# Patient Record
Sex: FEMALE | Race: WHITE | Employment: OTHER | URBAN - METROPOLITAN AREA
[De-identification: names, ages, dates, MRNs, and addresses within clinical notes are randomized per-mention and may not be internally consistent; named-entity substitution may affect disease eponyms.]

---

## 2017-01-16 DIAGNOSIS — E78.5 HYPERLIPIDEMIA LDL GOAL <100: Primary | ICD-10-CM

## 2017-01-16 RX ORDER — ATORVASTATIN CALCIUM 40 MG/1
40 TABLET, FILM COATED ORAL DAILY
Qty: 90 TABLET | Refills: 3 | OUTPATIENT
Start: 2017-01-16

## 2017-01-18 ENCOUNTER — TELEPHONE (OUTPATIENT)
Dept: INTERNAL MEDICINE | Facility: CLINIC | Age: 67
End: 2017-01-18

## 2017-01-18 DIAGNOSIS — E78.5 HYPERLIPIDEMIA LDL GOAL <100: Primary | ICD-10-CM

## 2017-01-18 NOTE — TELEPHONE ENCOUNTER
Informed pharmacy that rx could not be filled, pharmacy tech stated understanding and will inform pt.

## 2017-01-18 NOTE — TELEPHONE ENCOUNTER
Reason for Call:  Medication or medication refill:    Do you use a [facility]?  Name of the pharmacy and phone number for the current request:  [facility] [place] [facility] [place] [phone]    Name of the medication requested: atorvastatin 40mg, 1 per day    Other request: Pt says she discussed this with Dr Vega and asked if she would write this script for her when it ran out.  Previous dr that wrote it for her she does not need to see anymore. Pt is in [place] and won't be home until April.    Can we leave a detailed message on this number? YES    Phone number patient can be reached at: Cell number on file:    Telephone Information:   Mobile [phone]       Best Time: any    Call taken on 1/18/2017 at 3:07 PM by Ginger Cavanaugh

## 2017-01-19 NOTE — TELEPHONE ENCOUNTER
Atorvastatin    Provider approval needed, this was originally done by Cardiology, but they want PCP to fill now.     Last Written Prescription Date: 1/11/16  Last Fill Quantity: 90, # refills: 3    Last Office Visit with [facility], [facility] or [facility] prescribing provider: 11/28/16       CHOL      176   4/12/2016  HDL       60   4/12/2016  LDL       95   4/12/2016  TRIG      103   4/12/2016  CHOLHDLRATIO      3.1   3/11/2015    Labs showing if normal/abnormal  Lab Results   Component Value Date    CHOL 176 04/12/2016    TRIG 103 04/12/2016    HDL 60 04/12/2016    LDL 95 04/12/2016    VLDL 27 03/11/2015    CHOLHDLRATIO 3.1 03/11/2015

## 2017-01-20 RX ORDER — ATORVASTATIN CALCIUM 40 MG/1
20 TABLET, FILM COATED ORAL DAILY
Qty: 90 TABLET | Refills: 1 | Status: SHIPPED | OUTPATIENT
Start: 2017-01-20 | End: 2017-04-07

## 2017-03-09 DIAGNOSIS — K21.9 GASTROESOPHAGEAL REFLUX DISEASE WITHOUT ESOPHAGITIS: ICD-10-CM

## 2017-03-13 RX ORDER — PANTOPRAZOLE SODIUM 40 MG/1
40 TABLET, DELAYED RELEASE ORAL DAILY
Qty: 90 TABLET | Refills: 1 | Status: SHIPPED | OUTPATIENT
Start: 2017-03-13 | End: 2017-04-07

## 2017-03-17 DIAGNOSIS — I10 ESSENTIAL HYPERTENSION WITH GOAL BLOOD PRESSURE LESS THAN 140/90: ICD-10-CM

## 2017-03-17 NOTE — TELEPHONE ENCOUNTER
Valsartan      Last Written Prescription Date: 12/16/16  Last Fill Quantity: 45, # refills: 0  Last Office Visit with G, P or [facility] prescribing provider: 11/28/16       Potassium   Date Value Ref Range Status   12/02/2016 4.7 3.4 - 5.3 mmol/L Final     Creatinine   Date Value Ref Range Status   12/02/2016 1.26 (H) 0.52 - 1.04 mg/dL Final   12/08/2006 1.29 0.60 - 1.40 mg/dL Final     BP Readings from Last 3 Encounters:   12/05/16 150/79   11/28/16 126/50   11/14/16 110/78

## 2017-03-20 RX ORDER — VALSARTAN 160 MG/1
80 TABLET ORAL DAILY
Qty: 45 TABLET | Refills: 0 | Status: SHIPPED | OUTPATIENT
Start: 2017-03-20 | End: 2017-04-07

## 2017-03-24 DIAGNOSIS — Z94.0 KIDNEY REPLACED BY TRANSPLANT: Primary | ICD-10-CM

## 2017-03-27 RX ORDER — MYCOPHENOLIC ACID 180 MG/1
540 TABLET, DELAYED RELEASE ORAL 2 TIMES DAILY
Qty: 180 TABLET | Refills: 11 | Status: SHIPPED | OUTPATIENT
Start: 2017-03-27 | End: 2018-03-21

## 2017-03-31 DIAGNOSIS — Z94.0 KIDNEY TRANSPLANTED: ICD-10-CM

## 2017-03-31 DIAGNOSIS — Z48.298 AFTERCARE FOLLOWING ORGAN TRANSPLANT: ICD-10-CM

## 2017-03-31 DIAGNOSIS — Z79.899 ENCOUNTER FOR LONG-TERM CURRENT USE OF MEDICATION: ICD-10-CM

## 2017-03-31 DIAGNOSIS — Z94.0 KIDNEY REPLACED BY TRANSPLANT: ICD-10-CM

## 2017-03-31 LAB
ANION GAP SERPL CALCULATED.3IONS-SCNC: 8 MMOL/L (ref 3–14)
BUN SERPL-MCNC: 27 MG/DL (ref 7–30)
CALCIUM SERPL-MCNC: 9 MG/DL (ref 8.5–10.1)
CHLORIDE SERPL-SCNC: 105 MMOL/L (ref 94–109)
CO2 SERPL-SCNC: 28 MMOL/L (ref 20–32)
CREAT SERPL-MCNC: 1.33 MG/DL (ref 0.52–1.04)
CYCLOSPORINE BLD LC/MS/MS-MCNC: 83 UG/L (ref 50–400)
ERYTHROCYTE [DISTWIDTH] IN BLOOD BY AUTOMATED COUNT: 12.8 % (ref 10–15)
GFR SERPL CREATININE-BSD FRML MDRD: 40 ML/MIN/1.7M2
GLUCOSE SERPL-MCNC: 159 MG/DL (ref 70–99)
HCT VFR BLD AUTO: 35.7 % (ref 35–47)
HGB BLD-MCNC: 11 G/DL (ref 11.7–15.7)
MCH RBC QN AUTO: 28.4 PG (ref 26.5–33)
MCHC RBC AUTO-ENTMCNC: 30.8 G/DL (ref 31.5–36.5)
MCV RBC AUTO: 92 FL (ref 78–100)
PLATELET # BLD AUTO: 217 10E9/L (ref 150–450)
POTASSIUM SERPL-SCNC: 4.2 MMOL/L (ref 3.4–5.3)
RBC # BLD AUTO: 3.87 10E12/L (ref 3.8–5.2)
SODIUM SERPL-SCNC: 141 MMOL/L (ref 133–144)
TME LAST DOSE: NORMAL H
WBC # BLD AUTO: 4 10E9/L (ref 4–11)

## 2017-03-31 PROCEDURE — 87799 DETECT AGENT NOS DNA QUANT: CPT | Performed by: INTERNAL MEDICINE

## 2017-03-31 PROCEDURE — 80158 DRUG ASSAY CYCLOSPORINE: CPT | Performed by: INTERNAL MEDICINE

## 2017-04-03 LAB
EBV DNA # SPEC NAA+PROBE: ABNORMAL {COPIES}/ML
EBV DNA SPEC NAA+PROBE-LOG#: ABNORMAL {LOG_COPIES}/ML

## 2017-04-07 ENCOUNTER — OFFICE VISIT (OUTPATIENT)
Dept: INTERNAL MEDICINE | Facility: CLINIC | Age: 67
End: 2017-04-07
Payer: MEDICARE

## 2017-04-07 VITALS
HEIGHT: 64 IN | HEART RATE: 81 BPM | DIASTOLIC BLOOD PRESSURE: 62 MMHG | OXYGEN SATURATION: 98 % | WEIGHT: 177 LBS | TEMPERATURE: 97.6 F | BODY MASS INDEX: 30.22 KG/M2 | SYSTOLIC BLOOD PRESSURE: 134 MMHG

## 2017-04-07 DIAGNOSIS — Z79.4 TYPE 2 DIABETES MELLITUS WITH DIABETIC NEPHROPATHY, WITH LONG-TERM CURRENT USE OF INSULIN (H): Primary | ICD-10-CM

## 2017-04-07 DIAGNOSIS — E78.5 HYPERLIPIDEMIA LDL GOAL <100: ICD-10-CM

## 2017-04-07 DIAGNOSIS — K21.9 GASTROESOPHAGEAL REFLUX DISEASE WITHOUT ESOPHAGITIS: ICD-10-CM

## 2017-04-07 DIAGNOSIS — I25.10 CORONARY ARTERY DISEASE INVOLVING NATIVE HEART WITHOUT ANGINA PECTORIS, UNSPECIFIED VESSEL OR LESION TYPE: ICD-10-CM

## 2017-04-07 DIAGNOSIS — M85.80 OSTEOPENIA: ICD-10-CM

## 2017-04-07 DIAGNOSIS — I15.1 HYPERTENSION SECONDARY TO OTHER RENAL DISORDERS: ICD-10-CM

## 2017-04-07 DIAGNOSIS — E11.21 TYPE 2 DIABETES MELLITUS WITH DIABETIC NEPHROPATHY, WITH LONG-TERM CURRENT USE OF INSULIN (H): Primary | ICD-10-CM

## 2017-04-07 DIAGNOSIS — D84.9 IMMUNOSUPPRESSED STATUS (H): ICD-10-CM

## 2017-04-07 PROCEDURE — 99214 OFFICE O/P EST MOD 30 MIN: CPT | Performed by: INTERNAL MEDICINE

## 2017-04-07 RX ORDER — CYCLOSPORINE 25 MG/1
75 CAPSULE ORAL 2 TIMES DAILY
Qty: 180 CAPSULE | Refills: 11 | Status: CANCELLED | OUTPATIENT
Start: 2017-04-07

## 2017-04-07 RX ORDER — VALSARTAN 160 MG/1
80 TABLET ORAL DAILY
Qty: 45 TABLET | Refills: 1 | Status: SHIPPED | OUTPATIENT
Start: 2017-04-07 | End: 2017-12-22

## 2017-04-07 RX ORDER — CHLORTHALIDONE 25 MG/1
25 TABLET ORAL DAILY
Qty: 90 TABLET | Refills: 3 | Status: SHIPPED | OUTPATIENT
Start: 2017-04-07 | End: 2018-06-17

## 2017-04-07 RX ORDER — ISOSORBIDE MONONITRATE 30 MG/1
15 TABLET, EXTENDED RELEASE ORAL
Qty: 90 TABLET | Refills: 1 | Status: SHIPPED | OUTPATIENT
Start: 2017-04-07 | End: 2017-11-12

## 2017-04-07 RX ORDER — ALENDRONATE SODIUM 70 MG/1
70 TABLET ORAL
Qty: 12 TABLET | Refills: 3 | Status: SHIPPED | OUTPATIENT
Start: 2017-04-07 | End: 2017-12-21 | Stop reason: ALTCHOICE

## 2017-04-07 RX ORDER — PANTOPRAZOLE SODIUM 40 MG/1
40 TABLET, DELAYED RELEASE ORAL DAILY
Qty: 90 TABLET | Refills: 1 | Status: SHIPPED | OUTPATIENT
Start: 2017-04-07 | End: 2017-12-22

## 2017-04-07 RX ORDER — ATORVASTATIN CALCIUM 40 MG/1
20 TABLET, FILM COATED ORAL DAILY
Qty: 45 TABLET | Refills: 1 | Status: SHIPPED | OUTPATIENT
Start: 2017-04-07 | End: 2017-10-26

## 2017-04-07 RX ORDER — METOPROLOL SUCCINATE 25 MG/1
25 TABLET, EXTENDED RELEASE ORAL AT BEDTIME
Qty: 90 TABLET | Refills: 1 | Status: SHIPPED | OUTPATIENT
Start: 2017-04-07 | End: 2017-12-21

## 2017-04-07 RX ORDER — AMLODIPINE BESYLATE 10 MG/1
10 TABLET ORAL DAILY
Qty: 90 TABLET | Refills: 1 | Status: SHIPPED | OUTPATIENT
Start: 2017-04-07 | End: 2017-12-14

## 2017-04-07 RX ORDER — ATORVASTATIN CALCIUM 40 MG/1
40 TABLET, FILM COATED ORAL DAILY
Qty: 90 TABLET | Refills: 1 | Status: SHIPPED | OUTPATIENT
Start: 2017-04-07 | End: 2017-04-07

## 2017-04-07 NOTE — NURSING NOTE
"Chief Complaint   Patient presents with     Recheck Medication     pt sees Endo for her diabetes       Initial /62 (BP Location: Left arm, Patient Position: Chair, Cuff Size: Adult Large)  Pulse 81  Temp 97.6  F (36.4  C) (Oral)  Ht 5' 4\" (1.626 m)  Wt 177 lb (80.3 kg)  SpO2 98%  BMI 30.38 kg/m2 Estimated body mass index is 30.38 kg/(m^2) as calculated from the following:    Height as of this encounter: 5' 4\" (1.626 m).    Weight as of this encounter: 177 lb (80.3 kg).  Medication Reconciliation: complete    "

## 2017-04-07 NOTE — PROGRESS NOTES
.  SUBJECTIVE:                                                    Viv Shaw is a 66 year old female who presents to clinic today for the following health issues:      Hyperlipidemia Follow-Up      Rate your low fat/cholesterol diet?: good    Taking statin?  Yes, no muscle aches from statin    Other lipid medications/supplements?:  none     Hypertension Follow-up      Outpatient blood pressures are being checked at home.  Results are stable.    Low Salt Diet: no added salt       Diabetes Follow-up    Patient is checking blood sugars: three times daily.  Pt says gets her A1c and urine test through her endocrinologist  Results are as follows: 115    Diabetic concerns: None     Symptoms of hypoglycemia (low blood sugar): none     Paresthesias (numbness or burning in feet) or sores: Yes      Date of last diabetic eye exam: 08/16     Vascular Disease Follow-up:  Coronary Artery Disease (CAD)      Chest pain or pressure, left side neck or arm pain: No    Shortness of breath/increased sweats/nausea with exertion: No    Pain in calves walking 1-2 blocks: No    Worsened or new symptoms since last visit: No    Nitroglycerin use: no    Daily aspirin use: Yes     Hypothyroidism Follow-up      Since last visit, patient describes the following symptoms: Weight flucuates, no hair loss, no skin changes, no constipation, no loose stools         Amount of exercise or physical activity: pt is active    Problems taking medications regularly: No    Medication side effects: none    Diet: low salt and low fat/cholesterol       Patient Active Problem List   Diagnosis     Other vitamin B12 deficiency anemia     Irritable bowel syndrome     GERD (gastroesophageal reflux disease)     Advanced directives, counseling/discussion     Kidney replaced by transplant     Immunosuppressed status (HCC)     Dyslipidemia     Hyperlipidemia LDL goal <100     Long-term use of immunosuppressant medication     CAD S/P percutaneous coronary angioplasty      Anemia in chronic renal disease     Other specified hypothyroidism     Coronary artery disease involving native heart without angina pectoris, unspecified vessel or lesion type     Type 2 diabetes mellitus with diabetic nephropathy, with long-term current use of insulin (H)     Hypertension secondary to other renal disorders     Aftercare following organ transplant     Past Surgical History:   Procedure Laterality Date     C APPENDECTOMY       C NONSPECIFIC PROCEDURE      lt rotator cuff surgery.     C NONSPECIFIC PROCEDURE      renal transplant. RT     C NONSPECIFIC PROCEDURE      angioplasty with stent     C NONSPECIFIC PROCEDURE      nasal surgery     C REMOVAL GALLBLADDER       C REMV CATARACT INTRACAP,INSERT LENS      rt     [facility]     COLONOSCOPY N/A 2016    Procedure: COLONOSCOPY;  Surgeon: Rashard Snider MD;  Location: [facility]     HC LAPAROSCOPY, SURGICAL; CHOLECYSTECTOMY       HC REMOVAL OF TONSILS,<13 Y/O       HYSTERECTOMY, PAP NO LONGER INDICATED         Social History   Substance Use Topics     Smoking status: Never Smoker     Smokeless tobacco: Never Used     Alcohol use No     Family History   Problem Relation Age of Onset     Respiratory Mother       age 71     Cardiovascular Father       age 75 hyertension     Arthritis Sister      living, healthy     Family History Negative Brother       age 44     Colon Cancer No family hx of          Current Outpatient Prescriptions   Medication Sig Dispense Refill     mycophenolic acid (MYFORTIC - GENERIC EQUIVALENT) 180 MG EC tablet Take 3 tablets (540 mg) by mouth 2 times daily 180 tablet 11     valsartan (DIOVAN) 160 MG tablet Take 0.5 tablets (80 mg) by mouth daily 45 tablet 0     pantoprazole (PROTONIX) 40 MG EC tablet Take 1 tablet (40 mg) by mouth daily 90 tablet 1     atorvastatin (LIPITOR) 40 MG tablet Take 0.5 tablets (20 mg) by mouth daily 90 tablet 1     isosorbide  mononitrate (IMDUR) 30 MG 24 hr tablet Take 0.5 tablets (15 mg) by mouth 2 times daily 90 tablet 1     ferrous sulfate (IRON) 325 (65 FE) MG tablet Take 1 tablet (325 mg) by mouth daily (with breakfast) 100 tablet 1     amLODIPine (NORVASC) 10 MG tablet Take 1 tablet (10 mg) by mouth daily 90 tablet 1     metoprolol (TOPROL XL) 25 MG 24 hr tablet Take 1 tablet (25 mg) by mouth At Bedtime 90 tablet 1     chlorthalidone (HYGROTON) 25 MG tablet Take 1 tablet (25 mg) by mouth daily 90 tablet 3     GENGRAF 25 MG PO CAPSULE Take 3 capsules (75 mg) by mouth 2 times daily 180 capsule 11     FLORIN CONTOUR test strip   0     alendronate (FOSAMAX) 70 MG tablet Take 1 tablet (70 mg) by mouth every 7 days Take with over 8 ounces water and stay upright for at least 30 minutes after dose.  Take at least 60 minutes before breakfast 12 tablet 3     levothyroxine (SYNTHROID) 112 MCG tablet Take by mouth daily       aspirin 325 MG EC tablet Take 1 tablet by mouth daily. 100 tablet 3     MULTIVITAMINS OR TABS ONE DAILY 100 1 YEAR     LANTUS - INSULIN GLARGINE 22 u q pm 0 0     NOVOLOG 100 U/ML SC SOLN sliding scale 4-6 units tid         Reviewed and updated as needed this visit by clinical staff  Tobacco  Allergies  Meds  Med Hx  Surg Hx  Fam Hx  Soc Hx      Reviewed and updated as needed this visit by Provider         ROS:  C: NEGATIVE for fever, chills, change in weight  E/M: NEGATIVE for ear, mouth and throat problems  R: NEGATIVE for significant cough or SOB  CV: NEGATIVE for chest pain, palpitations or peripheral edema  GI: NEGATIVE for nausea, abdominal pain, heartburn, or change in bowel habits  MUSCULOSKELETAL: NEGATIVE for significant arthralgias or myalgia  ENDOCRINE: NEGATIVE for temperature intolerance, skin/hair changes    OBJECTIVE:                                                    /62 (BP Location: Left arm, Patient Position: Chair, Cuff Size: Adult Large)  Pulse 81  Temp 97.6  F (36.4  C) (Oral)  Ht 5'  "4\" (1.626 m)  Wt 177 lb (80.3 kg)  SpO2 98%  BMI 30.38 kg/m2  Body mass index is 30.38 kg/(m^2).   GENERAL: healthy, alert, well nourished, well hydrated, no distress  EYES: Eyes grossly normal to inspection, extraocular movements - intact, and PERRL  HENT: ear canals- normal; TMs- normal; Nose- normal; Mouth- no ulcers, no lesions  NECK: no tenderness, no adenopathy, no asymmetry, no masses, no stiffness; thyroid- normal to palpation  RESP: lungs clear to auscultation - no rales, no rhonchi, no wheezes  CV: regular rates and rhythm, normal S1 S2,    MS: extremities- no gross deformities noted, no edema  NEURO: strength and tone- normal, sensory exam- grossly normal, mentation- intact, speech- normal, reflexes- symmetric  Diabetic foot exam: normal DP and PT pulses, no trophic changes or ulcerative lesions, normal sensory exam and normal monofilament exam       ASSESSMENT/PLAN:                                                       (I15.1,  N28.89) Hypertension secondary to other renal disorders  Plan: amLODIPine (NORVASC) 10 MG tablet, chlorthalidone (HYGROTON) 25 MG tablet, metoprolol (TOPROL XL) 25 MG 24 hr tablet,  valsartan (DIOVAN) 160 MG tablet  Refilled.explained clearly about the medication,insructions and side effects.Advised to follow low salt diet and exercise and f/u in 6 mths.             (I25.10) Coronary artery disease involving native heart without angina pectoris, unspecified vessel or lesion type  Comment: stable   Plan: isosorbide mononitrate (IMDUR) 30 MG 24 hr  tablet, metoprolol (TOPROL XL) 25 MG 24 hr         Tablet as directed.explained clearly about the medication,insructions and side effects.         (D89.9) Immunosuppressed status (HCC)  Plan: on immunosuppressants,follows up at transplant clinic.       (E11.21,  Z79.4) Type 2 diabetes mellitus with diabetic nephropathy, with long-term current use of insulin (H)    Plan:sees endocrinologist, on lantus 23 units at hs, getting A1c next " week at her endocrine office.       (E78.5) Hyperlipidemia LDL goal <100  Plan: atorvastatin (LIPITOR) 40 MG tablet was reduced to 20 mg last time sec to being on Gengraf,but pt has been taking 1 tab daily, advised to decrease Lipitor to 20 mg .  ,                    (K21.9) Gastroesophageal reflux disease without esophagitis  Comment: stable  Plan: pantoprazole (PROTONIX) 40 MG EC tablet            (M85.80) Osteopenia  Plan: alendronate (FOSAMAX) 70 MG tablet refilled.explained clearly about the medication,insructions and side effects.             Summer Vega MD  [facility]

## 2017-04-07 NOTE — MR AVS SNAPSHOT
After Visit Summary   4/7/2017    Viv Shaw    MRN: [id number]           Patient Information     Date Of Birth          1950        Visit Information        Provider Department      4/7/2017 10:00 AM Summer Vega MD [facility]        Today's Diagnoses     Type 2 diabetes mellitus with diabetic nephropathy, with long-term current use of insulin (H)    -  1    Hypertension secondary to other renal disorders        Coronary artery disease involving native heart without angina pectoris, unspecified vessel or lesion type        Immunosuppressed status (HCC)        Hyperlipidemia LDL goal <100        Gastroesophageal reflux disease without esophagitis        Osteopenia           Follow-ups after your visit        Your next 10 appointments already scheduled     Apr 13, 2017  8:45 AM CDT   LAB with [facility]   [facility] ([facility])    [address]  [facility] [address] [phone]           Patient must bring picture ID.  Patient should be prepared to give a urine specimen  Please do not eat 10-12 hours before your appointment if you are coming in fasting for labs on lipids, cholesterol, or glucose (sugar).  Pregnant women should follow their Care Team instructions. Water with medications is okay. Do not drink coffee or other fluids.   If you have concerns about taking  your medications, please ask at office or if scheduling via [employer], send a message by clicking on Secure Messaging, Message Your Care Team.            Dec 04, 2017  1:05 PM CST   (Arrive by 12:35 PM)   Return Kidney Transplant with Morro Veloz MD   [facility] ([facility])    [address]  [facility] [address] [phone]              Future tests that were ordered for you today     Open Future Orders        Priority Expected Expires Ordered    Lipid panel reflex to direct LDL Routine  " 6/7/2017 4/7/2017    Hepatic panel Routine  6/7/2017 4/7/2017    Albumin Random Urine Quantitative Routine  6/7/2017 4/7/2017            Who to contact     If you have questions or need follow up information about today's clinic visit or your schedule please contact [facility] directly at [phone].  Normal or non-critical lab and imaging results will be communicated to you by MyChart, letter or phone within 4 business days after the clinic has received the results. If you do not hear from us within 7 days, please contact the clinic through "[employer]hart or phone. If you have a critical or abnormal lab result, we will notify you by phone as soon as possible.  Submit refill requests through [employer]one or call your pharmacy and they will forward the refill request to us. Please allow 3 business days for your refill to be completed.          Additional Information About Your Visit        "[employer] Knowledge Information     [employer]one gives you secure access to your electronic health record. If you see a primary care provider, you can also send messages to your care team and make appointments. If you have questions, please call your primary care clinic.  If you do not have a primary care provider, please call [phone] and they will assist you.        Care EveryWhere ID     This is your Care EveryWhere ID. This could be used by other organizations to access your [facility] medical records  [id number]        Your Vitals Were     Pulse Temperature Height Pulse Oximetry BMI (Body Mass Index)       81 97.6  F (36.4  C) (Oral) 5' 4\" (1.626 m) 98% 30.38 kg/m2        Blood Pressure from Last 3 Encounters:   04/07/17 134/62   12/05/16 150/79   11/28/16 126/50    Weight from Last 3 Encounters:   04/07/17 177 lb (80.3 kg)   12/05/16 175 lb (79.4 kg)   11/28/16 175 lb (79.4 kg)                 Where to get your medicines      These medications were sent to [facility]/pharmacy #[id number] - [place], [place] - [address], [place] [address]     Phone:  [phone]     amLODIPine 10 MG tablet    atorvastatin 40 MG tablet    chlorthalidone 25 MG tablet    isosorbide mononitrate 30 MG 24 hr tablet    metoprolol 25 MG 24 hr tablet    pantoprazole 40 MG EC tablet    valsartan 160 MG tablet         Some of these will need a paper prescription and others can be bought over the counter.  Ask your nurse if you have questions.     Bring a paper prescription for each of these medications     alendronate 70 MG tablet          Primary Care Provider Office Phone # Fax #    Summer LUIS Vega [phone]       [facility] [address] [facility] [address]        Thank you!     Thank you for choosing [facility]  for your care. Our goal is always to provide you with excellent care. Hearing back from our patients is one way we can continue to improve our services. Please take a few minutes to complete the written survey that you may receive in the mail after your visit with us. Thank you!             Your Updated Medication List - Protect others around you: Learn how to safely use, store and throw away your medicines at www.disposemymeds.org.          This list is accurate as of: 4/7/17  7:23 PM.  Always use your most recent med list.                   Brand Name Dispense Instructions for use    alendronate 70 MG tablet    FOSAMAX    12 tablet    Take 1 tablet (70 mg) by mouth every 7 days Take with over 8 ounces water and stay upright for at least 30 minutes after dose.  Take at least 60 minutes before breakfast       amLODIPine 10 MG tablet    NORVASC    90 tablet    Take 1 tablet (10 mg) by mouth daily       aspirin 325 MG EC tablet     100 tablet    Take 1 tablet by mouth daily.       atorvastatin 40 MG tablet    LIPITOR    45 tablet    Take 0.5 tablets (20 mg) by mouth daily       FLORIN CONTOUR test strip   Generic drug:  blood glucose monitoring          chlorthalidone 25  MG tablet    HYGROTON    90 tablet    Take 1 tablet (25 mg) by mouth daily       cycloSPORINE modified capsule     180 capsule    Take 3 capsules (75 mg) by mouth 2 times daily       ferrous sulfate 325 (65 FE) MG tablet    IRON    100 tablet    Take 1 tablet (325 mg) by mouth daily (with breakfast)       isosorbide mononitrate 30 MG 24 hr tablet    IMDUR    90 tablet    Take 0.5 tablets (15 mg) by mouth 2 times daily       LANTUS - INSULIN GLARGINE     0    22 u q pm       metoprolol 25 MG 24 hr tablet    TOPROL XL    90 tablet    Take 1 tablet (25 mg) by mouth At Bedtime       multivitamin per tablet     100    ONE DAILY       mycophenolic acid 180 MG EC tablet    MYFORTIC - GENERIC EQUIVALENT    180 tablet    Take 3 tablets (540 mg) by mouth 2 times daily       NovoLOG VIAL 100 UNITS/ML injection   Generic drug:  insulin aspart      sliding scale 4-6 units tid       pantoprazole 40 MG EC tablet    PROTONIX    90 tablet    Take 1 tablet (40 mg) by mouth daily       SYNTHROID 112 MCG tablet   Generic drug:  levothyroxine      Take by mouth daily       valsartan 160 MG tablet    DIOVAN    45 tablet    Take 0.5 tablets (80 mg) by mouth daily

## 2017-04-08 ENCOUNTER — APPOINTMENT (OUTPATIENT)
Dept: CT IMAGING | Facility: CLINIC | Age: 67
End: 2017-04-08
Attending: INTERNAL MEDICINE
Payer: MEDICARE

## 2017-04-08 ENCOUNTER — HOSPITAL ENCOUNTER (EMERGENCY)
Facility: CLINIC | Age: 67
Discharge: HOME | End: 2017-04-08
Attending: INTERNAL MEDICINE | Admitting: INTERNAL MEDICINE
Payer: MEDICARE

## 2017-04-08 VITALS
OXYGEN SATURATION: 97 % | DIASTOLIC BLOOD PRESSURE: 67 MMHG | HEART RATE: 77 BPM | RESPIRATION RATE: 20 BRPM | WEIGHT: 175 LBS | BODY MASS INDEX: 29.88 KG/M2 | SYSTOLIC BLOOD PRESSURE: 159 MMHG | HEIGHT: 64 IN | TEMPERATURE: 97.9 F

## 2017-04-08 DIAGNOSIS — K43.9 ABDOMINAL WALL HERNIA: ICD-10-CM

## 2017-04-08 LAB
ALBUMIN SERPL-MCNC: 3.9 G/DL (ref 3.4–5)
ALP SERPL-CCNC: 56 U/L (ref 40–150)
ALT SERPL W P-5'-P-CCNC: 17 U/L (ref 0–50)
ANION GAP SERPL CALCULATED.3IONS-SCNC: 10 MMOL/L (ref 3–14)
AST SERPL W P-5'-P-CCNC: 12 U/L (ref 0–45)
BASOPHILS # BLD AUTO: 0 10E9/L (ref 0–0.2)
BASOPHILS NFR BLD AUTO: 0.2 %
BILIRUB SERPL-MCNC: 0.5 MG/DL (ref 0.2–1.3)
BUN SERPL-MCNC: 31 MG/DL (ref 7–30)
CALCIUM SERPL-MCNC: 9.7 MG/DL (ref 8.5–10.1)
CHLORIDE SERPL-SCNC: 106 MMOL/L (ref 94–109)
CO2 SERPL-SCNC: 27 MMOL/L (ref 20–32)
CREAT SERPL-MCNC: 1.17 MG/DL (ref 0.52–1.04)
DIFFERENTIAL METHOD BLD: ABNORMAL
EOSINOPHIL # BLD AUTO: 0.1 10E9/L (ref 0–0.7)
EOSINOPHIL NFR BLD AUTO: 1.5 %
ERYTHROCYTE [DISTWIDTH] IN BLOOD BY AUTOMATED COUNT: 13.2 % (ref 10–15)
GFR SERPL CREATININE-BSD FRML MDRD: 46 ML/MIN/1.7M2
GLUCOSE SERPL-MCNC: 104 MG/DL (ref 70–99)
HCT VFR BLD AUTO: 34.7 % (ref 35–47)
HGB BLD-MCNC: 11.2 G/DL (ref 11.7–15.7)
IMM GRANULOCYTES # BLD: 0 10E9/L (ref 0–0.4)
IMM GRANULOCYTES NFR BLD: 0.3 %
LIPASE SERPL-CCNC: 224 U/L (ref 73–393)
LYMPHOCYTES # BLD AUTO: 1.1 10E9/L (ref 0.8–5.3)
LYMPHOCYTES NFR BLD AUTO: 17.7 %
MCH RBC QN AUTO: 29 PG (ref 26.5–33)
MCHC RBC AUTO-ENTMCNC: 32.3 G/DL (ref 31.5–36.5)
MCV RBC AUTO: 90 FL (ref 78–100)
MONOCYTES # BLD AUTO: 0.6 10E9/L (ref 0–1.3)
MONOCYTES NFR BLD AUTO: 9.7 %
NEUTROPHILS # BLD AUTO: 4.3 10E9/L (ref 1.6–8.3)
NEUTROPHILS NFR BLD AUTO: 70.6 %
NRBC # BLD AUTO: 0 10*3/UL
NRBC BLD AUTO-RTO: 0 /100
PLATELET # BLD AUTO: 226 10E9/L (ref 150–450)
POTASSIUM SERPL-SCNC: 4 MMOL/L (ref 3.4–5.3)
PROT SERPL-MCNC: 7.5 G/DL (ref 6.8–8.8)
RBC # BLD AUTO: 3.86 10E12/L (ref 3.8–5.2)
SODIUM SERPL-SCNC: 143 MMOL/L (ref 133–144)
WBC # BLD AUTO: 6.1 10E9/L (ref 4–11)

## 2017-04-08 PROCEDURE — 74176 CT ABD & PELVIS W/O CONTRAST: CPT

## 2017-04-08 PROCEDURE — 99284 EMERGENCY DEPT VISIT MOD MDM: CPT | Mod: 25

## 2017-04-08 PROCEDURE — 85025 COMPLETE CBC W/AUTO DIFF WBC: CPT | Performed by: INTERNAL MEDICINE

## 2017-04-08 PROCEDURE — 83690 ASSAY OF LIPASE: CPT | Performed by: INTERNAL MEDICINE

## 2017-04-08 PROCEDURE — 80053 COMPREHEN METABOLIC PANEL: CPT | Performed by: INTERNAL MEDICINE

## 2017-04-08 RX ORDER — LIDOCAINE 40 MG/G
CREAM TOPICAL
Status: DISCONTINUED | OUTPATIENT
Start: 2017-04-08 | End: 2017-04-09 | Stop reason: HOSPADM

## 2017-04-08 ASSESSMENT — ENCOUNTER SYMPTOMS
ABDOMINAL PAIN: 0
FEVER: 0
NAUSEA: 0
VOMITING: 0
DIFFICULTY URINATING: 0
DIARRHEA: 0
ABDOMINAL DISTENTION: 1

## 2017-04-08 NOTE — ED AVS SNAPSHOT
[facility] Emergency Department    [address]    [facility] [address]    Phone:  [phone]    Fax:  [phone]                                       Viv Shaw   MRN: [id number]    Department:  [facility] Emergency Department   Date of Visit:  4/8/2017           After Visit Summary Signature Page     I have received my discharge instructions, and my questions have been answered. I have discussed any challenges I see with this plan with the nurse or doctor.    ..........................................................................................................................................  Patient/Patient Representative Signature      ..........................................................................................................................................  Patient Representative Print Name and Relationship to Patient    ..................................................               ................................................  Date                                            Time    ..........................................................................................................................................  Reviewed by Signature/Title    ...................................................              ..............................................  Date                                                            Time

## 2017-04-08 NOTE — ED AVS SNAPSHOT
[facility] Emergency Department    [address]    [facility] [address]    Phone:  [phone]    Fax:  [phone]                                       Viv Shaw   MRN: [id number]    Department:  [facility] Emergency Department   Date of Visit:  4/8/2017           Patient Information     Date Of Birth          1950        Your diagnoses for this visit were:     Abdominal wall hernia        You were seen by Breanna Champion MD and Gurwinder Posadas MD.      Follow-up Information     Follow up with Renal Transplant clinic this week as discussed.      Discharge References/Attachments     HERNIA, WHAT IS A (ENGLISH)      Future Appointments        Provider Department Dept Phone Center    4/13/2017 8:45 AM [facility] [phone] [place]    12/4/2017 1:05 PM Morro Veloz MD [facility] Nephrology [phone] [facility]      24 Hour Appointment Hotline       To make an appointment at any [facility], call [phone] ([phone]). If you don't have a family doctor or clinic, we will help you find one. [facility] clinics are conveniently located to serve the needs of you and your family.             Review of your medicines      Our records show that you are taking the medicines listed below. If these are incorrect, please call your family doctor or clinic.        Dose / Directions Last dose taken    alendronate 70 MG tablet   Commonly known as:  FOSAMAX   Dose:  70 mg   Quantity:  12 tablet        Take 1 tablet (70 mg) by mouth every 7 days Take with over 8 ounces water and stay upright for at least 30 minutes after dose.  Take at least 60 minutes before breakfast   Refills:  3        amLODIPine 10 MG tablet   Commonly known as:  NORVASC   Dose:  10 mg   Quantity:  90 tablet        Take 1 tablet (10 mg) by mouth daily   Refills:  1        aspirin 325 MG EC tablet   Dose:  325 mg   Quantity:  100 tablet        Take 1 tablet  by mouth daily.   Refills:  3        atorvastatin 40 MG tablet   Commonly known as:  LIPITOR   Dose:  20 mg   Quantity:  45 tablet        Take 0.5 tablets (20 mg) by mouth daily   Refills:  1        FLORIN CONTOUR test strip   Generic drug:  blood glucose monitoring        Refills:  0        chlorthalidone 25 MG tablet   Commonly known as:  HYGROTON   Dose:  25 mg   Quantity:  90 tablet        Take 1 tablet (25 mg) by mouth daily   Refills:  3        cycloSPORINE modified capsule   Dose:  75 mg   Quantity:  180 capsule        Take 3 capsules (75 mg) by mouth 2 times daily   Refills:  11        ferrous sulfate 325 (65 FE) MG tablet   Commonly known as:  IRON   Dose:  1 tablet   Quantity:  100 tablet        Take 1 tablet (325 mg) by mouth daily (with breakfast)   Refills:  1        isosorbide mononitrate 30 MG 24 hr tablet   Commonly known as:  IMDUR   Dose:  15 mg   Quantity:  90 tablet        Take 0.5 tablets (15 mg) by mouth 2 times daily   Refills:  1        LANTUS - INSULIN GLARGINE   Quantity:  0        22 u q pm   Refills:  0        metoprolol 25 MG 24 hr tablet   Commonly known as:  TOPROL XL   Dose:  25 mg   Quantity:  90 tablet        Take 1 tablet (25 mg) by mouth At Bedtime   Refills:  1        multivitamin per tablet   Quantity:  100        ONE DAILY   Refills:  1 YEAR        mycophenolic acid 180 MG EC tablet   Commonly known as:  MYFORTIC - GENERIC EQUIVALENT   Dose:  540 mg   Quantity:  180 tablet        Take 3 tablets (540 mg) by mouth 2 times daily   Refills:  11        NovoLOG VIAL 100 UNITS/ML injection   Generic drug:  insulin aspart        sliding scale 4-6 units tid   Refills:  0        pantoprazole 40 MG EC tablet   Commonly known as:  PROTONIX   Dose:  40 mg   Quantity:  90 tablet        Take 1 tablet (40 mg) by mouth daily   Refills:  1        SYNTHROID 112 MCG tablet   Generic drug:  levothyroxine        Take by mouth daily   Refills:  0        valsartan 160 MG tablet   Commonly known as:   DIOVAN   Dose:  80 mg   Quantity:  45 tablet        Take 0.5 tablets (80 mg) by mouth daily   Refills:  1                Procedures and tests performed during your visit     CBC with platelets differential    CT Abdomen Pelvis w/o Contrast    Comprehensive metabolic panel    Lipase    Peripheral IV catheter      Orders Needing Specimen Collection     None      Pending Results     No orders found from 4/6/2017 to 4/9/2017.            Pending Culture Results     No orders found from 4/6/2017 to 4/9/2017.            Test Results From Your Hospital Stay        4/8/2017  9:52 PM      Component Results     Component Value Ref Range & Units Status    WBC 6.1 4.0 - 11.0 10e9/L Final    RBC Count 3.86 3.8 - 5.2 10e12/L Final    Hemoglobin 11.2 (L) 11.7 - 15.7 g/dL Final    Hematocrit 34.7 (L) 35.0 - 47.0 % Final    MCV 90 78 - 100 fl Final    MCH 29.0 26.5 - 33.0 pg Final    MCHC 32.3 31.5 - 36.5 g/dL Final    RDW 13.2 10.0 - 15.0 % Final    Platelet Count 226 150 - 450 10e9/L Final    Diff Method Automated Method  Final    % Neutrophils 70.6 % Final    % Lymphocytes 17.7 % Final    % Monocytes 9.7 % Final    % Eosinophils 1.5 % Final    % Basophils 0.2 % Final    % Immature Granulocytes 0.3 % Final    Nucleated RBCs 0 0 /100 Final    Absolute Neutrophil 4.3 1.6 - 8.3 10e9/L Final    Absolute Lymphocytes 1.1 0.8 - 5.3 10e9/L Final    Absolute Monocytes 0.6 0.0 - 1.3 10e9/L Final    Absolute Eosinophils 0.1 0.0 - 0.7 10e9/L Final    Absolute Basophils 0.0 0.0 - 0.2 10e9/L Final    Abs Immature Granulocytes 0.0 0 - 0.4 10e9/L Final    Absolute Nucleated RBC 0.0  Final         4/8/2017 10:10 PM      Component Results     Component Value Ref Range & Units Status    Sodium 143 133 - 144 mmol/L Final    Potassium 4.0 3.4 - 5.3 mmol/L Final    Chloride 106 94 - 109 mmol/L Final    Carbon Dioxide 27 20 - 32 mmol/L Final    Anion Gap 10 3 - 14 mmol/L Final    Glucose 104 (H) 70 - 99 mg/dL Final    Urea Nitrogen 31 (H) 7 - 30 mg/dL  Final    Creatinine 1.17 (H) 0.52 - 1.04 mg/dL Final    GFR Estimate 46 (L) >60 mL/min/1.7m2 Final    Non  GFR Calc    GFR Estimate If Black 56 (L) >60 mL/min/1.7m2 Final    African American GFR Calc    Calcium 9.7 8.5 - 10.1 mg/dL Final    Bilirubin Total 0.5 0.2 - 1.3 mg/dL Final    Albumin 3.9 3.4 - 5.0 g/dL Final    Protein Total 7.5 6.8 - 8.8 g/dL Final    Alkaline Phosphatase 56 40 - 150 U/L Final    ALT 17 0 - 50 U/L Final    AST 12 0 - 45 U/L Final         4/8/2017 10:10 PM      Component Results     Component Value Ref Range & Units Status    Lipase 224 73 - 393 U/L Final         4/8/2017 11:00 PM      Narrative     CT ABDOMEN AND PELVIS WITHOUT CONTRAST  4/8/2017 10:02 PM    HISTORY:  Swelling right lower abdomen. Patient is status post kidney  transplant, cholecystectomy, appendectomy, and hysterectomy.    TECHNIQUE: Scans obtained from the diaphragm through the pelvis  without oral or IV contrast.   Radiation dose for this scan was reduced using automated exposure  control, adjustment of the mA and/or kV according to patient size, or  iterative reconstruction technique.    COMPARISON:  None.    FINDINGS: Subtle groundglass density is only partially imaged in the  right lung base (image 1 series 2). This could represent infiltrate  which is not completely included. Visualized portions of the lung  bases and mediastinal contents are otherwise grossly unremarkable. No  aggressive osseous lesions are identified. Degenerative changes are  seen in the spine and SI joints.    Surgical clips in the gallbladder fossa are likely from prior  cholecystectomy. Common bile duct is prominent in extrahepatic  location likely compensatory status post cholecystectomy. It measures  up to approximately 1.7 diameter. The liver, pancreas, spleen, and  bilateral adrenal glands are otherwise normal in appearance for a  noncontrast CT. Severe atrophy bilateral kidneys is noted. Cystic  structure along the  peripheral aspect of left kidney measures 0.5 cm  and likely represents a cyst. No hydronephrosis, nephrolithiasis,  hydroureter or ureteral calculus is defined.    Urinary bladder is unremarkable. Pelvic kidney on the right is normal  in appearance without evidence for hydronephrosis, nephrolithiasis or  hydroureter.    No adenopathy, free fluid, or free air is identified. There is fairly  extensive nonaneurysmal atherosclerosis.    There is a large right ventral colon-containing hernia with an opening  measuring approximately 5.2 cm. No evidence for obstruction or  ischemia of the colon in this region is seen. The colon is otherwise  grossly of normal caliber. Moderate to large amount of stool is seen  in the colon. Appendix is not definitely seen. No pericecal  inflammatory change is identified. Small bowel is of normal caliber.  The stomach is partially distended by ingested material but is  otherwise unremarkable.        Impression     IMPRESSION:  1. Large right ventral lateral lower abdominal wall/pelvic hernia  containing colon without evidence for bowel obstruction or  strangulation in this region. This could be the cause of the patient's  symptoms.  2. Extensive nonaneurysmal atherosclerosis.  3. Severe bilateral native kidney atrophy. Cyst noted in the left  kidney. Right pelvic renal transplant appears within normal limits.  4. Groundglass density in the superior image of the lower lung bases  is only partially imaged. This could represent an infiltrate,  atelectasis or other abnormality in the right lung although it is only  partially imaged.  5. Extensive nonaneurysmal atherosclerosis.    MARGARITA LOPEZ MD                Clinical Quality Measure: Blood Pressure Screening     Your blood pressure was checked while you were in the emergency department today. The last reading we obtained was  BP: 157/65 . Please read the guidelines below about what these numbers mean and what you should do about them.  If  your systolic blood pressure (the top number) is less than 120 and your diastolic blood pressure (the bottom number) is less than 80, then your blood pressure is normal. There is nothing more that you need to do about it.  If your systolic blood pressure (the top number) is 120-139 or your diastolic blood pressure (the bottom number) is 80-89, your blood pressure may be higher than it should be. You should have your blood pressure rechecked within a year by a primary care provider.  If your systolic blood pressure (the top number) is 140 or greater or your diastolic blood pressure (the bottom number) is 90 or greater, you may have high blood pressure. High blood pressure is treatable, but if left untreated over time it can put you at risk for heart attack, stroke, or kidney failure. You should have your blood pressure rechecked by a primary care provider within the next 4 weeks.  If your provider in the emergency department today gave you specific instructions to follow-up with your doctor or provider even sooner than that, you should follow that instruction and not wait for up to 4 weeks for your follow-up visit.        Thank you for choosing [facility]       Thank you for choosing [facility] for your care. Our goal is always to provide you with excellent care. Hearing back from our patients is one way we can continue to improve our services. Please take a few minutes to complete the written survey that you may receive in the mail after you visit with us. Thank you!        [facility] Information     [facility] gives you secure access to your electronic health record. If you see a primary care provider, you can also send messages to your care team and make appointments. If you have questions, please call your primary care clinic.  If you do not have a primary care provider, please call [phone] and they will assist you.        Care EveryWhere ID     This is your Care EveryWhere ID. This could be used by other  organizations to access your [facility] medical records  [id number]        After Visit Summary       This is your record. Keep this with you and show to your community pharmacist(s) and doctor(s) at your next visit.

## 2017-04-09 NOTE — ED PROVIDER NOTES
History     Chief Complaint:  Abdominal Distention    BEENA Shaw is a 66 year old female with a history of right kidney transplant in  who presents with abdominal distention. The patient reports that over the last week she has been experiencing significant distention in the RLQ near the site of her previous kidney transplant. The distention is minimal when she gets up in the morning but by the time she goes to bed at night it is very large and hard. She does not have any associated abdominal pain. The patient denies fevers, nausea, vomiting, diarrhea, or difficulty urinating.     Allergies:  The patient has no known drug allergies.      Medications:    Fosamax  Norvasc  Hygroton  Imdur  Metoprolol  Protonix  Diovan  Lipitor  Mycophenolic acid  Iron  Synthroid  Gengraf  Aspirin  MVI  Lantus  Novolog    Past Medical History:    Anemia  CAD  DM type 2  Diabetic neuropathy  Dyslipidemia  Endometriosis   GERD  HTN  Hypothyroidism  IBS  Immunosuppressed status  Kidney replaced by transplant   Shingles    Past Surgical History:    Appendectomy   Left rotator cuff repair  Coronary angioplasty with stent  Hysterectomy  Right kidney transplant, right -   Cholecystectomy  Nose surgery  Right cataract iol  Tonsillectomy      Family History:    Respiratory  HTN  Arthritis    Social History:  Relationship status:   Tobacco use: Negative  Alcohol use: Negative  The patient presents with her .     Review of Systems   Constitutional: Negative for fever.   Gastrointestinal: Positive for abdominal distention. Negative for abdominal pain, diarrhea, nausea and vomiting.   Genitourinary: Negative for difficulty urinating.   All other systems reviewed and are negative.    Physical Exam     Patient Vitals for the past 24 hrs:   BP Temp Temp src Heart Rate Resp SpO2 Height Weight   17 2315 - - - - - 97 % - -   17 2300 159/67 - - 77 20 96 % - -   17 2245 - - - - - 95 % - -  "  04/08/17 2230 157/65 - - - - 94 % - -   04/08/17 2215 - - - - - 96 % - -   04/08/17 2205 - - - - - 96 % - -   04/08/17 2204 163/75 - - - - - - -   04/08/17 2150 164/72 - - - - 95 % - -   04/08/17 2145 164/72 - - - - 94 % - -   04/08/17 2140 - - - - - 97 % - -   04/08/17 2130 173/73 - - - - 98 % - -   04/08/17 2026 (!) 161/100 97.9  F (36.6  C) Temporal 76 18 97 % 1.626 m (5' 4\") 79.4 kg (175 lb)       Physical Exam  Nursing note and vitals reviewed.  Constitutional: Cooperative.   HENT:   Mouth/Throat: Mucous membranes are normal.   Cardiovascular: Normal rate, regular rhythm and normal heart sounds.  No murmur.  Pulmonary/Chest: Effort normal and breath sounds normal. No respiratory distress.   Abdominal: Soft. Bowel sounds are normal. In the RLQ there is a large focal area of distention likely representing a large incisional hernia. There is no tenderness. There is no rigidity and no guarding.   Neurological: Alert. Oriented x4  Skin: Skin is warm and dry. No rash noted.   Psychiatric: Normal mood and affect.     Emergency Department Course   Imaging:  Radiographic findings were communicated with the patient who voiced understanding of the findings.    CT Abdomen and Pelvis, without contrast, per radiology:   1. Large right ventral lateral lower abdominal wall/pelvic hernia containing colon without evidence for bowel obstruction or strangulation in this region. This could be the cause of the patient's symptoms.  2. Extensive nonaneurysmal atherosclerosis.  3. Severe bilateral native kidney atrophy. Cyst noted in the left kidney. Right pelvic renal transplant appears within normal limits.  4. Groundglass density in the superior image of the lower lung bases is only partially imaged. This could represent an infiltrate, atelectasis or other abnormality in the right lung although it is only partially imaged.  5. Extensive nonaneurysmal atherosclerosis.    Laboratory:  CBC: WBC 6.1,3 HGB 1.2 (L),   CMP: " Glucose 104 (H), BUN 31 (H), Creatinine 1.17 (H), GFR 46 (L), o/w WNL  Lipase: 224      Emergency Department Course:  Nursing notes and vitals reviewed.  I performed an exam of the patient as documented above.  The above workup was undertaken.  2310: I rechecked the patient and discussed results.  Findings and plan explained to the Patient and spouse. Patient discharged home, status improved, with instructions regarding supportive care, medications, and reasons to return as well as the importance of close follow-up was reviewed.     Impression & Plan    Medical Decision Making:  Viv Shaw is a 66 year old female with the above history including kidney transplant who presents with large RLQ abdominal wall distention. This is focused over her incision from her kidney transplant and CT confirms a large abdominal wall hernia. No evidence of obstruction, strangulation, or other complications. Her labs are reassuring and baseline. At this time I have recommended follow up at her renal transplant clinic for discussion of possible operative repair. No indication for further workup or admission at this time.     Diagnosis:    ICD-10-CM   1. Abdominal wall hernia K43.9     Disposition:  Discharge to home with renal transplant clinic follow up.       I, Socorro Plummer, am serving as a scribe on 4/8/2017 at 10:03 PM to personally document services performed by Gurwinder Posadas MD, based on my observations and the provider's statements to me.    [facility] EMERGENCY DEPARTMENT     Gurwinder Posadas MD  04/08/17 8169

## 2017-04-10 ENCOUNTER — TRANSFERRED RECORDS (OUTPATIENT)
Dept: HEALTH INFORMATION MANAGEMENT | Facility: CLINIC | Age: 67
End: 2017-04-10

## 2017-04-10 LAB — HBA1C MFR BLD: 7.4 % (ref 4–6)

## 2017-04-11 ENCOUNTER — TELEPHONE (OUTPATIENT)
Dept: TRANSPLANT | Facility: CLINIC | Age: 67
End: 2017-04-11

## 2017-04-11 NOTE — TELEPHONE ENCOUNTER
Please call Viv carbajal# [phone]  She needs to be scheduled for incisional hernia.  Was in ED [facility] on Saturday.     I called medical records and just received to  the records. 04/11/17.

## 2017-04-12 DIAGNOSIS — T86.10 COMPLICATIONS, KIDNEY TRANSPLANT: ICD-10-CM

## 2017-04-12 DIAGNOSIS — K43.2 HERNIA, INCISIONAL: ICD-10-CM

## 2017-04-12 DIAGNOSIS — Z48.298 AFTERCARE FOLLOWING ORGAN TRANSPLANT: ICD-10-CM

## 2017-04-12 DIAGNOSIS — Z94.0 KIDNEY TRANSPLANTED: Primary | ICD-10-CM

## 2017-04-13 DIAGNOSIS — E78.5 HYPERLIPIDEMIA LDL GOAL <100: ICD-10-CM

## 2017-04-13 DIAGNOSIS — Z79.4 TYPE 2 DIABETES MELLITUS WITH DIABETIC NEPHROPATHY, WITH LONG-TERM CURRENT USE OF INSULIN (H): ICD-10-CM

## 2017-04-13 DIAGNOSIS — E11.21 TYPE 2 DIABETES MELLITUS WITH DIABETIC NEPHROPATHY, WITH LONG-TERM CURRENT USE OF INSULIN (H): ICD-10-CM

## 2017-04-13 LAB
ALBUMIN SERPL-MCNC: 3.9 G/DL (ref 3.4–5)
ALP SERPL-CCNC: 55 U/L (ref 40–150)
ALT SERPL W P-5'-P-CCNC: 14 U/L (ref 0–50)
AST SERPL W P-5'-P-CCNC: 8 U/L (ref 0–45)
BILIRUB DIRECT SERPL-MCNC: 0.2 MG/DL (ref 0–0.2)
BILIRUB SERPL-MCNC: 0.8 MG/DL (ref 0.2–1.3)
CHOLEST SERPL-MCNC: 181 MG/DL
CREAT UR-MCNC: 188 MG/DL
HDLC SERPL-MCNC: 57 MG/DL
LDLC SERPL CALC-MCNC: 95 MG/DL
MICROALBUMIN UR-MCNC: 15 MG/L
MICROALBUMIN/CREAT UR: 7.98 MG/G CR (ref 0–25)
NONHDLC SERPL-MCNC: 124 MG/DL
PROT SERPL-MCNC: 7.2 G/DL (ref 6.8–8.8)
TRIGL SERPL-MCNC: 143 MG/DL

## 2017-04-13 PROCEDURE — 36415 COLL VENOUS BLD VENIPUNCTURE: CPT | Performed by: INTERNAL MEDICINE

## 2017-04-13 PROCEDURE — 80076 HEPATIC FUNCTION PANEL: CPT | Performed by: INTERNAL MEDICINE

## 2017-04-13 PROCEDURE — 80061 LIPID PANEL: CPT | Performed by: INTERNAL MEDICINE

## 2017-04-13 PROCEDURE — 82043 UR ALBUMIN QUANTITATIVE: CPT | Performed by: INTERNAL MEDICINE

## 2017-04-23 DIAGNOSIS — I25.10 CORONARY ARTERY DISEASE INVOLVING NATIVE HEART WITHOUT ANGINA PECTORIS, UNSPECIFIED VESSEL OR LESION TYPE: ICD-10-CM

## 2017-04-24 ENCOUNTER — OFFICE VISIT (OUTPATIENT)
Dept: TRANSPLANT | Facility: CLINIC | Age: 67
End: 2017-04-24
Attending: SURGERY
Payer: MEDICARE

## 2017-04-24 VITALS
DIASTOLIC BLOOD PRESSURE: 72 MMHG | HEART RATE: 87 BPM | OXYGEN SATURATION: 95 % | RESPIRATION RATE: 16 BRPM | TEMPERATURE: 98.6 F | SYSTOLIC BLOOD PRESSURE: 126 MMHG

## 2017-04-24 DIAGNOSIS — K43.2 INCISIONAL HERNIA, WITHOUT OBSTRUCTION OR GANGRENE: Primary | ICD-10-CM

## 2017-04-24 RX ORDER — ISOSORBIDE MONONITRATE 30 MG/1
TABLET, EXTENDED RELEASE ORAL
Qty: 90 TABLET | Refills: 1 | OUTPATIENT
Start: 2017-04-24

## 2017-04-24 NOTE — LETTER
4/24/2017       RE: Viv Shaw  [address]  [address]  [place] [address]     Dear Colleague,    Thank you for referring your patient, Viv Shaw, to the [facility] SOLID ORGAN TRANSPLANT at [facility]. Please see a copy of my visit note below.        History and Physical  Transplant Surgery     Date of Admission:  (Not on file)    Assessment & Plan   Viv Shaw is a 66 year old female who presents with complex kidney transplant incisional hernia in the setting of multiple abdominal surgeries with loss of domain and an incarceration of colon within hernia.  The patient and her  were explained that her hernia is quite complex and warrants a second opinion from a hernia specialist.  We will arrange for her to be seen by Dr. May and/or Dr. Cruz .  From a kidney stand-point, she is doing quite well and has been stable.  We will communicate with Dr. May's team and make ourselves available, if needed.    Francis Melgar     I have reviewed history, examined patient and discussed plan with the fellow/resident/NATALY.  I concur with the findings in this note.    Risks of the surgical procedure including but not limited to the rare risk of mortality discussed in detail. Patient verbalized good understanding and had several pertinent questions which were answered satisfactorily.       Total time: 30 min  Counseling time: 20 min                   Code Status   Full Code    Primary Care Physician   Summer Vega    Chief Complaint   RLQ hernia    History is obtained from the patient    History of Present Illness   Viv Shaw is a 66 year old female who presents with kidney transplant in 2005.  After surgery, she developed a wound infection requiring a VAC.  Eventually, she did well and her kidney function has stabilized with a creatinine around 1.3-1.5.  Over the past 6 months, she has noticed an increase in the  size of her bulge.  She has never had an episode of obstipation.    Past Medical History    I have reviewed this patient's medical history and updated it with pertinent information if needed.   Past Medical History:   Diagnosis Date     Abdominal wall hernia     RLQ     Allergic rhinitis      CAD (coronary artery disease)     coronary artery disease s/p PCI to LAD in 2005coronary artery disease s/p PCI to LAD in 2005     Diabetes mellitus, type 2 (H)     follows up with endocrinologist.     Dyslipidemia      GERD (gastroesophageal reflux disease)      H/O diabetic nephropathy     s/p kidney trnsplant     Hypertension      Hypothyroidism      Immunosuppressed status (H)      Kidney replaced by transplant     Rt     Shingles      Vitamin B12 deficiency anemia        Past Surgical History   I have reviewed this patient's surgical history and updated it with pertinent information if needed.  Past Surgical History:   Procedure Laterality Date     APPENDECTOMY NOS       ARTHROSCOPY SHOULDER ROTATOR CUFF REPAIR Left      COLONOSCOPY N/A 6/7/2016    Procedure: COLONOSCOPY;  Surgeon: Rashard Snider MD;  Location: [facility]     Coronary angioplasty with stent  2005     HYSTERECTOMY       Kidney Transplant NOS Right      LAPAROSCOPIC CHOLECYSTECTOMY       NOSE SURGERY  2013     REMOVE CATARACT INTRACAP,INSERT LENS Right      TONSILLECTOMY         Prior to Admission Medications   Cannot display prior to admission medications because the patient has not been admitted in this contact.     Allergies   Allergies   Allergen Reactions     No Known Drug Allergies        Social History   I have reviewed this patient's social history and updated it with pertinent information if needed. Viv Shaw  reports that she has never smoked. She has never used smokeless tobacco. She reports that she does not drink alcohol or use illicit drugs.    Family History   I have reviewed this patient's family history and updated it with pertinent  information if needed.   Family History   Problem Relation Age of Onset     Respiratory Mother       age 71     Cardiovascular Father       age 75 hyertension     Arthritis Sister      living, healthy     Family History Negative Brother       age 44     Colon Cancer No family hx of        Review of Systems   C: NEGATIVE for fever, chills, change in weight  E/M: NEGATIVE for ear, mouth and throat problems  R: NEGATIVE for significant cough or SOB  CV: NEGATIVE for chest pain, palpitations or peripheral edema  GI: NEGATIVE for nausea, abdominal pain, heartburn, or change in bowel habits  MUSCULOSKELETAL: NEGATIVE for significant arthralgias or myalgia  NEURO: NEGATIVE for weakness, dizziness or paresthesias  PSYCHIATRIC: NEGATIVE for changes in mood or affect    Physical Exam   Temp: 98.6  F (37  C) Temp src: Oral BP: 126/72 Pulse: 87   Resp: 16 SpO2: 95 %      Vital Signs with Ranges  Temp:  [98.6  F (37  C)] 98.6  F (37  C)  Pulse:  [87] 87  Resp:  [16] 16  BP: (126)/(72) 126/72  SpO2:  [95 %] 95 %  0 lbs 0 oz    Constitutional: awake, alert, cooperative, no apparent distress, and appears stated age  Eyes: Lids and lashes normal, pupils equal, round and reactive to light, extra ocular muscles intact, sclera clear, conjunctiva normal  Respiratory: No increased work of breathing, good air exchange, clear to auscultation bilaterally, no crackles or wheezing  Cardiovascular: Normal apical impulse, regular rate and rhythm, normal S1 and S2, no S3 or S4, and no murmur noted  GI: multiple abdominal scars, normal bowel sounds, soft, non-distended, non-tender, no masses palpated, no hepatosplenomegally.  RLQ KTx incision with bowel within it.  Not reducible.  Skin: no bruising or bleeding, normal skin color, texture, turgor and no redness, warmth, or swelling  Musculoskeletal: There is no redness, warmth, or swelling of the joints.  Full range of motion noted.  Motor strength is 5 out of 5 all  extremities bilaterally.  Tone is normal.  Neurologic: Awake, alert, oriented to name, place and time.  Cranial nerves II-XII are grossly intact.  Motor is 5 out of 5 bilaterally.  Cerebellar finger to nose, heel to shin intact.  Sensory is intact.  Babinski down going, Romberg negative, and gait is normal.    Data   Most Recent 3 CBC's:  Recent Labs   Lab Test  04/08/17 2111 03/31/17   0555  12/02/16   0619   WBC  6.1  4.0  5.8   HGB  11.2*  11.0*  10.5*   MCV  90  92  91   PLT  226  217  290     Most Recent 3 BMP's:  Recent Labs   Lab Test  04/08/17 2111 03/31/17   0555  12/02/16   0619   NA  143  141  139   POTASSIUM  4.0  4.2  4.7   CHLORIDE  106  105  106   CO2  27  28  27   BUN  31*  27  24   CR  1.17*  1.33*  1.26*   ANIONGAP  10  8  6   NAVI  9.7  9.0  8.6   GLC  104*  159*  128*     Most Recent 2 LFT's:  Recent Labs   Lab Test  04/13/17 0822  04/08/17 2111   AST  8  12   ALT  14  17   ALKPHOS  55  56   BILITOTAL  0.8  0.5     Most Recent 3 INR's:No lab results found.    Again, thank you for allowing me to participate in the care of your patient.      Sincerely,    Jenn Villa MD

## 2017-04-24 NOTE — MR AVS SNAPSHOT
After Visit Summary   4/24/2017    Viv Shaw    MRN: [id number]           Patient Information     Date Of Birth          1950        Visit Information        Provider Department      4/24/2017 2:30 PM Jenn Villa MD [facility] Solid Organ Transplant        Today's Diagnoses     Incisional hernia, without obstruction or gangrene    -  1       Follow-ups after your visit        Your next 10 appointments already scheduled     Dec 04, 2017  1:05 PM CST   (Arrive by 12:35 PM)   Return Kidney Transplant with Morro Veloz MD   [facility] Nephrology ([facility])    [address] [place] [address]  3rd [facility] [address] [phone]              Who to contact     If you have questions or need follow up information about today's clinic visit or your schedule please contact [facility] SOLID ORGAN TRANSPLANT directly at [phone].  Normal or non-critical lab and imaging results will be communicated to you by MyChart, letter or phone within 4 business days after the clinic has received the results. If you do not hear from us within 7 days, please contact the clinic through [employer] or phone. If you have a critical or abnormal lab result, we will notify you by phone as soon as possible.  Submit refill requests through [employer] or call your pharmacy and they will forward the refill request to us. Please allow 3 business days for your refill to be completed.          Additional Information About Your Visit        MyChart Information     [employer] gives you secure access to your electronic health record. If you see a primary care provider, you can also send messages to your care team and make appointments. If you have questions, please call your primary care clinic.  If you do not have a primary care provider, please call [phone] and they will assist you.        Care EveryWhere ID     This is your Care EveryWhere ID. This could be used by other organizations to  access your [facility] medical records  [id number]        Your Vitals Were     Pulse Temperature Respirations Pulse Oximetry          87 98.6  F (37  C) (Oral) 16 95%         Blood Pressure from Last 3 Encounters:   04/24/17 126/72   04/08/17 159/67   04/07/17 134/62    Weight from Last 3 Encounters:   04/08/17 79.4 kg (175 lb)   04/07/17 80.3 kg (177 lb)   12/05/16 79.4 kg (175 lb)              Today, you had the following     No orders found for display       Primary Care Provider Office Phone # Fax #    Summer SMITH MD [place] [phone]       [facility] [address] [facility] [address]        Thank you!     Thank you for choosing [facility] SOLID ORGAN TRANSPLANT  for your care. Our goal is always to provide you with excellent care. Hearing back from our patients is one way we can continue to improve our services. Please take a few minutes to complete the written survey that you may receive in the mail after your visit with us. Thank you!             Your Updated Medication List - Protect others around you: Learn how to safely use, store and throw away your medicines at www.disposemymeds.org.          This list is accurate as of: 4/24/17  5:19 PM.  Always use your most recent med list.                   Brand Name Dispense Instructions for use    alendronate 70 MG tablet    FOSAMAX    12 tablet    Take 1 tablet (70 mg) by mouth every 7 days Take with over 8 ounces water and stay upright for at least 30 minutes after dose.  Take at least 60 minutes before breakfast       amLODIPine 10 MG tablet    NORVASC    90 tablet    Take 1 tablet (10 mg) by mouth daily       aspirin 325 MG EC tablet     100 tablet    Take 1 tablet by mouth daily.       atorvastatin 40 MG tablet    LIPITOR    45 tablet    Take 0.5 tablets (20 mg) by mouth daily       FLORIN CONTOUR test strip   Generic drug:  blood glucose monitoring          chlorthalidone 25 MG tablet    HYGROTON    90 tablet    Take 1  tablet (25 mg) by mouth daily       cycloSPORINE modified capsule     180 capsule    Take 3 capsules (75 mg) by mouth 2 times daily       ferrous sulfate 325 (65 FE) MG tablet    IRON    100 tablet    Take 1 tablet (325 mg) by mouth daily (with breakfast)       isosorbide mononitrate 30 MG 24 hr tablet    IMDUR    90 tablet    Take 0.5 tablets (15 mg) by mouth 2 times daily       LANTUS - INSULIN GLARGINE     0    22 u q pm       metoprolol 25 MG 24 hr tablet    TOPROL XL    90 tablet    Take 1 tablet (25 mg) by mouth At Bedtime       multivitamin per tablet     100    ONE DAILY       mycophenolic acid 180 MG EC tablet    MYFORTIC - GENERIC EQUIVALENT    180 tablet    Take 3 tablets (540 mg) by mouth 2 times daily       NovoLOG VIAL 100 UNITS/ML injection   Generic drug:  insulin aspart      sliding scale 4-6 units tid       pantoprazole 40 MG EC tablet    PROTONIX    90 tablet    Take 1 tablet (40 mg) by mouth daily       SYNTHROID 112 MCG tablet   Generic drug:  levothyroxine      Take by mouth daily       valsartan 160 MG tablet    DIOVAN    45 tablet    Take 0.5 tablets (80 mg) by mouth daily

## 2017-04-24 NOTE — PROGRESS NOTES
History and Physical  Transplant Surgery     Date of Admission:  (Not on file)    Assessment & Plan   Viv Shaw is a 66 year old female who presents with complex kidney transplant incisional hernia in the setting of multiple abdominal surgeries with loss of domain and an incarceration of colon within hernia.  The patient and her  were explained that her hernia is quite complex and warrants a second opinion from a hernia specialist.  We will arrange for her to be seen by Dr. May and/or Dr. Cruz .  From a kidney stand-point, she is doing quite well and has been stable.  We will communicate with Dr. May's team and make ourselves available, if needed.    Francis Melgar     I have reviewed history, examined patient and discussed plan with the fellow/resident/NATALY.  I concur with the findings in this note.    Risks of the surgical procedure including but not limited to the rare risk of mortality discussed in detail. Patient verbalized good understanding and had several pertinent questions which were answered satisfactorily.       Total time: 30 min  Counseling time: 20 min                   Code Status   Full Code    Primary Care Physician   Summer Vega    Chief Complaint   RLQ hernia    History is obtained from the patient    History of Present Illness   Viv Shaw is a 66 year old female who presents with kidney transplant in 2005.  After surgery, she developed a wound infection requiring a VAC.  Eventually, she did well and her kidney function has stabilized with a creatinine around 1.3-1.5.  Over the past 6 months, she has noticed an increase in the size of her bulge.  She has never had an episode of obstipation.    Past Medical History    I have reviewed this patient's medical history and updated it with pertinent information if needed.   Past Medical History:   Diagnosis Date     Abdominal wall hernia     RLQ     Allergic rhinitis      CAD (coronary artery  disease)     coronary artery disease s/p PCI to LAD in coronary artery disease s/p PCI to LAD in      Diabetes mellitus, type 2 (H)     follows up with endocrinologist.     Dyslipidemia      GERD (gastroesophageal reflux disease)      H/O diabetic nephropathy     s/p kidney trnsplant     Hypertension      Hypothyroidism      Immunosuppressed status (H)      Kidney replaced by transplant     Rt     Shingles      Vitamin B12 deficiency anemia        Past Surgical History   I have reviewed this patient's surgical history and updated it with pertinent information if needed.  Past Surgical History:   Procedure Laterality Date     APPENDECTOMY NOS       ARTHROSCOPY SHOULDER ROTATOR CUFF REPAIR Left      COLONOSCOPY N/A 2016    Procedure: COLONOSCOPY;  Surgeon: Rashard Snider MD;  Location:  GI     Coronary angioplasty with stent       HYSTERECTOMY       Kidney Transplant NOS Right      LAPAROSCOPIC CHOLECYSTECTOMY       NOSE SURGERY       REMOVE CATARACT INTRACAP,INSERT LENS Right      TONSILLECTOMY         Prior to Admission Medications   Cannot display prior to admission medications because the patient has not been admitted in this contact.     Allergies   Allergies   Allergen Reactions     No Known Drug Allergies        Social History   I have reviewed this patient's social history and updated it with pertinent information if needed. Viv Shaw  reports that she has never smoked. She has never used smokeless tobacco. She reports that she does not drink alcohol or use illicit drugs.    Family History   I have reviewed this patient's family history and updated it with pertinent information if needed.   Family History   Problem Relation Age of Onset     Respiratory Mother       age 71     Cardiovascular Father       age 75 hyertension     Arthritis Sister      living, healthy     Family History Negative Brother       age 44     Colon Cancer No family hx of         Review of Systems   C: NEGATIVE for fever, chills, change in weight  E/M: NEGATIVE for ear, mouth and throat problems  R: NEGATIVE for significant cough or SOB  CV: NEGATIVE for chest pain, palpitations or peripheral edema  GI: NEGATIVE for nausea, abdominal pain, heartburn, or change in bowel habits  MUSCULOSKELETAL: NEGATIVE for significant arthralgias or myalgia  NEURO: NEGATIVE for weakness, dizziness or paresthesias  PSYCHIATRIC: NEGATIVE for changes in mood or affect    Physical Exam   Temp: 98.6  F (37  C) Temp src: Oral BP: 126/72 Pulse: 87   Resp: 16 SpO2: 95 %      Vital Signs with Ranges  Temp:  [98.6  F (37  C)] 98.6  F (37  C)  Pulse:  [87] 87  Resp:  [16] 16  BP: (126)/(72) 126/72  SpO2:  [95 %] 95 %  0 lbs 0 oz    Constitutional: awake, alert, cooperative, no apparent distress, and appears stated age  Eyes: Lids and lashes normal, pupils equal, round and reactive to light, extra ocular muscles intact, sclera clear, conjunctiva normal  Respiratory: No increased work of breathing, good air exchange, clear to auscultation bilaterally, no crackles or wheezing  Cardiovascular: Normal apical impulse, regular rate and rhythm, normal S1 and S2, no S3 or S4, and no murmur noted  GI: multiple abdominal scars, normal bowel sounds, soft, non-distended, non-tender, no masses palpated, no hepatosplenomegally.  RLQ KTx incision with bowel within it.  Not reducible.  Skin: no bruising or bleeding, normal skin color, texture, turgor and no redness, warmth, or swelling  Musculoskeletal: There is no redness, warmth, or swelling of the joints.  Full range of motion noted.  Motor strength is 5 out of 5 all extremities bilaterally.  Tone is normal.  Neurologic: Awake, alert, oriented to name, place and time.  Cranial nerves II-XII are grossly intact.  Motor is 5 out of 5 bilaterally.  Cerebellar finger to nose, heel to shin intact.  Sensory is intact.  Babinski down going, Romberg negative, and gait is  normal.    Data   Most Recent 3 CBC's:  Recent Labs   Lab Test  04/08/17 2111 03/31/17   0555  12/02/16   0619   WBC  6.1  4.0  5.8   HGB  11.2*  11.0*  10.5*   MCV  90  92  91   PLT  226  217  290     Most Recent 3 BMP's:  Recent Labs   Lab Test  04/08/17 2111 03/31/17   0555  12/02/16   0619   NA  143  141  139   POTASSIUM  4.0  4.2  4.7   CHLORIDE  106  105  106   CO2  27  28  27   BUN  31*  27  24   CR  1.17*  1.33*  1.26*   ANIONGAP  10  8  6   NAVI  9.7  9.0  8.6   GLC  104*  159*  128*     Most Recent 2 LFT's:  Recent Labs   Lab Test  04/13/17 0822  04/08/17 2111   AST  8  12   ALT  14  17   ALKPHOS  55  56   BILITOTAL  0.8  0.5     Most Recent 3 INR's:No lab results found.

## 2017-04-27 ENCOUNTER — CARE COORDINATION (OUTPATIENT)
Dept: SURGERY | Facility: CLINIC | Age: 67
End: 2017-04-27

## 2017-04-27 NOTE — PROGRESS NOTES
Called pt to schedule her with Dr. Lott. Appt made. Confirmed date, time, and location with pt and her . They denied any outstanding Qs or concerns at the end of the call.

## 2017-05-17 ENCOUNTER — OFFICE VISIT (OUTPATIENT)
Dept: PLASTIC SURGERY | Facility: CLINIC | Age: 67
End: 2017-05-17

## 2017-05-17 VITALS
TEMPERATURE: 97.7 F | HEIGHT: 64 IN | WEIGHT: 177 LBS | DIASTOLIC BLOOD PRESSURE: 61 MMHG | SYSTOLIC BLOOD PRESSURE: 149 MMHG | BODY MASS INDEX: 30.22 KG/M2 | OXYGEN SATURATION: 99 % | HEART RATE: 67 BPM

## 2017-05-17 DIAGNOSIS — K43.9 VENTRAL HERNIA WITHOUT OBSTRUCTION OR GANGRENE: Primary | ICD-10-CM

## 2017-05-17 ASSESSMENT — PAIN SCALES - GENERAL: PAINLEVEL: NO PAIN (0)

## 2017-05-17 NOTE — LETTER
2017       RE: Viv Shaw  [address]  [address]  [place] [address]     Dear Colleague,    Thank you for referring your patient, Viv Shaw, to the [facility] PLASTIC AND RECONSTRUCTIVE SURGERY at [facility]. Please see a copy of my visit note below.    REFERRING PROVIDER:  Jenn Villa MD, [facility]      PRESENTING COMPLAINT:  Consultation for a flank hernia.      HISTORY OF PRESENTING COMPLAINT:  Ms. Shaw is 66 years old.  About 11 years ago underwent a right-sided kidney transplant, developed a hernia over the ensuing years.  She basically has noticed a bulge in the last 3-6 months; it is slowly getting larger.  She has no other symptoms from it.  She has been referred to me for my recommendations regarding a hernia repair.      PAST MEDICAL HISTORY:     1. Hypothyroidism.   2. Diabetes.   3. Hypertension.   4. Coronary artery disease.   5. Anemia.   6. Hyperlipidemia.   7. GERD.   8. IBS.      PAST SURGICAL HISTORY:     1. Kidney transplant.   2. Shoulder surgery.   3. Wrist fracture surgery.   4. .    5. Open cholecystectomy.   6. Hysterectomy.      MEDICATIONS:     1. Fosamax.   2. Norvasc.   3. Chlorthalidone.   4. Imdur.   5. Toprol.   6. Protonix.    7. Diovan.   8. Lipitor.   9. Mycophenolate acid.     10.   Gengraf.   11.   Levothyroxine.   12.   Aspirin.   13.   Lantus.   14.   NovoLog.      ALLERGIES:  Nil.      SOCIAL HISTORY:  Does not smoke, does not drink.  Lives in [place].  Does not work.      REVIEW OF SYSTEMS:  Denies chest pain, shortness of breath.  Has not had an MI but has had a cardiac stent placed 10 years ago.  Has had a mini stroke 6 years ago.  No DVT or PE status.        PHYSICAL EXAMINATION:  On exam vital signs stable.  She is afebrile in no obvious distress.  She is 5 feet 4 inches, 177 pounds with a BMI of 30 kg/m2.  On examination of her abdomen, she has a large flank hernia on the  right side.  She has a subcostal incision, a midline lower abdominal incision and a right lower quadrant incision.  She has a skin pinch thickness about 5 cm.      LATEST LABORATORY VALUES:  Show an albumin of 3.9, hemoglobin 11.2 and hemoglobin A1c of 7.4.      ASSESSMENT AND PLAN:  Based on the above findings, a diagnosis of a right flank hernia was made.  I had a long discussion with the patient and her  about the findings, explained to them that currently she is asymptomatic, although she does have a relatively large hernia.  I explained to them the difficulty in fixing these hernias and the high chance for recurrences, especially if her certain risk factors are not addressed.  In her case, she needs good diabetic control.  She needs to be optimized from a medical standpoint.  She probably needs a cardiac workup with a cardiac stress test before any elective surgery is done on her.  We did discuss the fact that she has minimal symptoms from this and the fixation of this hernia is complicated and has its own risks including intra-abdominal complications as well as risk for hernia recurrence.  I went over the planned potential procedure in terms of the abdominal wall reconstruction including the use of Mitek suture anchors that are in the iliac bone.  I will discuss her case with Dr. Villa to see his thoughts on it and as long as I get a green light from her medical doctors along with Dr. Villa and the patient understands the risks, alternatives, and wants to proceed, I am happy to help with reconstruction of the abdominal wall.  I will see her back once the above steps are taken.  All questions were answered.  She was happy with the visit.  All exam done in the presence of my nurse.      Total time spent with patient was 40 minutes, more than half was counseling.      cc:   Jenn Villa MD   [facility]         CARL ANDREWS MD

## 2017-05-17 NOTE — PROGRESS NOTES
REFERRING PROVIDER:  Jenn Villa MD, [facility]      PRESENTING COMPLAINT:  Consultation for a flank hernia.      HISTORY OF PRESENTING COMPLAINT:  Ms. Shaw is 66 years old.  About 11 years ago underwent a right-sided kidney transplant, developed a hernia over the ensuing years.  She basically has noticed a bulge in the last 3-6 months; it is slowly getting larger.  She has no other symptoms from it.  She has been referred to me for my recommendations regarding a hernia repair.      PAST MEDICAL HISTORY:     1. Hypothyroidism.   2. Diabetes.   3. Hypertension.   4. Coronary artery disease.   5. Anemia.   6. Hyperlipidemia.   7. GERD.   8. IBS.      PAST SURGICAL HISTORY:     1. Kidney transplant.   2. Shoulder surgery.   3. Wrist fracture surgery.   4. .    5. Open cholecystectomy.   6. Hysterectomy.      MEDICATIONS:     1. Fosamax.   2. Norvasc.   3. Chlorthalidone.   4. Imdur.   5. Toprol.   6. Protonix.    7. Diovan.   8. Lipitor.   9. Mycophenolate acid.     10.   Gengraf.   11.   Levothyroxine.   12.   Aspirin.   13.   Lantus.   14.   NovoLog.      ALLERGIES:  Nil.      SOCIAL HISTORY:  Does not smoke, does not drink.  Lives in [place].  Does not work.      REVIEW OF SYSTEMS:  Denies chest pain, shortness of breath.  Has not had an MI but has had a cardiac stent placed 10 years ago.  Has had a mini stroke 6 years ago.  No DVT or PE status.        PHYSICAL EXAMINATION:  On exam vital signs stable.  She is afebrile in no obvious distress.  She is 5 feet 4 inches, 177 pounds with a BMI of 30 kg/m2.  On examination of her abdomen, she has a large flank hernia on the right side.  She has a subcostal incision, a midline lower abdominal incision and a right lower quadrant incision.  She has a skin pinch thickness about 5 cm.      LATEST LABORATORY VALUES:  Show an albumin of 3.9, hemoglobin 11.2 and hemoglobin A1c of 7.4.      ASSESSMENT AND PLAN:  Based on the above findings, a diagnosis of  a right flank hernia was made.  I had a long discussion with the patient and her  about the findings, explained to them that currently she is asymptomatic, although she does have a relatively large hernia.  I explained to them the difficulty in fixing these hernias and the high chance for recurrences, especially if her certain risk factors are not addressed.  In her case, she needs good diabetic control.  She needs to be optimized from a medical standpoint.  She probably needs a cardiac workup with a cardiac stress test before any elective surgery is done on her.  We did discuss the fact that she has minimal symptoms from this and the fixation of this hernia is complicated and has its own risks including intra-abdominal complications as well as risk for hernia recurrence.  I went over the planned potential procedure in terms of the abdominal wall reconstruction including the use of Mitek suture anchors that are in the iliac bone.  I will discuss her case with Dr. Villa to see his thoughts on it and as long as I get a green light from her medical doctors along with Dr. Villa and the patient understands the risks, alternatives, and wants to proceed, I am happy to help with reconstruction of the abdominal wall.  I will see her back once the above steps are taken.  All questions were answered.  She was happy with the visit.  All exam done in the presence of my nurse.      Total time spent with patient was 40 minutes, more than half was counseling.      cc:   Jenn Villa MD   [facility]          ANKITA ANDREWS MD             D: 2017 09:51   T: 2017 13:40   MT: nh      Name:     LAST KING   MRN:      [id number]        Account:      [id number]   :      1950           Service Date: 2017      Document: [id number]

## 2017-06-05 ENCOUNTER — TELEPHONE (OUTPATIENT)
Dept: PLASTIC SURGERY | Facility: CLINIC | Age: 67
End: 2017-06-05

## 2017-06-05 ENCOUNTER — MYC MEDICAL ADVICE (OUTPATIENT)
Dept: PLASTIC SURGERY | Facility: CLINIC | Age: 67
End: 2017-06-05

## 2017-06-05 NOTE — TELEPHONE ENCOUNTER
Viv is calling to see if Dr. Lott has checked with Dr. Villa regarding surgery options?    Will route to Dr. Kaylie De La Torre's clinic nurse.  [facility]

## 2017-06-06 ENCOUNTER — TELEPHONE (OUTPATIENT)
Dept: INTERNAL MEDICINE | Facility: CLINIC | Age: 67
End: 2017-06-06

## 2017-06-06 ENCOUNTER — TELEPHONE (OUTPATIENT)
Dept: TRANSPLANT | Facility: CLINIC | Age: 67
End: 2017-06-06

## 2017-06-06 NOTE — TELEPHONE ENCOUNTER
Britt with [facility] ([phone]) calling.  Patient has a hernia that needs repair by Dr. Lott but he doesn't want to schedule the surgery until patient's A1c is under better control.  States that Dr. Lott had sent a staff message to PCP recently about this also.  ,ERNA Albright R.N.

## 2017-06-06 NOTE — TELEPHONE ENCOUNTER
Britt Pierre [phone] called for Dr. Lott. Dr. Lott sent Dr. Villa a staff message on May 17th regarding the patients status and has yet to hear back from the doctor. Britt is trying to get in touch with Dr. Villa.    ADDENDUM by Jim Reyes RN  Spoke with Britt re: trying to contact Dr. Villa.

## 2017-06-07 ENCOUNTER — CARE COORDINATION (OUTPATIENT)
Dept: PLASTIC SURGERY | Facility: CLINIC | Age: 67
End: 2017-06-07

## 2017-06-07 NOTE — PROGRESS NOTES
I spoke with patient regarding her surgery.  She has planned a vacation for August and would like to see Dr. Lott when she returns.  She will contact Dr. Vega to obtain cardiology referral and cardiac stress test to be scheduled in July.  She remains ambiguous regarding having surgery.  Will schedule appt with Dr. Lott for late August.

## 2017-06-12 ENCOUNTER — OFFICE VISIT (OUTPATIENT)
Dept: INTERNAL MEDICINE | Facility: CLINIC | Age: 67
End: 2017-06-12
Payer: MEDICARE

## 2017-06-12 VITALS
DIASTOLIC BLOOD PRESSURE: 72 MMHG | BODY MASS INDEX: 30.04 KG/M2 | HEART RATE: 64 BPM | TEMPERATURE: 98 F | WEIGHT: 175 LBS | SYSTOLIC BLOOD PRESSURE: 118 MMHG

## 2017-06-12 DIAGNOSIS — K43.9 VENTRAL HERNIA WITHOUT OBSTRUCTION OR GANGRENE: Primary | ICD-10-CM

## 2017-06-12 PROCEDURE — 99213 OFFICE O/P EST LOW 20 MIN: CPT | Performed by: INTERNAL MEDICINE

## 2017-06-12 NOTE — MR AVS SNAPSHOT
After Visit Summary   6/12/2017    Viv Shaw    MRN: [id number]           Patient Information     Date Of Birth          1950        Visit Information        Provider Department      6/12/2017 12:40 PM Summer Vega MD [facility]        Today's Diagnoses     Ventral hernia without obstruction or gangrene    -  1       Follow-ups after your visit        Your next 10 appointments already scheduled     Jun 30, 2017  3:00 PM CDT   (Arrive by 2:45 PM)   New Patient Visit with Dejon Pickering MD   [facility] ([facility] [place])    [address] [place] [address]  [address] [facility] [address]   [phone]            Aug 23, 2017  8:30 AM CDT   (Arrive by 8:15 AM)   Return Plastic Surgery with CARL Lott MD   [facility] ([facility])    [address] [place] [address]  [address] [facility] [id number] [phone]           Do not wear perfume.            Dec 04, 2017  1:05 PM CST   (Arrive by 12:35 PM)   Return Kidney Transplant with Morro Veloz MD   [facility] ([facility])    [address] [place] [address]  [address] [facility] [id number] [phone]              Who to contact     If you have questions or need follow up information about today's clinic visit or your schedule please contact [facility] directly at [phone].  Normal or non-critical lab and imaging results will be communicated to you by MyChart, letter or phone within 4 business days after the clinic has received the results. If you do not hear from us within 7 days, please contact the clinic through MyChart or phone. If you have a critical or abnormal lab result, we will notify you by phone as soon as possible.  Submit refill requests through [employer] or call your pharmacy and they will forward the refill request to us. Please allow 3 business days  for your refill to be completed.          Additional Information About Your Visit        [employer] Information     [employer] gives you secure access to your electronic health record. If you see a primary care provider, you can also send messages to your care team and make appointments. If you have questions, please call your primary care clinic.  If you do not have a primary care provider, please call [phone] and they will assist you.        Care EveryWhere ID     This is your Care EveryWhere ID. This could be used by other organizations to access your [facility] medical records  [id number]        Your Vitals Were     Pulse Temperature BMI (Body Mass Index)             64 98  F (36.7  C) 30.04 kg/m2          Blood Pressure from Last 3 Encounters:   06/12/17 118/72   05/17/17 149/61   04/24/17 126/72    Weight from Last 3 Encounters:   06/12/17 175 lb (79.4 kg)   05/17/17 177 lb (80.3 kg)   04/08/17 175 lb (79.4 kg)              Today, you had the following     No orders found for display       Primary Care Provider Office Phone # Fax #    Krishnakumari G MD Silvia [phone]       [facility] [address] [place] [address]        Thank you!     Thank you for choosing [facility]  for your care. Our goal is always to provide you with excellent care. Hearing back from our patients is one way we can continue to improve our services. Please take a few minutes to complete the written survey that you may receive in the mail after your visit with us. Thank you!             Your Updated Medication List - Protect others around you: Learn how to safely use, store and throw away your medicines at www.disposemymeds.org.          This list is accurate as of: 6/12/17 10:28 PM.  Always use your most recent med list.                   Brand Name Dispense Instructions for use    alendronate 70 MG tablet    FOSAMAX    12 tablet    Take 1 tablet (70 mg) by mouth every 7 days Take  with over 8 ounces water and stay upright for at least 30 minutes after dose.  Take at least 60 minutes before breakfast       amLODIPine 10 MG tablet    NORVASC    90 tablet    Take 1 tablet (10 mg) by mouth daily       aspirin 325 MG EC tablet     100 tablet    Take 1 tablet by mouth daily.       atorvastatin 40 MG tablet    LIPITOR    45 tablet    Take 0.5 tablets (20 mg) by mouth daily       FLORIN CONTOUR test strip   Generic drug:  blood glucose monitoring          chlorthalidone 25 MG tablet    HYGROTON    90 tablet    Take 1 tablet (25 mg) by mouth daily       cycloSPORINE modified capsule     180 capsule    Take 3 capsules (75 mg) by mouth 2 times daily       isosorbide mononitrate 30 MG 24 hr tablet    IMDUR    90 tablet    Take 0.5 tablets (15 mg) by mouth 2 times daily       LANTUS - INSULIN GLARGINE     0    22 u q pm       metoprolol 25 MG 24 hr tablet    TOPROL XL    90 tablet    Take 1 tablet (25 mg) by mouth At Bedtime       mycophenolic acid 180 MG EC tablet    MYFORTIC - GENERIC EQUIVALENT    180 tablet    Take 3 tablets (540 mg) by mouth 2 times daily       NovoLOG VIAL 100 UNITS/ML injection   Generic drug:  insulin aspart      sliding scale 4-6 units tid       pantoprazole 40 MG EC tablet    PROTONIX    90 tablet    Take 1 tablet (40 mg) by mouth daily       SYNTHROID 112 MCG tablet   Generic drug:  levothyroxine      Take by mouth daily       valsartan 160 MG tablet    DIOVAN    45 tablet    Take 0.5 tablets (80 mg) by mouth daily

## 2017-06-13 DIAGNOSIS — Z48.298 AFTERCARE FOLLOWING ORGAN TRANSPLANT: ICD-10-CM

## 2017-06-13 DIAGNOSIS — Z79.899 ENCOUNTER FOR LONG-TERM CURRENT USE OF MEDICATION: ICD-10-CM

## 2017-06-13 DIAGNOSIS — Z94.0 KIDNEY REPLACED BY TRANSPLANT: Primary | ICD-10-CM

## 2017-06-13 NOTE — PROGRESS NOTES
SUBJECTIVE:                                                    Viv Shaw is a 66 year old female who presents to clinic today for the following health issues:    Pt is a 66 year old female who is seen here to day to discuss about rt ventral hernia. Pt says she has noticed rt lower abd lump for few mths but more noticeable since 1 mth  No pain.   Pt has seen plastic surgeon and was advised to undergo surgery but pt needs clearance from nephrologist as she has had renal transplant and  is on immunosuppressants.  Needs clearance from endocrinologist as her plastic surgeon would like A1c to be < 6.0. Has appt to see endocrinologist today .  Needs clearance form cardiologist, pt has appt to see cardiologist in 2 wks       Patient Active Problem List   Diagnosis     Other vitamin B12 deficiency anemia     Irritable bowel syndrome     GERD (gastroesophageal reflux disease)     Advanced directives, counseling/discussion     Kidney replaced by transplant     Immunosuppressed status (HCC)     Dyslipidemia     Hyperlipidemia LDL goal <100     Long-term use of immunosuppressant medication     CAD S/P percutaneous coronary angioplasty     Anemia in chronic renal disease     Other specified hypothyroidism     Coronary artery disease involving native heart without angina pectoris, unspecified vessel or lesion type     Type 2 diabetes mellitus with diabetic nephropathy, with long-term current use of insulin (H)     Hypertension secondary to other renal disorders     Aftercare following organ transplant     Ventral hernia without obstruction or gangrene     Past Surgical History:   Procedure Laterality Date     APPENDECTOMY NOS       ARTHROSCOPY SHOULDER ROTATOR CUFF REPAIR Left      COLONOSCOPY N/A 6/7/2016    Procedure: COLONOSCOPY;  Surgeon: Rashard Snider MD;  Location: [facility]     Coronary angioplasty with stent  2005     HYSTERECTOMY       Kidney Transplant NOS Right      LAPAROSCOPIC CHOLECYSTECTOMY       NOSE  SURGERY  2013     REMOVE CATARACT INTRACAP,INSERT LENS Right      TONSILLECTOMY         Social History   Substance Use Topics     Smoking status: Never Smoker     Smokeless tobacco: Never Used     Alcohol use No     Family History   Problem Relation Age of Onset     Respiratory Mother       age 71     Cardiovascular Father       age 75 hyertension     Arthritis Sister      living, healthy     Family History Negative Brother       age 44     Colon Cancer No family hx of          Current Outpatient Prescriptions   Medication Sig Dispense Refill     alendronate (FOSAMAX) 70 MG tablet Take 1 tablet (70 mg) by mouth every 7 days Take with over 8 ounces water and stay upright for at least 30 minutes after dose.  Take at least 60 minutes before breakfast 12 tablet 3     amLODIPine (NORVASC) 10 MG tablet Take 1 tablet (10 mg) by mouth daily 90 tablet 1     chlorthalidone (HYGROTON) 25 MG tablet Take 1 tablet (25 mg) by mouth daily 90 tablet 3     isosorbide mononitrate (IMDUR) 30 MG 24 hr tablet Take 0.5 tablets (15 mg) by mouth 2 times daily 90 tablet 1     metoprolol (TOPROL XL) 25 MG 24 hr tablet Take 1 tablet (25 mg) by mouth At Bedtime 90 tablet 1     pantoprazole (PROTONIX) 40 MG EC tablet Take 1 tablet (40 mg) by mouth daily 90 tablet 1     valsartan (DIOVAN) 160 MG tablet Take 0.5 tablets (80 mg) by mouth daily 45 tablet 1     atorvastatin (LIPITOR) 40 MG tablet Take 0.5 tablets (20 mg) by mouth daily 45 tablet 1     mycophenolic acid (MYFORTIC - GENERIC EQUIVALENT) 180 MG EC tablet Take 3 tablets (540 mg) by mouth 2 times daily 180 tablet 11     GENGRAF 25 MG PO CAPSULE Take 3 capsules (75 mg) by mouth 2 times daily 180 capsule 11     FLORIN CONTOUR test strip   0     levothyroxine (SYNTHROID) 112 MCG tablet Take by mouth daily       aspirin 325 MG EC tablet Take 1 tablet by mouth daily. 100 tablet 3     LANTUS - INSULIN GLARGINE 22 u q pm 0 0     NOVOLOG 100 U/ML SC SOLN sliding scale 4-6  units tid         Reviewed and updated as needed this visit by clinical staff       Reviewed and updated as needed this visit by Provider         ROS:  C: NEGATIVE for fever, chills, change in weight  GI: hernia, NEGATIVE for nausea, abdominal pain, heartburn, or change in bowel habits     OBJECTIVE:                                                    /72 (Cuff Size: Adult Regular)  Pulse 64  Temp 98  F (36.7  C)  Wt 175 lb (79.4 kg)  BMI 30.04 kg/m2  Body mass index is 30.04 kg/(m^2).   GENERAL: healthy, alert, well nourished, well hydrated, no distress  ABDOMEN: soft, no tenderness,  normal bowel sounds, large bulge noted rt lateral abdomen,not reducible, no tenderness.     ASSESSMENT/PLAN:                                                       (K43.9) Ventral hernia without obstruction or gangrene  (primary encounter diagnosis)  Plan: pt needs surgical clearance from her cardiologist and Endocrinologist. Ok from  her nephrologist to undergo surgery. Pt seeing cardiologist on 06/30/17. Seeing her endocrinologist today.      Summer Vega MD  [facility]

## 2017-06-15 ENCOUNTER — TRANSFERRED RECORDS (OUTPATIENT)
Dept: HEALTH INFORMATION MANAGEMENT | Facility: CLINIC | Age: 67
End: 2017-06-15

## 2017-06-19 ENCOUNTER — TRANSFERRED RECORDS (OUTPATIENT)
Dept: HEALTH INFORMATION MANAGEMENT | Facility: CLINIC | Age: 67
End: 2017-06-19

## 2017-06-19 LAB
GLUCOSE SERPL-MCNC: 138 MG/DL (ref 65–99)
HBA1C MFR BLD: 6.8 % (ref 4–6)

## 2017-06-29 ENCOUNTER — PRE VISIT (OUTPATIENT)
Dept: CARDIOLOGY | Facility: CLINIC | Age: 67
End: 2017-06-29

## 2017-06-29 DIAGNOSIS — Z48.298 AFTERCARE FOLLOWING ORGAN TRANSPLANT: ICD-10-CM

## 2017-06-29 DIAGNOSIS — Z79.899 ENCOUNTER FOR LONG-TERM CURRENT USE OF MEDICATION: ICD-10-CM

## 2017-06-29 DIAGNOSIS — Z94.0 KIDNEY REPLACED BY TRANSPLANT: ICD-10-CM

## 2017-06-29 LAB
ANION GAP SERPL CALCULATED.3IONS-SCNC: 7 MMOL/L (ref 3–14)
BUN SERPL-MCNC: 28 MG/DL (ref 7–30)
CALCIUM SERPL-MCNC: 8.8 MG/DL (ref 8.5–10.1)
CHLORIDE SERPL-SCNC: 107 MMOL/L (ref 94–109)
CO2 SERPL-SCNC: 29 MMOL/L (ref 20–32)
CREAT SERPL-MCNC: 1.22 MG/DL (ref 0.52–1.04)
CREAT UR-MCNC: 48 MG/DL
CYCLOSPORINE BLD LC/MS/MS-MCNC: 81 UG/L (ref 50–400)
ERYTHROCYTE [DISTWIDTH] IN BLOOD BY AUTOMATED COUNT: 12.5 % (ref 10–15)
GFR SERPL CREATININE-BSD FRML MDRD: 44 ML/MIN/1.7M2
GLUCOSE SERPL-MCNC: 60 MG/DL (ref 70–99)
HCT VFR BLD AUTO: 36.6 % (ref 35–47)
HGB BLD-MCNC: 11.2 G/DL (ref 11.7–15.7)
MCH RBC QN AUTO: 28.7 PG (ref 26.5–33)
MCHC RBC AUTO-ENTMCNC: 30.6 G/DL (ref 31.5–36.5)
MCV RBC AUTO: 94 FL (ref 78–100)
PLATELET # BLD AUTO: 198 10E9/L (ref 150–450)
POTASSIUM SERPL-SCNC: 4.5 MMOL/L (ref 3.4–5.3)
PROT UR-MCNC: 0.06 G/L
PROT/CREAT 24H UR: 0.13 G/G CR (ref 0–0.2)
RBC # BLD AUTO: 3.9 10E12/L (ref 3.8–5.2)
SODIUM SERPL-SCNC: 143 MMOL/L (ref 133–144)
TME LAST DOSE: 1900 H
WBC # BLD AUTO: 3.5 10E9/L (ref 4–11)

## 2017-06-29 PROCEDURE — 80158 DRUG ASSAY CYCLOSPORINE: CPT | Performed by: INTERNAL MEDICINE

## 2017-06-29 NOTE — TELEPHONE ENCOUNTER
5/5/11--Lexiscan  Impression:  1. Normal myocardial SPECT study with a summed stress score of 0. A  summed stress score of <4 is associated with an annual event rate of  0.5% and 0.3% for myocardial infarction and cardiac death,  respectively ([place]. Circulation 1998;98:535-43).  2. No significant perfusion abnormalities.   3. Normal left ventricular systolic function with no regional wall  motion abnormalities.  4. Mildly reduced for age functional capacity.  5. No prior study available for comparison.    5/22/09--Dobutamine stress echo  Interpretation Summary  Normal dobutamine echo without infarct or ischemia at adequate rate pressure   product     Baseline  Resting ECG is normal.

## 2017-06-30 ENCOUNTER — OFFICE VISIT (OUTPATIENT)
Dept: CARDIOLOGY | Facility: CLINIC | Age: 67
End: 2017-06-30
Attending: INTERNAL MEDICINE
Payer: MEDICARE

## 2017-06-30 VITALS
BODY MASS INDEX: 30.48 KG/M2 | WEIGHT: 178.5 LBS | HEIGHT: 64 IN | DIASTOLIC BLOOD PRESSURE: 58 MMHG | HEART RATE: 61 BPM | SYSTOLIC BLOOD PRESSURE: 142 MMHG | OXYGEN SATURATION: 96 %

## 2017-06-30 DIAGNOSIS — Z95.5 H/O HEART ARTERY STENT: Primary | ICD-10-CM

## 2017-06-30 PROCEDURE — 99203 OFFICE O/P NEW LOW 30 MIN: CPT | Mod: GC | Performed by: INTERNAL MEDICINE

## 2017-06-30 ASSESSMENT — PAIN SCALES - GENERAL: PAINLEVEL: NO PAIN (0)

## 2017-06-30 NOTE — MR AVS SNAPSHOT
After Visit Summary   6/30/2017    Viv Shaw    MRN: [id number]           Patient Information     Date Of Birth          1950        Visit Information        Provider Department      6/30/2017 3:00 PM Dejon Pickering MD [facility]        Today's Diagnoses     H/O heart artery stent    -  1      Care Instructions    Please hold your Metoprolol the day before and the morning of your procedure.    Thank you for your visit today.  Please call me with any questions or concerns.   Grayson Groves RN  Cardiology Care Coordinator  [phone], press option 1 then option 3          Follow-ups after your visit        Follow-up notes from your care team     Return if symptoms worsen or fail to improve.      Your next 10 appointments already scheduled     Jul 11, 2017 11:00 AM CDT   NM INJECTION with [id number]   [facility] [place], Nuclear Medicine ([facility])    [address] [id number] [phone]            Jul 11, 2017 11:45 AM CDT   NM SCAN3 with UUNM1   [facility], Nuclear Medicine ([facility])    [address] [address] [phone]            Jul 11, 2017 12:15 PM CDT   Ekg Stress Nm Exercise with UUEKGNMS   [facility] ([facility])    [address] [place] [phone]               Jul 11, 2017  1:15 PM CDT   NM MPI TREADMILL with UUNM1   [facility], Nuclear Medicine ([facility])    [address] [place] [address] [phone]           For a ONE day exam: Allow 3-4 hours for test. For a TWO day exam: Allow 2 hours PER day for test.  You may need to stop some medicines before the test. Follow your doctor s orders. - If you take a beta blocker: Follow your doctor s specific instructions on taking it prior to and on the  day of your exam. - If you take Aggrenox or dipyridamole (Persantine, Permole), stop taking it 48 hours before your test. - If you take Viagra, Cialis or Levitra, stop taking it 48 hours before your test. - If you take theophylline or aminophylline, stop taking it 12 hours before your test.  For patients with diabetes: - If you take insulin, call your diabetes care team. Ask if you should take a 1/2 dose the morning of your test. - If you take diabetes medicine by mouth, don t take it on the morning of your test. Bring it with you to take after the test. (If you have questions, call your diabetes care team.)  Do not take nitrates on the day of your test. Do not wear your Nitro-Patch.  Stop all caffeine 12 hours before the test. This includes coffee, tea, soda pop, chocolate and certain medicines (such as Anacin, Excedrin and NoDoz). Also avoid decaf coffee and tea, as these contain small amounts of caffeine.  No alcohol, smoking or other tobacco for 12 hours before the test.  Stop eating 3 hours before the test. You may drink water.  Please wear a loose two-piece outfit. If you will have an exercise test, bring rubber-soled walking shoes.  When you arrive, please tell us if you: - Have diabetes - Are breastfeeding - May be pregnant - Have a pacemaker of ICD (implantable defibrillator).  Please call your Imaging Department at your exam site with any questions.            Aug 23, 2017  8:30 AM CDT   (Arrive by 8:15 AM)   Return Plastic Surgery with  Angelique Lott MD   [facility] Plastic and Reconstructive Surgery ([facility])    [address] [place] [address] [phone]           Do not wear perfume.            Dec 04, 2017  1:05 PM CST   (Arrive by 12:35 PM)   Return Kidney Transplant with Morro Veloz MD   [facility] Nephrology ([facility])    [address] [address] [phone]          "     Future tests that were ordered for you today     Open Future Orders        Priority Expected Expires Ordered    NM Exercise stress test Routine  6/30/2018 6/30/2017            Who to contact     If you have questions or need follow up information about today's clinic visit or your schedule please contact [facility] directly at [phone].  Normal or non-critical lab and imaging results will be communicated to you by MyChart, letter or phone within 4 business days after the clinic has received the results. If you do not hear from us within 7 days, please contact the clinic through [email or web address] or phone. If you have a critical or abnormal lab result, we will notify you by phone as soon as possible.  Submit refill requests through [employer] or call your pharmacy and they will forward the refill request to us. Please allow 3 business days for your refill to be completed.          Additional Information About Your Visit        MyChart Information     [employer] gives you secure access to your electronic health record. If you see a primary care provider, you can also send messages to your care team and make appointments. If you have questions, please call your primary care clinic.  If you do not have a primary care provider, please call [phone] and they will assist you.        Care EveryWhere ID     This is your Care EveryWhere ID. This could be used by other organizations to access your [place] medical records  [id number]        Your Vitals Were     Pulse Height Pulse Oximetry BMI (Body Mass Index)          61 1.626 m (5' 4\") 96% 30.64 kg/m2         Blood Pressure from Last 3 Encounters:   06/30/17 142/58   06/12/17 118/72   05/17/17 149/61    Weight from Last 3 Encounters:   06/30/17 81 kg (178 lb 8 oz)   06/12/17 79.4 kg (175 lb)   05/17/17 80.3 kg (177 lb)               Primary Care Provider Office Phone # Fax #    Summer Vega [phone]       [facility] [address] [facility] [address]        Equal Access to Services     WALTROSLYN EDOUARD : Hadii kirit gaines paolagissell Gretchenali, wakerryda luqruddyha, qacoleenta kalatriciaphylicia gunnjosephylicia, hue edward kerrinevaeh fangushayobany payan . So [facility] [phone].    ATENCIÓN: Si habla español, tiene a rosario disposición servicios gratuitos de asistencia lingüística. Llame al [phone].    We comply with applicable federal civil rights laws and [place] laws. We do not discriminate on the basis of race, color, national origin, age, disability sex, sexual orientation or gender identity.            Thank you!     Thank you for choosing [facility]  for your care. Our goal is always to provide you with excellent care. Hearing back from our patients is one way we can continue to improve our services. Please take a few minutes to complete the written survey that you may receive in the mail after your visit with us. Thank you!             Your Updated Medication List - Protect others around you: Learn how to safely use, store and throw away your medicines at www.disposemymeds.org.          This list is accurate as of: 6/30/17  3:30 PM.  Always use your most recent med list.                   Brand Name Dispense Instructions for use Diagnosis    alendronate 70 MG tablet    FOSAMAX    12 tablet    Take 1 tablet (70 mg) by mouth every 7 days Take with over 8 ounces water and stay upright for at least 30 minutes after dose.  Take at least 60 minutes before breakfast    Osteopenia       amLODIPine 10 MG tablet    NORVASC    90 tablet    Take 1 tablet (10 mg) by mouth daily    Hypertension secondary to other renal disorders       aspirin 325 MG EC tablet     100 tablet    Take 1 tablet by mouth daily.        atorvastatin 40 MG tablet    LIPITOR    45 tablet    Take 0.5 tablets (20 mg) by mouth daily    Hyperlipidemia LDL goal <100       FLORIN CONTOUR test strip   Generic drug:  blood glucose monitoring           chlorthalidone 25 MG tablet    HYGROTON    90  tablet    Take 1 tablet (25 mg) by mouth daily    Hypertension secondary to other renal disorders       cycloSPORINE modified capsule     180 capsule    Take 3 capsules (75 mg) by mouth 2 times daily    Kidney transplanted       isosorbide mononitrate 30 MG 24 hr tablet    IMDUR    90 tablet    Take 0.5 tablets (15 mg) by mouth 2 times daily    Coronary artery disease involving native heart without angina pectoris, unspecified vessel or lesion type       LANTUS - INSULIN GLARGINE     0    22 u q pm    Type II or unspecified type diabetes mellitus without mention of complication, not stated as uncontrolled       metoprolol 25 MG 24 hr tablet    TOPROL XL    90 tablet    Take 1 tablet (25 mg) by mouth At Bedtime    Hypertension secondary to other renal disorders, Coronary artery disease involving native heart without angina pectoris, unspecified vessel or lesion type       mycophenolic acid 180 MG EC tablet    MYFORTIC - GENERIC EQUIVALENT    180 tablet    Take 3 tablets (540 mg) by mouth 2 times daily    Kidney replaced by transplant       NovoLOG VIAL 100 UNITS/ML injection   Generic drug:  insulin aspart      sliding scale 4-6 units tid        pantoprazole 40 MG EC tablet    PROTONIX    90 tablet    Take 1 tablet (40 mg) by mouth daily    Gastroesophageal reflux disease without esophagitis       SYNTHROID 112 MCG tablet   Generic drug:  levothyroxine      Take by mouth daily        valsartan 160 MG tablet    DIOVAN    45 tablet    Take 0.5 tablets (80 mg) by mouth daily    Hypertension secondary to other renal disorders

## 2017-06-30 NOTE — PROGRESS NOTES
HPI:   Viv Deras a 66 year old female with history of ESRD due to diabetic nephropathy status post renal transplant 2005, HTN, HL, DM2, and CAD status post PCI to the mLAD 2005 who presents for perioperative cardiovascular evaluation for elective hernia repair.     She underwent PCI 11/2005 after preoperative coronary angiogram for renal transplantation showed 90% mid-LAD stenosis; she was not experiencing any anginal symptoms at the time. She denies any chest pain, dyspnea at rest or with exertion, PND, orthopnea, peripheral edema, palpitations, LH, or syncope.     She walks 1/2-1 mile/day with her  without chest discomfort or dyspnea. She lives on the 3rd floor and develops dyspnea only walking 3 flights (from the basement to the 3rd floor.) She is able to walk up 2 flights without stopping without symptoms.     PAST MEDICAL HISTORY:  Past Medical History:   Diagnosis Date     Abdominal wall hernia     RLQ     Allergic rhinitis      CAD (coronary artery disease)     coronary artery disease s/p PCI to LAD in 2005coronary artery disease s/p PCI to LAD in 2005     Diabetes mellitus, type 2 (H)     follows up with endocrinologist.     Dyslipidemia      GERD (gastroesophageal reflux disease)      H/O diabetic nephropathy     s/p kidney trnsplant     Hypertension      Hypothyroidism      Immunosuppressed status (H)      Kidney replaced by transplant     Rt     Shingles      Vitamin B12 deficiency anemia      CURRENT MEDICATIONS:  Current Outpatient Prescriptions   Medication Sig Dispense Refill     alendronate (FOSAMAX) 70 MG tablet Take 1 tablet (70 mg) by mouth every 7 days Take with over 8 ounces water and stay upright for at least 30 minutes after dose.  Take at least 60 minutes before breakfast 12 tablet 3     amLODIPine (NORVASC) 10 MG tablet Take 1 tablet (10 mg) by mouth daily 90 tablet 1     chlorthalidone (HYGROTON) 25 MG tablet Take 1 tablet (25 mg) by mouth daily 90 tablet 3     isosorbide  mononitrate (IMDUR) 30 MG 24 hr tablet Take 0.5 tablets (15 mg) by mouth 2 times daily 90 tablet 1     metoprolol (TOPROL XL) 25 MG 24 hr tablet Take 1 tablet (25 mg) by mouth At Bedtime 90 tablet 1     pantoprazole (PROTONIX) 40 MG EC tablet Take 1 tablet (40 mg) by mouth daily 90 tablet 1     valsartan (DIOVAN) 160 MG tablet Take 0.5 tablets (80 mg) by mouth daily 45 tablet 1     atorvastatin (LIPITOR) 40 MG tablet Take 0.5 tablets (20 mg) by mouth daily 45 tablet 1     mycophenolic acid (MYFORTIC - GENERIC EQUIVALENT) 180 MG EC tablet Take 3 tablets (540 mg) by mouth 2 times daily 180 tablet 11     GENGRAF 25 MG PO CAPSULE Take 3 capsules (75 mg) by mouth 2 times daily 180 capsule 11     FLORIN CONTOUR test strip   0     levothyroxine (SYNTHROID) 112 MCG tablet Take by mouth daily       aspirin 325 MG EC tablet Take 1 tablet by mouth daily. 100 tablet 3     LANTUS - INSULIN GLARGINE 22 u q pm 0 0     NOVOLOG 100 U/ML SC SOLN sliding scale 4-6 units tid       PAST SURGICAL HISTORY:  Past Surgical History:   Procedure Laterality Date     APPENDECTOMY NOS       ARTHROSCOPY SHOULDER ROTATOR CUFF REPAIR Left      COLONOSCOPY N/A 2016    Procedure: COLONOSCOPY;  Surgeon: Rashard Snider MD;  Location: [facility]     Coronary angioplasty with stent       HYSTERECTOMY       Kidney Transplant NOS Right      LAPAROSCOPIC CHOLECYSTECTOMY       NOSE SURGERY       REMOVE CATARACT INTRACAP,INSERT LENS Right      TONSILLECTOMY       ALLERGIES:     Allergies   Allergen Reactions     No Known Drug Allergies      FAMILY HISTORY:  Family History   Problem Relation Age of Onset     Respiratory Mother       age 71     Cardiovascular Father       age 75 hyertension     Arthritis Sister      living, healthy     Family History Negative Brother       age 44     Colon Cancer No family hx of      SOCIAL HISTORY:  Social History   Substance Use Topics     Smoking status: Never Smoker     Smokeless tobacco:  "Never Used     Alcohol use No     ROS:   A comprehensive 10 point review of systems negative other than as mentioned in HPI.  Exam:  /58  Pulse 61  Ht 1.626 m (5' 4\")  Wt 81 kg (178 lb 8 oz)  SpO2 96%  BMI 30.64 kg/m2  GENERAL APPEARANCE: healthy, alert and no distress  HEENT: no icterus, no xanthelasmas, normal pupil size and reaction, normal palate, mucosa moist, no central cyanosis  NECK: no adenopathy, no asymmetry, masses, or scars, thyroid normal to palpation and no bruits, JVP not elevated  RESPIRATORY: lungs clear to auscultation - no rales, rhonchi or wheezes, no use of accessory muscles, no retractions, respirations are unlabored, normal respiratory rate  CARDIOVASCULAR: regular rhythm, normal S1 with physiologic split S2, no S3 or S4 and no murmur, click or rub, precordium quiet with normal PMI.  ABDOMEN: soft, non tender, without hepatosplenomegaly, no masses palpable, bowel sounds normal, aorta not enlarged by palpation, no abdominal bruits  EXTREMITIES: peripheral pulses normal, no edema, no bruits  NEURO: alert and oriented to person/place/time, normal speech, gait and affect  VASC: Radial, femoral, dorsalis pedis and posterior tibialis pulses are 2+ bilaterally. No bruits are heard.  SKIN: no ecchymoses, no rashes    Labs:  Reviewed.     Testing/Procedures:  CORONARY ANGIOGRAM 10/2005:   LMCA no disease.   LAD 90% mid stenosis after 2nd septal.   LCx no significant disease.   RCA (dominant) mild diffuse disease.   Successful PCI+[facility] to [facility] 11/2005.     DOBUTAMINE ECHO 5/22/2009:  Normal biventricular function, no evidence of ischemia. No significant valvular disease.     EXERCISE SPECT 5/5/2011:  Exercised 6:39 on Terrell Protocol (6 METS),  (85%)  1. Normal myocardial SPECT study with a summed stress score of 0. A summed stress score of <4 is associated with an annual event rate of 0.5% and 0.3% for myocardial infarction and cardiac death, respectively ([place]. Circulation " 1998;98:535-43).  2. No significant perfusion abnormalities.   3. Normal left ventricular systolic function with no regional wall motion abnormalities.  4. Mildly reduced for age functional capacity.  5. No prior study available for comparison.    Assessment and Plan:   1. Preoperative cardiovascular risk stratification:    Hernia repair is an elective, intermediate-risk surgery. Based on a revised cardiac risk index of 2, her risk of perioperative cardiovascular events is 6.6%. Ms. Shaw does not have any unstable cardiovascular conditions that would preclude surgery. Her exertional capacity is 4 METS. We have ordered a treadmill SPECT for further risk stratification; in the absence of severe ischemia, she should be able to proceed with surgery. We recommend that her beta blocker and atorvastatin be continued throughout the perioperative period without interruption.     Regarding perioperative management of cardiac meds:  -ASA may be safely interrupted (can hold 5 days prior to surgery), but should be resumed as soon as it is safe from a perioperative bleeding perspective to do so.     -metoprolol and atorvastatin should be continued without interruption    -antihypertensives may be otherwise adjusted or held as needed if hypotension is anticipated.     Final risk stratification is pending the results of her exercise SPECT.     2. CAD s/p PCI to mLAD 2005: Stably free of anginal symptoms. Reduce ASA to 81 mg. Continue beta blocker and statin.    3. HTN: primarily managed by PCP/nephrologist. She has been diagnosed with white coat hypertension and believes she has had ambulatory BP monitoring in the past.     4. HL: continue atorvastatin    The patient states understanding and is agreeable with plan.   This patient was seen and evaluated with Dr. Pickering. The above note reflects our joint assessment and plan.   Derrek Hernandez MD  Cardiology Fellow    CC  CARL ANDREWS    Attending  Attestation:  Patient seen and examined by me with the Fellow, Dr. Hernandez. I have performed all pertinent elements of the physical examination and reviewed the note above. I have reviewed pertinent laboratory, echocardiographic, imaging, and cardiac catheterization results. I agree with the plan of care as described in this note.    Dejon Pickering MD, PhD    Addendum to my note above:  The patient underwent risk stratification with a nuclear stress test. The results are listed below and indicate no signs of ischemia. The LVEF was normal. The patient can proceed with surgery as clinically indicated and per further recommendations from the surgical team. We recommend that her beta blocker and atorvastatin be continued throughout the perioperative period without interruption.     NM MPI: 7/11/2017    INDICATION:   Evaluation for obstructive coronary artery disease.      PROTOCOL:    Rest and stress myocardial perfusion imaging was performed using 10 and 30 mCi of Tc-99m tetrafosmin. Pharmacological stress was performed  with 0.4 mg of adenosine.      FINDINGS:  1. Overall quality of the study: Good.   2. SPECT images demonstrate homogenous radiotracer uptake in all myocardial segments during rest and stress. These results suggest a  low post-scan likelihood of obstructive coronary artery disease. The summed stress score is 0.   4. Left ventricular ejection fraction is 92%. Left ventricular end-diastolic volume is 100 mL. End-systolic volume is 8 mL. The left  ventricular ejection fraction is overestimated due to small ventricular volumes.   5. Baseline EKG and stress findings show no significant ischemia.         IMPRESSION:  1. Normal myocardial SPECT study with a summed stress score of 0. A summed stress score of 0 is associated with an annual event rate of  0.9 and 0.8 for myocardial infarction and cardiac death, respectively ([place]. Circulation 1998;98:535-43).  2. Normal LV systolic function.  3. No  significant perfusion abnormalities.  4. Compared to the prior study from May 5, 2011, there has been no significant interval change.    Dejon Pickering MD, PhD     Answers for HPI/ROS submitted by the patient on 6/27/2017   General Symptoms: No  Skin Symptoms: No  HENT Symptoms: No  EYE SYMPTOMS: No  HEART SYMPTOMS: No  LUNG SYMPTOMS: No  INTESTINAL SYMPTOMS: No  URINARY SYMPTOMS: No  GYNECOLOGIC SYMPTOMS: No  BREAST SYMPTOMS: No  SKELETAL SYMPTOMS: No  BLOOD SYMPTOMS: No  NERVOUS SYSTEM SYMPTOMS: No  MENTAL HEALTH SYMPTOMS: No

## 2017-06-30 NOTE — LETTER
6/30/2017      RE: Viv Shaw  [address]  [address]  [place] [address]       Dear Colleague,    Thank you for the opportunity to participate in the care of your patient, Viv Shaw, at the [facility] at [facility]. Please see a copy of my visit note below.    HPI:   Viv Shawis a 66 year old female with history of ESRD due to diabetic nephropathy status post renal transplant 2005, HTN, HL, DM2, and CAD status post PCI to the mLAD 2005 who presents for perioperative cardiovascular evaluation for elective hernia repair.     She underwent PCI 11/2005 after preoperative coronary angiogram for renal transplantation showed 90% mid-LAD stenosis; she was not experiencing any anginal symptoms at the time. She denies any chest pain, dyspnea at rest or with exertion, PND, orthopnea, peripheral edema, palpitations, LH, or syncope.     She walks 1/2-1 mile/day with her  without chest discomfort or dyspnea. She lives on the 3rd floor and develops dyspnea only walking 3 flights (from the basement to the 3rd floor.) She is able to walk up 2 flights without stopping without symptoms.     PAST MEDICAL HISTORY:  Past Medical History:   Diagnosis Date     Abdominal wall hernia     RLQ     Allergic rhinitis      CAD (coronary artery disease)     coronary artery disease s/p PCI to LAD in 2005coronary artery disease s/p PCI to LAD in 2005     Diabetes mellitus, type 2 (H)     follows up with endocrinologist.     Dyslipidemia      GERD (gastroesophageal reflux disease)      H/O diabetic nephropathy     s/p kidney trnsplant     Hypertension      Hypothyroidism      Immunosuppressed status (H)      Kidney replaced by transplant     Rt     Shingles      Vitamin B12 deficiency anemia      CURRENT MEDICATIONS:  Current Outpatient Prescriptions   Medication Sig Dispense Refill     alendronate (FOSAMAX) 70 MG tablet Take 1 tablet (70 mg) by mouth every 7  days Take with over 8 ounces water and stay upright for at least 30 minutes after dose.  Take at least 60 minutes before breakfast 12 tablet 3     amLODIPine (NORVASC) 10 MG tablet Take 1 tablet (10 mg) by mouth daily 90 tablet 1     chlorthalidone (HYGROTON) 25 MG tablet Take 1 tablet (25 mg) by mouth daily 90 tablet 3     isosorbide mononitrate (IMDUR) 30 MG 24 hr tablet Take 0.5 tablets (15 mg) by mouth 2 times daily 90 tablet 1     metoprolol (TOPROL XL) 25 MG 24 hr tablet Take 1 tablet (25 mg) by mouth At Bedtime 90 tablet 1     pantoprazole (PROTONIX) 40 MG EC tablet Take 1 tablet (40 mg) by mouth daily 90 tablet 1     valsartan (DIOVAN) 160 MG tablet Take 0.5 tablets (80 mg) by mouth daily 45 tablet 1     atorvastatin (LIPITOR) 40 MG tablet Take 0.5 tablets (20 mg) by mouth daily 45 tablet 1     mycophenolic acid (MYFORTIC - GENERIC EQUIVALENT) 180 MG EC tablet Take 3 tablets (540 mg) by mouth 2 times daily 180 tablet 11     GENGRAF 25 MG PO CAPSULE Take 3 capsules (75 mg) by mouth 2 times daily 180 capsule 11     FLORIN CONTOUR test strip   0     levothyroxine (SYNTHROID) 112 MCG tablet Take by mouth daily       aspirin 325 MG EC tablet Take 1 tablet by mouth daily. 100 tablet 3     LANTUS - INSULIN GLARGINE 22 u q pm 0 0     NOVOLOG 100 U/ML SC SOLN sliding scale 4-6 units tid       PAST SURGICAL HISTORY:  Past Surgical History:   Procedure Laterality Date     APPENDECTOMY NOS       ARTHROSCOPY SHOULDER ROTATOR CUFF REPAIR Left      COLONOSCOPY N/A 6/7/2016    Procedure: COLONOSCOPY;  Surgeon: Rashard Snider MD;  Location: [facility]     Coronary angioplasty with stent  2005     HYSTERECTOMY       Kidney Transplant NOS Right      LAPAROSCOPIC CHOLECYSTECTOMY       NOSE SURGERY  2013     REMOVE CATARACT INTRACAP,INSERT LENS Right      TONSILLECTOMY       ALLERGIES:     Allergies   Allergen Reactions     No Known Drug Allergies      FAMILY HISTORY:  Family History   Problem Relation Age of Onset     Respiratory  "Mother       age 71     Cardiovascular Father       age 75 hyertension     Arthritis Sister      living, healthy     Family History Negative Brother       age 44     Colon Cancer No family hx of      SOCIAL HISTORY:  Social History   Substance Use Topics     Smoking status: Never Smoker     Smokeless tobacco: Never Used     Alcohol use No     ROS:   A comprehensive 10 point review of systems negative other than as mentioned in HPI.  Exam:  /58  Pulse 61  Ht 1.626 m (5' 4\")  Wt 81 kg (178 lb 8 oz)  SpO2 96%  BMI 30.64 kg/m2  GENERAL APPEARANCE: healthy, alert and no distress  HEENT: no icterus, no xanthelasmas, normal pupil size and reaction, normal palate, mucosa moist, no central cyanosis  NECK: no adenopathy, no asymmetry, masses, or scars, thyroid normal to palpation and no bruits, JVP not elevated  RESPIRATORY: lungs clear to auscultation - no rales, rhonchi or wheezes, no use of accessory muscles, no retractions, respirations are unlabored, normal respiratory rate  CARDIOVASCULAR: regular rhythm, normal S1 with physiologic split S2, no S3 or S4 and no murmur, click or rub, precordium quiet with normal PMI.  ABDOMEN: soft, non tender, without hepatosplenomegaly, no masses palpable, bowel sounds normal, aorta not enlarged by palpation, no abdominal bruits  EXTREMITIES: peripheral pulses normal, no edema, no bruits  NEURO: alert and oriented to person/place/time, normal speech, gait and affect  VASC: Radial, femoral, dorsalis pedis and posterior tibialis pulses are 2+ bilaterally. No bruits are heard.  SKIN: no ecchymoses, no rashes    Labs:  Reviewed.     Testing/Procedures:  CORONARY ANGIOGRAM 10/2005:   LMCA no disease.   LAD 90% mid stenosis after 2nd septal.   LCx no significant disease.   RCA (dominant) mild diffuse disease.   Successful PCI+[facility] to [facility] 2005.     DOBUTAMINE ECHO 2009:  Normal biventricular function, no evidence of ischemia. No significant valvular " disease.     EXERCISE SPECT 5/5/2011:  Exercised 6:39 on Terrell Protocol (6 METS),  (85%)  1. Normal myocardial SPECT study with a summed stress score of 0. A summed stress score of <4 is associated with an annual event rate of 0.5% and 0.3% for myocardial infarction and cardiac death, respectively ([place]. Circulation 1998;98:535-43).  2. No significant perfusion abnormalities.   3. Normal left ventricular systolic function with no regional wall motion abnormalities.  4. Mildly reduced for age functional capacity.  5. No prior study available for comparison.    Assessment and Plan:   1. Preoperative cardiovascular risk stratification:    Hernia repair is an elective, intermediate-risk surgery. Based on a revised cardiac risk index of 2, her risk of perioperative cardiovascular events is 6.6%. Ms. Shaw does not have any unstable cardiovascular conditions that would preclude surgery. Her exertional capacity is 4 METS. We have ordered a treadmill SPECT for further risk stratification; in the absence of severe ischemia, she should be able to proceed with surgery. We recommend that her beta blocker and atorvastatin be continued throughout the perioperative period without interruption.     Regarding perioperative management of cardiac meds:  -ASA may be safely interrupted (can hold 5 days prior to surgery), but should be resumed as soon as it is safe from a perioperative bleeding perspective to do so.     -metoprolol and atorvastatin should be continued without interruption    -antihypertensives may be otherwise adjusted or held as needed if hypotension is anticipated.     Final risk stratification is pending the results of her exercise SPECT.     2. CAD s/p PCI to mLAD 2005: Stably free of anginal symptoms. Reduce ASA to 81 mg. Continue beta blocker and statin.    3. HTN: primarily managed by PCP/nephrologist. She has been diagnosed with white coat hypertension and believes she has had ambulatory BP  monitoring in the past.     4. HL: continue atorvastatin    The patient states understanding and is agreeable with plan.   This patient was seen and evaluated with Dr. Pickering. The above note reflects our joint assessment and plan.   Derrek Hernandez MD  Cardiology Fellow    CC  CARL ANDREWS    Attending Attestation:  Patient seen and examined by me with the Fellow, Dr. Hernandez. I have performed all pertinent elements of the physical examination and reviewed the note above. I have reviewed pertinent laboratory, echocardiographic, imaging, and cardiac catheterization results. I agree with the plan of care as described in this note.    Dejon Pickering MD, PhD

## 2017-06-30 NOTE — NURSING NOTE
Chief Complaint   Patient presents with     New Patient     Patient referred by referred by Dr. Lott for cardiac evaluation prior to right flank hernia repair.     Vitals were taken and medications were reconciled.     MARVA Hatch  2:30 PM

## 2017-06-30 NOTE — PATIENT INSTRUCTIONS
Please hold your Metoprolol the day before and the morning of your procedure.    Thank you for your visit today.  Please call me with any questions or concerns.   Grayson Groves RN  Cardiology Care Coordinator  [phone], press option 1 then option 3

## 2017-07-11 ENCOUNTER — HOSPITAL ENCOUNTER (OUTPATIENT)
Dept: CARDIOLOGY | Facility: CLINIC | Age: 67
Discharge: HOME | End: 2017-07-11
Attending: INTERNAL MEDICINE | Admitting: INTERNAL MEDICINE
Payer: MEDICARE

## 2017-07-11 ENCOUNTER — HOSPITAL ENCOUNTER (OUTPATIENT)
Dept: NUCLEAR MEDICINE | Facility: CLINIC | Age: 67
Setting detail: NUCLEAR MEDICINE
End: 2017-07-11
Attending: INTERNAL MEDICINE
Payer: MEDICARE

## 2017-07-11 DIAGNOSIS — Z95.5 H/O HEART ARTERY STENT: ICD-10-CM

## 2017-07-11 PROCEDURE — 25000128 H RX IP 250 OP 636: Performed by: INTERNAL MEDICINE

## 2017-07-11 PROCEDURE — 93018 CV STRESS TEST I&R ONLY: CPT | Performed by: INTERNAL MEDICINE

## 2017-07-11 PROCEDURE — 78452 HT MUSCLE IMAGE SPECT MULT: CPT

## 2017-07-11 PROCEDURE — 93016 CV STRESS TEST SUPVJ ONLY: CPT | Performed by: INTERNAL MEDICINE

## 2017-07-11 PROCEDURE — 78452 HT MUSCLE IMAGE SPECT MULT: CPT | Mod: 26 | Performed by: INTERNAL MEDICINE

## 2017-07-11 PROCEDURE — 34300033 ZZH RX 343: Performed by: INTERNAL MEDICINE

## 2017-07-11 PROCEDURE — A9502 TC99M TETROFOSMIN: HCPCS | Performed by: INTERNAL MEDICINE

## 2017-07-11 RX ORDER — AMINOPHYLLINE 25 MG/ML
50-100 INJECTION, SOLUTION INTRAVENOUS
Status: DISCONTINUED | OUTPATIENT
Start: 2017-07-11 | End: 2017-07-12 | Stop reason: HOSPADM

## 2017-07-11 RX ORDER — ACYCLOVIR 200 MG/1
0-1 CAPSULE ORAL
Status: DISCONTINUED | OUTPATIENT
Start: 2017-07-11 | End: 2017-07-12 | Stop reason: HOSPADM

## 2017-07-11 RX ORDER — ALBUTEROL SULFATE 90 UG/1
2 AEROSOL, METERED RESPIRATORY (INHALATION) EVERY 5 MIN PRN
Status: DISCONTINUED | OUTPATIENT
Start: 2017-07-11 | End: 2017-07-12 | Stop reason: HOSPADM

## 2017-07-11 RX ORDER — REGADENOSON 0.08 MG/ML
0.4 INJECTION, SOLUTION INTRAVENOUS ONCE
Status: COMPLETED | OUTPATIENT
Start: 2017-07-11 | End: 2017-07-11

## 2017-07-11 RX ADMIN — TETROFOSMIN 9.9 MCI.: 1.38 INJECTION, POWDER, LYOPHILIZED, FOR SOLUTION INTRAVENOUS at 11:00

## 2017-07-11 RX ADMIN — TETROFOSMIN 35.8 MCI.: 1.38 INJECTION, POWDER, LYOPHILIZED, FOR SOLUTION INTRAVENOUS at 12:47

## 2017-07-11 RX ADMIN — REGADENOSON 0.4 MG: 0.08 INJECTION, SOLUTION INTRAVENOUS at 12:44

## 2017-07-21 DIAGNOSIS — I25.10 CORONARY ARTERY DISEASE INVOLVING NATIVE HEART WITHOUT ANGINA PECTORIS, UNSPECIFIED VESSEL OR LESION TYPE: ICD-10-CM

## 2017-07-21 DIAGNOSIS — E78.5 HYPERLIPIDEMIA LDL GOAL <100: ICD-10-CM

## 2017-07-21 RX ORDER — ATORVASTATIN CALCIUM 40 MG/1
TABLET, FILM COATED ORAL
Qty: 45 TABLET | Refills: 1 | OUTPATIENT
Start: 2017-07-21

## 2017-07-21 RX ORDER — ISOSORBIDE MONONITRATE 30 MG/1
TABLET, EXTENDED RELEASE ORAL
Qty: 90 TABLET | Refills: 1 | OUTPATIENT
Start: 2017-07-21

## 2017-07-21 NOTE — TELEPHONE ENCOUNTER
LIPITOR     Last Written Prescription Date: 04/07/17  Last Fill Quantity: 45, # refills: 1  Last Office Visit with [facility] or [facility] prescribing provider: 06/12/17       Lab Results   Component Value Date    CHOL 181 04/13/2017     Lab Results   Component Value Date    HDL 57 04/13/2017     Lab Results   Component Value Date    LDL 95 04/13/2017     Lab Results   Component Value Date    TRIG 143 04/13/2017     Lab Results   Component Value Date    CHOLHDLRATIO 3.1 03/11/2015       Labs showing if normal/abnormal  Lab Results   Component Value Date    CHOL 181 04/13/2017    TRIG 143 04/13/2017    HDL 57 04/13/2017    LDL 95 04/13/2017    VLDL 27 03/11/2015    CHOLHDLRATIO 3.1 03/11/2015       IMDUR      Last Written Prescription Date: 04/07/17  Last Fill Quantity: 90,  # refills: 1   Last Office Visit with [facility], [facility] or [facility] prescribing provider: 06/12/17

## 2017-08-23 ENCOUNTER — OFFICE VISIT (OUTPATIENT)
Dept: PLASTIC SURGERY | Facility: CLINIC | Age: 67
End: 2017-08-23

## 2017-08-23 VITALS
WEIGHT: 177.6 LBS | DIASTOLIC BLOOD PRESSURE: 66 MMHG | HEIGHT: 64 IN | HEART RATE: 62 BPM | TEMPERATURE: 98.4 F | SYSTOLIC BLOOD PRESSURE: 154 MMHG | BODY MASS INDEX: 30.32 KG/M2 | OXYGEN SATURATION: 99 %

## 2017-08-23 DIAGNOSIS — K43.9 VENTRAL HERNIA WITHOUT OBSTRUCTION OR GANGRENE: Primary | ICD-10-CM

## 2017-08-23 RX ORDER — CHLORHEXIDINE GLUCONATE 40 MG/ML
SOLUTION TOPICAL ONCE
Status: CANCELLED | OUTPATIENT
Start: 2017-08-23 | End: 2017-08-23

## 2017-08-23 RX ORDER — PEN NEEDLE, DIABETIC 29 G X1/2"
NEEDLE, DISPOSABLE MISCELLANEOUS
Refills: 6 | COMMUNITY
Start: 2017-06-28 | End: 2023-01-01

## 2017-08-23 ASSESSMENT — PAIN SCALES - GENERAL: PAINLEVEL: NO PAIN (0)

## 2017-08-23 NOTE — PROGRESS NOTES
PRESENTING COMPLAINT:  Followup visit for right flank hernia.      HISTORY OF PRESENT ILLNESS:  Ms. Shaw is 67 years old, well-known to my service.  My consult note has the details.  In the interim, she has been cleared from a cardiac and renal standpoint.  Her cardiac stress test was normal.  The patient has been really thinking about whether she wants to get this hernia fixed.  She is still waffling over the decision but seems to be heading towards the decision that she wants it fixed.  No change in history and physical exam.      ASSESSMENT AND PLAN:  Based on above findings, a diagnosis of a right flank hernia was made.  The plan now is to discuss her case with Dr. Villa and potentially plan the procedure in the ensuing months.  She has been cleared from a medical standpoint to proceed.  I went over the planned procedure with her again in detail.  All risks, benefits and alternatives of the procedure including pain, infection, bleeding, scarring, asymmetry, seromas, hematomas, wound breakdown, wound dehiscence, recurrence, injury to deeper structures like nerves, vessels and bowel, DVT, PE, MI, CVA, pneumonia, renal failure and death were explained.  She understood everything.  She will let us know if she changes her mind.  All questions were answered.  She was happy with the visit.        Total time spent was 15 minutes, more than half was counseling.

## 2017-08-23 NOTE — LETTER
8/23/2017       RE: Viv Shaw  [address]  [address]  [place] [address]     Dear Colleague,    Thank you for referring your patient, Viv Shaw, to the [facility] PLASTIC AND RECONSTRUCTIVE SURGERY at [facility]. Please see a copy of my visit note below.    PRESENTING COMPLAINT:  Followup visit for right flank hernia.      HISTORY OF PRESENT ILLNESS:  Ms. Shaw is 67 years old, well-known to my service.  My consult note has the details.  In the interim, she has been cleared from a cardiac and renal standpoint.  Her cardiac stress test was normal.  The patient has been really thinking about whether she wants to get this hernia fixed.  She is still waffling over the decision but seems to be heading towards the decision that she wants it fixed.  No change in history and physical exam.      ASSESSMENT AND PLAN:  Based on above findings, a diagnosis of a right flank hernia was made.  The plan now is to discuss her case with Dr. Villa and potentially plan the procedure in the ensuing months.  She has been cleared from a medical standpoint to proceed.  I went over the planned procedure with her again in detail.  All risks, benefits and alternatives of the procedure including pain, infection, bleeding, scarring, asymmetry, seromas, hematomas, wound breakdown, wound dehiscence, recurrence, injury to deeper structures like nerves, vessels and bowel, DVT, PE, MI, CVA, pneumonia, renal failure and death were explained.  She understood everything.  She will let us know if she changes her mind.  All questions were answered.  She was happy with the visit.        Total time spent was 15 minutes, more than half was counseling.     Again, thank you for allowing me to participate in the care of your patient.    CARL Lott MD

## 2017-08-23 NOTE — MR AVS SNAPSHOT
After Visit Summary   8/23/2017    Viv Shaw    MRN: [id number]           Patient Information     Date Of Birth          1950        Visit Information        Provider Department      8/23/2017 8:30 AM CARL Lott MD [facility] Plastic and Reconstructive Surgery        Today's Diagnoses     Ventral hernia without obstruction or gangrene    -  1       Follow-ups after your visit        Follow-up notes from your care team     Return if symptoms worsen or fail to improve.      Your next 10 appointments already scheduled     Dec 04, 2017  1:05 PM CST   (Arrive by 12:35 PM)   Return Kidney Transplant with Morro Veloz MD   [facility] Nephrology ([facility] [[email or web address] [place])    [address] [address] [phone]              Who to contact     Please call your clinic at [phone] to:    Ask questions about your health    Make or cancel appointments    Discuss your medicines    Learn about your test results    Speak to your doctor   If you have compliments or concerns about an experience at your clinic, or if you wish to file a complaint, please contact [facility] Patient Relations at [phone] or email us at [email or web address]         Additional Information About Your Visit        MyChart Information     [employer] gives you secure access to your electronic health record. If you see a primary care provider, you can also send messages to your care team and make appointments. If you have questions, please call your primary care clinic.  If you do not have a primary care provider, please call [phone] and they will assist you.      [employer] is an electronic gateway that provides easy, online access to your medical records. With [employer], you can request a clinic appointment, read your test results, renew a prescription or communicate with your care team.     To access your existing account,  "please contact your [facility] or call [phone] for assistance.        Care EveryWhere ID     This is your Care EveryWhere ID. This could be used by other organizations to access your [facility] medical records  [id number]        Your Vitals Were     Pulse Temperature Height Pulse Oximetry BMI (Body Mass Index)       62 98.4  F (36.9  C) (Oral) 5' 4\" 99% 30.48 kg/m2        Blood Pressure from Last 3 Encounters:   08/23/17 154/66   06/30/17 142/58   06/12/17 118/72    Weight from Last 3 Encounters:   08/23/17 177 lb 9.6 oz   06/30/17 178 lb 8 oz   06/12/17 175 lb              Today, you had the following     No orders found for display       Primary Care Provider Office Phone # Fax #    Lanaluis LUIS Vega [phone]       [address] [facility] [address]        Equal Access to Services     [facility] : Hadii kirit ku hadasho Soomaali, waaxda luqadaha, qaybta kaalmada adeegyada, hue valladaresin haymaxim payan . So [facility] [phone].    ATENCIÓN: Si habla español, tiene a rosario disposición servicios gratuitos de asistencia lingüística. Llame al [phone].    We comply with applicable federal civil rights laws and [place] laws. We do not discriminate on the basis of race, color, national origin, age, disability sex, sexual orientation or gender identity.            Thank you!     Thank you for choosing [facility] PLASTIC AND RECONSTRUCTIVE SURGERY  for your care. Our goal is always to provide you with excellent care. Hearing back from our patients is one way we can continue to improve our services. Please take a few minutes to complete the written survey that you may receive in the mail after your visit with us. Thank you!             Your Updated Medication List - Protect others around you: Learn how to safely use, store and throw away your medicines at www.disposemymeds.org.          This list is accurate as of: 8/23/17  9:47 AM.  Always use " "your most recent med list.                   Brand Name Dispense Instructions for use Diagnosis    alendronate 70 MG tablet    FOSAMAX    12 tablet    Take 1 tablet (70 mg) by mouth every 7 days Take with over 8 ounces water and stay upright for at least 30 minutes after dose.  Take at least 60 minutes before breakfast    Osteopenia       amLODIPine 10 MG tablet    NORVASC    90 tablet    Take 1 tablet (10 mg) by mouth daily    Hypertension secondary to other renal disorders       aspirin 325 MG EC tablet     100 tablet    Take 1 tablet by mouth daily.        atorvastatin 40 MG tablet    LIPITOR    45 tablet    Take 0.5 tablets (20 mg) by mouth daily    Hyperlipidemia LDL goal <100       FLORIN CONTOUR test strip   Generic drug:  blood glucose monitoring           BD insulin syringe ultrafine 31G X 5/16\" 0.3 ML   Generic drug:  insulin syringe-needle U-100      USING 4-5 PER DAY ([facility] 31G/0.3ML)        chlorthalidone 25 MG tablet    HYGROTON    90 tablet    Take 1 tablet (25 mg) by mouth daily    Hypertension secondary to other renal disorders       cycloSPORINE modified capsule     180 capsule    Take 3 capsules (75 mg) by mouth 2 times daily    Kidney transplanted       isosorbide mononitrate 30 MG 24 hr tablet    IMDUR    90 tablet    Take 0.5 tablets (15 mg) by mouth 2 times daily    Coronary artery disease involving native heart without angina pectoris, unspecified vessel or lesion type       LANTUS - INSULIN GLARGINE     0    22 u q pm    Type II or unspecified type diabetes mellitus without mention of complication, not stated as uncontrolled       metoprolol 25 MG 24 hr tablet    TOPROL XL    90 tablet    Take 1 tablet (25 mg) by mouth At Bedtime    Hypertension secondary to other renal disorders, Coronary artery disease involving native heart without angina pectoris, unspecified vessel or lesion type       mycophenolic acid 180 MG EC tablet    GENERIC EQUIVALENT    180 tablet    Take 3 tablets (540 mg) " by mouth 2 times daily    Kidney replaced by transplant       NovoLOG VIAL 100 UNITS/ML injection   Generic drug:  insulin aspart      sliding scale 4-6 units tid        pantoprazole 40 MG EC tablet    PROTONIX    90 tablet    Take 1 tablet (40 mg) by mouth daily    Gastroesophageal reflux disease without esophagitis       SYNTHROID 112 MCG tablet   Generic drug:  levothyroxine      Take by mouth daily        valsartan 160 MG tablet    DIOVAN    45 tablet    Take 0.5 tablets (80 mg) by mouth daily    Hypertension secondary to other renal disorders

## 2017-08-25 DIAGNOSIS — Z94.0 KIDNEY TRANSPLANTED: ICD-10-CM

## 2017-08-28 RX ORDER — CYCLOSPORINE 25 MG/1
CAPSULE ORAL
Qty: 180 CAPSULE | Refills: 3 | Status: ON HOLD | OUTPATIENT
Start: 2017-08-28 | End: 2017-10-22

## 2017-09-08 ENCOUNTER — TRANSFERRED RECORDS (OUTPATIENT)
Dept: HEALTH INFORMATION MANAGEMENT | Facility: CLINIC | Age: 67
End: 2017-09-08

## 2017-09-08 LAB
CREAT SERPL-MCNC: 1.18 MG/DL (ref 0.5–0.99)
GFR SERPL CREATININE-BSD FRML MDRD: 47 ML/MIN/1.73M2
GLUCOSE SERPL-MCNC: 106 MG/DL (ref 65–99)
GLUCOSE SERPL-MCNC: 128 MG/DL (ref 65–99)
HBA1C MFR BLD: 6.6 % (ref 4–6)
POTASSIUM SERPL-SCNC: 4.6 MMOL/L (ref 3.5–5.3)

## 2017-09-18 ENCOUNTER — MYC MEDICAL ADVICE (OUTPATIENT)
Dept: PLASTIC SURGERY | Facility: CLINIC | Age: 67
End: 2017-09-18

## 2017-09-18 ENCOUNTER — TELEPHONE (OUTPATIENT)
Dept: SURGERY | Facility: CLINIC | Age: 67
End: 2017-09-18

## 2017-09-18 NOTE — TELEPHONE ENCOUNTER
Received transferred call from Carmen, patient is calling to ask if the hernia surgery on 10/16/17 will be medicare eligible?  email to  for codes to use.  Once I find out, I will call patient back

## 2017-09-18 NOTE — TELEPHONE ENCOUNTER
Received cpt codes 64663-TI, 18428, 75180-TE, called and talked to [email or web address]   She states these codes are active, payable with Part [employer].  Called patient and let her know

## 2017-09-27 ENCOUNTER — OFFICE VISIT (OUTPATIENT)
Dept: INTERNAL MEDICINE | Facility: CLINIC | Age: 67
End: 2017-09-27
Payer: MEDICARE

## 2017-09-27 VITALS
OXYGEN SATURATION: 97 % | DIASTOLIC BLOOD PRESSURE: 60 MMHG | SYSTOLIC BLOOD PRESSURE: 136 MMHG | TEMPERATURE: 98.3 F | HEART RATE: 70 BPM | BODY MASS INDEX: 30.36 KG/M2 | WEIGHT: 176.9 LBS

## 2017-09-27 DIAGNOSIS — Z79.4 TYPE 2 DIABETES MELLITUS WITH DIABETIC NEPHROPATHY, WITH LONG-TERM CURRENT USE OF INSULIN (H): ICD-10-CM

## 2017-09-27 DIAGNOSIS — K43.9 VENTRAL HERNIA WITHOUT OBSTRUCTION OR GANGRENE: ICD-10-CM

## 2017-09-27 DIAGNOSIS — E11.21 TYPE 2 DIABETES MELLITUS WITH DIABETIC NEPHROPATHY, WITH LONG-TERM CURRENT USE OF INSULIN (H): ICD-10-CM

## 2017-09-27 DIAGNOSIS — Z98.61 CAD S/P PERCUTANEOUS CORONARY ANGIOPLASTY: ICD-10-CM

## 2017-09-27 DIAGNOSIS — Z01.818 PREOP GENERAL PHYSICAL EXAM: Primary | ICD-10-CM

## 2017-09-27 DIAGNOSIS — I25.10 CAD S/P PERCUTANEOUS CORONARY ANGIOPLASTY: ICD-10-CM

## 2017-09-27 DIAGNOSIS — E03.8 OTHER SPECIFIED HYPOTHYROIDISM: ICD-10-CM

## 2017-09-27 LAB
ERYTHROCYTE [DISTWIDTH] IN BLOOD BY AUTOMATED COUNT: 13.7 % (ref 10–15)
HCT VFR BLD AUTO: 37.2 % (ref 35–47)
HGB BLD-MCNC: 11.6 G/DL (ref 11.7–15.7)
MCH RBC QN AUTO: 28.6 PG (ref 26.5–33)
MCHC RBC AUTO-ENTMCNC: 31.2 G/DL (ref 31.5–36.5)
MCV RBC AUTO: 92 FL (ref 78–100)
PLATELET # BLD AUTO: 226 10E9/L (ref 150–450)
RBC # BLD AUTO: 4.05 10E12/L (ref 3.8–5.2)
T4 FREE SERPL-MCNC: 1.38 NG/DL (ref 0.76–1.46)
TSH SERPL DL<=0.005 MIU/L-ACNC: 0.28 MU/L (ref 0.4–4)
WBC # BLD AUTO: 6.1 10E9/L (ref 4–11)

## 2017-09-27 PROCEDURE — 84439 ASSAY OF FREE THYROXINE: CPT | Performed by: INTERNAL MEDICINE

## 2017-09-27 PROCEDURE — 93000 ELECTROCARDIOGRAM COMPLETE: CPT | Performed by: INTERNAL MEDICINE

## 2017-09-27 PROCEDURE — 99214 OFFICE O/P EST MOD 30 MIN: CPT | Performed by: INTERNAL MEDICINE

## 2017-09-27 PROCEDURE — 85027 COMPLETE CBC AUTOMATED: CPT | Performed by: INTERNAL MEDICINE

## 2017-09-27 PROCEDURE — 36415 COLL VENOUS BLD VENIPUNCTURE: CPT | Performed by: INTERNAL MEDICINE

## 2017-09-27 PROCEDURE — 84443 ASSAY THYROID STIM HORMONE: CPT | Performed by: INTERNAL MEDICINE

## 2017-09-27 NOTE — MR AVS SNAPSHOT
After Visit Summary   9/27/2017    Viv Shaw    MRN: [id number]           Patient Information     Date Of Birth          1950        Visit Information        Provider Department      9/27/2017 8:20 AM Summer Vega MD [facility]        Today's Diagnoses     Preop general physical exam    -  1    Ventral hernia without obstruction or gangrene        Type 2 diabetes mellitus with diabetic nephropathy, with long-term current use of insulin (H)        Other specified hypothyroidism        CAD S/P percutaneous coronary angioplasty          Care Instructions      Before Your Surgery      Call your surgeon if there is any change in your health. This includes signs of a cold or flu (such as a sore throat, runny nose, cough, rash or fever).    Do not smoke, drink alcohol or take over the counter medicine (unless your surgeon or primary care doctor tells you to) for the 24 hours before and after surgery.    If you take prescribed drugs: Follow your doctor s orders about which medicines to take and which to stop until after surgery.    Eating and drinking prior to surgery: follow the instructions from your surgeon    Take a shower or bath the night before surgery. Use the soap your surgeon gave you to gently clean your skin. If you do not have soap from your surgeon, use your regular soap. Do not shave or scrub the surgery site.  Wear clean pajamas and have clean sheets on your bed.           Follow-ups after your visit        Your next 10 appointments already scheduled     Oct 16, 2017   Procedure with CARL Lott MD   [facility], Same Day Surgery (--)    [address] [facility] [address]   [phone]            Nov 01, 2017 10:30 AM CDT   (Arrive by 10:15 AM)   Post-Op with CARL Lott MD   [facility] Plastic [facility] Re[facility]ive Surgery ([facility])    [address]  4th Floor  [facility] [address]    [phone]            Dec 04, 2017  1:05 PM CST   (Arrive by 12:35 PM)   Return Kidney Transplant with Morro Veloz MD   [facility] Nephrology ([facility])    [address] [place] [address] [phone]              Who to contact     If you have questions or need follow up information about today's clinic visit or your schedule please contact [facility] directly at [phone].  Normal or non-critical lab and imaging results will be communicated to you by Errundhart, letter or phone within 4 business days after the clinic has received the results. If you do not hear from us within 7 days, please contact the clinic through CropUpt or phone. If you have a critical or abnormal lab result, we will notify you by phone as soon as possible.  Submit refill requests through inthinc or call your pharmacy and they will forward the refill request to us. Please allow 3 business days for your refill to be completed.          Additional Information About Your Visit        inthinc Information     inthinc gives you secure access to your electronic health record. If you see a primary care provider, you can also send messages to your care team and make appointments. If you have questions, please call your primary care clinic.  If you do not have a primary care provider, please call [phone] and they will assist you.        Care EveryWhere ID     This is your Care EveryWhere ID. This could be used by other organizations to access your [facility] medical records  [id number]        Your Vitals Were     Pulse Temperature Pulse Oximetry BMI (Body Mass Index)          70 98.3  F (36.8  C) (Oral) 97% 30.36 kg/m2         Blood Pressure from Last 3 Encounters:   09/27/17 136/60   08/23/17 154/66   06/30/17 142/58    Weight from Last 3 Encounters:   09/27/17 176 lb 14.4 oz (80.2 kg)   08/23/17 177 lb 9.6 oz (80.6 kg)   06/30/17 178 lb 8 oz (81 kg)               We Performed the Following     CBC with platelets     EKG 12-lead complete w/read - Clinics     TSH with free T4 reflex        Primary Care Provider Office Phone # Fax #    Summer LUIS Vega [phone]       [address] [facility] [address]        Equal Access to Services     WALTROSLYN EDOUARD : Hadii aad ku hadasho Soomaali, waaxda luqadaha, qaybta kaalmada adeegyada, waxay idiin hayaan adeerendira janel lasonia . So [facility] [phone].    ATENCIÓN: Si habla español, tiene a rosario disposición servicios gratuitos de asistencia lingüística. Llame al [phone].    We comply with applicable federal civil rights laws and [place] laws. We do not discriminate on the basis of race, color, national origin, age, disability sex, sexual orientation or gender identity.            Thank you!     Thank you for choosing [facility]  for your care. Our goal is always to provide you with excellent care. Hearing back from our patients is one way we can continue to improve our services. Please take a few minutes to complete the written survey that you may receive in the mail after your visit with us. Thank you!             Your Updated Medication List - Protect others around you: Learn how to safely use, store and throw away your medicines at www.disposemymeds.org.          This list is accurate as of: 9/27/17 12:40 PM.  Always use your most recent med list.                   Brand Name Dispense Instructions for use Diagnosis    alendronate 70 MG tablet    FOSAMAX    12 tablet    Take 1 tablet (70 mg) by mouth every 7 days Take with over 8 ounces water and stay upright for at least 30 minutes after dose.  Take at least 60 minutes before breakfast    Osteopenia       amLODIPine 10 MG tablet    NORVASC    90 tablet    Take 1 tablet (10 mg) by mouth daily    Hypertension secondary to other renal disorders       aspirin 325 MG EC tablet     100 tablet    Take 81 mg by mouth daily         "atorvastatin 40 MG tablet    LIPITOR    45 tablet    Take 0.5 tablets (20 mg) by mouth daily    Hyperlipidemia LDL goal <100       FLORIN CONTOUR test strip   Generic drug:  blood glucose monitoring           BD insulin syringe ultrafine 31G X 5/16\" 0.3 ML   Generic drug:  insulin syringe-needle U-100      USING 4-5 PER DAY ([facility] 31G/0.3ML)        chlorthalidone 25 MG tablet    HYGROTON    90 tablet    Take 1 tablet (25 mg) by mouth daily    Hypertension secondary to other renal disorders       cycloSPORINE modified capsule     180 capsule    TAKE 3 CAPSULES BY MOUTH TWICE DAILY    Kidney transplanted       isosorbide mononitrate 30 MG 24 hr tablet    IMDUR    90 tablet    Take 0.5 tablets (15 mg) by mouth 2 times daily    Coronary artery disease involving native heart without angina pectoris, unspecified vessel or lesion type       LANTUS - INSULIN GLARGINE     0    21 u q pm    Type II or unspecified type diabetes mellitus without mention of complication, not stated as uncontrolled       metoprolol 25 MG 24 hr tablet    TOPROL XL    90 tablet    Take 1 tablet (25 mg) by mouth At Bedtime    Hypertension secondary to other renal disorders, Coronary artery disease involving native heart without angina pectoris, unspecified vessel or lesion type       mycophenolic acid 180 MG EC tablet    GENERIC EQUIVALENT    180 tablet    Take 3 tablets (540 mg) by mouth 2 times daily    Kidney replaced by transplant       NovoLOG VIAL 100 UNITS/ML injection   Generic drug:  insulin aspart      sliding scale 4-6 units tid        pantoprazole 40 MG EC tablet    PROTONIX    90 tablet    Take 1 tablet (40 mg) by mouth daily    Gastroesophageal reflux disease without esophagitis       SYNTHROID 112 MCG tablet   Generic drug:  levothyroxine      Take by mouth daily        valsartan 160 MG tablet    DIOVAN    45 tablet    Take 0.5 tablets (80 mg) by mouth daily    Hypertension secondary to other renal disorders         "

## 2017-09-27 NOTE — NURSING NOTE
"Chief Complaint   Patient presents with     Pre-Op Exam     Reconstruct abdominal wall, 10/16/17, Dr. Lott, [facility]       Initial /60  Pulse 70  Temp 98.3  F (36.8  C) (Oral)  Wt 176 lb 14.4 oz (80.2 kg)  SpO2 97%  BMI 30.36 kg/m2 Estimated body mass index is 30.36 kg/(m^2) as calculated from the following:    Height as of 8/23/17: 5' 4\" (1.626 m).    Weight as of this encounter: 176 lb 14.4 oz (80.2 kg).  Medication Reconciliation: complete     Socorro Perez CMA      "

## 2017-09-27 NOTE — PROGRESS NOTES
[place] [address]  [facility] [address]  [phone]  Dept: [phone]    PRE-OP EVALUATION:  Today's date: 2017    Viv Shaw (: 1950) presents for pre-operative evaluation assessment as requested by Dr. Lott.  She requires evaluation and anesthesia risk assessment prior to undergoing surgery/procedure for treatment of Ventral  hernia .  Proposed procedure:abdominal wall reconstruction    Date of Surgery/ Procedure: 10/16/17  Time of Surgery/ Procedure: 5:45am  Hospital/Surgical Facility[facility]  [phone]  Primary Physician: Summer Vega  Type of Anesthesia Anticipated: General    Patient has a Health Care Directive or Living Will:  YES     Preop Questions 2017   1.  Do you have a history of heart attack, stroke, stent, bypass or surgery on an artery in the head, neck, heart or legs? Cardiac Stent    2.  Do you ever have any pain or discomfort in your chest? No   3.  Do you have a history of  Heart Failure? No   4.   Are you troubled by shortness of breath when:  walking on a level surface, or up a slight hill, or at night? No   5.  Do you currently have a cold, bronchitis or other respiratory infection? No   6.  Do you have a cough, shortness of breath, or wheezing? No   7.  Do you sometimes get pains in the calves of your legs when you walk? No   8. Do you or anyone in your family have previous history of blood clots? No   9.  Do you or does anyone in your family have a serious bleeding problem such as prolonged bleeding following surgeries or cuts? No   10. Have you ever had problems with anemia or been told to take iron pills? YES -    11. Have you had any abnormal blood loss such as black, tarry or bloody stools, or abnormal vaginal bleeding? No   12. Have you ever had a blood transfusion? No   13. Have you or any of your relatives ever had problems with anesthesia? YES - nausea   14. Do you have sleep apnea, excessive  snoring or daytime drowsiness? No   15. Do you have any prosthetic heart valves? No   16. Do you have prosthetic joints? No   17. Is there any chance that you may be pregnant? No           HPI:                                                        DIABETES - Patient has a longstanding history of DiabetesType Type II . Patient is being treated with insulin injections and denies significant side effects. Control has been good.   HYPERTENSION - Patient has longstanding history of  HTN , currently denies any symptoms referable to elevated blood pressure. Specifically denies chest pain, palpitations, dyspnea, orthopnea, PND or peripheral edema. Blood pressure readings have been in normal range. Current medication regimen is as listed below. Patient denies any side effects of medication.                                                                                                                                                                                          .  HYPERLIPIDEMIA - Patient has a long history of significant Hyperlipidemia requiring medication for treatment with recent good control. Patient reports no problems or side effects with the medication.                                                                                                                                                       .                                                                                                                                                                                    .  HYPOTHYROIDISM - Patient has a longstanding history of chronic Hypothyroidism. Patient has been doing well, noting no tremor, insomnia, hair loss or changes in skin texture  Continues to take medications as directed, without adverse reactions or side effects.                                                                                                                                                                                                                         .  S/p Kidney transplant /Immunosuppressed status    Coronary artery disease s/p stent.stable on meds                                                                                                                             .    MEDICAL HISTORY:                                                    Patient Active Problem List    Diagnosis Date Noted     Kidney replaced by transplant      Priority: High     Rt       Ventral hernia without obstruction or gangrene 05/17/2017     Priority: Medium     Aftercare following organ transplant 12/05/2016     Priority: Medium     Type 2 diabetes mellitus with diabetic nephropathy, with long-term current use of insulin (H) 11/14/2016     Priority: Medium     Hypertension secondary to other renal disorders 11/14/2016     Priority: Medium     Other specified hypothyroidism 04/12/2016     Priority: Medium     Coronary artery disease involving native heart without angina pectoris, unspecified vessel or lesion type 04/12/2016     Priority: Medium     Anemia in chronic renal disease 12/07/2015     Priority: Medium     Long-term use of immunosuppressant medication 09/20/2015     Priority: Medium     CAD S/P percutaneous coronary angioplasty 09/20/2015     Priority: Medium     Hyperlipidemia LDL goal <100 07/15/2013     Priority: Medium     Immunosuppressed status (HCC)      Priority: Medium     Dyslipidemia      Priority: Medium     Advanced directives, counseling/discussion 08/10/2012     Priority: Medium     Patient states has Advance Directive and will bring in a copy to clinic.       GERD (gastroesophageal reflux disease)      Priority: Medium     Irritable bowel syndrome 04/30/2006     Priority: Medium     Other vitamin B12 deficiency anemia 12/02/2002     Priority: Medium      Past Medical History:   Diagnosis Date     Abdominal wall hernia     RLQ     Allergic rhinitis      CAD (coronary artery disease)     coronary artery disease s/p PCI  "to LAD in 2005coronary artery disease s/p PCI to LAD in 2005     Diabetes mellitus, type 2 (H)     follows up with endocrinologist.     Dyslipidemia      GERD (gastroesophageal reflux disease)      H/O diabetic nephropathy     s/p kidney trnsplant     Hypertension      Hypothyroidism      Immunosuppressed status (H)      Kidney replaced by transplant     Rt     Shingles      Vitamin B12 deficiency anemia      Past Surgical History:   Procedure Laterality Date     APPENDECTOMY NOS       ARTHROSCOPY SHOULDER ROTATOR CUFF REPAIR Left      COLONOSCOPY N/A 6/7/2016    Procedure: COLONOSCOPY;  Surgeon: Rashard Snider MD;  Location: [facility]     Coronary angioplasty with stent  2005     HYSTERECTOMY       Kidney Transplant NOS Right      LAPAROSCOPIC CHOLECYSTECTOMY       NOSE SURGERY  2013     REMOVE CATARACT INTRACAP,INSERT LENS Right      TONSILLECTOMY         Current Outpatient Prescriptions   Medication Sig Dispense Refill     GENGRAF (BRAND) 25 MG CAPSULE TAKE 3 CAPSULES BY MOUTH TWICE DAILY 180 capsule 3     BD INSULIN SYRINGE ULTRAFINE 31G X 5/16\" 0.3 ML USING 4-5 PER DAY ([facility] 31G/0.3ML)  6     alendronate (FOSAMAX) 70 MG tablet Take 1 tablet (70 mg) by mouth every 7 days Take with over 8 ounces water and stay upright for at least 30 minutes after dose.  Take at least 60 minutes before breakfast 12 tablet 3     amLODIPine (NORVASC) 10 MG tablet Take 1 tablet (10 mg) by mouth daily 90 tablet 1     chlorthalidone (HYGROTON) 25 MG tablet Take 1 tablet (25 mg) by mouth daily 90 tablet 3     isosorbide mononitrate (IMDUR) 30 MG 24 hr tablet Take 0.5 tablets (15 mg) by mouth 2 times daily 90 tablet 1     metoprolol (TOPROL XL) 25 MG 24 hr tablet Take 1 tablet (25 mg) by mouth At Bedtime 90 tablet 1     pantoprazole (PROTONIX) 40 MG EC tablet Take 1 tablet (40 mg) by mouth daily 90 tablet 1     valsartan (DIOVAN) 160 MG tablet Take 0.5 tablets (80 mg) by mouth daily 45 tablet 1     atorvastatin (LIPITOR) 40 MG " tablet Take 0.5 tablets (20 mg) by mouth daily 45 tablet 1     mycophenolic acid (MYFORTIC - GENERIC EQUIVALENT) 180 MG EC tablet Take 3 tablets (540 mg) by mouth 2 times daily 180 tablet 11     FLORIN CONTOUR test strip   0     levothyroxine (SYNTHROID) 112 MCG tablet Take by mouth daily       aspirin 325 MG EC tablet Take 81 mg by mouth daily  100 tablet 3     LANTUS - INSULIN GLARGINE 21 u q pm 0 0     NOVOLOG 100 U/ML SC SOLN sliding scale 4-6 units tid         OTC products: None, except as noted above      Allergies   Allergen Reactions     No Known Drug Allergies       Latex Allergy: NO    Social History   Substance Use Topics     Smoking status: Never Smoker     Smokeless tobacco: Never Used     Alcohol use No     History   Drug Use No       REVIEW OF SYSTEMS:                                                    C: NEGATIVE for fever, chills, change in weight  I: NEGATIVE for worrisome rashes, moles or lesions  E: NEGATIVE for vision changes or irritation  E/M: NEGATIVE for ear, mouth and throat problems  R: NEGATIVE for significant cough or SOB  B: NEGATIVE for masses, tenderness or discharge  CV: NEGATIVE for chest pain, palpitations or peripheral edema  GI: NEGATIVE for nausea, abdominal pain, heartburn, or change in bowel habits  : NEGATIVE for frequency, dysuria, or hematuria  M: NEGATIVE for significant arthralgias or myalgia  N: NEGATIVE for weakness, dizziness or paresthesias  E: NEGATIVE for temperature intolerance, skin/hair changes  H: NEGATIVE for bleeding problems  P: NEGATIVE for changes in mood or affect    EXAM:                                                    /60  Pulse 70  Temp 98.3  F (36.8  C) (Oral)  Wt 176 lb 14.4 oz (80.2 kg)  SpO2 97%  BMI 30.36 kg/m2    GENERAL APPEARANCE: healthy, alert and no distress     EYES: EOMI, PERRL     HENT: ear canals and TM's normal and nose and mouth without ulcers or lesions     NECK: no adenopathy, no asymmetry, masses, or scars and thyroid  normal to palpation     RESP: lungs clear to auscultation - no rales, rhonchi or wheezes     CV: regular rates and rhythm, normal S1 S2, no S3 or S4 and no murmur, click or rub     ABDOMEN:  soft, nontender, bowel sounds normal,large ventral hernia noted.     MS: extremities normal- no gross deformities noted, no evidence of inflammation in joints, FROM in all extremities.     NEURO: Normal strength and tone, sensory exam grossly normal, mentation intact and speech normal     PSYCH: mentation appears normal. and affect normal/bright         DIAGNOSTICS:                                                    EKG: sinus bradycardia, normal axis, normal intervals, no acute ST/T changes c/w ischemia, no LVH by voltage criteria  Hemoglobin pending   Serum Potassium  4.6 ( 09/09/17)   Serum Creatinine 1.18  Hemoglobin A1C  6.6     Recent Labs   Lab Test  06/29/17   0603 06/19/17 04/10/17  04/08/17   2111   HGB  11.2*   --    --   11.2*   PLT  198   --    --   226   NA  143   --    --   143   POTASSIUM  4.5   --    --   4.0   CR  1.22*   --    --   1.17*   A1C   --   6.8*  7.4*   --         IMPRESSION:                                                        (Z01.818) Preop general physical exam  (primary encounter diagnosis)  Comment: Abdominal wall reconstruction  Plan: EKG 12-lead complete w/read - Clinics, CBC with        platelets            (K43.9) Ventral hernia without obstruction or gangrene  Plan: Abdominal wall reconstruction      (E11.21,  Z79.4) Type 2 diabetes mellitus with diabetic nephropathy, with long-term current use of insulin (H)  Plan: A1c 6.6, stable, on Lantus and sliding scale       (E03.8) Other specified hypothyroidism  Plan: TSH with free T4 reflex            (I25.10,  Z98.61) CAD S/P percutaneous coronary angioplasty  Plan: stable, had recent stress echo.         The proposed surgical procedure is considered INTERMEDIATE risk.    REVISED CARDIAC RISK INDEX  The patient has the following serious  cardiovascular risks for perioperative complications such as (MI, PE, VFib and 3  AV Block):  Coronary Artery Disease (MI, positive stress test, angina, Qs on EKG)  Diabetes Mellitus (on Insulin)  INTERPRETATION: 2 risks: Class III (moderate risk - 6.6% complication rate)    The patient has the following additional risks for perioperative complications:  No identified additional risks      ICD-10-CM    1. Preop general physical exam Z01.818 EKG 12-lead complete w/read - Clinics     CBC with platelets   2. Ventral hernia without obstruction or gangrene K43.9    3. Type 2 diabetes mellitus with diabetic nephropathy, with long-term current use of insulin (H) E11.21     Z79.4    4. Other specified hypothyroidism E03.8 TSH with free T4 reflex   5. CAD S/P percutaneous coronary angioplasty I25.10     Z98.61        RECOMMENDATIONS:                                                      --Consult hospital rounder / IM to assist post-op medical management      Diabetes Medication Use  -----Take 80% of long acting insulin (e.g. Lantus, NPH) while NPO (fasting)  -----Use usual sliding scale correction of insulin while NPO (fasting)      Anticoagulant or Antiplatelet Medication Use  ASPIRIN: Discontinue ASA 7- days prior to procedure to reduce bleeding risk.   NSAIDS:  Stop  3 days prior to surgery        ACE Inhibitor or Angiotensin Receptor Blocker (ARB) Use  Ace inhibitor or Angiotensin Receptor Blocker (ARB) and should HOLD this medication for the 24 hours prior to surgery.      APPROVAL GIVEN to proceed with proposed procedure, without further diagnostic evaluation       Signed Electronically by: Summer Vega MD    Copy of this evaluation report is provided to requesting physician.    [facility] Preop Guidelines

## 2017-10-15 ENCOUNTER — ANESTHESIA EVENT (OUTPATIENT)
Dept: SURGERY | Facility: CLINIC | Age: 67
DRG: 354 | End: 2017-10-15
Payer: MEDICARE

## 2017-10-16 ENCOUNTER — ANESTHESIA (OUTPATIENT)
Dept: SURGERY | Facility: CLINIC | Age: 67
DRG: 354 | End: 2017-10-16
Payer: MEDICARE

## 2017-10-16 ENCOUNTER — HOSPITAL ENCOUNTER (INPATIENT)
Facility: CLINIC | Age: 67
LOS: 6 days | Discharge: HOME | DRG: 354 | End: 2017-10-22
Attending: PLASTIC SURGERY | Admitting: SURGERY
Payer: MEDICARE

## 2017-10-16 DIAGNOSIS — Z98.890 S/P HERNIA REPAIR: ICD-10-CM

## 2017-10-16 DIAGNOSIS — Z79.4 TYPE 2 DIABETES MELLITUS WITH DIABETIC NEPHROPATHY, WITH LONG-TERM CURRENT USE OF INSULIN (H): Primary | ICD-10-CM

## 2017-10-16 DIAGNOSIS — E78.5 DYSLIPIDEMIA: ICD-10-CM

## 2017-10-16 DIAGNOSIS — G89.18 ACUTE POST-OPERATIVE PAIN: ICD-10-CM

## 2017-10-16 DIAGNOSIS — R41.0 DELIRIUM: ICD-10-CM

## 2017-10-16 DIAGNOSIS — I15.1 HYPERTENSION SECONDARY TO OTHER RENAL DISORDERS: ICD-10-CM

## 2017-10-16 DIAGNOSIS — Z94.0 KIDNEY REPLACED BY TRANSPLANT: ICD-10-CM

## 2017-10-16 DIAGNOSIS — E11.21 TYPE 2 DIABETES MELLITUS WITH DIABETIC NEPHROPATHY, WITH LONG-TERM CURRENT USE OF INSULIN (H): Primary | ICD-10-CM

## 2017-10-16 DIAGNOSIS — Z87.19 S/P HERNIA REPAIR: ICD-10-CM

## 2017-10-16 DIAGNOSIS — R11.2 PONV (POSTOPERATIVE NAUSEA AND VOMITING): ICD-10-CM

## 2017-10-16 DIAGNOSIS — Z98.890 PONV (POSTOPERATIVE NAUSEA AND VOMITING): ICD-10-CM

## 2017-10-16 DIAGNOSIS — Z94.0 KIDNEY TRANSPLANTED: ICD-10-CM

## 2017-10-16 LAB
ABO + RH BLD: NORMAL
ABO + RH BLD: NORMAL
BLD GP AB SCN SERPL QL: NORMAL
BLOOD BANK CMNT PATIENT-IMP: NORMAL
CREAT SERPL-MCNC: 1.23 MG/DL (ref 0.52–1.04)
GFR SERPL CREATININE-BSD FRML MDRD: 43 ML/MIN/1.7M2
GLUCOSE BLDC GLUCOMTR-MCNC: 126 MG/DL (ref 70–99)
GLUCOSE BLDC GLUCOMTR-MCNC: 143 MG/DL (ref 70–99)
GLUCOSE BLDC GLUCOMTR-MCNC: 148 MG/DL (ref 70–99)
GLUCOSE BLDC GLUCOMTR-MCNC: 148 MG/DL (ref 70–99)
GLUCOSE BLDC GLUCOMTR-MCNC: 150 MG/DL (ref 70–99)
GLUCOSE BLDC GLUCOMTR-MCNC: 152 MG/DL (ref 70–99)
GLUCOSE BLDC GLUCOMTR-MCNC: 155 MG/DL (ref 70–99)
GLUCOSE BLDC GLUCOMTR-MCNC: 155 MG/DL (ref 70–99)
GLUCOSE BLDC GLUCOMTR-MCNC: 161 MG/DL (ref 70–99)
GLUCOSE BLDC GLUCOMTR-MCNC: 163 MG/DL (ref 70–99)
GLUCOSE BLDC GLUCOMTR-MCNC: 165 MG/DL (ref 70–99)
GLUCOSE BLDC GLUCOMTR-MCNC: 189 MG/DL (ref 70–99)
GLUCOSE BLDC GLUCOMTR-MCNC: 194 MG/DL (ref 70–99)
GLUCOSE BLDC GLUCOMTR-MCNC: 227 MG/DL (ref 70–99)
HCT VFR BLD AUTO: 34.4 % (ref 35–47)
HGB BLD-MCNC: 10.9 G/DL (ref 11.7–15.7)
POTASSIUM SERPL-SCNC: 4.1 MMOL/L (ref 3.4–5.3)
SPECIMEN EXP DATE BLD: NORMAL

## 2017-10-16 PROCEDURE — C1713 ANCHOR/SCREW BN/BN,TIS/BN: HCPCS | Performed by: PLASTIC SURGERY

## 2017-10-16 PROCEDURE — 0WQF0ZZ REPAIR ABDOMINAL WALL, OPEN APPROACH: ICD-10-PCS | Performed by: PLASTIC SURGERY

## 2017-10-16 PROCEDURE — 86850 RBC ANTIBODY SCREEN: CPT | Performed by: ANESTHESIOLOGY

## 2017-10-16 PROCEDURE — 25000566 ZZH SEVOFLURANE, EA 15 MIN: Performed by: PLASTIC SURGERY

## 2017-10-16 PROCEDURE — 36415 COLL VENOUS BLD VENIPUNCTURE: CPT | Performed by: ANESTHESIOLOGY

## 2017-10-16 PROCEDURE — 27210794 ZZH OR GENERAL SUPPLY STERILE: Performed by: PLASTIC SURGERY

## 2017-10-16 PROCEDURE — 85018 HEMOGLOBIN: CPT | Performed by: TRANSPLANT SURGERY

## 2017-10-16 PROCEDURE — C9399 UNCLASSIFIED DRUGS OR BIOLOG: HCPCS | Performed by: NURSE ANESTHETIST, CERTIFIED REGISTERED

## 2017-10-16 PROCEDURE — 84132 ASSAY OF SERUM POTASSIUM: CPT | Performed by: ANESTHESIOLOGY

## 2017-10-16 PROCEDURE — 27210995 ZZH RX 272: Performed by: PLASTIC SURGERY

## 2017-10-16 PROCEDURE — 0JB80ZZ EXCISION OF ABDOMEN SUBCUTANEOUS TISSUE AND FASCIA, OPEN APPROACH: ICD-10-PCS | Performed by: PLASTIC SURGERY

## 2017-10-16 PROCEDURE — 86901 BLOOD TYPING SEROLOGIC RH(D): CPT | Performed by: ANESTHESIOLOGY

## 2017-10-16 PROCEDURE — 86900 BLOOD TYPING SEROLOGIC ABO: CPT | Performed by: ANESTHESIOLOGY

## 2017-10-16 PROCEDURE — 88302 TISSUE EXAM BY PATHOLOGIST: CPT | Performed by: PLASTIC SURGERY

## 2017-10-16 PROCEDURE — S0020 INJECTION, BUPIVICAINE HYDRO: HCPCS | Performed by: STUDENT IN AN ORGANIZED HEALTH CARE EDUCATION/TRAINING PROGRAM

## 2017-10-16 PROCEDURE — 71000014 ZZH RECOVERY PHASE 1 LEVEL 2 FIRST HR: Performed by: PLASTIC SURGERY

## 2017-10-16 PROCEDURE — 40000171 ZZH STATISTIC PRE-PROCEDURE ASSESSMENT III: Performed by: PLASTIC SURGERY

## 2017-10-16 PROCEDURE — 25000125 ZZHC RX 250: Performed by: TRANSPLANT SURGERY

## 2017-10-16 PROCEDURE — 27810169 ZZH OR IMPLANT GENERAL: Performed by: PLASTIC SURGERY

## 2017-10-16 PROCEDURE — 25000125 ZZHC RX 250: Performed by: NURSE ANESTHETIST, CERTIFIED REGISTERED

## 2017-10-16 PROCEDURE — 00000146 ZZHCL STATISTIC GLUCOSE BY METER IP

## 2017-10-16 PROCEDURE — 25000128 H RX IP 250 OP 636: Performed by: STUDENT IN AN ORGANIZED HEALTH CARE EDUCATION/TRAINING PROGRAM

## 2017-10-16 PROCEDURE — 25000125 ZZHC RX 250: Performed by: PLASTIC SURGERY

## 2017-10-16 PROCEDURE — 36415 COLL VENOUS BLD VENIPUNCTURE: CPT | Performed by: TRANSPLANT SURGERY

## 2017-10-16 PROCEDURE — 37000008 ZZH ANESTHESIA TECHNICAL FEE, 1ST 30 MIN: Performed by: PLASTIC SURGERY

## 2017-10-16 PROCEDURE — 85014 HEMATOCRIT: CPT | Performed by: TRANSPLANT SURGERY

## 2017-10-16 PROCEDURE — 36000057 ZZH SURGERY LEVEL 3 1ST 30 MIN - UMMC: Performed by: PLASTIC SURGERY

## 2017-10-16 PROCEDURE — 25000131 ZZH RX MED GY IP 250 OP 636 PS 637: Mod: GY | Performed by: ANESTHESIOLOGY

## 2017-10-16 PROCEDURE — 25000125 ZZHC RX 250: Performed by: STUDENT IN AN ORGANIZED HEALTH CARE EDUCATION/TRAINING PROGRAM

## 2017-10-16 PROCEDURE — 25000131 ZZH RX MED GY IP 250 OP 636 PS 637: Mod: GY | Performed by: TRANSPLANT SURGERY

## 2017-10-16 PROCEDURE — 36000059 ZZH SURGERY LEVEL 3 EA 15 ADDTL MIN UMMC: Performed by: PLASTIC SURGERY

## 2017-10-16 PROCEDURE — 25000132 ZZH RX MED GY IP 250 OP 250 PS 637: Mod: GY | Performed by: TRANSPLANT SURGERY

## 2017-10-16 PROCEDURE — 25000128 H RX IP 250 OP 636: Performed by: NURSE ANESTHETIST, CERTIFIED REGISTERED

## 2017-10-16 PROCEDURE — 25000128 H RX IP 250 OP 636: Performed by: PLASTIC SURGERY

## 2017-10-16 PROCEDURE — 71000015 ZZH RECOVERY PHASE 1 LEVEL 2 EA ADDTL HR: Performed by: PLASTIC SURGERY

## 2017-10-16 PROCEDURE — 0WUF0JZ SUPPLEMENT ABDOMINAL WALL WITH SYNTHETIC SUBSTITUTE, OPEN APPROACH: ICD-10-PCS | Performed by: SURGERY

## 2017-10-16 PROCEDURE — 88305 TISSUE EXAM BY PATHOLOGIST: CPT | Performed by: PLASTIC SURGERY

## 2017-10-16 PROCEDURE — 37000009 ZZH ANESTHESIA TECHNICAL FEE, EACH ADDTL 15 MIN: Performed by: PLASTIC SURGERY

## 2017-10-16 PROCEDURE — 25000128 H RX IP 250 OP 636: Performed by: TRANSPLANT SURGERY

## 2017-10-16 PROCEDURE — 25000128 H RX IP 250 OP 636: Performed by: ANESTHESIOLOGY

## 2017-10-16 PROCEDURE — A9270 NON-COVERED ITEM OR SERVICE: HCPCS | Mod: GY | Performed by: TRANSPLANT SURGERY

## 2017-10-16 PROCEDURE — 82565 ASSAY OF CREATININE: CPT | Performed by: ANESTHESIOLOGY

## 2017-10-16 PROCEDURE — 12000024 ZZH R&B TRANSPLANT CRITICAL

## 2017-10-16 DEVICE — IMPLANTABLE DEVICE: Type: IMPLANTABLE DEVICE | Site: ABDOMEN | Status: FUNCTIONAL

## 2017-10-16 RX ORDER — DIPHENHYDRAMINE HYDROCHLORIDE 50 MG/ML
12.5 INJECTION INTRAMUSCULAR; INTRAVENOUS EVERY 6 HOURS PRN
Status: DISCONTINUED | OUTPATIENT
Start: 2017-10-16 | End: 2017-10-19

## 2017-10-16 RX ORDER — PROPOFOL 10 MG/ML
INJECTION, EMULSION INTRAVENOUS CONTINUOUS PRN
Status: DISCONTINUED | OUTPATIENT
Start: 2017-10-16 | End: 2017-10-16

## 2017-10-16 RX ORDER — FENTANYL CITRATE 50 UG/ML
25-50 INJECTION, SOLUTION INTRAMUSCULAR; INTRAVENOUS
Status: DISCONTINUED | OUTPATIENT
Start: 2017-10-16 | End: 2017-10-16 | Stop reason: HOSPADM

## 2017-10-16 RX ORDER — CEFAZOLIN SODIUM 1 G/3ML
1 INJECTION, POWDER, FOR SOLUTION INTRAMUSCULAR; INTRAVENOUS SEE ADMIN INSTRUCTIONS
Status: DISCONTINUED | OUTPATIENT
Start: 2017-10-16 | End: 2017-10-16 | Stop reason: HOSPADM

## 2017-10-16 RX ORDER — OXYCODONE HYDROCHLORIDE 5 MG/1
5 TABLET ORAL
Status: DISCONTINUED | OUTPATIENT
Start: 2017-10-16 | End: 2017-10-19

## 2017-10-16 RX ORDER — DIPHENHYDRAMINE HCL 12.5MG/5ML
12.5 LIQUID (ML) ORAL EVERY 6 HOURS PRN
Status: DISCONTINUED | OUTPATIENT
Start: 2017-10-16 | End: 2017-10-19

## 2017-10-16 RX ORDER — FENTANYL CITRATE 50 UG/ML
25-50 INJECTION, SOLUTION INTRAMUSCULAR; INTRAVENOUS EVERY 5 MIN PRN
Status: DISCONTINUED | OUTPATIENT
Start: 2017-10-16 | End: 2017-10-16 | Stop reason: HOSPADM

## 2017-10-16 RX ORDER — METOPROLOL SUCCINATE 25 MG/1
25 TABLET, EXTENDED RELEASE ORAL DAILY
Status: DISCONTINUED | OUTPATIENT
Start: 2017-10-16 | End: 2017-10-22 | Stop reason: HOSPADM

## 2017-10-16 RX ORDER — CEFAZOLIN SODIUM 2 G/100ML
2 INJECTION, SOLUTION INTRAVENOUS
Status: COMPLETED | OUTPATIENT
Start: 2017-10-16 | End: 2017-10-16

## 2017-10-16 RX ORDER — LIDOCAINE HYDROCHLORIDE 20 MG/ML
INJECTION, SOLUTION INFILTRATION; PERINEURAL PRN
Status: DISCONTINUED | OUTPATIENT
Start: 2017-10-16 | End: 2017-10-16

## 2017-10-16 RX ORDER — PROCHLORPERAZINE MALEATE 5 MG
5 TABLET ORAL EVERY 6 HOURS PRN
Status: DISCONTINUED | OUTPATIENT
Start: 2017-10-16 | End: 2017-10-22 | Stop reason: HOSPADM

## 2017-10-16 RX ORDER — CYCLOSPORINE 25 MG/1
75 CAPSULE ORAL 2 TIMES DAILY
Status: DISCONTINUED | OUTPATIENT
Start: 2017-10-16 | End: 2017-10-18

## 2017-10-16 RX ORDER — LIDOCAINE HYDROCHLORIDE AND EPINEPHRINE 15; 5 MG/ML; UG/ML
INJECTION, SOLUTION EPIDURAL PRN
Status: DISCONTINUED | OUTPATIENT
Start: 2017-10-16 | End: 2017-10-16

## 2017-10-16 RX ORDER — DOCUSATE SODIUM 100 MG/1
100 CAPSULE, LIQUID FILLED ORAL 2 TIMES DAILY
Status: DISCONTINUED | OUTPATIENT
Start: 2017-10-16 | End: 2017-10-22 | Stop reason: HOSPADM

## 2017-10-16 RX ORDER — ATORVASTATIN CALCIUM 20 MG/1
20 TABLET, FILM COATED ORAL DAILY
Status: DISCONTINUED | OUTPATIENT
Start: 2017-10-16 | End: 2017-10-22 | Stop reason: HOSPADM

## 2017-10-16 RX ORDER — LIDOCAINE 40 MG/G
CREAM TOPICAL
Status: DISCONTINUED | OUTPATIENT
Start: 2017-10-16 | End: 2017-10-16 | Stop reason: HOSPADM

## 2017-10-16 RX ORDER — DEXTROSE MONOHYDRATE 25 G/50ML
25-50 INJECTION, SOLUTION INTRAVENOUS
Status: DISCONTINUED | OUTPATIENT
Start: 2017-10-16 | End: 2017-10-16

## 2017-10-16 RX ORDER — DEXTROSE MONOHYDRATE 25 G/50ML
25-50 INJECTION, SOLUTION INTRAVENOUS
Status: DISCONTINUED | OUTPATIENT
Start: 2017-10-16 | End: 2017-10-19

## 2017-10-16 RX ORDER — ACETAMINOPHEN 325 MG/1
975 TABLET ORAL EVERY 8 HOURS
Status: DISCONTINUED | OUTPATIENT
Start: 2017-10-16 | End: 2017-10-19

## 2017-10-16 RX ORDER — HYDROMORPHONE HYDROCHLORIDE 1 MG/ML
.3-.5 INJECTION, SOLUTION INTRAMUSCULAR; INTRAVENOUS; SUBCUTANEOUS EVERY 10 MIN PRN
Status: DISCONTINUED | OUTPATIENT
Start: 2017-10-16 | End: 2017-10-16 | Stop reason: HOSPADM

## 2017-10-16 RX ORDER — FENTANYL CITRATE 50 UG/ML
INJECTION, SOLUTION INTRAMUSCULAR; INTRAVENOUS PRN
Status: DISCONTINUED | OUTPATIENT
Start: 2017-10-16 | End: 2017-10-16

## 2017-10-16 RX ORDER — ONDANSETRON 2 MG/ML
INJECTION INTRAMUSCULAR; INTRAVENOUS PRN
Status: DISCONTINUED | OUTPATIENT
Start: 2017-10-16 | End: 2017-10-16

## 2017-10-16 RX ORDER — PANTOPRAZOLE SODIUM 40 MG/1
40 TABLET, DELAYED RELEASE ORAL EVERY MORNING
Status: DISCONTINUED | OUTPATIENT
Start: 2017-10-16 | End: 2017-10-22 | Stop reason: HOSPADM

## 2017-10-16 RX ORDER — FLUMAZENIL 0.1 MG/ML
0.2 INJECTION, SOLUTION INTRAVENOUS
Status: DISCONTINUED | OUTPATIENT
Start: 2017-10-16 | End: 2017-10-16 | Stop reason: HOSPADM

## 2017-10-16 RX ORDER — CEFAZOLIN SODIUM 1 G/3ML
INJECTION, POWDER, FOR SOLUTION INTRAMUSCULAR; INTRAVENOUS PRN
Status: DISCONTINUED | OUTPATIENT
Start: 2017-10-16 | End: 2017-10-16

## 2017-10-16 RX ORDER — EPHEDRINE SULFATE 50 MG/ML
INJECTION, SOLUTION INTRAMUSCULAR; INTRAVENOUS; SUBCUTANEOUS PRN
Status: DISCONTINUED | OUTPATIENT
Start: 2017-10-16 | End: 2017-10-16

## 2017-10-16 RX ORDER — NICOTINE POLACRILEX 4 MG
15-30 LOZENGE BUCCAL
Status: DISCONTINUED | OUTPATIENT
Start: 2017-10-16 | End: 2017-10-19

## 2017-10-16 RX ORDER — CHLORTHALIDONE 25 MG/1
25 TABLET ORAL DAILY
Status: DISCONTINUED | OUTPATIENT
Start: 2017-10-16 | End: 2017-10-21

## 2017-10-16 RX ORDER — NALBUPHINE HYDROCHLORIDE 10 MG/ML
2.5-5 INJECTION, SOLUTION INTRAMUSCULAR; INTRAVENOUS; SUBCUTANEOUS EVERY 6 HOURS PRN
Status: DISCONTINUED | OUTPATIENT
Start: 2017-10-16 | End: 2017-10-17

## 2017-10-16 RX ORDER — MEPERIDINE HYDROCHLORIDE 25 MG/ML
12.5 INJECTION INTRAMUSCULAR; INTRAVENOUS; SUBCUTANEOUS
Status: DISCONTINUED | OUTPATIENT
Start: 2017-10-16 | End: 2017-10-16 | Stop reason: HOSPADM

## 2017-10-16 RX ORDER — CYCLOSPORINE 25 MG/1
75 CAPSULE ORAL ONCE
Status: COMPLETED | OUTPATIENT
Start: 2017-10-16 | End: 2017-10-16

## 2017-10-16 RX ORDER — ACETAMINOPHEN 325 MG/1
650 TABLET ORAL EVERY 4 HOURS PRN
Status: DISCONTINUED | OUTPATIENT
Start: 2017-10-19 | End: 2017-10-22 | Stop reason: HOSPADM

## 2017-10-16 RX ORDER — NICOTINE POLACRILEX 4 MG
15-30 LOZENGE BUCCAL
Status: DISCONTINUED | OUTPATIENT
Start: 2017-10-16 | End: 2017-10-16

## 2017-10-16 RX ORDER — NALOXONE HYDROCHLORIDE 0.4 MG/ML
.1-.4 INJECTION, SOLUTION INTRAMUSCULAR; INTRAVENOUS; SUBCUTANEOUS
Status: DISCONTINUED | OUTPATIENT
Start: 2017-10-16 | End: 2017-10-16

## 2017-10-16 RX ORDER — GLYCOPYRROLATE 0.2 MG/ML
INJECTION, SOLUTION INTRAMUSCULAR; INTRAVENOUS PRN
Status: DISCONTINUED | OUTPATIENT
Start: 2017-10-16 | End: 2017-10-16

## 2017-10-16 RX ORDER — SODIUM CHLORIDE 9 MG/ML
INJECTION, SOLUTION INTRAVENOUS CONTINUOUS PRN
Status: DISCONTINUED | OUTPATIENT
Start: 2017-10-16 | End: 2017-10-16

## 2017-10-16 RX ORDER — SODIUM CHLORIDE, SODIUM LACTATE, POTASSIUM CHLORIDE, CALCIUM CHLORIDE 600; 310; 30; 20 MG/100ML; MG/100ML; MG/100ML; MG/100ML
INJECTION, SOLUTION INTRAVENOUS CONTINUOUS
Status: DISCONTINUED | OUTPATIENT
Start: 2017-10-16 | End: 2017-10-16 | Stop reason: HOSPADM

## 2017-10-16 RX ORDER — ONDANSETRON 4 MG/1
4 TABLET, ORALLY DISINTEGRATING ORAL EVERY 6 HOURS PRN
Status: DISCONTINUED | OUTPATIENT
Start: 2017-10-16 | End: 2017-10-17

## 2017-10-16 RX ORDER — ALENDRONATE SODIUM 70 MG/1
70 TABLET ORAL
Status: DISCONTINUED | OUTPATIENT
Start: 2017-10-16 | End: 2017-10-16

## 2017-10-16 RX ORDER — ONDANSETRON 2 MG/ML
4 INJECTION INTRAMUSCULAR; INTRAVENOUS EVERY 30 MIN PRN
Status: DISCONTINUED | OUTPATIENT
Start: 2017-10-16 | End: 2017-10-16 | Stop reason: HOSPADM

## 2017-10-16 RX ORDER — NALOXONE HYDROCHLORIDE 0.4 MG/ML
.1-.4 INJECTION, SOLUTION INTRAMUSCULAR; INTRAVENOUS; SUBCUTANEOUS
Status: DISCONTINUED | OUTPATIENT
Start: 2017-10-16 | End: 2017-10-16 | Stop reason: HOSPADM

## 2017-10-16 RX ORDER — PROPOFOL 10 MG/ML
INJECTION, EMULSION INTRAVENOUS PRN
Status: DISCONTINUED | OUTPATIENT
Start: 2017-10-16 | End: 2017-10-16

## 2017-10-16 RX ORDER — ONDANSETRON 2 MG/ML
4 INJECTION INTRAMUSCULAR; INTRAVENOUS EVERY 6 HOURS PRN
Status: DISCONTINUED | OUTPATIENT
Start: 2017-10-16 | End: 2017-10-17

## 2017-10-16 RX ORDER — CHLORHEXIDINE GLUCONATE 40 MG/ML
SOLUTION TOPICAL ONCE
Status: DISCONTINUED | OUTPATIENT
Start: 2017-10-16 | End: 2017-10-16 | Stop reason: HOSPADM

## 2017-10-16 RX ORDER — DEXTROSE, SODIUM CHLORIDE, SODIUM LACTATE, POTASSIUM CHLORIDE, AND CALCIUM CHLORIDE 5; .6; .31; .03; .02 G/100ML; G/100ML; G/100ML; G/100ML; G/100ML
INJECTION, SOLUTION INTRAVENOUS CONTINUOUS
Status: DISCONTINUED | OUTPATIENT
Start: 2017-10-16 | End: 2017-10-18

## 2017-10-16 RX ORDER — NALOXONE HYDROCHLORIDE 0.4 MG/ML
.1-.4 INJECTION, SOLUTION INTRAMUSCULAR; INTRAVENOUS; SUBCUTANEOUS
Status: DISCONTINUED | OUTPATIENT
Start: 2017-10-16 | End: 2017-10-22 | Stop reason: HOSPADM

## 2017-10-16 RX ORDER — ONDANSETRON 4 MG/1
4 TABLET, ORALLY DISINTEGRATING ORAL EVERY 30 MIN PRN
Status: DISCONTINUED | OUTPATIENT
Start: 2017-10-16 | End: 2017-10-16 | Stop reason: HOSPADM

## 2017-10-16 RX ORDER — LEVOTHYROXINE SODIUM 112 UG/1
112 TABLET ORAL
Status: DISCONTINUED | OUTPATIENT
Start: 2017-10-17 | End: 2017-10-22 | Stop reason: HOSPADM

## 2017-10-16 RX ADMIN — ONDANSETRON 4 MG: 2 INJECTION INTRAMUSCULAR; INTRAVENOUS at 09:47

## 2017-10-16 RX ADMIN — CEFAZOLIN SODIUM 2 G: 2 INJECTION, SOLUTION INTRAVENOUS at 08:20

## 2017-10-16 RX ADMIN — HUMAN INSULIN 3 UNITS/HR: 100 INJECTION, SOLUTION SUBCUTANEOUS at 19:53

## 2017-10-16 RX ADMIN — ROCURONIUM BROMIDE 20 MG: 10 INJECTION INTRAVENOUS at 08:47

## 2017-10-16 RX ADMIN — MYCOPHENOLIC ACID 540 MG: 180 TABLET, DELAYED RELEASE ORAL at 18:09

## 2017-10-16 RX ADMIN — BUPIVACAINE HYDROCHLORIDE: 7.5 INJECTION, SOLUTION EPIDURAL; RETROBULBAR at 13:04

## 2017-10-16 RX ADMIN — SODIUM CHLORIDE, SODIUM LACTATE, POTASSIUM CHLORIDE, CALCIUM CHLORIDE, AND DEXTROSE MONOHYDRATE: 600; 310; 30; 20; 5 INJECTION, SOLUTION INTRAVENOUS at 11:44

## 2017-10-16 RX ADMIN — FENTANYL CITRATE 25 MCG: 50 INJECTION, SOLUTION INTRAMUSCULAR; INTRAVENOUS at 11:03

## 2017-10-16 RX ADMIN — CYCLOSPORINE 75 MG: 25 CAPSULE ORAL at 07:29

## 2017-10-16 RX ADMIN — HUMAN INSULIN 2 UNITS/HR: 100 INJECTION, SOLUTION SUBCUTANEOUS at 18:05

## 2017-10-16 RX ADMIN — HUMAN INSULIN 3 UNITS/HR: 100 INJECTION, SOLUTION SUBCUTANEOUS at 17:00

## 2017-10-16 RX ADMIN — Medication 5 MG: at 08:37

## 2017-10-16 RX ADMIN — FENTANYL CITRATE 50 MCG: 50 INJECTION, SOLUTION INTRAMUSCULAR; INTRAVENOUS at 07:52

## 2017-10-16 RX ADMIN — ATORVASTATIN CALCIUM 20 MG: 20 TABLET, FILM COATED ORAL at 20:01

## 2017-10-16 RX ADMIN — PROCHLORPERAZINE EDISYLATE 5 MG: 5 INJECTION INTRAMUSCULAR; INTRAVENOUS at 22:11

## 2017-10-16 RX ADMIN — DOCUSATE SODIUM 100 MG: 100 CAPSULE, LIQUID FILLED ORAL at 20:01

## 2017-10-16 RX ADMIN — ONDANSETRON 4 MG: 4 TABLET, ORALLY DISINTEGRATING ORAL at 19:57

## 2017-10-16 RX ADMIN — METOPROLOL SUCCINATE 25 MG: 25 TABLET, EXTENDED RELEASE ORAL at 20:01

## 2017-10-16 RX ADMIN — SODIUM CHLORIDE, POTASSIUM CHLORIDE, SODIUM LACTATE AND CALCIUM CHLORIDE: 600; 310; 30; 20 INJECTION, SOLUTION INTRAVENOUS at 07:22

## 2017-10-16 RX ADMIN — PROCHLORPERAZINE EDISYLATE 5 MG: 5 INJECTION INTRAMUSCULAR; INTRAVENOUS at 13:07

## 2017-10-16 RX ADMIN — SUGAMMADEX 40 MG: 100 INJECTION, SOLUTION INTRAVENOUS at 11:27

## 2017-10-16 RX ADMIN — Medication 5 MG: at 08:52

## 2017-10-16 RX ADMIN — HUMAN INSULIN 1.5 UNITS/HR: 100 INJECTION, SOLUTION SUBCUTANEOUS at 09:11

## 2017-10-16 RX ADMIN — CHLORTHALIDONE 25 MG: 25 TABLET ORAL at 20:13

## 2017-10-16 RX ADMIN — FENTANYL CITRATE 50 MCG: 50 INJECTION, SOLUTION INTRAMUSCULAR; INTRAVENOUS at 07:19

## 2017-10-16 RX ADMIN — SODIUM CHLORIDE, SODIUM LACTATE, POTASSIUM CHLORIDE, CALCIUM CHLORIDE, AND DEXTROSE MONOHYDRATE: 600; 310; 30; 20; 5 INJECTION, SOLUTION INTRAVENOUS at 22:18

## 2017-10-16 RX ADMIN — ROCURONIUM BROMIDE 50 MG: 10 INJECTION INTRAVENOUS at 07:52

## 2017-10-16 RX ADMIN — PROPOFOL 110 MG: 10 INJECTION, EMULSION INTRAVENOUS at 07:52

## 2017-10-16 RX ADMIN — MYCOPHENOLIC ACID 540 MG: 360 TABLET, DELAYED RELEASE ORAL at 07:32

## 2017-10-16 RX ADMIN — SUGAMMADEX 160 MG: 100 INJECTION, SOLUTION INTRAVENOUS at 11:12

## 2017-10-16 RX ADMIN — MIDAZOLAM HYDROCHLORIDE 1 MG: 1 INJECTION, SOLUTION INTRAMUSCULAR; INTRAVENOUS at 07:41

## 2017-10-16 RX ADMIN — HUMAN INSULIN 2 UNITS/HR: 100 INJECTION, SOLUTION SUBCUTANEOUS at 16:08

## 2017-10-16 RX ADMIN — FENTANYL CITRATE 50 MCG: 50 INJECTION, SOLUTION INTRAMUSCULAR; INTRAVENOUS at 09:56

## 2017-10-16 RX ADMIN — LIDOCAINE HYDROCHLORIDE,EPINEPHRINE BITARTRATE 3 ML: 15; .005 INJECTION, SOLUTION EPIDURAL; INFILTRATION; INTRACAUDAL; PERINEURAL at 07:24

## 2017-10-16 RX ADMIN — RANITIDINE 150 MG: 150 TABLET ORAL at 20:01

## 2017-10-16 RX ADMIN — CEFAZOLIN 1 G: 1 INJECTION, POWDER, FOR SOLUTION INTRAMUSCULAR; INTRAVENOUS at 10:20

## 2017-10-16 RX ADMIN — LIDOCAINE HYDROCHLORIDE 100 MG: 20 INJECTION, SOLUTION INFILTRATION; PERINEURAL at 07:52

## 2017-10-16 RX ADMIN — SODIUM CHLORIDE: 9 INJECTION, SOLUTION INTRAVENOUS at 07:25

## 2017-10-16 RX ADMIN — MIDAZOLAM 1 MG: 1 INJECTION INTRAMUSCULAR; INTRAVENOUS at 07:20

## 2017-10-16 RX ADMIN — PROPOFOL 25 MCG/KG/MIN: 10 INJECTION, EMULSION INTRAVENOUS at 08:39

## 2017-10-16 RX ADMIN — ONDANSETRON 4 MG: 2 INJECTION INTRAMUSCULAR; INTRAVENOUS at 12:03

## 2017-10-16 RX ADMIN — SODIUM CHLORIDE, POTASSIUM CHLORIDE, SODIUM LACTATE AND CALCIUM CHLORIDE: 600; 310; 30; 20 INJECTION, SOLUTION INTRAVENOUS at 07:15

## 2017-10-16 RX ADMIN — HYDROMORPHONE HYDROCHLORIDE: 10 INJECTION, SOLUTION INTRAMUSCULAR; INTRAVENOUS; SUBCUTANEOUS at 12:07

## 2017-10-16 RX ADMIN — FENTANYL CITRATE 50 MCG: 50 INJECTION, SOLUTION INTRAMUSCULAR; INTRAVENOUS at 09:35

## 2017-10-16 RX ADMIN — FENTANYL CITRATE 50 MCG: 50 INJECTION, SOLUTION INTRAMUSCULAR; INTRAVENOUS at 08:59

## 2017-10-16 RX ADMIN — Medication 0.2 MG: at 10:31

## 2017-10-16 RX ADMIN — ACETAMINOPHEN 975 MG: 325 TABLET, FILM COATED ORAL at 15:25

## 2017-10-16 RX ADMIN — HYDROMORPHONE HYDROCHLORIDE 0.5 MG: 1 INJECTION, SOLUTION INTRAMUSCULAR; INTRAVENOUS; SUBCUTANEOUS at 12:04

## 2017-10-16 RX ADMIN — Medication 0.2 MG: at 07:52

## 2017-10-16 RX ADMIN — CYCLOSPORINE 75 MG: 25 CAPSULE ORAL at 20:01

## 2017-10-16 NOTE — OP NOTE
Transplant Surgery  Operative Note    Preop Dx:  Incisional Hernia, s/p kidney transplant  Postop Dx: Same  Procedure: Exploratory laparotomy , Lysis of adhesions <60 minutes  and reduction of incarcerated hernia  Surgeon: Jenn Villa M.D.  Fellow: Ramón cruz.  There was no qualified resident available to assist with this procedure.  Anesthesia: General  EBL: 50 ml  Fluids: 750 cc crystalloid  UO: 125 ml  Drains: Rachid-Bailey drain  Specimen: hernia sac.  Complications: None apparent.  Findings:  5cm incisional hernia over RLQ kidney transplant with incarcerated colon, which was reduced after lysis of adhesions.  Indication: The patient has incisional hernia (s/p kidney transplant).  After discussing the risks and benefits of surgery and potential complications, the patient provided informed consent.     DETAILS OF PROCEDURE:  The patient was brought to the operating room, placed in a supine position. Sequential compression devices were placed on both lower extremities and anesthesia was provided.  Perioperative prophylactic IV antibiotics were given.  A Arevalo catheter was placed using sterile technique and the abdomen was shaved, prepped, and draped in the usual sterile fashion.  Time out was performed.    A right lower quadrant skin incision was made over the previous scar using a knife. Subcutaneous tissue was dissected out. The hernial sac with contents was dissected from surrounding tissues by sharp and blunt dissection. The fascial defect was identified. The defect was 5 cm. The sac was  from the fascia circumferentially. The sac was opened.  The contents were small and large bowel and were found viable. The contents were reduced. The excess sac was excised.     At this point, Dr. Lott performed a mesh repair and closure.  Please see their note for the operative details.    The patient tolerated the procedure well without apparent complications and was trasfered to the PACU in good  condition.  Faculty was present for critical portions of the procedure.    I was present during the key portions of the procedure, and I was immediately available for the entire procedure.

## 2017-10-16 NOTE — OR NURSING
Writer contacted Dr. Melgar at this time for continuous insulin infusion orders. Awaiting orders/reply.

## 2017-10-16 NOTE — IP AVS SNAPSHOT
MRN:[id number]                      After Visit Summary   10/16/2017    Viv Shaw    MRN: [id number]           Thank you!     Thank you for choosing [facility] for your care. Our goal is always to provide you with excellent care. Hearing back from our patients is one way we can continue to improve our services. Please take a few minutes to complete the written survey that you may receive in the mail after you visit with us. Thank you!        Patient Information     Date Of Birth          1950        Designated Caregiver       Most Recent Value    Caregiver    Will someone help with your care after discharge? yes    Name of designated caregiver Bill     Phone number of caregiver [phone]    Caregiver address see chart       About your hospital stay     You were admitted on:  October 16, 2017 You last received care in the:  [address] [facility]    You were discharged on:  October 22, 2017        Reason for your hospital stay       Hernia repair                  Who to Call     For medical emergencies, please call 911.  For non-urgent questions about your medical care, please call your primary care provider or clinic, [phone]  For questions related to your surgery, please call your surgery clinic        Attending Provider     Provider Specialty    CARL Lott MD Plastic Surgery    [facility]       Primary Care Provider Office Phone # Fax #    Summer Vega [phone]       When to contact your care team       Notify your coordinator if you have pain over your kidney, fever greater than 100.5F, redness or drainage from incision, or decreased urine output.    Notify your coordinator immediately if you are ever unable to take your immunosuppressive medications for any reason.    Transplant Coordinator [facility] [phone]                  After Care Instructions     Activity       Your activity upon discharge: No lifting  greater than 10 pounds for at least 8 weeks, no driving while taking narcotic pain medications, avoid bath tubs, hot tubs, and swimming for at least 3 weeks.            Diet       Follow this diet upon discharge: Regular diet            Monitor and record       blood glucose 4 times a day, before meals and at bedtime            Tubes and drains       You are going home with the following tubes or drains: REAL drain.  Empty daily and write down amount.            Wound care and dressings       Instructions to care for your wound at home: keep wound clean and dry and may get incision wet in shower but do not soak or scrub.                  Follow-up Appointments     Adult [facility] Follow-up and recommended labs and tests       1.  Dr. Villa, Transplant Clinic, 1-2 weeks, follow up hernia repair  2.  Plastic Surgery Clinic, as scheduled on 11/1  3.  Pulmonary Clinic, 2-3 months to follow up tree in bud nodularity  4.  Chest CT, non-contrast, 2-3 months to follow up tree in bud nodularity  5.  CSA level next week    Appointments on [place] and/or [facility] (with [facility] or [facility] provider or service). Call [phone] if you haven't heard regarding these appointments within 7 days of discharge.                  Your next 10 appointments already scheduled     Nov 01, 2017 10:30 AM CDT   (Arrive by 10:15 AM)   Post-Op with CARL Lott MD   [facility] Plastic and Reconstructive Surgery ([facility])    [address] [place] [address]  [address] [facility] [address]   [phone]            Dec 04, 2017  1:05 PM CST   (Arrive by 12:35 PM)   Return Kidney Transplant with Morro Veloz MD   [facility] Nephrology ([facility])    [address] [place] [address]  [address] [facility] [address]   [phone]              Pending Results     Date and Time Order Name Status Description    10/19/2017 1609 Blood culture Preliminary     10/19/2017 1609 Blood culture  "Preliminary     10/16/2017 1035 Surgical pathology exam In process             Statement of Approval     Ordered          10/22/17 1215  I have reviewed and agree with all the recommendations and orders detailed in this document.  EFFECTIVE NOW     Approved and electronically signed by:  Kinza Carnes APRN CNP             Admission Information     Date & Time Provider Department Dept. Phone    10/16/2017 Jenn Villa MD [address] [facility] [place] [phone]      Your Vitals Were     Blood Pressure Pulse Temperature Respirations Height Weight    141/59 75 98.7  F (37.1  C) (Oral) 16 1.626 m (5' 4\") 80.4 kg (177 lb 3.2 oz)    Pulse Oximetry BMI (Body Mass Index)                93% 30.42 kg/m2          phorushart Information     [facility] gives you secure access to your electronic health record. If you see a primary care provider, you can also send messages to your care team and make appointments. If you have questions, please call your primary care clinic.  If you do not have a primary care provider, please call [phone] and they will assist you.        Care EveryWhere ID     This is your Care EveryWhere ID. This could be used by other organizations to access your [facility] medical records  [id number]        Equal Access to Services     PORTER NUNN AH: Tierra Dickens, mindi pulliam, qacoleenta kaalmada amado, hue garrido. So [facility] [phone].    ATENCIÓN: Si habla español, tiene a rosario disposición servicios gratuitos de asistencia lingüística. Llame al [phone].    We comply with applicable federal civil rights laws and [place] laws. We do not discriminate on the basis of race, color, national origin, age, disability, sex, sexual orientation, or gender identity.               Review of your medicines      START taking        Dose / Directions    acetaminophen 325 MG tablet   Commonly known as:  TYLENOL        Dose:  650 mg   Take 2 tablets (650 mg) by mouth " "every 4 hours as needed for other (surgical pain)   Quantity:  100 tablet   Refills:  0       senna 8.6 MG tablet   Commonly known as:  SENOKOT        Dose:  1 tablet   Take 1 tablet by mouth daily Hold for loose stools   Quantity:  30 tablet   Refills:  0         CONTINUE these medicines which may have CHANGED, or have new prescriptions. If we are uncertain of the size of tablets/capsules you have at home, strength may be listed as something that might have changed.        Dose / Directions    cycloSPORINE modified 25 MG capsule   This may have changed:  See the new instructions.   Used for:  Kidney transplanted        Dose:  100 mg   Take 4 capsules (100 mg) by mouth 2 times daily   Quantity:  240 capsule   Refills:  11         CONTINUE these medicines which have NOT CHANGED        Dose / Directions    alendronate 70 MG tablet   Commonly known as:  FOSAMAX   Used for:  Osteopenia        Dose:  70 mg   Take 1 tablet (70 mg) by mouth every 7 days Take with over 8 ounces water and stay upright for at least 30 minutes after dose.  Take at least 60 minutes before breakfast   Quantity:  12 tablet   Refills:  3       amLODIPine 10 MG tablet   Commonly known as:  NORVASC   Used for:  Hypertension secondary to other renal disorders        Dose:  10 mg   Take 1 tablet (10 mg) by mouth daily   Quantity:  90 tablet   Refills:  1       aspirin 325 MG EC tablet        Dose:  81 mg   Take 81 mg by mouth daily   Quantity:  100 tablet   Refills:  3       atorvastatin 40 MG tablet   Commonly known as:  LIPITOR   Used for:  Hyperlipidemia LDL goal <100        Dose:  20 mg   Take 0.5 tablets (20 mg) by mouth daily   Quantity:  45 tablet   Refills:  1       FLORIN CONTOUR test strip   Generic drug:  blood glucose monitoring        Refills:  0       BD insulin syringe ultrafine 31G X 5/16\" 0.3 ML   Generic drug:  insulin syringe-needle U-100        USING 4-5 PER DAY ([employer] 31G/0.3ML)   Refills:  6       chlorthalidone 25 MG tablet "   Commonly known as:  HYGROTON   Used for:  Hypertension secondary to other renal disorders        Dose:  25 mg   Take 1 tablet (25 mg) by mouth daily   Quantity:  90 tablet   Refills:  3       isosorbide mononitrate 30 MG 24 hr tablet   Commonly known as:  IMDUR   Used for:  Coronary artery disease involving native heart without angina pectoris, unspecified vessel or lesion type        Dose:  15 mg   Take 0.5 tablets (15 mg) by mouth 2 times daily   Quantity:  90 tablet   Refills:  1       LANTUS - INSULIN GLARGINE   Used for:  Type II or unspecified type diabetes mellitus without mention of complication, not stated as uncontrolled        21 u q pm   Quantity:  0   Refills:  0       metoprolol 25 MG 24 hr tablet   Commonly known as:  TOPROL XL   Used for:  Hypertension secondary to other renal disorders, Coronary artery disease involving native heart without angina pectoris, unspecified vessel or lesion type        Dose:  25 mg   Take 1 tablet (25 mg) by mouth At Bedtime   Quantity:  90 tablet   Refills:  1       mycophenolic acid 180 MG EC tablet   Commonly known as:  GENERIC EQUIVALENT   Used for:  Kidney replaced by transplant        Dose:  540 mg   Take 3 tablets (540 mg) by mouth 2 times daily   Quantity:  180 tablet   Refills:  11       NovoLOG VIAL 100 UNITS/ML injection   Generic drug:  insulin aspart        sliding scale 4-6 units tid   Refills:  0       pantoprazole 40 MG EC tablet   Commonly known as:  PROTONIX   Used for:  Gastroesophageal reflux disease without esophagitis        Dose:  40 mg   Take 1 tablet (40 mg) by mouth daily   Quantity:  90 tablet   Refills:  1       SYNTHROID 112 MCG tablet   Generic drug:  levothyroxine        Take by mouth daily   Refills:  0       valsartan 160 MG tablet   Commonly known as:  DIOVAN   Used for:  Hypertension secondary to other renal disorders        Dose:  80 mg   Take 0.5 tablets (80 mg) by mouth daily   Quantity:  45 tablet   Refills:  1            Where to  "get your medicines      These medications were sent to [facility] - [place], [place] - [address]  [address], [facility] [address]     Phone:  [phone]     cycloSPORINE modified 25 MG capsule    senna 8.6 MG tablet         Some of these will need a paper prescription and others can be bought over the counter. Ask your nurse if you have questions.     You don't need a prescription for these medications     acetaminophen 325 MG tablet                Protect others around you: Learn how to safely use, store and throw away your medicines at www.disposemymeds.org.             Medication List: This is a list of all your medications and when to take them. Check marks below indicate your daily home schedule. Keep this list as a reference.      Medications           Morning Afternoon Evening Bedtime As Needed    acetaminophen 325 MG tablet   Commonly known as:  TYLENOL   Take 2 tablets (650 mg) by mouth every 4 hours as needed for other (surgical pain)   Last time this was given:  650 mg on 10/22/2017  9:22 AM                                alendronate 70 MG tablet   Commonly known as:  FOSAMAX   Take 1 tablet (70 mg) by mouth every 7 days Take with over 8 ounces water and stay upright for at least 30 minutes after dose.  Take at least 60 minutes before breakfast                                amLODIPine 10 MG tablet   Commonly known as:  NORVASC   Take 1 tablet (10 mg) by mouth daily   Last time this was given:  10 mg on 10/22/2017  8:33 AM                                aspirin 325 MG EC tablet   Take 81 mg by mouth daily                                atorvastatin 40 MG tablet   Commonly known as:  LIPITOR   Take 0.5 tablets (20 mg) by mouth daily   Last time this was given:  20 mg on 10/21/2017  8:18 PM                                FLORIN CONTOUR test strip   Generic drug:  blood glucose monitoring                                BD insulin syringe ultrafine 31G X 5/16\" 0.3 ML "   USING 4-5 PER DAY (WALNA[employer]FORMEENS 31G/0.3ML)   Generic drug:  insulin syringe-needle U-100                                chlorthalidone 25 MG tablet   Commonly known as:  HYGROTON   Take 1 tablet (25 mg) by mouth daily   Last time this was given:  12.5 mg on 10/21/2017  8:17 PM                                cycloSPORINE modified 25 MG capsule   Take 4 capsules (100 mg) by mouth 2 times daily   Last time this was given:  100 mg on 10/22/2017  8:31 AM                                isosorbide mononitrate 30 MG 24 hr tablet   Commonly known as:  IMDUR   Take 0.5 tablets (15 mg) by mouth 2 times daily   Last time this was given:  15 mg on 10/22/2017  8:33 AM                                LANTUS - INSULIN GLARGINE   21 u q pm                                metoprolol 25 MG 24 hr tablet   Commonly known as:  TOPROL XL   Take 1 tablet (25 mg) by mouth At Bedtime   Last time this was given:  25 mg on 10/21/2017  8:18 PM                                mycophenolic acid 180 MG EC tablet   Commonly known as:  GENERIC EQUIVALENT   Take 3 tablets (540 mg) by mouth 2 times daily   Last time this was given:  10/22/2017  8:32 AM                                NovoLOG VIAL 100 UNITS/ML injection   sliding scale 4-6 units tid   Generic drug:  insulin aspart                                pantoprazole 40 MG EC tablet   Commonly known as:  PROTONIX   Take 1 tablet (40 mg) by mouth daily   Last time this was given:  40 mg on 10/22/2017  8:33 AM                                senna 8.6 MG tablet   Commonly known as:  SENOKOT   Take 1 tablet by mouth daily Hold for loose stools   Last time this was given:  2 tablets on 10/22/2017  8:32 AM                                SYNTHROID 112 MCG tablet   Take by mouth daily   Last time this was given:  112 mcg on 10/22/2017  8:32 AM   Generic drug:  levothyroxine                                valsartan 160 MG tablet   Commonly known as:  DIOVAN   Take 0.5 tablets (80 mg) by mouth daily   Last  time this was given:  80 mg on 10/21/2017 12:23 PM

## 2017-10-16 NOTE — PLAN OF CARE
Problem: Patient Care Overview  Goal: Plan of Care/Patient Progress Review  Outcome: No Change  Pt arrived to 7A from PACU at 1400.  Pt sleepy, but awakens to voice.  VSS.  Pt put on 2L per NC satting high 90's.  Pt on RA dips to 88%.  Abdominal site is dry and intact.  Dried drainage marked.  REAL patent.  Arevalo patent.  Pt on Bupicaine, PCA and insulin drip.  Glucose 155 and insulin increased to 3 units/hour algorithm #2.  MD paged regarding need for insulin gtt and diet?  Pt taking sips of clears.   at bedside.  Pt and family oriented to unit and routines.  Will continue with care plan and notify MD if any changes.

## 2017-10-16 NOTE — ANESTHESIA PREPROCEDURE EVALUATION
Anesthesia Evaluation     .             ROS/MED HX    ENT/Pulmonary:       Neurologic:       Cardiovascular:     (+) Dyslipidemia, hypertension--CAD, --stent,. : . . . :. .       METS/Exercise Tolerance:     Hematologic:     (+) Anemia, -      Musculoskeletal:         GI/Hepatic:     (+) GERD Inflammatory bowel disease,       Renal/Genitourinary:     (+) chronic renal disease, Pt has history of transplant,       Endo:     (+) type II DM Diabetic complications: nephropathy, thyroid problem hypothyroidism, .      Psychiatric:         Infectious Disease:         Malignancy:         Other:                     Physical Exam  Normal systems: cardiovascular and pulmonary    Airway   Mallampati: II  TM distance: >3 FB  Neck ROM: full    Dental     Cardiovascular   Rhythm and rate: normal      Pulmonary     Other findings: Two upper front crowns                Anesthesia Plan      History & Physical Review  History and physical reviewed and following examination; no interval change.    ASA Status:  3 .    NPO Status:  > 8 hours    Plan for General and ETT with Intravenous and Propofol induction. Maintenance will be Balanced.    PONV prophylaxis:  Ondansetron (or other 5HT-3) and Dexamethasone or Solumedrol  Additional equipment: 2nd IV and Videolaryngoscope      Postoperative Care  Postoperative pain management:  IV analgesics, Multi-modal analgesia and Neuraxial analgesia.  Plan for postoperative opioid use.    Consents  Anesthetic plan, risks, benefits and alternatives discussed with:  Patient.  Use of blood products discussed: Yes.   Use of blood products discussed with Patient.  Consented to blood products.  .                          .

## 2017-10-16 NOTE — OR NURSING
Dr. Lee called to clarify if pt should take immunosupression meds preop. Dr. Lee states that he wants patient to have them.

## 2017-10-16 NOTE — OP NOTE
DATE OF SERVICE:  10/16/2017       PREOPERATIVE DIAGNOSIS:  Status post kidney transplant with right flank hernia requiring treatment.      POSTOPERATIVE DIAGNOSIS:  Status post kidney transplant with right flank hernia requiring treatment.      PLASTIC SURGERY PROCEDURES:  Abdominal wall reconstruction with placement of XenMatrix AB (reference [id number], lot #[id number]) 10 x 15 cm inlay mesh using Mitek suture anchors to the iliac bone and primary closure of the lateral abdominal wall covering the entire mesh with excision of skin and subcutaneous tissue of right lower abdominal wall and primary closure of skin.  Umbilical wall hernia repair.      SURGEON:  Angelique Lott MD      FELLOW:  Minoo Rios MD      ANESTHESIA:  General anesthesia with endotracheal intubation.      COMPLICATIONS:  Nil.      DRAINS:  A 15-Uzbek Ibrahima drain in the subcutaneous plane.      SPECIMENS:  Abdominal wall scar.      DESCRIPTION OF PROCEDURE:  After informed consent was taken from Viv Shaw, the proper site and procedure was ascertained with her and she was appropriately marked, she was taken to the operating room.  She was placed in a supine position with the knees comfortably flexed, pillows underneath them and pneumoboots placed and running prior to induction of anesthesia.  Preoperative antibiotics were given in the OR and appropriately redosed during the case.  General anesthesia was administered without any complications.  A Arevalo catheter was inserted.  She was appropriately padded, prepped and draped in a standard surgical fashion.  Dr. Villa of Transplant Surgery began the case and carried out takedown and lysis of adhesions of the hernia.  Once that was completed, I was called to the operating room.  On evaluation of the abdominal wall, she had a 10 x 10 cm right lower quadrant abdominal wall defect, with the borders of the fascia identifiable medially, superiorly and inferiorly; however, laterally, the muscle fascia  and the muscle had retracted down to the iliac crest and there was an area where there was very little tissue to sew any mesh to.  Looking at the defect, it was clear that we would be potentially able to close the fascia primarily but we needed to buttress this closure with mesh and given her high risk and immunocompromised state, we decided to use a biologic mesh on evaluation intraabdominally, it also became apparent she had a small umbilical hernia approximately 1 x 1 cm.  Intraabdominally, I placed a figure-of-eight #1 PDS suture and closed that defect.  I then turned my attention to the abdominal wall defect flaps in the right lateral lower quadrant/flank area.  I measured it at about 10 x 10 cm, opened a 10 x 15 cm XenMatrix AB, appropriately rehydrated it.  I then placed the mesh in an inlay technique and used #1 PDS suture to inset the flap under tension.  This was done in a running technique from about the 11 o'clock position all the way up to about the 7 o'clock position with a good 5 cm overlay on healthy looking mesh.  I then used Mitek suture anchor and sutured it to the iliac crest.  It was a #2 Ethibond suture and had 2 of the sutures attached to the Mitek suture, these were attached to the mesh appropriately.  In the 11 o'clock to about the 9 o'clock position, interrupted #1 PDS suture was used to sew in the mesh.  Once the mesh was in place, I then used a looped #1 PDS suture and closed the fascia primarily.  Once this was complete, I placed a 15-Burkinan REAL drain in the subcutaneous plane.  I temporarily stapled the skin closed and then excised the excess skin that has been stretched out from this large ventral hernia.  Hemostasis was ensured and the wound was then closed using 2-0 Monocryl suture in the deep layer followed by deep dermal layer followed by staples.  A dry dressing and an abdominal binder was placed.  The patient remained stable throughout the case.  All counts correct at the end of  the case.  The patient was extubated and sent to recovery room in a stable condition.         CARL ANDREWS MD             D: 10/16/2017 11:13   T: 10/16/2017 11:59   MT: KAROLINE      Name:     LAST KING   MRN:      [id number]        Account:        [id number]   :      1950           Procedure Date: 10/16/2017      Document: [id number]

## 2017-10-16 NOTE — OR NURSING
Pt had a epidural placed in preop. Pt tolerated well. Denies ringing in the ears or metallic taste in her mouth.

## 2017-10-16 NOTE — IP AVS SNAPSHOT
[address] [address]    Phone:  [phone]                                       After Visit Summary   10/16/2017    Viv Shaw    MRN: [id number]           After Visit Summary Signature Page     I have received my discharge instructions, and my questions have been answered. I have discussed any challenges I see with this plan with the nurse or doctor.    ..........................................................................................................................................  Patient/Patient Representative Signature      ..........................................................................................................................................  Patient Representative Print Name and Relationship to Patient    ..................................................               ................................................  Date                                            Time    ..........................................................................................................................................  Reviewed by Signature/Title    ...................................................              ..............................................  Date                                                            Time

## 2017-10-16 NOTE — PHARMACY
"The following home medications were NOT continued on inpatient admission per \"Discontinuation of nonessential home medications during hospitalization\" policy: alendronate 70 mg po every 7 days    If a therapeutic holiday is deemed inappropriate per the prescriber, please notify the pharmacist regarding the medication order.    The pharmacist is available to answer any questions and/or concerns the patient may have regarding discontinuation of non-essential medications.    Please ensure that these medications are restarted as needed upon discharge via the medication reconciliation discharge process and included on the discharge medication reconciliation report.    Thank you,  Zenaida Colon, PharmD      "

## 2017-10-16 NOTE — ANESTHESIA POSTPROCEDURE EVALUATION
Patient: Viv A Coquille    Procedure(s):  Exploratory Laparotomy, Lysis of Adhesions, Abdominal Wall reconstruction, Inlay Xen AB mesh, Mitek Suture [place] and Umbilical Hernia Repair. - Wound Class: I-Clean   - Wound Class: I-Clean    Diagnosis:Ventral Hernia   Diagnosis Additional Information: No value filed.    Anesthesia Type:  General, ETT    Note:  Anesthesia Post Evaluation    Patient location during evaluation: PACU  Patient participation: Able to fully participate in evaluation  Level of consciousness: awake and alert  Pain management: adequate  Airway patency: patent  Cardiovascular status: acceptable  Respiratory status: acceptable  Hydration status: acceptable  PONV: none     Anesthetic complications: None          Last vitals:  Vitals:    10/16/17 0725 10/16/17 0730 10/16/17 0735   BP: 113/53 133/71    Pulse:      Resp: 12     Temp:      SpO2: 94% 97% 97%         Electronically Signed By: Bashir Aguila MD  October 16, 2017  11:54 AM

## 2017-10-16 NOTE — LETTER
Transition Communication Hand-off for Care Transitions to Next Level of Care Provider    Name: Viv Shaw  MRN #: [id number]  Primary Care Provider: Summer Vega     [facility] [address]     Reason for Hospitalization:  S/P umbilical hernia repair, follow-up exam [Z09]  Admit Date/Time: 10/16/2017  5:17 AM  Discharge Date:  10.22.17  Payor Source: Payor: MEDICARE / Plan: MEDICARE / Product Type: Medicare /              Reason for Communication Hand-off Referral: Other hernia repair    Discharge Plan:       Concern for non-adherence with plan of care:   Y/N N  Discharge Needs Assessment: Pt was having hallucinations and disorientation while here--? From meds or sleep deprivation--MD and  agreed she could go home with him    Follow-up specialty is recommended: Yes    Follow-up plan:  Future Appointments  Date Time Provider Department Center   10/30/2017 3:00 PM  LAB [facility]   10/30/2017 3:45 PM Jenn Villa MD [facility]   11/1/2017 10:30 AM CARL Lott MD [facility]   12/4/2017 1:05 PM Morro Veloz MD [facility]   12/6/2017 9:00 AM Pepe Boss MD [facility]       Any outstanding tests or procedures:              Key Recommendations:      Carmen Martin    AVS/Discharge Summary is the source of truth; this is a helpful guide for improved communication of patient story

## 2017-10-16 NOTE — ANESTHESIA CARE TRANSFER NOTE
Patient: Viv Shaw    Procedure(s):  Exploratory Laparotomy, Lysis of Adhesions, Abdominal Wall reconstruction, Inlay Xen AB mesh, Mitek Suture [place] and Umbilical Hernia Repair. - Wound Class: I-Clean   - Wound Class: I-Clean    Diagnosis: Ventral Hernia   Diagnosis Additional Information: No value filed.    Anesthesia Type:   General, ETT     Note:  Airway :Face Mask  Patient transferred to:PACU  Comments: To PACU via face mask and hi flow oxygen. VSS. Denies pain or nausea.  Report given to RN at bedside.Handoff Report: Identifed the Patient, Identified the Reponsible Provider, Reviewed the pertinent medical history, Discussed the surgical course, Reviewed Intra-OP anesthesia mangement and issues during anesthesia, Set expectations for post-procedure period and Allowed opportunity for questions and acknowledgement of understanding      Vitals: (Last set prior to Anesthesia Care Transfer)    CRNA VITALS  10/16/2017 1100 - 10/16/2017 1138      10/16/2017             NIBP: 114/51    Pulse: 54                Electronically Signed By: BRENDA Contreras CRNA  October 16, 2017  11:38 AM

## 2017-10-16 NOTE — BRIEF OP NOTE
[facility] Brief Operative Note    Pre-operative diagnosis: Ventral Hernia    Post-operative diagnosis * No post-op diagnosis entered *   Procedure: Procedure(s):  Exploratory Laparotomy, Lysis of Adhesions, Abdominal Wall reconstruction, Inlay Xen AB mesh, Mitek Suture [place] and Umbilical Hernia Repair. - Wound Class: I-Clean   - Wound Class: I-Clean   Surgeon: CARL Lott MD   Assistants(s): Minoo Rios MD   Estimated blood loss: Less than 50 ml    Specimens: Abdominal wall scar   Findings: XenMatrix AB 10 x 15 cm Inlay, x1 15 Fr REAL drain

## 2017-10-16 NOTE — ANESTHESIA PROCEDURE NOTES
Epidural Procedure Note    Staff:     Anesthesiologist:  ED URRUTIA    Resident/CRNA:  VITOR STEINBERG    Procedure performed by resident/CRNA in the presence of a teaching physician    Location: Pre-op     Procedure start time:  10/16/2017 7:15 AM     Procedure end time:  10/16/2017 7:25 AM   Pre-procedure checklist:   patient identified, IV checked, site marked, risks and benefits discussed, informed consent, monitors and equipment checked, pre-op evaluation, at physician/surgeon's request and post-op pain management      Correct Patient: Yes      Correct Position: Yes      Correct Site: Yes      Correct Procedure: Yes      Correct Laterality:  Yes    Site Marked:  Yes  Procedure:     Procedure:  Epidural catheter    ASA:  3    Position:  Sitting    Sterile Prep: chloraprep, mask and sterile gloves      Insertion site:  T10-11    Local skin infiltration:  1% lidocaine    amount (mL):  3    Approach:  Midline    Needle gauge (G):  17    Needle Length (in):  3.5    Block Needle Type:  Touhy    Injection Technique:  LORT saline    AMIRAH at (cm):  6    Attempts:  1    Redirects:  1    Catheter gauge (G):  19    Catheter threaded easily: Yes      Threaded to cm at skin:  11    Threaded in epidural space (cm):  5    Paresthesias:  No    Aspiration negative for Heme or CSF: Yes      Test dose (mL):  3     Local anesthetic:  Lidocaine 1.5% w/ 1:200,000 epinephrine    Test dose time:  07:23    Test dose negative for signs of intravascular, subdural or intrathecal injection: Yes

## 2017-10-16 NOTE — OR NURSING
Handoff from Mariza Harper RN received. Monitoring patient until transport is available, Mariza has called report to the RN on unit 7A. VSS, pt resting, nausea decreasing.

## 2017-10-17 ENCOUNTER — APPOINTMENT (OUTPATIENT)
Dept: GENERAL RADIOLOGY | Facility: CLINIC | Age: 67
DRG: 354 | End: 2017-10-17
Attending: NURSE PRACTITIONER
Payer: MEDICARE

## 2017-10-17 PROBLEM — R11.2 PONV (POSTOPERATIVE NAUSEA AND VOMITING): Status: ACTIVE | Noted: 2017-10-17

## 2017-10-17 PROBLEM — Z98.890 PONV (POSTOPERATIVE NAUSEA AND VOMITING): Status: ACTIVE | Noted: 2017-10-17

## 2017-10-17 PROBLEM — G89.18 ACUTE POST-OPERATIVE PAIN: Status: ACTIVE | Noted: 2017-10-17

## 2017-10-17 LAB
BUN SERPL-MCNC: 22 MG/DL (ref 7–30)
CREAT SERPL-MCNC: 1.25 MG/DL (ref 0.52–1.04)
GFR SERPL CREATININE-BSD FRML MDRD: 42 ML/MIN/1.7M2
GLUCOSE BLDC GLUCOMTR-MCNC: 103 MG/DL (ref 70–99)
GLUCOSE BLDC GLUCOMTR-MCNC: 115 MG/DL (ref 70–99)
GLUCOSE BLDC GLUCOMTR-MCNC: 119 MG/DL (ref 70–99)
GLUCOSE BLDC GLUCOMTR-MCNC: 126 MG/DL (ref 70–99)
GLUCOSE BLDC GLUCOMTR-MCNC: 129 MG/DL (ref 70–99)
GLUCOSE BLDC GLUCOMTR-MCNC: 129 MG/DL (ref 70–99)
GLUCOSE BLDC GLUCOMTR-MCNC: 130 MG/DL (ref 70–99)
GLUCOSE BLDC GLUCOMTR-MCNC: 130 MG/DL (ref 70–99)
GLUCOSE BLDC GLUCOMTR-MCNC: 135 MG/DL (ref 70–99)
GLUCOSE BLDC GLUCOMTR-MCNC: 135 MG/DL (ref 70–99)
GLUCOSE BLDC GLUCOMTR-MCNC: 136 MG/DL (ref 70–99)
GLUCOSE BLDC GLUCOMTR-MCNC: 137 MG/DL (ref 70–99)
GLUCOSE BLDC GLUCOMTR-MCNC: 138 MG/DL (ref 70–99)
GLUCOSE BLDC GLUCOMTR-MCNC: 148 MG/DL (ref 70–99)
GLUCOSE BLDC GLUCOMTR-MCNC: 149 MG/DL (ref 70–99)
GLUCOSE BLDC GLUCOMTR-MCNC: 159 MG/DL (ref 70–99)
GLUCOSE BLDC GLUCOMTR-MCNC: 161 MG/DL (ref 70–99)
GLUCOSE BLDC GLUCOMTR-MCNC: 188 MG/DL (ref 70–99)
GLUCOSE BLDC GLUCOMTR-MCNC: 196 MG/DL (ref 70–99)
GLUCOSE BLDC GLUCOMTR-MCNC: 203 MG/DL (ref 70–99)
GLUCOSE BLDC GLUCOMTR-MCNC: 223 MG/DL (ref 70–99)
HCT VFR BLD AUTO: 33.7 % (ref 35–47)
HGB BLD-MCNC: 10.6 G/DL (ref 11.7–15.7)

## 2017-10-17 PROCEDURE — 25000132 ZZH RX MED GY IP 250 OP 250 PS 637: Mod: GY | Performed by: TRANSPLANT SURGERY

## 2017-10-17 PROCEDURE — 25000128 H RX IP 250 OP 636: Performed by: TRANSPLANT SURGERY

## 2017-10-17 PROCEDURE — 85014 HEMATOCRIT: CPT | Performed by: TRANSPLANT SURGERY

## 2017-10-17 PROCEDURE — 74020 XR ABDOMEN 2 VW: CPT

## 2017-10-17 PROCEDURE — 36415 COLL VENOUS BLD VENIPUNCTURE: CPT | Performed by: TRANSPLANT SURGERY

## 2017-10-17 PROCEDURE — 25000128 H RX IP 250 OP 636: Performed by: STUDENT IN AN ORGANIZED HEALTH CARE EDUCATION/TRAINING PROGRAM

## 2017-10-17 PROCEDURE — 82565 ASSAY OF CREATININE: CPT | Performed by: TRANSPLANT SURGERY

## 2017-10-17 PROCEDURE — 25000125 ZZHC RX 250: Performed by: STUDENT IN AN ORGANIZED HEALTH CARE EDUCATION/TRAINING PROGRAM

## 2017-10-17 PROCEDURE — 25000131 ZZH RX MED GY IP 250 OP 636 PS 637: Mod: GY | Performed by: TRANSPLANT SURGERY

## 2017-10-17 PROCEDURE — 40000556 ZZH STATISTIC PERIPHERAL IV START W US GUIDANCE

## 2017-10-17 PROCEDURE — A9270 NON-COVERED ITEM OR SERVICE: HCPCS | Mod: GY | Performed by: TRANSPLANT SURGERY

## 2017-10-17 PROCEDURE — 84520 ASSAY OF UREA NITROGEN: CPT | Performed by: TRANSPLANT SURGERY

## 2017-10-17 PROCEDURE — 12000024 ZZH R&B TRANSPLANT CRITICAL

## 2017-10-17 PROCEDURE — 00000146 ZZHCL STATISTIC GLUCOSE BY METER IP

## 2017-10-17 PROCEDURE — 25000125 ZZHC RX 250: Performed by: TRANSPLANT SURGERY

## 2017-10-17 PROCEDURE — 85018 HEMOGLOBIN: CPT | Performed by: TRANSPLANT SURGERY

## 2017-10-17 RX ORDER — ONDANSETRON 4 MG/1
4-8 TABLET, ORALLY DISINTEGRATING ORAL EVERY 6 HOURS PRN
Status: DISCONTINUED | OUTPATIENT
Start: 2017-10-17 | End: 2017-10-19

## 2017-10-17 RX ORDER — METOCLOPRAMIDE HYDROCHLORIDE 5 MG/ML
2.5 INJECTION INTRAMUSCULAR; INTRAVENOUS EVERY 6 HOURS PRN
Status: DISCONTINUED | OUTPATIENT
Start: 2017-10-17 | End: 2017-10-17

## 2017-10-17 RX ORDER — SCOLOPAMINE TRANSDERMAL SYSTEM 1 MG/1
1 PATCH, EXTENDED RELEASE TRANSDERMAL
Status: DISCONTINUED | OUTPATIENT
Start: 2017-10-17 | End: 2017-10-19

## 2017-10-17 RX ORDER — ONDANSETRON 2 MG/ML
4-8 INJECTION INTRAMUSCULAR; INTRAVENOUS EVERY 6 HOURS PRN
Status: DISCONTINUED | OUTPATIENT
Start: 2017-10-17 | End: 2017-10-19

## 2017-10-17 RX ADMIN — SCOPALAMINE 1 PATCH: 1 PATCH, EXTENDED RELEASE TRANSDERMAL at 03:03

## 2017-10-17 RX ADMIN — ONDANSETRON 4 MG: 2 INJECTION INTRAMUSCULAR; INTRAVENOUS at 00:14

## 2017-10-17 RX ADMIN — ATORVASTATIN CALCIUM 20 MG: 20 TABLET, FILM COATED ORAL at 20:34

## 2017-10-17 RX ADMIN — HUMAN INSULIN 1 UNITS/HR: 100 INJECTION, SOLUTION SUBCUTANEOUS at 13:50

## 2017-10-17 RX ADMIN — ONDANSETRON 4 MG: 2 INJECTION INTRAMUSCULAR; INTRAVENOUS at 15:27

## 2017-10-17 RX ADMIN — SODIUM CHLORIDE, SODIUM LACTATE, POTASSIUM CHLORIDE, CALCIUM CHLORIDE, AND DEXTROSE MONOHYDRATE: 600; 310; 30; 20; 5 INJECTION, SOLUTION INTRAVENOUS at 06:55

## 2017-10-17 RX ADMIN — PANTOPRAZOLE SODIUM 40 MG: 40 TABLET, DELAYED RELEASE ORAL at 08:32

## 2017-10-17 RX ADMIN — SODIUM CHLORIDE, SODIUM LACTATE, POTASSIUM CHLORIDE, CALCIUM CHLORIDE, AND DEXTROSE MONOHYDRATE: 600; 310; 30; 20; 5 INJECTION, SOLUTION INTRAVENOUS at 14:46

## 2017-10-17 RX ADMIN — CYCLOSPORINE 75 MG: 25 CAPSULE ORAL at 08:30

## 2017-10-17 RX ADMIN — LEVOTHYROXINE SODIUM 112 MCG: 112 TABLET ORAL at 08:32

## 2017-10-17 RX ADMIN — HYDROMORPHONE HYDROCHLORIDE: 10 INJECTION, SOLUTION INTRAMUSCULAR; INTRAVENOUS; SUBCUTANEOUS at 06:56

## 2017-10-17 RX ADMIN — CYCLOSPORINE 75 MG: 25 CAPSULE ORAL at 20:34

## 2017-10-17 RX ADMIN — CHLORTHALIDONE 25 MG: 25 TABLET ORAL at 20:34

## 2017-10-17 RX ADMIN — DOCUSATE SODIUM 100 MG: 100 CAPSULE, LIQUID FILLED ORAL at 08:32

## 2017-10-17 RX ADMIN — PROCHLORPERAZINE EDISYLATE 5 MG: 5 INJECTION INTRAMUSCULAR; INTRAVENOUS at 06:05

## 2017-10-17 RX ADMIN — RANITIDINE 150 MG: 150 TABLET ORAL at 08:31

## 2017-10-17 RX ADMIN — MYCOPHENOLIC ACID 540 MG: 180 TABLET, DELAYED RELEASE ORAL at 18:16

## 2017-10-17 RX ADMIN — METOPROLOL SUCCINATE 25 MG: 25 TABLET, EXTENDED RELEASE ORAL at 20:34

## 2017-10-17 RX ADMIN — PROCHLORPERAZINE EDISYLATE 5 MG: 5 INJECTION INTRAMUSCULAR; INTRAVENOUS at 20:26

## 2017-10-17 RX ADMIN — MYCOPHENOLIC ACID 540 MG: 180 TABLET, DELAYED RELEASE ORAL at 08:31

## 2017-10-17 RX ADMIN — DOCUSATE SODIUM 100 MG: 100 CAPSULE, LIQUID FILLED ORAL at 20:34

## 2017-10-17 RX ADMIN — RANITIDINE 150 MG: 150 TABLET ORAL at 20:35

## 2017-10-17 NOTE — PROGRESS NOTES
REGIONAL ANESTHESIA PAIN SERVICE EPIDURAL NOTE  SUBJECTIVE:   Interval History: Pt reports she has no pain at rest, rating pain 3/10 with repositioning in bed, has not been out of bed yet.  Denies LE weakness, paresthesias, circumoral numbness, metallic taste or tinnitus.  Patient states she has gastroparesis, currently experiencing nausea, but tolerating clear liquids from breakfast tray.          Anticoagulation:  None ordered    Medications related to Pain Management (Future)    Start     Dose/Rate Route Frequency Ordered Stop    10/19/17 0000  acetaminophen (TYLENOL) tablet 650 mg      650 mg Oral EVERY 4 HOURS PRN 10/16/17 1430      10/16/17 2000  docusate sodium (COLACE) capsule 100 mg      100 mg Oral 2 TIMES DAILY 10/16/17 1430      10/16/17 1445  acetaminophen (TYLENOL) tablet 975 mg      975 mg Oral EVERY 8 HOURS 10/16/17 1430 10/19/17 1059    10/16/17 1430  diphenhydrAMINE (BENADRYL) solution 12.5 mg      12.5 mg Oral EVERY 6 HOURS PRN 10/16/17 1430      10/16/17 1430  diphenhydrAMINE (BENADRYL) injection 12.5 mg      12.5 mg Intravenous EVERY 6 HOURS PRN 10/16/17 1430      10/16/17 1430  oxyCODONE (ROXICODONE) IR tablet 5 mg      5 mg Oral EVERY 3 HOURS PRN 10/16/17 1430      10/16/17 1145  HYDROmorphone (DILAUDID) PCA 1 mg/mL       Intravenous PCA 10/16/17 1143      10/16/17 0745  bupivacaine (MARCAINE) 0.125 % in NaCl 0.9 % 250 mL EPIDURAL Infusion      8 mL/hr  EPIDURAL CONTINUOUS 10/16/17 0744              OBJECTIVE:  CBC RESULTS:   Recent Labs   Lab Test  10/17/17   0619   09/27/17   0845   WBC   --    --   6.1   RBC   --    --   4.05   HGB  10.6*   < >  11.6*   HCT  33.7*   < >  37.2   MCV   --    --   92   MCH   --    --   28.6   MCHC   --    --   31.2*   RDW   --    --   13.7   PLT   --    --   226    < > = values in this interval not displayed.       Lab Results   Component Value Date    INR 0.96 05/24/2007    INR 0.96@ 05/24/2007    INR 1.20 12/27/2005       Vitals:    Temp:  [97.6  F (36.4   C)-98.7  F (37.1  C)] 98.7  F (37.1  C)  Pulse:  [63-74] 68  Heart Rate:  [53-70] 68  Resp:  [14-17] 16  BP: (114-168)/(46-77) 160/77  SpO2:  [93 %-100 %] 93 %    Exam:    Strength 5/5 and symmetric grossly in bilateral LE   Epidural site with dressing scant old bloody drainage, no tenderness, erythema, heme, edema      ASSESSMENT/PLAN:    Viv Shaw is a 67 year old female POD #1 s/p RECONSTRUCT ABDOMINAL WALL  OPEN SEPARATION COMPONENT HERNIORRHAPHY and placement of T10-11 epidural for analgesia.  Pt receiving adequate analgesia with epidural infusion Bupivacaine 0.125% at 8mL/hour + PCA Dilaudid, has not used any doses this shift.   Pt has not been out of bed yet.  No weakness or paresthesias, adequate sensory block.  No evidence of adverse side effects related to local anesthetic.    Epidural T10-11 placed 10/16/17, catheter day #1  - continue current Bupivacaine 0.125% epidural infusion at 8mL/hour  - will likely continue epidural until catheter day #5    - will continue to follow and adjust as needed  - discussed plan with attending anesthesiologist        BRENDA [facility]  Regional Anesthesia Pain Service  10/17/2017 11:18 AM    24 hour Job Code Pager.  For in-house use only.     Dial * * *[phone] and  [place]:  [phone]  [place]: [phone]  Peds:  [phone]  Then enter call-back number  May text page using [employer], but NOT [employer].

## 2017-10-17 NOTE — PROGRESS NOTES
Care Coordinator- Assessment Note     Admission Date/Time:  10/16/2017  Attending MD:  Jenn Villa MD     Data  Chart reviewed, discussed with interdisciplinary team.   Patient was admitted for:   1. Dyslipidemia    2. PONV (postoperative nausea and vomiting)         RNCC Assessment  Concerns with insurance coverage for discharge needs: None.  Current Living Situation: Patient lives with spouse.  Support System: Supportive  Services Involved: none at admit  Transportation: Family or Friend will provide  Barriers to Discharge: none identified    Assessment: .Per discussion in interdisciplinary rounds, the primary team states that the patient continues to require hospitalization for the following reasons: continues to have an epidural in and is still under going medical management.  It is too early in the patient hospitalization to determine discharge disposition.  RNCC will continue to follow and assess for needs.    Plan  Anticipated Discharge Date:  TBD  Anticipated Discharge Plan:  Pending medical plan of care and therapy recommendations.    CTS Handoff completed: marcella Kaur RN, MSN, PHN  RNCCPH: [phone]  Pager: [phone]  Covering for : Carmen Martin, MARGY RNCC

## 2017-10-17 NOTE — PROGRESS NOTES
Transplant Surgery  Inpatient Daily Progress Note  10/17/2017    Assessment & Plan:  66yo with hx DM2, diabetic nephropathy s/p KT 2005, HTN, CAD, hypothyroidism, and GERD.    10/16/17: Underwent exploratory laparotomy , JOSEFA, reduction of incarcerated incisional hernia, abdominal wall reconstruction with placement of XenMatrix AB 10 x 15 cm inlay mesh using Mitek suture anchors to the iliac bone, primary closure of the lateral abdominal wall covering the entire mesh with excision of skin and subcutaneous tissue of right lower abdominal wall and primary closure of skin, and umbilical wall hernia repair.  Surgeons: Jenn Villa MD (Transplant Surgery) and Angelique Lott MD (Plastic Surgery).    S/p hernia repair:  POD #1.  Pain control with epidural.  No bowel function yet.  Check AXR due to persistent nausea.  Graft function:  Cr 1.2, baseline 1.2-1.4.  Immunosuppression management: On Myfortic and CSA.  CSA level goal per protocol ~75.  Tolerated oral meds this morning.  Check CSA level MWF.  Complexity of management: Low. Contributing factors: nausea  Hematology: Mild anemia, hgb 10.6, stable.  Cardiorespiratory: On chlorthalidone and statin.  GI/Nutrition: S/p reduction of incarcerated colon.  Sips/chips.  Check AXR for ileus or obstruction.  Has hx PONV.  Continue zofran, compazine, scopolamine.  Endocrine: DM type 2, continue insulin gtt until bowel fxn returns.  Hypothyroidism, on synthroid.  Fluid/Electrolytes: MIVF:  Continue until bowel fxn returns  : Keep nelson while pt has epidural  Infectious disease: Afebrile  Prophylaxis: DVT  Disposition: 7A     Medical Decision Making: Medium  Subsequent visit 34936 (moderate level decision making)    NATALY/Fellow/Resident Provider: Kinza Carnes NP [id number]    Faculty: Jenn Villa M.D.  _________________________________________________________________  Transplant History:   12/27/2005 (Kidney), Postoperative day: 4312     Interval History: History is obtained from  "the patient  Overnight events: Minimal pain with epidural.  Distal sensation intact.  Nausea with vomiting overnight, no vomiting today.  No flatus.    ROS:   A 10-point review of systems was negative except as noted above.    Meds:    scopolamine  1 patch Transdermal Q72H    And     scopolamine   Transdermal Q8H    And     [START ON 10/20/2017] scopolamine   Transdermal Q72H     cycloSPORINE modified  75 mg Oral BID     chlorthalidone  25 mg Oral Daily     metoprolol  25 mg Oral Daily     pantoprazole  40 mg Oral QAM     atorvastatin  20 mg Oral Daily     zz ims template  540 mg Oral BID IS     levothyroxine  112 mcg Oral QAM AC     ranitidine  150 mg Oral BID     HYDROmorphone   Intravenous PCA     acetaminophen  975 mg Oral Q8H     docusate sodium  100 mg Oral BID       Physical Exam:     Admit Weight: 79.1 kg (174 lb 6.1 oz)    Current vitals:   /77 (BP Location: Right arm)  Pulse 72  Temp 98.1  F (36.7  C) (Oral)  Resp 16  Ht 1.626 m (5' 4\")  Wt 79.1 kg (174 lb 6.1 oz)  SpO2 95%  BMI 29.93 kg/m2      Vital sign ranges:    Temp:  [97.7  F (36.5  C)-98.7  F (37.1  C)] 98.1  F (36.7  C)  Pulse:  [63-74] 72  Heart Rate:  [53-72] 72  Resp:  [14-17] 16  BP: (117-168)/(46-77) 141/77  SpO2:  [93 %-100 %] 95 %  Patient Vitals for the past 24 hrs:   BP Temp Temp src Pulse Heart Rate Resp SpO2   10/17/17 1156 141/77 98.1  F (36.7  C) Oral 72 72 16 -   10/17/17 1100 - - - - - - 95 %   10/17/17 0803 160/77 98.7  F (37.1  C) Oral 68 68 16 93 %   10/17/17 0700 - - - - - - 94 %   10/17/17 0407 148/58 - - - 69 - -   10/17/17 0359 168/61 98.5  F (36.9  C) Oral - 69 16 93 %   10/16/17 2354 146/53 97.7  F (36.5  C) Oral 74 - 16 95 %   10/16/17 1926 119/62 98  F (36.7  C) Oral 70 70 17 97 %   10/16/17 1530 124/76 98.1  F (36.7  C) Oral - 64 14 100 %   10/16/17 1500 143/60 - - 67 - 16 98 %   10/16/17 1400 129/53 - - 63 - 14 97 %   10/16/17 1330 129/47 - - - 59 14 98 %   10/16/17 1315 133/46 - - - 59 14 97 %   10/16/17 " 1300 129/51 - - - 61 16 98 %   10/16/17 1251 125/47 98  F (36.7  C) Oral - 56 16 98 %   10/16/17 1245 120/48 - - - 56 16 98 %   10/16/17 1230 124/51 - - - 56 16 98 %   10/16/17 1215 117/48 98  F (36.7  C) Oral - 53 16 99 %     General Appearance: in no apparent distress.   Skin: normal, warm, dry  Heart: regular rate and rhythm  Lungs: clear to auscultation, diminished bases  Abdomen: The abdomen is soft and rounded, and  non-tender.  The wound is covered.  REAL SS  : nelson is present.  Yellow uop  Extremities: edema: absent.   Neurologic: awake, alert and oriented x4. Tremor absent..     Data:   CMP  Recent Labs  Lab 10/17/17  0619 10/16/17  0606   POTASSIUM  --  4.1   BUN 22  --    CR 1.25* 1.23*   GFRESTIMATED 42* 43*   GFRESTBLACK 51* 52*     CBC  Recent Labs  Lab 10/17/17  0619 10/16/17  1617   HGB 10.6* 10.9*     COAGSNo lab results found in last 7 days.    Invalid input(s): XA   Urinalysis  Recent Labs   Lab Test  06/29/17   0620  07/05/16   0618   UTPG  0.13  0.13     Virology:  CMV DNA Quantitation Specimen   Date Value Ref Range Status   12/05/2016 Plasma, EDTA anticoagulant  Final     CMV Quantitative   Date Value Ref Range Status   12/19/2007 <100 <100 Copies/mL Final     Comment:     No CMV DNA detected.   12/04/2007 <100 <100 Copies/mL Final     Comment:     No CMV DNA detected.   05/24/2007 <100 <100 Copies/mL Final     Comment:     No CMV DNA detected.     CMV IgG Antibody   Date Value Ref Range Status   12/22/2005 >160.0 EU/mL Final     Comment:     Positive for anti-CMV IgG     Hepatitis C Antibody   Date Value Ref Range Status   12/21/2006 Negative NEG Final     Hep B Surface Kika   Date Value Ref Range Status   12/21/2006 0.0 0.0 - 4.9 mIU/mL Final     Comment:     Negative     BK Virus Result   Date Value Ref Range Status   06/18/2015 BK Virus DNA Not Detected BKNEG copies/mL Final   12/02/2013 <1000 <1000 copies/mL Final   05/08/2013 <1000 <1000 copies/mL Final   11/30/2012 <1000 <1000 copies/mL  Final   12/07/2011 <1000 <1000 copies/mL Final   09/27/2011 <1000 <1000 copies/mL Final   07/07/2011 <1000 <1000 copies/mL Final   06/07/2011 <1000 <1000 copies/mL Final   04/29/2011 <1000 <1000 copies/mL Final   04/06/2011 [id number] (H) <1000 copies/mL Final   04/06/2011 <1000 <1000 copies/mL Final   11/24/2010 <1000 <1000 copies/mL Final   06/16/2010 <1000 <1000 copies/mL Final   11/25/2009 <1000 <1000 copies/mL Final   12/30/2008 <1000 <1000 copies/mL Final   11/25/2008 <1000 <1000 copies/mL Final   05/24/2007 <1000 <1000 copies/mL Final   12/21/2006 <1000 <1000 copies/mL Final   10/16/2006 1600 (H) <1000 copies/mL Final   09/25/2006 <1000 <1000 copies/mL Final   09/11/2006 2100 (H) <1000 copies/mL Final

## 2017-10-17 NOTE — PROGRESS NOTES
"Plastic Surgery note    Pain well controlled. +nausea    /77 (BP Location: Right arm)  Pulse 68  Temp 98.7  F (37.1  C) (Oral)  Resp 16  Ht 1.626 m (5' 4\")  Wt 79.1 kg (174 lb 6.1 oz)  SpO2 93%  BMI 29.93 kg/m2  Drain 165 post op, 105 x 2shifts  Abdomen soft;dressing intact with minimal shadowing  Epidural in place    POD 1 s/p right flank hernia repair with xenmatrix mesh.  Dressing change tomorrow  REAL cares  Diet advancement per primary team; take it slow 2/2 nausea  Ok to walk; no lifting >10lbs, no twisting  DVT ppx with lovenox ok  [facility] 11/1/17    Pina Hernandez MD    "

## 2017-10-17 NOTE — PROGRESS NOTES
"../77 (BP Location: Right arm)  Pulse 68  Temp 98.7  F (37.1  C) (Oral)  Resp 16  Ht 1.626 m (5' 4\")  Wt 79.1 kg (174 lb 6.1 oz)  SpO2 95%  BMI 29.93 kg/m2  NPO. Pt appears very tired this morning after going down for Xray and then washing up in BR with assist of 1 nursing staff. Pt did have a clear liquid diet for breakfast which she tolerated well. Took am meds.  Bill here and is very supportive. Pt refused CAPNO, Arevalo care done and REAL site care and drsg chg done. ABD binder given for pt to use when up. PCA dilaudid keeping pt comfortable at setting 0.1 mg every10 min for T=0.6 mg Block intact, drsg intact and rate remains at 8 ml/hr. Continue with plan of cares and medicate per orders.  "

## 2017-10-17 NOTE — PLAN OF CARE
"Problem: Patient Care Overview  Goal: Plan of Care/Patient Progress Review  Outcome: No Change  /58  Pulse 74  Temp 98.5  F (36.9  C) (Oral)  Resp 16  Ht 1.626 m (5' 4\")  Wt 79.1 kg (174 lb 6.1 oz)  SpO2 93%  BMI 29.93 kg/m2     Alert and oriented. Slightly hypertensive, OVSS on RA. Pts pain adequately controlled with epidermal 8ml/hr. Pt only used dilaudid PCA once.  Pt complaining of nausea throughout the night. Compazine, Zofran x2, and scol patch on R ear used. Minimal relief throughout the night. Pt states this has happened a few times after surgery. L PIV with MIVF and insulin gtt (range 115-165) Alg #2. Arevalo patent with adequate OP.  Pt not passing gas but has bowel sounds. R REAL with moderate bloody OP. Incision covered with operative dressing, scant dried drainage. Pt refused capno d/t nausea, on-call aware. Pt dangled at the edge of the bed with assist of 1. NPO. Will continue to monitor. Call light in reach, continue with POC.       "

## 2017-10-18 LAB
ANION GAP SERPL CALCULATED.3IONS-SCNC: 8 MMOL/L (ref 3–14)
BUN SERPL-MCNC: 18 MG/DL (ref 7–30)
CALCIUM SERPL-MCNC: 7.8 MG/DL (ref 8.5–10.1)
CHLORIDE SERPL-SCNC: 108 MMOL/L (ref 94–109)
CO2 SERPL-SCNC: 26 MMOL/L (ref 20–32)
COPATH REPORT: NORMAL
CREAT SERPL-MCNC: 1.19 MG/DL (ref 0.52–1.04)
CYCLOSPORINE BLD LC/MS/MS-MCNC: 53 UG/L (ref 50–400)
ERYTHROCYTE [DISTWIDTH] IN BLOOD BY AUTOMATED COUNT: 13.7 % (ref 10–15)
GFR SERPL CREATININE-BSD FRML MDRD: 45 ML/MIN/1.7M2
GLUCOSE BLDC GLUCOMTR-MCNC: 101 MG/DL (ref 70–99)
GLUCOSE BLDC GLUCOMTR-MCNC: 115 MG/DL (ref 70–99)
GLUCOSE BLDC GLUCOMTR-MCNC: 115 MG/DL (ref 70–99)
GLUCOSE BLDC GLUCOMTR-MCNC: 118 MG/DL (ref 70–99)
GLUCOSE BLDC GLUCOMTR-MCNC: 119 MG/DL (ref 70–99)
GLUCOSE BLDC GLUCOMTR-MCNC: 121 MG/DL (ref 70–99)
GLUCOSE BLDC GLUCOMTR-MCNC: 123 MG/DL (ref 70–99)
GLUCOSE BLDC GLUCOMTR-MCNC: 124 MG/DL (ref 70–99)
GLUCOSE BLDC GLUCOMTR-MCNC: 127 MG/DL (ref 70–99)
GLUCOSE BLDC GLUCOMTR-MCNC: 144 MG/DL (ref 70–99)
GLUCOSE BLDC GLUCOMTR-MCNC: 152 MG/DL (ref 70–99)
GLUCOSE BLDC GLUCOMTR-MCNC: 168 MG/DL (ref 70–99)
GLUCOSE BLDC GLUCOMTR-MCNC: 222 MG/DL (ref 70–99)
GLUCOSE BLDC GLUCOMTR-MCNC: 262 MG/DL (ref 70–99)
GLUCOSE BLDC GLUCOMTR-MCNC: 327 MG/DL (ref 70–99)
GLUCOSE BLDC GLUCOMTR-MCNC: 334 MG/DL (ref 70–99)
GLUCOSE SERPL-MCNC: 115 MG/DL (ref 70–99)
HCT VFR BLD AUTO: 30.3 % (ref 35–47)
HGB BLD-MCNC: 9.4 G/DL (ref 11.7–15.7)
MCH RBC QN AUTO: 28.6 PG (ref 26.5–33)
MCHC RBC AUTO-ENTMCNC: 31 G/DL (ref 31.5–36.5)
MCV RBC AUTO: 92 FL (ref 78–100)
PLATELET # BLD AUTO: 164 10E9/L (ref 150–450)
POTASSIUM SERPL-SCNC: 3.5 MMOL/L (ref 3.4–5.3)
RBC # BLD AUTO: 3.29 10E12/L (ref 3.8–5.2)
SODIUM SERPL-SCNC: 142 MMOL/L (ref 133–144)
TME LAST DOSE: NORMAL H
WBC # BLD AUTO: 8.8 10E9/L (ref 4–11)

## 2017-10-18 PROCEDURE — 25000128 H RX IP 250 OP 636: Performed by: TRANSPLANT SURGERY

## 2017-10-18 PROCEDURE — A9270 NON-COVERED ITEM OR SERVICE: HCPCS | Mod: GY | Performed by: TRANSPLANT SURGERY

## 2017-10-18 PROCEDURE — 85027 COMPLETE CBC AUTOMATED: CPT | Performed by: NURSE PRACTITIONER

## 2017-10-18 PROCEDURE — 80158 DRUG ASSAY CYCLOSPORINE: CPT | Performed by: NURSE PRACTITIONER

## 2017-10-18 PROCEDURE — 36415 COLL VENOUS BLD VENIPUNCTURE: CPT | Performed by: NURSE PRACTITIONER

## 2017-10-18 PROCEDURE — 25000132 ZZH RX MED GY IP 250 OP 250 PS 637: Mod: GY | Performed by: TRANSPLANT SURGERY

## 2017-10-18 PROCEDURE — 25000131 ZZH RX MED GY IP 250 OP 636 PS 637: Mod: GY | Performed by: TRANSPLANT SURGERY

## 2017-10-18 PROCEDURE — A9270 NON-COVERED ITEM OR SERVICE: HCPCS | Mod: GY | Performed by: PHYSICIAN ASSISTANT

## 2017-10-18 PROCEDURE — 00000146 ZZHCL STATISTIC GLUCOSE BY METER IP

## 2017-10-18 PROCEDURE — 12000024 ZZH R&B TRANSPLANT CRITICAL

## 2017-10-18 PROCEDURE — 25000125 ZZHC RX 250: Performed by: TRANSPLANT SURGERY

## 2017-10-18 PROCEDURE — 80048 BASIC METABOLIC PNL TOTAL CA: CPT | Performed by: NURSE PRACTITIONER

## 2017-10-18 PROCEDURE — 25000132 ZZH RX MED GY IP 250 OP 250 PS 637: Mod: GY | Performed by: PHYSICIAN ASSISTANT

## 2017-10-18 PROCEDURE — 25000131 ZZH RX MED GY IP 250 OP 636 PS 637: Mod: GY | Performed by: PHYSICIAN ASSISTANT

## 2017-10-18 PROCEDURE — 25000125 ZZHC RX 250: Performed by: STUDENT IN AN ORGANIZED HEALTH CARE EDUCATION/TRAINING PROGRAM

## 2017-10-18 RX ORDER — BISACODYL 10 MG
10 SUPPOSITORY, RECTAL RECTAL ONCE
Status: COMPLETED | OUTPATIENT
Start: 2017-10-18 | End: 2017-10-18

## 2017-10-18 RX ORDER — OXYCODONE HYDROCHLORIDE 5 MG/1
5-10 TABLET ORAL EVERY 4 HOURS PRN
Status: DISCONTINUED | OUTPATIENT
Start: 2017-10-18 | End: 2017-10-19

## 2017-10-18 RX ORDER — FUROSEMIDE 20 MG
20 TABLET ORAL ONCE
Status: COMPLETED | OUTPATIENT
Start: 2017-10-18 | End: 2017-10-18

## 2017-10-18 RX ORDER — CYCLOSPORINE 25 MG/1
100 CAPSULE ORAL
Status: DISCONTINUED | OUTPATIENT
Start: 2017-10-18 | End: 2017-10-22 | Stop reason: HOSPADM

## 2017-10-18 RX ORDER — DEXTROSE MONOHYDRATE 25 G/50ML
25-50 INJECTION, SOLUTION INTRAVENOUS
Status: DISCONTINUED | OUTPATIENT
Start: 2017-10-18 | End: 2017-10-18

## 2017-10-18 RX ORDER — NICOTINE POLACRILEX 4 MG
15-30 LOZENGE BUCCAL
Status: DISCONTINUED | OUTPATIENT
Start: 2017-10-18 | End: 2017-10-18

## 2017-10-18 RX ORDER — SENNOSIDES 8.6 MG
2 TABLET ORAL 2 TIMES DAILY
Status: DISCONTINUED | OUTPATIENT
Start: 2017-10-18 | End: 2017-10-22 | Stop reason: HOSPADM

## 2017-10-18 RX ORDER — AMLODIPINE BESYLATE 10 MG/1
10 TABLET ORAL DAILY
Status: DISCONTINUED | OUTPATIENT
Start: 2017-10-18 | End: 2017-10-22 | Stop reason: HOSPADM

## 2017-10-18 RX ORDER — VALSARTAN 80 MG/1
80 TABLET ORAL DAILY
Status: DISCONTINUED | OUTPATIENT
Start: 2017-10-18 | End: 2017-10-20

## 2017-10-18 RX ADMIN — CYCLOSPORINE 100 MG: 25 CAPSULE ORAL at 18:09

## 2017-10-18 RX ADMIN — CYCLOSPORINE 75 MG: 25 CAPSULE ORAL at 07:43

## 2017-10-18 RX ADMIN — OXYCODONE HYDROCHLORIDE 5 MG: 5 TABLET ORAL at 09:59

## 2017-10-18 RX ADMIN — PROCHLORPERAZINE EDISYLATE 5 MG: 5 INJECTION INTRAMUSCULAR; INTRAVENOUS at 18:15

## 2017-10-18 RX ADMIN — VALSARTAN 80 MG: 80 TABLET, FILM COATED ORAL at 12:29

## 2017-10-18 RX ADMIN — ONDANSETRON 4 MG: 4 TABLET, ORALLY DISINTEGRATING ORAL at 07:53

## 2017-10-18 RX ADMIN — LEVOTHYROXINE SODIUM 112 MCG: 112 TABLET ORAL at 07:48

## 2017-10-18 RX ADMIN — ACETAMINOPHEN 975 MG: 325 TABLET, FILM COATED ORAL at 06:06

## 2017-10-18 RX ADMIN — METOPROLOL SUCCINATE 25 MG: 25 TABLET, EXTENDED RELEASE ORAL at 20:10

## 2017-10-18 RX ADMIN — DOCUSATE SODIUM 100 MG: 100 CAPSULE, LIQUID FILLED ORAL at 07:48

## 2017-10-18 RX ADMIN — FUROSEMIDE 20 MG: 20 TABLET ORAL at 10:02

## 2017-10-18 RX ADMIN — ONDANSETRON 8 MG: 4 TABLET, ORALLY DISINTEGRATING ORAL at 21:31

## 2017-10-18 RX ADMIN — Medication 15 MG: at 15:53

## 2017-10-18 RX ADMIN — DOCUSATE SODIUM 100 MG: 100 CAPSULE, LIQUID FILLED ORAL at 20:10

## 2017-10-18 RX ADMIN — MYCOPHENOLIC ACID 540 MG: 180 TABLET, DELAYED RELEASE ORAL at 18:09

## 2017-10-18 RX ADMIN — Medication 15 MG: at 12:29

## 2017-10-18 RX ADMIN — ATORVASTATIN CALCIUM 20 MG: 20 TABLET, FILM COATED ORAL at 20:10

## 2017-10-18 RX ADMIN — INSULIN GLARGINE 11 UNITS: 100 INJECTION, SOLUTION SUBCUTANEOUS at 10:47

## 2017-10-18 RX ADMIN — RANITIDINE 150 MG: 150 TABLET ORAL at 07:48

## 2017-10-18 RX ADMIN — SENNOSIDES 2 TABLET: 8.6 TABLET ORAL at 09:59

## 2017-10-18 RX ADMIN — INSULIN ASPART 4 UNITS: 100 INJECTION, SOLUTION INTRAVENOUS; SUBCUTANEOUS at 18:32

## 2017-10-18 RX ADMIN — BISACODYL 10 MG: 10 SUPPOSITORY RECTAL at 14:32

## 2017-10-18 RX ADMIN — MYCOPHENOLIC ACID 540 MG: 180 TABLET, DELAYED RELEASE ORAL at 07:48

## 2017-10-18 RX ADMIN — AMLODIPINE BESYLATE 10 MG: 10 TABLET ORAL at 12:10

## 2017-10-18 RX ADMIN — HUMAN INSULIN 2.5 UNITS/HR: 100 INJECTION, SOLUTION SUBCUTANEOUS at 00:02

## 2017-10-18 RX ADMIN — ONDANSETRON 8 MG: 4 TABLET, ORALLY DISINTEGRATING ORAL at 14:31

## 2017-10-18 RX ADMIN — PANTOPRAZOLE SODIUM 40 MG: 40 TABLET, DELAYED RELEASE ORAL at 07:47

## 2017-10-18 NOTE — PROGRESS NOTES
"../61 (BP Location: Right arm)  Pulse 70  Temp 98.6  F (37  C) (Oral)  Resp 18  Ht 1.626 m (5' 4\")  Wt 82.1 kg (180 lb 14.4 oz)  SpO2 93%  BMI 31.05 kg/m2  A&O. Pt stated that she is feeling much better today just slightly nauseated but Zofran ODT helped before she took her meds and had a clear liquid breakfast. Pt given oral laxatives but refused MOM and suppository at present time. On rounds she stated that prune juice worked the best and pt was give some juice this afternoon. Off PCA after taking oral Oxy. Off Epidural and insulin gtt dc'd after given 11 units SQ lantus at 1230. Arevalo dc's and pt up to BR to void.  Bill her and very supportive. ABD binder to be worn this afternoon when pt up to walk. Resting after lunch. REAL drain home care handout given and reviewed by  just in case pt does go home with REAL drain.  stated that after transplant he did W-D ABD drsg changed on his wife BID when they stayed in [place] and that this REAL home cares he could handle. Pt was nauseated again this afternoon and ODT zofran given again. Pt maybe nauseated since she has not had a bowel movement yet and/or the Oxy may upset her stomach..Pt up to walk in hallway with assist of 1 and used her W/C. Up to BR and voided.  Continue with plan of cares and medicate per orders.  "

## 2017-10-18 NOTE — PROGRESS NOTES
Transplant Surgery  Inpatient Daily Progress Note  10/18/2017    Assessment & Plan: 66yo with hx DM2, diabetic nephropathy s/p KT 2005, HTN, CAD, hypothyroidism, and GERD.     10/16/17: Underwent exploratory laparotomy , JOSEFA, reduction of incarcerated incisional hernia, abdominal wall reconstruction with placement of XenMatrix AB 10 x 15 cm inlay mesh using Mitek suture anchors to the iliac bone, primary closure of the lateral abdominal wall covering the entire mesh with excision of skin and subcutaneous tissue of right lower abdominal wall and primary closure of skin, and umbilical wall hernia repair.  Surgeons: Jenn Villa MD (Transplant Surgery) and Angelique Lott MD (Plastic Surgery).     S/p hernia repair:  POD #2. Pain control with epidural. No bowel function yet. AXR 10/17 showed stool burden without obstruction. Stopping epidural today. D/c PCA. Transition to PO pain meds.  Graft function: Stable. Cr 1.2, baseline 1.2-1.4.  Immunosuppression management: On Myfortic and CSA.  CSA level goal per protocol ~75.  Tolerated oral meds this morning. Check CSA level MWF. Complexity of management: Low. Contributing factors: nausea  Hematology: Mild anemia, hgb 9.4 from 10.6. Will continue to monitor.  Cardiorespiratory: On chlorthalidone and statin. SBP 140s-160s. Consider restarting home Norvasc.  GI/Nutrition: S/p reduction of incarcerated colon. Stool burden on AXR. Awaiting ROBF. Milk of Mg and suppository today. Has hx PONV. Nausea improved. Continue zofran, compazine, scopolamine PRN. Advanced to CLD.   Endocrine: DM type 2, continue insulin gtt until bowel fxn returns. Hypothyroidism, on synthroid.  Fluid/Electrolytes: MIVF d/c. Positive 3 kg since admission. Lasix 20 mg PO today.  : Keep nelson while pt has epidural.  Infectious disease: Afebrile  Prophylaxis: DVT mechanical  Disposition: 7A     Medical Decision Making: Medium  Admit [id number] (moderate level decision making)    NATALY/Fellow/Resident Provider:  "Eneida Quintero    Faculty: Jenn Villa M.D.  _________________________________________________________________  Transplant History:   12/27/2005 (Kidney), Postoperative day: 4313     Interval History: History is obtained from the patient  Overnight events: No acute events overnight. Minimal pain with epidural. No use of PCA. Ambulating around room. Nausea significantly improved. No flatus or BM. Now on 1L NC for O2 sat >90% in setting of Hgb slowly drifting down (9.4 from 10.4).    ROS:   A 10-point review of systems was negative except as noted above.    Meds:    sennosides  2 tablet Oral BID     magnesium hydroxide  30 mL Oral Daily     bisacodyl  10 mg Rectal Once     insulin glargine  11 Units Subcutaneous QAM AC     insulin aspart  1-7 Units Subcutaneous TID AC     insulin aspart  1-5 Units Subcutaneous At Bedtime     furosemide  20 mg Oral Once     scopolamine  1 patch Transdermal Q72H    And     scopolamine   Transdermal Q8H    And     [START ON 10/20/2017] scopolamine   Transdermal Q72H     cycloSPORINE modified  75 mg Oral BID     chlorthalidone  25 mg Oral Daily     metoprolol  25 mg Oral Daily     pantoprazole  40 mg Oral QAM     atorvastatin  20 mg Oral Daily     zz ims template  540 mg Oral BID IS     levothyroxine  112 mcg Oral QAM AC     ranitidine  150 mg Oral BID     acetaminophen  975 mg Oral Q8H     docusate sodium  100 mg Oral BID       Physical Exam:     Admit Weight: 79.1 kg (174 lb 6.1 oz)    Current vitals:   /61  Pulse 79  Temp 98.2  F (36.8  C) (Oral)  Resp 16  Ht 1.626 m (5' 4\")  Wt 82.1 kg (180 lb 14.4 oz)  SpO2 96%  BMI 31.05 kg/m2         Vital sign ranges:    Temp:  [97.8  F (36.6  C)-99.2  F (37.3  C)] 98.2  F (36.8  C)  Pulse:  [72-79] 79  Heart Rate:  [72-79] 74  Resp:  [16] 16  BP: (141-165)/(59-77) 159/61  SpO2:  [90 %-96 %] 96 %  Patient Vitals for the past 24 hrs:   BP Temp Temp src Pulse Heart Rate Resp SpO2 Weight   10/18/17 0800 - - - - - - - 82.1 kg (180 lb " 14.4 oz)   10/18/17 0401 - - - - - - 96 % -   10/18/17 0357 159/61 98.2  F (36.8  C) Oral - 74 16 90 % -   10/17/17 2351 153/59 97.8  F (36.6  C) Oral - 74 16 96 % -   10/17/17 2102 - - - - - - 96 % -   10/17/17 2003 165/62 98.6  F (37  C) Oral 79 79 16 92 % -   10/17/17 1527 157/65 99.2  F (37.3  C) Oral 78 - 16 90 % -   10/17/17 1156 141/77 98.1  F (36.7  C) Oral 72 72 16 - -   10/17/17 1100 - - - - - - 95 % -     General Appearance: in no apparent distress.   Skin: normal, warm  Heart: regular rate and rhythm  Lungs: breathing comfortably on 1L NC  Abdomen: The abdomen is flat and obese, and  non-tender. she is not tender over the kidney graft. The right oblique wound is C/D/I and dressed. REAL drain with thin SS fluid.  : nelson is present. Urine has no gross hematuria.   Extremities: edema: present bilaterally. ankles only  Neurologic: awake and alert. Tremor absent.     Data:   CMP  Recent Labs  Lab 10/18/17  0610 10/17/17  0619 10/16/17  0606     --   --    POTASSIUM 3.5  --  4.1   CHLORIDE 108  --   --    CO2 26  --   --    *  --   --    BUN 18 22  --    CR 1.19* 1.25* 1.23*   GFRESTIMATED 45* 42* 43*   GFRESTBLACK 54* 51* 52*   NAVI 7.8*  --   --      CBC  Recent Labs  Lab 10/18/17  0610 10/17/17  0619   HGB 9.4* 10.6*   WBC 8.8  --      --      COAGSNo lab results found in last 7 days.    Invalid input(s): XA   Urinalysis  Recent Labs   Lab Test  06/29/17   0620  07/05/16   0618   UTPG  0.13  0.13     Virology:  CMV DNA Quantitation Specimen   Date Value Ref Range Status   12/05/2016 Plasma, EDTA anticoagulant  Final     CMV Quantitative   Date Value Ref Range Status   12/19/2007 <100 <100 Copies/mL Final     Comment:     No CMV DNA detected.   12/04/2007 <100 <100 Copies/mL Final     Comment:     No CMV DNA detected.   05/24/2007 <100 <100 Copies/mL Final     Comment:     No CMV DNA detected.     CMV IgG Antibody   Date Value Ref Range Status   12/22/2005 >160.0 EU/mL Final     Comment:      Positive for anti-CMV IgG     Hepatitis C Antibody   Date Value Ref Range Status   12/21/2006 Negative NEG Final     Hep B Surface Kika   Date Value Ref Range Status   12/21/2006 0.0 0.0 - 4.9 mIU/mL Final     Comment:     Negative     BK Virus Result   Date Value Ref Range Status   06/18/2015 BK Virus DNA Not Detected BKNEG copies/mL Final   12/02/2013 <1000 <1000 copies/mL Final   05/08/2013 <1000 <1000 copies/mL Final   11/30/2012 <1000 <1000 copies/mL Final   12/07/2011 <1000 <1000 copies/mL Final   09/27/2011 <1000 <1000 copies/mL Final   07/07/2011 <1000 <1000 copies/mL Final   06/07/2011 <1000 <1000 copies/mL Final   04/29/2011 <1000 <1000 copies/mL Final   04/06/2011 859506 (H) <1000 copies/mL Final   04/06/2011 <1000 <1000 copies/mL Final   11/24/2010 <1000 <1000 copies/mL Final   06/16/2010 <1000 <1000 copies/mL Final   11/25/2009 <1000 <1000 copies/mL Final   12/30/2008 <1000 <1000 copies/mL Final   11/25/2008 <1000 <1000 copies/mL Final   05/24/2007 <1000 <1000 copies/mL Final   12/21/2006 <1000 <1000 copies/mL Final   10/16/2006 1600 (H) <1000 copies/mL Final   09/25/2006 <1000 <1000 copies/mL Final   09/11/2006 2100 (H) <1000 copies/mL Final

## 2017-10-18 NOTE — PLAN OF CARE
"Problem: Patient Care Overview  Goal: Plan of Care/Patient Progress Review  [id number]     VS Hypertensive, 150-160/60s, temp max 99.2 (received no tylenol), sating low 90s on RA, placed on 2L after compazine.      IV Additional PIV placed d/t zofran and compazine incompatibility. One PIV SL, other is infusing D5 LR @ 100mL/hr w/ insulin gtt (2-4units/hr, -161), and dilaudid PCA (using minimally).      Pain Moderately controlled, dilaudid PCA (using minimally), epidural infusing at 8mL/hr.      Neuro Slightly confused this evening, auditory and visual hallucinations, quickly redirected, however. Bed alarm is on for additional safety. Pt is aware that she is a little \"off.\"      Resp Encouraging IS, BS clear     Cardiovascular generalized trace edema      GI contour irregular, BS not audible, pt does state that she passed gas on day shift, persistent nausea, zofran and compazine both given x1, compazine seemed more effective but did make pt drowsy, ~50mL emesis x1, able to keep down pills, NPO, can have meds and ice chips. Ab xray completed day shift, see results review       Arevalo intact, urine output adequate     Incision/Drain Incision covered, old drainage, REAL intact, bright red/ bloody moderate output.      Mobility A x2, ambulated in room, unsteady. Up to the chair for ~ 20 mins. Cueing position changes in bed.                       "

## 2017-10-18 NOTE — PROGRESS NOTES
"Plastic Surgery note    Pain well controlled. +nausea    /61  Pulse 79  Temp 98.2  F (36.8  C) (Oral)  Resp 16  Ht 1.626 m (5' 4\")  Wt 82.1 kg (180 lb 14.4 oz)  SpO2 96%  BMI 31.05 kg/m2  [id number], 60 x 2shifts  Abdomen soft;dressing intact with minimal shadowing  Epidural in place but off for now    POD 2 s/p right flank hernia repair with xenmatrix mesh.  Dressing changed today  REAL cares; keep drain in until <30cc x 2 days; plan on discharging with drain. We will remove in clinic.   Diet advancement per primary team; take it slow 2/2 nausea  Ok to walk; no lifting >10lbs, no twisting  DVT ppx with lovenox ok  Fu clinic 11/1/17    Pina Hernandez MD    "

## 2017-10-19 ENCOUNTER — APPOINTMENT (OUTPATIENT)
Dept: GENERAL RADIOLOGY | Facility: CLINIC | Age: 67
DRG: 354 | End: 2017-10-19
Attending: PHYSICIAN ASSISTANT
Payer: MEDICARE

## 2017-10-19 LAB
ALBUMIN UR-MCNC: 10 MG/DL
ANION GAP SERPL CALCULATED.3IONS-SCNC: 11 MMOL/L (ref 3–14)
APPEARANCE UR: CLEAR
BACTERIA #/AREA URNS HPF: ABNORMAL /HPF
BILIRUB UR QL STRIP: NEGATIVE
BUN SERPL-MCNC: 27 MG/DL (ref 7–30)
CALCIUM SERPL-MCNC: 8.6 MG/DL (ref 8.5–10.1)
CHLORIDE SERPL-SCNC: 101 MMOL/L (ref 94–109)
CO2 SERPL-SCNC: 25 MMOL/L (ref 20–32)
COLOR UR AUTO: YELLOW
CREAT SERPL-MCNC: 1.27 MG/DL (ref 0.52–1.04)
CYCLOSPORINE BLD LC/MS/MS-MCNC: 43 UG/L (ref 50–400)
ERYTHROCYTE [DISTWIDTH] IN BLOOD BY AUTOMATED COUNT: 13.3 % (ref 10–15)
FERRITIN SERPL-MCNC: 181 NG/ML (ref 8–252)
GFR SERPL CREATININE-BSD FRML MDRD: 42 ML/MIN/1.7M2
GLUCOSE BLDC GLUCOMTR-MCNC: 120 MG/DL (ref 70–99)
GLUCOSE BLDC GLUCOMTR-MCNC: 157 MG/DL (ref 70–99)
GLUCOSE BLDC GLUCOMTR-MCNC: 207 MG/DL (ref 70–99)
GLUCOSE BLDC GLUCOMTR-MCNC: 287 MG/DL (ref 70–99)
GLUCOSE BLDC GLUCOMTR-MCNC: 306 MG/DL (ref 70–99)
GLUCOSE BLDC GLUCOMTR-MCNC: 315 MG/DL (ref 70–99)
GLUCOSE BLDC GLUCOMTR-MCNC: 338 MG/DL (ref 70–99)
GLUCOSE BLDC GLUCOMTR-MCNC: 343 MG/DL (ref 70–99)
GLUCOSE BLDC GLUCOMTR-MCNC: 343 MG/DL (ref 70–99)
GLUCOSE BLDC GLUCOMTR-MCNC: 357 MG/DL (ref 70–99)
GLUCOSE BLDC GLUCOMTR-MCNC: 357 MG/DL (ref 70–99)
GLUCOSE BLDC GLUCOMTR-MCNC: 369 MG/DL (ref 70–99)
GLUCOSE SERPL-MCNC: 381 MG/DL (ref 70–99)
GLUCOSE UR STRIP-MCNC: >1000 MG/DL
HCT VFR BLD AUTO: 30.7 % (ref 35–47)
HGB BLD-MCNC: 9.8 G/DL (ref 11.7–15.7)
HGB UR QL STRIP: NEGATIVE
HYALINE CASTS #/AREA URNS LPF: 7 /LPF (ref 0–2)
IRON SATN MFR SERPL: 10 % (ref 15–46)
IRON SERPL-MCNC: 29 UG/DL (ref 35–180)
KETONES UR STRIP-MCNC: NEGATIVE MG/DL
LEUKOCYTE ESTERASE UR QL STRIP: NEGATIVE
MAGNESIUM SERPL-MCNC: 1.7 MG/DL (ref 1.6–2.3)
MCH RBC QN AUTO: 28.5 PG (ref 26.5–33)
MCHC RBC AUTO-ENTMCNC: 31.9 G/DL (ref 31.5–36.5)
MCV RBC AUTO: 89 FL (ref 78–100)
MUCOUS THREADS #/AREA URNS LPF: PRESENT /LPF
NITRATE UR QL: NEGATIVE
PH UR STRIP: 5 PH (ref 5–7)
PHOSPHATE SERPL-MCNC: 2.5 MG/DL (ref 2.5–4.5)
PLATELET # BLD AUTO: 187 10E9/L (ref 150–450)
POTASSIUM SERPL-SCNC: 3.2 MMOL/L (ref 3.4–5.3)
RADIOLOGIST FLAGS: ABNORMAL
RBC # BLD AUTO: 3.44 10E12/L (ref 3.8–5.2)
RBC #/AREA URNS AUTO: 1 /HPF (ref 0–2)
SODIUM SERPL-SCNC: 138 MMOL/L (ref 133–144)
SOURCE: ABNORMAL
SP GR UR STRIP: 1.01 (ref 1–1.03)
SQUAMOUS #/AREA URNS AUTO: <1 /HPF (ref 0–1)
TIBC SERPL-MCNC: 280 UG/DL (ref 240–430)
TME LAST DOSE: ABNORMAL H
UROBILINOGEN UR STRIP-MCNC: NORMAL MG/DL (ref 0–2)
WBC # BLD AUTO: 9.4 10E9/L (ref 4–11)
WBC #/AREA URNS AUTO: 1 /HPF (ref 0–2)

## 2017-10-19 PROCEDURE — 80158 DRUG ASSAY CYCLOSPORINE: CPT | Performed by: PHYSICIAN ASSISTANT

## 2017-10-19 PROCEDURE — 25000131 ZZH RX MED GY IP 250 OP 636 PS 637: Mod: GY | Performed by: PHYSICIAN ASSISTANT

## 2017-10-19 PROCEDURE — 25000128 H RX IP 250 OP 636: Performed by: TRANSPLANT SURGERY

## 2017-10-19 PROCEDURE — 25000131 ZZH RX MED GY IP 250 OP 636 PS 637: Mod: GY | Performed by: TRANSPLANT SURGERY

## 2017-10-19 PROCEDURE — A9270 NON-COVERED ITEM OR SERVICE: HCPCS | Mod: GY | Performed by: TRANSPLANT SURGERY

## 2017-10-19 PROCEDURE — 12000024 ZZH R&B TRANSPLANT CRITICAL

## 2017-10-19 PROCEDURE — A9270 NON-COVERED ITEM OR SERVICE: HCPCS | Mod: GY | Performed by: PHYSICIAN ASSISTANT

## 2017-10-19 PROCEDURE — 83550 IRON BINDING TEST: CPT | Performed by: STUDENT IN AN ORGANIZED HEALTH CARE EDUCATION/TRAINING PROGRAM

## 2017-10-19 PROCEDURE — 00000146 ZZHCL STATISTIC GLUCOSE BY METER IP

## 2017-10-19 PROCEDURE — 82728 ASSAY OF FERRITIN: CPT | Performed by: STUDENT IN AN ORGANIZED HEALTH CARE EDUCATION/TRAINING PROGRAM

## 2017-10-19 PROCEDURE — 25000132 ZZH RX MED GY IP 250 OP 250 PS 637: Mod: GY | Performed by: TRANSPLANT SURGERY

## 2017-10-19 PROCEDURE — 87086 URINE CULTURE/COLONY COUNT: CPT | Performed by: PHYSICIAN ASSISTANT

## 2017-10-19 PROCEDURE — 84100 ASSAY OF PHOSPHORUS: CPT | Performed by: STUDENT IN AN ORGANIZED HEALTH CARE EDUCATION/TRAINING PROGRAM

## 2017-10-19 PROCEDURE — 83735 ASSAY OF MAGNESIUM: CPT | Performed by: STUDENT IN AN ORGANIZED HEALTH CARE EDUCATION/TRAINING PROGRAM

## 2017-10-19 PROCEDURE — 85027 COMPLETE CBC AUTOMATED: CPT | Performed by: STUDENT IN AN ORGANIZED HEALTH CARE EDUCATION/TRAINING PROGRAM

## 2017-10-19 PROCEDURE — 80048 BASIC METABOLIC PNL TOTAL CA: CPT | Performed by: STUDENT IN AN ORGANIZED HEALTH CARE EDUCATION/TRAINING PROGRAM

## 2017-10-19 PROCEDURE — 25000125 ZZHC RX 250: Performed by: STUDENT IN AN ORGANIZED HEALTH CARE EDUCATION/TRAINING PROGRAM

## 2017-10-19 PROCEDURE — 36415 COLL VENOUS BLD VENIPUNCTURE: CPT | Performed by: PHYSICIAN ASSISTANT

## 2017-10-19 PROCEDURE — 40000141 ZZH STATISTIC PERIPHERAL IV START W/O US GUIDANCE

## 2017-10-19 PROCEDURE — 81001 URINALYSIS AUTO W/SCOPE: CPT | Performed by: PHYSICIAN ASSISTANT

## 2017-10-19 PROCEDURE — 71020 XR CHEST 2 VW: CPT

## 2017-10-19 PROCEDURE — 25000128 H RX IP 250 OP 636: Performed by: STUDENT IN AN ORGANIZED HEALTH CARE EDUCATION/TRAINING PROGRAM

## 2017-10-19 PROCEDURE — 83540 ASSAY OF IRON: CPT | Performed by: STUDENT IN AN ORGANIZED HEALTH CARE EDUCATION/TRAINING PROGRAM

## 2017-10-19 PROCEDURE — 25000132 ZZH RX MED GY IP 250 OP 250 PS 637: Mod: GY | Performed by: PHYSICIAN ASSISTANT

## 2017-10-19 PROCEDURE — 25000128 H RX IP 250 OP 636: Performed by: PHYSICIAN ASSISTANT

## 2017-10-19 PROCEDURE — 36415 COLL VENOUS BLD VENIPUNCTURE: CPT | Performed by: STUDENT IN AN ORGANIZED HEALTH CARE EDUCATION/TRAINING PROGRAM

## 2017-10-19 PROCEDURE — 87040 BLOOD CULTURE FOR BACTERIA: CPT | Performed by: PHYSICIAN ASSISTANT

## 2017-10-19 RX ORDER — MAGNESIUM OXIDE 400 MG/1
400 TABLET ORAL ONCE
Status: COMPLETED | OUTPATIENT
Start: 2017-10-19 | End: 2017-10-19

## 2017-10-19 RX ORDER — POTASSIUM CL/LIDO/0.9 % NACL 10MEQ/0.1L
10 INTRAVENOUS SOLUTION, PIGGYBACK (ML) INTRAVENOUS
Status: COMPLETED | OUTPATIENT
Start: 2017-10-19 | End: 2017-10-19

## 2017-10-19 RX ORDER — POTASSIUM CHLORIDE 750 MG/1
40 TABLET, EXTENDED RELEASE ORAL ONCE
Status: DISCONTINUED | OUTPATIENT
Start: 2017-10-19 | End: 2017-10-19

## 2017-10-19 RX ORDER — HYDRALAZINE HYDROCHLORIDE 20 MG/ML
10 INJECTION INTRAMUSCULAR; INTRAVENOUS EVERY 6 HOURS PRN
Status: DISCONTINUED | OUTPATIENT
Start: 2017-10-19 | End: 2017-10-22 | Stop reason: HOSPADM

## 2017-10-19 RX ORDER — DEXTROSE MONOHYDRATE 25 G/50ML
25-50 INJECTION, SOLUTION INTRAVENOUS
Status: DISCONTINUED | OUTPATIENT
Start: 2017-10-19 | End: 2017-10-19

## 2017-10-19 RX ORDER — DEXTROSE MONOHYDRATE 25 G/50ML
25-50 INJECTION, SOLUTION INTRAVENOUS
Status: DISCONTINUED | OUTPATIENT
Start: 2017-10-19 | End: 2017-10-20

## 2017-10-19 RX ORDER — ONDANSETRON 4 MG/1
4-8 TABLET, ORALLY DISINTEGRATING ORAL EVERY 4 HOURS PRN
Status: DISCONTINUED | OUTPATIENT
Start: 2017-10-19 | End: 2017-10-22 | Stop reason: HOSPADM

## 2017-10-19 RX ORDER — NICOTINE POLACRILEX 4 MG
15-30 LOZENGE BUCCAL
Status: DISCONTINUED | OUTPATIENT
Start: 2017-10-19 | End: 2017-10-20

## 2017-10-19 RX ORDER — NICOTINE POLACRILEX 4 MG
15-30 LOZENGE BUCCAL
Status: DISCONTINUED | OUTPATIENT
Start: 2017-10-19 | End: 2017-10-19

## 2017-10-19 RX ADMIN — CHLORTHALIDONE 25 MG: 25 TABLET ORAL at 19:36

## 2017-10-19 RX ADMIN — DOCUSATE SODIUM 100 MG: 100 CAPSULE, LIQUID FILLED ORAL at 19:36

## 2017-10-19 RX ADMIN — Medication 10 MEQ: at 17:19

## 2017-10-19 RX ADMIN — PROCHLORPERAZINE EDISYLATE 5 MG: 5 INJECTION INTRAMUSCULAR; INTRAVENOUS at 04:26

## 2017-10-19 RX ADMIN — ATORVASTATIN CALCIUM 20 MG: 20 TABLET, FILM COATED ORAL at 19:35

## 2017-10-19 RX ADMIN — PANTOPRAZOLE SODIUM 40 MG: 40 TABLET, DELAYED RELEASE ORAL at 08:20

## 2017-10-19 RX ADMIN — Medication 10 MEQ: at 16:00

## 2017-10-19 RX ADMIN — METOPROLOL SUCCINATE 25 MG: 25 TABLET, EXTENDED RELEASE ORAL at 19:35

## 2017-10-19 RX ADMIN — Medication 10 MEQ: at 18:41

## 2017-10-19 RX ADMIN — SENNOSIDES 2 TABLET: 8.6 TABLET ORAL at 19:36

## 2017-10-19 RX ADMIN — INSULIN ASPART 5 UNITS: 100 INJECTION, SOLUTION INTRAVENOUS; SUBCUTANEOUS at 18:45

## 2017-10-19 RX ADMIN — LEVOTHYROXINE SODIUM 112 MCG: 112 TABLET ORAL at 08:19

## 2017-10-19 RX ADMIN — ACETAMINOPHEN 975 MG: 325 TABLET, FILM COATED ORAL at 01:51

## 2017-10-19 RX ADMIN — SENNOSIDES 2 TABLET: 8.6 TABLET ORAL at 08:19

## 2017-10-19 RX ADMIN — HYDRALAZINE HYDROCHLORIDE 10 MG: 20 INJECTION INTRAMUSCULAR; INTRAVENOUS at 04:32

## 2017-10-19 RX ADMIN — RANITIDINE 150 MG: 150 TABLET ORAL at 08:21

## 2017-10-19 RX ADMIN — INSULIN GLARGINE 21 UNITS: 100 INJECTION, SOLUTION SUBCUTANEOUS at 10:06

## 2017-10-19 RX ADMIN — MYCOPHENOLIC ACID 540 MG: 180 TABLET, DELAYED RELEASE ORAL at 08:20

## 2017-10-19 RX ADMIN — VALSARTAN 80 MG: 80 TABLET, FILM COATED ORAL at 08:21

## 2017-10-19 RX ADMIN — CYCLOSPORINE 100 MG: 25 CAPSULE ORAL at 17:19

## 2017-10-19 RX ADMIN — Medication 15 MG: at 08:21

## 2017-10-19 RX ADMIN — Medication 10 MEQ: at 14:45

## 2017-10-19 RX ADMIN — AMLODIPINE BESYLATE 10 MG: 10 TABLET ORAL at 08:20

## 2017-10-19 RX ADMIN — DOCUSATE SODIUM 100 MG: 100 CAPSULE, LIQUID FILLED ORAL at 08:19

## 2017-10-19 RX ADMIN — HUMAN INSULIN 7 UNITS/HR: 100 INJECTION, SOLUTION SUBCUTANEOUS at 08:19

## 2017-10-19 RX ADMIN — CYCLOSPORINE 100 MG: 25 CAPSULE ORAL at 08:19

## 2017-10-19 RX ADMIN — MAGNESIUM OXIDE TAB 400 MG (241.3 MG ELEMENTAL MG) 400 MG: 400 (241.3 MG) TAB at 10:54

## 2017-10-19 RX ADMIN — MYCOPHENOLIC ACID 540 MG: 180 TABLET, DELAYED RELEASE ORAL at 17:19

## 2017-10-19 RX ADMIN — HUMAN INSULIN 5 UNITS/HR: 100 INJECTION, SOLUTION SUBCUTANEOUS at 06:20

## 2017-10-19 RX ADMIN — Medication 15 MG: at 16:00

## 2017-10-19 RX ADMIN — RANITIDINE 150 MG: 150 TABLET ORAL at 19:35

## 2017-10-19 NOTE — PLAN OF CARE
Problem: Patient Care Overview  Goal: Plan of Care/Patient Progress Review  Outcome: No Change  Afebrile, B/P at 0400 163/65, 10 mg hydralazine given, no change in B/P. Other vitals stable on 1 L 02.  at 0200, 3 units given.  at 0430, orders placed to restart insulin gtt. Waiting for insulin pump to be sent up. Tylenol given for pain, pt did not want oxycodone. 5 mg compazine given x 1 for nausea with relief, sea band bracelets used as well. Incision stapled and covered with gauze, c/d/i. Voided 450 ml and bladder scanned for 42 and 3. Last BM 10/18, passing gas but abdomen still distended. Pt very confused and disoriented to time, place, and situation. Having visual hallucinations but easily reoriented. Provider came to assess pt, no new orders. Pt very impulsive, bed alarm on. Up with assist of 1 and a walker.

## 2017-10-19 NOTE — PROGRESS NOTES
POD#3. Stable. Pain well controled. Ambulating. Urinating. Passing gas, BM. PO solids today. Nausea improved. No vomiting.    VSS AF    Adb: soft, appropriately tender, no cellulitis, no hematoma.     REAL: serous, 85/45    A/P: Doing well. No lifting > 5 pounds for 6 weeks. DC tomorrow. RTC 11/1 to remove drain and staples. All questions answered.

## 2017-10-19 NOTE — PROGRESS NOTES
"Paged by RN re increased confusion.    Patient had some mild confusion and hallucinations per notes last night, and since the evening has had some tonight as well.  Patient aware that she at times \"loses her place in this world\".  Currently alert and oriented to person, place, and time, denies current hallucinations.  Abd pain when up moving but well controlled with tylenol.  Denies dizziness, CP, SOB.  She has not received any narcotics tonight.    /88  Pulse 77  Temp 98.3  F (36.8  C) (Oral)  Resp 16  Ht 1.626 m (5' 4\")  Wt 82.1 kg (180 lb 14.4 oz)  SpO2 95%  BMI 31.05 kg/m2  Resting comfortably, AAOX3, NAD  Breathing nonlabored on 1L NC  Abd soft, nondistended, nontender, dressing clean and dry, REAL serosang  Ext wwp    - VS wnl  - Cr stable, no leukocytosis  -  after evening correction, will give another 3 units  - UOP adequate  - Frequent reorientation  - Continue bed alarm    Daisy Ibarra  General Surgery PGY1  [id number]    "

## 2017-10-19 NOTE — CONSULTS
Nephrology Initial Consult  October 19, 2017      Viv Shaw MRN:[id number] YOB: 1950  Date of Admission:10/16/2017  Primary care provider: Summer Vega  Requesting physician: Jenn Villa MD    ASSESSMENT AND RECOMMENDATIONS:   Viv Shaw is a 67 year old female with a PMH of DMII , LDKT 12/27/2005 with CKD III bl creat 1.3-1.5 , HTN , CAD , GERD , admitted for ex lap for incarcerated abdominal hernia. On IS with CsA , MPA . No complications from the transplant no DSA. Admitted for ex lap for incarcerated abdominal hernia    LDKT for DM nephropathy 12/27/05  Stable allograft function  bl creatinine 1.3-1.5  Creatinine is stabilized post op  No hx of DSA     IS : CsA 75 Bid ,  mg BID,     Nausea , constipation and post op ex lap for hernia  Xray with increased stool burden  Still unable to tolerate PO intake and will be discharged likely tomorrow once Po intake is good    HTN  Well controlled on amlodipine 10mg, chlorthalidone 25, Valsartan 80 and lasix 20mg    Eu volemic  Poor intake  Can use maintenance fluids if the PO intake stays poor  Reports nausea but it has been improving-- after compazine last night had periods of confusion can be related to medication    Electrolytes  At goal    Anemia Hb is 9.8   Chronic low   Transfuse <7  Will recommend to recheck iron levels (ordered)    BMD  Calcium 8.6  Phos 2.5  Hx of osteoporosis on Fosamax     Recommendations were communicated to primary team via note    Seen and discussed with Dr. Magdiel Villalpando MD   [phone]      REASON FOR CONSULT: TOI    HISTORY OF PRESENT ILLNESS:  Viv Shaw is a 67 year old female with a PMH of DMII , LDKT 12/27/2005 with CKD III bl creat 1.3-1.5 , HTN , CAD , GERD , admitted for ex lap for incarcerated abdominal hernia. On IS with CsA , MPA . No complications from the transplant no DSA. She has a hx of resistant HTN with amlodipine , chlorthalidone and valsartan with lasix  she has been on goal. Well controlled DMII   She was in [place] visiting when she noted a swelling in her abdomen and sec to incarcerated hernia was sent to [facility] for surgery with Tx surgery and plastic involved in the procedure.  She is post op day 2 and has been doing well  Baseline creatinine is 1.3-1.5 and she has improved back to her baseline creatinine . She has  Brief episode of confusion last night but otherwise is doing well      PAST MEDICAL HISTORY:  Reviewed with patient on 10/19/2017     Past Medical History:   Diagnosis Date     Abdominal wall hernia     RLQ     Allergic rhinitis      CAD (coronary artery disease)     coronary artery disease s/p PCI to LAD in 2005coronary artery disease s/p PCI to LAD in 2005     Diabetes mellitus, type 2 (H)     follows up with endocrinologist.     Dyslipidemia      GERD (gastroesophageal reflux disease)      H/O diabetic nephropathy     s/p kidney trnsplant     Hypertension      Hypothyroidism      Immunosuppressed status (H)      Kidney replaced by transplant     Rt     PONV (postoperative nausea and vomiting)      Shingles      Vitamin B12 deficiency anemia        Past Surgical History:   Procedure Laterality Date     APPENDECTOMY NOS       ARTHROSCOPY SHOULDER ROTATOR CUFF REPAIR Left      COLONOSCOPY N/A 6/7/2016    Procedure: COLONOSCOPY;  Surgeon: Rashard Snider MD;  Location: [facility]     Coronary angioplasty with stent  2005     HYSTERECTOMY       Kidney Transplant NOS Right      LAPAROSCOPIC CHOLECYSTECTOMY       NOSE SURGERY  2013     OPEN SEPARATION COMPONENT HERNIORRHAPHY N/A 10/16/2017    Procedure: OPEN SEPARATION COMPONENT HERNIORRHAPHY;;  Surgeon: CARL Lott MD;  Location: [facility]     RECONSTRUCT ABDOMINAL WALL N/A 10/16/2017    Procedure: RECONSTRUCT ABDOMINAL WALL;  Exploratory Laparotomy, Lysis of Adhesions, Abdominal Wall reconstruction, Inlay Xen AB mesh, Mitek Suture [place] and Umbilical Hernia Repair.;  Surgeon: CARL Lott,  MD;  Location: [facility] OR     REMOVE CATARACT INTRACAP,INSERT LENS Right      TONSILLECTOMY          MEDICATIONS:  PTA Meds  Prior to Admission medications    Medication Sig Last Dose Taking? Auth Provider   GENGRAF (BRAND) 25 MG CAPSULE TAKE 3 CAPSULES BY MOUTH TWICE DAILY 10/15/2017 at 1900 Yes Morro Veloz MD   alendronate (FOSAMAX) 70 MG tablet Take 1 tablet (70 mg) by mouth every 7 days Take with over 8 ounces water and stay upright for at least 30 minutes after dose.  Take at least 60 minutes before breakfast Past Week at Unknown time Yes Summer Vega MD   amLODIPine (NORVASC) 10 MG tablet Take 1 tablet (10 mg) by mouth daily 10/15/2017 at 1900 Yes Summer Vega MD   chlorthalidone (HYGROTON) 25 MG tablet Take 1 tablet (25 mg) by mouth daily 10/15/2017 at 1900 Yes Summer Vega MD   isosorbide mononitrate (IMDUR) 30 MG 24 hr tablet Take 0.5 tablets (15 mg) by mouth 2 times daily 10/15/2017 at 1900 Yes Summer Vega MD   metoprolol (TOPROL XL) 25 MG 24 hr tablet Take 1 tablet (25 mg) by mouth At Bedtime 10/15/2017 at 1900 Yes Summer Vega MD   pantoprazole (PROTONIX) 40 MG EC tablet Take 1 tablet (40 mg) by mouth daily 10/15/2017 at 1900 Yes Summer Veag MD   valsartan (DIOVAN) 160 MG tablet Take 0.5 tablets (80 mg) by mouth daily 10/15/2017 at 0700 Yes Summer Vega MD   atorvastatin (LIPITOR) 40 MG tablet Take 0.5 tablets (20 mg) by mouth daily 10/15/2017 at 1900 Yes Summer Vega MD   mycophenolic acid (MYFORTIC - GENERIC EQUIVALENT) 180 MG EC tablet Take 3 tablets (540 mg) by mouth 2 times daily 10/15/2017 at 1900 Yes Morro Veloz MD   levothyroxine (SYNTHROID) 112 MCG tablet Take by mouth daily 10/15/2017 at 1900 Yes Reported, Patient   LANTUS - INSULIN GLARGINE 21 u q pm 10/15/2017 at 1900 Yes Summer Vega MD   NOVOLOG 100 U/ML SC SOLN sliding scale 4-6 units tid  "10/15/2017 at 1730 Yes    BD INSULIN SYRINGE ULTRAFINE 31G X 5/16\" 0.3 ML USING 4-5 PER DAY ([facility] 31G/0.3ML)   Reported, Patient   FLORIN CONTOUR test strip    Reported, Patient   aspirin 325 MG EC tablet Take 81 mg by mouth daily  10/5/2017  Summer Vega MD      Current Meds    insulin aspart  1-7 Units Subcutaneous TID AC     insulin aspart  1-5 Units Subcutaneous At Bedtime     insulin glargine  21 Units Subcutaneous QAM AC     potassium chloride  40 mEq Oral Once     sennosides  2 tablet Oral BID     magnesium hydroxide  30 mL Oral Daily     isosorbide mononitrate  15 mg Oral BID     valsartan  80 mg Oral Daily     amLODIPine  10 mg Oral Daily     cycloSPORINE modified  100 mg Oral BID IS     scopolamine  1 patch Transdermal Q72H    And     scopolamine   Transdermal Q8H    And     [START ON 10/20/2017] scopolamine   Transdermal Q72H     chlorthalidone  25 mg Oral Daily     metoprolol  25 mg Oral Daily     pantoprazole  40 mg Oral QAM     atorvastatin  20 mg Oral Daily     zz ims template  540 mg Oral BID IS     levothyroxine  112 mcg Oral QAM AC     ranitidine  150 mg Oral BID     docusate sodium  100 mg Oral BID     Infusion Meds    IV fluid REPLACEMENT ONLY       - MEDICATION INSTRUCTIONS -         ALLERGIES:    Allergies   Allergen Reactions     No Known Drug Allergies        REVIEW OF SYSTEMS:  A comprehensive of systems was negative except as noted above.    SOCIAL HISTORY:   Social History     Social History     Marital status:      Spouse name: N/A     Number of children: N/A     Years of education: N/A     Occupational History     Not on file.     Social History Main Topics     Smoking status: Never Smoker     Smokeless tobacco: Never Used     Alcohol use No     Drug use: No     Sexual activity: Yes     Partners: Male     Other Topics Concern     Not on file     Social History Narrative     Reviewed with patient    accompanies Viv Shaw in hospital room    FAMILY " "MEDICAL HISTORY:   Family History   Problem Relation Age of Onset     Respiratory Mother       age 71     Cardiovascular Father       age 75 hyertension     Arthritis Sister      living, healthy     Family History Negative Brother       age 44     Colon Cancer No family hx of      Reviewed with patient     PHYSICAL EXAM:   Temp  Av.2  F (36.8  C)  Min: 97.6  F (36.4  C)  Max: 99.2  F (37.3  C)      Pulse  Av.4  Min: 61  Max: 79 Resp  Avg: 15.8  Min: 12  Max: 18  SpO2  Av.6 %  Min: 89 %  Max: 100 %       /63 (BP Location: Right arm)  Pulse 77  Temp 98.2  F (36.8  C) (Oral)  Resp 16  Ht 1.626 m (5' 4\")  Wt 82.1 kg (180 lb 14.4 oz)  SpO2 93%  BMI 31.05 kg/m2   Date 10/19/17 0700 - 10/20/17 0659   Shift 1128-5200 1722-5315 1220-7561 24 Hour Total   I  N  T  A  K  E   P.O. 150   150    Shift Total  (mL/kg) 150  (1.83)   150  (1.83)   O  U  T  P  U  T   Urine 450   450    Emesis/NG output 50   50    Drains 20   20    Shift Total  (mL/kg) 520  (6.34)   520  (6.34)   Weight (kg) 82.05 82.05 82.05 82.05        Admit Weight: 79.1 kg (174 lb 6.1 oz)     GENERAL APPEARANCE: no acute  distress,  awake  EYES: - scleral icterus, pupils equal  HENT: NC/AT,  mouth  without ulcers or lesions  Lymphatics: no cervical or supraclavicular LAD  Endo: no moon facies, no goiter  Pulmonary: lungs clear to auscultation with equal breath sounds bilaterally, no clubbing  CV: regular rhythm, normal rate, no rub   - JVP -   - Edema-  GI: soft, nontender, normal bowel sounds, no HSM   MS: no evidence of inflammation in joints, no muscle tenderness  : - nelson  SKIN: no rash, warm, dry, no cyanosis  NEURO: face symmetric, - asterixis     LABS:   CMP  Recent Labs  Lab 10/19/17  0747 10/18/17  0610 10/17/17  0619 10/16/17  0606    142  --   --    POTASSIUM 3.2* 3.5  --  4.1   CHLORIDE 101 108  --   --    CO2 25 26  --   --    ANIONGAP 11 8  --   --    * 115*  --   --    BUN  18 22  " --    CR 1.27* 1.19* 1.25* 1.23*   GFRESTIMATED 42* 45* 42* 43*   GFRESTBLACK 50* 54* 51* 52*   NAVI 8.6 7.8*  --   --    MAG 1.7  --   --   --    PHOS 2.5  --   --   --      CBC  Recent Labs  Lab 10/19/17  0747 10/18/17  0610 10/17/17  0619 10/16/17  1617   HGB 9.8* 9.4* 10.6* 10.9*   WBC 9.4 8.8  --   --    RBC 3.44* 3.29*  --   --    HCT 30.7* 30.3* 33.7* 34.4*   MCV 89 92  --   --    MCH 28.5 28.6  --   --    MCHC 31.9 31.0*  --   --    RDW 13.3 13.7  --   --     164  --   --      INRNo lab results found in last 7 days.  ABGNo lab results found in last 7 days.   URINE STUDIES  No lab results found.  Recent Labs   Lab Test  06/29/17   0620  07/05/16   0618  06/18/15   0645  12/04/14   0721  12/02/13   0654  05/08/13   0739  11/30/12   0710  04/19/12   0647  12/07/11   0929  04/06/11   0702  11/24/10   0632  06/16/10   0700  11/25/09   0642   UTPG  0.13  0.13  0.11  0.09  0.11  0.06  0.09  <0.06  0.10  0.10  0.09  0.12  0.20     PTH  No lab results found.  IRON STUDIES  Recent Labs   Lab Test  12/05/16   1400  12/07/15   1357   IRON  37  36   FEB  290  315   IRONSAT  13*  11*   PROSPER  130  55       IMAGING:  All imaging studies reviewed by me.     Vinh Villalpando MD    Attestation:  This patient has been seen and evaluated by me, Morro Veloz MD.  I have reviewed the note and agree with plan of care as documented by the fellow.

## 2017-10-19 NOTE — PROVIDER NOTIFICATION
Notified provider that pt has become increasingly confused. Pt is disoriented to time, place, and situation. Pt states that she is 'in a van' and is 'moving'. Pt is easily redirectable and knows that she is confused. No other neuro changes noted. Pt denies dizziness, SOB. Some confusion was noted on evening shift. Provider notified and will come assess pt.

## 2017-10-19 NOTE — PLAN OF CARE
Problem: Patient Care Overview  Goal: Plan of Care/Patient Progress Review  Pt is afebrile with AVSS on RA. Pt denies pain. Pt is nauseous and received 5mg of compazine IV x 1 and 8 mg of zofran ODT x 1 with some relief. Pt refused several meds d/t nausea. Pt is voiding adequate amounts of urine. She is up with assist of 1 with her walker to the bathroom. Pt had one small formed BM tonight. Pt's REAL is patent and draining and put out 25 mLs of serosanguinous fluid tonight. Pt's dressings are CDI. Pt's BGs were 327 and 334, insulin given per orders. MDs notified about the elevated sugars, no new orders. Pt became confused tonight. She was disoriented to time and place. RN put the bed alarm for the Pt's safety. Pt was made aware and instructed to use the call light before getting up. Will continue to monitor care.

## 2017-10-19 NOTE — PROGRESS NOTES
Transplant Surgery  Inpatient Daily Progress Note  10/19/2017    Assessment & Plan: 66yo with hx DM2, diabetic nephropathy s/p KT 2005, HTN, CAD, hypothyroidism, and GERD.     10/16/17: Underwent exploratory laparotomy , JOSEFA, reduction of incarcerated incisional hernia, abdominal wall reconstruction with placement of XenMatrix AB 10 x 15 cm inlay mesh using Mitek suture anchors to the iliac bone, primary closure of the lateral abdominal wall covering the entire mesh with excision of skin and subcutaneous tissue of right lower abdominal wall and primary closure of skin, and umbilical wall hernia repair.  Surgeons: Jenn Villa MD (Transplant Surgery) and Angelique Lott MD (Plastic Surgery).     S/p hernia repair:  POD #3. Pain control good with minimal narcotic usage. Epidural and pca discontinued on POD 2, started oral Oxycodone. AXR 10/17 showed stool burden without obstruction. REAL in place with 70 cc output yesterday, per plastic surgery plan to remain in place until output < 30 ccx 2 days.   Graft function: Stable. Cr 1.3, baseline 1.2-1.4.  Immunosuppression management: On Myfortic and CSA.  CSA level goal per protocol ~75.  Tolerated oral meds this morning. 10/19 level 43 (14 hour trough), increased done from 75 to 100 yesterday. Recheck level tomorrow. Complexity of management: Low. Contributing factors: nausea  Hematology: Mild anemia, hgb 9.8. Will continue to monitor.  Cardiorespiratory: On chlorthalidone and statin. SBP 140s-160s. Metoprolol 25 mg daily home dose initiated immediately post op. Restarted home Imdur 15 BID, Diovan 80 daily and Norvasc 10 daily on POD 2. Hydralazine prn.   GI/Nutrition: S/p reduction of incarcerated colon. Stool burden on AXR on POD 1. Bowel movement, small, on POD 2 with prune juice, dulcolax suppository and senna/colace. Will plan for enema today. Has hx PONV. Nausea improving. Continue zofran, compazine, scopolamine PRN. Tolerating CLD, will advance to regular diet.    Endocrine: DM type 2, insulin gtt discontinued on POD 2 and Lantus start at 11 units, home dose 21. BG elevated > 300s overnight and insulin gtt was restarted. Will give 21 units home Lantus this AM and SSI and monitor. Hypothyroidism, on synthroid.  Fluid/Electrolytes: MIVF d/c. Positive 3 kg since admission. Lasix 20 mg PO given on POD 2 with net negative 1.5 L yesterday. Hypomagnesemia, replace. Hypokalemia, replace.   : Arevalo removed on POD 2 with negative PVR.   Infectious disease: Afebrile. WBC 9.4. Confusion overnight, likely due to compazine. Hyperglycemia could be infectious related. Will check UA/UC.   Prophylaxis: DVT mechanical  Disposition: 7A     Medical Decision Making: Medium  Admit 01002 (moderate level decision making)    NATALY/Fellow/Resident Provider: Mame Hidalgo PA-C [id number]    Faculty: Jenn Villa M.D.  _________________________________________________________________  Transplant History:   12/27/2005 (Kidney), Postoperative day: 4314     Interval History: History is obtained from the patient  Overnight events: No acute events overnight. Minimal pain with epidural. No use of PCA. Ambulating around room. Nausea significantly improved. No flatus or BM. Now on 1L NC for O2 sat >90% in setting of Hgb slowly drifting down (9.4 from 10.4).    ROS:   A 10-point review of systems was negative except as noted above.    Meds:    insulin aspart  1-7 Units Subcutaneous TID AC     insulin aspart  1-5 Units Subcutaneous At Bedtime     insulin glargine  21 Units Subcutaneous QAM AC     sennosides  2 tablet Oral BID     magnesium hydroxide  30 mL Oral Daily     isosorbide mononitrate  15 mg Oral BID     valsartan  80 mg Oral Daily     amLODIPine  10 mg Oral Daily     cycloSPORINE modified  100 mg Oral BID IS     scopolamine  1 patch Transdermal Q72H    And     scopolamine   Transdermal Q8H    And     [START ON 10/20/2017] scopolamine   Transdermal Q72H     chlorthalidone  25 mg Oral Daily     metoprolol   "25 mg Oral Daily     pantoprazole  40 mg Oral QAM     atorvastatin  20 mg Oral Daily     zz ims template  540 mg Oral BID IS     levothyroxine  112 mcg Oral QAM AC     ranitidine  150 mg Oral BID     docusate sodium  100 mg Oral BID       Physical Exam:     Admit Weight: 79.1 kg (174 lb 6.1 oz)    Current vitals:   /63 (BP Location: Right arm)  Pulse 77  Temp 98.2  F (36.8  C) (Oral)  Resp 16  Ht 1.626 m (5' 4\")  Wt 82.1 kg (180 lb 14.4 oz)  SpO2 91%  BMI 31.05 kg/m2         Vital sign ranges:    Temp:  [98.1  F (36.7  C)-98.6  F (37  C)] 98.2  F (36.8  C)  Pulse:  [70-77] 77  Heart Rate:  [72-80] 72  Resp:  [16-18] 16  BP: (133-167)/(61-88) 149/63  SpO2:  [89 %-95 %] 91 %  Patient Vitals for the past 24 hrs:   BP Temp Temp src Pulse Heart Rate Resp SpO2   10/19/17 1135 149/63 98.2  F (36.8  C) Oral - 72 16 91 %   10/19/17 0749 154/68 98.4  F (36.9  C) Oral - 80 18 94 %   10/19/17 0538 167/63 - - - - - -   10/19/17 0358 163/65 - - - 76 - -   10/19/17 0100 167/65 98.1  F (36.7  C) Oral - 79 16 93 %   10/18/17 2352 - - - - - - 95 %   10/18/17 2345 152/88 98.3  F (36.8  C) Oral - 75 16 (!) 89 %   10/18/17 1944 133/62 98.2  F (36.8  C) Oral 77 - 16 91 %   10/18/17 1558 156/65 98.3  F (36.8  C) Oral 74 - 18 93 %   10/18/17 1200 153/61 98.6  F (37  C) Oral 70 - 18 93 %     General Appearance: in no apparent distress.   Skin: normal, warm  Heart: regular rate and rhythm  Lungs: NAD on room air  Abdomen: The abdomen is flat and obese, and  non-tender. she is not tender over the kidney graft. The right oblique wound is C/D/I and dressed. REAL drain with thin SS fluid.  : Voiding  Extremities: edema: trace bilaterally.  Neurologic: awake and alert. Tremor absent.     Data:   CMP    Recent Labs  Lab 10/19/17  0747 10/18/17  0610    142   POTASSIUM 3.2* 3.5   CHLORIDE 101 108   CO2 25 26   * 115*   BUN 27 18   CR 1.27* 1.19*   GFRESTIMATED 42* 45*   GFRESTBLACK 50* 54*   NAVI 8.6 7.8*   MAG 1.7  --  "   PHOS 2.5  --      CBC    Recent Labs  Lab 10/19/17  0747 10/18/17  0610   HGB 9.8* 9.4*   WBC 9.4 8.8    164     COAGSNo lab results found in last 7 days.    Invalid input(s): XA   Urinalysis  Recent Labs   Lab Test  06/29/17   0620  07/05/16   0618   UTPG  0.13  0.13     Virology:  CMV DNA Quantitation Specimen   Date Value Ref Range Status   12/05/2016 Plasma, EDTA anticoagulant  Final     CMV Quantitative   Date Value Ref Range Status   12/19/2007 <100 <100 Copies/mL Final     Comment:     No CMV DNA detected.   12/04/2007 <100 <100 Copies/mL Final     Comment:     No CMV DNA detected.   05/24/2007 <100 <100 Copies/mL Final     Comment:     No CMV DNA detected.     CMV IgG Antibody   Date Value Ref Range Status   12/22/2005 >160.0 EU/mL Final     Comment:     Positive for anti-CMV IgG     Hepatitis C Antibody   Date Value Ref Range Status   12/21/2006 Negative NEG Final     Hep B Surface Kika   Date Value Ref Range Status   12/21/2006 0.0 0.0 - 4.9 mIU/mL Final     Comment:     Negative     BK Virus Result   Date Value Ref Range Status   06/18/2015 BK Virus DNA Not Detected BKNEG copies/mL Final   12/02/2013 <1000 <1000 copies/mL Final   05/08/2013 <1000 <1000 copies/mL Final   11/30/2012 <1000 <1000 copies/mL Final   12/07/2011 <1000 <1000 copies/mL Final   09/27/2011 <1000 <1000 copies/mL Final   07/07/2011 <1000 <1000 copies/mL Final   06/07/2011 <1000 <1000 copies/mL Final   04/29/2011 <1000 <1000 copies/mL Final   04/06/2011 894431 (H) <1000 copies/mL Final   04/06/2011 <1000 <1000 copies/mL Final   11/24/2010 <1000 <1000 copies/mL Final   06/16/2010 <1000 <1000 copies/mL Final   11/25/2009 <1000 <1000 copies/mL Final   12/30/2008 <1000 <1000 copies/mL Final   11/25/2008 <1000 <1000 copies/mL Final   05/24/2007 <1000 <1000 copies/mL Final   12/21/2006 <1000 <1000 copies/mL Final   10/16/2006 1600 (H) <1000 copies/mL Final   09/25/2006 <1000 <1000 copies/mL Final   09/11/2006 2100 (H) <1000 copies/mL  Final

## 2017-10-19 NOTE — PROGRESS NOTES
"../63 (BP Location: Right arm)  Pulse 77  Temp 98.2  F (36.8  C) (Oral)  Resp 16  Ht 1.626 m (5' 4\")  Wt 82.1 kg (180 lb 14.4 oz)  SpO2 93%  BMI 31.05 kg/m2  Pt very confused this shift and even her  is concerned about her behavior. Pt is pleasant but not making sense and her statements are not accurate. Pt does seem to know that she is confused but she remains confused at present time. Pt not eating much. At the start of this shift noted that her peripheral IV was infiltrated and that maybe why she was requiring high doses of insulin. New peripheral placed, insulin drip restarted for a few hours and then dc'd after morning dose of Lantus was given at 1000. Pt unable to take oral K+ and PA notified and will order IV replacements.Set up for urine to be collected.Noted small amount of clear yellow drainage from upper part of incision. Teaching done about REAL home care with pt and her . and supplies given.Helped pt to shower and shampoo her hair this afternoon. Continue with plan of cares and medicate per orders.  "

## 2017-10-20 ENCOUNTER — APPOINTMENT (OUTPATIENT)
Dept: CT IMAGING | Facility: CLINIC | Age: 67
DRG: 354 | End: 2017-10-20
Attending: PLASTIC SURGERY
Payer: MEDICARE

## 2017-10-20 PROBLEM — R41.0 DELIRIUM: Status: ACTIVE | Noted: 2017-10-20

## 2017-10-20 LAB
ANION GAP SERPL CALCULATED.3IONS-SCNC: 9 MMOL/L (ref 3–14)
BUN SERPL-MCNC: 37 MG/DL (ref 7–30)
CALCIUM SERPL-MCNC: 8.4 MG/DL (ref 8.5–10.1)
CHLORIDE SERPL-SCNC: 102 MMOL/L (ref 94–109)
CO2 SERPL-SCNC: 27 MMOL/L (ref 20–32)
CREAT SERPL-MCNC: 1.7 MG/DL (ref 0.52–1.04)
CYCLOSPORINE BLD LC/MS/MS-MCNC: 67 UG/L (ref 50–400)
ERYTHROCYTE [DISTWIDTH] IN BLOOD BY AUTOMATED COUNT: 13.5 % (ref 10–15)
GFR SERPL CREATININE-BSD FRML MDRD: 30 ML/MIN/1.7M2
GLUCOSE BLDC GLUCOMTR-MCNC: 102 MG/DL (ref 70–99)
GLUCOSE BLDC GLUCOMTR-MCNC: 104 MG/DL (ref 70–99)
GLUCOSE BLDC GLUCOMTR-MCNC: 104 MG/DL (ref 70–99)
GLUCOSE BLDC GLUCOMTR-MCNC: 112 MG/DL (ref 70–99)
GLUCOSE BLDC GLUCOMTR-MCNC: 115 MG/DL (ref 70–99)
GLUCOSE BLDC GLUCOMTR-MCNC: 123 MG/DL (ref 70–99)
GLUCOSE BLDC GLUCOMTR-MCNC: 127 MG/DL (ref 70–99)
GLUCOSE BLDC GLUCOMTR-MCNC: 128 MG/DL (ref 70–99)
GLUCOSE BLDC GLUCOMTR-MCNC: 164 MG/DL (ref 70–99)
GLUCOSE BLDC GLUCOMTR-MCNC: 260 MG/DL (ref 70–99)
GLUCOSE BLDC GLUCOMTR-MCNC: 268 MG/DL (ref 70–99)
GLUCOSE BLDC GLUCOMTR-MCNC: 285 MG/DL (ref 70–99)
GLUCOSE BLDC GLUCOMTR-MCNC: 303 MG/DL (ref 70–99)
GLUCOSE BLDC GLUCOMTR-MCNC: 86 MG/DL (ref 70–99)
GLUCOSE BLDC GLUCOMTR-MCNC: 96 MG/DL (ref 70–99)
GLUCOSE SERPL-MCNC: 386 MG/DL (ref 70–99)
HCT VFR BLD AUTO: 32.9 % (ref 35–47)
HGB BLD-MCNC: 9.9 G/DL (ref 11.7–15.7)
MAGNESIUM SERPL-MCNC: 2 MG/DL (ref 1.6–2.3)
MCH RBC QN AUTO: 27.9 PG (ref 26.5–33)
MCHC RBC AUTO-ENTMCNC: 30.1 G/DL (ref 31.5–36.5)
MCV RBC AUTO: 93 FL (ref 78–100)
PLATELET # BLD AUTO: 233 10E9/L (ref 150–450)
POTASSIUM SERPL-SCNC: 3.7 MMOL/L (ref 3.4–5.3)
PROCALCITONIN SERPL-MCNC: 0.09 NG/ML
RBC # BLD AUTO: 3.55 10E12/L (ref 3.8–5.2)
SODIUM SERPL-SCNC: 138 MMOL/L (ref 133–144)
TME LAST DOSE: NORMAL H
WBC # BLD AUTO: 8.9 10E9/L (ref 4–11)

## 2017-10-20 PROCEDURE — A9270 NON-COVERED ITEM OR SERVICE: HCPCS | Mod: GY | Performed by: PHYSICIAN ASSISTANT

## 2017-10-20 PROCEDURE — 80158 DRUG ASSAY CYCLOSPORINE: CPT | Performed by: NURSE PRACTITIONER

## 2017-10-20 PROCEDURE — 25000125 ZZHC RX 250: Performed by: NURSE PRACTITIONER

## 2017-10-20 PROCEDURE — 25000132 ZZH RX MED GY IP 250 OP 250 PS 637: Mod: GY | Performed by: PHYSICIAN ASSISTANT

## 2017-10-20 PROCEDURE — 25000128 H RX IP 250 OP 636: Performed by: NURSE PRACTITIONER

## 2017-10-20 PROCEDURE — 84145 PROCALCITONIN (PCT): CPT | Performed by: NURSE PRACTITIONER

## 2017-10-20 PROCEDURE — 12000024 ZZH R&B TRANSPLANT CRITICAL

## 2017-10-20 PROCEDURE — 25000128 H RX IP 250 OP 636: Performed by: STUDENT IN AN ORGANIZED HEALTH CARE EDUCATION/TRAINING PROGRAM

## 2017-10-20 PROCEDURE — 40000556 ZZH STATISTIC PERIPHERAL IV START W US GUIDANCE

## 2017-10-20 PROCEDURE — 25000132 ZZH RX MED GY IP 250 OP 250 PS 637: Mod: GY | Performed by: NURSE PRACTITIONER

## 2017-10-20 PROCEDURE — 25000132 ZZH RX MED GY IP 250 OP 250 PS 637: Mod: GY | Performed by: TRANSPLANT SURGERY

## 2017-10-20 PROCEDURE — 70450 CT HEAD/BRAIN W/O DYE: CPT

## 2017-10-20 PROCEDURE — 25000131 ZZH RX MED GY IP 250 OP 636 PS 637: Mod: GY | Performed by: PHYSICIAN ASSISTANT

## 2017-10-20 PROCEDURE — 36415 COLL VENOUS BLD VENIPUNCTURE: CPT | Performed by: NURSE PRACTITIONER

## 2017-10-20 PROCEDURE — 25000131 ZZH RX MED GY IP 250 OP 636 PS 637: Mod: GY | Performed by: TRANSPLANT SURGERY

## 2017-10-20 PROCEDURE — A9270 NON-COVERED ITEM OR SERVICE: HCPCS | Mod: GY | Performed by: NURSE PRACTITIONER

## 2017-10-20 PROCEDURE — 71250 CT THORAX DX C-: CPT

## 2017-10-20 PROCEDURE — 85027 COMPLETE CBC AUTOMATED: CPT | Performed by: NURSE PRACTITIONER

## 2017-10-20 PROCEDURE — 80048 BASIC METABOLIC PNL TOTAL CA: CPT | Performed by: NURSE PRACTITIONER

## 2017-10-20 PROCEDURE — 00000146 ZZHCL STATISTIC GLUCOSE BY METER IP

## 2017-10-20 PROCEDURE — 25000131 ZZH RX MED GY IP 250 OP 636 PS 637: Mod: GY | Performed by: NURSE PRACTITIONER

## 2017-10-20 PROCEDURE — A9270 NON-COVERED ITEM OR SERVICE: HCPCS | Mod: GY | Performed by: TRANSPLANT SURGERY

## 2017-10-20 PROCEDURE — 83735 ASSAY OF MAGNESIUM: CPT | Performed by: NURSE PRACTITIONER

## 2017-10-20 RX ORDER — VALSARTAN 80 MG/1
80 TABLET ORAL DAILY
Status: DISCONTINUED | OUTPATIENT
Start: 2017-10-21 | End: 2017-10-22 | Stop reason: HOSPADM

## 2017-10-20 RX ORDER — DEXTROSE MONOHYDRATE 25 G/50ML
25-50 INJECTION, SOLUTION INTRAVENOUS
Status: DISCONTINUED | OUTPATIENT
Start: 2017-10-20 | End: 2017-10-22 | Stop reason: HOSPADM

## 2017-10-20 RX ORDER — QUETIAPINE FUMARATE 25 MG/1
25 TABLET, FILM COATED ORAL AT BEDTIME
Status: DISCONTINUED | OUTPATIENT
Start: 2017-10-20 | End: 2017-10-22 | Stop reason: HOSPADM

## 2017-10-20 RX ORDER — NICOTINE POLACRILEX 4 MG
15-30 LOZENGE BUCCAL
Status: DISCONTINUED | OUTPATIENT
Start: 2017-10-20 | End: 2017-10-22 | Stop reason: HOSPADM

## 2017-10-20 RX ORDER — BISACODYL 10 MG
10 SUPPOSITORY, RECTAL RECTAL ONCE
Status: COMPLETED | OUTPATIENT
Start: 2017-10-20 | End: 2017-10-20

## 2017-10-20 RX ADMIN — DOCUSATE SODIUM 100 MG: 100 CAPSULE, LIQUID FILLED ORAL at 20:06

## 2017-10-20 RX ADMIN — RANITIDINE 150 MG: 150 TABLET ORAL at 08:47

## 2017-10-20 RX ADMIN — ATORVASTATIN CALCIUM 20 MG: 20 TABLET, FILM COATED ORAL at 20:06

## 2017-10-20 RX ADMIN — SENNOSIDES 2 TABLET: 8.6 TABLET ORAL at 08:48

## 2017-10-20 RX ADMIN — AMLODIPINE BESYLATE 10 MG: 10 TABLET ORAL at 08:48

## 2017-10-20 RX ADMIN — CYCLOSPORINE 100 MG: 25 CAPSULE ORAL at 18:16

## 2017-10-20 RX ADMIN — QUETIAPINE FUMARATE 25 MG: 25 TABLET, FILM COATED ORAL at 22:15

## 2017-10-20 RX ADMIN — SODIUM CHLORIDE, POTASSIUM CHLORIDE, SODIUM LACTATE AND CALCIUM CHLORIDE 500 ML: 600; 310; 30; 20 INJECTION, SOLUTION INTRAVENOUS at 02:41

## 2017-10-20 RX ADMIN — SODIUM CHLORIDE 500 ML: 9 INJECTION, SOLUTION INTRAVENOUS at 12:17

## 2017-10-20 RX ADMIN — MYCOPHENOLIC ACID 540 MG: 180 TABLET, DELAYED RELEASE ORAL at 18:17

## 2017-10-20 RX ADMIN — CYCLOSPORINE 100 MG: 25 CAPSULE ORAL at 08:47

## 2017-10-20 RX ADMIN — SENNOSIDES 2 TABLET: 8.6 TABLET ORAL at 20:05

## 2017-10-20 RX ADMIN — DOCUSATE SODIUM 100 MG: 100 CAPSULE, LIQUID FILLED ORAL at 08:48

## 2017-10-20 RX ADMIN — VALSARTAN 80 MG: 80 TABLET, FILM COATED ORAL at 08:48

## 2017-10-20 RX ADMIN — BISACODYL 10 MG: 10 SUPPOSITORY RECTAL at 16:39

## 2017-10-20 RX ADMIN — ACETAMINOPHEN 650 MG: 325 TABLET, FILM COATED ORAL at 12:18

## 2017-10-20 RX ADMIN — PANTOPRAZOLE SODIUM 40 MG: 40 TABLET, DELAYED RELEASE ORAL at 08:47

## 2017-10-20 RX ADMIN — INSULIN ASPART 3 UNITS: 100 INJECTION, SOLUTION INTRAVENOUS; SUBCUTANEOUS at 08:41

## 2017-10-20 RX ADMIN — ACETAMINOPHEN 650 MG: 325 TABLET, FILM COATED ORAL at 07:03

## 2017-10-20 RX ADMIN — LEVOTHYROXINE SODIUM 112 MCG: 112 TABLET ORAL at 08:47

## 2017-10-20 RX ADMIN — INSULIN ASPART 3 UNITS: 100 INJECTION, SOLUTION INTRAVENOUS; SUBCUTANEOUS at 08:31

## 2017-10-20 RX ADMIN — MYCOPHENOLIC ACID 540 MG: 180 TABLET, DELAYED RELEASE ORAL at 08:48

## 2017-10-20 RX ADMIN — MAGNESIUM HYDROXIDE 30 ML: 400 SUSPENSION ORAL at 08:47

## 2017-10-20 RX ADMIN — METOPROLOL SUCCINATE 25 MG: 25 TABLET, EXTENDED RELEASE ORAL at 20:06

## 2017-10-20 RX ADMIN — Medication 15 MG: at 08:47

## 2017-10-20 RX ADMIN — CHLORTHALIDONE 25 MG: 25 TABLET ORAL at 20:06

## 2017-10-20 RX ADMIN — HUMAN INSULIN 3 UNITS/HR: 100 INJECTION, SOLUTION SUBCUTANEOUS at 12:56

## 2017-10-20 RX ADMIN — ACETAMINOPHEN 650 MG: 325 TABLET, FILM COATED ORAL at 01:37

## 2017-10-20 RX ADMIN — Medication 15 MG: at 16:39

## 2017-10-20 NOTE — PROGRESS NOTES
Transplant Surgery  Inpatient Daily Progress Note  10/20/2017    Assessment & Plan: 68yo with hx DM2, diabetic nephropathy s/p KT 2005, HTN, CAD, hypothyroidism, and GERD.     10/16/17: Underwent exploratory laparotomy , JOSEFA, reduction of incarcerated incisional hernia, abdominal wall reconstruction with placement of XenMatrix AB 10 x 15 cm inlay mesh using Mitek suture anchors to the iliac bone, primary closure of the lateral abdominal wall covering the entire mesh with excision of skin and subcutaneous tissue of right lower abdominal wall and primary closure of skin, and umbilical wall hernia repair.  Surgeons: Jenn Villa MD (Transplant Surgery) and Angelique Lott MD (Plastic Surgery).     S/p hernia repair:  POD #4. Epidural and pca discontinued on POD 2, Oxycodone d/c'd POD 3. AXR 10/17 showed stool burden without obstruction. REAL in place with 65cc output yesterday, per plastic surgery plan to remain in place until output < 30 ccx 2 days.   Graft function: Stable. Cr pending, baseline 1.2-1.4.  Immunosuppression management: On Myfortic and CSA.  CSA level 67 (13h), goal per protocol ~75.  Complexity of management: Low. Contributing factors: nausea  Hematology: Mild anemia, hgb 9.9. Will continue to monitor.  Cardiorespiratory:   HTN: SBP 120s-150s. Back on home metoprolol, imdur, valsartan, chlorthalidone, amlodipine.  R/o pulmonary nodules:  Suspected on CXR 10/19.  CT chest 10/19: Small area of tree-in-bud nodularity in the right lower lobe, infectious or inflammatory.  GI/Nutrition:   S/p reduction of incarcerated colon: Stool burden on AXR on POD 1.  Now having bowel movements.    PONV:  Resolved by POD 3.  Scopolamine and compazine d/c'd due to confusion.  Diet:  Regular diet.  Endocrine: DM type 2, insulin gtt discontinued on POD 2.  Glucose 260-343 on lantus 21u (home dose) and med SSI.  Increase to lantus 30u and high SSI today.  If glucose still high at noon, restart gtt.  Hypothyroidism, on  synthroid.  Fluid/Electrolytes:  Labs and weight pending.  : Arevalo removed on POD 2 with negative PVR.   Neuro:  Delirium.  Compazine, scopolamine, narcotics d/c'd 10/19.  Infectious w/u in progress.  Head CT negative for acute findings overnight.  Will promote normal sleep/wake cycle and add low dose seroquel at HS.  Infectious disease: Afebrile. WBC 9.4->8.9.  Infectious w/u due to high normal WBC, hyperglycemia, delirium.  10/19 UA: no pyuria, bld cx NGTD, chest CT: small area of tree in bud RLL.  Procalcitonin pending.  Prophylaxis: DVT mechanical  Disposition: 7A     Medical Decision Making: Medium    NATALY/Fellow/Resident Provider:  Kinza Carnes NP  [id number]    Faculty: Jenn Villa M.D.  _________________________________________________________________  Transplant History:   12/27/2005 (Kidney), Postoperative day: 4315     Interval History: History is obtained from the patient  Overnight events:  Confusion and mild paranoia overnight.  Orientation waxes and wanes.  On exam, she was asking where she was.  Pain controlled.  Moving around well.  BM x1.    ROS:   A 10-point review of systems was negative except as noted above.    Meds:    QUEtiapine  25 mg Oral At Bedtime     insulin glargine  30 Units Subcutaneous QAM AC     insulin aspart  1-10 Units Subcutaneous TID AC     insulin aspart  1-7 Units Subcutaneous At Bedtime     sennosides  2 tablet Oral BID     magnesium hydroxide  30 mL Oral Daily     isosorbide mononitrate  15 mg Oral BID     valsartan  80 mg Oral Daily     amLODIPine  10 mg Oral Daily     cycloSPORINE modified  100 mg Oral BID IS     chlorthalidone  25 mg Oral Daily     metoprolol  25 mg Oral Daily     pantoprazole  40 mg Oral QAM     atorvastatin  20 mg Oral Daily     zz ims template  540 mg Oral BID IS     levothyroxine  112 mcg Oral QAM AC     ranitidine  150 mg Oral BID     docusate sodium  100 mg Oral BID       Physical Exam:     Admit Weight: 79.1 kg (174 lb 6.1 oz)    Current vitals:  "  /67 (BP Location: Right arm)  Pulse 111  Temp 97.8  F (36.6  C) (Oral)  Resp 18  Ht 1.626 m (5' 4\")  Wt 82.1 kg (180 lb 14.4 oz)  SpO2 96%  BMI 31.05 kg/m2    Vital sign ranges:    Temp:  [97.6  F (36.4  C)-98.5  F (36.9  C)] 97.8  F (36.6  C)  Pulse:  [] 111  Heart Rate:  [67-76] 67  Resp:  [16-18] 18  BP: (125-165)/(41-67) 165/67  SpO2:  [85 %-96 %] 96 %  Patient Vitals for the past 24 hrs:   BP Temp Temp src Pulse Heart Rate Resp SpO2   10/20/17 0700 165/67 97.8  F (36.6  C) Oral 111 67 18 96 %   10/20/17 0332 155/64 98.2  F (36.8  C) Oral - 72 18 95 %   10/20/17 0118 142/57 97.8  F (36.6  C) Oral - 76 18 96 %   10/20/17 0116 - - - - - - (!) 85 %   10/19/17 1952 125/41 97.6  F (36.4  C) Oral 79 - 16 92 %   10/19/17 1549 145/53 98.5  F (36.9  C) Oral 78 - 16 90 %   10/19/17 1200 - - - - - - 93 %   10/19/17 1135 149/63 98.2  F (36.8  C) Oral - 72 16 91 %     General Appearance: in no apparent distress.   Skin: normal, warm  Heart: regular rate and rhythm  Lungs: NAD on room air, CTA  Abdomen: The abdomen is flat and obese, and  non-tender. she is not tender over the kidney graft. The right oblique wound is C/D/I, bruised. REAL drain with thin SS fluid.  : Voiding  Extremities: edema:none  Neurologic: awake and alert , oriented x1-3. Tremor absent.     Data:   CMP    Recent Labs  Lab 10/19/17  0747 10/18/17  0610    142   POTASSIUM 3.2* 3.5   CHLORIDE 101 108   CO2 25 26   * 115*   BUN 27 18   CR 1.27* 1.19*   GFRESTIMATED 42* 45*   GFRESTBLACK 50* 54*   NAVI 8.6 7.8*   MAG 1.7  --    PHOS 2.5  --      CBC    Recent Labs  Lab 10/20/17  0937 10/19/17  0747   HGB 9.9* 9.8*   WBC 8.9 9.4    187     COAGSNo lab results found in last 7 days.    Invalid input(s): XA   Urinalysis  Recent Labs   Lab Test  10/19/17   1819  06/29/17   0620  07/05/16   0618   COLOR  Yellow   --    --    APPEARANCE  Clear   --    --    URINEGLC  >1000*   --    --    URINEBILI  Negative   --    --  "   URINEKETONE  Negative   --    --    SG  1.013   --    --    UBLD  Negative   --    --    URINEPH  5.0   --    --    PROTEIN  10*   --    --    NITRITE  Negative   --    --    LEUKEST  Negative   --    --    RBCU  1   --    --    WBCU  1   --    --    UTPG   --   0.13  0.13     Virology:  CMV DNA Quantitation Specimen   Date Value Ref Range Status   12/05/2016 Plasma, EDTA anticoagulant  Final     CMV Quantitative   Date Value Ref Range Status   12/19/2007 <100 <100 Copies/mL Final     Comment:     No CMV DNA detected.   12/04/2007 <100 <100 Copies/mL Final     Comment:     No CMV DNA detected.   05/24/2007 <100 <100 Copies/mL Final     Comment:     No CMV DNA detected.     CMV IgG Antibody   Date Value Ref Range Status   12/22/2005 >160.0 EU/mL Final     Comment:     Positive for anti-CMV IgG     Hepatitis C Antibody   Date Value Ref Range Status   12/21/2006 Negative NEG Final     Hep B Surface Kika   Date Value Ref Range Status   12/21/2006 0.0 0.0 - 4.9 mIU/mL Final     Comment:     Negative     BK Virus Result   Date Value Ref Range Status   06/18/2015 BK Virus DNA Not Detected BKNEG copies/mL Final   12/02/2013 <1000 <1000 copies/mL Final   05/08/2013 <1000 <1000 copies/mL Final   11/30/2012 <1000 <1000 copies/mL Final   12/07/2011 <1000 <1000 copies/mL Final   09/27/2011 <1000 <1000 copies/mL Final   07/07/2011 <1000 <1000 copies/mL Final   06/07/2011 <1000 <1000 copies/mL Final   04/29/2011 <1000 <1000 copies/mL Final   04/06/2011 [id number] (H) <1000 copies/mL Final   04/06/2011 <1000 <1000 copies/mL Final   11/24/2010 <1000 <1000 copies/mL Final   06/16/2010 <1000 <1000 copies/mL Final   11/25/2009 <1000 <1000 copies/mL Final   12/30/2008 <1000 <1000 copies/mL Final   11/25/2008 <1000 <1000 copies/mL Final   05/24/2007 <1000 <1000 copies/mL Final   12/21/2006 <1000 <1000 copies/mL Final   10/16/2006 1600 (H) <1000 copies/mL Final   09/25/2006 <1000 <1000 copies/mL Final   09/11/2006 2100 (H) <1000 copies/mL  Final

## 2017-10-20 NOTE — PROGRESS NOTES
"Paged by RN re confusion and agitation.    Patient had been more confused and agitated than previous nights.  She had wanted to get out of bed and did not know where she was, requiring a sitter.  Per RN, patient had calmed down a lot by the time seen by writer.  Patient very talkative, discussing her children, states that she is frustrated with not being able to get up or do the things that she wants while in the hospital, she feels she is not getting the whole picture from her physicians and nurses.  She knows she is in a hospital and correctly states the day, date, and year, however seems at times to be unclear of who she is speaking to, and perseverating.  Denies hallucinations.  Discussed head CT with patient and she is understanding of benefit.    /57 (BP Location: Right arm)  Pulse 79  Temp 97.6  F (36.4  C) (Oral)  Resp 18  Ht 1.626 m (5' 4\")  Wt 82.1 kg (180 lb 14.4 oz)  SpO2 96%  BMI 31.05 kg/m2  Resting comfortably, reading on phone, alert and interactive.  Seems slightly off.  AAOX3  Breathing nonlabored on 2L nc  Ext WWP    UOP 50 and 1X unrecorded for past 12 hr  BG 300s after SSI    - Head CT, will CT chest at same time as CXR from earlier recommended FU with CT  - 500cc bolus  - 3u Linda Ibarra  General Surgery PGY1  [id number]    "

## 2017-10-20 NOTE — PROGRESS NOTES
Nephrology Progress Note  10/20/2017         Assessment & Recommendations:     Viv Shaw is a 67 year old female with a PMH of DMII , LDKT 12/27/2005 with CKD III bl creat 1.3-1.5 , HTN , CAD , GERD , admitted for ex lap for incarcerated abdominal hernia. On IS with CsA , MPA . No complications from the transplant no DSA. Admitted for ex lap for incarcerated abdominal hernia     LDKT for DM nephropathy 12/27/05  Stable allograft function  bl creatinine 1.3-1.5  Creatinine increased to 1.7 today --> will follow on tomorrow labs, no change for now  No hx of DSA      IS : CsA 75 Bid ( levels 67 10/20) goals ,  mg BID     Nausea , constipation and post op ex lap for hernia  Xray with increased stool burden  She continues to be confused with disturbed orientation at this time this can be likely sec to the anesthesia effects prolonged with older age  CT head chronic changes and pro calcitonin 0.09 less chances of infection as a trigger  Agree with Seroquel and may consider psych eval       HTN  Well controlled on amlodipine 10mg, chlorthalidone 25, Valsartan 80 and lasix 20mg     Eu volemic     Electrolytes  Hypokalemia  repleted     Anemia Hb is 9.8   Chronic low   Transfuse <7  Will recommend to recheck iron levels (ordered)     BMD  Calcium 8.4  Phos 2.5  Hx of osteoporosis on Fosamax      Recommendations were communicated to primary team       Seen and discussed with Dr. Marilynn Villalpando MD   [phone]    Interval History :   In the last 24 hours Viv Shaw has been satble overall , with confusion episodes where she is not oriented to place    Review of Systems:   I reviewed the following systems:  GI: + appetite. - nausea or vomiting or diarrhea.   Neuro:  + confusion with hallucinations  Constitutional:  - fever or chills  CV: - dyspnea or edema.  - chest pain.    Physical Exam:   I/O last 3 completed shifts:  In: 720 [P.O.:720]  Out: 830 [Urine:750; Drains:80]   /65 (BP Location:  "Right arm)  Pulse 90  Temp 98.1  F (36.7  C) (Oral)  Resp 18  Ht 1.626 m (5' 4\")  Wt 82.1 kg (180 lb 14.4 oz)  SpO2 94%  BMI 31.05 kg/m2     GENERAL APPEARANCE: well appearing  EYES:  - scleral icterus, pupils equal  HENT: mouth without ulcers or lesions  PULM: lungs clear to auscultation,  bilaterally, equal air movement, no clubbing  CV: regular rhythm, normal rate, no rub     -JVP -     -edema -   GI: soft, -tender, -distended, bowel sounds are +  INTEGUMENT: no cyanosis, - rash  NEURO:  - asterixis       Labs:   All labs reviewed by me  Electrolytes/Renal -   Recent Labs   Lab Test  10/20/17   0937  10/19/17   0747  10/18/17   0610   NA  138  138  142   POTASSIUM  3.7  3.2*  3.5   CHLORIDE  102  101  108   CO2  27  25  26   BUN  37*  27  18   CR  1.70*  1.27*  1.19*   GLC  386*  381*  115*   NAVI  8.4*  8.6  7.8*   MAG  2.0  1.7   --    PHOS   --   2.5   --        CBC -   Recent Labs   Lab Test  10/20/17   0937  10/19/17   0747  10/18/17   0610   WBC  8.9  9.4  8.8   HGB  9.9*  9.8*  9.4*   PLT  233  187  164       LFTs -   Recent Labs   Lab Test  04/13/17   0822  04/08/17   2111  04/12/16   1000   ALKPHOS  55  56  51   BILITOTAL  0.8  0.5  0.7   ALT  14  17  31   AST  8  12  14   PROTTOTAL  7.2  7.5  7.3   ALBUMIN  3.9  3.9  4.0       Iron Panel -   Recent Labs   Lab Test  10/19/17   0747  12/05/16   1400  12/07/15   1357   IRON  29*  37  36   IRONSAT  10*  13*  11*   PROSPER  181  130  55         Imaging:  All imaging studies reviewed by me.     Current Medications:    QUEtiapine  25 mg Oral At Bedtime     [START ON 10/21/2017] ranitidine  150 mg Oral Daily     [START ON 10/21/2017] valsartan  80 mg Oral Daily     sennosides  2 tablet Oral BID     magnesium hydroxide  30 mL Oral Daily     isosorbide mononitrate  15 mg Oral BID     amLODIPine  10 mg Oral Daily     cycloSPORINE modified  100 mg Oral BID IS     chlorthalidone  25 mg Oral Daily     metoprolol  25 mg Oral Daily     pantoprazole  40 mg Oral QAM "     atorvastatin  20 mg Oral Daily     zz ims template  540 mg Oral BID IS     levothyroxine  112 mcg Oral QAM AC     docusate sodium  100 mg Oral BID       IV fluid REPLACEMENT ONLY       insulin (regular) 0.2 Units/hr (10/20/17 1624)     IV fluid REPLACEMENT ONLY       Vinh Villalpando MD     Patient was seen and evaluated by me, Chapincito Subramanian MD. I have reviewed the note and agree with the the plan of care as documented by the fellow.

## 2017-10-20 NOTE — CONSULTS
[facility]    Pulmonary, Allergy, Critical Care and Sleep Medicine    Initial Consultation  10/20/2017    Viv Shaw MRN# [id number]   Age: 67 year old YOB: 1950     Date of Admission: 10/16/2017  Reason for Consultation: tree in bud nodularity on CT chest on immunosuppresion  Requesting Team: transplant    Primary care provider: Summer Vega     Assessment and Recommendations:    Viv Shaw is a 67 year old female with a history of DMII, diabetic nephropathy s/p kidney transplant in 2005, HTN, CAD, hypothyroidism, and GERD who presented on 10/16/2017 for exploratory laparotomy with JOSEFA for incarcerated incisional hernia repair, complicated now by higher than expected WBC count and tree-in-bud opacities found on CT chest.      1. Tree in [place] Opacities in RLL: seen on CT today. When looking at old imaging (an abdominal CT from April that includes the bottom of the lungs), this same area has infiltrate present, actually more prominent than on today's CT. She does not have current pulmonary symptoms to suggest that this small area is the cause of her higher than expected normal WBC count. This area could represent an atypical infection (such as fungal, viral, or atypical bacterial), or it could represent slowly resolving sequale from her winter episode of bronchitis last year. Lastly, given immunosuppression and appearance, early in situ pulmonary adenocarcinoma or other pulmonary adenocarcinoma could present like this lesion (but would be expected to grow, not improve, since April CT), especially with patient reported weight loss This does not require urgent work up or evaluation given patient lack of symptoms, but needs to be followed.  -I will set the patient up to be seen by me in pulmonary clinic in 2-3 months (likely 2 months so her annual trip to [place] at the end of December does not need to be delayed) with repeat non-contrast CT imaging prior to  appointment. If lesion is still present at that time, we will consider further work up with testing, which could include bronchoscopy with BAL and possible biopsy for evaluation.   -We do not believe this represents the cause of the patient's higher than exp ected white blood cell elevation.     Seen and discussed with Dr Figueroa. Pulmonary will sign off at this time. Please call if further questions arise.     Tirso Lugo MD  Pulmonary and Critical Care Fellow   Pager [phone]    Chief Complaint and History of Present Illness:    CC:  Hernia repair  HPI:   Ms. Shaw is a 67 year old female with a history of DMII, diabetic nephropathy s/p kidney transplant in 2005 (on Myfortic and CSA), HTN, CAD, hypothyroidism, and GERD who presented on 10/16/2017 for exploratory laparotomy with JOSEFA for incarcerated incisional hernia repair. After admission, she had been doing well. On 10/18-19 overnight, she was confused. This has happened over the last few nights. Yesterday, a CXR was done showing possible pulmonary nodules (this was done for infectious work up). A CT was then done, that found a small area of tree-in-bud nodularity, of unclear etiology. Pulmonary is consulted for assistance.    The patient has had a normal WBC count (higher than expected given immunosuppression), and has been afebrile this admission. Procalcitonin was checked today and was 0.09.     Ms. Shaw states she developed her hernia after an episode of bronchitis last winter. She had a horrible cough at that time, and developed the hernia. Cough has mostly resolved since then, but she still has a cough (occasionally productive of clear sputum) every few days ever since this bronchitis. She was admitted for her hernia surgery, and has slowly been improving after surgery. She denies any change in her breathing- it is no different than prior to admission. She has not had any change in the cough that she has had since last winter. She denies  fevers, chills, rhinorrhea, nasal congestions, sore throat. She does have some post nasal drip and GERD symptoms (although GERD is relatively well controlled). She does endorse some night sweats when she has low BGs, as well as an unintentional 10 pound weight loss in the last month or so.     She is a never smoker. 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 She travels to [place] for about 3 months in the winter, and was there for 1 week this summer, otherwise, no recent travel. She does not have pets.     Review of Systems:  Complete 12 point ROS negative unless mentioned in HPI  Histories, Prior to Admission Medications, Allergies:    Past Medical History:  Past Medical History:   Diagnosis Date     Abdominal wall hernia     RLQ     Allergic rhinitis      CAD (coronary artery disease)     coronary artery disease s/p PCI to LAD in 2005coronary artery disease s/p PCI to LAD in 2005     Diabetes mellitus, type 2 (H)     follows up with endocrinologist.     Dyslipidemia      GERD (gastroesophageal reflux disease)      H/O diabetic nephropathy     s/p kidney trnsplant     Hypertension      Hypothyroidism      Immunosuppressed status (H)      Kidney replaced by transplant     Rt     PONV (postoperative nausea and vomiting)      Shingles      Vitamin B12 deficiency anemia        Past Surgical History:  Past Surgical History:   Procedure Laterality Date     APPENDECTOMY NOS       ARTHROSCOPY SHOULDER ROTATOR CUFF REPAIR Left      COLONOSCOPY N/A 6/7/2016    Procedure: COLONOSCOPY;  Surgeon: Rashard Snider MD;  Location: [facility]     Coronary angioplasty with stent  2005     HYSTERECTOMY       Kidney Transplant NOS Right      LAPAROSCOPIC CHOLECYSTECTOMY       NOSE SURGERY  2013     OPEN SEPARATION COMPONENT HERNIORRHAPHY N/A 10/16/2017    Procedure: OPEN SEPARATION COMPONENT HERNIORRHAPHY;;  Surgeon: CARL Lott MD;  Location: [facility]     RECONSTRUCT ABDOMINAL WALL N/A 10/16/2017    Procedure: RECONSTRUCT ABDOMINAL WALL;   Exploratory Laparotomy, Lysis of Adhesions, Abdominal Wall reconstruction, Inlay Xen AB mesh, Mitek Suture [place] and Umbilical Hernia Repair.;  Surgeon: CARL Lott MD;  Location: [facility] OR     REMOVE CATARACT INTRACAP,INSERT LENS Right      TONSILLECTOMY         Past Social History:  Social History     Social History     Marital status:      Spouse name: N/A     Number of children: N/A     Years of education: N/A     Occupational History     Not on file.     Social History Main Topics     Smoking status: Never Smoker     Smokeless tobacco: Never Used     Alcohol use No     Drug use: No     Sexual activity: Yes     Partners: Male     Other Topics Concern     Not on file     Social History Narrative     Family History:  Family History   Problem Relation Age of Onset     Respiratory Mother       age 71     Cardiovascular Father       age 75 hyertension     Arthritis Sister      living, healthy     Family History Negative Brother       age 44     Colon Cancer No family hx of      Medications:    QUEtiapine  25 mg Oral At Bedtime     sodium chloride 0.9%  500 mL Intravenous Once     [START ON 10/21/2017] ranitidine  150 mg Oral Daily     [START ON 10/21/2017] valsartan  80 mg Oral Daily     sennosides  2 tablet Oral BID     magnesium hydroxide  30 mL Oral Daily     isosorbide mononitrate  15 mg Oral BID     amLODIPine  10 mg Oral Daily     cycloSPORINE modified  100 mg Oral BID IS     chlorthalidone  25 mg Oral Daily     metoprolol  25 mg Oral Daily     pantoprazole  40 mg Oral QAM     atorvastatin  20 mg Oral Daily     zz ims template  540 mg Oral BID IS     levothyroxine  112 mcg Oral QAM AC     docusate sodium  100 mg Oral BID     IV fluid REPLACEMENT ONLY, glucose **OR** dextrose **OR** glucagon, hydrALAZINE, IV fluid REPLACEMENT ONLY, ondansetron **OR** [DISCONTINUED] ondansetron, pink lady enema, naloxone, acetaminophen, [DISCONTINUED] prochlorperazine **OR**  prochlorperazine    Allergies:     Allergies   Allergen Reactions     No Known Drug Allergies        Physical Exam:    Temp:  [97.6  F (36.4  C)-98.5  F (36.9  C)] 97.8  F (36.6  C)  Pulse:  [] 90  Heart Rate:  [67-76] 71  Resp:  [16-18] 18  BP: (125-165)/(41-67) 138/51  SpO2:  [85 %-96 %] 92 %    Intake/Output Summary (Last 24 hours) at 10/20/17 1315  Last data filed at 10/20/17 0900   Gross per 24 hour   Intake              820 ml   Output              530 ml   Net              290 ml     General: sitting up in chair, NAD  HEENT: anicteric, dry mucosa  Neck: no palpable lymphadenopathy, no JVD noted  Chest: very mild right basilar crackles, otherwise CTAB. Normal WOB. On Room air.   Cardiac: RRR, 3/6 systolic murmur.   Abdomen: Soft, flat, mildly tender, active BS  Extremities: No LE Edema  Neuro: A&Ox3, no focal deficits   Skin: no rash noted    Laboratory, imaging, and microbiologic data:    All laboratory and imaging data reviewed.    Notable for:   K 3.7 Cr 1.70 from 1.27 yesterday.   BUN 37  Procal 0.09    WBC 8.9. Hgb 9.9. Plts 233.     Cyclosporine 67.     UA with >1000 glucose, 10 protein, few bacteria.    10/19 CXR: 1. New infrahilar nodular opacity seen on the lateral view, CT chest may be done for further evaluation. 2. Interval increase in size of cardiac silhouette. 3. Minimal bilateral neural effusion.  10/20 CT chest: 1. No definite CT correlate that corresponds to the 10/19/2017 radiograph. 2. Small area of tree-in-bud nodularity in the right lower lobe, infectious or inflammatory. 3. Enlargement of the pulmonary artery which can be seen in setting of pulmonary hypertension. 4. Sliding-type hiatal hernia. 5. Bilateral atrophic native kidneys.  10/20 CT head: 1. No acute intracranial pathology.  2.. Mild presumed chronic small vessel ischemic disease.

## 2017-10-20 NOTE — PLAN OF CARE
Problem: Patient Care Overview  Goal: Plan of Care/Patient Progress Review  Pt is afebrile with AVSS on RA. Pt is very confused and having some visual hallucinations. Bed alarm is on. VPM was ordered at beginning of shift and is currently effective at maintaining her safety. Pt is somewhat impulsive. , Johnny, came to visit loulou as he is very worried about her confusion. The team is doing an infection work up to explore source of confusion. CXR was done, MD was notified of abnormal finding, no new orders. Urine was sent. Pt c/o pain near incision but declines pain meds. Pt c/o queasiness but declines nausea meds. Pt has a poor appetite but is tolerating a regular diet well. No BM tonight. Pt did not void adequate amounts during shift. Bladder scan volume was 54. REAL is patent and draining serosanguinous fluid. BGs were 349 and 287. Will continue to monitor.

## 2017-10-20 NOTE — PROVIDER NOTIFICATION
Notified provider that pt is increasingly confused and very agitated. Patient is disoriented to place, time, and situation. Pt having visual hallucinations, states that she can see her kitchen and is in her home. States that the doctors and nurses are lying to her. Pt very restless. Pt reoriented frequently. Video patient monitoring on. Sitter in room as pt is getting out of bed very frequently. Pt also taking her nasal cannula off. Provider will come assess patient.     Also notified provider that a scant amount of yellow drainage was noted from pt's incision. Provider will pass information on to day team

## 2017-10-20 NOTE — PLAN OF CARE
Problem: Patient Care Overview  Goal: Individualization & Mutuality  Blood pressure decreased after am medications.  AOVSs on RA.   Tylenol given X 1 for mild abd pain with good results.   Insulin drip started at algorithm 1 at 1300.  Will monitor.  Lantus increased to 30 Units.   Denies nausea.  Oriented to Self only at times.  She was clear in the am. Then after a nap, she thought all three of her sister in law's were in her room.  Hallucinations lasted a couple hours.  See NST sitter notes in the Patient Care Summary assessment.   Bolus of  ml given.  See I & O.   Adequate UOP.  No stool.  Suppository ordered for PM shift.   REAL was clear serosanguinous.

## 2017-10-20 NOTE — PROGRESS NOTES
Care Coordinator  D/I: Pt with continued delerium and confusion. Head and chest CT.   P: will follow for needs. Has RTC 11/1 with plastics Dr Lott.

## 2017-10-20 NOTE — PLAN OF CARE
Problem: Patient Care Overview  Goal: Plan of Care/Patient Progress Review  Outcome: No Change  Pt very disoriented and agitated. Having visual hallucinations about being at home or in a car, not easily redirectable. Pulled out her IV. Has a sitter at bedside now. Provider notified of pt's worsening confusion, pt had a head CT and chest CT, results have not been read yet. Afebrile, VSS. Pt on 2 L 02 while sleeping, otherwise on RA. Tylenol given x 2 for pain with relief. Pt denies nausea. New PIV placed in R hand, 500 cc LR bolus infusing.  at 0200, 3 units insulin given. BG at 0515 260. Scant amount of yellow drainage from incision noted, provider notified. Incision slightly pink. Pt voided 200 cc and had 1 unmeasured void. No BM, pt passing gas. Pt's  Bill updated on pt's status and will be in by 9 AM.

## 2017-10-20 NOTE — PROGRESS NOTES
"Plastic Surgery    NO complaints. Neuro status baseline during my exam but she has been hallucinating and confused at other times.     /51 (BP Location: Right arm)  Pulse 90  Temp 97.8  F (36.6  C) (Oral)  Resp 18  Ht 1.626 m (5' 4\")  Wt 82.1 kg (180 lb 14.4 oz)  SpO2 92%  BMI 31.05 kg/m2  DRain: serosanguinous output. 75cc in 24hrs.   Incision intact with staples, no erythema.     A/P:   Transplant delaying discharge due to delirium and suspected infection.   No change from our perspective, the hernia repair does not appear to be the source but we will continue to follow peripherally to answer any questions and re-evaluate if needed.     Pina Hernandez MD  "

## 2017-10-21 LAB
ANION GAP SERPL CALCULATED.3IONS-SCNC: 6 MMOL/L (ref 3–14)
BACTERIA SPEC CULT: NORMAL
BACTERIA SPEC CULT: NORMAL
BUN SERPL-MCNC: 26 MG/DL (ref 7–30)
CALCIUM SERPL-MCNC: 8.2 MG/DL (ref 8.5–10.1)
CHLORIDE SERPL-SCNC: 107 MMOL/L (ref 94–109)
CO2 SERPL-SCNC: 29 MMOL/L (ref 20–32)
CREAT SERPL-MCNC: 1.21 MG/DL (ref 0.52–1.04)
ERYTHROCYTE [DISTWIDTH] IN BLOOD BY AUTOMATED COUNT: 13.4 % (ref 10–15)
GFR SERPL CREATININE-BSD FRML MDRD: 44 ML/MIN/1.7M2
GLUCOSE BLDC GLUCOMTR-MCNC: 104 MG/DL (ref 70–99)
GLUCOSE BLDC GLUCOMTR-MCNC: 226 MG/DL (ref 70–99)
GLUCOSE BLDC GLUCOMTR-MCNC: 252 MG/DL (ref 70–99)
GLUCOSE BLDC GLUCOMTR-MCNC: 272 MG/DL (ref 70–99)
GLUCOSE BLDC GLUCOMTR-MCNC: 295 MG/DL (ref 70–99)
GLUCOSE BLDC GLUCOMTR-MCNC: 85 MG/DL (ref 70–99)
GLUCOSE BLDC GLUCOMTR-MCNC: 85 MG/DL (ref 70–99)
GLUCOSE BLDC GLUCOMTR-MCNC: 86 MG/DL (ref 70–99)
GLUCOSE BLDC GLUCOMTR-MCNC: 89 MG/DL (ref 70–99)
GLUCOSE BLDC GLUCOMTR-MCNC: 90 MG/DL (ref 70–99)
GLUCOSE BLDC GLUCOMTR-MCNC: 91 MG/DL (ref 70–99)
GLUCOSE SERPL-MCNC: 100 MG/DL (ref 70–99)
HCT VFR BLD AUTO: 30.4 % (ref 35–47)
HGB BLD-MCNC: 9.5 G/DL (ref 11.7–15.7)
Lab: NORMAL
MCH RBC QN AUTO: 28.4 PG (ref 26.5–33)
MCHC RBC AUTO-ENTMCNC: 31.3 G/DL (ref 31.5–36.5)
MCV RBC AUTO: 91 FL (ref 78–100)
PLATELET # BLD AUTO: 187 10E9/L (ref 150–450)
POTASSIUM SERPL-SCNC: 3.4 MMOL/L (ref 3.4–5.3)
RBC # BLD AUTO: 3.35 10E12/L (ref 3.8–5.2)
SODIUM SERPL-SCNC: 142 MMOL/L (ref 133–144)
SPECIMEN SOURCE: NORMAL
WBC # BLD AUTO: 6.9 10E9/L (ref 4–11)

## 2017-10-21 PROCEDURE — 85027 COMPLETE CBC AUTOMATED: CPT | Performed by: NURSE PRACTITIONER

## 2017-10-21 PROCEDURE — A9270 NON-COVERED ITEM OR SERVICE: HCPCS | Mod: GY | Performed by: NURSE PRACTITIONER

## 2017-10-21 PROCEDURE — 12000024 ZZH R&B TRANSPLANT CRITICAL

## 2017-10-21 PROCEDURE — 25000132 ZZH RX MED GY IP 250 OP 250 PS 637: Mod: GY | Performed by: NURSE PRACTITIONER

## 2017-10-21 PROCEDURE — 25000125 ZZHC RX 250: Performed by: NURSE PRACTITIONER

## 2017-10-21 PROCEDURE — 25000132 ZZH RX MED GY IP 250 OP 250 PS 637: Mod: GY | Performed by: PHYSICIAN ASSISTANT

## 2017-10-21 PROCEDURE — A9270 NON-COVERED ITEM OR SERVICE: HCPCS | Mod: GY | Performed by: INTERNAL MEDICINE

## 2017-10-21 PROCEDURE — 80048 BASIC METABOLIC PNL TOTAL CA: CPT | Performed by: NURSE PRACTITIONER

## 2017-10-21 PROCEDURE — 25000132 ZZH RX MED GY IP 250 OP 250 PS 637: Mod: GY | Performed by: TRANSPLANT SURGERY

## 2017-10-21 PROCEDURE — 25000131 ZZH RX MED GY IP 250 OP 636 PS 637: Mod: GY | Performed by: TRANSPLANT SURGERY

## 2017-10-21 PROCEDURE — A9270 NON-COVERED ITEM OR SERVICE: HCPCS | Mod: GY | Performed by: TRANSPLANT SURGERY

## 2017-10-21 PROCEDURE — 00000146 ZZHCL STATISTIC GLUCOSE BY METER IP

## 2017-10-21 PROCEDURE — A9270 NON-COVERED ITEM OR SERVICE: HCPCS | Mod: GY | Performed by: SURGERY

## 2017-10-21 PROCEDURE — 36415 COLL VENOUS BLD VENIPUNCTURE: CPT | Performed by: NURSE PRACTITIONER

## 2017-10-21 PROCEDURE — 25000131 ZZH RX MED GY IP 250 OP 636 PS 637: Mod: GY | Performed by: STUDENT IN AN ORGANIZED HEALTH CARE EDUCATION/TRAINING PROGRAM

## 2017-10-21 PROCEDURE — 25000131 ZZH RX MED GY IP 250 OP 636 PS 637: Mod: GY | Performed by: PHYSICIAN ASSISTANT

## 2017-10-21 PROCEDURE — 25000132 ZZH RX MED GY IP 250 OP 250 PS 637: Mod: GY | Performed by: SURGERY

## 2017-10-21 PROCEDURE — 25000132 ZZH RX MED GY IP 250 OP 250 PS 637: Mod: GY | Performed by: INTERNAL MEDICINE

## 2017-10-21 PROCEDURE — A9270 NON-COVERED ITEM OR SERVICE: HCPCS | Mod: GY | Performed by: PHYSICIAN ASSISTANT

## 2017-10-21 RX ORDER — NICOTINE POLACRILEX 4 MG
15-30 LOZENGE BUCCAL
Status: DISCONTINUED | OUTPATIENT
Start: 2017-10-21 | End: 2017-10-22 | Stop reason: HOSPADM

## 2017-10-21 RX ORDER — DEXTROSE MONOHYDRATE 25 G/50ML
25-50 INJECTION, SOLUTION INTRAVENOUS
Status: DISCONTINUED | OUTPATIENT
Start: 2017-10-21 | End: 2017-10-22 | Stop reason: HOSPADM

## 2017-10-21 RX ORDER — BISACODYL 10 MG
10 SUPPOSITORY, RECTAL RECTAL DAILY PRN
Status: DISCONTINUED | OUTPATIENT
Start: 2017-10-21 | End: 2017-10-22 | Stop reason: HOSPADM

## 2017-10-21 RX ADMIN — CYCLOSPORINE 100 MG: 25 CAPSULE ORAL at 08:27

## 2017-10-21 RX ADMIN — RANITIDINE HYDROCHLORIDE 150 MG: 150 TABLET, FILM COATED ORAL at 08:30

## 2017-10-21 RX ADMIN — Medication 12.5 MG: at 20:17

## 2017-10-21 RX ADMIN — LEVOTHYROXINE SODIUM 112 MCG: 112 TABLET ORAL at 08:29

## 2017-10-21 RX ADMIN — MAGNESIUM HYDROXIDE 30 ML: 400 SUSPENSION ORAL at 08:27

## 2017-10-21 RX ADMIN — HUMAN INSULIN 0.2 UNITS/HR: 100 INJECTION, SOLUTION SUBCUTANEOUS at 00:36

## 2017-10-21 RX ADMIN — PANTOPRAZOLE SODIUM 40 MG: 40 TABLET, DELAYED RELEASE ORAL at 08:22

## 2017-10-21 RX ADMIN — CYCLOSPORINE 100 MG: 25 CAPSULE ORAL at 18:26

## 2017-10-21 RX ADMIN — QUETIAPINE FUMARATE 25 MG: 25 TABLET, FILM COATED ORAL at 21:16

## 2017-10-21 RX ADMIN — ATORVASTATIN CALCIUM 20 MG: 20 TABLET, FILM COATED ORAL at 20:18

## 2017-10-21 RX ADMIN — Medication 15 MG: at 08:30

## 2017-10-21 RX ADMIN — ACETAMINOPHEN 650 MG: 325 TABLET, FILM COATED ORAL at 13:46

## 2017-10-21 RX ADMIN — MYCOPHENOLIC ACID 540 MG: 180 TABLET, DELAYED RELEASE ORAL at 08:29

## 2017-10-21 RX ADMIN — SENNOSIDES 2 TABLET: 8.6 TABLET ORAL at 08:29

## 2017-10-21 RX ADMIN — METOPROLOL SUCCINATE 25 MG: 25 TABLET, EXTENDED RELEASE ORAL at 20:18

## 2017-10-21 RX ADMIN — INSULIN ASPART 7 UNITS: 100 INJECTION, SOLUTION INTRAVENOUS; SUBCUTANEOUS at 12:23

## 2017-10-21 RX ADMIN — INSULIN ASPART 4 UNITS: 100 INJECTION, SOLUTION INTRAVENOUS; SUBCUTANEOUS at 17:08

## 2017-10-21 RX ADMIN — SENNOSIDES 2 TABLET: 8.6 TABLET ORAL at 20:18

## 2017-10-21 RX ADMIN — Medication 15 MG: at 17:08

## 2017-10-21 RX ADMIN — MYCOPHENOLIC ACID 540 MG: 180 TABLET, DELAYED RELEASE ORAL at 18:26

## 2017-10-21 RX ADMIN — VALSARTAN 80 MG: 80 TABLET, FILM COATED ORAL at 12:23

## 2017-10-21 RX ADMIN — DOCUSATE SODIUM 100 MG: 100 CAPSULE, LIQUID FILLED ORAL at 20:17

## 2017-10-21 RX ADMIN — AMLODIPINE BESYLATE 10 MG: 10 TABLET ORAL at 08:30

## 2017-10-21 RX ADMIN — DOCUSATE SODIUM 100 MG: 100 CAPSULE, LIQUID FILLED ORAL at 08:29

## 2017-10-21 NOTE — PLAN OF CARE
"Problem: Patient Care Overview  Goal: Plan of Care/Patient Progress Review  Outcome: No Change  /70 (BP Location: Left arm)  Pulse 66  Temp 97.5  F (36.4  C) (Axillary)  Resp 16  Ht 1.626 m (5' 4\")  Wt 81.3 kg (179 lb 3.2 oz)  SpO2 95%  BMI 30.76 kg/m2     Neuro: Pt alert to self only. Believes she is at [facility] and waiting for a bus.   Cardiac: HTN   Respiratory: 97% on 2LPM weaned down from 3LPM unable to tolerate any lower oxygen saturation drops to 87%   GI/: incontient of urine x1, no BM will need bowel medication, urinated in bathroom x1.   Diet/Appetite: Regular diet denies hunger   End: insulin gtt off during shift BG as follows 104,85,91,89,85,90,86,104.   Skin: abdominal incision staples clean dry and intact.   LDA: REAL with serosanguinous output of 30mL.   Activity: up with SBA and use of walker and gait belt.   Pain: no reports of pain, no nonverbal signed noted.   Pt has attendant at bedside for safety. Impulsive.   Plan: Continue to monitor mentation, monitor BG, follow current POC and update care team as needed.           "

## 2017-10-21 NOTE — PLAN OF CARE
Problem: Patient Care Overview  Goal: Individualization & Mutuality  Temp max 99.3 and Tylenol given after this for Abdominal pain which was effective.   AOVSS on RA.  Insulin drip discontinued and pt was started back on AC & HS checks.  Blood glucose 252; then orders obtained for Novolog.  and corrected as charted.   Pt did NOT have any hallucinations this shift.  She was clear and oriented the whole shift.   Plan is to give another suppository or enema, then possibly discharge tomorrow.

## 2017-10-21 NOTE — PLAN OF CARE
Problem: Patient Care Overview  Goal: Plan of Care/Patient Progress Review  Outcome: No Change  A/Ox2; disoriented to time and situation. Hypertensive; scheduled BP meds given. Poor appetite. Up with SBA; bedside attendant. Pt continues to have visual hallucinations of people and objects. She is often confused and disoriented, but easy to redirect. Urine output 320 mL this shift. Suppository given at beginning of shift; one very small BM. Pink lady edema ordered from pharmacy, but pt refusing it at this time. She is agreeable to do it in the morning if no BM overnight. BGs ; insulin gtt currently off. Denies pain and nausea. R REAL with 10 mL serosanguinous output. Incision staples w/ ecchymosis; open to air. Will continue to monitor and notify the team of changes.

## 2017-10-21 NOTE — PROGRESS NOTES
Transplant Surgery  Inpatient Daily Progress Note  10/21/2017    Assessment & Plan: 66yo with hx DM2, diabetic nephropathy s/p KT 2005, HTN, CAD, hypothyroidism, and GERD.      10/16/17: Underwent exploratory laparotomy , JOSEFA, reduction of incarcerated incisional hernia, abdominal wall reconstruction with placement of XenMatrix AB 10 x 15 cm inlay mesh using Mitek suture anchors to the iliac bone, primary closure of the lateral abdominal wall covering the entire mesh with excision of skin and subcutaneous tissue of right lower abdominal wall and primary closure of skin, and umbilical wall hernia repair.  Surgeons: Jenn Villa MD (Transplant Surgery) and Angelique Lott MD (Plastic Surgery).      S/p hernia repair:  POD #5. Epidural and pca discontinued on POD 2, Oxycodone d/c'd POD 3. AXR 10/17 showed stool burden without obstruction. REAL in place with 60 cc output yesterday, per plastic surgery plan to remain in place until output < 30 cc x2 days.   Graft function: Stable. Cr pending, baseline 1.2-1.4.  Immunosuppression management: On Myfortic and CSA.  CSA level 67 (13h), goal per protocol ~75.  Complexity of management: Low.   Hematology: Mild anemia, Hgb 9.5-9.9, stable.  Cardiorespiratory:   HTN: SBP 120s-150s. Back on home metoprolol, imdur, valsartan, chlorthalidone, amlodipine.  R/o pulmonary nodules:  Suspected on CXR 10/19. CT chest 10/19: Small area of tree-in-bud nodularity in the right lower lobe, infectious or inflammatory. Appreciate pulmonology recommendations.  GI/Nutrition:   S/p reduction of incarcerated colon: Stool burden on AXR on POD 1. Now having bowel movements.   PONV:  Resolved by POD 3. Scopolamine and compazine d/c'd due to confusion.  Diet:  Regular diet.   Endocrine: DM type 2, insulin gtt discontinued on POD 2. Glucose 80s -100s overnight, requiring no correction. Will d/c insulin gtt today and restart home lantus qHS. Hypothyroidism, on synthroid.  Fluid/Electrolytes:  Labs and  "weight pending.  : Arevalo removed on POD 2 with negative PVR.   Neuro:  Delirium.  Compazine, scopolamine, narcotics d/c'd 10/19.  Infectious currently negative.  Head CT 10/20 negative for acute findings. Will promote normal sleep/wake cycle and add low dose seroquel and melatonin at HS.  Infectious disease: Afebrile. WBC 8.9 --> 6.9.  Infectious w/u due to high normal WBC, hyperglycemia, delirium. 10/19 UA: no pyuria, bld cx NGTD, chest CT: small area of tree in bud RLL.  Procalcitonin 0.09 (low risk of systemic bacterial infection)  Prophylaxis: DVT mechanical  Disposition: 7A     Medical Decision Making: Medium  Admit 55319 (moderate level decision making)    NATALY/Fellow/Resident Provider: Eneida Quintero    Faculty: Jenn Villa M.D.  _________________________________________________________________  Transplant History:  12/27/2005 (Kidney), Postoperative day: 4316     Interval History: History is obtained from the patient and overnight RN  Overnight events: Continues to have intermittent confusion and visual hallucinations overnight, easily redirectable. Orientation waxes and wanes concerning for delirium, but with some improvement as she has \"been sleeping better\". Small BM yesterday.    ROS:   A 10-point review of systems was negative except as noted above.    Meds:    QUEtiapine  25 mg Oral At Bedtime     ranitidine  150 mg Oral Daily     valsartan  80 mg Oral Daily     sennosides  2 tablet Oral BID     magnesium hydroxide  30 mL Oral Daily     isosorbide mononitrate  15 mg Oral BID     amLODIPine  10 mg Oral Daily     cycloSPORINE modified  100 mg Oral BID IS     chlorthalidone  25 mg Oral Daily     metoprolol  25 mg Oral Daily     pantoprazole  40 mg Oral QAM     atorvastatin  20 mg Oral Daily     zz ims template  540 mg Oral BID IS     levothyroxine  112 mcg Oral QAM AC     docusate sodium  100 mg Oral BID       Physical Exam:     Admit Weight: 79.1 kg (174 lb 6.1 oz)    Current vitals:   /61 (BP " "Location: Left arm)  Pulse 69  Temp 98.1  F (36.7  C) (Oral)  Resp 16  Ht 1.626 m (5' 4\")  Wt 81.3 kg (179 lb 3.2 oz)  SpO2 97%  BMI 30.76 kg/m2         Vital sign ranges:    Temp:  [97.5  F (36.4  C)-99.3  F (37.4  C)] 98.1  F (36.7  C)  Pulse:  [66-90] 69  Heart Rate:  [66-76] 66  Resp:  [14-18] 16  BP: (138-169)/(51-70) 161/61  SpO2:  [90 %-97 %] 97 %  Patient Vitals for the past 24 hrs:   BP Temp Temp src Pulse Heart Rate Resp SpO2 Weight   10/21/17 0725 161/61 98.1  F (36.7  C) Oral 69 - 16 97 % -   10/21/17 0351 149/70 97.5  F (36.4  C) Axillary 66 66 16 95 % 81.3 kg (179 lb 3.2 oz)   10/21/17 0112 - - - - - - 97 % -   10/21/17 0035 159/65 - - - - - - -   10/20/17 2315 163/70 99.3  F (37.4  C) Axillary 76 - 14 90 % -   10/20/17 1932 144/61 98.5  F (36.9  C) Oral 77 - 16 91 % -   10/20/17 1511 169/65 98.1  F (36.7  C) Oral - 76 18 94 % -   10/20/17 1100 138/51 97.8  F (36.6  C) Oral 90 71 18 92 % -     General Appearance: in no apparent distress.   Skin: normal, warm  Heart: regular rate and rhythm  Lungs: breathing comfortably on 2L NC  Abdomen: The abdomen is flat and obese, and  non-tender, . she is not tender over the kidney graft. The wound is Healing well, well approximated, without signs of infection and no drainage.  : voiding to toilet  Extremities: edema: absent.   Neurologic: awake, alert and oriented x3. Tremor absent..     Data:   CMP  Recent Labs  Lab 10/21/17  0614 10/20/17  0937 10/19/17  0747    138 138   POTASSIUM 3.4 3.7 3.2*   CHLORIDE 107 102 101   CO2 29 27 25   * 386* 381*   BUN 26 37* 27   CR 1.21* 1.70* 1.27*   GFRESTIMATED 44* 30* 42*   GFRESTBLACK 53* 36* 50*   NAVI 8.2* 8.4* 8.6   MAG  --  2.0 1.7   PHOS  --   --  2.5     CBC  Recent Labs  Lab 10/21/17  0614 10/20/17  0937   HGB 9.5* 9.9*   WBC 6.9 8.9    233     COAGSNo lab results found in last 7 days.    Invalid input(s): XA   Urinalysis  Recent Labs   Lab Test  10/19/17   1819  06/29/17   0620  " 07/05/16   0618   COLOR  Yellow   --    --    APPEARANCE  Clear   --    --    URINEGLC  >1000*   --    --    URINEBILI  Negative   --    --    URINEKETONE  Negative   --    --    SG  1.013   --    --    UBLD  Negative   --    --    URINEPH  5.0   --    --    PROTEIN  10*   --    --    NITRITE  Negative   --    --    LEUKEST  Negative   --    --    RBCU  1   --    --    WBCU  1   --    --    UTPG   --   0.13  0.13     Virology:  CMV DNA Quantitation Specimen   Date Value Ref Range Status   12/05/2016 Plasma, EDTA anticoagulant  Final     CMV Quantitative   Date Value Ref Range Status   12/19/2007 <100 <100 Copies/mL Final     Comment:     No CMV DNA detected.   12/04/2007 <100 <100 Copies/mL Final     Comment:     No CMV DNA detected.   05/24/2007 <100 <100 Copies/mL Final     Comment:     No CMV DNA detected.     CMV IgG Antibody   Date Value Ref Range Status   12/22/2005 >160.0 EU/mL Final     Comment:     Positive for anti-CMV IgG     Hepatitis C Antibody   Date Value Ref Range Status   12/21/2006 Negative NEG Final     Hep B Surface Kika   Date Value Ref Range Status   12/21/2006 0.0 0.0 - 4.9 mIU/mL Final     Comment:     Negative     BK Virus Result   Date Value Ref Range Status   06/18/2015 BK Virus DNA Not Detected BKNEG copies/mL Final   12/02/2013 <1000 <1000 copies/mL Final   05/08/2013 <1000 <1000 copies/mL Final   11/30/2012 <1000 <1000 copies/mL Final   12/07/2011 <1000 <1000 copies/mL Final   09/27/2011 <1000 <1000 copies/mL Final   07/07/2011 <1000 <1000 copies/mL Final   06/07/2011 <1000 <1000 copies/mL Final   04/29/2011 <1000 <1000 copies/mL Final   04/06/2011 199284 (H) <1000 copies/mL Final   04/06/2011 <1000 <1000 copies/mL Final   11/24/2010 <1000 <1000 copies/mL Final   06/16/2010 <1000 <1000 copies/mL Final   11/25/2009 <1000 <1000 copies/mL Final   12/30/2008 <1000 <1000 copies/mL Final   11/25/2008 <1000 <1000 copies/mL Final   05/24/2007 <1000 <1000 copies/mL Final   12/21/2006 <1000 <1000  copies/mL Final   10/16/2006 1600 (H) <1000 copies/mL Final   09/25/2006 <1000 <1000 copies/mL Final   09/11/2006 2100 (H) <1000 copies/mL Final

## 2017-10-21 NOTE — PLAN OF CARE
Problem: Surgery Nonspecified (Adult)  Goal: Signs and Symptoms of Listed Potential Problems Will be Absent, Minimized or Managed (Surgery Nonspecified)  Signs and symptoms of listed potential problems will be absent, minimized or managed by discharge/transition of care (reference Surgery Nonspecified (Adult) CPG).   Outcome: No Change  Pt continues to be disoriented to time, place, and situation.

## 2017-10-21 NOTE — PROGRESS NOTES
"  Nephrology Progress Note  10/21/2017         Assessment & Recommendations:   Viv Shaw is a 67 year old female with a PMH of DMII , LDKT 12/27/2005 with CKD III bl creat 1.3-1.5 , HTN , CAD , GERD , admitted for ex lap for incarcerated abdominal hernia. On IS with CsA , MPA . No complications from the transplant no DSA. Admitted for ex lap for incarcerated abdominal hernia    1. Kidney Function - allograft function improved back to baseline (1.2.-1.5). Good urine output.   2. Immunosuppression: CsA 75 Bid ( levels 67 10/20) goals ,  mg BID.   3. Hypertension - 140s-160s/60-70s. On amlodipine, valsartan, imdur and and lasix.   4. Anemia in chronic kidney disease - chronically low. Iron deficiency noted with iron panel. Recommend starting oral iron replete.   5. CKD-MBD - calcium and phos WNLs. History of osteoporosis on Fosamax.    7. Constipation and post op ex lap for hernia - patient had a regular bowel movement today. Should continue bowel regime after discharge.         Recommendations were communicated to primary team via this note.     Seen and discussed with Dr. Marilynn Galarza, APRN CNP   [phone]    Interval History :   In the last 24 hours Viv Shaw has had improved mentation being A&O x 4 this morning. She has not had nausea in 24 hours and she did have a bowel movement today.   Review of Systems:   I reviewed the following systems:  GI: Good appetite. No nausea or vomiting or diarrhea.   Neuro:  No confusion  Constitutional:  No fever or chills  CV: No dyspnea or edema.  No chest pain.    Physical Exam:   I/O last 3 completed shifts:  In: 250 [P.O.:90; I.V.:160]  Out: 2530 [Urine:2445; Drains:60; Blood:25]   /69 (BP Location: Right arm)  Pulse 75  Temp 98.2  F (36.8  C) (Oral)  Resp 16  Ht 1.626 m (5' 4\")  Wt 81.3 kg (179 lb 3.2 oz)  SpO2 91%  BMI 30.76 kg/m2     GENERAL APPEARANCE: awake/oriented  HENT: mouth without ulcers or lesions  Pulmonary: lungs " clear to auscultation with equal breath sounds bilaterally  CV: regular rhythm and rate, no rub, + murmur    - 1+ LE edema bilaterally  GI: soft, nontender, normal bowel sounds, REAL draining small amounts of sanguineous fluid, incision CDI.   MS: no evidence of inflammation in joints, no muscle tenderness  SKIN: no rash    Labs:   All labs reviewed by me  Electrolytes/Renal -   Recent Labs   Lab Test  10/21/17   0614  10/20/17   0937  10/19/17   0747   NA  142  138  138   POTASSIUM  3.4  3.7  3.2*   CHLORIDE  107  102  101   CO2  29  27  25   BUN  26  37*  27   CR  1.21*  1.70*  1.27*   GLC  100*  386*  381*   NAVI  8.2*  8.4*  8.6   MAG   --   2.0  1.7   PHOS   --    --   2.5       CBC -   Recent Labs   Lab Test  10/21/17   0614  10/20/17   0937  10/19/17   0747   WBC  6.9  8.9  9.4   HGB  9.5*  9.9*  9.8*   PLT  187  233  187       LFTs -   Recent Labs   Lab Test  04/13/17   0822  04/08/17   2111  04/12/16   1000   ALKPHOS  55  56  51   BILITOTAL  0.8  0.5  0.7   ALT  14  17  31   AST  8  12  14   PROTTOTAL  7.2  7.5  7.3   ALBUMIN  3.9  3.9  4.0       Iron Panel -   Recent Labs   Lab Test  10/19/17   0747  12/05/16   1400  12/07/15   1357   IRON  29*  37  36   IRONSAT  10*  13*  11*   PROSPER  181  130  55         Imaging:  All imaging studies reviewed by me.     Current Medications:    insulin glargine  21 Units Subcutaneous At Bedtime     chlorthalidone  12.5 mg Oral Daily     insulin aspart  1-10 Units Subcutaneous TID AC     insulin aspart  1-7 Units Subcutaneous At Bedtime     QUEtiapine  25 mg Oral At Bedtime     ranitidine  150 mg Oral Daily     valsartan  80 mg Oral Daily     sennosides  2 tablet Oral BID     magnesium hydroxide  30 mL Oral Daily     isosorbide mononitrate  15 mg Oral BID     amLODIPine  10 mg Oral Daily     cycloSPORINE modified  100 mg Oral BID IS     metoprolol  25 mg Oral Daily     pantoprazole  40 mg Oral QAM     atorvastatin  20 mg Oral Daily     zz ims template  540 mg Oral BID IS      levothyroxine  112 mcg Oral QAM AC     docusate sodium  100 mg Oral BID       IV fluid REPLACEMENT ONLY       IV fluid REPLACEMENT ONLY       BERNDA Nix CNP     Left before seen on this day   Chapincito Subramanian

## 2017-10-22 VITALS
HEIGHT: 64 IN | WEIGHT: 177.2 LBS | SYSTOLIC BLOOD PRESSURE: 141 MMHG | BODY MASS INDEX: 30.25 KG/M2 | OXYGEN SATURATION: 93 % | DIASTOLIC BLOOD PRESSURE: 59 MMHG | HEART RATE: 75 BPM | TEMPERATURE: 98.7 F | RESPIRATION RATE: 16 BRPM

## 2017-10-22 PROBLEM — R91.8 OPACITY OF LUNG ON IMAGING STUDY: Status: ACTIVE | Noted: 2017-10-22

## 2017-10-22 LAB
ANION GAP SERPL CALCULATED.3IONS-SCNC: 6 MMOL/L (ref 3–14)
BUN SERPL-MCNC: 18 MG/DL (ref 7–30)
CALCIUM SERPL-MCNC: 8.5 MG/DL (ref 8.5–10.1)
CHLORIDE SERPL-SCNC: 102 MMOL/L (ref 94–109)
CO2 SERPL-SCNC: 30 MMOL/L (ref 20–32)
CREAT SERPL-MCNC: 1.09 MG/DL (ref 0.52–1.04)
ERYTHROCYTE [DISTWIDTH] IN BLOOD BY AUTOMATED COUNT: 13.2 % (ref 10–15)
GFR SERPL CREATININE-BSD FRML MDRD: 50 ML/MIN/1.7M2
GLUCOSE BLDC GLUCOMTR-MCNC: 113 MG/DL (ref 70–99)
GLUCOSE BLDC GLUCOMTR-MCNC: 118 MG/DL (ref 70–99)
GLUCOSE BLDC GLUCOMTR-MCNC: 120 MG/DL (ref 70–99)
GLUCOSE BLDC GLUCOMTR-MCNC: 51 MG/DL (ref 70–99)
GLUCOSE BLDC GLUCOMTR-MCNC: 68 MG/DL (ref 70–99)
GLUCOSE BLDC GLUCOMTR-MCNC: 89 MG/DL (ref 70–99)
GLUCOSE BLDC GLUCOMTR-MCNC: 94 MG/DL (ref 70–99)
GLUCOSE SERPL-MCNC: 108 MG/DL (ref 70–99)
HCT VFR BLD AUTO: 31.8 % (ref 35–47)
HGB BLD-MCNC: 10.1 G/DL (ref 11.7–15.7)
MCH RBC QN AUTO: 28.4 PG (ref 26.5–33)
MCHC RBC AUTO-ENTMCNC: 31.8 G/DL (ref 31.5–36.5)
MCV RBC AUTO: 89 FL (ref 78–100)
PLATELET # BLD AUTO: 201 10E9/L (ref 150–450)
POTASSIUM SERPL-SCNC: 3.5 MMOL/L (ref 3.4–5.3)
RBC # BLD AUTO: 3.56 10E12/L (ref 3.8–5.2)
SODIUM SERPL-SCNC: 138 MMOL/L (ref 133–144)
WBC # BLD AUTO: 6.4 10E9/L (ref 4–11)

## 2017-10-22 PROCEDURE — 25000128 H RX IP 250 OP 636: Performed by: STUDENT IN AN ORGANIZED HEALTH CARE EDUCATION/TRAINING PROGRAM

## 2017-10-22 PROCEDURE — A9270 NON-COVERED ITEM OR SERVICE: HCPCS | Mod: GY | Performed by: PHYSICIAN ASSISTANT

## 2017-10-22 PROCEDURE — 25000132 ZZH RX MED GY IP 250 OP 250 PS 637: Mod: GY | Performed by: PHYSICIAN ASSISTANT

## 2017-10-22 PROCEDURE — 36415 COLL VENOUS BLD VENIPUNCTURE: CPT | Performed by: NURSE PRACTITIONER

## 2017-10-22 PROCEDURE — 80048 BASIC METABOLIC PNL TOTAL CA: CPT | Performed by: NURSE PRACTITIONER

## 2017-10-22 PROCEDURE — 25000132 ZZH RX MED GY IP 250 OP 250 PS 637: Mod: GY | Performed by: NURSE PRACTITIONER

## 2017-10-22 PROCEDURE — 25000132 ZZH RX MED GY IP 250 OP 250 PS 637: Mod: GY | Performed by: SURGERY

## 2017-10-22 PROCEDURE — 25000131 ZZH RX MED GY IP 250 OP 636 PS 637: Mod: GY | Performed by: PHYSICIAN ASSISTANT

## 2017-10-22 PROCEDURE — A9270 NON-COVERED ITEM OR SERVICE: HCPCS | Mod: GY | Performed by: SURGERY

## 2017-10-22 PROCEDURE — 00000146 ZZHCL STATISTIC GLUCOSE BY METER IP

## 2017-10-22 PROCEDURE — 85027 COMPLETE CBC AUTOMATED: CPT | Performed by: NURSE PRACTITIONER

## 2017-10-22 PROCEDURE — A9270 NON-COVERED ITEM OR SERVICE: HCPCS | Mod: GY | Performed by: TRANSPLANT SURGERY

## 2017-10-22 PROCEDURE — 25000131 ZZH RX MED GY IP 250 OP 636 PS 637: Mod: GY | Performed by: TRANSPLANT SURGERY

## 2017-10-22 PROCEDURE — A9270 NON-COVERED ITEM OR SERVICE: HCPCS | Mod: GY | Performed by: NURSE PRACTITIONER

## 2017-10-22 PROCEDURE — 25000132 ZZH RX MED GY IP 250 OP 250 PS 637: Mod: GY | Performed by: TRANSPLANT SURGERY

## 2017-10-22 RX ORDER — SENNOSIDES A AND B 8.6 MG/1
1 TABLET, FILM COATED ORAL DAILY
Qty: 30 TABLET | Refills: 0 | Status: SHIPPED | OUTPATIENT
Start: 2017-10-22 | End: 2017-12-21

## 2017-10-22 RX ORDER — CYCLOSPORINE 25 MG/1
100 CAPSULE ORAL 2 TIMES DAILY
Qty: 240 CAPSULE | Refills: 11 | Status: SHIPPED | OUTPATIENT
Start: 2017-10-22 | End: 2017-11-03

## 2017-10-22 RX ORDER — ACETAMINOPHEN 325 MG/1
650 TABLET ORAL EVERY 4 HOURS PRN
Qty: 100 TABLET
Start: 2017-10-22 | End: 2017-12-21

## 2017-10-22 RX ADMIN — Medication 15 MG: at 08:33

## 2017-10-22 RX ADMIN — MYCOPHENOLIC ACID 540 MG: 180 TABLET, DELAYED RELEASE ORAL at 08:32

## 2017-10-22 RX ADMIN — SODIUM CHLORIDE, POTASSIUM CHLORIDE, SODIUM LACTATE AND CALCIUM CHLORIDE 500 ML: 600; 310; 30; 20 INJECTION, SOLUTION INTRAVENOUS at 00:04

## 2017-10-22 RX ADMIN — MAGNESIUM HYDROXIDE 30 ML: 400 SUSPENSION ORAL at 08:33

## 2017-10-22 RX ADMIN — ACETAMINOPHEN 650 MG: 325 TABLET, FILM COATED ORAL at 14:16

## 2017-10-22 RX ADMIN — VALSARTAN 80 MG: 80 TABLET, FILM COATED ORAL at 14:16

## 2017-10-22 RX ADMIN — CYCLOSPORINE 100 MG: 25 CAPSULE ORAL at 08:31

## 2017-10-22 RX ADMIN — LEVOTHYROXINE SODIUM 112 MCG: 112 TABLET ORAL at 08:32

## 2017-10-22 RX ADMIN — DOCUSATE SODIUM 100 MG: 100 CAPSULE, LIQUID FILLED ORAL at 08:33

## 2017-10-22 RX ADMIN — ACETAMINOPHEN 650 MG: 325 TABLET, FILM COATED ORAL at 09:22

## 2017-10-22 RX ADMIN — SENNOSIDES 2 TABLET: 8.6 TABLET ORAL at 08:32

## 2017-10-22 RX ADMIN — PANTOPRAZOLE SODIUM 40 MG: 40 TABLET, DELAYED RELEASE ORAL at 08:33

## 2017-10-22 RX ADMIN — AMLODIPINE BESYLATE 10 MG: 10 TABLET ORAL at 08:33

## 2017-10-22 NOTE — PLAN OF CARE
"Problem: Patient Care Overview  Goal: Plan of Care/Patient Progress Review  Outcome: Improving  /55 (BP Location: Right arm)  Pulse 75  Temp 98.7  F (37.1  C) (Oral)  Resp 16  Ht 1.626 m (5' 4\")  Wt 81.3 kg (179 lb 3.2 oz)  SpO2 96%  BMI 30.76 kg/m2     Neuro: A&O x4.   Cardiac: Hypertensive   Respiratory: No concerns noted.   GI/: Adequate urine output spontaneously voiding, BM's hard   Diet/Appetite: Regular diet   Endo: BG dropped at 0530- 51 treatment with apple juice and sugar 68,94,89,113 was final results no symptoms.   Skin: No new concerns, incision staples clean dry and intact.   LDA: PIV- SL, REAL- serosanguineous  Activity: Up with SBA   Pain: Denies pain   Plan: potential to DC today, Sitter at bedside           "

## 2017-10-22 NOTE — PROGRESS NOTES
Transplant Surgery  Inpatient Daily Progress Note  10/22/2017    Assessment & Plan: 66yo with hx DM2, diabetic nephropathy s/p KT 2005, HTN, CAD, hypothyroidism, and GERD. 10/16/17: Underwent exploratory laparotomy , JOSEFA, reduction of incarcerated incisional hernia, abdominal wall reconstruction with placement of XenMatrix AB 10 x 15 cm inlay mesh using Mitek suture anchors to the iliac bone, primary closure of the lateral abdominal wall covering the entire mesh with excision of skin and subcutaneous tissue of right lower abdominal wall and primary closure of skin, and umbilical wall hernia repair.  Surgeons: Jenn Villa MD (Transplant Surgery) and Angelique Lott MD (Plastic Surgery).    Good pos-operative recovery apart for delirium with hallucinations which has resolved.       S/p hernia repair:  POD #6. Epidural and pca discontinued on POD 2, Oxycodone d/c'd POD 3. AXR 10/17 showed stool burden without obstruction. REAL in place with 50 cc output yesterday, per plastic surgery plan to remain in place until output < 30 cc x2 days.   Graft function: Stable. Cr 1.09, baseline 1.2-1.4.  Immunosuppression management: On Myfortic and CSA.  CSA goal per protocol ~75.  Complexity of management: medium.   Hematology: Mild anemia, Hgb 10.1, stable.  Cardiorespiratory:   HTN: SBP 120s-150s. Back on home metoprolol, imdur, valsartan, chlorthalidone, amlodipine.  R/o pulmonary nodules:  Suspected on CXR 10/19. CT chest 10/19: Small area of tree-in-bud nodularity in the right lower lobe, infectious or inflammatory. Pulmonary consulted. Differential dx included atypical bacterial/mycobacterial, fungal or very slow growing adenocarcinoma. Patient will be followed up in Pulmonary clinic with  CT imaging  and further plans for assessment made at that time.  GI/Nutrition:   S/p reduction of incarcerated colon: Stool burden on AXR on POD 1. Now having bowel movements.   PONV:  Resolved by POD 3. Scopolamine and compazine d/c'd due  to confusion.  Diet:  Regular diet.   Endocrine: DM type 2, insulin gtt discontinued on POD 2. Glucose 80s -100s overnight, requiring no correction. Will d/c insulin gtt today and restart home lantus qHS. Hypothyroidism, on synthroid.  Fluid/Electrolytes:  Labs and weight pending.  : Arevalo removed on POD 2 with negative PVR.   Neuro:  Delirium.  Compazine, scopolamine, narcotics d/c'd 10/19.  Infectious currently negative.  Head CT 10/20 negative for acute findings. Normal sleep/wake cycle and add low dose seroquel and melatonin at HS. No delirium over last 24 hours.  Infectious disease: Afebrile. WBC6.4.  10/19 UA: no pyuria, bld cx NGTD, chest CT: small area of tree in bud RLL.  Procalcitonin 0.09 (low risk of systemic bacterial infection)  Prophylaxis: DVT mechanical  Disposition: Discharge home     Medical Decision Making: Medium  Admit 62637 (moderate level decision making)    NATALY/Fellow/Resident Provider: Stephanie Gunn    Faculty: Jenn Villa M.D.  _________________________________________________________________  Transplant History: Admitted 10/16/2017 for incisional hernia repair.  12/27/2005 (Kidney), Postoperative day: 4317     Interval History: History is obtained from the patient and overnight RN  Overnight events: no further hallucinations, no agitation, remembers overnight events     ROS:   A 10-point review of systems was negative except as noted above.    Meds:    insulin glargine  21 Units Subcutaneous At Bedtime     chlorthalidone  12.5 mg Oral Daily     insulin aspart  1-10 Units Subcutaneous TID AC     insulin aspart  1-7 Units Subcutaneous At Bedtime     QUEtiapine  25 mg Oral At Bedtime     ranitidine  150 mg Oral Daily     valsartan  80 mg Oral Daily     sennosides  2 tablet Oral BID     magnesium hydroxide  30 mL Oral Daily     isosorbide mononitrate  15 mg Oral BID     amLODIPine  10 mg Oral Daily     cycloSPORINE modified  100 mg Oral BID IS     metoprolol  25 mg Oral Daily      "pantoprazole  40 mg Oral QAM     atorvastatin  20 mg Oral Daily     zz ims template  540 mg Oral BID IS     levothyroxine  112 mcg Oral QAM AC     docusate sodium  100 mg Oral BID       Physical Exam:     Admit Weight: 79.1 kg (174 lb 6.1 oz)    Current vitals:   /59  Pulse 75  Temp 98.7  F (37.1  C) (Oral)  Resp 16  Ht 1.626 m (5' 4\")  Wt 80.4 kg (177 lb 3.2 oz)  SpO2 93%  BMI 30.42 kg/m2         Vital sign ranges:    Temp:  [98.2  F (36.8  C)-99.3  F (37.4  C)] 98.7  F (37.1  C)  Pulse:  [75] 75  Heart Rate:  [67-77] 67  Resp:  [16] 16  BP: (141-159)/(55-69) 141/59  SpO2:  [91 %-96 %] 93 %  Patient Vitals for the past 24 hrs:   BP Temp Temp src Pulse Heart Rate Resp SpO2 Weight   10/22/17 0941 - - - - - - - 80.4 kg (177 lb 3.2 oz)   10/22/17 0743 141/59 98.7  F (37.1  C) Oral - 67 16 93 % -   10/21/17 2022 159/55 98.7  F (37.1  C) Oral - 77 16 96 % -   10/21/17 1509 154/69 98.2  F (36.8  C) Oral - 71 16 91 % -   10/21/17 1150 146/56 99.3  F (37.4  C) Oral 75 - 16 93 % -     General Appearance: in no apparent distress.   Skin: normal, warm  Heart: regular rate and rhythm  Lungs: breathing comfortably   Abdomen: The abdomen is flat and obese, and  non-tender, she is not tender over the kidney graft. The wound is Healing well, well approximated, without signs of infection and no drainage. Superficial drain in place  : voiding to toilet  Extremities: edema: absent.   Neurologic: awake, alert and oriented x3. Tremor absent..     Data:   CMP  Recent Labs  Lab 10/22/17  0632 10/21/17  0614 10/20/17  0937 10/19/17  0747    142 138 138   POTASSIUM 3.5 3.4 3.7 3.2*   CHLORIDE 102 107 102 101   CO2 30 29 27 25   * 100* 386* 381*   BUN 18 26 37* 27   CR 1.09* 1.21* 1.70* 1.27*   GFRESTIMATED 50* 44* 30* 42*   GFRESTBLACK 60* 53* 36* 50*   NAVI 8.5 8.2* 8.4* 8.6   MAG  --   --  2.0 1.7   PHOS  --   --   --  2.5     CBC  Recent Labs  Lab 10/22/17  0632 10/21/17  0614   HGB 10.1* 9.5*   WBC 6.4 6.9 "    187     COAGSNo lab results found in last 7 days.    Invalid input(s): XA   Urinalysis  Recent Labs   Lab Test  10/19/17   1819  06/29/17   0620  07/05/16   0618   COLOR  Yellow   --    --    APPEARANCE  Clear   --    --    URINEGLC  >1000*   --    --    URINEBILI  Negative   --    --    URINEKETONE  Negative   --    --    SG  1.013   --    --    UBLD  Negative   --    --    URINEPH  5.0   --    --    PROTEIN  10*   --    --    NITRITE  Negative   --    --    LEUKEST  Negative   --    --    RBCU  1   --    --    WBCU  1   --    --    UTPG   --   0.13  0.13     Virology:  CMV DNA Quantitation Specimen   Date Value Ref Range Status   12/05/2016 Plasma, EDTA anticoagulant  Final     CMV Quantitative   Date Value Ref Range Status   12/19/2007 <100 <100 Copies/mL Final     Comment:     No CMV DNA detected.   12/04/2007 <100 <100 Copies/mL Final     Comment:     No CMV DNA detected.   05/24/2007 <100 <100 Copies/mL Final     Comment:     No CMV DNA detected.     CMV IgG Antibody   Date Value Ref Range Status   12/22/2005 >160.0 EU/mL Final     Comment:     Positive for anti-CMV IgG     Hepatitis C Antibody   Date Value Ref Range Status   12/21/2006 Negative NEG Final     Hep B Surface Kika   Date Value Ref Range Status   12/21/2006 0.0 0.0 - 4.9 mIU/mL Final     Comment:     Negative     BK Virus Result   Date Value Ref Range Status   06/18/2015 BK Virus DNA Not Detected BKNEG copies/mL Final   12/02/2013 <1000 <1000 copies/mL Final   05/08/2013 <1000 <1000 copies/mL Final   11/30/2012 <1000 <1000 copies/mL Final   12/07/2011 <1000 <1000 copies/mL Final   09/27/2011 <1000 <1000 copies/mL Final   07/07/2011 <1000 <1000 copies/mL Final   06/07/2011 <1000 <1000 copies/mL Final   04/29/2011 <1000 <1000 copies/mL Final   04/06/2011 [id number] (H) <1000 copies/mL Final   04/06/2011 <1000 <1000 copies/mL Final   11/24/2010 <1000 <1000 copies/mL Final   06/16/2010 <1000 <1000 copies/mL Final   11/25/2009 <1000 <1000  copies/mL Final   12/30/2008 <1000 <1000 copies/mL Final   11/25/2008 <1000 <1000 copies/mL Final   05/24/2007 <1000 <1000 copies/mL Final   12/21/2006 <1000 <1000 copies/mL Final   10/16/2006 1600 (H) <1000 copies/mL Final   09/25/2006 <1000 <1000 copies/mL Final   09/11/2006 2100 (H) <1000 copies/mL Final

## 2017-10-22 NOTE — PLAN OF CARE
Problem: Patient Care Overview  Goal: Plan of Care/Patient Progress Review  Outcome: No Change  A/Ox4. No hallucinations noted this shift. Pt on bed alarm. VSS on RA. Mod carb diet. Up with SBA. R PIV SL. Pt had 3 BMs and 350 mL urine output. MD notified of urine output; plan for 500cc bolus. Denies pain and nausea. BGs 226, 272; SSI and Lantus given per orders. Plan for possible discharge to home tomorrow.

## 2017-10-22 NOTE — DISCHARGE SUMMARY
General acute hospital, [place]    Discharge Summary  Solid Organ Transplant    Date of Admission:  10/16/2017  Date of Discharge:  10/22/2017  Discharging Provider: Jenn Villa MD / Kinza Carnes NP     Discharge Diagnoses   Principal Problem:    S/P hernia repair  Active Problems:    PONV (postoperative nausea and vomiting)    Acute post-operative pain    Delirium      History of Present Illness   Viv NORA Shaw is an 67 year old female with history of DM2, diabetic nephropathy s/p KT 2005, HTN, CAD, hypothyroidism, and GERD. 10/16/17: Underwent exploratory laparotomy , JOSEFA, reduction of incarcerated incisional hernia, abdominal wall reconstruction with placement of XenMatrix AB 10 x 15 cm inlay mesh using Mitek suture anchors to the iliac bone, primary closure of the lateral abdominal wall covering the entire mesh with excision of skin and subcutaneous tissue of right lower abdominal wall and primary closure of skin, and umbilical wall hernia repair.  Surgeons: Jenn Villa MD (Transplant Surgery) and Angelique Lott MD (Plastic Surgery).    Hospital Course   S/p hernia repair:  10/16/17.  Epidural and PCA discontinued on POD 2, Oxycodone d/c'd POD 3 due to delirium. AXR 10/17 showed stool burden without obstruction. REAL in place, per plastic surgery plan to remain in place until output < 30 cc x2 days.  Bowel function returned prior to discharge.    Graft function: Stable. Cr 1.1, baseline 1.2-1.4.    Immunosuppression management: On Myfortic and CSA.  CSA goal per protocol ~75. Cyclosporine dose was increased to 100mg BID.  Will need a CSA level within a week.    HTN: Restarted on home dose metoprolol, imdur, valsartan, chlorthalidone, amlodipine post-op.    Tree in bud pulmonary opacities:  CXR on 10/19 to r/o infection, incidentally noted suspected pulmonary nodule. CT chest 10/19: Small area of tree-in-bud nodularity in the right lower lobe, infectious or inflammatory. Pulmonary  consulted: Differential includes atypical bacterial/mycobacterial, fungal, or very slow growing adenocarcinoma. Patient will be follow up in Pulmonary clinic with non-contrast CT imaging in 2-3 months.    PONV:  Resolved by POD 3. Scopolamine and compazine d/c'd due to confusion.    DM type 2: Insulin gtt discontinued on POD 2.  Back on home insulin regimen prior to discharge.    Delirium:  Onset on POD #2.  Compazine, scopolamine, narcotics d/c'd 10/19.  Infectious w/u negative for acute findings (CT findings above not felt to be contributory).  Head CT 10/20 negative for acute findings. Normal sleep/wake cycle and add low dose seroquel and melatonin at HS.  Delirium resolved by day of discharge.    Significant Results and Procedures   10/16/17  PLASTIC SURGERY PROCEDURES:  Abdominal wall reconstruction with placement of XenMatrix AB (reference [id number], lot #[id number]) 10 x 15 cm inlay mesh using Mitek suture anchors to the iliac bone and primary closure of the lateral abdominal wall covering the entire mesh with excision of skin and subcutaneous tissue of right lower abdominal wall and primary closure of skin.  Umbilical wall hernia repair.       SURGEON:  Angelique Lott MD     10/16/17  Procedure: Exploratory laparotomy , Lysis of adhesions <60 minutes  and reduction of incarcerated hernia  Surgeon: Jenn Villa M.D.  Findings:  5cm incisional hernia over RLQ kidney transplant with incarcerated colon, which was reduced after lysis of adhesions.    Pending Results   Unresulted Labs Ordered in the Past 30 Days of this Admission     Date and Time Order Name Status Description    10/19/2017 1609 Blood culture Preliminary     10/19/2017 1609 Blood culture Preliminary     10/16/2017 1035 Surgical pathology exam In process           Code Status   Full Code    Primary Care Physician   Summer Vega    Physical Exam   Temp: 98.7  F (37.1  C) Temp src: Oral BP: 141/59   Heart Rate: 67 Resp: 16 SpO2: 93 % O2  Device: None (Room air)    Vitals:    10/18/17 0800 10/21/17 0351 10/22/17 0941   Weight: 82.1 kg (180 lb 14.4 oz) 81.3 kg (179 lb 3.2 oz) 80.4 kg (177 lb 3.2 oz)     Vital Signs with Ranges  Temp:  [98.2  F (36.8  C)-98.7  F (37.1  C)] 98.7  F (37.1  C)  Heart Rate:  [67-77] 67  Resp:  [16] 16  BP: (141-159)/(55-69) 141/59  SpO2:  [91 %-96 %] 93 %  I/O last 3 completed shifts:  In: 1220 [P.O.:720; I.V.:500]  Out: 2640 [Urine:2575; Drains:65]      Time Spent on this Encounter   IKinza, personally saw the patient today and spent greater than 30 minutes discharging this patient.    Discharge Disposition   Discharged to home  Condition at discharge: Stable    Consultations This Hospital Stay   PHARMACY IP CONSULT  PHARMACY IP CONSULT  VASCULAR ACCESS CARE ADULT IP CONSULT  NEPHROLOGY KIDNEY/PANCREAS TRANSPLANT ADULT IP CONSULT  PHARMACY IP CONSULT  VASCULAR ACCESS CARE ADULT IP CONSULT  VASCULAR ACCESS CARE ADULT IP CONSULT  PHARMACY IP CONSULT  PULMONARY GENERAL ADULT IP CONSULT    Discharge Orders     Reason for your hospital stay   Hernia repair     Adult [facility] Follow-up and recommended labs and tests   1.  Dr. Villa, Transplant Clinic, 1-2 weeks, follow up hernia repair  2.  Plastic Surgery Clinic, as scheduled on 11/1  3.  Pulmonary Clinic, 2-3 months to follow up tree in bud nodularity  4.  Chest CT, non-contrast, 2-3 months to follow up tree in bud nodularity  5.  CSA level next week    Appointments on [place] and/or [facility] (with [facility] or [facility] provider or service). Call [phone] if you haven't heard regarding these appointments within 7 days of discharge.     Activity   Your activity upon discharge: No lifting greater than 10 pounds for at least 8 weeks, no driving while taking narcotic pain medications, avoid bath tubs, hot tubs, and swimming for at least 3 weeks.     Monitor and record   blood glucose 4 times a day, before meals and at bedtime     When to contact your  care team   Notify your coordinator if you have pain over your kidney, fever greater than 100.5F, redness or drainage from incision, or decreased urine output.    Notify your coordinator immediately if you are ever unable to take your immunosuppressive medications for any reason.    Transplant Coordinator [facility] [phone]     Wound care and dressings   Instructions to care for your wound at home: keep wound clean and dry and may get incision wet in shower but do not soak or scrub.     Tubes and drains   You are going home with the following tubes or drains: REAL drain.  Empty daily and write down amount.     Diet   Follow this diet upon discharge: Regular diet       Discharge Medications   Current Discharge Medication List      START taking these medications    Details   acetaminophen (TYLENOL) 325 MG tablet Take 2 tablets (650 mg) by mouth every 4 hours as needed for other (surgical pain)  Qty: 100 tablet    Associated Diagnoses: Acute post-operative pain      senna (SENOKOT) 8.6 MG tablet Take 1 tablet by mouth daily Hold for loose stools  Qty: 30 tablet, Refills: 0    Associated Diagnoses: S/P hernia repair         CONTINUE these medications which have CHANGED    Details   GENGRAF (BRAND) 25 MG CAPSULE Take 4 capsules (100 mg) by mouth 2 times daily  Qty: 240 capsule, Refills: 11    Associated Diagnoses: Kidney transplanted         CONTINUE these medications which have NOT CHANGED    Details   alendronate (FOSAMAX) 70 MG tablet Take 1 tablet (70 mg) by mouth every 7 days Take with over 8 ounces water and stay upright for at least 30 minutes after dose.  Take at least 60 minutes before breakfast  Qty: 12 tablet, Refills: 3    Associated Diagnoses: Osteopenia      amLODIPine (NORVASC) 10 MG tablet Take 1 tablet (10 mg) by mouth daily  Qty: 90 tablet, Refills: 1    Comments: Profile Rx: patient will contact pharmacy when needed  Associated Diagnoses: Hypertension secondary to other renal disorders      chlorthalidone  (HYGROTON) 25 MG tablet Take 1 tablet (25 mg) by mouth daily  Qty: 90 tablet, Refills: 3    Comments: Profile Rx: patient will contact pharmacy when needed  Associated Diagnoses: Hypertension secondary to other renal disorders      isosorbide mononitrate (IMDUR) 30 MG 24 hr tablet Take 0.5 tablets (15 mg) by mouth 2 times daily  Qty: 90 tablet, Refills: 1    Comments: Profile Rx: patient will contact pharmacy when needed  Associated Diagnoses: Coronary artery disease involving native heart without angina pectoris, unspecified vessel or lesion type      metoprolol (TOPROL XL) 25 MG 24 hr tablet Take 1 tablet (25 mg) by mouth At Bedtime  Qty: 90 tablet, Refills: 1    Comments: Profile Rx: patient will contact pharmacy when needed  Associated Diagnoses: Hypertension secondary to other renal disorders; Coronary artery disease involving native heart without angina pectoris, unspecified vessel or lesion type      pantoprazole (PROTONIX) 40 MG EC tablet Take 1 tablet (40 mg) by mouth daily  Qty: 90 tablet, Refills: 1    Comments: Profile Rx: patient will contact pharmacy when needed  Associated Diagnoses: Gastroesophageal reflux disease without esophagitis      valsartan (DIOVAN) 160 MG tablet Take 0.5 tablets (80 mg) by mouth daily  Qty: 45 tablet, Refills: 1    Comments: Profile Rx: patient will contact pharmacy when needed  Associated Diagnoses: Hypertension secondary to other renal disorders      atorvastatin (LIPITOR) 40 MG tablet Take 0.5 tablets (20 mg) by mouth daily  Qty: 45 tablet, Refills: 1    Associated Diagnoses: Hyperlipidemia LDL goal <100      mycophenolic acid (MYFORTIC - GENERIC EQUIVALENT) 180 MG EC tablet Take 3 tablets (540 mg) by mouth 2 times daily  Qty: 180 tablet, Refills: 11    Associated Diagnoses: Kidney replaced by transplant      levothyroxine (SYNTHROID) 112 MCG tablet Take by mouth daily      LANTUS - INSULIN GLARGINE 21 u q pm  Qty: 0, Refills: 0    Associated Diagnoses: Type II or  "unspecified type diabetes mellitus without mention of complication, not stated as uncontrolled      NOVOLOG 100 U/ML SC SOLN sliding scale 4-6 units tid      BD INSULIN SYRINGE ULTRAFINE 31G X 5/16\" 0.3 ML USING 4-5 PER DAY ([facility] 31G/0.3ML)  Refills: 6      FLORIN CONTOUR test strip Refills: 0      aspirin 325 MG EC tablet Take 81 mg by mouth daily   Qty: 100 tablet, Refills: 3           Allergies   Allergies   Allergen Reactions     No Known Drug Allergies      Data   Most Recent 3 CBC's:  Recent Labs   Lab Test  10/22/17   0632  10/21/17   0614  10/20/17   0937   WBC  6.4  6.9  8.9   HGB  10.1*  9.5*  9.9*   MCV  89  91  93   PLT  201  187  233     Most Recent 3 BMP's:  Recent Labs   Lab Test  10/22/17   0632  10/21/17   0614  10/20/17   0937   NA  138  142  138   POTASSIUM  3.5  3.4  3.7   CHLORIDE  102  107  102   CO2  30  29  27   BUN  18  26  37*   CR  1.09*  1.21*  1.70*   ANIONGAP  6  6  9   NAVI  8.5  8.2*  8.4*   GLC  108*  100*  386*   I have reviewed history, examined patient and discussed plan with the fellow/resident/NATALY.    I concur with the findings in this note.    Time spent on discharge activities: 45 minutes.             "

## 2017-10-22 NOTE — PLAN OF CARE
Problem: Patient Care Overview  Goal: Individualization & Mutuality  VSS and pt afebrile. Tylenol given X 2 for pain with good control.   Blood glucose monitored and corrected as charted.   Sitter was removed at 0945.  Pt called appropriately and discharge was discussed and agreed upon by MD.   Discharge instructions reviewed in detail with patient and Johnny, .  Time left for questions.  Johnny has education on REAL drain and all supplies.  He also has a way to record the output and color.  He demonstrated how to empty the bulb.  Both Bill and pt verbalized understanding of the discharge instructions.  Pt left via wheelchair with transport in stable condition.    PIV removed from right lower arm with cannula intact.

## 2017-10-23 ENCOUNTER — CARE COORDINATION (OUTPATIENT)
Dept: CARE COORDINATION | Facility: CLINIC | Age: 67
End: 2017-10-23

## 2017-10-23 ENCOUNTER — TELEPHONE (OUTPATIENT)
Dept: INTERNAL MEDICINE | Facility: CLINIC | Age: 67
End: 2017-10-23

## 2017-10-23 DIAGNOSIS — R93.89 ABNORMAL FINDING ON IMAGING: Primary | ICD-10-CM

## 2017-10-23 NOTE — PROGRESS NOTES
Clinic Care Coordination Contact  OUTREACH    Referral Information:  Referral Source: IP/TCU Report  Reason for Contact: Post hospital follow up        Universal Utilization:   ED Visits in last year: 1  Hospital visits in last year: 1  Last PCP appointment: 09/27/17  Missed Appointments: 0     Multiple Providers or Specialists: Yes    Clinical Concerns:    Current Medical Concerns: 67 year old female that was inpatient at the [facility] from 10/16/17 to 10/22/17. Patient has history of kidney transplant in 2005.  She had a exploratory laparotomy with lysis of adhesions, reduction of incarcerated incisional hernia, abdominal wall reconstruction with placement of mesh and mitek suture anchors to the iliac bone. Primary closure of the lateral abdominal wall covering the entire mesh with excision of skin and subcutaneous tissue of right lower abdominal wall and primary closure of skin, umbilical wall hernia repair.    Clinic care coordinator called patient and spoke with spouse, Johnny. He states that patient was very confused in the hospital and required a sitter with her. This cleared and she is now back to her normal self.  They are using tylenol only for pain. He states she is using it every 5 -6 hours and getting good relief.    Patient has a REAL drain in place. Johnny is caring for this. She has had 45 cc since discharge.  The plan is to keep drain in place until she has two consecutive days of less than 30 cc. Spouse is aware of this. He states they also told them if the drain falls out not to worry about it. Johnny states her is clean and dry. He helped her shower this am and then cleaned incision as instructed.         Current Behavioral Concerns: Denies concerns - confusion has cleared and no recurrence        Clinical Pathway Name: None      Medication Management:  Johnny is managing her medications at this time.  There was a change to her anti-rejection medication. She is to take four (25 mg tablets)  of Gengraf twice a day. She was taking three BID.  Clarified with Bill the change. He states he has enough medication.     Functional Status:  Mobility Status: Independent     Transportation: Spouse drives        Psychosocial:  Current living arrangement:: I live in a private home with spouse    Spouse is primary caregiver.      Resources and Interventions:  Current Resources:  None          Advanced Care Plans/Directives on file:: Yes        Patient/Caregiver understanding: Expresses understanding    Frequency of Care Coordination:  (na)    Upcoming appointment: 10/30/17 - nephrology     Plan: Patient has several specialist appointments. She will then Make a follow up appointment with PCP.  Patient and spouse do not feel they need additional calls from clinic care coordinators. They continue to be involved with transplant clinic.     Kena Marrero RN, CCM - Care Coordinator     10/23/2017    1:56 PM  [phone]

## 2017-10-23 NOTE — TELEPHONE ENCOUNTER
IP F/U    Date: 10/22/17  Diagnosis: Ventral Hernia, S/P Umbilical hernia Repair, Follow up Exam  Is patient active in care coordination? No  Was patient in TCU? No

## 2017-10-24 ENCOUNTER — TELEPHONE (OUTPATIENT)
Dept: TRANSPLANT | Facility: CLINIC | Age: 67
End: 2017-10-24

## 2017-10-24 DIAGNOSIS — Z94.0 KIDNEY REPLACED BY TRANSPLANT: Primary | ICD-10-CM

## 2017-10-24 NOTE — TELEPHONE ENCOUNTER
Kinza Carnes APRN CNP Ututalum, Teresa, RN                   Discharged Sunday after hernia repair.  Needs CSA level this week.  INcreased cyclo dose to 100mg BID.  Needs Pulm f/u, please see DC summary.  Teresita Kinza [phone]       PLAN:  Call and have CSA level rechecked.

## 2017-10-25 LAB
BACTERIA SPEC CULT: NO GROWTH
BACTERIA SPEC CULT: NO GROWTH
SPECIMEN SOURCE: NORMAL
SPECIMEN SOURCE: NORMAL

## 2017-10-25 NOTE — TELEPHONE ENCOUNTER
Spoke to patient and she will do labs Friday 6AM, will cancel lab appointment for Monday. Aware of her upcoming Monday with Dr Villa.

## 2017-10-26 DIAGNOSIS — E78.5 HYPERLIPIDEMIA LDL GOAL <100: ICD-10-CM

## 2017-10-27 DIAGNOSIS — Z94.0 KIDNEY REPLACED BY TRANSPLANT: ICD-10-CM

## 2017-10-27 LAB
ANION GAP SERPL CALCULATED.3IONS-SCNC: 7 MMOL/L (ref 3–14)
BUN SERPL-MCNC: 22 MG/DL (ref 7–30)
CALCIUM SERPL-MCNC: 9.1 MG/DL (ref 8.5–10.1)
CHLORIDE SERPL-SCNC: 102 MMOL/L (ref 94–109)
CO2 SERPL-SCNC: 31 MMOL/L (ref 20–32)
CREAT SERPL-MCNC: 1.49 MG/DL (ref 0.52–1.04)
CYCLOSPORINE BLD LC/MS/MS-MCNC: 151 UG/L (ref 50–400)
DIFFERENTIAL METHOD BLD: ABNORMAL
EOSINOPHIL # BLD AUTO: 0.1 10E9/L (ref 0–0.7)
EOSINOPHIL NFR BLD AUTO: 2 %
ERYTHROCYTE [DISTWIDTH] IN BLOOD BY AUTOMATED COUNT: 13.2 % (ref 10–15)
GFR SERPL CREATININE-BSD FRML MDRD: 35 ML/MIN/1.7M2
GLUCOSE SERPL-MCNC: 120 MG/DL (ref 70–99)
HCT VFR BLD AUTO: 34.2 % (ref 35–47)
HGB BLD-MCNC: 10.3 G/DL (ref 11.7–15.7)
LYMPHOCYTES # BLD AUTO: 1 10E9/L (ref 0.8–5.3)
LYMPHOCYTES NFR BLD AUTO: 14.8 %
MCH RBC QN AUTO: 28.2 PG (ref 26.5–33)
MCHC RBC AUTO-ENTMCNC: 30.1 G/DL (ref 31.5–36.5)
MCV RBC AUTO: 94 FL (ref 78–100)
MONOCYTES # BLD AUTO: 0.9 10E9/L (ref 0–1.3)
MONOCYTES NFR BLD AUTO: 12.6 %
NEUTROPHILS # BLD AUTO: 4.9 10E9/L (ref 1.6–8.3)
NEUTROPHILS NFR BLD AUTO: 70.6 %
NRBC # BLD AUTO: 0 10*3/UL
NRBC BLD AUTO-RTO: 0 /100
PLATELET # BLD AUTO: 286 10E9/L (ref 150–450)
POTASSIUM SERPL-SCNC: 3.8 MMOL/L (ref 3.4–5.3)
RBC # BLD AUTO: 3.65 10E12/L (ref 3.8–5.2)
SODIUM SERPL-SCNC: 140 MMOL/L (ref 133–144)
TME LAST DOSE: NORMAL H
WBC # BLD AUTO: 6.9 10E9/L (ref 4–11)

## 2017-10-27 PROCEDURE — 80158 DRUG ASSAY CYCLOSPORINE: CPT | Performed by: INTERNAL MEDICINE

## 2017-10-28 RX ORDER — ATORVASTATIN CALCIUM 40 MG/1
TABLET, FILM COATED ORAL
Qty: 45 TABLET | Refills: 1 | Status: SHIPPED | OUTPATIENT
Start: 2017-10-28 | End: 2017-12-21

## 2017-10-28 NOTE — TELEPHONE ENCOUNTER
Per 10/23 care coord dict-Plan: Patient has several specialist appointments. She will then Make a follow up appointment with PCP.  Patient and spouse do not feel they need additional calls from clinic care coordinators. They continue to be involved with transplant clinic.

## 2017-10-30 ENCOUNTER — OFFICE VISIT (OUTPATIENT)
Dept: TRANSPLANT | Facility: CLINIC | Age: 67
End: 2017-10-30
Attending: SURGERY
Payer: MEDICARE

## 2017-10-30 ENCOUNTER — TELEPHONE (OUTPATIENT)
Dept: TRANSPLANT | Facility: CLINIC | Age: 67
End: 2017-10-30

## 2017-10-30 VITALS
TEMPERATURE: 98 F | SYSTOLIC BLOOD PRESSURE: 112 MMHG | OXYGEN SATURATION: 100 % | RESPIRATION RATE: 16 BRPM | HEART RATE: 64 BPM | DIASTOLIC BLOOD PRESSURE: 69 MMHG

## 2017-10-30 DIAGNOSIS — Z94.0 KIDNEY REPLACED BY TRANSPLANT: Primary | ICD-10-CM

## 2017-10-30 PROBLEM — K43.2 HERNIA, INCISIONAL: Status: ACTIVE | Noted: 2017-10-30

## 2017-10-30 PROCEDURE — 99212 OFFICE O/P EST SF 10 MIN: CPT | Mod: ZF

## 2017-10-30 ASSESSMENT — PAIN SCALES - GENERAL: PAINLEVEL: NO PAIN (0)

## 2017-10-30 NOTE — TELEPHONE ENCOUNTER
Issue CYCLOSPORINE  Level 151 above goal level   Please see following EPIC message  Reviewing CYCLOSPORINE  Level

## 2017-10-30 NOTE — MR AVS SNAPSHOT
After Visit Summary   10/30/2017    Viv Shaw    MRN: [id number]           Patient Information     Date Of Birth          1950        Visit Information        Provider Department      10/30/2017 3:45 PM Jenn Villa MD [facility] Solid Organ Transplant        Today's Diagnoses     Kidney replaced by transplant    -  1       Follow-ups after your visit        Your next 10 appointments already scheduled     Nov 01, 2017 10:30 AM CDT   (Arrive by 10:15 AM)   Post-Op with  Angelique Lott MD   [facility] Plastic and Reconstructive Surgery ([facility])    [address]  4th Floor  [facility] [address]   [phone]            Dec 04, 2017  1:05 PM CST   (Arrive by 12:35 PM)   Return Kidney Transplant with Morro Veloz MD   [facility] Nephrology ([facility])    [address] [place] [address]  [address] [facility] [address]   [phone]            Dec 06, 2017  9:00 AM CST   (Arrive by 8:45 AM)   New Patient Visit with Pepe Boss MD   [facility] ([facility])    [address] [place] [address]  [address] [facility] [id number] [phone]            Jan 06, 2018  8:45 AM CST   (Arrive by 8:30 AM)   XR CHEST 2 VIEWS with UCXR1   [facility] Imaging [facility] Xray ([facility])    [address]  1st Floor  [facility] [id number] [phone]           Please bring a list of your current medicines to your exam. (Include vitamins, minerals and over-thecounter medicines.) Leave your valuables at home.  Tell your doctor if there is a chance you may be pregnant.  You do not need to do anything special for this exam.              Who to contact     If you have questions or need follow up information about today's clinic visit or your schedule please contact [facility] SOLID ORGAN TRANSPLANT directly at [phone].  Normal or non-critical lab and  imaging results will be communicated to you by MyChart, letter or phone within 4 business days after the clinic has received the results. If you do not hear from us within 7 days, please contact the clinic through [employer] or phone. If you have a critical or abnormal lab result, we will notify you by phone as soon as possible.  Submit refill requests through [employer] or call your pharmacy and they will forward the refill request to us. Please allow 3 business days for your refill to be completed.          Additional Information About Your Visit        [employer] Information     [employer] gives you secure access to your electronic health record. If you see a primary care provider, you can also send messages to your care team and make appointments. If you have questions, please call your primary care clinic.  If you do not have a primary care provider, please call [phone] and they will assist you.        Care EveryWhere ID     This is your Care EveryWhere ID. This could be used by other organizations to access your [place] medical records  [id number]        Your Vitals Were     Pulse Temperature Respirations Pulse Oximetry          64 98  F (36.7  C) (Oral) 16 100%         Blood Pressure from Last 3 Encounters:   10/30/17 112/69   10/22/17 141/59   09/27/17 136/60    Weight from Last 3 Encounters:   10/22/17 80.4 kg (177 lb 3.2 oz)   09/27/17 80.2 kg (176 lb 14.4 oz)   08/23/17 80.6 kg (177 lb 9.6 oz)              Today, you had the following     No orders found for display       Primary Care Provider Office Phone # Fax #    Krishnakumari G MD Silvia [phone]       [address] [facility] [address]        Equal Access to Services     [facility]: Hadii aad ku hadasho Soomaali, waaxda luqadaha, qaybta kaalmada adeegyada, hue payan . So [facility] [phone].    ATENCIÓN: Si habla español, tiene a rosario disposición servicios gratuitos de asistencia lingüística. Chad  "al [phone].    We comply with applicable federal civil rights laws and [place] laws. We do not discriminate on the basis of race, color, national origin, age, disability, sex, sexual orientation, or gender identity.            Thank you!     Thank you for choosing [facility] SOLID ORGAN TRANSPLANT  for your care. Our goal is always to provide you with excellent care. Hearing back from our patients is one way we can continue to improve our services. Please take a few minutes to complete the written survey that you may receive in the mail after your visit with us. Thank you!             Your Updated Medication List - Protect others around you: Learn how to safely use, store and throw away your medicines at www.disposemymeds.org.          This list is accurate as of: 10/30/17  4:21 PM.  Always use your most recent med list.                   Brand Name Dispense Instructions for use Diagnosis    acetaminophen 325 MG tablet    TYLENOL    100 tablet    Take 2 tablets (650 mg) by mouth every 4 hours as needed for other (surgical pain)    Acute post-operative pain       alendronate 70 MG tablet    FOSAMAX    12 tablet    Take 1 tablet (70 mg) by mouth every 7 days Take with over 8 ounces water and stay upright for at least 30 minutes after dose.  Take at least 60 minutes before breakfast    Osteopenia       amLODIPine 10 MG tablet    NORVASC    90 tablet    Take 1 tablet (10 mg) by mouth daily    Hypertension secondary to other renal disorders       aspirin 325 MG EC tablet     100 tablet    Take 81 mg by mouth daily        atorvastatin 40 MG tablet    LIPITOR    45 tablet    TAKE 0.5 TABLETS (20 MG) BY MOUTH DAILY    Hyperlipidemia LDL goal <100       FLORIN CONTOUR test strip   Generic drug:  blood glucose monitoring           BD insulin syringe ultrafine 31G X 5/16\" 0.3 ML   Generic drug:  insulin syringe-needle U-100      USING 4-5 PER DAY ([facility] 31G/0.3ML)        chlorthalidone 25 MG tablet    HYGROTON    90 " tablet    Take 1 tablet (25 mg) by mouth daily    Hypertension secondary to other renal disorders       cycloSPORINE modified 25 MG capsule     240 capsule    Take 4 capsules (100 mg) by mouth 2 times daily    Kidney transplanted       isosorbide mononitrate 30 MG 24 hr tablet    IMDUR    90 tablet    Take 0.5 tablets (15 mg) by mouth 2 times daily    Coronary artery disease involving native heart without angina pectoris, unspecified vessel or lesion type       LANTUS - INSULIN GLARGINE     0    21 u q pm    Type II or unspecified type diabetes mellitus without mention of complication, not stated as uncontrolled       metoprolol 25 MG 24 hr tablet    TOPROL XL    90 tablet    Take 1 tablet (25 mg) by mouth At Bedtime    Hypertension secondary to other renal disorders, Coronary artery disease involving native heart without angina pectoris, unspecified vessel or lesion type       mycophenolic acid 180 MG EC tablet    GENERIC EQUIVALENT    180 tablet    Take 3 tablets (540 mg) by mouth 2 times daily    Kidney replaced by transplant       NovoLOG VIAL 100 UNITS/ML injection   Generic drug:  insulin aspart      sliding scale 4-6 units tid        pantoprazole 40 MG EC tablet    PROTONIX    90 tablet    Take 1 tablet (40 mg) by mouth daily    Gastroesophageal reflux disease without esophagitis       senna 8.6 MG tablet    SENOKOT    30 tablet    Take 1 tablet by mouth daily Hold for loose stools    S/P hernia repair       SYNTHROID 112 MCG tablet   Generic drug:  levothyroxine      Take by mouth daily        valsartan 160 MG tablet    DIOVAN    45 tablet    Take 0.5 tablets (80 mg) by mouth daily    Hypertension secondary to other renal disorders

## 2017-10-30 NOTE — PROGRESS NOTES
S/p hernia repair with mesh  Joint procedure with plastics  Drain output 30 c last few days  D/C drain  Staples in place   Leave for 3 weeks.   F/U with plastics on Wednesday  Immunosuppressive regimen, management and long term risks discussed in detail. Changes, when applicable made as per orders.    Total time: 15 min  Counseling time: 10 min

## 2017-10-30 NOTE — LETTER
10/30/2017       RE: Viv Shaw  [address]  [address]  [place] [id number]     Dear Colleague,    Thank you for referring your patient, Viv Shaw, to the [facility] SOLID ORGAN TRANSPLANT at [facility]. Please see a copy of my visit note below.    S/p hernia repair with mesh  Joint procedure with plastics  Drain output 30 c last few days  D/C drain  Staples in place   Leave for 3 weeks.   F/U with plastics on Wednesday  Immunosuppressive regimen, management and long term risks discussed in detail. Changes, when applicable made as per orders.    Total time: 15 min  Counseling time: 10 min

## 2017-10-31 DIAGNOSIS — R93.89 ABNORMAL CT OF THE CHEST: Primary | ICD-10-CM

## 2017-11-01 ENCOUNTER — OFFICE VISIT (OUTPATIENT)
Dept: PLASTIC SURGERY | Facility: CLINIC | Age: 67
End: 2017-11-01

## 2017-11-01 DIAGNOSIS — K43.2 INCISIONAL HERNIA, WITHOUT OBSTRUCTION OR GANGRENE: Primary | ICD-10-CM

## 2017-11-01 NOTE — PROGRESS NOTES
PRESENTING COMPLAINT:  Postoperative visit, status post right flank hernia repair and abdominal wall reconstruction using XenMatrix AB and Mitek suture anchors done on 10/16/2017.      HISTORY OF PRESENT ILLNESS:  Ms. Shaw is 67 years old.  She is about half week out from surgery, doing very well, no major issues.  REAL drain was removed by Dr. Villa.  She has had no major issues.      PHYSICAL EXAMINATION:  On exam vital signs stable.  She is afebrile in no obvious distress.  Wound is healing in well.  No evidence for infection, seroma or hematoma.      ASSESSMENT AND PLAN:  Based above findings, a diagnosis of a right flank hernia repair was made, took out the staples.  I have asked her to refrain from any heavy lifting over 5-10 pounds for total of 3 months and then refrain from lifting more than 15-20 pounds for another 3 months and then thereafter never lift more than 25 pounds.  I will see back in 4-6 weeks.

## 2017-11-01 NOTE — NURSING NOTE
Chief Complaint   Patient presents with     Surgical Followup     post op        There were no vitals filed for this visit.    Ashish UMANZOR

## 2017-11-01 NOTE — LETTER
11/1/2017       RE: Viv Shaw  [address]  [address]  [place] [address]     Dear Colleague,    Thank you for referring your patient, Viv Shaw, to the [facility] PLASTIC AND RECONSTRUCTIVE SURGERY at [facility]. Please see a copy of my visit note below.    PRESENTING COMPLAINT:  Postoperative visit, status post right flank hernia repair and abdominal wall reconstruction using XenMatrix AB and Mitek suture anchors done on 10/16/2017.      HISTORY OF PRESENT ILLNESS:  Ms. Shaw is 67 years old.  She is about half week out from surgery, doing very well, no major issues.  REAL drain was removed by Dr. Villa.  She has had no major issues.      PHYSICAL EXAMINATION:  On exam vital signs stable.  She is afebrile in no obvious distress.  Wound is healing in well.  No evidence for infection, seroma or hematoma.      ASSESSMENT AND PLAN:  Based above findings, a diagnosis of a right flank hernia repair was made, took out the staples.  I have asked her to refrain from any heavy lifting over 5-10 pounds for total of 3 months and then refrain from lifting more than 15-20 pounds for another 3 months and then thereafter never lift more than 25 pounds.  I will see back in 4-6 weeks.         Again, thank you for allowing me to participate in the care of your patient.      Sincerely,    CARL Lott MD

## 2017-11-02 ENCOUNTER — TELEPHONE (OUTPATIENT)
Dept: TRANSPLANT | Facility: CLINIC | Age: 67
End: 2017-11-02

## 2017-11-02 DIAGNOSIS — Z94.0 KIDNEY TRANSPLANTED: ICD-10-CM

## 2017-11-03 DIAGNOSIS — E78.5 HYPERLIPIDEMIA LDL GOAL <100: ICD-10-CM

## 2017-11-03 RX ORDER — CYCLOSPORINE 25 MG/1
CAPSULE ORAL
Qty: 210 CAPSULE | Refills: 11 | Status: SHIPPED | OUTPATIENT
Start: 2017-11-03 | End: 2017-12-01

## 2017-11-03 NOTE — TELEPHONE ENCOUNTER
ISSUE: Cyclosporine = 151  Confirms good 12 hour dose.  Dose was increased while inpatient from 75 bis to 100 bid.    PLAN:  Decrease to 100 mg AM and 75 mg PM.  Verbalized understanding.  Will have levels rechecked in 2 weeks.  Sees Dr. Veloz on Dec 4,  2017.

## 2017-11-06 RX ORDER — ATORVASTATIN CALCIUM 40 MG/1
TABLET, FILM COATED ORAL
Qty: 45 TABLET | Refills: 1 | Status: SHIPPED | OUTPATIENT
Start: 2017-11-06 | End: 2018-04-20

## 2017-11-12 DIAGNOSIS — I25.10 CORONARY ARTERY DISEASE INVOLVING NATIVE HEART WITHOUT ANGINA PECTORIS, UNSPECIFIED VESSEL OR LESION TYPE: ICD-10-CM

## 2017-11-13 RX ORDER — ISOSORBIDE MONONITRATE 30 MG/1
TABLET, EXTENDED RELEASE ORAL
Qty: 90 TABLET | Refills: 0 | Status: SHIPPED | OUTPATIENT
Start: 2017-11-13 | End: 2017-12-21

## 2017-11-29 ENCOUNTER — TELEPHONE (OUTPATIENT)
Dept: NEPHROLOGY | Facility: CLINIC | Age: 67
End: 2017-11-29

## 2017-11-29 NOTE — TELEPHONE ENCOUNTER
Spoke with patient. Had hernia surgery in October, which was tough for her. She's doing well at this point. Otherwise no symptoms or concerns. Will have labs drawn tomorrow.    Cynthia Heck RN

## 2017-11-30 ENCOUNTER — TELEPHONE (OUTPATIENT)
Dept: TRANSPLANT | Facility: CLINIC | Age: 67
End: 2017-11-30

## 2017-11-30 DIAGNOSIS — Z94.0 KIDNEY REPLACED BY TRANSPLANT: Primary | ICD-10-CM

## 2017-11-30 DIAGNOSIS — Z94.0 KIDNEY REPLACED BY TRANSPLANT: ICD-10-CM

## 2017-11-30 DIAGNOSIS — Z48.298 AFTERCARE FOLLOWING ORGAN TRANSPLANT: ICD-10-CM

## 2017-11-30 DIAGNOSIS — Z94.0 KIDNEY TRANSPLANTED: ICD-10-CM

## 2017-11-30 DIAGNOSIS — Z79.899 ENCOUNTER FOR LONG-TERM CURRENT USE OF MEDICATION: ICD-10-CM

## 2017-11-30 LAB
ANION GAP SERPL CALCULATED.3IONS-SCNC: 7 MMOL/L (ref 3–14)
BUN SERPL-MCNC: 39 MG/DL (ref 7–30)
CALCIUM SERPL-MCNC: 9.1 MG/DL (ref 8.5–10.1)
CHLORIDE SERPL-SCNC: 106 MMOL/L (ref 94–109)
CO2 SERPL-SCNC: 28 MMOL/L (ref 20–32)
CREAT SERPL-MCNC: 1.38 MG/DL (ref 0.52–1.04)
CREAT UR-MCNC: 88 MG/DL
CYCLOSPORINE BLD LC/MS/MS-MCNC: 145 UG/L (ref 50–400)
ERYTHROCYTE [DISTWIDTH] IN BLOOD BY AUTOMATED COUNT: 12.8 % (ref 10–15)
GFR SERPL CREATININE-BSD FRML MDRD: 38 ML/MIN/1.7M2
GLUCOSE SERPL-MCNC: 88 MG/DL (ref 70–99)
HCT VFR BLD AUTO: 34.3 % (ref 35–47)
HGB BLD-MCNC: 10.2 G/DL (ref 11.7–15.7)
MCH RBC QN AUTO: 27.6 PG (ref 26.5–33)
MCHC RBC AUTO-ENTMCNC: 29.7 G/DL (ref 31.5–36.5)
MCV RBC AUTO: 93 FL (ref 78–100)
PLATELET # BLD AUTO: 250 10E9/L (ref 150–450)
POTASSIUM SERPL-SCNC: 4.1 MMOL/L (ref 3.4–5.3)
PROT UR-MCNC: 0.09 G/L
PROT/CREAT 24H UR: 0.11 G/G CR (ref 0–0.2)
RBC # BLD AUTO: 3.7 10E12/L (ref 3.8–5.2)
SODIUM SERPL-SCNC: 141 MMOL/L (ref 133–144)
TME LAST DOSE: NORMAL H
WBC # BLD AUTO: 4.2 10E9/L (ref 4–11)

## 2017-11-30 PROCEDURE — 80158 DRUG ASSAY CYCLOSPORINE: CPT | Performed by: INTERNAL MEDICINE

## 2017-11-30 NOTE — TELEPHONE ENCOUNTER
ISSUE: cyclosporine level = 145 (151)    Dose was decreased last 11/3 from 100 bid to 100/75.  Still above goal of 75.    LPN task:  Recommend decreasing dose further to 75 mg BID.  Recheck level in 1 - 2 weeks after dose change.  Please make sure to have it drawn 12 hrs from the last dose.

## 2017-12-01 RX ORDER — CYCLOSPORINE 25 MG/1
75 CAPSULE ORAL 2 TIMES DAILY
Qty: 180 CAPSULE | Refills: 11 | Status: SHIPPED | OUTPATIENT
Start: 2017-12-01 | End: 2018-11-29

## 2017-12-04 ENCOUNTER — OFFICE VISIT (OUTPATIENT)
Dept: NEPHROLOGY | Facility: CLINIC | Age: 67
End: 2017-12-04
Attending: INTERNAL MEDICINE
Payer: MEDICARE

## 2017-12-04 ENCOUNTER — TELEPHONE (OUTPATIENT)
Dept: NEPHROLOGY | Facility: CLINIC | Age: 67
End: 2017-12-04

## 2017-12-04 VITALS
WEIGHT: 167 LBS | BODY MASS INDEX: 28.51 KG/M2 | HEART RATE: 64 BPM | OXYGEN SATURATION: 97 % | HEIGHT: 64 IN | DIASTOLIC BLOOD PRESSURE: 78 MMHG | SYSTOLIC BLOOD PRESSURE: 129 MMHG

## 2017-12-04 DIAGNOSIS — Z48.298 AFTERCARE FOLLOWING ORGAN TRANSPLANT: ICD-10-CM

## 2017-12-04 DIAGNOSIS — D84.9 IMMUNOSUPPRESSED STATUS (H): ICD-10-CM

## 2017-12-04 DIAGNOSIS — D64.9 ANEMIA: Primary | ICD-10-CM

## 2017-12-04 DIAGNOSIS — D63.1 ANEMIA IN STAGE 3 CHRONIC KIDNEY DISEASE (H): ICD-10-CM

## 2017-12-04 DIAGNOSIS — I15.1 HYPERTENSION SECONDARY TO OTHER RENAL DISORDERS: ICD-10-CM

## 2017-12-04 DIAGNOSIS — Z94.0 KIDNEY REPLACED BY TRANSPLANT: Primary | ICD-10-CM

## 2017-12-04 DIAGNOSIS — N18.30 ANEMIA IN STAGE 3 CHRONIC KIDNEY DISEASE (H): ICD-10-CM

## 2017-12-04 PROBLEM — R11.2 PONV (POSTOPERATIVE NAUSEA AND VOMITING): Status: RESOLVED | Noted: 2017-10-17 | Resolved: 2017-12-04

## 2017-12-04 PROBLEM — Z98.890 PONV (POSTOPERATIVE NAUSEA AND VOMITING): Status: RESOLVED | Noted: 2017-10-17 | Resolved: 2017-12-04

## 2017-12-04 PROBLEM — R41.0 DELIRIUM: Status: RESOLVED | Noted: 2017-10-20 | Resolved: 2017-12-04

## 2017-12-04 PROBLEM — G89.18 ACUTE POST-OPERATIVE PAIN: Status: RESOLVED | Noted: 2017-10-17 | Resolved: 2017-12-04

## 2017-12-04 PROCEDURE — 99212 OFFICE O/P EST SF 10 MIN: CPT | Mod: ZF

## 2017-12-04 RX ORDER — FERROUS SULFATE 325(65) MG
1 TABLET ORAL
Qty: 100 TABLET | Refills: 0 | COMMUNITY
Start: 2017-12-04 | End: 2017-12-21 | Stop reason: SINTOL

## 2017-12-04 ASSESSMENT — PAIN SCALES - GENERAL: PAINLEVEL: NO PAIN (0)

## 2017-12-04 NOTE — MR AVS SNAPSHOT
After Visit Summary   12/4/2017    Viv Shaw    MRN: [id number]           Patient Information     Date Of Birth          1950        Visit Information        Provider Department      12/4/2017 1:05 PM Morro Veloz MD [facility] Nephrology        Today's Diagnoses     Kidney replaced by transplant    -  1    Aftercare following organ transplant        Anemia in stage 3 chronic kidney disease        Immunosuppressed status (HCC)        Hypertension secondary to other renal disorders           Follow-ups after your visit        Follow-up notes from your care team     Return in about 1 year (around 12/4/2018).      Your next 10 appointments already scheduled     Dec 13, 2017  8:30 AM CST   (Arrive by 8:15 AM)   Post-Op with CARL Lott MD   [facility] Plastic and Reconstructive Surgery ([facility])    [address] [place] [address]  4th [facility] [address]   [phone]            Dec 15, 2017  6:00 AM CST   Lab with  LAB   [facility] Lab ([facility])    [address] [place] [address]   [phone]            Apr 04, 2018  6:20 AM CDT   (Arrive by 6:05 AM)   CT CHEST W/O CONTRAST with UCCT1   [facility] Imaging [place] [facility] ([facility])    [address] [place] [id number] [phone]           Please bring any scans or X-rays taken at other hospitals, if similar tests were done. Also bring a list of your medicines, including vitamins, minerals and over-the-counter drugs. It is safest to leave personal items at home.  Be sure to tell your doctor:   If you have any allergies.   If there s any chance you are pregnant.   If you are breastfeeding.   If you have any special needs.  You do not need to do anything special to prepare.  Please wear loose clothing, such as a sweat suit or jogging clothes. Avoid snaps, zippers and other metal. We may ask you to  undress and put on a hospital gown.            Apr 04, 2018  7:00 AM CDT   FULL PULMONARY FUNCTION with UC PFL B   [facility] Pulmonary Function Testing ([facility])    [address] [place] [id number] [phone]            Apr 04, 2018  8:00 AM CDT   (Arrive by 7:45 AM)   New Patient Visit with Pepe Boss MD   [facility] ([facility])    [address] [place] [id number] [phone]            Dec 03, 2018  1:05 PM CST   (Arrive by 12:35 PM)   Return Kidney Transplant with Morro Veloz MD   [facility] Nephrology ([facility])    [address] [place] [id number] [phone]              Who to contact     If you have questions or need follow up information about today's clinic visit or your schedule please contact [facility] NEPHROLOGY directly at [phone].  Normal or non-critical lab and imaging results will be communicated to you by [employer], letter or phone within 4 business days after the clinic has received the results. If you do not hear from us within 7 days, please contact the clinic through N4MDt or phone. If you have a critical or abnormal lab result, we will notify you by phone as soon as possible.  Submit refill requests through [employer] or call your pharmacy and they will forward the refill request to us. Please allow 3 business days for your refill to be completed.          Additional Information About Your Visit        Synchro Information     Synchro gives you secure access to your electronic health record. If you see a primary care provider, you can also send messages to your care team and make appointments. If you have questions, please call your primary care clinic.  If you do not have a primary care provider, please call [phone] and they will assist you.        Care EveryWhere ID     This is  "your Care EveryWhere ID. This could be used by other organizations to access your [facility] medical records  [id number]        Your Vitals Were     Pulse Height Pulse Oximetry BMI (Body Mass Index)          64 1.626 m (5' 4\") 97% 28.67 kg/m2         Blood Pressure from Last 3 Encounters:   12/04/17 129/78   10/30/17 112/69   10/22/17 141/59    Weight from Last 3 Encounters:   12/04/17 75.8 kg (167 lb)   10/22/17 80.4 kg (177 lb 3.2 oz)   09/27/17 80.2 kg (176 lb 14.4 oz)              Today, you had the following     No orders found for display       Primary Care Provider Office Phone # Fax #    Summer SMITH MD Sil[place] [phone]       [address]  [facility] [address]        Equal Access to Services     [facility]: Hadii aad ku hadasho Soomaali, waaxda luqadaha, qaybta kaalmada adeegyada, waxay blainein hayaan velia payan . So [facility] [phone].    ATENCIÓN: Si habla español, tiene a rosario disposición servicios gratuitos de asistencia lingüística. Llame al [phone].    We comply with applicable federal civil rights laws and [place] laws. We do not discriminate on the basis of race, color, national origin, age, disability, sex, sexual orientation, or gender identity.            Thank you!     Thank you for choosing [facility] NEPHROLOGY  for your care. Our goal is always to provide you with excellent care. Hearing back from our patients is one way we can continue to improve our services. Please take a few minutes to complete the written survey that you may receive in the mail after your visit with us. Thank you!             Your Updated Medication List - Protect others around you: Learn how to safely use, store and throw away your medicines at www.disposemymeds.org.          This list is accurate as of: 12/4/17  1:07 PM.  Always use your most recent med list.                   Brand Name Dispense Instructions for use Diagnosis    acetaminophen 325 MG tablet    TYLENOL    100 " "tablet    Take 2 tablets (650 mg) by mouth every 4 hours as needed for other (surgical pain)    Acute post-operative pain       alendronate 70 MG tablet    FOSAMAX    12 tablet    Take 1 tablet (70 mg) by mouth every 7 days Take with over 8 ounces water and stay upright for at least 30 minutes after dose.  Take at least 60 minutes before breakfast    Osteopenia       amLODIPine 10 MG tablet    NORVASC    90 tablet    Take 1 tablet (10 mg) by mouth daily    Hypertension secondary to other renal disorders       ASPIRIN PO      Take 81 mg by mouth daily        * atorvastatin 40 MG tablet    LIPITOR    45 tablet    TAKE 0.5 TABLETS (20 MG) BY MOUTH DAILY    Hyperlipidemia LDL goal <100       * atorvastatin 40 MG tablet    LIPITOR    45 tablet    TAKE 0.5 TABLETS (20 MG) BY MOUTH DAILY    Hyperlipidemia LDL goal <100       FLORIN CONTOUR test strip   Generic drug:  blood glucose monitoring           BD insulin syringe ultrafine 31G X 5/16\" 0.3 ML   Generic drug:  insulin syringe-needle U-100      USING 4-5 PER DAY ([employer] 31G/0.3ML)        chlorthalidone 25 MG tablet    HYGROTON    90 tablet    Take 1 tablet (25 mg) by mouth daily    Hypertension secondary to other renal disorders       cycloSPORINE modified 25 MG capsule     180 capsule    Take 3 capsules (75 mg) by mouth 2 times daily    Kidney transplanted       isosorbide mononitrate 30 MG 24 hr tablet    IMDUR    90 tablet    TAKE 0.5 TABLETS (15 MG) BY MOUTH 2 TIMES DAILY    Coronary artery disease involving native heart without angina pectoris, unspecified vessel or lesion type       LANTUS - INSULIN GLARGINE     0    21 u q pm    Type II or unspecified type diabetes mellitus without mention of complication, not stated as uncontrolled       metoprolol 25 MG 24 hr tablet    TOPROL XL    90 tablet    Take 1 tablet (25 mg) by mouth At Bedtime    Hypertension secondary to other renal disorders, Coronary artery disease involving native heart without angina pectoris, " unspecified vessel or lesion type       mycophenolic acid 180 MG EC tablet    GENERIC EQUIVALENT    180 tablet    Take 3 tablets (540 mg) by mouth 2 times daily    Kidney replaced by transplant       NovoLOG VIAL 100 UNITS/ML injection   Generic drug:  insulin aspart      sliding scale 4-6 units tid        pantoprazole 40 MG EC tablet    PROTONIX    90 tablet    Take 1 tablet (40 mg) by mouth daily    Gastroesophageal reflux disease without esophagitis       senna 8.6 MG tablet    SENOKOT    30 tablet    Take 1 tablet by mouth daily Hold for loose stools    S/P hernia repair       SYNTHROID 112 MCG tablet   Generic drug:  levothyroxine      Take by mouth daily        valsartan 160 MG tablet    DIOVAN    45 tablet    Take 0.5 tablets (80 mg) by mouth daily    Hypertension secondary to other renal disorders       * Notice:  This list has 2 medication(s) that are the same as other medications prescribed for you. Read the directions carefully, and ask your doctor or other care provider to review them with you.

## 2017-12-04 NOTE — PROGRESS NOTES
Assessment and Plan:  1. LDKT - baseline Cr ~ 1.3-1.5, which has remained stable.  Normal proteinuria.  No DSA when last checked in 2007.  Will make no changes in immunosuppression, but target CSA level ~ 50-75.  2. HTN - well controlled at target of less than 140/90.  No changes.  3. DM - well controlled.  4. CAD - asymptomatic.  5. Anemia in chronic kidney disease - stable Hgb.  Patient was iron deficient when last check and recommend starting oral iron.  Will follow.  6. Overweight - some decrease following recent surgery and would like patient not to regain weight.  Recommended increased exercise and watching caloric intake.  7. Fatigue - was a bit worse with recent surgery, but back to previous baseline now.  Previously ruled out viral infections.  Recommend increased exercise.  8. Skin cancer risk - no new skin lesions.  Recommend regular follow up with Dermatology.  9. Recommend return visit in 12 months.    Assessment and plan was discussed with patient and she voiced her understanding and agreement.    Reason for Visit:  Ms. Shaw is here for routine follow up.    HPI:   Viv Shaw is a 67 year old female with ESKD from DM and is status post LDKT on 12/27/05.         Transplant Hx:       Tx: LDKT  Date: 12/27/05       Present Maintenance IS: Cyclosporine and Mycophenolic acid       Baseline Creatinine: 1.3-1.5    Ms. Shaw reports feeling okay overall with some medical complaints.  She was recently admitted for incarcerated abdominal hernia repair 10/2017 and is slowly improving from this.  Her energy level is improved, but still low and not normal, although pretty much her baseline.  She has been active, although gets minimal exercise, just a little walking.  Denies any chest pain or shortness of breath with exertion.  Appetite is okay and she is down about 10 lbs with her recent abdominal surgery.  No nausea, vomiting or diarrhea.  No fever, sweats or chills.  No leg swelling.    Home BP: Not  checked.      ROS:   A comprehensive review of systems was obtained and negative, except as noted in the HPI or PMH.    Active Medical Problems:  Patient Active Problem List   Diagnosis     Other vitamin B12 deficiency anemia     Irritable bowel syndrome     GERD (gastroesophageal reflux disease)     Advanced directives, counseling/discussion     Kidney replaced by transplant     Immunosuppressed status (HCC)     Dyslipidemia     Hyperlipidemia LDL goal <100     Long-term use of immunosuppressant medication     CAD S/P percutaneous coronary angioplasty     Anemia in chronic kidney disease     Other specified hypothyroidism     Coronary artery disease involving native heart without angina pectoris, unspecified vessel or lesion type     Type 2 diabetes mellitus with diabetic nephropathy, with long-term current use of insulin (H)     Hypertension secondary to other renal disorders     Aftercare following organ transplant     Ventral hernia without obstruction or gangrene     S/P hernia repair     Opacity of lung on imaging study     Hernia, incisional     Personal Hx:  Social History     Social History     Marital status:      Spouse name: N/A     Number of children: N/A     Years of education: N/A     Occupational History     Not on file.     Social History Main Topics     Smoking status: Never Smoker     Smokeless tobacco: Never Used     Alcohol use No     Drug use: No     Sexual activity: Yes     Partners: Male     Other Topics Concern     Not on file     Social History Narrative     Allergies:  Allergies   Allergen Reactions     No Known Drug Allergies      Medications:  Prior to Admission medications    Medication Sig Start Date End Date Taking? Authorizing Provider   atorvastatin (LIPITOR) 40 MG tablet Take 40 mg by mouth daily.   Yes Reported, Patient   isosorbide mononitrate 30 MG 24 hr tablet Take 15 mg by mouth 2 times daily.   Yes Reported, Patient   chlorthalidone (HYGROTEN) 25 MG tablet Take 0.5  tablets by mouth daily as needed. 12/3/12  Yes Morro Veloz MD   mycophenolate (MYFORTIC) 180 MG EC tablet Take 1 tablet by mouth 2 times daily. Take with 360mg tab for total dose 540mg BID 12/17/12   Morro Veloz MD   mycophenolate (MYFORTIC) 360 MG TBEC Take 1 tablet by mouth 2 times daily. Take with 180mg tab for total dose 540mg po every 12 hours 12/17/12   Morro Veloz MD   aspirin 325 MG EC tablet Take 1 tablet by mouth daily. 8/21/12   Ignacio Vega MD   valsartan (DIOVAN) 320 MG tablet Take 1 tablet by mouth daily. 8/13/12   Ignacio Vega MD   pantoprazole (PROTONIX) 40 MG enteric coated tablet Take 40 mg by mouth daily.    Reported, Patient   cycloSPORINE modified (GENGRAF) 25 MG capsule Take 3 capsules by mouth 2 times daily. 4/27/12   Vincenzo Delgado MD   amLODIPine (NORVASC) 10 MG tablet Take 1 tablet by mouth daily. 12/23/11   Morro Veloz MD   RISEdronate (ACTONEL) 35 MG tablet Take 1 tablet by mouth once a week. 30 minutes before a meal. 12/22/11   Carlotta Winston MD   fish oil-omega-3 fatty acids (FISH OIL) 1000 MG capsule Take 2 g by mouth daily.    Reported, Patient   metoprolol (TOPROL XL) 25 MG 24 hr tablet Take  by mouth daily. 1/4 tablet in the morning and 1/2 tablet at night.  12/5/11   Ignacio Vega MD   mometasone (NASONEX) 50 MCG/ACT nasal spray 2 sprays by Both Nostrils route daily. 5/31/11   Ignacio Vega MD   LEVOTHYROXINE SODIUM 125 MCG OR TABS 1 TABLET DAILY 2/28/08   Ignacio Vega MD   MULTIVITAMINS OR TABS ONE DAILY 12/10/07   Ignacio Vega MD   AMITRIPTYLINE HCL 10 MG OR TABS ONE AT BEDTIME prn 12/10/07   PallIgnacio xiao MD   LANTUS - INSULIN GLARGINE 22 u q pm 12/10/07   Ignacio Vega MD   REGLAN 10 MG OR TABS 1 tab prn 1/31/06   Ignacio Vega MD   NOVOLOG 100 U/ML SC SOLN sliding scale 4-6 units tid      "    Vitals:  /78  Pulse 64  Ht 1.626 m (5' 4\")  Wt 75.8 kg (167 lb)  SpO2 97%  BMI 28.67 kg/m2    Exam:   GENERAL APPEARANCE: alert and no distress  HENT: mouth without ulcers or lesions  LYMPHATICS: no cervical or supraclavicular nodes  RESP: slight diffuse rhonchi, otherwise lungs clear to auscultation - no rales or wheezes  CV: regular rhythm, normal rate, no rub, no murmur  EDEMA: trace LE edema bilaterally  ABDOMEN: soft, nontender and bowel sounds normal, overweight  MS: extremities normal - no gross deformities noted, no evidence of inflammation in joints, no muscle tenderness  SKIN: no rash  TX KIDNEY: normal    Results:   Recent Results (from the past 336 hour(s))   CBC with platelets    Collection Time: 11/30/17  5:57 AM   Result Value Ref Range    WBC 4.2 4.0 - 11.0 10e9/L    RBC Count 3.70 (L) 3.8 - 5.2 10e12/L    Hemoglobin 10.2 (L) 11.7 - 15.7 g/dL    Hematocrit 34.3 (L) 35.0 - 47.0 %    MCV 93 78 - 100 fl    MCH 27.6 26.5 - 33.0 pg    MCHC 29.7 (L) 31.5 - 36.5 g/dL    RDW 12.8 10.0 - 15.0 %    Platelet Count 250 150 - 450 10e9/L   Basic metabolic panel    Collection Time: 11/30/17  5:57 AM   Result Value Ref Range    Sodium 141 133 - 144 mmol/L    Potassium 4.1 3.4 - 5.3 mmol/L    Chloride 106 94 - 109 mmol/L    Carbon Dioxide 28 20 - 32 mmol/L    Anion Gap 7 3 - 14 mmol/L    Glucose 88 70 - 99 mg/dL    Urea Nitrogen 39 (H) 7 - 30 mg/dL    Creatinine 1.38 (H) 0.52 - 1.04 mg/dL    GFR Estimate 38 (L) >60 mL/min/1.7m2    GFR Estimate If Black 46 (L) >60 mL/min/1.7m2    Calcium 9.1 8.5 - 10.1 mg/dL   Cyclosporine    Collection Time: 11/30/17  5:57 AM   Result Value Ref Range    Cyclosporine Last Dose 1900 11/29/17     Cyclosporine Level 145 50 - 400 ug/L   Protein  random urine    Collection Time: 11/30/17  6:16 AM   Result Value Ref Range    Protein Random Urine 0.09 g/L    Protein Total Urine g/gr Creatinine 0.11 0 - 0.2 g/g Cr   Creatinine urine calculation only    Collection Time: 11/30/17 "  6:16 AM   Result Value Ref Range    Creatinine Urine 88 mg/dL

## 2017-12-04 NOTE — NURSING NOTE
"Chief Complaint   Patient presents with     RECHECK     Annual Post kidney tx follow up        Initial /78  Pulse 64  Ht 1.626 m (5' 4\")  Wt 75.8 kg (167 lb)  SpO2 97%  BMI 28.67 kg/m2 Estimated body mass index is 28.67 kg/(m^2) as calculated from the following:    Height as of this encounter: 1.626 m (5' 4\").    Weight as of this encounter: 75.8 kg (167 lb).  Medication Reconciliation: complete   Betty Weeks CMA    "

## 2017-12-04 NOTE — TELEPHONE ENCOUNTER
Per Dr. Veloz:  I reviewed older labs, saw she was iron deficient.  Recommend she start oral iron sulfate 325 mg daily with lunch.  This may help her hemoglobin increase.     Spoke with patient. She will buy it OTC.    Cynthia Heck RN

## 2017-12-04 NOTE — LETTER
12/4/2017       RE: Viv Shaw  [address]  [address]  [facility] [address]       Assessment and Plan:  1. LDKT - baseline Cr ~ 1.3-1.5, which has remained stable.  Normal proteinuria.  No DSA when last checked in 2007.  Will make no changes in immunosuppression, but target CSA level ~ 50-75.  2. HTN - well controlled at target of less than 140/90.  No changes.  3. DM - well controlled.  4. CAD - asymptomatic.  5. Anemia in chronic kidney disease - stable Hgb.  Patient was iron deficient when last check and recommend starting oral iron.  Will follow.  6. Overweight - some decrease following recent surgery and would like patient not to regain weight.  Recommended increased exercise and watching caloric intake.  7. Fatigue - was a bit worse with recent surgery, but back to previous baseline now.  Previously ruled out viral infections.  Recommend increased exercise.  8. Skin cancer risk - no new skin lesions.  Recommend regular follow up with Dermatology.  9. Recommend return visit in 12 months.    Assessment and plan was discussed with patient and she voiced her understanding and agreement.    Reason for Visit:  Ms. Shaw is here for routine follow up.    HPI:   Viv Shaw is a 67 year old female with ESKD from DM and is status post LDKT on 12/27/05.         Transplant Hx:       Tx: LDKT  Date: 12/27/05       Present Maintenance IS: Cyclosporine and Mycophenolic acid       Baseline Creatinine: 1.3-1.5    Ms. Shaw reports feeling okay overall with some medical complaints.  She was recently admitted for incarcerated abdominal hernia repair 10/2017 and is slowly improving from this.  Her energy level is improved, but still low and not normal, although pretty much her baseline.  She has been active, although gets minimal exercise, just a little walking.  Denies any chest pain or shortness of breath with exertion.  Appetite is okay and she is down about 10 lbs with her recent abdominal  surgery.  No nausea, vomiting or diarrhea.  No fever, sweats or chills.  No leg swelling.    Home BP: Not checked.      ROS:   A comprehensive review of systems was obtained and negative, except as noted in the HPI or PMH.    Active Medical Problems:  Patient Active Problem List   Diagnosis     Other vitamin B12 deficiency anemia     Irritable bowel syndrome     GERD (gastroesophageal reflux disease)     Advanced directives, counseling/discussion     Kidney replaced by transplant     Immunosuppressed status (HCC)     Dyslipidemia     Hyperlipidemia LDL goal <100     Long-term use of immunosuppressant medication     CAD S/P percutaneous coronary angioplasty     Anemia in chronic kidney disease     Other specified hypothyroidism     Coronary artery disease involving native heart without angina pectoris, unspecified vessel or lesion type     Type 2 diabetes mellitus with diabetic nephropathy, with long-term current use of insulin (H)     Hypertension secondary to other renal disorders     Aftercare following organ transplant     Ventral hernia without obstruction or gangrene     S/P hernia repair     Opacity of lung on imaging study     Hernia, incisional     Personal Hx:  Social History     Social History     Marital status:      Spouse name: N/A     Number of children: N/A     Years of education: N/A     Occupational History     Not on file.     Social History Main Topics     Smoking status: Never Smoker     Smokeless tobacco: Never Used     Alcohol use No     Drug use: No     Sexual activity: Yes     Partners: Male     Other Topics Concern     Not on file     Social History Narrative     Allergies:  Allergies   Allergen Reactions     No Known Drug Allergies      Medications:  Prior to Admission medications    Medication Sig Start Date End Date Taking? Authorizing Provider   atorvastatin (LIPITOR) 40 MG tablet Take 40 mg by mouth daily.   Yes Reported, Patient   isosorbide mononitrate 30 MG 24 hr tablet Take  15 mg by mouth 2 times daily.   Yes Reported, Patient   chlorthalidone (HYGROTEN) 25 MG tablet Take 0.5 tablets by mouth daily as needed. 12/3/12  Yes Morro Veloz MD   mycophenolate (MYFORTIC) 180 MG EC tablet Take 1 tablet by mouth 2 times daily. Take with 360mg tab for total dose 540mg BID 12/17/12   Morro Veloz MD   mycophenolate (MYFORTIC) 360 MG TBEC Take 1 tablet by mouth 2 times daily. Take with 180mg tab for total dose 540mg po every 12 hours 12/17/12   Morro Veloz MD   aspirin 325 MG EC tablet Take 1 tablet by mouth daily. 8/21/12   Ignacio Vega MD   valsartan (DIOVAN) 320 MG tablet Take 1 tablet by mouth daily. 8/13/12   Ignacio Vega MD   pantoprazole (PROTONIX) 40 MG enteric coated tablet Take 40 mg by mouth daily.    Reported, Patient   cycloSPORINE modified (GENGRAF) 25 MG capsule Take 3 capsules by mouth 2 times daily. 4/27/12   Vincenzo Delgado MD   amLODIPine (NORVASC) 10 MG tablet Take 1 tablet by mouth daily. 12/23/11   Morro Veloz MD   RISEdronate (ACTONEL) 35 MG tablet Take 1 tablet by mouth once a week. 30 minutes before a meal. 12/22/11   Carlotta Winston MD   fish oil-omega-3 fatty acids (FISH OIL) 1000 MG capsule Take 2 g by mouth daily.    Reported, Patient   metoprolol (TOPROL XL) 25 MG 24 hr tablet Take  by mouth daily. 1/4 tablet in the morning and 1/2 tablet at night.  12/5/11   Ignacio Vega MD   mometasone (NASONEX) 50 MCG/ACT nasal spray 2 sprays by Both Nostrils route daily. 5/31/11   Ignacio Vega MD   LEVOTHYROXINE SODIUM 125 MCG OR TABS 1 TABLET DAILY 2/28/08   Ignacio Vega MD   MULTIVITAMINS OR TABS ONE DAILY 12/10/07   Ignacio Vega MD   AMITRIPTYLINE HCL 10 MG OR TABS ONE AT BEDTIME prn 12/10/07   Ignacio Vega MD   LANTUS - INSULIN GLARGINE 22 u q pm 12/10/07   Ignacio Vega MD   REGLAN 10 MG OR TABS 1 tab prn  "1/31/06   Ignacio Vega MD   NOVOLOG 100 U/ML SC SOLN sliding scale 4-6 units tid         Vitals:  /78  Pulse 64  Ht 1.626 m (5' 4\")  Wt 75.8 kg (167 lb)  SpO2 97%  BMI 28.67 kg/m2    Exam:   GENERAL APPEARANCE: alert and no distress  HENT: mouth without ulcers or lesions  LYMPHATICS: no cervical or supraclavicular nodes  RESP: slight diffuse rhonchi, otherwise lungs clear to auscultation - no rales or wheezes  CV: regular rhythm, normal rate, no rub, no murmur  EDEMA: trace LE edema bilaterally  ABDOMEN: soft, nontender and bowel sounds normal, overweight  MS: extremities normal - no gross deformities noted, no evidence of inflammation in joints, no muscle tenderness  SKIN: no rash  TX KIDNEY: normal    Results:   Recent Results (from the past 336 hour(s))   CBC with platelets    Collection Time: 11/30/17  5:57 AM   Result Value Ref Range    WBC 4.2 4.0 - 11.0 10e9/L    RBC Count 3.70 (L) 3.8 - 5.2 10e12/L    Hemoglobin 10.2 (L) 11.7 - 15.7 g/dL    Hematocrit 34.3 (L) 35.0 - 47.0 %    MCV 93 78 - 100 fl    MCH 27.6 26.5 - 33.0 pg    MCHC 29.7 (L) 31.5 - 36.5 g/dL    RDW 12.8 10.0 - 15.0 %    Platelet Count 250 150 - 450 10e9/L   Basic metabolic panel    Collection Time: 11/30/17  5:57 AM   Result Value Ref Range    Sodium 141 133 - 144 mmol/L    Potassium 4.1 3.4 - 5.3 mmol/L    Chloride 106 94 - 109 mmol/L    Carbon Dioxide 28 20 - 32 mmol/L    Anion Gap 7 3 - 14 mmol/L    Glucose 88 70 - 99 mg/dL    Urea Nitrogen 39 (H) 7 - 30 mg/dL    Creatinine 1.38 (H) 0.52 - 1.04 mg/dL    GFR Estimate 38 (L) >60 mL/min/1.7m2    GFR Estimate If Black 46 (L) >60 mL/min/1.7m2    Calcium 9.1 8.5 - 10.1 mg/dL   Cyclosporine    Collection Time: 11/30/17  5:57 AM   Result Value Ref Range    Cyclosporine Last Dose 1900 11/29/17     Cyclosporine Level 145 50 - 400 ug/L   Protein  random urine    Collection Time: 11/30/17  6:16 AM   Result Value Ref Range    Protein Random Urine 0.09 g/L    Protein Total " Urine g/gr Creatinine 0.11 0 - 0.2 g/g Cr   Creatinine urine calculation only    Collection Time: 11/30/17  6:16 AM   Result Value Ref Range    Creatinine Urine 88 mg/dL       Morro Veloz MD

## 2017-12-13 ENCOUNTER — OFFICE VISIT (OUTPATIENT)
Dept: PLASTIC SURGERY | Facility: CLINIC | Age: 67
End: 2017-12-13
Payer: MEDICARE

## 2017-12-13 DIAGNOSIS — Z98.890 S/P HERNIA REPAIR: Primary | ICD-10-CM

## 2017-12-13 DIAGNOSIS — Z87.19 S/P HERNIA REPAIR: Primary | ICD-10-CM

## 2017-12-13 NOTE — MR AVS SNAPSHOT
After Visit Summary   12/13/2017    Viv Shaw    MRN: [id number]           Patient Information     Date Of Birth          1950        Visit Information        Provider Department      12/13/2017 8:30 AM CARL Lott MD [facility]        Today's Diagnoses     S/P hernia repair    -  1       Follow-ups after your visit        Follow-up notes from your care team     Return if symptoms worsen or fail to improve.      Your next 10 appointments already scheduled     Dec 15, 2017  6:00 AM CST   Lab with  LAB   [facility] ([facility])    [address] [place] [address] [id number] [phone]            Apr 04, 2018  6:20 AM CDT   (Arrive by 6:05 AM)   CT CHEST W/O CONTRAST with CT1   [facility] [place] [facility] ([facility])    [address] [place] [address] [id number] [phone]           Please bring any scans or X-rays taken at other hospitals, if similar tests were done. Also bring a list of your medicines, including vitamins, minerals and over-the-counter drugs. It is safest to leave personal items at home.  Be sure to tell your doctor:   If you have any allergies.   If there s any chance you are pregnant.   If you are breastfeeding.   If you have any special needs.  You do not need to do anything special to prepare.  Please wear loose clothing, such as a sweat suit or jogging clothes. Avoid snaps, zippers and other metal. We may ask you to undress and put on a hospital gown.            Apr 04, 2018  7:00 AM CDT   FULL PULMONARY FUNCTION with  PFL B   [facility] Pulmonary Function Testing ([facility])    [address] [place] [address] [id number] [phone]            Apr 04, 2018  8:00 AM CDT   (Arrive by 7:45 AM)   New Patient Visit with Pepe Boss MD   [facility] ([facility]  [facility] [place])    [address] [facility] [id number] [phone]            Dec 03, 2018  1:05 PM CST   (Arrive by 12:35 PM)   Return Kidney Transplant with Morro Veloz MD   [facility] ([facility])    [address]  [address] [facility] [id number] [phone]              Who to contact     Please call your clinic at [phone] to:    Ask questions about your health    Make or cancel appointments    Discuss your medicines    Learn about your test results    Speak to your doctor   If you have compliments or concerns about an experience at your clinic, or if you wish to file a complaint, please contact [facility] Patient Relations at [phone] or email us at [email or web address]         Additional Information About Your Visit        [employer] Information     [employer] gives you secure access to your electronic health record. If you see a primary care provider, you can also send messages to your care team and make appointments. If you have questions, please call your primary care clinic.  If you do not have a primary care provider, please call [phone] and they will assist you.      [employer] is an electronic gateway that provides easy, online access to your medical records. With [employer], you can request a clinic appointment, read your test results, renew a prescription or communicate with your care team.     To access your existing account, please contact your [facility] or call [phone] for assistance.        Care EveryWhere ID     This is your Care EveryWhere ID. This could be used by other organizations to access your [facility] medical records  [id number]         Blood Pressure from Last 3 Encounters:   12/04/17 129/78   10/30/17 112/69   10/22/17 141/59    Weight from Last 3 Encounters:   12/04/17 167 lb   10/22/17 177 lb 3.2 oz   09/27/17  176 lb 14.4 oz              Today, you had the following     No orders found for display       Primary Care Provider Office Phone # Fax #    Summer SMITH MD Silvia [phone]       [address] [facility] [address]        Equal Access to Services     [facility]: Hadii aad ku hadasho Soomaali, waaxda luqadaha, qaybta kaalmada adeegyada, waxay blainein hayaan adeerendira marceloyobany lasonia . So [facility] [phone].    ATENCIÓN: Si habla español, tiene a rosario disposición servicios gratuitos de asistencia lingüística. Llame al [phone].    We comply with applicable federal civil rights laws and [place] laws. We do not discriminate on the basis of race, color, national origin, age, disability, sex, sexual orientation, or gender identity.            Thank you!     Thank you for choosing [facility] PLASTIC AND RECONSTRUCTIVE SURGERY  for your care. Our goal is always to provide you with excellent care. Hearing back from our patients is one way we can continue to improve our services. Please take a few minutes to complete the written survey that you may receive in the mail after your visit with us. Thank you!             Your Updated Medication List - Protect others around you: Learn how to safely use, store and throw away your medicines at www.disposemymeds.org.          This list is accurate as of: 12/13/17  7:01 PM.  Always use your most recent med list.                   Brand Name Dispense Instructions for use Diagnosis    acetaminophen 325 MG tablet    TYLENOL    100 tablet    Take 2 tablets (650 mg) by mouth every 4 hours as needed for other (surgical pain)    Acute post-operative pain       alendronate 70 MG tablet    FOSAMAX    12 tablet    Take 1 tablet (70 mg) by mouth every 7 days Take with over 8 ounces water and stay upright for at least 30 minutes after dose.  Take at least 60 minutes before breakfast    Osteopenia       amLODIPine 10 MG tablet    NORVASC    90 tablet    Take 1 tablet (10  "mg) by mouth daily    Hypertension secondary to other renal disorders       ASPIRIN PO      Take 81 mg by mouth daily        * atorvastatin 40 MG tablet    LIPITOR    45 tablet    TAKE 0.5 TABLETS (20 MG) BY MOUTH DAILY    Hyperlipidemia LDL goal <100       * atorvastatin 40 MG tablet    LIPITOR    45 tablet    TAKE 0.5 TABLETS (20 MG) BY MOUTH DAILY    Hyperlipidemia LDL goal <100       FLORIN CONTOUR test strip   Generic drug:  blood glucose monitoring           BD insulin syringe ultrafine 31G X 5/16\" 0.3 ML   Generic drug:  insulin syringe-needle U-100      USING 4-5 PER DAY ([facility] 31G/0.3ML)        chlorthalidone 25 MG tablet    HYGROTON    90 tablet    Take 1 tablet (25 mg) by mouth daily    Hypertension secondary to other renal disorders       cycloSPORINE modified 25 MG capsule     180 capsule    Take 3 capsules (75 mg) by mouth 2 times daily    Kidney transplanted       ferrous sulfate 325 (65 FE) MG tablet    IRON    100 tablet    Take 1 tablet (325 mg) by mouth daily (with breakfast)    Anemia       isosorbide mononitrate 30 MG 24 hr tablet    IMDUR    90 tablet    TAKE 0.5 TABLETS (15 MG) BY MOUTH 2 TIMES DAILY    Coronary artery disease involving native heart without angina pectoris, unspecified vessel or lesion type       LANTUS - INSULIN GLARGINE     0    21 u q pm    Type II or unspecified type diabetes mellitus without mention of complication, not stated as uncontrolled       metoprolol 25 MG 24 hr tablet    TOPROL XL    90 tablet    Take 1 tablet (25 mg) by mouth At Bedtime    Hypertension secondary to other renal disorders, Coronary artery disease involving native heart without angina pectoris, unspecified vessel or lesion type       mycophenolic acid 180 MG EC tablet    GENERIC EQUIVALENT    180 tablet    Take 3 tablets (540 mg) by mouth 2 times daily    Kidney replaced by transplant       NovoLOG VIAL 100 UNITS/ML injection   Generic drug:  insulin aspart      sliding scale 4-6 units tid     "    pantoprazole 40 MG EC tablet    PROTONIX    90 tablet    Take 1 tablet (40 mg) by mouth daily    Gastroesophageal reflux disease without esophagitis       senna 8.6 MG tablet    SENOKOT    30 tablet    Take 1 tablet by mouth daily Hold for loose stools    S/P hernia repair       SYNTHROID 112 MCG tablet   Generic drug:  levothyroxine      Take by mouth daily        valsartan 160 MG tablet    DIOVAN    45 tablet    Take 0.5 tablets (80 mg) by mouth daily    Hypertension secondary to other renal disorders       * Notice:  This list has 2 medication(s) that are the same as other medications prescribed for you. Read the directions carefully, and ask your doctor or other care provider to review them with you.

## 2017-12-13 NOTE — LETTER
12/13/2017       RE: Viv Shaw  [address]  [address]  [place] [facility] [place] [id number]     Dear Colleague,    Thank you for referring your patient, Viv Shaw, to the [facility] RECON[facility] SUR[facility]ERY at [facility]. Please see a copy of my visit note below.    PRESENTING COMPLAINT:  Postoperative visit, status post right flank hernia repair and abdominal wall reconstruction using XenMatrix AB and Mitek suture anchors done on 10/16/2017.       HISTORY OF PRESENT ILLNESS:  Ms. Shaw is 67 years old.  She is about 6 weeks out from surgery, doing very well, no major issues.         PHYSICAL EXAMINATION:  On exam vital signs stable.  She is afebrile in no obvious distress.  Wound is healed.  No evidence for infection, seroma or hematoma or hernia.       ASSESSMENT AND PLAN:  Based above findings, a diagnosis of a right flank hernia repair was made. She is healed.  I have asked her to refrain from any heavy lifting  more than 15-20 pounds for another 3 months and then thereafter never lift more than 25 pounds.  I will see back in prn.    Again, thank you for allowing me to participate in the care of your patient.      Sincerely,    CARL Lott MD

## 2017-12-14 DIAGNOSIS — I15.1 HYPERTENSION SECONDARY TO OTHER RENAL DISORDERS: ICD-10-CM

## 2017-12-14 NOTE — PROGRESS NOTES
PRESENTING COMPLAINT:  Postoperative visit, status post right flank hernia repair and abdominal wall reconstruction using XenMatrix AB and Mitek suture anchors done on 10/16/2017.       HISTORY OF PRESENT ILLNESS:  Ms. Shaw is 67 years old.  She is about 6 weeks out from surgery, doing very well, no major issues.         PHYSICAL EXAMINATION:  On exam vital signs stable.  She is afebrile in no obvious distress.  Wound is healed.  No evidence for infection, seroma or hematoma or hernia.       ASSESSMENT AND PLAN:  Based above findings, a diagnosis of a right flank hernia repair was made. She is healed.  I have asked her to refrain from any heavy lifting  more than 15-20 pounds for another 3 months and then thereafter never lift more than 25 pounds.  I will see back in prn.

## 2017-12-15 DIAGNOSIS — Z94.0 KIDNEY REPLACED BY TRANSPLANT: ICD-10-CM

## 2017-12-15 LAB
CYCLOSPORINE BLD LC/MS/MS-MCNC: 102 UG/L (ref 50–400)
TME LAST DOSE: 1900 H

## 2017-12-15 PROCEDURE — 80158 DRUG ASSAY CYCLOSPORINE: CPT | Performed by: INTERNAL MEDICINE

## 2017-12-18 RX ORDER — AMLODIPINE BESYLATE 10 MG/1
TABLET ORAL
Qty: 90 TABLET | Refills: 1 | Status: SHIPPED | OUTPATIENT
Start: 2017-12-18 | End: 2018-06-17

## 2017-12-18 NOTE — TELEPHONE ENCOUNTER
Requested Prescriptions   Pending Prescriptions Disp Refills     amLODIPine (NORVASC) 10 MG tablet [Pharmacy Med Name: AMLODIPINE BESYLATE 10 MG TAB] 90 tablet 1     Sig: TAKE 1 TABLET (10 MG) BY MOUTH DAILY    Calcium Channel Blockers Protocol  Failed    12/14/2017  5:17 PM       Failed - Normal serum creatinine on file in past 12 months    Recent Labs   Lab Test  11/30/17   0557   CR  1.38*            Passed - Blood pressure under 140/90    BP Readings from Last 3 Encounters:   12/04/17 129/78   10/30/17 112/69   10/22/17 141/59                Passed - Recent or future visit with authorizing provider    Patient had office visit in the last year or has a visit in the next 30 days with authorizing provider.  See chart review.              Passed - Patient is age 18 or older       Passed - No active pregnancy on record       Passed - No positive pregnancy test in past 12 months      Routing refill request to provider for review/approval because:  Labs out of range:  Cr

## 2017-12-19 ENCOUNTER — TELEPHONE (OUTPATIENT)
Dept: INTERNAL MEDICINE | Facility: CLINIC | Age: 67
End: 2017-12-19

## 2017-12-19 NOTE — TELEPHONE ENCOUNTER
Pt's  calls, states they received phone call from a pharmacist who was offering an appt to review pt's meds and verify good regimen. Pt is interested in this. Do not see in epic documentation about call.  reports he's now noticing they left a different phone number. Instruct him to call that number back to schedule.

## 2017-12-21 ENCOUNTER — ALLIED HEALTH/NURSE VISIT (OUTPATIENT)
Dept: NURSING | Facility: CLINIC | Age: 67
End: 2017-12-21
Payer: MEDICARE

## 2017-12-21 ENCOUNTER — OFFICE VISIT (OUTPATIENT)
Dept: PHARMACY | Facility: CLINIC | Age: 67
End: 2017-12-21
Payer: COMMERCIAL

## 2017-12-21 VITALS
WEIGHT: 166.1 LBS | SYSTOLIC BLOOD PRESSURE: 106 MMHG | HEART RATE: 61 BPM | DIASTOLIC BLOOD PRESSURE: 60 MMHG | BODY MASS INDEX: 28.51 KG/M2

## 2017-12-21 DIAGNOSIS — I25.10 CORONARY ARTERY DISEASE INVOLVING NATIVE HEART WITHOUT ANGINA PECTORIS, UNSPECIFIED VESSEL OR LESION TYPE: ICD-10-CM

## 2017-12-21 DIAGNOSIS — I15.1 HYPERTENSION SECONDARY TO OTHER RENAL DISORDERS: ICD-10-CM

## 2017-12-21 DIAGNOSIS — Z23 ENCOUNTER FOR IMMUNIZATION: Primary | ICD-10-CM

## 2017-12-21 DIAGNOSIS — K43.2 INCISIONAL HERNIA, WITHOUT OBSTRUCTION OR GANGRENE: ICD-10-CM

## 2017-12-21 DIAGNOSIS — K59.00 CONSTIPATION, UNSPECIFIED CONSTIPATION TYPE: ICD-10-CM

## 2017-12-21 DIAGNOSIS — Z79.4 TYPE 2 DIABETES MELLITUS WITH DIABETIC NEPHROPATHY, WITH LONG-TERM CURRENT USE OF INSULIN (H): ICD-10-CM

## 2017-12-21 DIAGNOSIS — E78.5 HYPERLIPIDEMIA LDL GOAL <100: ICD-10-CM

## 2017-12-21 DIAGNOSIS — M85.80 OSTEOPENIA, UNSPECIFIED LOCATION: ICD-10-CM

## 2017-12-21 DIAGNOSIS — K21.9 GASTROESOPHAGEAL REFLUX DISEASE WITHOUT ESOPHAGITIS: ICD-10-CM

## 2017-12-21 DIAGNOSIS — Z94.0 KIDNEY REPLACED BY TRANSPLANT: Primary | ICD-10-CM

## 2017-12-21 DIAGNOSIS — E03.8 OTHER SPECIFIED HYPOTHYROIDISM: ICD-10-CM

## 2017-12-21 DIAGNOSIS — E11.21 TYPE 2 DIABETES MELLITUS WITH DIABETIC NEPHROPATHY, WITH LONG-TERM CURRENT USE OF INSULIN (H): ICD-10-CM

## 2017-12-21 PROCEDURE — 90732 PPSV23 VACC 2 YRS+ SUBQ/IM: CPT

## 2017-12-21 PROCEDURE — G0009 ADMIN PNEUMOCOCCAL VACCINE: HCPCS

## 2017-12-21 PROCEDURE — 99607 MTMS BY PHARM ADDL 15 MIN: CPT | Performed by: PHARMACIST

## 2017-12-21 PROCEDURE — 99605 MTMS BY PHARM NP 15 MIN: CPT | Performed by: PHARMACIST

## 2017-12-21 RX ORDER — METOPROLOL SUCCINATE 25 MG/1
TABLET, EXTENDED RELEASE ORAL
Qty: 90 TABLET | Refills: 1 | Status: SHIPPED | OUTPATIENT
Start: 2017-12-21 | End: 2018-06-17

## 2017-12-21 RX ORDER — ISOSORBIDE MONONITRATE 30 MG/1
15 TABLET, EXTENDED RELEASE ORAL DAILY
Qty: 90 TABLET | Refills: 0
Start: 2017-12-21 | End: 2018-03-28

## 2017-12-21 RX ORDER — INSULIN GLARGINE 100 [IU]/ML
20 INJECTION, SOLUTION SUBCUTANEOUS AT BEDTIME
Start: 2017-12-21 | End: 2018-09-11

## 2017-12-21 NOTE — NURSING NOTE
Prior to injection verified patient identity using patient's name and date of birth.  Screening Questionnaire for Pediatric Immunization     Is the child sick today?   No    Does the child have allergies to medications, food a vaccine component, or latex?   No    Has the child had a serious reaction to a vaccine in the past?   No    Has the child had a health problem with lung, heart, kidney or metabolic disease (e.g., diabetes), asthma, or a blood disorder?  Is he/she on long-term aspirin therapy?   No    If the child to be vaccinated is 2 through 4 years of age, has a healthcare provider told you that the child had wheezing or asthma in the  past 12 months?   No   If your child is a baby, have you ever been told he or she has had intussusception ?   No    Has the child, sibling or parent had a seizure, has the child had brain or other nervous system problems?   No    Does the child have cancer, leukemia, AIDS, or any immune system          problem?   No    In the past 3 months, has the child taken medications that affect the immune system such as prednisone, other steroids, or anticancer drugs; drugs for the treatment of rheumatoid arthritis, Crohn s disease, or psoriasis; or had radiation treatments?   No   In the past year, has the child received a transfusion of blood or blood products, or been given immune (gamma) globulin or an antiviral drug?   No    Is the child/teen pregnant or is there a chance that she could become         pregnant during the next month?   No    Has the child received any vaccinations in the past 4 weeks?   No      Immunization questionnaire answers were all negative.          Per orders of Shantanu Freire, injection of Pneumovax 23 given by Kandice Easton CMA. Patient instructed to remain in clinic for 15 minutes afterwards, and to report any adverse reaction to me immediately.    Screening performed by Kandice Easton CMA on 12/21/2017 at 9:19 AM.

## 2017-12-21 NOTE — PROGRESS NOTES
SUBJECTIVE/OBJECTIVE:                           Viv Shaw is a 67 year old female coming in for an initial visit for Medication Therapy Management.  She was referred to me from [facility].   Her  Johnny joined us today.    Chief Complaint: No concerns    Allergies/ADRs: Reviewed in Epic  Tobacco: No tobacco use  Alcohol: not currently using  Caffeine: no caffeine  Activity: limited since surgery; prior was walking  PMH: Reviewed in Epic    Medication Adherence/Access  The patient misses their medication 0 times per week.  Hard to remember alendronate weekly - keeps visible.  Patient has assistance with medication administration.  Adherence/Compliance is described as excellent.  Medication barriers: no issues reported by patient.  The patient fills general medications at  [facility].    Has the patient been offered to fill at [facility]? Yes - however the patient is restricted to filling their prescriptions at a different pharmacy.    Hernia: s/p surgery 10/16.  Finally feeling better, recovery has been slow for her.  No pain medication needed at this time.    Kidney Tx: S/p 2005.  Followed by [facility] and Dr. Veloz.  Taking Gengraf 75 mg BID and mycophenolic acid 540 mg BID.  No concerns with side effects at this time.  Scr has been stable.  Follows with transplant clinic closely and regular labs.  She has been advised to start oral iron but refuses to take due to GI side effects.    Osteopenia: Current therapy includes: alendronate (Fosamax) 70mg weekly (Pt has been on bisphosphonate therapy since 2005 years) and TUMS as needed (usually 2 per day). Pt is not experiencing side effects.  Pt is getting the following sources of dietary calcium: minimal  Last vitamin D level: NA  DEXA History: -1.3 from 3/18/15  Risk factors: post-menopausal  chronic PPI use    Hypertension/TIA: Current medications include metoprolol XL 25 mg daily, amlodipine, Imdur 15 mg daily (taking BID caused dizziness), chlorthalidone, valsartan 80 mg  daily.  Patient does not self-monitor BP.  Patient reports no current medication side effects.    Constipation:  Drinking prune juice and drinking water throughout the day.  This has been effective.  No therapy needed.    Hyperlipidemia: Current therapy includes atorvastatin 20 mg once daily.  Pt reports no significant myalgias or other side effects.     Diabetes:  Pt currently taking Lantus 20 units HS and Novolog 6-8 units with meals.   Diagnosed at age 19.    SMBG: three times daily.   Ranges (patient reported):   Patient is experiencing hypoglycemia. Frequency of hypoglycemia? rarely. Symptoms of low blood sugar?  able to see it in her eyes.   Recent symptoms of high blood sugar? none  Eye exam: up to date  Foot exam: up to date  Microalbumin is < 30 mg/g. Pt is taking an ACEi/ARB.  Aspirin: Taking 81mg daily and denies side effects  Diet/Exercise: exercise has been limited since surgery; leaving for [place] in January and more active there with walking  Due for Pneumovax 23 booster; previously had two doses but not after age 63 yrs.  Followed by Endocrinology, Dr. Garvin.    Hypothyroidism: Patient is taking levothyroxine 112 mcg daily. Patient is having the following symptoms: none.  Reports followed by Dr. Garvin as well and recent labs were 'okay'.  TSH   Date Value Ref Range Status   09/27/2017 0.28 (L) 0.40 - 4.00 mU/L Final   ]  T4 Free   Date Value Ref Range Status   09/27/2017 1.38 0.76 - 1.46 ng/dL Final   ]    GERD: Current medications include: Protonix (pantoprazole) 40 mg once daily and TUMS as needed. Pt c/o heartburn.  Patient feels that current regimen is effective.      Current labs include:BP Readings from Last 3 Encounters:   12/04/17 129/78   10/30/17 112/69   10/22/17 141/59     Today's Vitals: /60  Pulse 61  Wt 166 lb 1.6 oz (75.3 kg)  BMI 28.51 kg/m2  Lab Results   Component Value Date    A1C 6.6 09/08/2017   .  Lab Results   Component Value Date    CHOL 181  04/13/2017     Lab Results   Component Value Date    TRIG 143 04/13/2017     Lab Results   Component Value Date    HDL 57 04/13/2017     Lab Results   Component Value Date    LDL 95 04/13/2017       Liver Function Studies -   Recent Labs   Lab Test  04/13/17   0822   PROTTOTAL  7.2   ALBUMIN  3.9   BILITOTAL  0.8   ALKPHOS  55   AST  8   ALT  14       Lab Results   Component Value Date    UCRR 88 11/30/2017    MICROL 15 04/13/2017    UMALCR 7.98 04/13/2017       Last Basic Metabolic Panel:  Lab Results   Component Value Date     11/30/2017      Lab Results   Component Value Date    POTASSIUM 4.1 11/30/2017     Lab Results   Component Value Date    CHLORIDE 106 11/30/2017     Lab Results   Component Value Date    BUN 39 11/30/2017     Lab Results   Component Value Date    CR 1.38 11/30/2017     GFR Estimate   Date Value Ref Range Status   11/30/2017 38 (L) >60 mL/min/1.7m2 Final     Comment:     Non  GFR Calc   10/27/2017 35 (L) >60 mL/min/1.7m2 Final     Comment:     Non  GFR Calc   10/22/2017 50 (L) >60 mL/min/1.7m2 Final     Comment:     Non  GFR Calc     GFR Estimate If Black   Date Value Ref Range Status   11/30/2017 46 (L) >60 mL/min/1.7m2 Final     Comment:      GFR Calc   10/27/2017 42 (L) >60 mL/min/1.7m2 Final     Comment:      GFR Calc   10/22/2017 60 (L) >60 mL/min/1.7m2 Final     Comment:      GFR Calc     TSH   Date Value Ref Range Status   09/27/2017 0.28 (L) 0.40 - 4.00 mU/L Final   ]    Most Recent Immunizations   Administered Date(s) Administered     Influenza (High Dose) 3 valent vaccine 09/01/2017     Influenza (IIV3) PF 08/30/2010     Mantoux Tuberculin Skin Test 07/05/2005     Pneumo Conj 13-V (2010&after) 03/11/2015     Pneumococcal 23 valent 07/18/2005     TDAP Vaccine (Adacel) 06/08/2012     Twinrix A/B 02/28/2006       ASSESSMENT:                             Current medications were reviewed  today.     Medication Adherence: no issues identified    Hernia: improved    Kidney Tx: stable    Osteopenia: Needs Improvement. Pt is not meeting RDI of calcium 1200mg/day. Pt would benefit from:additional supplementation with calcium citrate/vitamin D and bisphosphonate holiday since she has been on therapy for 12 yrs and DEXA stable/improved.    Hypertension/TIA: stable    Constipation:  stable    Hyperlipidemia: stable    Diabetes:  A1c at goal.  Home readings at goal.  Continue current regimen and follow-up with Dr. Garvin as planned.  Due for Pneumovax 23 booster.    Hypothyroidism: stable    GERD: stable    PLAN:                            1.  Calcium 1200 mg daily including diet - calcium citrate 600 mg and vitamin D once  daily recommended    2.  Pantoprazole 30 - minutes before breakfast    3.  Pneumovax 23 today    4.  After visit looked into bisphosphonate history, has been on for 15 yrs with stable to improved Tscore.  Recommend drug holiday and recheck DEXA in 2 years.    I spent 60 minutes with this patient today  . All changes were made via collaborative practice agreement with Summer Vega A copy of the visit note was provided to the patient's primary care provider.    Will follow up in 1 year.    The patient was given a summary of these recommendations as an after visit summary.     Danielle Aguayo , Pharm D  [phone] (phone)  [phone] (pager)  Medication Therapy Management Pharmacist

## 2017-12-21 NOTE — TELEPHONE ENCOUNTER
Requested Prescriptions   Pending Prescriptions Disp Refills     metoprolol (TOPROL-XL) 25 MG 24 hr tablet [Pharmacy Med Name: METOPROLOL SUCC ER 25 MG TAB] 90 tablet 1     Sig: TAKE 1 TABLET (25 MG) BY MOUTH AT BEDTIME    Beta-Blockers Protocol Passed    12/21/2017 12:57 AM       Passed - Blood pressure under 140/90    BP Readings from Last 3 Encounters:   12/21/17 106/60   12/04/17 129/78   10/30/17 112/69                Passed - Patient is age 6 or older       Passed - Recent or future visit with authorizing provider's specialty    Patient had office visit in the last year or has a visit in the next 30 days with authorizing provider.  See chart review.                 Prescription approved per [facility] Refill Protocol.

## 2017-12-21 NOTE — MR AVS SNAPSHOT
After Visit Summary   12/21/2017    Viv Shaw    MRN: [id number]           Patient Information     Date Of Birth          1950        Visit Information        Provider Department      12/21/2017 8:30 AM Danielle Aguayo, [facility] MTM        Today's Diagnoses     Coronary artery disease involving native heart without angina pectoris, unspecified vessel or lesion type          Care Instructions    Recommendations from today's MTM visit:                                                    MTM (medication therapy management) is a service provided by a clinical pharmacist designed to help you get the most of out of your medicines.   Today we reviewed what your medicines are for, how to know if they are working, that your medicines are safe and how to make your medicine regimen as easy as possible.     1.  Target Calcium 1200 mg daily including diet.   If you are not meeting this with you diet, recommend starting calcium citrate 600 mg and vitamin D daily.  Multivitamin is okay as well if you are not getting fruits and veggies every day in diet.    2.  Recommend taking Pantoprazole 30 minutes before breakfast    3.  Due for Pneumovax 23 booster - will get today    Next MTM visit: 1 year    To schedule another MTM appointment, please call the clinic directly or you may call the MTM scheduling line at [phone] or toll-free at [phone].     My Clinical Pharmacist's contact information:                                                      It was a pleasure seeing you today!  Please feel free to contact me with any questions or concerns you have.      Danielle Aguayo , Pharm D  [phone] (phone)  [phone] (pager)  Medication Therapy Management Pharmacist     You may receive a survey about the MTM services you received.  I would appreciate your feedback to help me serve you better in the future. Please fill it out and return it when you can. Your comments will be anonymous.                       Follow-ups after your visit        Your next 10 appointments already scheduled     Apr 04, 2018  6:20 AM CDT   (Arrive by 6:05 AM)   CT CHEST W/O CONTRAST with UCCT1   [facility] [place] [facility] ([facility])    [address] [place] [id number] [phone]           Please bring any scans or X-rays taken at other hospitals, if similar tests were done. Also bring a list of your medicines, including vitamins, minerals and over-the-counter drugs. It is safest to leave personal items at home.  Be sure to tell your doctor:   If you have any allergies.   If there s any chance you are pregnant.   If you are breastfeeding.   If you have any special needs.  You do not need to do anything special to prepare.  Please wear loose clothing, such as a sweat suit or jogging clothes. Avoid snaps, zippers and other metal. We may ask you to undress and put on a hospital gown.            Apr 04, 2018  7:00 AM CDT   FULL PULMONARY FUNCTION with  PFL B   [facility] Function Testing ([facility])    [address] [place] [id number] [phone]            Apr 04, 2018  8:00 AM CDT   (Arrive by 7:45 AM)   New Patient Visit with Pepe Boss MD   [facility] ([facility])    [address] [place] [id number] [phone]            Dec 03, 2018  1:05 PM CST   (Arrive by 12:35 PM)   Return Kidney Transplant with Morro Veloz MD   [facility] ([facility])    [address] [place] [id number] [phone]              Who to contact     If you have questions or need follow up information about today's clinic visit or your schedule please contact [facility] directly at [phone].  Normal or non-critical lab and imaging results will be communicated  to you by [employer], letter or phone within 4 business days after the clinic has received the results. If you do not hear from us within 7 days, please contact the clinic through [employer] or phone. If you have a critical or abnormal lab result, we will notify you by phone as soon as possible.  Submit refill requests through [employer] or call your pharmacy and they will forward the refill request to us. Please allow 3 business days for your refill to be completed.          Additional Information About Your Visit        [employer] Information     [employer] gives you secure access to your electronic health record. If you see a primary care provider, you can also send messages to your care team and make appointments. If you have questions, please call your primary care clinic.  If you do not have a primary care provider, please call [phone] and they will assist you.        Care EveryWhere ID     This is your Care EveryWhere ID. This could be used by other organizations to access your [facility] medical records  [id number]        Your Vitals Were     Pulse BMI (Body Mass Index)                61 28.51 kg/m2           Blood Pressure from Last 3 Encounters:   12/21/17 106/60   12/04/17 129/78   10/30/17 112/69    Weight from Last 3 Encounters:   12/21/17 166 lb 1.6 oz (75.3 kg)   12/04/17 167 lb (75.8 kg)   10/22/17 177 lb 3.2 oz (80.4 kg)              Today, you had the following     No orders found for display         Today's Medication Changes          These changes are accurate as of: 12/21/17  9:04 AM.  If you have any questions, ask your nurse or doctor.               Start taking these medicines.        Dose/Directions    insulin glargine 100 UNIT/ML injection   Commonly known as:  LANTUS   Used for:  Coronary artery disease involving native heart without angina pectoris, unspecified vessel or lesion type   Started by:  Danielle Aguayo, [facility]        Dose:  20 Units   Inject 20 Units Subcutaneous At Bedtime   Refills:  0          These medicines have changed or have updated prescriptions.        Dose/Directions    isosorbide mononitrate 30 MG 24 hr tablet   Commonly known as:  IMDUR   This may have changed:  See the new instructions.   Used for:  Coronary artery disease involving native heart without angina pectoris, unspecified vessel or lesion type   Changed by:  Danielle Aguayo RPH        Dose:  15 mg   Take 0.5 tablets (15 mg) by mouth daily   Quantity:  90 tablet   Refills:  0         Stop taking these medicines if you haven't already. Please contact your care team if you have questions.     ferrous sulfate 325 (65 FE) MG tablet   Commonly known as:  IRON   Stopped by:  Danielle Aguayo RPH                Where to get your medicines      Some of these will need a paper prescription and others can be bought over the counter.  Ask your nurse if you have questions.     You don't need a prescription for these medications     insulin glargine 100 UNIT/ML injection    isosorbide mononitrate 30 MG 24 hr tablet                Primary Care Provider Office Phone # Fax #    Krsarakumari LUIS Vega [phone]       [facility] [address] [facility] [address]        Equal Access to Services     [facility]: Hadii kirit gaines hadasho Sojoi, waaxda luqadaha, qaybta kaalmaphylicia fischer, hue payan . So [facility] [phone].    ATENCIÓN: Si habla español, tiene a rosario disposición servicios gratuitos de asistencia lingüística. [facility] [phone].    We comply with applicable federal civil rights laws and [place] laws. We do not discriminate on the basis of race, color, national origin, age, disability, sex, sexual orientation, or gender identity.            Thank you!     Thank you for choosing [facility]  for your care. Our goal is always to provide you with excellent care. Hearing back from our patients is one way we can continue to improve our services. Please take a few minutes to  "complete the written survey that you may receive in the mail after your visit with us. Thank you!             Your Updated Medication List - Protect others around you: Learn how to safely use, store and throw away your medicines at www.disposemymeds.org.          This list is accurate as of: 12/21/17  9:04 AM.  Always use your most recent med list.                   Brand Name Dispense Instructions for use Diagnosis    alendronate 70 MG tablet    FOSAMAX    12 tablet    Take 1 tablet (70 mg) by mouth every 7 days Take with over 8 ounces water and stay upright for at least 30 minutes after dose.  Take at least 60 minutes before breakfast    Osteopenia       amLODIPine 10 MG tablet    NORVASC    90 tablet    TAKE 1 TABLET (10 MG) BY MOUTH DAILY    Hypertension secondary to other renal disorders       ASPIRIN PO      Take 81 mg by mouth daily        atorvastatin 40 MG tablet    LIPITOR    45 tablet    TAKE 0.5 TABLETS (20 MG) BY MOUTH DAILY    Hyperlipidemia LDL goal <100       FLORIN CONTOUR test strip   Generic drug:  blood glucose monitoring           BD insulin syringe ultrafine 31G X 5/16\" 0.3 ML   Generic drug:  insulin syringe-needle U-100      USING 4-5 PER DAY ([facility] 31G/0.3ML)        chlorthalidone 25 MG tablet    HYGROTON    90 tablet    Take 1 tablet (25 mg) by mouth daily    Hypertension secondary to other renal disorders       cycloSPORINE modified 25 MG capsule     180 capsule    Take 3 capsules (75 mg) by mouth 2 times daily    Kidney transplanted       insulin glargine 100 UNIT/ML injection    LANTUS     Inject 20 Units Subcutaneous At Bedtime    Coronary artery disease involving native heart without angina pectoris, unspecified vessel or lesion type       isosorbide mononitrate 30 MG 24 hr tablet    IMDUR    90 tablet    Take 0.5 tablets (15 mg) by mouth daily    Coronary artery disease involving native heart without angina pectoris, unspecified vessel or lesion type       metoprolol 25 MG 24 hr " tablet    TOPROL XL    90 tablet    Take 1 tablet (25 mg) by mouth At Bedtime    Hypertension secondary to other renal disorders, Coronary artery disease involving native heart without angina pectoris, unspecified vessel or lesion type       mycophenolic acid 180 MG EC tablet    GENERIC EQUIVALENT    180 tablet    Take 3 tablets (540 mg) by mouth 2 times daily    Kidney replaced by transplant       NovoLOG VIAL 100 UNITS/ML injection   Generic drug:  insulin aspart      sliding scale 4-6 units tid        pantoprazole 40 MG EC tablet    PROTONIX    90 tablet    Take 1 tablet (40 mg) by mouth daily    Gastroesophageal reflux disease without esophagitis       SYNTHROID 112 MCG tablet   Generic drug:  levothyroxine      Take by mouth daily        valsartan 160 MG tablet    DIOVAN    45 tablet    Take 0.5 tablets (80 mg) by mouth daily    Hypertension secondary to other renal disorders

## 2017-12-21 NOTE — MR AVS SNAPSHOT
After Visit Summary   12/21/2017    Viv Shaw    MRN: [id number]           Patient Information     Date Of Birth          1950        Visit Information        Provider Department      12/21/2017 9:30 AM [facility] NURSE [facility]        Today's Diagnoses     Encounter for immunization    -  1       Follow-ups after your visit        Your next 10 appointments already scheduled     Apr 04, 2018  6:20 AM CDT   (Arrive by 6:05 AM)   CT CHEST W/O CONTRAST with UCCT1   [facility] [place] [facility] ([facility])    [address] [place] [id number] [phone]           Please bring any scans or X-rays taken at other hospitals, if similar tests were done. Also bring a list of your medicines, including vitamins, minerals and over-the-counter drugs. It is safest to leave personal items at home.  Be sure to tell your doctor:   If you have any allergies.   If there s any chance you are pregnant.   If you are breastfeeding.   If you have any special needs.  You do not need to do anything special to prepare.  Please wear loose clothing, such as a sweat suit or jogging clothes. Avoid snaps, zippers and other metal. We may ask you to undress and put on a hospital gown.            Apr 04, 2018  7:00 AM CDT   FULL PULMONARY FUNCTION with  PFL B   [facility] Pulmonary Function Testing ([facility])    [address] [place] [id number] [phone]            Apr 04, 2018  8:00 AM CDT   (Arrive by 7:45 AM)   New Patient Visit with Pepe Boss MD   [facility] ([facility])    [address] [place] [address] [phone]            Dec 03, 2018  1:05 PM CST   (Arrive by 12:35 PM)   Return Kidney Transplant with Morro Veloz MD   [facility] Nephrology ([facility])    [address] [place] [address]  [address] [facility] [address] [phone]              Who to contact     If you have questions or need follow up information about today's clinic visit or your schedule please contact [facility] directly at [phone].  Normal or non-critical lab and imaging results will be communicated to you by MyChart, letter or phone within 4 business days after the clinic has received the results. If you do not hear from us within 7 days, please contact the clinic through FreshRealmhart or phone. If you have a critical or abnormal lab result, we will notify you by phone as soon as possible.  Submit refill requests through Talentwire or call your pharmacy and they will forward the refill request to us. Please allow 3 business days for your refill to be completed.          Additional Information About Your Visit        FreshRealmhart Information     Talentwire gives you secure access to your electronic health record. If you see a primary care provider, you can also send messages to your care team and make appointments. If you have questions, please call your primary care clinic.  If you do not have a primary care provider, please call [phone] and they will assist you.        Care EveryWhere ID     This is your Care EveryWhere ID. This could be used by other organizations to access your [place] medical records  [id number]         Blood Pressure from Last 3 Encounters:   12/21/17 106/60   12/04/17 129/78   10/30/17 112/69    Weight from Last 3 Encounters:   12/21/17 166 lb 1.6 oz (75.3 kg)   12/04/17 167 lb (75.8 kg)   10/22/17 177 lb 3.2 oz (80.4 kg)              We Performed the Following     PNEUMOCOCCAL VACCINE,ADULT,SQ OR IM          Today's Medication Changes          These changes are accurate as of: 12/21/17 11:54 AM.  If you have any questions, ask your nurse or doctor.               Start taking these medicines.        Dose/Directions    insulin glargine 100 UNIT/ML injection   Commonly known  as:  LANTUS   Used for:  Coronary artery disease involving native heart without angina pectoris, unspecified vessel or lesion type   Started by:  Danielle Aguayo RPH        Dose:  20 Units   Inject 20 Units Subcutaneous At Bedtime   Refills:  0         These medicines have changed or have updated prescriptions.        Dose/Directions    isosorbide mononitrate 30 MG 24 hr tablet   Commonly known as:  IMDUR   This may have changed:  See the new instructions.   Used for:  Coronary artery disease involving native heart without angina pectoris, unspecified vessel or lesion type   Changed by:  Danielle Aguayo RPH        Dose:  15 mg   Take 0.5 tablets (15 mg) by mouth daily   Quantity:  90 tablet   Refills:  0         Stop taking these medicines if you haven't already. Please contact your care team if you have questions.     alendronate 70 MG tablet   Commonly known as:  FOSAMAX   Stopped by:  Danielle Aguayo RPH           ferrous sulfate 325 (65 FE) MG tablet   Commonly known as:  IRON   Stopped by:  Danielle Aguayo RPH                Where to get your medicines      Some of these will need a paper prescription and others can be bought over the counter.  Ask your nurse if you have questions.     You don't need a prescription for these medications     insulin glargine 100 UNIT/ML injection    isosorbide mononitrate 30 MG 24 hr tablet                Primary Care Provider Office Phone # Fax #    Ignaciokamii LUIS Vega [phone]       [address] [facility] [address]        Equal Access to Services     [facility] AH: Hadii aad ku hadasho Sotreasureali, waaxda luqadaha, qaybta kaalmada adeegyada, hue payan . So [facility] [phone].    ATENCIÓN: Si habla español, tiene a rosario disposición servicios gratuitos de asistencia lingüística. Llame al [phone].    We comply with applicable federal civil rights laws and [place] laws. We do not discriminate on the basis of  "race, color, national origin, age, disability, sex, sexual orientation, or gender identity.            Thank you!     Thank you for choosing [facility]  for your care. Our goal is always to provide you with excellent care. Hearing back from our patients is one way we can continue to improve our services. Please take a few minutes to complete the written survey that you may receive in the mail after your visit with us. Thank you!             Your Updated Medication List - Protect others around you: Learn how to safely use, store and throw away your medicines at www.disposemymeds.org.          This list is accurate as of: 12/21/17 11:54 AM.  Always use your most recent med list.                   Brand Name Dispense Instructions for use Diagnosis    amLODIPine 10 MG tablet    NORVASC    90 tablet    TAKE 1 TABLET (10 MG) BY MOUTH DAILY    Hypertension secondary to other renal disorders       ASPIRIN PO      Take 81 mg by mouth daily        atorvastatin 40 MG tablet    LIPITOR    45 tablet    TAKE 0.5 TABLETS (20 MG) BY MOUTH DAILY    Hyperlipidemia LDL goal <100       FLORIN CONTOUR test strip   Generic drug:  blood glucose monitoring           BD insulin syringe ultrafine 31G X 5/16\" 0.3 ML   Generic drug:  insulin syringe-needle U-100      USING 4-5 PER DAY ([facility] 31G/0.3ML)        chlorthalidone 25 MG tablet    HYGROTON    90 tablet    Take 1 tablet (25 mg) by mouth daily    Hypertension secondary to other renal disorders       cycloSPORINE modified 25 MG capsule     180 capsule    Take 3 capsules (75 mg) by mouth 2 times daily    Kidney transplanted       insulin glargine 100 UNIT/ML injection    LANTUS     Inject 20 Units Subcutaneous At Bedtime    Coronary artery disease involving native heart without angina pectoris, unspecified vessel or lesion type       isosorbide mononitrate 30 MG 24 hr tablet    IMDUR    90 tablet    Take 0.5 tablets (15 mg) by mouth daily    Coronary artery disease " involving native heart without angina pectoris, unspecified vessel or lesion type       metoprolol 25 MG 24 hr tablet    TOPROL XL    90 tablet    Take 1 tablet (25 mg) by mouth At Bedtime    Hypertension secondary to other renal disorders, Coronary artery disease involving native heart without angina pectoris, unspecified vessel or lesion type       mycophenolic acid 180 MG EC tablet    GENERIC EQUIVALENT    180 tablet    Take 3 tablets (540 mg) by mouth 2 times daily    Kidney replaced by transplant       NovoLOG VIAL 100 UNITS/ML injection   Generic drug:  insulin aspart      sliding scale 4-6 units tid        pantoprazole 40 MG EC tablet    PROTONIX    90 tablet    Take 1 tablet (40 mg) by mouth daily    Gastroesophageal reflux disease without esophagitis       SYNTHROID 112 MCG tablet   Generic drug:  levothyroxine      Take by mouth daily        valsartan 160 MG tablet    DIOVAN    45 tablet    Take 0.5 tablets (80 mg) by mouth daily    Hypertension secondary to other renal disorders

## 2017-12-21 NOTE — PATIENT INSTRUCTIONS
Recommendations from today's MTM visit:                                                    MTM (medication therapy management) is a service provided by a clinical pharmacist designed to help you get the most of out of your medicines.   Today we reviewed what your medicines are for, how to know if they are working, that your medicines are safe and how to make your medicine regimen as easy as possible.     1.  Target Calcium 1200 mg daily including diet.   If you are not meeting this with you diet, recommend starting calcium citrate 600 mg and vitamin D daily.  Multivitamin is okay as well if you are not getting fruits and veggies every day in diet.    2.  Recommend taking Pantoprazole 30 minutes before breakfast    3.  Due for Pneumovax 23 booster - will get today    Next MTM visit: 1 year    To schedule another MTM appointment, please call the clinic directly or you may call the MTM scheduling line at [phone] or toll-free at [phone].     My Clinical Pharmacist's contact information:                                                      It was a pleasure seeing you today!  Please feel free to contact me with any questions or concerns you have.      Danielle Aguayo , Pharm D  [phone] (phone)  [phone] (pager)  Medication Therapy Management Pharmacist     You may receive a survey about the MTM services you received.  I would appreciate your feedback to help me serve you better in the future. Please fill it out and return it when you can. Your comments will be anonymous.

## 2017-12-22 DIAGNOSIS — I15.1 HYPERTENSION SECONDARY TO OTHER RENAL DISORDERS: ICD-10-CM

## 2017-12-22 DIAGNOSIS — K21.9 GASTROESOPHAGEAL REFLUX DISEASE WITHOUT ESOPHAGITIS: ICD-10-CM

## 2017-12-26 RX ORDER — PANTOPRAZOLE SODIUM 40 MG/1
TABLET, DELAYED RELEASE ORAL
Qty: 90 TABLET | Refills: 1 | Status: SHIPPED | OUTPATIENT
Start: 2017-12-26 | End: 2018-03-28

## 2017-12-26 RX ORDER — VALSARTAN 160 MG/1
TABLET ORAL
Qty: 45 TABLET | Refills: 1 | Status: SHIPPED | OUTPATIENT
Start: 2017-12-26 | End: 2018-07-21

## 2017-12-28 ENCOUNTER — TRANSFERRED RECORDS (OUTPATIENT)
Dept: HEALTH INFORMATION MANAGEMENT | Facility: CLINIC | Age: 67
End: 2017-12-28

## 2017-12-28 DIAGNOSIS — K21.9 GASTROESOPHAGEAL REFLUX DISEASE WITHOUT ESOPHAGITIS: ICD-10-CM

## 2018-01-03 RX ORDER — PANTOPRAZOLE SODIUM 40 MG/1
TABLET, DELAYED RELEASE ORAL
Qty: 90 TABLET | Refills: 3 | Status: SHIPPED | OUTPATIENT
Start: 2018-01-03 | End: 2018-03-28

## 2018-01-05 DIAGNOSIS — K21.9 GASTROESOPHAGEAL REFLUX DISEASE WITHOUT ESOPHAGITIS: ICD-10-CM

## 2018-01-09 RX ORDER — PANTOPRAZOLE SODIUM 40 MG/1
TABLET, DELAYED RELEASE ORAL
Qty: 90 TABLET | Refills: 0 | Status: SHIPPED | OUTPATIENT
Start: 2018-01-09 | End: 2018-07-01

## 2018-01-09 NOTE — TELEPHONE ENCOUNTER
"Requested Prescriptions   Pending Prescriptions Disp Refills     pantoprazole (PROTONIX) 40 MG EC tablet [Pharmacy Med Name: PANTOPRAZOLE SOD DR 40 MG TAB] 90 tablet 1     Sig: TAKE 1 TABLET (40 MG) BY MOUTH DAILY    PPI Protocol Passed    1/5/2018  9:54 AM       Passed - Not on Clopidogrel (unless Pantoprazole ordered)       Passed - No diagnosis of osteoporosis on record       Passed - Recent or future visit with authorizing provider's specialty    Patient had office visit in the last year or has a visit in the next 30 days with authorizing provider.  See \"Patient Info\" tab in inbasket, or \"Choose Columns\" in Meds & Orders section of the refill encounter.              Passed - Patient is age 18 or older       Passed - No active pregnacy on record       Passed - No positive pregnancy test in past 12 months        Prescription approved per [facility] Refill Protocol.    "

## 2018-02-10 DIAGNOSIS — I25.10 CORONARY ARTERY DISEASE INVOLVING NATIVE HEART WITHOUT ANGINA PECTORIS, UNSPECIFIED VESSEL OR LESION TYPE: ICD-10-CM

## 2018-02-15 RX ORDER — ISOSORBIDE MONONITRATE 30 MG/1
TABLET, EXTENDED RELEASE ORAL
Qty: 90 TABLET | Refills: 1 | Status: SHIPPED | OUTPATIENT
Start: 2018-02-15 | End: 2019-10-30

## 2018-02-18 DIAGNOSIS — I25.10 CORONARY ARTERY DISEASE INVOLVING NATIVE HEART WITHOUT ANGINA PECTORIS, UNSPECIFIED VESSEL OR LESION TYPE: ICD-10-CM

## 2018-02-20 RX ORDER — ISOSORBIDE MONONITRATE 30 MG/1
TABLET, EXTENDED RELEASE ORAL
Qty: 90 TABLET | Refills: 0 | OUTPATIENT
Start: 2018-02-20

## 2018-03-21 DIAGNOSIS — Z94.0 KIDNEY REPLACED BY TRANSPLANT: Primary | ICD-10-CM

## 2018-03-21 RX ORDER — MYCOPHENOLIC ACID 180 MG/1
540 TABLET, DELAYED RELEASE ORAL 2 TIMES DAILY
Qty: 180 TABLET | Refills: 0 | Status: SHIPPED | OUTPATIENT
Start: 2018-03-21 | End: 2018-04-24

## 2018-03-27 DIAGNOSIS — Z94.0 KIDNEY REPLACED BY TRANSPLANT: ICD-10-CM

## 2018-03-27 DIAGNOSIS — Z79.899 ENCOUNTER FOR LONG-TERM CURRENT USE OF MEDICATION: ICD-10-CM

## 2018-03-27 DIAGNOSIS — Z48.298 AFTERCARE FOLLOWING ORGAN TRANSPLANT: ICD-10-CM

## 2018-03-27 LAB
ANION GAP SERPL CALCULATED.3IONS-SCNC: 4 MMOL/L (ref 3–14)
BUN SERPL-MCNC: 31 MG/DL (ref 7–30)
CALCIUM SERPL-MCNC: 9.9 MG/DL (ref 8.5–10.1)
CHLORIDE SERPL-SCNC: 104 MMOL/L (ref 94–109)
CO2 SERPL-SCNC: 31 MMOL/L (ref 20–32)
CREAT SERPL-MCNC: 1.37 MG/DL (ref 0.52–1.04)
CYCLOSPORINE BLD LC/MS/MS-MCNC: 98 UG/L (ref 50–400)
ERYTHROCYTE [DISTWIDTH] IN BLOOD BY AUTOMATED COUNT: 13.6 % (ref 10–15)
GFR SERPL CREATININE-BSD FRML MDRD: 38 ML/MIN/1.7M2
GLUCOSE SERPL-MCNC: 160 MG/DL (ref 70–99)
HCT VFR BLD AUTO: 33.7 % (ref 35–47)
HGB BLD-MCNC: 10.7 G/DL (ref 11.7–15.7)
MCH RBC QN AUTO: 28.7 PG (ref 26.5–33)
MCHC RBC AUTO-ENTMCNC: 31.8 G/DL (ref 31.5–36.5)
MCV RBC AUTO: 90 FL (ref 78–100)
PLATELET # BLD AUTO: 218 10E9/L (ref 150–450)
POTASSIUM SERPL-SCNC: 4.1 MMOL/L (ref 3.4–5.3)
RBC # BLD AUTO: 3.73 10E12/L (ref 3.8–5.2)
SODIUM SERPL-SCNC: 139 MMOL/L (ref 133–144)
TME LAST DOSE: NORMAL H
WBC # BLD AUTO: 4 10E9/L (ref 4–11)

## 2018-03-27 PROCEDURE — 80158 DRUG ASSAY CYCLOSPORINE: CPT | Performed by: INTERNAL MEDICINE

## 2018-03-28 ENCOUNTER — OFFICE VISIT (OUTPATIENT)
Dept: INTERNAL MEDICINE | Facility: CLINIC | Age: 68
End: 2018-03-28
Payer: MEDICARE

## 2018-03-28 VITALS
BODY MASS INDEX: 28.51 KG/M2 | WEIGHT: 167 LBS | HEIGHT: 64 IN | OXYGEN SATURATION: 97 % | SYSTOLIC BLOOD PRESSURE: 132 MMHG | TEMPERATURE: 97.9 F | HEART RATE: 71 BPM | DIASTOLIC BLOOD PRESSURE: 68 MMHG

## 2018-03-28 DIAGNOSIS — Z98.61 CAD S/P PERCUTANEOUS CORONARY ANGIOPLASTY: ICD-10-CM

## 2018-03-28 DIAGNOSIS — E11.21 TYPE 2 DIABETES MELLITUS WITH DIABETIC NEPHROPATHY, WITH LONG-TERM CURRENT USE OF INSULIN (H): Primary | ICD-10-CM

## 2018-03-28 DIAGNOSIS — I25.10 CAD S/P PERCUTANEOUS CORONARY ANGIOPLASTY: ICD-10-CM

## 2018-03-28 DIAGNOSIS — I15.1 HYPERTENSION SECONDARY TO OTHER RENAL DISORDERS: ICD-10-CM

## 2018-03-28 DIAGNOSIS — N18.30 ANEMIA IN STAGE 3 CHRONIC KIDNEY DISEASE (H): ICD-10-CM

## 2018-03-28 DIAGNOSIS — E78.5 HYPERLIPIDEMIA LDL GOAL <100: ICD-10-CM

## 2018-03-28 DIAGNOSIS — E03.8 OTHER SPECIFIED HYPOTHYROIDISM: ICD-10-CM

## 2018-03-28 DIAGNOSIS — Z79.4 TYPE 2 DIABETES MELLITUS WITH DIABETIC NEPHROPATHY, WITH LONG-TERM CURRENT USE OF INSULIN (H): Primary | ICD-10-CM

## 2018-03-28 DIAGNOSIS — D63.1 ANEMIA IN STAGE 3 CHRONIC KIDNEY DISEASE (H): ICD-10-CM

## 2018-03-28 DIAGNOSIS — D84.9 IMMUNOSUPPRESSED STATUS (H): ICD-10-CM

## 2018-03-28 PROCEDURE — 99214 OFFICE O/P EST MOD 30 MIN: CPT | Performed by: INTERNAL MEDICINE

## 2018-03-28 NOTE — MR AVS SNAPSHOT
After Visit Summary   3/28/2018    Viv Shaw    MRN: [id number]           Patient Information     Date Of Birth          1950        Visit Information        Provider Department      3/28/2018 8:40 AM Summer Vega MD [facility]        Today's Diagnoses     Type 2 diabetes mellitus with diabetic nephropathy, with long-term current use of insulin (H)    -  1    Other specified hypothyroidism        Hyperlipidemia LDL goal <100        Immunosuppressed status (HCC)        CAD S/P percutaneous coronary angioplasty        Anemia in stage 3 chronic kidney disease        Hypertension secondary to other renal disorders           Follow-ups after your visit        Your next 10 appointments already scheduled     Apr 02, 2018  8:15 AM CDT   LAB with RI LAB   [facility] ([facility])    [address]  [facility] [address] [phone]           Please do not eat 10-12 hours before your appointment if you are coming in fasting for labs on lipids, cholesterol, or glucose (sugar). This does not apply to pregnant women. Water, hot tea and black coffee (with nothing added) are okay. Do not drink other fluids, diet soda or chew gum.            Apr 04, 2018  6:20 AM CDT   (Arrive by 6:05 AM)   CT CHEST W/O CONTRAST with UCCT1   [facility] ([facility])    [address] [address] [phone]           Please bring any scans or X-rays taken at other hospitals, if similar tests were done. Also bring a list of your medicines, including vitamins, minerals and over-the-counter drugs. It is safest to leave personal items at home.  Be sure to tell your doctor:   If you have any allergies.   If there s any chance you are pregnant.   If you are breastfeeding.  You do not need to do anything special to prepare for this exam.  Please wear loose clothing, such as a  sweat suit or jogging clothes. Avoid snaps, zippers and other metal. We may ask you to undress and put on a hospital gown.            Apr 04, 2018  7:00 AM CDT   FULL PULMONARY FUNCTION with UC PFL B   [facility] Pulmonary Function Testing ([facility])    [address]  [address]  [facility] [address]   [phone]            Apr 04, 2018  8:00 AM CDT   (Arrive by 7:45 AM)   New Patient Visit with Pepe Boss MD   [facility] ([facility])    [address]  [address]  [facility] [address]   [phone]            Dec 03, 2018  1:05 PM CST   (Arrive by 12:35 PM)   Return Kidney Transplant with Morro Veloz MD   [facility] Nephrology ([facility])    [address]  [address]  [facility] [address]   [phone]              Who to contact     If you have questions or need follow up information about today's clinic visit or your schedule please contact [facility] directly at [phone].  Normal or non-critical lab and imaging results will be communicated to you by PadMatcherhart, letter or phone within 4 business days after the clinic has received the results. If you do not hear from us within 7 days, please contact the clinic through PadMatcherhart or phone. If you have a critical or abnormal lab result, we will notify you by phone as soon as possible.  Submit refill requests through Cadigo or call your pharmacy and they will forward the refill request to us. Please allow 3 business days for your refill to be completed.          Additional Information About Your Visit        PadMatcherhart Information     Cadigo gives you secure access to your electronic health record. If you see a primary care provider, you can also send messages to your care team and make appointments. If you have questions, please call your primary care clinic.  If you do not have a primary care  "provider, please call [phone] and they will assist you.        Care EveryWhere ID     This is your Care EveryWhere ID. This could be used by other organizations to access your [place] medical records  [id number]        Your Vitals Were     Pulse Temperature Height Pulse Oximetry BMI (Body Mass Index)       71 97.9  F (36.6  C) (Oral) 5' 4\" (1.626 m) 97% 28.67 kg/m2        Blood Pressure from Last 3 Encounters:   03/28/18 132/68   12/21/17 106/60   12/04/17 129/78    Weight from Last 3 Encounters:   03/28/18 167 lb (75.8 kg)   12/21/17 166 lb 1.6 oz (75.3 kg)   12/04/17 167 lb (75.8 kg)               Primary Care Provider Office Phone # Fax #    Documari LUIS MD Silvia [phone]       [address] [facility] [address]        Equal Access to Services     [facility] : Hadii aad ku hadasho Soomaali, waaxda luqadaha, qaybta kaalmada adeegyada, waxay idiin hayaan velia payan . So [facility] [phone].    ATENCIÓN: Si habla español, tiene a rosario disposición servicios gratuitos de asistencia lingüística. LlK[facility]tering [facility] [phone].    We comply with applicable federal civil rights laws and [place] laws. We do not discriminate on the basis of race, color, national origin, age, disability, sex, sexual orientation, or gender identity.            Thank you!     Thank you for choosing [facility]  for your care. Our goal is always to provide you with excellent care. Hearing back from our patients is one way we can continue to improve our services. Please take a few minutes to complete the written survey that you may receive in the mail after your visit with us. Thank you!             Your Updated Medication List - Protect others around you: Learn how to safely use, store and throw away your medicines at www.disposemymeds.org.          This list is accurate as of 3/28/18 11:59 PM.  Always use your most recent med list.                   Brand Name Dispense Instructions for " "use Diagnosis    amLODIPine 10 MG tablet    NORVASC    90 tablet    TAKE 1 TABLET (10 MG) BY MOUTH DAILY    Hypertension secondary to other renal disorders       ASPIRIN PO      Take 81 mg by mouth daily        atorvastatin 40 MG tablet    LIPITOR    45 tablet    TAKE 0.5 TABLETS (20 MG) BY MOUTH DAILY    Hyperlipidemia LDL goal <100       FLORIN CONTOUR test strip   Generic drug:  blood glucose monitoring           BD insulin syringe ultrafine 31G X 5/16\" 0.3 ML   Generic drug:  insulin syringe-needle U-100      USING 4-5 PER DAY ([facility] 31G/0.3ML)        chlorthalidone 25 MG tablet    HYGROTON    90 tablet    Take 1 tablet (25 mg) by mouth daily    Hypertension secondary to other renal disorders       cycloSPORINE modified 25 MG capsule     180 capsule    Take 3 capsules (75 mg) by mouth 2 times daily    Kidney transplanted       insulin glargine 100 UNIT/ML injection    LANTUS     Inject 20 Units Subcutaneous At Bedtime    Coronary artery disease involving native heart without angina pectoris, unspecified vessel or lesion type       isosorbide mononitrate 30 MG 24 hr tablet    IMDUR    90 tablet    TAKE 0.5 TABLETS (15 MG) BY MOUTH 2 TIMES DAILY    Coronary artery disease involving native heart without angina pectoris, unspecified vessel or lesion type       metoprolol succinate 25 MG 24 hr tablet    TOPROL-XL    90 tablet    TAKE 1 TABLET (25 MG) BY MOUTH AT BEDTIME    Hypertension secondary to other renal disorders, Coronary artery disease involving native heart without angina pectoris, unspecified vessel or lesion type       mycophenolic acid 180 MG EC tablet    GENERIC EQUIVALENT    180 tablet    Take 3 tablets (540 mg) by mouth 2 times daily    Kidney replaced by transplant       NovoLOG VIAL 100 UNITS/ML injection   Generic drug:  insulin aspart      sliding scale 4-6 units tid        pantoprazole 40 MG EC tablet    PROTONIX    90 tablet    TAKE 1 TABLET (40 MG) BY MOUTH DAILY    Gastroesophageal reflux " disease without esophagitis       SYNTHROID 112 MCG tablet   Generic drug:  levothyroxine      Take by mouth daily        valsartan 160 MG tablet    DIOVAN    45 tablet    TAKE 0.5 TABLETS (80 MG) BY MOUTH DAILY    Hypertension secondary to other renal disorders

## 2018-03-28 NOTE — NURSING NOTE
"Chief Complaint   Patient presents with     Diabetes     F/U       Initial /68  Pulse 71  Temp 97.9  F (36.6  C) (Oral)  Ht 5' 4\" (1.626 m)  Wt 167 lb (75.8 kg)  SpO2 97%  BMI 28.67 kg/m2 Estimated body mass index is 28.67 kg/(m^2) as calculated from the following:    Height as of this encounter: 5' 4\" (1.626 m).    Weight as of this encounter: 167 lb (75.8 kg).  Medication Reconciliation: complete   Isabella Davenport MA      "

## 2018-03-28 NOTE — PROGRESS NOTES
SUBJECTIVE:   Viv Shaw is a 67 year old female who presents to clinic today for the following health issues:      Diabetes Follow-up    Patient is checking blood sugars: 2-3 times daily. BS are in  130's usually    Diabetic concerns: None     Symptoms of hypoglycemia (low blood sugar): very rarely     Paresthesias (numbness or burning in feet) or sores: No     Date of last diabetic eye exam: 08/2017    Hyperlipidemia Follow-Up      Rate your low fat/cholesterol diet?: good    Taking statin?  Yes, no muscle aches from statin    Other lipid medications/supplements?:  none    Hypertension Follow-up      Outpatient blood pressures are being checked at store.  Results are normal.    Low Salt Diet: no added salt    BP Readings from Last 2 Encounters:   03/28/18 132/68   12/21/17 106/60     Hemoglobin A1C (%)   Date Value   09/08/2017 6.6 (H)   06/19/2017 6.8 (H)     LDL Cholesterol Calculated (mg/dL)   Date Value   04/13/2017 95   04/12/2016 95       Amount of exercise or physical activity: walk daily    Problems taking medications regularly: No    Medication side effects: none    Diet: regular        Vascular Disease Follow-up:  Coronary Artery Disease (CAD)      Chest pain or pressure, left side neck or arm pain: No    Shortness of breath/increased sweats/nausea with exertion: No    Pain in calves walking 1-2 blocks: No    Worsened or new symptoms since last visit: No    Nitroglycerin use: no    Daily aspirin use: Yes    Hypothyroidism Follow-up      Since last visit, patient describes the following symptoms: Weight stable, no hair loss, no skin changes, no constipation, no loose stools, sees endocrinologist     Patient Active Problem List   Diagnosis     Other vitamin B12 deficiency anemia     Irritable bowel syndrome     GERD (gastroesophageal reflux disease)     Advanced directives, counseling/discussion     Kidney replaced by transplant     Immunosuppressed status (HCC)     Hyperlipidemia LDL goal <100      Long-term use of immunosuppressant medication     CAD S/P percutaneous coronary angioplasty     Anemia in chronic kidney disease     Other specified hypothyroidism     Coronary artery disease involving native heart without angina pectoris, unspecified vessel or lesion type     Type 2 diabetes mellitus with diabetic nephropathy, with long-term current use of insulin (H)     Hypertension secondary to other renal disorders     Aftercare following organ transplant     Ventral hernia without obstruction or gangrene     S/P hernia repair     Opacity of lung on imaging study     Hernia, incisional     Past Surgical History:   Procedure Laterality Date     APPENDECTOMY NOS       ARTHROSCOPY SHOULDER ROTATOR CUFF REPAIR Left      COLONOSCOPY N/A 2016    Procedure: COLONOSCOPY;  Surgeon: Rashard Snider MD;  Location: [facility]     Coronary angioplasty with stent       HYSTERECTOMY       Kidney Transplant NOS Right      LAPAROSCOPIC CHOLECYSTECTOMY       NOSE SURGERY       OPEN SEPARATION COMPONENT HERNIORRHAPHY N/A 10/16/2017    Procedure: OPEN SEPARATION COMPONENT HERNIORRHAPHY;;  Surgeon: CARL Lott MD;  Location: [facility]     RECONSTRUCT ABDOMINAL WALL N/A 10/16/2017    Procedure: RECONSTRUCT ABDOMINAL WALL;  Exploratory Laparotomy, Lysis of Adhesions, Abdominal Wall reconstruction, Inlay Xen AB mesh, Mitek Suture Lamont and Umbilical Hernia Repair.;  Surgeon: CARL Lott MD;  Location: [facility]     REMOVE CATARACT INTRACAP,INSERT LENS Right      TONSILLECTOMY         Social History   Substance Use Topics     Smoking status: Never Smoker     Smokeless tobacco: Never Used     Alcohol use No     Family History   Problem Relation Age of Onset     Respiratory Mother       age 71     Cardiovascular Father       age 75 hyertension     Arthritis Sister      living, healthy     Family History Negative Brother       age 44     Colon Cancer No family hx of          Current  "Outpatient Prescriptions   Medication Sig Dispense Refill     mycophenolic acid (GENERIC EQUIVALENT) 180 MG EC tablet Take 3 tablets (540 mg) by mouth 2 times daily 180 tablet 0     isosorbide mononitrate (IMDUR) 30 MG 24 hr tablet TAKE 0.5 TABLETS (15 MG) BY MOUTH 2 TIMES DAILY 90 tablet 1     pantoprazole (PROTONIX) 40 MG EC tablet TAKE 1 TABLET (40 MG) BY MOUTH DAILY 90 tablet 0     valsartan (DIOVAN) 160 MG tablet TAKE 0.5 TABLETS (80 MG) BY MOUTH DAILY 45 tablet 1     metoprolol (TOPROL-XL) 25 MG 24 hr tablet TAKE 1 TABLET (25 MG) BY MOUTH AT BEDTIME 90 tablet 1     insulin glargine (LANTUS) 100 UNIT/ML injection Inject 20 Units Subcutaneous At Bedtime       amLODIPine (NORVASC) 10 MG tablet TAKE 1 TABLET (10 MG) BY MOUTH DAILY 90 tablet 1     ASPIRIN PO Take 81 mg by mouth daily       GENGRAF (BRAND) 25 MG CAPSULE Take 3 capsules (75 mg) by mouth 2 times daily 180 capsule 11     atorvastatin (LIPITOR) 40 MG tablet TAKE 0.5 TABLETS (20 MG) BY MOUTH DAILY 45 tablet 1     chlorthalidone (HYGROTON) 25 MG tablet Take 1 tablet (25 mg) by mouth daily 90 tablet 3     levothyroxine (SYNTHROID) 112 MCG tablet Take by mouth daily       NOVOLOG 100 U/ML SC SOLN sliding scale 4-6 units tid       BD INSULIN SYRINGE ULTRAFINE 31G X 5/16\" 0.3 ML USING 4-5 PER DAY ([facility] 31G/0.3ML)  6     FLORIN CONTOUR test strip   0       Reviewed and updated as needed this visit by clinical staff  Tobacco  Allergies  Meds  Med Hx  Surg Hx  Fam Hx  Soc Hx      Reviewed and updated as needed this visit by Provider         ROS:  CONSTITUTIONAL: NEGATIVE for fever, chills, change in weight  ENT/MOUTH: NEGATIVE for ear, mouth and throat problems  RESP: NEGATIVE for significant cough or SOB  CV: NEGATIVE for chest pain, palpitations or peripheral edema  GI: NEGATIVE for nausea, abdominal pain, heartburn, or change in bowel habits  MUSCULOSKELETAL: NEGATIVE for significant arthralgias or myalgia  NEURO: NEGATIVE for weakness, dizziness or " "paresthesias  ENDOCRINE: NEGATIVE for temperature intolerance, skin/hair changes  ROS otherwise negative    OBJECTIVE:                                                    /68  Pulse 71  Temp 97.9  F (36.6  C) (Oral)  Ht 5' 4\" (1.626 m)  Wt 167 lb (75.8 kg)  SpO2 97%  BMI 28.67 kg/m2  Body mass index is 28.67 kg/(m^2).   GENERAL: healthy, alert, well nourished, well hydrated, no distress  EYES: Eyes grossly normal to inspection, extraocular movements - intact, and PERRL  HENT: ear canals- normal; TMs- normal; Nose- normal; Mouth- no ulcers, no lesions  NECK: no tenderness, no adenopathy, no asymmetry, no masses, no stiffness   RESP: lungs clear to auscultation - no rales, no rhonchi, no wheezes  CV: regular rates and rhythm,  -  MS: extremities- trace ankle edema, no calf tenderness  NEURO: strength and tone- normal, sensory exam- grossly normal, mentation- intact, speech- normal, reflexes- symmetric  Diabetic foot exam: normal DP and PT pulses, no trophic changes or ulcerative lesions, normal sensory exam and normal monofilament exam       ASSESSMENT/PLAN:                                                        (E11.21,  Z79.4) Type 2 diabetes mellitus with diabetic nephropathy, with long-term current use of insulin (H)  (primary encounter diagnosis)  Plan: On Lantus 20 units daily, also on NovoLog sliding scale check hemoglobin A1c.Advised to follow ADA diet and exercise and f/u in 6 mths.     (E03.8) Other specified hypothyroidism  Plan: Check TSH with free T4 reflex, on Levoxyl 112 mcg daily seeing endocrinologist            (E78.5) Hyperlipidemia LDL goal <100  Plan: On Lipitor 40 mg half tablet daily ,check lipid panel reflex to direct LDL Fasting,         Hepatic panel      (D89.9) Immunosuppressed status (HCC)  Plan: On immunosuppressants follows up with the transplant clinic      (I25.10,  Z98.61) CAD S/P percutaneous coronary angioplasty  Plan: Stable continue IMDUR, valsartan and metoprolol and " aspirin      (N18.3,  D63.1) Anemia in stage 3 chronic kidney disease  Plan: Hemoglobin 10.7 slightly improved since last time advised to take over-the-counter iron supplements as directed      (I15.1,  N28.89) Hypertension secondary to other renal disorders  Plan: Blood pressure stable continue valsartan 160 mg daily ,metoprolol, amlodipine, chlorthalidone.Advised to follow low salt diet and exercise and f/u in 6 mths.         Summer Vega MD  [facility]

## 2018-03-29 NOTE — TELEPHONE ENCOUNTER
FUTURE VISIT INFORMATION      FUTURE VISIT INFORMATION:    Date: 4/4/18    Time: 8 am    Location: [facility]  REFERRAL INFORMATION:    Referring provider:  Dr. Jenn Villa    Referring providers clinic:  Transplant    Reason for visit/diagnosis  Tree bud nodularity     RECORDS REQUESTED FROM:       Clinic name Comments Records Status Imaging Status                                         RECORDS STATUS      RECORDS RECEIVED FROM: Internal from [facility]   DATE RECEIVED: NA   NOTES STATUS DETAILS   OFFICE NOTE from referring provider N/A    OFFICE NOTE from other specialist N/A    DISCHARGE SUMMARY from hospital Internal 10.16.17 [facility]   DISCHARGE REPORT from the ER N/A    OPERATIVE REPORT N/A    MEDICATION LIST Internal    IMAGING  (NEED IMAGES AND REPORTS)     CT SCAN Internal 10.20.17 [facility]   CHEST XRAY (CXR) Internal 10.19.17 [facility]   TESTS     PULMONARY FUNCTION TESTING (PFT) In process Scheduled for 4.4.18   FLOW LOOP VOLUME (FVL) N/A Scheduled for 4.4.18   CYSTIC FIBROSIS     CF SPUTUM CULTURE N/A

## 2018-04-02 DIAGNOSIS — E03.8 OTHER SPECIFIED HYPOTHYROIDISM: ICD-10-CM

## 2018-04-02 DIAGNOSIS — E11.21 TYPE 2 DIABETES MELLITUS WITH DIABETIC NEPHROPATHY, WITH LONG-TERM CURRENT USE OF INSULIN (H): ICD-10-CM

## 2018-04-02 DIAGNOSIS — Z79.4 TYPE 2 DIABETES MELLITUS WITH DIABETIC NEPHROPATHY, WITH LONG-TERM CURRENT USE OF INSULIN (H): ICD-10-CM

## 2018-04-02 DIAGNOSIS — E78.5 HYPERLIPIDEMIA LDL GOAL <100: ICD-10-CM

## 2018-04-02 LAB
ALBUMIN SERPL-MCNC: 3.6 G/DL (ref 3.4–5)
ALP SERPL-CCNC: 46 U/L (ref 40–150)
ALT SERPL W P-5'-P-CCNC: 13 U/L (ref 0–50)
AST SERPL W P-5'-P-CCNC: 12 U/L (ref 0–45)
BILIRUB DIRECT SERPL-MCNC: 0.2 MG/DL (ref 0–0.2)
BILIRUB SERPL-MCNC: 0.6 MG/DL (ref 0.2–1.3)
CHOLEST SERPL-MCNC: 172 MG/DL
HBA1C MFR BLD: 7 % (ref 0–6.4)
HDLC SERPL-MCNC: 53 MG/DL
LDLC SERPL CALC-MCNC: 90 MG/DL
NONHDLC SERPL-MCNC: 119 MG/DL
PROT SERPL-MCNC: 6.7 G/DL (ref 6.8–8.8)
T4 FREE SERPL-MCNC: 1.41 NG/DL (ref 0.76–1.46)
TRIGL SERPL-MCNC: 143 MG/DL
TSH SERPL DL<=0.005 MIU/L-ACNC: 0.08 MU/L (ref 0.4–4)

## 2018-04-02 PROCEDURE — 80061 LIPID PANEL: CPT | Performed by: INTERNAL MEDICINE

## 2018-04-02 PROCEDURE — 83036 HEMOGLOBIN GLYCOSYLATED A1C: CPT | Performed by: INTERNAL MEDICINE

## 2018-04-02 PROCEDURE — 84439 ASSAY OF FREE THYROXINE: CPT | Performed by: INTERNAL MEDICINE

## 2018-04-02 PROCEDURE — 80076 HEPATIC FUNCTION PANEL: CPT | Performed by: INTERNAL MEDICINE

## 2018-04-02 PROCEDURE — 36415 COLL VENOUS BLD VENIPUNCTURE: CPT | Performed by: INTERNAL MEDICINE

## 2018-04-02 PROCEDURE — 84443 ASSAY THYROID STIM HORMONE: CPT | Performed by: INTERNAL MEDICINE

## 2018-04-04 ENCOUNTER — PRE VISIT (OUTPATIENT)
Dept: PULMONOLOGY | Facility: CLINIC | Age: 68
End: 2018-04-04

## 2018-04-04 ENCOUNTER — OFFICE VISIT (OUTPATIENT)
Dept: PULMONOLOGY | Facility: CLINIC | Age: 68
End: 2018-04-04
Attending: INTERNAL MEDICINE
Payer: MEDICARE

## 2018-04-04 ENCOUNTER — RADIANT APPOINTMENT (OUTPATIENT)
Dept: CT IMAGING | Facility: CLINIC | Age: 68
End: 2018-04-04
Payer: MEDICARE

## 2018-04-04 VITALS
OXYGEN SATURATION: 96 % | RESPIRATION RATE: 16 BRPM | HEART RATE: 68 BPM | SYSTOLIC BLOOD PRESSURE: 152 MMHG | DIASTOLIC BLOOD PRESSURE: 75 MMHG

## 2018-04-04 DIAGNOSIS — R93.89 ABNORMAL CT OF THE CHEST: ICD-10-CM

## 2018-04-04 DIAGNOSIS — R91.8 PULMONARY NODULES: Primary | ICD-10-CM

## 2018-04-04 DIAGNOSIS — R93.89 ABNORMAL FINDING ON IMAGING: ICD-10-CM

## 2018-04-04 PROCEDURE — G0463 HOSPITAL OUTPT CLINIC VISIT: HCPCS | Mod: ZF

## 2018-04-04 ASSESSMENT — PAIN SCALES - GENERAL: PAINLEVEL: NO PAIN (0)

## 2018-04-04 NOTE — MR AVS SNAPSHOT
After Visit Summary   4/4/2018    Viv Shaw    MRN: [id number]           Patient Information     Date Of Birth          1950        Visit Information        Provider Department      4/4/2018 8:00 AM Pepe Boss MD [facility]        Today's Diagnoses     Pulmonary nodules    -  1       Follow-ups after your visit        Follow-up notes from your care team     Return if symptoms worsen or fail to improve.      Your next 10 appointments already scheduled     Dec 03, 2018  1:05 PM CST   (Arrive by 12:35 PM)   Return Kidney Transplant with Morro Veloz MD   [facility] ([facility])    [address]  [address]  [place] [address] [phone]              Who to contact     If you have questions or need follow up information about today's clinic visit or your schedule please contact [facility] directly at [phone].  Normal or non-critical lab and imaging results will be communicated to you by MyChart, letter or phone within 4 business days after the clinic has received the results. If you do not hear from us within 7 days, please contact the clinic through [facility] or phone. If you have a critical or abnormal lab result, we will notify you by phone as soon as possible.  Submit refill requests through [facility] or call your pharmacy and they will forward the refill request to us. Please allow 3 business days for your refill to be completed.          Additional Information About Your Visit        MyChart Information     [facility] gives you secure access to your electronic health record. If you see a primary care provider, you can also send messages to your care team and make appointments. If you have questions, please call your primary care clinic.  If you do not have a primary care provider, please call [phone] and they will assist you.        Care EveryWhere ID      This is your Care EveryWhere ID. This could be used by other organizations to access your [facility] medical records  [id number]        Your Vitals Were     Pulse Respirations Pulse Oximetry             68 16 96%          Blood Pressure from Last 3 Encounters:   04/04/18 152/75   03/28/18 132/68   12/21/17 106/60    Weight from Last 3 Encounters:   03/28/18 75.8 kg (167 lb)   12/21/17 75.3 kg (166 lb 1.6 oz)   12/04/17 75.8 kg (167 lb)              Today, you had the following     No orders found for display       Primary Care Provider Office Phone # Fax #    Summer SMITH MD [place] [phone]       [address]  [facility] [address]        Equal Access to Services     PORTER NUNN : Hadii aad ku hadasho Soomaali, waaxda luqadaha, qaybta kaalmada adeegyada, hue payan . So [facility] [phone].    ATENCIÓN: Si habla español, tiene a rosario disposición servicios gratuitos de asistencia lingüística. Llame al [phone].    We comply with applicable federal civil rights laws and [place] laws. We do not discriminate on the basis of race, color, national origin, age, disability, sex, sexual orientation, or gender identity.            Thank you!     Thank you for choosing [facility]  for your care. Our goal is always to provide you with excellent care. Hearing back from our patients is one way we can continue to improve our services. Please take a few minutes to complete the written survey that you may receive in the mail after your visit with us. Thank you!             Your Updated Medication List - Protect others around you: Learn how to safely use, store and throw away your medicines at www.disposemymeds.org.          This list is accurate as of 4/4/18  8:59 AM.  Always use your most recent med list.                   Brand Name Dispense Instructions for use Diagnosis    amLODIPine 10 MG tablet    NORVASC    90 tablet    TAKE 1 TABLET  "(10 MG) BY MOUTH DAILY    Hypertension secondary to other renal disorders       ASPIRIN PO      Take 81 mg by mouth daily        atorvastatin 40 MG tablet    LIPITOR    45 tablet    TAKE 0.5 TABLETS (20 MG) BY MOUTH DAILY    Hyperlipidemia LDL goal <100       FLORIN CONTOUR test strip   Generic drug:  blood glucose monitoring           BD insulin syringe ultrafine 31G X 5/16\" 0.3 ML   Generic drug:  insulin syringe-needle U-100      USING 4-5 PER DAY ([facility] 31G/0.3ML)        chlorthalidone 25 MG tablet    HYGROTON    90 tablet    Take 1 tablet (25 mg) by mouth daily    Hypertension secondary to other renal disorders       cycloSPORINE modified 25 MG capsule     180 capsule    Take 3 capsules (75 mg) by mouth 2 times daily    Kidney transplanted       insulin glargine 100 UNIT/ML injection    LANTUS     Inject 20 Units Subcutaneous At Bedtime    Coronary artery disease involving native heart without angina pectoris, unspecified vessel or lesion type       isosorbide mononitrate 30 MG 24 hr tablet    IMDUR    90 tablet    TAKE 0.5 TABLETS (15 MG) BY MOUTH 2 TIMES DAILY    Coronary artery disease involving native heart without angina pectoris, unspecified vessel or lesion type       metoprolol succinate 25 MG 24 hr tablet    TOPROL-XL    90 tablet    TAKE 1 TABLET (25 MG) BY MOUTH AT BEDTIME    Hypertension secondary to other renal disorders, Coronary artery disease involving native heart without angina pectoris, unspecified vessel or lesion type       mycophenolic acid 180 MG EC tablet    GENERIC EQUIVALENT    180 tablet    Take 3 tablets (540 mg) by mouth 2 times daily    Kidney replaced by transplant       NovoLOG VIAL 100 UNITS/ML injection   Generic drug:  insulin aspart      sliding scale 4-6 units tid        pantoprazole 40 MG EC tablet    PROTONIX    90 tablet    TAKE 1 TABLET (40 MG) BY MOUTH DAILY    Gastroesophageal reflux disease without esophagitis       SYNTHROID 112 MCG tablet   Generic drug:  " levothyroxine      Take by mouth daily        valsartan 160 MG tablet    DIOVAN    45 tablet    TAKE 0.5 TABLETS (80 MG) BY MOUTH DAILY    Hypertension secondary to other renal disorders

## 2018-04-04 NOTE — LETTER
4/4/2018       RE: Viv Shaw  [address]  [address]  [place] [facility] [address]     Dear Colleague,    Thank you for referring your patient, Viv Shaw, to the [facility] at [facility]. Please see a copy of my visit note below.    [facility]   PULMONARY/INTERVENTIONAL CLINIC  [place]     Viv Shaw MRN# [id number]   Age: 67 year old YOB: 1950     Reason for Consultation:     Bibasilar ggo, nodules       Assessment and Plan:    1. Bibasilar ggo, nodules. Had CT today and reviewed with Mrs. Shaw in detail. Overall, the bibasilar nodular infiltrates have largely resolved; these were likely related to abd surgery I.e. Atelectasis, inflammation. There are sub-4mm nodules which do not need any further f/u given that she is low risk for lung cancer. She has normal PFT's today and given that she is asx, would not need any bronchodilators based on evidence of small airways disease on CT. At this point, there is no indication for bronchoscopy or further evaluation for infection. Recommend f/u as needed.     I spent more than 30 minutes face to face and greater than 50% of time was for counseling and coordination of care about the issues above.    Pepe Boss MD   of Medicine  Interventional Pulmonary  Department of Pulmonary, Allergy, Critical Care and Sleep Medicine   [facility]  Pager: [phone]            History:   Mrs. Shaw is a 67G w/ h/o CAD, Kidney transplant, hypothyroidism, DM2 who is here for evaluation nodules.   - No resp sx today. Denies cough, dyspnea or CP.   - Had right flank hernia repair and abd wall reconstruction 10/16/17 with no post-op complications. CT c/a/p performed post-op and demonstrated bibasilar ggo and nodules.   - Has DM2 controlled with insulin  - Had Kidney transplant in 2005  on cellcept and gengraf.   - Has hypothyroidism on synthroid   - Never smoker. Denies exposure to asbestos, radon.   - No personal h/o cancer or family h/o lung cancer.            Past Medical History:      Past Medical History:   Diagnosis Date     Abdominal wall hernia     RLQ     Allergic rhinitis      CAD (coronary artery disease)     coronary artery disease s/p PCI to LAD in 2005coronary artery disease s/p PCI to LAD in 2005     Diabetes mellitus, type 2 (H)     follows up with endocrinologist.     Dyslipidemia      GERD (gastroesophageal reflux disease)      H/O diabetic nephropathy     s/p kidney trnsplant     Hypertension      Hypothyroidism      Immunosuppressed status (H)      Kidney replaced by transplant     Rt     PONV (postoperative nausea and vomiting)      Shingles      Vitamin B12 deficiency anemia              Past Surgical History:      Past Surgical History:   Procedure Laterality Date     APPENDECTOMY NOS       ARTHROSCOPY SHOULDER ROTATOR CUFF REPAIR Left      COLONOSCOPY N/A 6/7/2016    Procedure: COLONOSCOPY;  Surgeon: Rashard Snider MD;  Location: [facility]     Coronary angioplasty with stent  2005     HYSTERECTOMY       Kidney Transplant NOS Right      LAPAROSCOPIC CHOLECYSTECTOMY       NOSE SURGERY  2013     OPEN SEPARATION COMPONENT HERNIORRHAPHY N/A 10/16/2017    Procedure: OPEN SEPARATION COMPONENT HERNIORRHAPHY;;  Surgeon: CARL Lott MD;  [facility]     RECONSTRUCT ABDOMINAL WALL N/A 10/16/2017    Procedure: RECONSTRUCT ABDOMINAL WALL;  Exploratory Laparotomy, Lysis of Adhesions, Abdominal Wall reconstruction, Inlay Xen AB mesh, Mitek Suture [place] and Umbilical Hernia Repair.;  Surgeon: CARL Lott MD;  Location: [facility]     REMOVE CATARACT INTRACAP,INSERT LENS Right      TONSILLECTOMY              Social History:     Social History   Substance Use Topics     Smoking status: Never Smoker     Smokeless tobacco: Never Used     Alcohol use No            Family  "History:     Family History   Problem Relation Age of Onset     Respiratory Mother       age 71     Cardiovascular Father       age 75 hyertension     Arthritis Sister      living, healthy     Family History Negative Brother       age 44     Colon Cancer No family hx of              Allergies:   Please see allergy list which was reviewed this admission.         Medications:     .  Current Outpatient Prescriptions   Medication     mycophenolic acid (GENERIC EQUIVALENT) 180 MG EC tablet     isosorbide mononitrate (IMDUR) 30 MG 24 hr tablet     pantoprazole (PROTONIX) 40 MG EC tablet     valsartan (DIOVAN) 160 MG tablet     metoprolol (TOPROL-XL) 25 MG 24 hr tablet     insulin glargine (LANTUS) 100 UNIT/ML injection     amLODIPine (NORVASC) 10 MG tablet     ASPIRIN PO     GENGRAF (BRAND) 25 MG CAPSULE     atorvastatin (LIPITOR) 40 MG tablet     BD INSULIN SYRINGE ULTRAFINE 31G X 16\" 0.3 ML     chlorthalidone (HYGROTON) 25 MG tablet     FLORIN CONTOUR test strip     levothyroxine (SYNTHROID) 112 MCG tablet     NOVOLOG 100 U/ML SC SOLN     No current facility-administered medications for this visit.               Review of Systems:   CONSTITUTIONAL: negative for fever, chills, change in weight  INTEGUMENTARY/SKIN: no rash or obvious new lesions  ENT/MOUTH: no sore throat, new sinus pain or nasal drainage  RESP: see interval history  CV: negative for chest pain, palpitations or peripheral edema  GI: no nausea, vomiting, change in stools  : no dysuria  MUSCULOSKELETAL: no myalgias, arthralgias  ENDOCRINE: blood sugars with adequate control  PSYCHIATRIC: mood stable  LYMPHATIC: no new lymphadenopathy  HEME: no bleeding or easy bruisability  NEURO: no numbness, weakness, headaches         Physical Exam:   Pulse:  [68] 68  Resp:  [16] 16  BP: (152)/(75) 152/75  SpO2:  [96 %] 96 %    Wt Readings from Last 4 Encounters:   18 75.8 kg (167 lb)   17 75.3 kg (166 lb 1.6 oz)   17 75.8 kg " (167 lb)   10/22/17 80.4 kg (177 lb 3.2 oz)       Constitutional:   Awake, alert and in no apparent distress     Eyes:   Nonicteric, BREE     ENT:    oral mucosa moist without lesions     Neck:   Supple without supraclavicular or cervical lymphadenopathy     Lungs:   Good air flow.  No crackles. No rhonchi.  No wheezes.     Cardiovascular:   Normal S1 and S2.  RRR.  No murmur, gallop or rub.  Radial, DP and PT pulses normal and symmetric     Abdomen:   NABS, soft, nontender, nondistended.  No HSM.     Musculoskeletal:   No edema. Strength 5/5 and symmetric     Neurologic:   Alert and conversant. Cranial nerves II-XII intact.       Skin:   Warm, dry.  No rash on limited exam.           Current Laboratory Data:   All laboratory and imaging data reviewed.    Results for orders placed or performed in visit on 04/04/18 (from the past 24 hour(s))   Pulmonary function test   Result Value Ref Range    FVC-Pred 2.86 L    FVC-Pre 2.80 L    FVC-%Pred-Pre 97 %    FEV1-Pre 2.23 L    FEV1-%Pred-Pre 100 %    FEV1FVC-Pred 76 %    FEV1FVC-Pre 80 %    FEFMax-Pred 5.67 L/sec    FEFMax-Pre 5.27 L/sec    FEFMax-%Pred-Pre 92 %    FEF2575-Pred 1.89 L/sec    FEF2575-Pre 2.04 L/sec    CWV8043-%Pred-Pre 107 %    ExpTime-Pre 9.59 sec    FIFMax-Pre 4.70 L/sec    VC-Pred 3.07 L    VC-Pre 2.88 L    VC-%Pred-Pre 93 %    IC-Pred 2.53 L    IC-Pre 2.31 L    IC-%Pred-Pre 91 %    ERV-Pred 0.54 L    ERV-Pre 0.57 L    ERV-%Pred-Pre 104 %    FEV1FEV6-Pred 79 %    FEV1FEV6-Pre 80 %    FRCPleth-Pred 2.71 L    FRCPleth-Pre 2.63 L    FRCPleth-%Pred-Pre 97 %    RVPleth-Pred 2.06 L    RVPleth-Pre 2.07 L    RVPleth-%Pred-Pre 100 %    TLCPleth-Pred 4.94 L    TLCPleth-Pre 4.95 L    TLCPleth-%Pred-Pre 100 %    DLCOunc-Pred 20.56 ml/min/mmHg    DLCOunc-Pre 17.95 ml/min/mmHg    DLCOunc-%Pred-Pre 87 %    DLCOcor-Pre 19.82 ml/min/mmHg    DLCOcor-%Pred-Pre 96 %    VA-Pre 4.17 L    VA-%Pred-Pre 82 %    FEV1SVC-Pred 72 %    FEV1SVC-Pre 77 %          Previous Pulmonary  Function Testing       FVC-Pred   Date Value Ref Range Status   04/04/2018 2.86 L      FVC-Pre   Date Value Ref Range Status   04/04/2018 2.80 L      FVC-%Pred-Pre   Date Value Ref Range Status   04/04/2018 97 %      FEV1-Pre   Date Value Ref Range Status   04/04/2018 2.23 L      FEV1-%Pred-Pre   Date Value Ref Range Status   04/04/2018 100 %      FEV1FVC-Pred   Date Value Ref Range Status   04/04/2018 76 %      FEV1FVC-Pre   Date Value Ref Range Status   04/04/2018 80 %      No results found for: 20029  FEFMax-Pred   Date Value Ref Range Status   04/04/2018 5.67 L/sec      FEFMax-Pre   Date Value Ref Range Status   04/04/2018 5.27 L/sec      FEFMax-%Pred-Pre   Date Value Ref Range Status   04/04/2018 92 %      ExpTime-Pre   Date Value Ref Range Status   04/04/2018 9.59 sec      FIFMax-Pre   Date Value Ref Range Status   04/04/2018 4.70 L/sec      FEV1FEV6-Pred   Date Value Ref Range Status   04/04/2018 79 %      FEV1FEV6-Pre   Date Value Ref Range Status   04/04/2018 80 %            Previous Chest Imaging   High-resolution CT of the chest without contrast high resolution     HISTORY: Abnormal CT chest     COMPARISON STUDY: 10/20/2017.     FINDINGS: Mosaic attenuation. Mild lobular gas trapping on expiratory  images. A few patches of groundglass opacity are noted in the lungs.   Linear atelectasis or scar in the right middle lobe. Mild  bronchiectasis.     3 mm nodule left lower lobe image 244 series 4.      Granuloma left lower lobe measuring 2 mm image 260 series 4.      3 mm nodule right upper lobe image 133 unchanged.      2 mm nodule right middle lobe image 149 unchanged.      Coronary stent in the LAD. Heart is not enlarged. Moderate coronary  calcifications. Main pulmonary artery measures 3.4 cm. No pleural  effusion. Tiny pericardial effusion. No mediastinal, hilar or axillary  adenopathy. Patulous esophagus. Sliding hiatal hernia.     Evaluation of the upper abdomen is limited and is without  contrast.  Cholecystectomy clips. Atrophic kidneys partially imaged with an  indeterminant highly attenuation 1.2 cm lesion in the left kidney  upper pole unchanged.     Bones: Cystic bone lesion left scapula measuring 5 mm inferiorly  unchanged. Surgical changes right shoulder.         IMPRESSION:  1. Mild bronchiectasis, a few tiny patches of groundglass opacity and  lobular gas trapping in the lungs is suggestive of small airways  disease, mild, possibly from infectious bronchiolitis. Differential  includes minimal hypersensitivity pneumonitis  2. Main pulmonary artery enlargement suggestive of pulmonary  hypertension.  3. Hiatal hernia with patulous esophagus.  4. Indeterminate left renal cystic lesion.  5. Tiny pericardial effusion.     CHRISTOPHER VASQUEZ MD                 Again, thank you for allowing me to participate in the care of your patient.      Sincerely,    Pepe Boss MD

## 2018-04-04 NOTE — NURSING NOTE
Chief Complaint   Patient presents with     Consult     Patient is being seen for consultation of nodules      Sandra Peña CMA at 7:44 AM on 4/4/2018

## 2018-04-04 NOTE — PROGRESS NOTES
[facility]   PULMONARY/INTERVENTIONAL CLINIC  [place] [place]     Viv Shaw MRN# [id number]   Age: 67 year old YOB: 1950     Reason for Consultation:     Bibasilar ggo, nodules       Assessment and Plan:    1. Bibasilar ggo, nodules. Had CT today and reviewed with Mrs. Shaw in detail. Overall, the bibasilar nodular infiltrates have largely resolved; these were likely related to abd surgery I.e. Atelectasis, inflammation. There are sub-4mm nodules which do not need any further f/u given that she is low risk for lung cancer. She has normal PFT's today and given that she is asx, would not need any bronchodilators based on evidence of small airways disease on CT. At this point, there is no indication for bronchoscopy or further evaluation for infection. Recommend f/u as needed.     I spent more than 30 minutes face to face and greater than 50% of time was for counseling and coordination of care about the issues above.    Pepe Boss MD   of Medicine  Interventional Pulmonary  Department of Pulmonary, Allergy, Critical Care and Sleep Medicine   [facility]  Pager: [phone]            History:   Mrs. Shaw is a 67G w/ h/o CAD, Kidney transplant, hypothyroidism, DM2 who is here for evaluation nodules.   - No resp sx today. Denies cough, dyspnea or CP.   - Had right flank hernia repair and abd wall reconstruction 10/16/17 with no post-op complications. CT c/a/p performed post-op and demonstrated bibasilar ggo and nodules.   - Has DM2 controlled with insulin  - Had Kidney transplant in 2005 on cellcept and gengraf.   - Has hypothyroidism on synthroid   - Never smoker. Denies exposure to asbestos, radon.   - No personal h/o cancer or family h/o lung cancer.            Past Medical History:      Past Medical History:   Diagnosis Date     Abdominal wall hernia     RLQ     Allergic rhinitis      CAD (coronary  artery disease)     coronary artery disease s/p PCI to LAD in coronary artery disease s/p PCI to LAD in      Diabetes mellitus, type 2 (H)     follows up with endocrinologist.     Dyslipidemia      GERD (gastroesophageal reflux disease)      H/O diabetic nephropathy     s/p kidney trnsplant     Hypertension      Hypothyroidism      Immunosuppressed status (H)      Kidney replaced by transplant     Rt     PONV (postoperative nausea and vomiting)      Shingles      Vitamin B12 deficiency anemia              Past Surgical History:      Past Surgical History:   Procedure Laterality Date     APPENDECTOMY NOS       ARTHROSCOPY SHOULDER ROTATOR CUFF REPAIR Left      COLONOSCOPY N/A 2016    Procedure: COLONOSCOPY;  Surgeon: Rashard Snider MD;  Location: [facility]     Coronary angioplasty with stent       HYSTERECTOMY       Kidney Transplant NOS Right      LAPAROSCOPIC CHOLECYSTECTOMY       NOSE SURGERY       OPEN SEPARATION COMPONENT HERNIORRHAPHY N/A 10/16/2017    Procedure: OPEN SEPARATION COMPONENT HERNIORRHAPHY;;  Surgeon: CARL Lott MD;  Location[facility]     RECONSTRUCT ABDOMINAL WALL N/A 10/16/2017    Procedure: RECONSTRUCT ABDOMINAL WALL;  Exploratory Laparotomy, Lysis of Adhesions, Abdominal Wall reconstruction, Inlay Xen AB mesh, Mitek Suture [place] and Umbilical Hernia Repair.;  Surgeon: CARL Lott MD;  Location: [facility]     REMOVE CATARACT INTRACAP,INSERT LENS Right      TONSILLECTOMY              Social History:     Social History   Substance Use Topics     Smoking status: Never Smoker     Smokeless tobacco: Never Used     Alcohol use No            Family History:     Family History   Problem Relation Age of Onset     Respiratory Mother       age 71     Cardiovascular Father       age 75 hyertension     Arthritis Sister      living, healthy     Family History Negative Brother       age 44     Colon Cancer No family hx of              Allergies:  "  Please see allergy list which was reviewed this admission.         Medications:     .  Current Outpatient Prescriptions   Medication     mycophenolic acid (GENERIC EQUIVALENT) 180 MG EC tablet     isosorbide mononitrate (IMDUR) 30 MG 24 hr tablet     pantoprazole (PROTONIX) 40 MG EC tablet     valsartan (DIOVAN) 160 MG tablet     metoprolol (TOPROL-XL) 25 MG 24 hr tablet     insulin glargine (LANTUS) 100 UNIT/ML injection     amLODIPine (NORVASC) 10 MG tablet     ASPIRIN PO     GENGRAF (BRAND) 25 MG CAPSULE     atorvastatin (LIPITOR) 40 MG tablet     BD INSULIN SYRINGE ULTRAFINE 31G X 5/16\" 0.3 ML     chlorthalidone (HYGROTON) 25 MG tablet     FLORIN CONTOUR test strip     levothyroxine (SYNTHROID) 112 MCG tablet     NOVOLOG 100 U/ML SC SOLN     No current facility-administered medications for this visit.               Review of Systems:   CONSTITUTIONAL: negative for fever, chills, change in weight  INTEGUMENTARY/SKIN: no rash or obvious new lesions  ENT/MOUTH: no sore throat, new sinus pain or nasal drainage  RESP: see interval history  CV: negative for chest pain, palpitations or peripheral edema  GI: no nausea, vomiting, change in stools  : no dysuria  MUSCULOSKELETAL: no myalgias, arthralgias  ENDOCRINE: blood sugars with adequate control  PSYCHIATRIC: mood stable  LYMPHATIC: no new lymphadenopathy  HEME: no bleeding or easy bruisability  NEURO: no numbness, weakness, headaches         Physical Exam:   Pulse:  [68] 68  Resp:  [16] 16  BP: (152)/(75) 152/75  SpO2:  [96 %] 96 %    Wt Readings from Last 4 Encounters:   03/28/18 75.8 kg (167 lb)   12/21/17 75.3 kg (166 lb 1.6 oz)   12/04/17 75.8 kg (167 lb)   10/22/17 80.4 kg (177 lb 3.2 oz)       Constitutional:   Awake, alert and in no apparent distress     Eyes:   Nonicteric, BREE     ENT:    oral mucosa moist without lesions     Neck:   Supple without supraclavicular or cervical lymphadenopathy     Lungs:   Good air flow.  No crackles. No rhonchi.  No " wheezes.     Cardiovascular:   Normal S1 and S2.  RRR.  No murmur, gallop or rub.  Radial, DP and PT pulses normal and symmetric     Abdomen:   NABS, soft, nontender, nondistended.  No HSM.     Musculoskeletal:   No edema. Strength 5/5 and symmetric     Neurologic:   Alert and conversant. Cranial nerves II-XII intact.       Skin:   Warm, dry.  No rash on limited exam.           Current Laboratory Data:   All laboratory and imaging data reviewed.    Results for orders placed or performed in visit on 04/04/18 (from the past 24 hour(s))   Pulmonary function test   Result Value Ref Range    FVC-Pred 2.86 L    FVC-Pre 2.80 L    FVC-%Pred-Pre 97 %    FEV1-Pre 2.23 L    FEV1-%Pred-Pre 100 %    FEV1FVC-Pred 76 %    FEV1FVC-Pre 80 %    FEFMax-Pred 5.67 L/sec    FEFMax-Pre 5.27 L/sec    FEFMax-%Pred-Pre 92 %    FEF2575-Pred 1.89 L/sec    FEF2575-Pre 2.04 L/sec    MDG3953-%Pred-Pre 107 %    ExpTime-Pre 9.59 sec    FIFMax-Pre 4.70 L/sec    VC-Pred 3.07 L    VC-Pre 2.88 L    VC-%Pred-Pre 93 %    IC-Pred 2.53 L    IC-Pre 2.31 L    IC-%Pred-Pre 91 %    ERV-Pred 0.54 L    ERV-Pre 0.57 L    ERV-%Pred-Pre 104 %    FEV1FEV6-Pred 79 %    FEV1FEV6-Pre 80 %    FRCPleth-Pred 2.71 L    FRCPleth-Pre 2.63 L    FRCPleth-%Pred-Pre 97 %    RVPleth-Pred 2.06 L    RVPleth-Pre 2.07 L    RVPleth-%Pred-Pre 100 %    TLCPleth-Pred 4.94 L    TLCPleth-Pre 4.95 L    TLCPleth-%Pred-Pre 100 %    DLCOunc-Pred 20.56 ml/min/mmHg    DLCOunc-Pre 17.95 ml/min/mmHg    DLCOunc-%Pred-Pre 87 %    DLCOcor-Pre 19.82 ml/min/mmHg    DLCOcor-%Pred-Pre 96 %    VA-Pre 4.17 L    VA-%Pred-Pre 82 %    FEV1SVC-Pred 72 %    FEV1SVC-Pre 77 %          Previous Pulmonary Function Testing       FVC-Pred   Date Value Ref Range Status   04/04/2018 2.86 L      FVC-Pre   Date Value Ref Range Status   04/04/2018 2.80 L      FVC-%Pred-Pre   Date Value Ref Range Status   04/04/2018 97 %      FEV1-Pre   Date Value Ref Range Status   04/04/2018 2.23 L      FEV1-%Pred-Pre   Date Value Ref  Range Status   04/04/2018 100 %      FEV1FVC-Pred   Date Value Ref Range Status   04/04/2018 76 %      FEV1FVC-Pre   Date Value Ref Range Status   04/04/2018 80 %      No results found for: 20029  FEFMax-Pred   Date Value Ref Range Status   04/04/2018 5.67 L/sec      FEFMax-Pre   Date Value Ref Range Status   04/04/2018 5.27 L/sec      FEFMax-%Pred-Pre   Date Value Ref Range Status   04/04/2018 92 %      ExpTime-Pre   Date Value Ref Range Status   04/04/2018 9.59 sec      FIFMax-Pre   Date Value Ref Range Status   04/04/2018 4.70 L/sec      FEV1FEV6-Pred   Date Value Ref Range Status   04/04/2018 79 %      FEV1FEV6-Pre   Date Value Ref Range Status   04/04/2018 80 %            Previous Chest Imaging   High-resolution CT of the chest without contrast high resolution     HISTORY: Abnormal CT chest     COMPARISON STUDY: 10/20/2017.     FINDINGS: Mosaic attenuation. Mild lobular gas trapping on expiratory  images. A few patches of groundglass opacity are noted in the lungs.   Linear atelectasis or scar in the right middle lobe. Mild  bronchiectasis.     3 mm nodule left lower lobe image 244 series 4.      Granuloma left lower lobe measuring 2 mm image 260 series 4.      3 mm nodule right upper lobe image 133 unchanged.      2 mm nodule right middle lobe image 149 unchanged.      Coronary stent in the LAD. Heart is not enlarged. Moderate coronary  calcifications. Main pulmonary artery measures 3.4 cm. No pleural  effusion. Tiny pericardial effusion. No mediastinal, hilar or axillary  adenopathy. Patulous esophagus. Sliding hiatal hernia.     Evaluation of the upper abdomen is limited and is without contrast.  Cholecystectomy clips. Atrophic kidneys partially imaged with an  indeterminant highly attenuation 1.2 cm lesion in the left kidney  upper pole unchanged.     Bones: Cystic bone lesion left scapula measuring 5 mm inferiorly  unchanged. Surgical changes right shoulder.         IMPRESSION:  1. Mild bronchiectasis, a  few tiny patches of groundglass opacity and  lobular gas trapping in the lungs is suggestive of small airways  disease, mild, possibly from infectious bronchiolitis. Differential  includes minimal hypersensitivity pneumonitis  2. Main pulmonary artery enlargement suggestive of pulmonary  hypertension.  3. Hiatal hernia with patulous esophagus.  4. Indeterminate left renal cystic lesion.  5. Tiny pericardial effusion.     CHRISTOPHER VASQUEZ MD

## 2018-04-09 LAB
DLCOCOR-%PRED-PRE: 96 %
DLCOCOR-PRE: 19.82 ML/MIN/MMHG
DLCOUNC-%PRED-PRE: 87 %
DLCOUNC-PRE: 17.95 ML/MIN/MMHG
DLCOUNC-PRED: 20.56 ML/MIN/MMHG
ERV-%PRED-PRE: 104 %
ERV-PRE: 0.57 L
ERV-PRED: 0.54 L
EXPTIME-PRE: 9.59 SEC
FEF2575-%PRED-PRE: 107 %
FEF2575-PRE: 2.04 L/SEC
FEF2575-PRED: 1.89 L/SEC
FEFMAX-%PRED-PRE: 92 %
FEFMAX-PRE: 5.27 L/SEC
FEFMAX-PRED: 5.67 L/SEC
FEV1-%PRED-PRE: 100 %
FEV1-PRE: 2.23 L
FEV1FEV6-PRE: 80 %
FEV1FEV6-PRED: 79 %
FEV1FVC-PRE: 80 %
FEV1FVC-PRED: 76 %
FEV1SVC-PRE: 77 %
FEV1SVC-PRED: 72 %
FIFMAX-PRE: 4.7 L/SEC
FRCPLETH-%PRED-PRE: 97 %
FRCPLETH-PRE: 2.63 L
FRCPLETH-PRED: 2.71 L
FVC-%PRED-PRE: 97 %
FVC-PRE: 2.8 L
FVC-PRED: 2.86 L
IC-%PRED-PRE: 91 %
IC-PRE: 2.31 L
IC-PRED: 2.53 L
RVPLETH-%PRED-PRE: 100 %
RVPLETH-PRE: 2.07 L
RVPLETH-PRED: 2.06 L
TLCPLETH-%PRED-PRE: 100 %
TLCPLETH-PRE: 4.95 L
TLCPLETH-PRED: 4.94 L
VA-%PRED-PRE: 82 %
VA-PRE: 4.17 L
VC-%PRED-PRE: 93 %
VC-PRE: 2.88 L
VC-PRED: 3.07 L

## 2018-04-24 ENCOUNTER — TELEPHONE (OUTPATIENT)
Dept: TRANSPLANT | Facility: CLINIC | Age: 68
End: 2018-04-24

## 2018-04-24 DIAGNOSIS — Z94.0 KIDNEY REPLACED BY TRANSPLANT: ICD-10-CM

## 2018-04-24 RX ORDER — MYCOPHENOLIC ACID 180 MG/1
540 TABLET, DELAYED RELEASE ORAL 2 TIMES DAILY
Qty: 180 TABLET | Refills: 11 | Status: SHIPPED | OUTPATIENT
Start: 2018-04-24 | End: 2019-04-24

## 2018-04-25 ENCOUNTER — TELEPHONE (OUTPATIENT)
Dept: TRANSPLANT | Facility: CLINIC | Age: 68
End: 2018-04-25

## 2018-04-25 NOTE — TELEPHONE ENCOUNTER
Prior Authorization Specialty Medication Request    Medication/Dose: Mycophenolate   ICD code (if different than what is on RX):  Kidney transplant Z94.0  Previously Tried and Failed:      Important Lab Values:   Rationale:     Insurance Name:Cover My Med    Insurance ID: [id number]  Insurance Phone Number:     Pharmacy Information (if different than what is on RX)  Name:  [facility]  Phone:  [phone]

## 2018-04-25 NOTE — TELEPHONE ENCOUNTER
Provider Call: Provider Call: Medication Refill  Route to LPN  Pharmacy Name: [facility]  Name of Medication: Microphenolate  When will the patient be out of this medication?: Less than 3 days (Route high priority)  Callback needed? Yes  Return Call Needed  Same as documented in contacts section  When to return call?: Same day: Route High Priority     Rx needs prior authorization. Rx#-[id number]; Store#- [id number]

## 2018-06-07 ENCOUNTER — TRANSFERRED RECORDS (OUTPATIENT)
Dept: HEALTH INFORMATION MANAGEMENT | Facility: CLINIC | Age: 68
End: 2018-06-07

## 2018-06-17 DIAGNOSIS — I15.1 HYPERTENSION SECONDARY TO OTHER RENAL DISORDERS: ICD-10-CM

## 2018-06-17 DIAGNOSIS — I25.10 CORONARY ARTERY DISEASE INVOLVING NATIVE HEART WITHOUT ANGINA PECTORIS, UNSPECIFIED VESSEL OR LESION TYPE: ICD-10-CM

## 2018-06-21 NOTE — TELEPHONE ENCOUNTER
"Requested Prescriptions   Pending Prescriptions Disp Refills     amLODIPine (NORVASC) 10 MG tablet [Pharmacy Med Name: AMLODIPINE BESYLATE 10 MG TAB]  Last Written Prescription Date:  12/18/2017  Last Fill Quantity: 90 tablet,  # refills: 1   Last office visit: 3/28/2018 with prescribing provider:  Beka   Future Office Visit:     90 tablet 1     Sig: TAKE 1 TABLET BY MOUTH EVERY DAY    Calcium Channel Blockers Protocol  Failed    6/17/2018  1:59 AM       Failed - Blood pressure under 140/90 in past 12 months    BP Readings from Last 3 Encounters:   04/04/18 152/75   03/28/18 132/68   12/21/17 106/60          Failed - Normal serum creatinine on file in past 12 months    Recent Labs   Lab Test  03/27/18   0559   CR  1.37*          Passed - Recent (12 mo) or future (30 days) visit within the authorizing provider's specialty    Patient had office visit in the last 12 months or has a visit in the next 30 days with authorizing provider or within the authorizing provider's specialty.  See \"Patient Info\" tab in inbasket, or \"Choose Columns\" in Meds & Orders section of the refill encounter.           Passed - Patient is age 18 or older       Passed - No active pregnancy on record       Passed - No positive pregnancy test in past 12 months          "

## 2018-06-21 NOTE — TELEPHONE ENCOUNTER
"Requested Prescriptions   Pending Prescriptions Disp Refills     metoprolol succinate (TOPROL-XL) 25 MG 24 hr tablet [Pharmacy Med Name: METOPROLOL SUCC ER 25 MG TAB]  Last Written Prescription Date:  12/21/2017  Last Fill Quantity: 90 tabs,  # refills: 1   Last office visit: 3/28/2018 with prescribing provider:  Silvia   Future Office Visit:     90 tablet 1     Sig: TAKE 1 TABLET BY MOUTH AT BEDTIME    Beta-Blockers Protocol Failed    6/17/2018  1:59 AM       Failed - Blood pressure under 140/90 in past 12 months    BP Readings from Last 3 Encounters:   04/04/18 152/75   03/28/18 132/68   12/21/17 106/60                Passed - Patient is age 6 or older       Passed - Recent (12 mo) or future (30 days) visit within the authorizing provider's specialty    Patient had office visit in the last 12 months or has a visit in the next 30 days with authorizing provider or within the authorizing provider's specialty.  See \"Patient Info\" tab in inbasket, or \"Choose Columns\" in Meds & Orders section of the refill encounter.            chlorthalidone (HYGROTON) 25 MG tablet [Pharmacy Med Name: CHLORTHALIDONE 25 MG TABLET]  Last Written Prescription Date:  4/7/2017  Last Fill Quantity: 90 tabs,  # refills: 3   Last office visit: 3/28/2018 with prescribing provider:  Silvia   Future Office Visit:     90 tablet 2     Sig: TAKE 1 TABLET (25 MG) BY MOUTH DAILY    Diuretics (Including Combos) Protocol Failed    6/17/2018  1:59 AM       Failed - Blood pressure under 140/90 in past 12 months    BP Readings from Last 3 Encounters:   04/04/18 152/75   03/28/18 132/68   12/21/17 106/60                Failed - Normal serum creatinine on file in past 12 months    Recent Labs   Lab Test  03/27/18   0559   CR  1.37*             Passed - Recent (12 mo) or future (30 days) visit within the authorizing provider's specialty    Patient had office visit in the last 12 months or has a visit in the next 30 days with authorizing provider or within " "the authorizing provider's specialty.  See \"Patient Info\" tab in inbasket, or \"Choose Columns\" in Meds & Orders section of the refill encounter.           Passed - Patient is age 18 or older       Passed - No active pregancy on record       Passed - Normal serum potassium on file in past 12 months    Recent Labs   Lab Test  03/27/18   0559   POTASSIUM  4.1                   Passed - Normal serum sodium on file in past 12 months    Recent Labs   Lab Test  03/27/18   0559   NA  139             Passed - No positive pregnancy test in past 12 months          "

## 2018-06-22 RX ORDER — METOPROLOL SUCCINATE 25 MG/1
TABLET, EXTENDED RELEASE ORAL
Qty: 90 TABLET | Refills: 0 | Status: SHIPPED | OUTPATIENT
Start: 2018-06-22 | End: 2018-09-18

## 2018-06-22 RX ORDER — AMLODIPINE BESYLATE 10 MG/1
TABLET ORAL
Qty: 90 TABLET | Refills: 0 | Status: SHIPPED | OUTPATIENT
Start: 2018-06-22 | End: 2018-09-18

## 2018-06-22 RX ORDER — CHLORTHALIDONE 25 MG/1
TABLET ORAL
Qty: 90 TABLET | Refills: 0 | Status: SHIPPED | OUTPATIENT
Start: 2018-06-22 | End: 2019-04-13

## 2018-06-22 NOTE — TELEPHONE ENCOUNTER
Per MD's notes, pt should follow up in 6 months (September).  Prescription approved per [facility] Refill Protocol.  Mariza Rudd RN

## 2018-06-28 DIAGNOSIS — Z48.298 AFTERCARE FOLLOWING ORGAN TRANSPLANT: ICD-10-CM

## 2018-06-28 DIAGNOSIS — Z79.899 ENCOUNTER FOR LONG-TERM CURRENT USE OF MEDICATION: ICD-10-CM

## 2018-06-28 DIAGNOSIS — Z94.0 KIDNEY REPLACED BY TRANSPLANT: ICD-10-CM

## 2018-06-28 LAB
ANION GAP SERPL CALCULATED.3IONS-SCNC: 5 MMOL/L (ref 3–14)
BUN SERPL-MCNC: 26 MG/DL (ref 7–30)
CALCIUM SERPL-MCNC: 9.8 MG/DL (ref 8.5–10.1)
CHLORIDE SERPL-SCNC: 107 MMOL/L (ref 94–109)
CO2 SERPL-SCNC: 28 MMOL/L (ref 20–32)
CREAT SERPL-MCNC: 1.28 MG/DL (ref 0.52–1.04)
CYCLOSPORINE BLD LC/MS/MS-MCNC: 103 UG/L (ref 50–400)
ERYTHROCYTE [DISTWIDTH] IN BLOOD BY AUTOMATED COUNT: 12.9 % (ref 10–15)
GFR SERPL CREATININE-BSD FRML MDRD: 41 ML/MIN/1.7M2
GLUCOSE SERPL-MCNC: 139 MG/DL (ref 70–99)
HCT VFR BLD AUTO: 35.5 % (ref 35–47)
HGB BLD-MCNC: 11.3 G/DL (ref 11.7–15.7)
MCH RBC QN AUTO: 28.6 PG (ref 26.5–33)
MCHC RBC AUTO-ENTMCNC: 31.8 G/DL (ref 31.5–36.5)
MCV RBC AUTO: 90 FL (ref 78–100)
PLATELET # BLD AUTO: 207 10E9/L (ref 150–450)
POTASSIUM SERPL-SCNC: 4.4 MMOL/L (ref 3.4–5.3)
RBC # BLD AUTO: 3.95 10E12/L (ref 3.8–5.2)
SODIUM SERPL-SCNC: 140 MMOL/L (ref 133–144)
TME LAST DOSE: 1900 H
WBC # BLD AUTO: 3.8 10E9/L (ref 4–11)

## 2018-06-28 PROCEDURE — 80158 DRUG ASSAY CYCLOSPORINE: CPT | Performed by: INTERNAL MEDICINE

## 2018-07-01 DIAGNOSIS — K21.9 GASTROESOPHAGEAL REFLUX DISEASE WITHOUT ESOPHAGITIS: ICD-10-CM

## 2018-07-02 NOTE — TELEPHONE ENCOUNTER
"Requested Prescriptions   Pending Prescriptions Disp Refills     pantoprazole (PROTONIX) 40 MG EC tablet [Pharmacy Med Name: PANTOPRAZOLE SOD DR 40 MG TAB] 90 tablet 0    Last Written Prescription Date:  01/09/2018  Last Fill Quantity: 90,  # refills: 0   Last office visit: 3/28/2018 with prescribing provider:     Future Office Visit:   Sig: TAKE 1 TABLET (40 MG) BY MOUTH DAILY    PPI Protocol Passed    7/1/2018  2:02 AM       Passed - Not on Clopidogrel (unless Pantoprazole ordered)       Passed - No diagnosis of osteoporosis on record       Passed - Recent (12 mo) or future (30 days) visit within the authorizing provider's specialty    Patient had office visit in the last 12 months or has a visit in the next 30 days with authorizing provider or within the authorizing provider's specialty.  See \"Patient Info\" tab in inbasket, or \"Choose Columns\" in Meds & Orders section of the refill encounter.           Passed - Patient is age 18 or older       Passed - No active pregnacy on record       Passed - No positive pregnancy test in past 12 months        "

## 2018-07-03 RX ORDER — PANTOPRAZOLE SODIUM 40 MG/1
TABLET, DELAYED RELEASE ORAL
Qty: 90 TABLET | Refills: 0 | Status: SHIPPED | OUTPATIENT
Start: 2018-07-03 | End: 2018-09-30

## 2018-07-26 ENCOUNTER — TRANSFERRED RECORDS (OUTPATIENT)
Dept: HEALTH INFORMATION MANAGEMENT | Facility: CLINIC | Age: 68
End: 2018-07-26

## 2018-07-30 DIAGNOSIS — I15.1 HYPERTENSION SECONDARY TO OTHER RENAL DISORDERS: ICD-10-CM

## 2018-07-31 NOTE — TELEPHONE ENCOUNTER
"Requested Prescriptions   Pending Prescriptions Disp Refills     valsartan (DIOVAN) 160 MG tablet [Pharmacy Med Name: VALSARTAN 160 MG TABLET]  Last Written Prescription Date:  7/24/18  Last Fill Quantity: 45,  # refills: 1   Last office visit: 3/28/2018 with prescribing provider:  Silvia   Future Office Visit:     45 tablet 0     Sig: TAKE 0.5 TABLETS (80 MG) BY MOUTH DAILY    Angiotensin-II Receptors Failed    7/30/2018  7:24 PM       Failed - Blood pressure under 140/90 in past 12 months    BP Readings from Last 3 Encounters:   04/04/18 152/75   03/28/18 132/68   12/21/17 106/60                Failed - Normal serum creatinine on file in past 12 months    Recent Labs   Lab Test  06/28/18   0555   CR  1.28*            Passed - Recent (12 mo) or future (30 days) visit within the authorizing provider's specialty    Patient had office visit in the last 12 months or has a visit in the next 30 days with authorizing provider or within the authorizing provider's specialty.  See \"Patient Info\" tab in inbasket, or \"Choose Columns\" in Meds & Orders section of the refill encounter.           Passed - Patient is age 18 or older       Passed - No active pregnancy on record       Passed - Normal serum potassium on file in past 12 months    Recent Labs   Lab Test  06/28/18   0555   POTASSIUM  4.4                   Passed - No positive pregnancy test in past 12 months          "

## 2018-08-02 RX ORDER — LOSARTAN POTASSIUM 50 MG/1
50 TABLET ORAL DAILY
Qty: 90 TABLET | Refills: 1 | Status: SHIPPED | OUTPATIENT
Start: 2018-08-02 | End: 2018-10-09

## 2018-08-02 RX ORDER — VALSARTAN 160 MG/1
TABLET ORAL
Qty: 45 TABLET | Refills: 0 | OUTPATIENT
Start: 2018-08-02

## 2018-08-02 NOTE — TELEPHONE ENCOUNTER
Valsartan recalled.  Also BP and creatinine both outside of protocol parameters.  ERNA Albright R.N.

## 2018-08-02 NOTE — TELEPHONE ENCOUNTER
Please advise pt to stop valsartan and start on losartan 50 mg daily, advise pt to monitor BP and f/u with me in 1 mth to recheck BP on new med

## 2018-08-04 RX ORDER — VALSARTAN 160 MG/1
TABLET ORAL
Qty: 45 TABLET | Refills: 0 | OUTPATIENT
Start: 2018-08-04

## 2018-08-04 NOTE — TELEPHONE ENCOUNTER
"See 7/30 refill entry-      Requested Prescriptions   Pending Prescriptions Disp Refills     valsartan (DIOVAN) 160 MG tablet [Pharmacy Med Name: VALSARTAN 160 MG TABLET] 45 tablet 0     Sig: TAKE 0.5 TABLETS (80 MG) BY MOUTH DAILY    Angiotensin-II Receptors Failed    8/4/2018 12:20 PM       Failed - Blood pressure under 140/90 in past 12 months    BP Readings from Last 3 Encounters:   04/04/18 152/75   03/28/18 132/68   12/21/17 106/60                Failed - Normal serum creatinine on file in past 12 months    Recent Labs   Lab Test  06/28/18   0555   CR  1.28*            Passed - Recent (12 mo) or future (30 days) visit within the authorizing provider's specialty    Patient had office visit in the last 12 months or has a visit in the next 30 days with authorizing provider or within the authorizing provider's specialty.  See \"Patient Info\" tab in inbasket, or \"Choose Columns\" in Meds & Orders section of the refill encounter.           Passed - Patient is age 18 or older       Passed - No active pregnancy on record       Passed - Normal serum potassium on file in past 12 months    Recent Labs   Lab Test  06/28/18   0555   POTASSIUM  4.4                   Passed - No positive pregnancy test in past 12 months          "

## 2018-08-06 ENCOUNTER — TELEPHONE (OUTPATIENT)
Dept: TRANSPLANT | Facility: CLINIC | Age: 68
End: 2018-08-06

## 2018-08-06 DIAGNOSIS — Z94.0 KIDNEY REPLACED BY TRANSPLANT: ICD-10-CM

## 2018-08-06 DIAGNOSIS — Z48.298 AFTERCARE FOLLOWING ORGAN TRANSPLANT: Primary | ICD-10-CM

## 2018-08-06 DIAGNOSIS — Z79.899 ENCOUNTER FOR LONG-TERM CURRENT USE OF MEDICATION: ICD-10-CM

## 2018-08-06 NOTE — TELEPHONE ENCOUNTER
Patient Call: Transplant Lab/Orders  Route to LPN  Post Transplant Days: [id number]  When patient is less than 60 days post-transplant, route high priority    Reason for Call: Annual lab reorder   Needs updated routine lab orders in her chart epic  Callback needed? No

## 2018-08-07 NOTE — TELEPHONE ENCOUNTER
Call to pt and advised.    Patient understands, agrees to plan of care, and has no further questions.

## 2018-08-21 ENCOUNTER — TRANSFERRED RECORDS (OUTPATIENT)
Dept: HEALTH INFORMATION MANAGEMENT | Facility: CLINIC | Age: 68
End: 2018-08-21

## 2018-09-11 ENCOUNTER — OFFICE VISIT (OUTPATIENT)
Dept: INTERNAL MEDICINE | Facility: CLINIC | Age: 68
End: 2018-09-11
Payer: MEDICARE

## 2018-09-11 VITALS
DIASTOLIC BLOOD PRESSURE: 80 MMHG | RESPIRATION RATE: 16 BRPM | BODY MASS INDEX: 27.71 KG/M2 | WEIGHT: 162.3 LBS | HEART RATE: 67 BPM | TEMPERATURE: 98.2 F | HEIGHT: 64 IN | SYSTOLIC BLOOD PRESSURE: 126 MMHG | OXYGEN SATURATION: 97 %

## 2018-09-11 DIAGNOSIS — E11.21 TYPE 2 DIABETES MELLITUS WITH DIABETIC NEPHROPATHY, WITH LONG-TERM CURRENT USE OF INSULIN (H): ICD-10-CM

## 2018-09-11 DIAGNOSIS — Z79.4 TYPE 2 DIABETES MELLITUS WITH DIABETIC NEPHROPATHY, WITH LONG-TERM CURRENT USE OF INSULIN (H): ICD-10-CM

## 2018-09-11 DIAGNOSIS — D84.9 IMMUNOSUPPRESSED STATUS (H): ICD-10-CM

## 2018-09-11 DIAGNOSIS — E78.5 HYPERLIPIDEMIA LDL GOAL <100: Primary | ICD-10-CM

## 2018-09-11 DIAGNOSIS — K21.9 GASTROESOPHAGEAL REFLUX DISEASE WITHOUT ESOPHAGITIS: ICD-10-CM

## 2018-09-11 DIAGNOSIS — I15.1 HYPERTENSION SECONDARY TO OTHER RENAL DISORDERS: ICD-10-CM

## 2018-09-11 DIAGNOSIS — E03.8 OTHER SPECIFIED HYPOTHYROIDISM: ICD-10-CM

## 2018-09-11 PROCEDURE — 99214 OFFICE O/P EST MOD 30 MIN: CPT | Performed by: INTERNAL MEDICINE

## 2018-09-11 NOTE — PROGRESS NOTES
SUBJECTIVE:   Viv Shaw is a 68 year old female who presents to clinic today for the following health issues:      Diabetes Follow-up      Patient is checking blood sugars: not at all    Diabetic concerns: None     Symptoms of hypoglycemia (low blood sugar): none     Paresthesias (numbness or burning in feet) or sores: No     Date of last diabetic eye exam: 8/28/18      Hyperlipidemia Follow-Up      Rate your low fat/cholesterol diet?: good    Taking statin?  Yes, no muscle aches from statin    Other lipid medications/supplements?:  none    Hypothyroidism Follow-up      Since last visit, patient describes the following symptoms: Weight stable, no hair loss, no skin changes, no constipation, no loose stools      Hypertension Follow-up      Outpatient blood pressures are being checked at home.  Results are 108/62 - 141/80.    Low Salt Diet: low salt    BP Readings from Last 2 Encounters:   04/04/18 152/75   03/28/18 132/68     Hemoglobin A1C (%)   Date Value   04/02/2018 7.0 (H)   09/08/2017 6.6 (H)     LDL Cholesterol Calculated (mg/dL)   Date Value   04/02/2018 90   04/13/2017 95       Amount of exercise or physical activity: Walking.    Problems taking medications regularly: No    Medication side effects: none    Diet: low salt and diabetic         Patient Active Problem List   Diagnosis     Other vitamin B12 deficiency anemia     Irritable bowel syndrome     GERD (gastroesophageal reflux disease)     Advanced directives, counseling/discussion     Kidney replaced by transplant     Immunosuppressed status (HCC)     Hyperlipidemia LDL goal <100     Long-term use of immunosuppressant medication     CAD S/P percutaneous coronary angioplasty     Anemia in chronic kidney disease     Other specified hypothyroidism     Coronary artery disease involving native heart without angina pectoris, unspecified vessel or lesion type     Type 2 diabetes mellitus with diabetic nephropathy, with long-term current use of insulin  "(H)     Hypertension secondary to other renal disorders     Aftercare following organ transplant     Ventral hernia without obstruction or gangrene     S/P hernia repair     Opacity of lung on imaging study     Hernia, incisional     Past Surgical History:   Procedure Laterality Date     APPENDECTOMY NOS       ARTHROSCOPY SHOULDER ROTATOR CUFF REPAIR Left      COLONOSCOPY N/A 2016    Procedure: COLONOSCOPY;  Surgeon: Rashard Snider MD;  Location: [facility]     Coronary angioplasty with stent       HYSTERECTOMY       Kidney Transplant NOS Right      LAPAROSCOPIC CHOLECYSTECTOMY       NOSE SURGERY       OPEN SEPARATION COMPONENT HERNIORRHAPHY N/A 10/16/2017    Procedure: OPEN SEPARATION COMPONENT HERNIORRHAPHY;;  Surgeon: CARL Lott MD;  Location: [facility] OR     RECONSTRUCT ABDOMINAL WALL N/A 10/16/2017    Procedure: RECONSTRUCT ABDOMINAL WALL;  Exploratory Laparotomy, Lysis of Adhesions, Abdominal Wall reconstruction, Inlay Xen AB mesh, Mitek Suture Milan and Umbilical Hernia Repair.;  Surgeon: CARL Lott MD;  Location: [facility] OR     REMOVE CATARACT INTRACAP,INSERT LENS Right      TONSILLECTOMY         Social History   Substance Use Topics     Smoking status: Never Smoker     Smokeless tobacco: Never Used     Alcohol use No     Family History   Problem Relation Age of Onset     Respiratory Mother       age 71     Cardiovascular Father       age 75 hyertension     Arthritis Sister      living, healthy     Family History Negative Brother       age 44     Colon Cancer No family hx of          Current Outpatient Prescriptions   Medication Sig Dispense Refill     ASPIRIN PO Take 81 mg by mouth daily       atorvastatin (LIPITOR) 40 MG tablet TAKE 0.5 TABLETS (20 MG) BY MOUTH DAILY 45 tablet 2     FLORIN CONTOUR test strip   0     BD INSULIN SYRINGE ULTRAFINE 31G X 5/16\" 0.3 ML USING 4-5 PER DAY ([facility] 31G/0.3ML)  6     chlorthalidone (HYGROTON) 25 MG tablet TAKE 1 " "TABLET (25 MG) BY MOUTH DAILY 90 tablet 0     GENGRAF (BRAND) 25 MG CAPSULE Take 3 capsules (75 mg) by mouth 2 times daily 180 capsule 11     isosorbide mononitrate (IMDUR) 30 MG 24 hr tablet TAKE 0.5 TABLETS (15 MG) BY MOUTH 2 TIMES DAILY 90 tablet 1     levothyroxine (SYNTHROID) 112 MCG tablet Take by mouth daily       losartan (COZAAR) 50 MG tablet Take 1 tablet (50 mg) by mouth daily 90 tablet 1     mycophenolic acid (GENERIC EQUIVALENT) 180 MG EC tablet Take 3 tablets (540 mg) by mouth 2 times daily 180 tablet 11     NOVOLOG 100 U/ML SC SOLN sliding scale 4-6 units tid       pantoprazole (PROTONIX) 40 MG EC tablet TAKE 1 TABLET (40 MG) BY MOUTH DAILY 90 tablet 1     amLODIPine (NORVASC) 10 MG tablet TAKE 1 TABLET BY MOUTH EVERY DAY, FOLLOW UP DUE IN SEPT 90 tablet 0     metoprolol succinate (TOPROL-XL) 25 MG 24 hr tablet TAKE 1 TABLET BY MOUTH EVERYDAY AT BEDTIME 30 tablet 0       Reviewed and updated as needed this visit by clinical staff  Tobacco  Allergies  Meds  Med Hx  Surg Hx  Fam Hx  Soc Hx      Reviewed and updated as needed this visit by Provider         ROS:  CONSTITUTIONAL: NEGATIVE for fever, chills, change in weight  ENT/MOUTH: NEGATIVE for ear, mouth and throat problems  RESP: NEGATIVE for significant cough or SOB  CV: NEGATIVE for chest pain, palpitations or peripheral edema  MUSCULOSKELETAL: NEGATIVE for significant arthralgias or myalgia  NEURO: NEGATIVE for weakness, dizziness or paresthesias  ENDOCRINE: NEGATIVE for temperature intolerance, skin/hair changes  ROS otherwise negative    OBJECTIVE:                                                    /80 (BP Location: Right arm, Patient Position: Sitting, Cuff Size: Adult Regular)  Pulse 67  Temp 98.2  F (36.8  C) (Oral)  Resp 16  Ht 5' 4\" (1.626 m)  Wt 162 lb 4.8 oz (73.6 kg)  SpO2 97%  BMI 27.86 kg/m2  Body mass index is 27.86 kg/(m^2).   GENERAL: healthy, alert, well nourished, well hydrated, no distress  EYES: Eyes grossly " normal to inspection, extraocular movements - intact, and PERRL  HENT: ear canals- normal; TMs- normal; Nose- normal; Mouth- no ulcers, no lesions  NECK: no tenderness, no adenopathy, no asymmetry, no masses, no stiffness   RESP: lungs clear to auscultation - no rales, no rhonchi, no wheezes  CV: regular rates and rhythm,    MS: extremities- no gross deformities noted, no edema  NEURO: strength and tone- normal, sensory exam- grossly normal, mentation- intact, speech- normal, reflexes- symmetric  Diabetic foot exam: normal DP and PT pulses, no trophic changes or ulcerative lesions, normal sensory exam and normal monofilament exam        ASSESSMENT/PLAN:                                                       (E78.5) Hyperlipidemia LDL goal <100    Plan: LDL at goal, continue Lipitor 40 mg  Daily,rpt lipids in 04/19    (E11.21,  Z79.4) Type 2 diabetes mellitus with diabetic nephropathy, with long-term current use of insulin (H)  Plan: Patient had A1c done at endocrinology office and was 7.0 on 8/21/2018, on Basaglar 12  units at bedtime and also on NovoLog.    (D89.9) Immunosuppressed status (HCC)  Plan: follows up at Transplant clinic and on Gengraf, cellcept     (E03.8) Other specified hypothyroidism  Plan: recent TSH on 08/18 0.19 and T4 1.51    (I15.1,  N28.89) Hypertension secondary to other renal disorders  Plan: BP well controlled , continue losartan     (K21.9) Gastroesophageal reflux disease without esophagitis  Plan: pantoprazole (PROTONIX) 40 MG EC tablet refilled.explained clearly about the medication,insructions and side effects.      Summer Vega MD  [facility] [address]

## 2018-09-11 NOTE — MR AVS SNAPSHOT
After Visit Summary   9/11/2018    Viv Shaw    MRN: [id number]           Patient Information     Date Of Birth          1950        Visit Information        Provider Department      9/11/2018 8:00 AM Summer Vega MD [facility]        Today's Diagnoses     Hyperlipidemia LDL goal <100    -  1    Type 2 diabetes mellitus with diabetic nephropathy, with long-term current use of insulin (H)        Immunosuppressed status (HCC)        Other specified hypothyroidism        Hypertension secondary to other renal disorders        Gastroesophageal reflux disease without esophagitis           Follow-ups after your visit        Your next 10 appointments already scheduled     Nov 19, 2018  7:30 AM CST   MA SCREENING DIGITAL BILATERAL with [id number]   [facility] ([facility])    [address] [facility], [address]  [facility] [address] [phone]           How do I prepare for my exam? (Food and drink instructions) No Food and Drink Restrictions.  How do I prepare for my exam? (Other instructions) Do not use any powder, lotion or deodorant under your arms or on your breast. If you do, we will ask you to remove it before your exam.  What should I wear: Wear comfortable, two-piece clothing.  How long does the exam take: Most scans will take 15 minutes.  What should I bring: Bring any previous mammograms from other facilities or have them mailed to the breast center.  Do I need a :  No  is needed.  What do I need to tell my doctor: If you have any allergies, tell your care team.  What should I do after the exam: No restrictions, You may resume normal activities.  What is this test: This test is an x-ray of the breast to look for breast disease. The breast is pressed between two plates to flatten and spread the tissue. An X-ray is taken of the breast from different angles.  Who should I call with questions: If you have any questions,  "please call the Imaging Department where you will have your exam. Directions, parking instructions, and other information is available on our website, [email or web address].  Other information about my exam Three-dimensional (3D) mammograms are available at [facility] locations in [facility], [facility], [place], [facility] and [place].  Health locations include [place] and [facility] in [place].  Benefits of 3D mammograms include * Improved rate of cancer detection * Decreases your chance of having to go back for more tests, which means fewer: * \"False-positive\" results (This means that there is an abnormal area but it isn't cancer.) * Invasive testing procedures, such as a biopsy or surgery * Can provide clearer images of the breast if you have dense breast tissue.  *3D mammography is an optional exam that anyone can have with a 2D mammogram. It doesn't replace or take the place of a 2D mammogram. 2D mammograms remain an effective screening test for all women.  Not all insurance companies cover the cost of a 3D mammogram. Check with your insurance. Three-dimensional (3D) mammograms are available at [facility] locations in [facility], [facility], [facility], and [place]. Health locations include [place] and [facility] in [place]. Benefits of 3D mammograms include: - Improved rate of cancer detection - Decreases your chance of having to go back for more tests, which means fewer: - \"False-positive\" results (This means that there is an abnormal area but it isn't cancer.) - Invasive testing procedures, such as a biopsy or surgery - Can provide clearer images of the breast if you have dense breast tissue. 3D mammography is an optional exam that anyone can have with a 2D mammogram. It doesn't replace or take the place of a 2D mammogram. 2D mammograms remain an effective screening test for all women.  " "Not all insurance companies cover the cost of a 3D mammogram. Check with your insurance.            Dec 03, 2018  1:05 PM CST   (Arrive by 12:35 PM)   Return Kidney Transplant with  Kidney/Pancreas Recipient [address] [place] [address] Nephrology ([facility])    [address] [facility]  [address]  [facility] [address] [phone]              Who to contact     If you have questions or need follow up information about today's clinic visit or your schedule please contact [facility] directly at [phone].  Normal or non-critical lab and imaging results will be communicated to you by Plexxhart, letter or phone within 4 business days after the clinic has received the results. If you do not hear from us within 7 days, please contact the clinic through EventSorbett or phone. If you have a critical or abnormal lab result, we will notify you by phone as soon as possible.  Submit refill requests through LocBox or call your pharmacy and they will forward the refill request to us. Please allow 3 business days for your refill to be completed.          Additional Information About Your Visit        LocBox Information     LocBox gives you secure access to your electronic health record. If you see a primary care provider, you can also send messages to your care team and make appointments. If you have questions, please call your primary care clinic.  If you do not have a primary care provider, please call [phone] and they will assist you.        Care EveryWhere ID     This is your Care EveryWhere ID. This could be used by other organizations to access your [place] [facility] medical records  [id number]        Your Vitals Were     Pulse Temperature Respirations Height Pulse Oximetry BMI (Body Mass Index)    67 98.2  F (36.8  C) (Oral) 16 5' 4\" (1.626 m) 97% 27.86 kg/m2       Blood Pressure from Last 3 Encounters:   09/11/18 126/80   04/04/18 152/75   03/28/18 132/68    Weight from Last 3 Encounters: "   09/11/18 162 lb 4.8 oz (73.6 kg)   03/28/18 167 lb (75.8 kg)   12/21/17 166 lb 1.6 oz (75.3 kg)              Today, you had the following     No orders found for display         Where to get your medicines      These medications were sent to [facility]/pharmacy #[id number] - [place], [place] - [address], [place] [address]     Phone:  [phone]     pantoprazole 40 MG EC tablet          Primary Care Provider Office Phone # Fax #    Summer SMITH MD [facility] [phone]       [address] [facility] [address]        Equal Access to Services     PORTER NUNN : Tierra Dickens, wajose rafael pulliam, qaybta kaalmada amado, hue garrido. So [facility] [phone].    ATENCIÓN: Si habla español, tiene a rosario disposición servicios gratuitos de asistencia lingüística. Llame al [phone].    We comply with applicable federal civil rights laws and [place] laws. We do not discriminate on the basis of race, color, national origin, age, disability, sex, sexual orientation, or gender identity.            Thank you!     Thank you for choosing [facility]  for your care. Our goal is always to provide you with excellent care. Hearing back from our patients is one way we can continue to improve our services. Please take a few minutes to complete the written survey that you may receive in the mail after your visit with us. Thank you!             Your Updated Medication List - Protect others around you: Learn how to safely use, store and throw away your medicines at www.disposemymeds.org.          This list is accurate as of 9/11/18 11:59 PM.  Always use your most recent med list.                   Brand Name Dispense Instructions for use Diagnosis    ASPIRIN PO      Take 81 mg by mouth daily        atorvastatin 40 MG tablet    LIPITOR    45 tablet    TAKE 0.5 TABLETS (20 MG) BY MOUTH DAILY    Hyperlipidemia LDL goal <100        "FLORIN CONTOUR test strip   Generic drug:  blood glucose monitoring           BD insulin syringe ultrafine 31G X 5/16\" 0.3 ML   Generic drug:  insulin syringe-needle U-100      USING 4-5 PER DAY ([facility] 31G/0.3ML)        chlorthalidone 25 MG tablet    HYGROTON    90 tablet    TAKE 1 TABLET (25 MG) BY MOUTH DAILY    Hypertension secondary to other renal disorders       cycloSPORINE modified 25 MG capsule     180 capsule    Take 3 capsules (75 mg) by mouth 2 times daily    Kidney transplanted       isosorbide mononitrate 30 MG 24 hr tablet    IMDUR    90 tablet    TAKE 0.5 TABLETS (15 MG) BY MOUTH 2 TIMES DAILY    Coronary artery disease involving native heart without angina pectoris, unspecified vessel or lesion type       losartan 50 MG tablet    COZAAR    90 tablet    Take 1 tablet (50 mg) by mouth daily    Hypertension secondary to other renal disorders       mycophenolic acid 180 MG EC tablet    GENERIC EQUIVALENT    180 tablet    Take 3 tablets (540 mg) by mouth 2 times daily    Kidney replaced by transplant       NovoLOG VIAL 100 UNITS/ML injection   Generic drug:  insulin aspart      sliding scale 4-6 units tid        pantoprazole 40 MG EC tablet    PROTONIX    90 tablet    TAKE 1 TABLET (40 MG) BY MOUTH DAILY    Gastroesophageal reflux disease without esophagitis       SYNTHROID 112 MCG tablet   Generic drug:  levothyroxine      Take by mouth daily          "

## 2018-09-11 NOTE — NURSING NOTE
"Vital signs:  Temp: 98.2  F (36.8  C) Temp src: Oral BP: 126/80 Pulse: 67   Resp: 16 SpO2: 97 %     Height: 5' 4\" (162.6 cm) Weight: 162 lb 4.8 oz (73.6 kg)  Estimated body mass index is 27.86 kg/(m^2) as calculated from the following:    Height as of this encounter: 5' 4\" (1.626 m).    Weight as of this encounter: 162 lb 4.8 oz (73.6 kg).        "

## 2018-09-18 DIAGNOSIS — I15.1 HYPERTENSION SECONDARY TO OTHER RENAL DISORDERS: ICD-10-CM

## 2018-09-18 DIAGNOSIS — I25.10 CORONARY ARTERY DISEASE INVOLVING NATIVE HEART WITHOUT ANGINA PECTORIS, UNSPECIFIED VESSEL OR LESION TYPE: ICD-10-CM

## 2018-09-18 NOTE — TELEPHONE ENCOUNTER
"Requested Prescriptions   Pending Prescriptions Disp Refills     amLODIPine (NORVASC) 10 MG tablet [Pharmacy Med Name: AMLODIPINE BESYLATE 10 MG TAB]  Last Written Prescription Date:  6/22/2018  Last Fill Quantity: 90,  # refills: 0   Last office visit: No previous visit found with prescribing provider:     Future Office Visit:   90 tablet 0     Sig: TAKE 1 TABLET BY MOUTH EVERY DAY, FOLLOW UP DUE IN SEPT    Calcium Channel Blockers Protocol  Failed    9/18/2018  2:12 AM       Failed - Normal serum creatinine on file in past 12 months    Recent Labs   Lab Test  06/28/18   0555   CR  1.28*            Passed - Blood pressure under 140/90 in past 12 months    BP Readings from Last 3 Encounters:   09/11/18 126/80   04/04/18 152/75   03/28/18 132/68                Passed - Recent (12 mo) or future (30 days) visit within the authorizing provider's specialty    Patient had office visit in the last 12 months or has a visit in the next 30 days with authorizing provider or within the authorizing provider's specialty.  See \"Patient Info\" tab in inbasket, or \"Choose Columns\" in Meds & Orders section of the refill encounter.           Passed - Patient is age 18 or older       Passed - No active pregnancy on record       Passed - No positive pregnancy test in past 12 months        "

## 2018-09-20 DIAGNOSIS — Z94.0 KIDNEY REPLACED BY TRANSPLANT: ICD-10-CM

## 2018-09-20 DIAGNOSIS — Z79.899 ENCOUNTER FOR LONG-TERM CURRENT USE OF MEDICATION: ICD-10-CM

## 2018-09-20 DIAGNOSIS — Z48.298 AFTERCARE FOLLOWING ORGAN TRANSPLANT: ICD-10-CM

## 2018-09-20 LAB
ANION GAP SERPL CALCULATED.3IONS-SCNC: 5 MMOL/L (ref 3–14)
BUN SERPL-MCNC: 26 MG/DL (ref 7–30)
CALCIUM SERPL-MCNC: 9.2 MG/DL (ref 8.5–10.1)
CHLORIDE SERPL-SCNC: 106 MMOL/L (ref 94–109)
CO2 SERPL-SCNC: 29 MMOL/L (ref 20–32)
CREAT SERPL-MCNC: 1.24 MG/DL (ref 0.52–1.04)
CREAT UR-MCNC: 51 MG/DL
CYCLOSPORINE BLD LC/MS/MS-MCNC: 89 UG/L (ref 50–400)
ERYTHROCYTE [DISTWIDTH] IN BLOOD BY AUTOMATED COUNT: 12.9 % (ref 10–15)
GFR SERPL CREATININE-BSD FRML MDRD: 43 ML/MIN/1.7M2
GLUCOSE SERPL-MCNC: 160 MG/DL (ref 70–99)
HCT VFR BLD AUTO: 38 % (ref 35–47)
HGB BLD-MCNC: 11.6 G/DL (ref 11.7–15.7)
MCH RBC QN AUTO: 28.3 PG (ref 26.5–33)
MCHC RBC AUTO-ENTMCNC: 30.5 G/DL (ref 31.5–36.5)
MCV RBC AUTO: 93 FL (ref 78–100)
PLATELET # BLD AUTO: 216 10E9/L (ref 150–450)
POTASSIUM SERPL-SCNC: 4.2 MMOL/L (ref 3.4–5.3)
PROT UR-MCNC: 0.05 G/L
PROT/CREAT 24H UR: 0.1 G/G CR (ref 0–0.2)
RBC # BLD AUTO: 4.1 10E12/L (ref 3.8–5.2)
SODIUM SERPL-SCNC: 141 MMOL/L (ref 133–144)
TME LAST DOSE: 1900 H
WBC # BLD AUTO: 3.3 10E9/L (ref 4–11)

## 2018-09-20 PROCEDURE — 80158 DRUG ASSAY CYCLOSPORINE: CPT | Performed by: INTERNAL MEDICINE

## 2018-09-20 RX ORDER — AMLODIPINE BESYLATE 10 MG/1
TABLET ORAL
Qty: 90 TABLET | Refills: 0 | Status: SHIPPED | OUTPATIENT
Start: 2018-09-20 | End: 2018-12-14

## 2018-09-20 RX ORDER — METOPROLOL SUCCINATE 25 MG/1
TABLET, EXTENDED RELEASE ORAL
Qty: 30 TABLET | Refills: 0 | Status: SHIPPED | OUTPATIENT
Start: 2018-09-20 | End: 2018-10-15

## 2018-09-20 NOTE — TELEPHONE ENCOUNTER
Medication is being filled for 1 time refill only due to:  Patient needs to be seen because due for diabetes follow up.   Last seen 3/28/18  Carmen Vanegas RN

## 2018-09-30 RX ORDER — INSULIN GLARGINE 100 [IU]/ML
12 INJECTION, SOLUTION SUBCUTANEOUS AT BEDTIME
COMMUNITY
End: 2019-10-31

## 2018-09-30 RX ORDER — PANTOPRAZOLE SODIUM 40 MG/1
TABLET, DELAYED RELEASE ORAL
Qty: 90 TABLET | Refills: 1 | Status: SHIPPED | OUTPATIENT
Start: 2018-09-30 | End: 2019-03-31

## 2018-10-09 DIAGNOSIS — I15.1 HYPERTENSION SECONDARY TO OTHER RENAL DISORDERS: ICD-10-CM

## 2018-10-10 NOTE — TELEPHONE ENCOUNTER
"Requested Prescriptions   Pending Prescriptions Disp Refills     losartan (COZAAR) 50 MG tablet [Pharmacy Med Name: LOSARTAN POTASSIUM 50 MG TAB] 90 tablet 1    Last Written Prescription Date:  08/02/2018  Last Fill0 Quantity: 90,  # refills: 1  Last office visit: 9/11/2018 with prescribing provider:    Future Office Visit:   Sig: TAKE 1 TABLET BY MOUTH EVERY DAY    Angiotensin-II Receptors Failed    10/9/2018 10:39 AM       Failed - Normal serum creatinine on file in past 12 months    Recent Labs   Lab Test  09/20/18   0552   CR  1.24*            Passed - Blood pressure under 140/90 in past 12 months    BP Readings from Last 3 Encounters:   09/11/18 126/80   04/04/18 152/75   03/28/18 132/68                Passed - Recent (12 mo) or future (30 days) visit within the authorizing provider's specialty    Patient had office visit in the last 12 months or has a visit in the next 30 days with authorizing provider or within the authorizing provider's specialty.  See \"Patient Info\" tab in inbasket, or \"Choose Columns\" in Meds & Orders section of the refill encounter.           Passed - Patient is age 18 or older       Passed - No active pregnancy on record       Passed - Normal serum potassium on file in past 12 months    Recent Labs   Lab Test  09/20/18   0552   POTASSIUM  4.2                   Passed - No positive pregnancy test in past 12 months        "

## 2018-10-11 RX ORDER — LOSARTAN POTASSIUM 50 MG/1
TABLET ORAL
Qty: 90 TABLET | Refills: 1 | Status: SHIPPED | OUTPATIENT
Start: 2018-10-11 | End: 2019-04-22

## 2018-10-15 DIAGNOSIS — I15.1 HYPERTENSION SECONDARY TO OTHER RENAL DISORDERS: ICD-10-CM

## 2018-10-15 DIAGNOSIS — I25.10 CORONARY ARTERY DISEASE INVOLVING NATIVE HEART WITHOUT ANGINA PECTORIS, UNSPECIFIED VESSEL OR LESION TYPE: ICD-10-CM

## 2018-10-15 NOTE — TELEPHONE ENCOUNTER
"PHARMACY IS REQUESTING 90 DAY REFILLS    Requested Prescriptions   Pending Prescriptions Disp Refills     metoprolol succinate (TOPROL-XL) 25 MG 24 hr tablet  Last Written Prescription Date:  09/20/18  Last Fill Quantity: 30,  # refills: 0   Last office visit: 9/11/2018 with prescribing provider:  09/11/18   Future Office Visit:     30 tablet 0    Beta-Blockers Protocol Passed    10/15/2018 10:42 AM       Passed - Blood pressure under 140/90 in past 12 months    BP Readings from Last 3 Encounters:   09/11/18 126/80   04/04/18 152/75   03/28/18 132/68                Passed - Patient is age 6 or older       Passed - Recent (12 mo) or future (30 days) visit within the authorizing provider's specialty    Patient had office visit in the last 12 months or has a visit in the next 30 days with authorizing provider or within the authorizing provider's specialty.  See \"Patient Info\" tab in inbasket, or \"Choose Columns\" in Meds & Orders section of the refill encounter.              "

## 2018-10-16 RX ORDER — METOPROLOL SUCCINATE 25 MG/1
TABLET, EXTENDED RELEASE ORAL
Qty: 30 TABLET | Refills: 10 | Status: SHIPPED | OUTPATIENT
Start: 2018-10-16 | End: 2019-04-22

## 2018-10-23 ENCOUNTER — DOCUMENTATION ONLY (OUTPATIENT)
Dept: TRANSPLANT | Facility: CLINIC | Age: 68
End: 2018-10-23

## 2018-10-23 NOTE — PROGRESS NOTES
Chart Prep    Clinic Visit on:  12/3/18    Last lab completed:  9/20/18    Lab letter updated: 10/23/18    Lab orders up to date in Epic.

## 2018-10-23 NOTE — LETTER
PHYSICIAN ORDERS    DATE & TIME ISSUED: 2018 2:48 PM  PATIENT NAME: Viv Shaw   : 1950     [facility] MR# [if applicable]: [id number]     DIAGNOSIS / ICD - 10 CODES    Kidney Transplanted (Z94.0)    After Care Following Organ Transplant (Z48.298)    Long Term Use of Medication (Z79.899)    Complications Kidney Transplant (T86.10)      Please complete the following labs:    Every 3 Months    Basic Metabolic panel    Complete Blood Count    Cyclosporine level    Every 6 months    Protein Random Urine with creatinine ratio      Patient should release information to the [facility].   Please fax results to the Transplant Center at [phone].  Any questions please call [phone].      .

## 2018-10-23 NOTE — LETTER
PHYSICIAN ORDERS    DATE & TIME ISSUED: 2019  PATIENT NAME: Viv Shaw   : 1950     [facility] MR# [if applicable]: [id number]     DIAGNOSIS / ICD - 10 CODES    Kidney Transplanted (Z94.0)    After Care Following Organ Transplant (Z48.298)    Long Term Use of Medication (Z79.899)    Complications Kidney Transplant (T86.10)      Please complete the following labs:    Every 3 Months    Basic Metabolic panel    Complete Blood Count    Cyclosporine level    Every 6 months    Protein Random Urine with creatinine ratio      Patient should release information to the [facility].   Please fax results to the Transplant Center at [phone].  Any questions please call [phone].      .

## 2018-10-29 DIAGNOSIS — I25.10 CORONARY ARTERY DISEASE INVOLVING NATIVE HEART WITHOUT ANGINA PECTORIS, UNSPECIFIED VESSEL OR LESION TYPE: ICD-10-CM

## 2018-10-30 NOTE — TELEPHONE ENCOUNTER
"Requested Prescriptions   Pending Prescriptions Disp Refills     isosorbide mononitrate (IMDUR) 30 MG 24 hr tablet [Pharmacy Med Name: ISOSORBIDE [place] ER 30 MG TABLET]  Last Written Prescription Date:  2/15/18  Last Fill Quantity: 90,  # refills: 1   Last office visit: 9/11/2018 with prescribing provider:  Silvia   Future Office Visit:     90 tablet 1     Sig: TAKE 0.5 TABLETS (15 MG) BY MOUTH 2 TIMES DAILY    Nitrates Passed    10/29/2018  4:45 PM       Passed - Blood pressure under 140/90 in past 12 months    BP Readings from Last 3 Encounters:   09/11/18 126/80   04/04/18 152/75   03/28/18 132/68                Passed - Pt is not on erectile dysfunction medications       Passed - Recent (12 mo) or future (30 days) visit within the authorizing provider's specialty    Patient had office visit in the last 12 months or has a visit in the next 30 days with authorizing provider or within the authorizing provider's specialty.  See \"Patient Info\" tab in inbasket, or \"Choose Columns\" in Meds & Orders section of the refill encounter.             Passed - Patient is age 18 or older          "

## 2018-10-31 RX ORDER — ISOSORBIDE MONONITRATE 30 MG/1
TABLET, EXTENDED RELEASE ORAL
Qty: 90 TABLET | Refills: 1 | Status: SHIPPED | OUTPATIENT
Start: 2018-10-31 | End: 2019-04-16

## 2018-10-31 NOTE — TELEPHONE ENCOUNTER
Prescription approved per [facility] Refill Protocol.  Grazyna HERRERA RN

## 2018-11-05 DIAGNOSIS — I25.10 CORONARY ARTERY DISEASE INVOLVING NATIVE HEART WITHOUT ANGINA PECTORIS, UNSPECIFIED VESSEL OR LESION TYPE: ICD-10-CM

## 2018-11-06 NOTE — TELEPHONE ENCOUNTER
"Requested Prescriptions   Pending Prescriptions Disp Refills     isosorbide mononitrate (IMDUR) 30 MG 24 hr tablet [Pharmacy Med Name: ISOSORBIDE [place] ER 30 MG TABLET]  Last Written Prescription Date:  10/31/18  Last Fill Quantity: 90 TABLET,  # refills: 1   Last office visit: 9/11/2018 with prescribing provider:  RAMÍREZ   Future Office Visit:     90 tablet 1     Sig: TAKE 0.5 TABLETS (15 MG) BY MOUTH 2 TIMES DAILY    Nitrates Passed    11/5/2018  7:53 PM       Passed - Blood pressure under 140/90 in past 12 months    BP Readings from Last 3 Encounters:   09/11/18 126/80   04/04/18 152/75   03/28/18 132/68                Passed - Pt is not on erectile dysfunction medications       Passed - Recent (12 mo) or future (30 days) visit within the authorizing provider's specialty    Patient had office visit in the last 12 months or has a visit in the next 30 days with authorizing provider or within the authorizing provider's specialty.  See \"Patient Info\" tab in inbasket, or \"Choose Columns\" in Meds & Orders section of the refill encounter.             Passed - Patient is age 18 or older          "

## 2018-11-07 RX ORDER — ISOSORBIDE MONONITRATE 30 MG/1
TABLET, EXTENDED RELEASE ORAL
Start: 2018-11-07

## 2018-11-19 ENCOUNTER — TELEPHONE (OUTPATIENT)
Dept: NEPHROLOGY | Facility: CLINIC | Age: 68
End: 2018-11-19

## 2018-11-19 ENCOUNTER — HOSPITAL ENCOUNTER (OUTPATIENT)
Dept: MAMMOGRAPHY | Facility: CLINIC | Age: 68
Discharge: HOME | End: 2018-11-19
Attending: INTERNAL MEDICINE | Admitting: INTERNAL MEDICINE
Payer: MEDICARE

## 2018-11-19 DIAGNOSIS — Z12.39 BREAST SCREENING: ICD-10-CM

## 2018-11-19 PROCEDURE — 77067 SCR MAMMO BI INCL CAD: CPT

## 2018-11-19 NOTE — TELEPHONE ENCOUNTER
Spoke with patient for transplant follow up. States she's doing very well, no questions or concerns ahead of appointment.    Cynthia Heck RN

## 2018-11-29 DIAGNOSIS — Z94.0 KIDNEY REPLACED BY TRANSPLANT: ICD-10-CM

## 2018-11-29 DIAGNOSIS — Z79.899 ENCOUNTER FOR LONG-TERM CURRENT USE OF MEDICATION: ICD-10-CM

## 2018-11-29 DIAGNOSIS — Z94.0 KIDNEY TRANSPLANTED: ICD-10-CM

## 2018-11-29 DIAGNOSIS — Z48.298 AFTERCARE FOLLOWING ORGAN TRANSPLANT: ICD-10-CM

## 2018-11-29 LAB
ANION GAP SERPL CALCULATED.3IONS-SCNC: 6 MMOL/L (ref 3–14)
BUN SERPL-MCNC: 30 MG/DL (ref 7–30)
CALCIUM SERPL-MCNC: 9 MG/DL (ref 8.5–10.1)
CHLORIDE SERPL-SCNC: 107 MMOL/L (ref 94–109)
CO2 SERPL-SCNC: 28 MMOL/L (ref 20–32)
CREAT SERPL-MCNC: 1.12 MG/DL (ref 0.52–1.04)
CYCLOSPORINE BLD LC/MS/MS-MCNC: 83 UG/L (ref 50–400)
ERYTHROCYTE [DISTWIDTH] IN BLOOD BY AUTOMATED COUNT: 12.5 % (ref 10–15)
GFR SERPL CREATININE-BSD FRML MDRD: 48 ML/MIN/1.7M2
GLUCOSE SERPL-MCNC: 154 MG/DL (ref 70–99)
HCT VFR BLD AUTO: 37.4 % (ref 35–47)
HGB BLD-MCNC: 11.7 G/DL (ref 11.7–15.7)
MCH RBC QN AUTO: 28.5 PG (ref 26.5–33)
MCHC RBC AUTO-ENTMCNC: 31.3 G/DL (ref 31.5–36.5)
MCV RBC AUTO: 91 FL (ref 78–100)
PLATELET # BLD AUTO: 192 10E9/L (ref 150–450)
POTASSIUM SERPL-SCNC: 4.4 MMOL/L (ref 3.4–5.3)
RBC # BLD AUTO: 4.11 10E12/L (ref 3.8–5.2)
SODIUM SERPL-SCNC: 141 MMOL/L (ref 133–144)
TME LAST DOSE: 1900 H
WBC # BLD AUTO: 3.7 10E9/L (ref 4–11)

## 2018-11-29 PROCEDURE — 80158 DRUG ASSAY CYCLOSPORINE: CPT | Performed by: INTERNAL MEDICINE

## 2018-11-29 RX ORDER — CYCLOSPORINE 25 MG/1
75 CAPSULE ORAL 2 TIMES DAILY
Qty: 180 CAPSULE | Refills: 11 | Status: SHIPPED | OUTPATIENT
Start: 2018-11-29 | End: 2019-08-02

## 2018-12-03 ENCOUNTER — OFFICE VISIT (OUTPATIENT)
Dept: NEPHROLOGY | Facility: CLINIC | Age: 68
End: 2018-12-03
Attending: INTERNAL MEDICINE
Payer: MEDICARE

## 2018-12-03 VITALS
HEIGHT: 64 IN | HEART RATE: 57 BPM | DIASTOLIC BLOOD PRESSURE: 76 MMHG | OXYGEN SATURATION: 94 % | TEMPERATURE: 97.8 F | BODY MASS INDEX: 27.14 KG/M2 | WEIGHT: 159 LBS | SYSTOLIC BLOOD PRESSURE: 138 MMHG

## 2018-12-03 DIAGNOSIS — Z94.0 KIDNEY REPLACED BY TRANSPLANT: ICD-10-CM

## 2018-12-03 DIAGNOSIS — Z79.4 TYPE 2 DIABETES MELLITUS WITH DIABETIC NEPHROPATHY, WITH LONG-TERM CURRENT USE OF INSULIN (H): ICD-10-CM

## 2018-12-03 DIAGNOSIS — D63.1 ANEMIA IN STAGE 3 CHRONIC KIDNEY DISEASE (H): ICD-10-CM

## 2018-12-03 DIAGNOSIS — Z79.60 LONG-TERM USE OF IMMUNOSUPPRESSANT MEDICATION: ICD-10-CM

## 2018-12-03 DIAGNOSIS — I15.1 HYPERTENSION SECONDARY TO OTHER RENAL DISORDERS: ICD-10-CM

## 2018-12-03 DIAGNOSIS — N18.30 ANEMIA IN STAGE 3 CHRONIC KIDNEY DISEASE (H): ICD-10-CM

## 2018-12-03 DIAGNOSIS — Z48.298 AFTERCARE FOLLOWING ORGAN TRANSPLANT: Primary | ICD-10-CM

## 2018-12-03 DIAGNOSIS — E11.21 TYPE 2 DIABETES MELLITUS WITH DIABETIC NEPHROPATHY, WITH LONG-TERM CURRENT USE OF INSULIN (H): ICD-10-CM

## 2018-12-03 DIAGNOSIS — D84.9 IMMUNOSUPPRESSED STATUS (H): ICD-10-CM

## 2018-12-03 PROCEDURE — G0463 HOSPITAL OUTPT CLINIC VISIT: HCPCS | Mod: ZF

## 2018-12-03 ASSESSMENT — PAIN SCALES - GENERAL: PAINLEVEL: NO PAIN (0)

## 2018-12-03 NOTE — LETTER
12/3/2018      RE: Viv Shaw  [address]  [address]  [place] [facility] [place] [address]       [facility]  Nephrology Clinic  Kidney/Pancreas Recipient  2018     Name: Viv Shaw  MRN: [id number]   Age: 68 year old  : 1950  Referring provider: Summer Vega     CHRONIC TRANSPLANT NEPHROLOGY VISIT    Assessment and Plan:   # LDKT:    - Baseline Cr ~ 1.2-1.3; Stable   - Proteinuria: Normal   - Date of DSA last checked: 2007  Latest DSA: No   - BK Viremia: No, last checked 2015   - Kidney Tx Biopsy: Yes,  no rejection     # Immunosuppression: Cyclosporine (goal  50-75) and Mycophenolic acid 540 mg bid   - Changes: No    # Hypertension: Controlled; Goal BP: < 130/80   - Changes: No    - I recommend the patient restart low dose aspirin with her history of heart disease    # Diabetes: Controlled    # Anemia in chronic renal disease: Hgb: Stable   - Iron studies: Not checked recently    # Mineral Bone Disorder:    - Secondary renal hyperparathyroidism; PTH level is: Not checked recently   - Vitamin D; level is: Not checked recently   - Calcium; level is: Normal   - Phosphorus; level is: Normal     # Electrolytes:   - Potassium; level: Normal  - Magnesium; level: Normal  - Bicarbonate; level: Normal    # Skin Cancer:   - New lesions: one, BCC last 2018 and was surgically removed.   - Discussed sun protection and recommend regular follow up with Dermatology    # Medical Compliance: Yes    Follow-up: Data Unavailable     # Transplant History:  Etiology of kidney failure: diabetes mellitus   Tx: LDKT  Transplant: 2005 (Kidney)  Donor Type: Living  Crossmatch at time of Tx: negative  Significant changes in immunosuppression: None  Significant transplant-related complications: None    Transplant Office Phone Number: [phone]    Assessment and plan was discussed with the patient and they voiced understanding and agreement.    Chief Complaint   Follow-up    History of  "Present Illness:  Viv Shaw is a 68 year old female with a history of end stage kidney disease secondary to diabetes mellitus and status post LDKT on 12/27/2005 who presents for follow-up.     The patient was last seen on 12/04/2017 by Dr. Veloz; please see note for further details. At that time, patient was recommended to start taking iron sulfate 325 mg daily with lunch due to iron deficiency.     Today, the patient notes no complaints.  She is off of aspirin 81 mg daily but denies any complications with this medication in the past.     Recent Hospitalizations:  [x] No [] Yes    New Medical Issues: [x] No [] Yes    Decreased energy: [x] No [] Yes    Chest pain or SOB with exertion:  [x] No [] Yes    Appetite change or weight change: [x] No [] Yes    Nausea, vomiting or diarrhea:  [x] No [] Yes    Fever, sweats or chills: [x] No [] Yes    Leg swelling: [] No [x] Yes      Other medical issues:  No    Home BP: controlled     Review of Systems:   A comprehensive review of systems was obtained and negative, except as noted in the HPI or past medical history.     Active Medications:     Current Outpatient Prescriptions:      amLODIPine (NORVASC) 10 MG tablet, TAKE 1 TABLET BY MOUTH EVERY DAY, FOLLOW UP DUE IN SEPT, Disp: 90 tablet, Rfl: 0     ASPIRIN PO, Take 81 mg by mouth daily, Disp: , Rfl:      atorvastatin (LIPITOR) 40 MG tablet, TAKE 0.5 TABLETS (20 MG) BY MOUTH DAILY, Disp: 45 tablet, Rfl: 2     BASAGLAR 100 UNIT/ML injection, Inject 12 Units Subcutaneous At Bedtime, Disp: , Rfl:      FLORIN CONTOUR test strip, , Disp: , Rfl: 0     BD INSULIN SYRINGE ULTRAFINE 31G X 5/16\" 0.3 ML, USING 4-5 PER DAY ([employer] 31G/0.3ML), Disp: , Rfl: 6     chlorthalidone (HYGROTON) 25 MG tablet, TAKE 1 TABLET (25 MG) BY MOUTH DAILY, Disp: 90 tablet, Rfl: 0     GENGRAF (BRAND) 25 MG CAPSULE, Take 3 capsules (75 mg) by mouth 2 times daily, Disp: 180 capsule, Rfl: 11     isosorbide mononitrate (IMDUR) 30 MG 24 hr tablet, TAKE " 0.5 TABLETS (15 MG) BY MOUTH 2 TIMES DAILY, Disp: 90 tablet, Rfl: 1     isosorbide mononitrate (IMDUR) 30 MG 24 hr tablet, TAKE 0.5 TABLETS (15 MG) BY MOUTH 2 TIMES DAILY, Disp: 90 tablet, Rfl: 1     levothyroxine (SYNTHROID) 112 MCG tablet, Take by mouth daily, Disp: , Rfl:      losartan (COZAAR) 50 MG tablet, TAKE 1 TABLET BY MOUTH EVERY DAY, Disp: 90 tablet, Rfl: 1     metoprolol succinate (TOPROL-XL) 25 MG 24 hr tablet, TAKE 1 TABLET BY MOUTH EVERYDAY AT BEDTIME, Disp: 30 tablet, Rfl: 10     mycophenolic acid (GENERIC EQUIVALENT) 180 MG EC tablet, Take 3 tablets (540 mg) by mouth 2 times daily, Disp: 180 tablet, Rfl: 11     NOVOLOG 100 U/ML SC SOLN, sliding scale 4-6 units tid, Disp: , Rfl:      pantoprazole (PROTONIX) 40 MG EC tablet, TAKE 1 TABLET (40 MG) BY MOUTH DAILY, Disp: 90 tablet, Rfl: 1      Allergies:   Iron [ferrous sulfate]      Active Medical Problems:  Vitamin B12 deficiency  Irritable bowel syndrome  Gastroesophageal reflux disease   Status post kidney transplant  Immunosuppressed status  Hyperlipidemia   Coronary artery disease   Anemia  Hypothyroidism   Diabetes mellitus type 2  Diabetic nephropathy  Hypertension     Social History:   The patient has never smoked. Denies alcohol use.     Physical Exam:   There were no vitals taken for this visit.   Wt Readings from Last 4 Encounters:   09/11/18 73.6 kg (162 lb 4.8 oz)   03/28/18 75.8 kg (167 lb)   12/21/17 75.3 kg (166 lb 1.6 oz)   12/04/17 75.8 kg (167 lb)     GENERAL APPEARANCE: alert and no distress  HENT: mouth without ulcers or lesions  LYMPHATICS: no cervical or supraclavicular nodes  RESP: lungs clear to auscultation - no rales, rhonchi or wheezes  CV: regular rhythm, normal rate, no rub, no murmur  EDEMA: no LE edema bilaterally  ABDOMEN: soft, nondistended, nontender, bowel sounds normal  MS: extremities normal - no gross deformities noted, no evidence of inflammation in joints, no muscle tenderness  SKIN: no rash  TX KIDNEY:  normal  DIALYSIS ACCESS:  None    Data:   Renal Latest Ref Rng & Units 11/29/2018 9/20/2018 8/21/2018   Na 133 - 144 mmol/L 141 141 -   Na (external) 135 - 146 mmol/L - - -   K 3.4 - 5.3 mmol/L 4.4 4.2 -   K (external) 3.5 - 5.3 mmol/L - - -   Cl 94 - 109 mmol/L 107 106 -   Cl (external) 98 - 110 mmol/L - - -   CO2 20 - 32 mmol/L 28 29 -   CO2 (external) 20 - 32 mmol/L - - -   BUN 7 - 30 mg/dL 30 26 -   BUN (external) 7 - 25 mg/dL - - -   Cr 0.52 - 1.04 mg/dL 1.12(H) 1.24(H) -   Cr (external) 0.60 - 0.93 mg/dL - - -   Glucose 70 - 99 mg/dL 154(H) 160(H) -   Glucose (external) 65 - 99 mg/dL - - 143(H)   Ca  8.5 - 10.1 mg/dL 9.0 9.2 -   Ca (external) 8.6 - 10.4 mg/dL - - -   Mg 1.6 - 2.3 mg/dL - - -     Bone Health Latest Ref Rng & Units 10/19/2017 10/1/2008 6/4/2008   Phos 2.5 - 4.5 mg/dL 2.5 3.2 4.7(H)     Heme Latest Ref Rng & Units 11/29/2018 9/20/2018 6/28/2018   WBC 4.0 - 11.0 10e9/L 3.7(L) 3.3(L) 3.8(L)   Hgb 11.7 - 15.7 g/dL 11.7 11.6(L) 11.3(L)   Plt 150 - 450 10e9/L 192 216 207     Liver Latest Ref Rng & Units 4/2/2018 4/13/2017 4/8/2017   AP 40 - 150 U/L 46 55 56   TBili 0.2 - 1.3 mg/dL 0.6 0.8 0.5   DBili 0.0 - 0.2 mg/dL 0.2 0.2 -   ALT 0 - 50 U/L 13 14 17   AST 0 - 45 U/L 12 8 12   Tot Protein 6.8 - 8.8 g/dL 6.7(L) 7.2 7.5   Albumin 3.4 - 5.0 g/dL 3.6 3.9 3.9     Pancreas Latest Ref Rng & Units 8/21/2018 4/2/2018 9/8/2017   A1C 0 - 6.4 % - 7.0(H) 6.6(H)   A1C (external) 4.0 - 6.0 % 7.0(H) - -   Amylase 30 - 110 U/L - - -   Lipase 73 - 393 U/L - - -     Iron studies Latest Ref Rng & Units 10/19/2017 12/5/2016 12/7/2015   Iron 35 - 180 ug/dL 29(L) 37 36   Iron sat 15 - 46 % 10(L) 13(L) 11(L)   Ferritin 8 - 252 ng/mL 181 130 55     [facility] Txp Virology Latest Ref Rng & Units 12/5/2016 6/18/2015 12/2/2013   CMV IgG EU/mL - - -   CVM DNA Quant - Plasma, EDTA anticoagulant - -   CMV Quant <100 Copies/mL - - -   CMV QT Log <2.0 Log copies/mL - - -   BK Spec - - Plasma Plasma, EDTA anticoagulant   BK Res BKNEG  copies/mL - BK Virus DNA Not Detected <1000   BK Log <2.7 Log copies/mL - Not Calculated   The Real-Time quantitative BK Virus assay was developed and its performance   characteristics determined by the Infectious [facility] Diagnostic Laboratory at   the [facility] in [place], [place]. The   primers and probes for each analyte are Analyte Specific Reagents (ASRs)   manufactured by [employer].   ASRs are used in many laboratory tests necessary for standard medical care and   generally do not require U.S. Food and Drug Administration approval. The FDA   has determined that such clearance or approval is not necessary.   This test is used for clinical purposes. It should not be regarded as   investigational or for research. This laboratory is certified under the   Clinical Laboratory Improvement Amendments of 1988 (CLIA-88) as qualified to   perform high complexity clinical laboratory testing.   <3.0  The lower limit of detection for this assay is 1000 copies/mL.  Real-time TaqMan   PCR was performed using BK primers and probe for the detection of a 90 bp   portion of the  1 gene.  The performance characteristics were validated by   the [facility].   It has not been cleared or approved by the U.S. Food and Drug Administration.   EBV IgG - - - -   Hep B Core NEG - - -   Hep B Surf 0.0 - 4.9 mIU/mL - - -   HIV 1&2 NEG - - -     Recent Labs   Lab Test  09/10/15   0649  12/04/15   0650  04/07/16   0638   DOSMPA  1900 9/9/15  2000 12/3/15  19:00 04/6/16   MPACID  1.50  1.56  0.80*   MPAG  54.1  72.1  64.2     Scribe Disclosure:   I, Maricarmen Bhagat, am serving as a scribe to document services personally performed by Dr. Harrell at this visit, based upon the provider's statements to me. All documentation has been reviewed by the aforementioned provider prior to being entered into the official medical record.     Portions of this medical record were completed  by a scribe. UPON MY REVIEW AND AUTHENTICATION BY ELECTRONIC SIGNATURE, this confirms (a) I performed the applicable clinical services, and (b) the record is accurate.       Kidney/Pancreas Recipient

## 2018-12-03 NOTE — MR AVS SNAPSHOT
After Visit Summary   12/3/2018    Viv Shaw    MRN: [id number]           Patient Information     Date Of Birth          1950        Visit Information        Provider Department      12/3/2018 1:05 PM 1,  Kidney/Pancreas Recipient [facility] Nephrology        Today's Diagnoses     Kidney replaced by transplant    -  1    Aftercare following organ transplant        Immunosuppressed status (HCC)        Anemia in stage 3 chronic kidney disease (H)        Hypertension secondary to other renal disorders        Type 2 diabetes mellitus with diabetic nephropathy, with long-term current use of insulin (H)        Long-term use of immunosuppressant medication           Follow-ups after your visit        Follow-up notes from your care team     Return in about 1 year (around 12/3/2019) for Routine Visit.      Your next 10 appointments already scheduled     Dec 02, 2019  1:05 PM CST   (Arrive by 12:35 PM)   Return Kidney Transplant with  Kidney/Pancreas Recipient 1   [facility] Nephrology ([facility])    [address] [place] [address]  [address]  [facility] [address] [phone]              Who to contact     If you have questions or need follow up information about today's clinic visit or your schedule please contact [facility] NEPHROLOGY directly at [phone].  Normal or non-critical lab and imaging results will be communicated to you by MyChart, letter or phone within 4 business days after the clinic has received the results. If you do not hear from us within 7 days, please contact the clinic through Idylishart or phone. If you have a critical or abnormal lab result, we will notify you by phone as soon as possible.  Submit refill requests through [employer] or call your pharmacy and they will forward the refill request to us. Please allow 3 business days for your refill to be completed.          Additional Information About Your Visit        MyChart Information     [employer] gives you  "secure access to your electronic health record. If you see a primary care provider, you can also send messages to your care team and make appointments. If you have questions, please call your primary care clinic.  If you do not have a primary care provider, please call [phone] and they will assist you.        Care EveryWhere ID     This is your Care EveryWhere ID. This could be used by other organizations to access your [facility] medical records  [id number]        Your Vitals Were     Pulse Temperature Height Pulse Oximetry BMI (Body Mass Index)       57 97.8  F (36.6  C) (Oral) 1.626 m (5' 4\") 94% 27.29 kg/m2        Blood Pressure from Last 3 Encounters:   12/03/18 138/76   09/11/18 126/80   04/04/18 152/75    Weight from Last 3 Encounters:   12/03/18 72.1 kg (159 lb)   09/11/18 73.6 kg (162 lb 4.8 oz)   03/28/18 75.8 kg (167 lb)              Today, you had the following     No orders found for display       Primary Care Provider Office Phone # Fax #    Krishnakumari LUIS Vega [phone]       [address] [facility] [address]        Equal Access to Services     ROSLYN NUNN : Hadii aad ku hadasho Soomaali, waaxda luqadaha, qaybta kaalmada adeegyada, waxay cheyanne payan . So [facility] [phone].    ATENCIÓN: Si habla español, tiene a rosario disposición servicios gratuitos de asistencia lingüística. Llame al [phone].    We comply with applicable federal civil rights laws and [place] laws. We do not discriminate on the basis of race, color, national origin, age, disability, sex, sexual orientation, or gender identity.            Thank you!     Thank you for choosing [facility] NEPHROLOGY  for your care. Our goal is always to provide you with excellent care. Hearing back from our patients is one way we can continue to improve our services. Please take a few minutes to complete the written survey that you may receive in the mail after your visit with us. Thank you!   " "          Your Updated Medication List - Protect others around you: Learn how to safely use, store and throw away your medicines at www.disposemymeds.org.          This list is accurate as of 12/3/18  1:43 PM.  Always use your most recent med list.                   Brand Name Dispense Instructions for use Diagnosis    amLODIPine 10 MG tablet    NORVASC    90 tablet    TAKE 1 TABLET BY MOUTH EVERY DAY, FOLLOW UP DUE IN SEPT    Hypertension secondary to other renal disorders       ASPIRIN PO      Take 81 mg by mouth daily        atorvastatin 40 MG tablet    LIPITOR    45 tablet    TAKE 0.5 TABLETS (20 MG) BY MOUTH DAILY    Hyperlipidemia LDL goal <100       FLORIN CONTOUR test strip   Generic drug:  blood glucose monitoring           BD insulin syringe ultrafine 31G X 5/16\" 0.3 ML miscellaneous   Generic drug:  insulin syringe-needle U-100      USING 4-5 PER DAY ([employer] 31G/0.3ML)        chlorthalidone 25 MG tablet    HYGROTON    90 tablet    TAKE 1 TABLET (25 MG) BY MOUTH DAILY    Hypertension secondary to other renal disorders       cycloSPORINE modified 25 MG capsule     180 capsule    Take 3 capsules (75 mg) by mouth 2 times daily    Kidney transplanted       insulin glargine 100 UNIT/ML pen      Inject 12 Units Subcutaneous At Bedtime        * isosorbide mononitrate 30 MG 24 hr tablet    IMDUR    90 tablet    TAKE 0.5 TABLETS (15 MG) BY MOUTH 2 TIMES DAILY    Coronary artery disease involving native heart without angina pectoris, unspecified vessel or lesion type       * isosorbide mononitrate 30 MG 24 hr tablet    IMDUR    90 tablet    TAKE 0.5 TABLETS (15 MG) BY MOUTH 2 TIMES DAILY    Coronary artery disease involving native heart without angina pectoris, unspecified vessel or lesion type       losartan 50 MG tablet    COZAAR    90 tablet    TAKE 1 TABLET BY MOUTH EVERY DAY    Hypertension secondary to other renal disorders       metoprolol succinate ER 25 MG 24 hr tablet    TOPROL-XL    30 tablet    TAKE 1 " TABLET BY MOUTH EVERYDAY AT BEDTIME    Hypertension secondary to other renal disorders, Coronary artery disease involving native heart without angina pectoris, unspecified vessel or lesion type       mycophenolic acid 180 MG EC tablet    GENERIC EQUIVALENT    180 tablet    Take 3 tablets (540 mg) by mouth 2 times daily    Kidney replaced by transplant       NovoLOG VIAL 100 UNITS/ML vial   Generic drug:  insulin aspart      sliding scale 4-6 units tid        pantoprazole 40 MG EC tablet    PROTONIX    90 tablet    TAKE 1 TABLET (40 MG) BY MOUTH DAILY    Gastroesophageal reflux disease without esophagitis       SYNTHROID 112 MCG tablet   Generic drug:  levothyroxine      Take by mouth daily        * Notice:  This list has 2 medication(s) that are the same as other medications prescribed for you. Read the directions carefully, and ask your doctor or other care provider to review them with you.

## 2018-12-03 NOTE — PROGRESS NOTES
[facility]  Nephrology Clinic  Kidney/Pancreas Recipient  2018     Name: Viv Shaw  MRN: [id number]   Age: 68 year old  : 1950  Referring provider: Smumer Vega     CHRONIC TRANSPLANT NEPHROLOGY VISIT    Assessment and Plan:   # LDKT:    - Baseline Cr ~ 1.2-1.3; Stable   - Proteinuria: Normal   - Date of DSA last checked: 2007  Latest DSA: No   - BK Viremia: No, last checked 2015   - Kidney Tx Biopsy: Yes,  no rejection     # Immunosuppression: Cyclosporine (goal  50-75) and Mycophenolic acid 540 mg bid   - Changes: No    # Hypertension: Controlled; Goal BP: < 130/80   - Changes: No    - I recommend the patient restart low dose aspirin with her history of heart disease    # Diabetes: Controlled    # Anemia in chronic renal disease: Hgb: Stable   - Iron studies: Not checked recently    # Mineral Bone Disorder:    - Secondary renal hyperparathyroidism; PTH level is: Not checked recently   - Vitamin D; level is: Not checked recently   - Calcium; level is: Normal   - Phosphorus; level is: Normal     # Electrolytes:   - Potassium; level: Normal  - Magnesium; level: Normal  - Bicarbonate; level: Normal    # Skin Cancer:   - New lesions: one, BCC last 2018 and was surgically removed.   - Discussed sun protection and recommend regular follow up with Dermatology    # Medical Compliance: Yes    Follow-up: Data Unavailable     # Transplant History:  Etiology of kidney failure: diabetes mellitus   Tx: LDKT  Transplant: 2005 (Kidney)  Donor Type: Living  Crossmatch at time of Tx: negative  Significant changes in immunosuppression: None  Significant transplant-related complications: None    Transplant Office Phone Number: [phone]    Assessment and plan was discussed with the patient and they voiced understanding and agreement.    Chief Complaint   Follow-up    History of Present Illness:  Viv Shaw is a 68 year old female with a history of end stage kidney disease  "secondary to diabetes mellitus and status post LDKT on 12/27/2005 who presents for follow-up.     The patient was last seen on 12/04/2017 by Dr. Veloz; please see note for further details. At that time, patient was recommended to start taking iron sulfate 325 mg daily with lunch due to iron deficiency.     Today, the patient notes no complaints.  She is off of aspirin 81 mg daily but denies any complications with this medication in the past.     Recent Hospitalizations:  [x] No [] Yes    New Medical Issues: [x] No [] Yes    Decreased energy: [x] No [] Yes    Chest pain or SOB with exertion:  [x] No [] Yes    Appetite change or weight change: [x] No [] Yes    Nausea, vomiting or diarrhea:  [x] No [] Yes    Fever, sweats or chills: [x] No [] Yes    Leg swelling: [] No [x] Yes      Other medical issues:  No    Home BP: controlled     Review of Systems:   A comprehensive review of systems was obtained and negative, except as noted in the HPI or past medical history.     Active Medications:     Current Outpatient Prescriptions:      amLODIPine (NORVASC) 10 MG tablet, TAKE 1 TABLET BY MOUTH EVERY DAY, FOLLOW UP DUE IN SEPT, Disp: 90 tablet, Rfl: 0     ASPIRIN PO, Take 81 mg by mouth daily, Disp: , Rfl:      atorvastatin (LIPITOR) 40 MG tablet, TAKE 0.5 TABLETS (20 MG) BY MOUTH DAILY, Disp: 45 tablet, Rfl: 2     BASAGLAR 100 UNIT/ML injection, Inject 12 Units Subcutaneous At Bedtime, Disp: , Rfl:      FLORIN CONTOUR test strip, , Disp: , Rfl: 0     BD INSULIN SYRINGE ULTRAFINE 31G X 5/16\" 0.3 ML, USING 4-5 PER DAY ([facility] 31G/0.3ML), Disp: , Rfl: 6     chlorthalidone (HYGROTON) 25 MG tablet, TAKE 1 TABLET (25 MG) BY MOUTH DAILY, Disp: 90 tablet, Rfl: 0     GENGRAF (BRAND) 25 MG CAPSULE, Take 3 capsules (75 mg) by mouth 2 times daily, Disp: 180 capsule, Rfl: 11     isosorbide mononitrate (IMDUR) 30 MG 24 hr tablet, TAKE 0.5 TABLETS (15 MG) BY MOUTH 2 TIMES DAILY, Disp: 90 tablet, Rfl: 1     isosorbide mononitrate (IMDUR) " 30 MG 24 hr tablet, TAKE 0.5 TABLETS (15 MG) BY MOUTH 2 TIMES DAILY, Disp: 90 tablet, Rfl: 1     levothyroxine (SYNTHROID) 112 MCG tablet, Take by mouth daily, Disp: , Rfl:      losartan (COZAAR) 50 MG tablet, TAKE 1 TABLET BY MOUTH EVERY DAY, Disp: 90 tablet, Rfl: 1     metoprolol succinate (TOPROL-XL) 25 MG 24 hr tablet, TAKE 1 TABLET BY MOUTH EVERYDAY AT BEDTIME, Disp: 30 tablet, Rfl: 10     mycophenolic acid (GENERIC EQUIVALENT) 180 MG EC tablet, Take 3 tablets (540 mg) by mouth 2 times daily, Disp: 180 tablet, Rfl: 11     NOVOLOG 100 U/ML SC SOLN, sliding scale 4-6 units tid, Disp: , Rfl:      pantoprazole (PROTONIX) 40 MG EC tablet, TAKE 1 TABLET (40 MG) BY MOUTH DAILY, Disp: 90 tablet, Rfl: 1      Allergies:   Iron [ferrous sulfate]      Active Medical Problems:  Vitamin B12 deficiency  Irritable bowel syndrome  Gastroesophageal reflux disease   Status post kidney transplant  Immunosuppressed status  Hyperlipidemia   Coronary artery disease   Anemia  Hypothyroidism   Diabetes mellitus type 2  Diabetic nephropathy  Hypertension     Social History:   The patient has never smoked. Denies alcohol use.     Physical Exam:   There were no vitals taken for this visit.   Wt Readings from Last 4 Encounters:   09/11/18 73.6 kg (162 lb 4.8 oz)   03/28/18 75.8 kg (167 lb)   12/21/17 75.3 kg (166 lb 1.6 oz)   12/04/17 75.8 kg (167 lb)     GENERAL APPEARANCE: alert and no distress  HENT: mouth without ulcers or lesions  LYMPHATICS: no cervical or supraclavicular nodes  RESP: lungs clear to auscultation - no rales, rhonchi or wheezes  CV: regular rhythm, normal rate, no rub, no murmur  EDEMA: no LE edema bilaterally  ABDOMEN: soft, nondistended, nontender, bowel sounds normal  MS: extremities normal - no gross deformities noted, no evidence of inflammation in joints, no muscle tenderness  SKIN: no rash  TX KIDNEY: normal  DIALYSIS ACCESS:  None    Data:   Renal Latest Ref Rng & Units 11/29/2018 9/20/2018 8/21/2018   Na 133 -  144 mmol/L 141 141 -   Na (external) 135 - 146 mmol/L - - -   K 3.4 - 5.3 mmol/L 4.4 4.2 -   K (external) 3.5 - 5.3 mmol/L - - -   Cl 94 - 109 mmol/L 107 106 -   Cl (external) 98 - 110 mmol/L - - -   CO2 20 - 32 mmol/L 28 29 -   CO2 (external) 20 - 32 mmol/L - - -   BUN 7 - 30 mg/dL 30 26 -   BUN (external) 7 - 25 mg/dL - - -   Cr 0.52 - 1.04 mg/dL 1.12(H) 1.24(H) -   Cr (external) 0.60 - 0.93 mg/dL - - -   Glucose 70 - 99 mg/dL 154(H) 160(H) -   Glucose (external) 65 - 99 mg/dL - - 143(H)   Ca  8.5 - 10.1 mg/dL 9.0 9.2 -   Ca (external) 8.6 - 10.4 mg/dL - - -   Mg 1.6 - 2.3 mg/dL - - -     Bone Health Latest Ref Rng & Units 10/19/2017 10/1/2008 6/4/2008   Phos 2.5 - 4.5 mg/dL 2.5 3.2 4.7(H)     Heme Latest Ref Rng & Units 11/29/2018 9/20/2018 6/28/2018   WBC 4.0 - 11.0 10e9/L 3.7(L) 3.3(L) 3.8(L)   Hgb 11.7 - 15.7 g/dL 11.7 11.6(L) 11.3(L)   Plt 150 - 450 10e9/L 192 216 207     Liver Latest Ref Rng & Units 4/2/2018 4/13/2017 4/8/2017   AP 40 - 150 U/L 46 55 56   TBili 0.2 - 1.3 mg/dL 0.6 0.8 0.5   DBili 0.0 - 0.2 mg/dL 0.2 0.2 -   ALT 0 - 50 U/L 13 14 17   AST 0 - 45 U/L 12 8 12   Tot Protein 6.8 - 8.8 g/dL 6.7(L) 7.2 7.5   Albumin 3.4 - 5.0 g/dL 3.6 3.9 3.9     Pancreas Latest Ref Rng & Units 8/21/2018 4/2/2018 9/8/2017   A1C 0 - 6.4 % - 7.0(H) 6.6(H)   A1C (external) 4.0 - 6.0 % 7.0(H) - -   Amylase 30 - 110 U/L - - -   Lipase 73 - 393 U/L - - -     Iron studies Latest Ref Rng & Units 10/19/2017 12/5/2016 12/7/2015   Iron 35 - 180 ug/dL 29(L) 37 36   Iron sat 15 - 46 % 10(L) 13(L) 11(L)   Ferritin 8 - 252 ng/mL 181 130 55     [facility] Txp Virology Latest Ref Rng & Units 12/5/2016 6/18/2015 12/2/2013   CMV IgG EU/mL - - -   CVM DNA Quant - Plasma, EDTA anticoagulant - -   CMV Quant <100 Copies/mL - - -   CMV QT Log <2.0 Log copies/mL - - -   BK Spec - - Plasma Plasma, EDTA anticoagulant   BK Res BKNEG copies/mL - BK Virus DNA Not Detected <1000   BK Log <2.7 Log copies/mL - Not Calculated   The Real-Time quantitative  BK Virus assay was developed and its performance   characteristics determined by the [facility] Laboratory at   the [facility] in [place], [place]. The   primers and probes for each analyte are Analyte Specific Reagents (ASRs)   manufactured by [employer].   ASRs are used in many laboratory tests necessary for standard medical care and   generally do not require U.S. Food and Drug Administration approval. The FDA   has determined that such clearance or approval is not necessary.   This test is used for clinical purposes. It should not be regarded as   investigational or for research. This laboratory is certified under the   Clinical Laboratory Improvement Amendments of 1988 (CLIA-88) as qualified to   perform high complexity clinical laboratory testing.   <3.0  The lower limit of detection for this assay is 1000 copies/mL.  Real-time TaqMan   PCR was performed using BK primers and probe for the detection of a 90 bp   portion of the  1 gene.  The performance characteristics were validated by   the [facility].   It has not been cleared or approved by the U.S. Food and Drug Administration.   EBV IgG - - - -   Hep B Core NEG - - -   Hep B Surf 0.0 - 4.9 mIU/mL - - -   HIV 1&2 NEG - - -     Recent Labs   Lab Test  09/10/15   0649  12/04/15   0650  04/07/16   0638   DOSMPA  1900 9/9/15  2000 12/3/15  19:00 04/6/16   MPACID  1.50  1.56  0.80*   MPAG  54.1  72.1  64.2     Scribe Disclosure:   I, Maricarmen Bhagat, am serving as a scribe to document services personally performed by Dr. Harrell at this visit, based upon the provider's statements to me. All documentation has been reviewed by the aforementioned provider prior to being entered into the official medical record.     Portions of this medical record were completed by a scribe. UPON MY REVIEW AND AUTHENTICATION BY ELECTRONIC SIGNATURE, this confirms (a) I performed the applicable  clinical services, and (b) the record is accurate.

## 2018-12-04 ENCOUNTER — TRANSFERRED RECORDS (OUTPATIENT)
Dept: HEALTH INFORMATION MANAGEMENT | Facility: CLINIC | Age: 68
End: 2018-12-04

## 2018-12-14 DIAGNOSIS — I15.1 HYPERTENSION SECONDARY TO OTHER RENAL DISORDERS: ICD-10-CM

## 2018-12-14 NOTE — TELEPHONE ENCOUNTER
"Requested Prescriptions   Pending Prescriptions Disp Refills     amLODIPine (NORVASC) 10 MG tablet  Last Written Prescription Date:  09/20/18  Last Fill Quantity: 90,  # refills: 0   Last office visit: 9/11/2018 with prescribing provider:  09/11/18   Future Office Visit:     90 tablet 0    Calcium Channel Blockers Protocol  Failed - 12/14/2018  7:39 AM       Failed - Normal serum creatinine on file in past 12 months    Recent Labs   Lab Test 11/29/18  0546   CR 1.12*            Passed - Blood pressure under 140/90 in past 12 months    BP Readings from Last 3 Encounters:   12/03/18 138/76   09/11/18 126/80   04/04/18 152/75                Passed - Recent (12 mo) or future (30 days) visit within the authorizing provider's specialty    Patient had office visit in the last 12 months or has a visit in the next 30 days with authorizing provider or within the authorizing provider's specialty.  See \"Patient Info\" tab in inbasket, or \"Choose Columns\" in Meds & Orders section of the refill encounter.             Passed - Patient is age 18 or older       Passed - No active pregnancy on record       Passed - No positive pregnancy test in past 12 months          "

## 2018-12-17 RX ORDER — AMLODIPINE BESYLATE 10 MG/1
10 TABLET ORAL DAILY
Qty: 90 TABLET | Refills: 1 | Status: SHIPPED | OUTPATIENT
Start: 2018-12-17 | End: 2019-04-22

## 2018-12-20 ENCOUNTER — TRANSFERRED RECORDS (OUTPATIENT)
Dept: HEALTH INFORMATION MANAGEMENT | Facility: CLINIC | Age: 68
End: 2018-12-20

## 2018-12-20 LAB
GLUCOSE SERPL-MCNC: 69 MG/DL (ref 65–99)
HBA1C MFR BLD: 7.2 % (ref 4–6)

## 2019-01-20 DIAGNOSIS — E78.5 HYPERLIPIDEMIA LDL GOAL <100: ICD-10-CM

## 2019-01-21 NOTE — TELEPHONE ENCOUNTER
"Requested Prescriptions   Pending Prescriptions Disp Refills     atorvastatin (LIPITOR) 40 MG tablet [Pharmacy Med Name: ATORVASTATIN 40 MG TABLET] 45 tablet 0    Last Written Prescription Date:  04/23/2018  Last Fill Quantity: 45,  # refills: 2   Last office visit: 9/11/2018 with prescribing provider:     Future Office Visit:   Sig: TAKE 0.5 TABLETS (20 MG) BY MOUTH DAILY    Statins Protocol Passed - 1/20/2019  9:06 AM       Passed - LDL on file in past 12 months    Recent Labs   Lab Test 04/02/18  0758   LDL 90            Passed - No abnormal creatine kinase in past 12 months    No lab results found.            Passed - Recent (12 mo) or future (30 days) visit within the authorizing provider's specialty    Patient had office visit in the last 12 months or has a visit in the next 30 days with authorizing provider or within the authorizing provider's specialty.  See \"Patient Info\" tab in inbasket, or \"Choose Columns\" in Meds & Orders section of the refill encounter.             Passed - Medication is active on med list       Passed - Patient is age 18 or older       Passed - No active pregnancy on record       Passed - No positive pregnancy test in past 12 months        "

## 2019-01-22 RX ORDER — ATORVASTATIN CALCIUM 40 MG/1
TABLET, FILM COATED ORAL
Qty: 45 TABLET | Refills: 1 | Status: SHIPPED | OUTPATIENT
Start: 2019-01-22 | End: 2019-04-22

## 2019-01-22 NOTE — TELEPHONE ENCOUNTER
Routing refill request to provider for review/approval because:  Drug interaction warning      RADHA AragonN, RN  [employer] [facility]

## 2019-01-31 DIAGNOSIS — Z94.0 KIDNEY REPLACED BY TRANSPLANT: Primary | ICD-10-CM

## 2019-01-31 RX ORDER — CYCLOSPORINE 25 MG/1
75 CAPSULE, LIQUID FILLED ORAL 2 TIMES DAILY
Qty: 180 CAPSULE | Refills: 11 | Status: SHIPPED | OUTPATIENT
Start: 2019-01-31 | End: 2019-09-20

## 2019-01-31 NOTE — TELEPHONE ENCOUNTER
Call returned to patient. Patient local pharmacy is unable to fill Gengraf d\t the manufacture being out of stock. Patient will use generic cyclosporine. Thinks that when she had her transplant 13 years ago she was switched to brand d\t increased n\v. Patient v\u to complete weekly labs x2 beginning one week after starting the generic and report any abnormal s\s related to the generic. Order sent

## 2019-01-31 NOTE — TELEPHONE ENCOUNTER
Patient Call: General  Route to LPN    Reason for call: patient called to update the coordinator that [facility] in [place], [place] will not has enough Gengraf to fill the patient's order and can not get an order from the manufacture.  Patient has about 3 weeks left [facility] on [address] -[phone]    Call back needed? Yes    Return Call Needed  Same as documented in contacts section  When to return call?: Greater than one day: Route standard priority

## 2019-01-31 NOTE — LETTER
PHYSICIAN ORDERS      DATE & TIME ISSUED: 2019 2:25 PM  PATIENT NAME: Viv Shaw   : 1950     [facility] MR# [if applicable]: [id number]     DIAGNOSIS:  Kidney transplant   ICD-10 CODE: Z94.0      Please complete the following labs weekly x2 one week after beginning generic cyclosporine.     Cyclosporine level  CBC  BMP    Any questions please call: [phone] option #5    Please fax results to [phone].

## 2019-03-29 ENCOUNTER — TELEPHONE (OUTPATIENT)
Dept: TRANSPLANT | Facility: CLINIC | Age: 69
End: 2019-03-29

## 2019-03-29 DIAGNOSIS — Z48.298 AFTERCARE FOLLOWING ORGAN TRANSPLANT: Primary | ICD-10-CM

## 2019-03-29 DIAGNOSIS — Z79.899 ENCOUNTER FOR LONG-TERM CURRENT USE OF MEDICATION: ICD-10-CM

## 2019-03-29 DIAGNOSIS — Z94.0 KIDNEY REPLACED BY TRANSPLANT: ICD-10-CM

## 2019-03-29 NOTE — TELEPHONE ENCOUNTER
Patient Call: Transplant Lab/Orders  Route to LPN    Reason for Call: Annual lab reorder  Callback needed? No   Viv is scheduled for lab appt [facility] 04/05/19 at 0600 Please add lab order in epic.

## 2019-03-31 DIAGNOSIS — K21.9 GASTROESOPHAGEAL REFLUX DISEASE WITHOUT ESOPHAGITIS: ICD-10-CM

## 2019-04-02 RX ORDER — PANTOPRAZOLE SODIUM 40 MG/1
TABLET, DELAYED RELEASE ORAL
Qty: 90 TABLET | Refills: 1 | Status: SHIPPED | OUTPATIENT
Start: 2019-04-02 | End: 2019-09-23

## 2019-04-02 NOTE — TELEPHONE ENCOUNTER
"Requested Prescriptions   Pending Prescriptions Disp Refills     pantoprazole (PROTONIX) 40 MG EC tablet [Pharmacy Med Name: PANTOPRAZOLE SOD DR 40 MG TAB]  Last Written Prescription Date:  9/30/2018  Last Fill Quantity: 90,  # refills: 1   Last office visit: 9/11/2018 with prescribing provider:     Future Office Visit:   90 tablet 0     Sig: TAKE 1 TABLET BY MOUTH EVERY DAY    PPI Protocol Passed - 3/31/2019 11:34 AM       Passed - Not on Clopidogrel (unless Pantoprazole ordered)       Passed - No diagnosis of osteoporosis on record       Passed - Recent (12 mo) or future (30 days) visit within the authorizing provider's specialty    Patient had office visit in the last 12 months or has a visit in the next 30 days with authorizing provider or within the authorizing provider's specialty.  See \"Patient Info\" tab in inbasket, or \"Choose Columns\" in Meds & Orders section of the refill encounter.             Passed - Medication is active on med list       Passed - Patient is age 18 or older       Passed - No active pregnacy on record       Passed - No positive pregnancy test in past 12 months        "

## 2019-04-04 DIAGNOSIS — Z94.0 KIDNEY REPLACED BY TRANSPLANT: ICD-10-CM

## 2019-04-04 DIAGNOSIS — Z48.298 AFTERCARE FOLLOWING ORGAN TRANSPLANT: ICD-10-CM

## 2019-04-04 DIAGNOSIS — Z79.899 ENCOUNTER FOR LONG-TERM CURRENT USE OF MEDICATION: ICD-10-CM

## 2019-04-04 LAB
ANION GAP SERPL CALCULATED.3IONS-SCNC: 4 MMOL/L (ref 3–14)
BUN SERPL-MCNC: 36 MG/DL (ref 7–30)
CALCIUM SERPL-MCNC: 9.7 MG/DL (ref 8.5–10.1)
CHLORIDE SERPL-SCNC: 106 MMOL/L (ref 94–109)
CO2 SERPL-SCNC: 29 MMOL/L (ref 20–32)
CREAT SERPL-MCNC: 1.46 MG/DL (ref 0.52–1.04)
CREAT UR-MCNC: 149 MG/DL
CYCLOSPORINE BLD LC/MS/MS-MCNC: 85 UG/L (ref 50–400)
ERYTHROCYTE [DISTWIDTH] IN BLOOD BY AUTOMATED COUNT: 12.7 % (ref 10–15)
GFR SERPL CREATININE-BSD FRML MDRD: 36 ML/MIN/{1.73_M2}
GLUCOSE SERPL-MCNC: 149 MG/DL (ref 70–99)
HCT VFR BLD AUTO: 36.8 % (ref 35–47)
HGB BLD-MCNC: 11.2 G/DL (ref 11.7–15.7)
MCH RBC QN AUTO: 28.2 PG (ref 26.5–33)
MCHC RBC AUTO-ENTMCNC: 30.4 G/DL (ref 31.5–36.5)
MCV RBC AUTO: 93 FL (ref 78–100)
PLATELET # BLD AUTO: 218 10E9/L (ref 150–450)
POTASSIUM SERPL-SCNC: 4.6 MMOL/L (ref 3.4–5.3)
PROT UR-MCNC: 0.11 G/L
PROT/CREAT 24H UR: 0.07 G/G CR (ref 0–0.2)
RBC # BLD AUTO: 3.97 10E12/L (ref 3.8–5.2)
SODIUM SERPL-SCNC: 139 MMOL/L (ref 133–144)
TME LAST DOSE: 1800 H
WBC # BLD AUTO: 4.4 10E9/L (ref 4–11)

## 2019-04-04 PROCEDURE — 80158 DRUG ASSAY CYCLOSPORINE: CPT | Performed by: INTERNAL MEDICINE

## 2019-04-08 ENCOUNTER — TELEPHONE (OUTPATIENT)
Dept: TRANSPLANT | Facility: CLINIC | Age: 69
End: 2019-04-08

## 2019-04-08 DIAGNOSIS — R79.89 ELEVATED SERUM CREATININE: ICD-10-CM

## 2019-04-08 DIAGNOSIS — Z48.298 AFTERCARE FOLLOWING ORGAN TRANSPLANT: ICD-10-CM

## 2019-04-08 DIAGNOSIS — Z94.0 KIDNEY TRANSPLANTED: Primary | ICD-10-CM

## 2019-04-08 NOTE — TELEPHONE ENCOUNTER
Issue:    Cr above baseline at 1.46      Plan:    Viv Shaw does not report any acute cuase of creatinine increase. She denies N/V/D or acute illness. She denies s/s of UTI, missed IS or new meds. She has not been measuring fluid intake. RNCC encouraged her to monitor fluid intake, make sure she is drinking 2-3L of water daily and recheck bmp next week. Will place order.

## 2019-04-16 ENCOUNTER — OFFICE VISIT (OUTPATIENT)
Dept: PHARMACY | Facility: CLINIC | Age: 69
End: 2019-04-16
Payer: COMMERCIAL

## 2019-04-16 VITALS
DIASTOLIC BLOOD PRESSURE: 68 MMHG | HEART RATE: 66 BPM | WEIGHT: 162.9 LBS | BODY MASS INDEX: 27.96 KG/M2 | SYSTOLIC BLOOD PRESSURE: 142 MMHG

## 2019-04-16 DIAGNOSIS — I15.1 HYPERTENSION SECONDARY TO OTHER RENAL DISORDERS: ICD-10-CM

## 2019-04-16 DIAGNOSIS — E11.21 TYPE 2 DIABETES MELLITUS WITH DIABETIC NEPHROPATHY, WITH LONG-TERM CURRENT USE OF INSULIN (H): Primary | ICD-10-CM

## 2019-04-16 DIAGNOSIS — Z94.0 KIDNEY REPLACED BY TRANSPLANT: ICD-10-CM

## 2019-04-16 DIAGNOSIS — R52 PAIN: ICD-10-CM

## 2019-04-16 DIAGNOSIS — M85.80 OSTEOPENIA, UNSPECIFIED LOCATION: ICD-10-CM

## 2019-04-16 DIAGNOSIS — I25.10 CORONARY ARTERY DISEASE INVOLVING NATIVE HEART WITHOUT ANGINA PECTORIS, UNSPECIFIED VESSEL OR LESION TYPE: ICD-10-CM

## 2019-04-16 DIAGNOSIS — E03.8 OTHER SPECIFIED HYPOTHYROIDISM: ICD-10-CM

## 2019-04-16 DIAGNOSIS — K21.9 GASTROESOPHAGEAL REFLUX DISEASE WITHOUT ESOPHAGITIS: ICD-10-CM

## 2019-04-16 DIAGNOSIS — E78.5 HYPERLIPIDEMIA LDL GOAL <100: ICD-10-CM

## 2019-04-16 DIAGNOSIS — Z79.4 TYPE 2 DIABETES MELLITUS WITH DIABETIC NEPHROPATHY, WITH LONG-TERM CURRENT USE OF INSULIN (H): Primary | ICD-10-CM

## 2019-04-16 PROCEDURE — 99607 MTMS BY PHARM ADDL 15 MIN: CPT | Performed by: PHARMACIST

## 2019-04-16 PROCEDURE — 99605 MTMS BY PHARM NP 15 MIN: CPT | Performed by: PHARMACIST

## 2019-04-16 RX ORDER — CALCIUM CARBONATE 500 MG/1
1-2 TABLET, CHEWABLE ORAL 2 TIMES DAILY PRN
COMMUNITY
End: 2022-12-05

## 2019-04-16 NOTE — PROGRESS NOTES
SUBJECTIVE/OBJECTIVE:                Viv Shaw is a 68 year old female coming in for a follow-up visit for Medication Therapy Management.  She was referred to me from [facility]. Joined with , Johnny today. First of 2019.    Chief Complaint: Follow up from our visit on 12/21/2017.  More aches wondering if this is due to statins. Also wondering if she needs have another bone scan.     Tobacco: No tobacco use  Alcohol: not currently using    Medication Adherence/Access:  Patient uses pill box(es).  helps with medication set up.  Patient takes medications 2 time(s) per day.   Per patient, misses medication 0 times per week.   Medication barriers: insulin is expensive ($190, 90 day supply).   The patient fills medications at [facility]: NO, fills medications at [facility].    Hypertension/CAD: Current medications include amlodipine 10 mg daily, chlorthalidone 25 mg daily, losartan 50 mg daily in the morning, metoprolol XL 25 mg daily QHS. Also taking aspirin 81 mg daily and Imdur 15 mg twice daily (12 hours apart).  and pt are wondering if she needs to be on this medication. She denies ever having chest pain. She said they put her on Imdur because she never used the NTG. The cardiologist started this and then discharged her so she no longer follows with a cardiologist.  Pt and  are also wondering if she needs to be on aspirin as they heard that not everyone should be on this.  Patient does self-monitor BP. Home BP monitoring in range of 120-130's systolic over 70-80's diastolic.  Patient reports no current medication side effects. Use to have orthostatic hypotension but no longer has an issues.     BP Readings from Last 3 Encounters:   04/16/19 142/68   12/03/18 138/76   09/11/18 126/80       Kidney Tx: Taking Gengraf 75 mg BID and mycophenolic acid 540 mg BID. When she was in [place], they were worried about Gengraf supply as the pharmacy in [place] was out of stock. Pt never had to switch to the  generic formulation because they luckily had enough of the Gengraf. S/p 2005.  Followed by nephrology, Dr. Veloz.     No concerns with side effects at this time.    Scr trending up and has an appointment next Thursday to recheck levels. Scr on 11/2018 was 1.12.  Signs of infections: none.      !RENAL/LYTES Latest Ref Rng & Units 4/4/2019    - 144 mmol/L 139   POTASSIUM 3.4 - 5.3 mmol/L 4.6   CHLORIDE 94 - 109 mmol/L 106   CARBON DIOXIDE 20 - 32 mmol/L 29   BUN 7 - 30 mg/dL 36 (H)   CREATININE 0.52 - 1.04 mg/dL 1.46 (H)   Creatinine 0.60 - 1.40 mg/dL    GFR ESTIMATE, IF BLACK >60 mL/min/1.73:m2 42 (L)   GFR ESTIMATE >60 mL/min/1.73:m2 36 (L)   GLUCOSE 70 - 99 mg/dL 149 (H)   ANION GAP 3 - 14 mmol/L 4   CALCIUM 8.5 - 10.1 mg/dL 9.7       Diabetes:  Pt currently taking Lantus 12 units qhs, Novolog on average 7-8 units with meals. Pt's  is using an adjustment scale and carb coverage provided by the Endocrinologist.  Pt is not experiencing side effects. They feel her sugars have been well controlled.   SMBG: CGM = dexcom G5. They are hoping to get the G6 soon.    Patient is experiencing hypoglycemia. Frequency of hypoglycemia? 0-2 times per 2 weeks. Symptoms of low blood sugar?  just notices a look on her face. Now has Dexcom which will alert him when she is getting low.   Recent symptoms of high blood sugar? none  Eye exam: up to date  Foot exam: up to date  ACEi/ARB: Yes: losartan.   Urine Albumin:   Lab Results   Component Value Date    UMALCR 7.98 04/13/2017      Aspirin: Taking 81mg daily and denies side effects.      Lab Results   Component Value Date    A1C 7.2 12/20/2018    A1C 7.0 04/02/2018    A1C 6.6 09/08/2017    A1C 6.8 06/19/2017    A1C 7.4 04/10/2017         Hypothyroidism: Patient is taking levothyroxine 112 mcg daily. Patient is having the following symptoms: none.   TSH   Date Value Ref Range Status   04/02/2018 0.08 (L) 0.40 - 4.00 mU/L Final     T4 Free   Date Value Ref Range  Status   04/02/2018 1.41 0.76 - 1.46 ng/dL Final         GERD: Current medications include: Protonix (pantoprazole) 40 mg once daily, and TUMS PRN. Pt c/o heartburn.  Patient feels that current regimen is effective.    Osteopenia: Current therapy includes: TUMS PRN (inconsistent, sometimes 0-4 tablets a day). Was on Fosamax for > 10 years. Started drug holiday around end of 12/2017.  Pt is getting the following sources of dietary calcium: minimal. Pt does not eat cheese, yogurt, milk or leafy greens.  Last vitamin D level: n/a  DEXA History: 3/18/2015      Hyperlipidemia/Pain: Current therapy includes atorvastatin 20 mg once daily.  Pt reports the following side effects: aches and pain but she is not sure if this is caused by statin therapy. The aches and pains are generalized in her shoulder, back and legs. She doesn't feel the pains are impacting quality of life, she just notices it more. She is using heating packs on her shoulders which seem to help. She also sees a chiropractor and feels that helps a lot.     Recent Labs   Lab Test 04/02/18  0758 04/13/17  0822  03/11/15  0938 03/14/14  0820   CHOL 172 181   < > 173 168   HDL 53 57   < > 55 51   LDL 90 95   < > 91 93   TRIG 143 143   < > 137 123   CHOLHDLRATIO  --   --   --  3.1 3.3    < > = values in this interval not displayed.       Today's Vitals: /68   Pulse 66   Wt 162 lb 14.4 oz (73.9 kg)   BMI 27.96 kg/m      Last Comprehensive Metabolic Panel:  Sodium   Date Value Ref Range Status   04/04/2019 139 133 - 144 mmol/L Final     Potassium   Date Value Ref Range Status   04/04/2019 4.6 3.4 - 5.3 mmol/L Final     Chloride   Date Value Ref Range Status   04/04/2019 106 94 - 109 mmol/L Final     Carbon Dioxide   Date Value Ref Range Status   04/04/2019 29 20 - 32 mmol/L Final     Anion Gap   Date Value Ref Range Status   04/04/2019 4 3 - 14 mmol/L Final     Glucose   Date Value Ref Range Status   04/04/2019 149 (H) 70 - 99 mg/dL Final     Urea Nitrogen    Date Value Ref Range Status   04/04/2019 36 (H) 7 - 30 mg/dL Final     Creatinine   Date Value Ref Range Status   04/04/2019 1.46 (H) 0.52 - 1.04 mg/dL Final     GFR Estimate   Date Value Ref Range Status   04/04/2019 36 (L) >60 mL/min/[1.73_m2] Final     Comment:     Non  GFR Calc  Starting 12/18/2018, serum creatinine based estimated GFR (eGFR) will be   calculated using the Chronic Kidney Disease Epidemiology Collaboration   (CKD-EPI) equation.       Calcium   Date Value Ref Range Status   04/04/2019 9.7 8.5 - 10.1 mg/dL Final     Estimated Creatinine Clearance: 35.3 mL/min (A) (based on SCr of 1.46 mg/dL (H)).        ASSESSMENT:              Current medications were reviewed today as discussed above.      Medication Adherence: excellent, no issues identified    Hypertension/CAD: Needs improvement. Pt may benefit from taper of Imdur or trial off. Pt may no longer need agent for angina relief. Pt would benefit from having NTG for PRN use.     Kidney Tx: Needs improvement. Pt would benefit from close monitoring with nephrology to ensure SCr is stable.     Diabetes:  Stable. Pt is meeting A1c goal of < 7.5%.     Hypothyroidism: Stable.    GERD: stable.     Osteopenia: Needs improvement. Pt is not meeting adequate calcium or vitamin D intake. Recommend pt start supplementation.     Hyperlipidemia/Pain: Needs improvement. Literature supports correcting low vitamin D may improve statin associated myopathy. Recommend pt try start vitamin D supplementation. If continues to complain of aches, may consider vitamin D level and/or co-enzyme q10 supplementation.       PLAN:                  1. Start calcium-vitamin d 600-400 mg twice daily. DEXA 2-3 years from holiday (which would be 12/2019 at the earliest).    2. If no improvement in muscle aches, may try OTC co-enzyme q10.    3. Pt to discuss with Dr. Vega regarding changing Imdur therapy. Dr. Vega also to consider starting SL nitroglycerin for  PRN.    Future considerations:  - recheck vitamin b 12 to ensure no deficiency as this may be causing neuropathic pain.    I spent 60 minutes with this patient today. I offer these suggestions for consideration by cc'd chart. A copy of the visit note was provided to the patient's primary care provider.     Will follow up in 1 year.    I concur with the note as dictated above which reflects our joint assessment and plan.   Danielle Aguayo PharmD    The patient was given a summary of these recommendations as an after visit summary.      Mary Cruz, PharmD  Pharmaceutical Care Resident  Pager: [phone]

## 2019-04-16 NOTE — PATIENT INSTRUCTIONS
Recommendations from today's MTM visit:                                                    MTM (medication therapy management) is a service provided by a clinical pharmacist designed to help you get the most of out of your medicines.   Today we reviewed what your medicines are for, how to know if they are working, that your medicines are safe and how to make your medicine regimen as easy as possible.     1. Please discuss with Dr. Vega about either decreasing the dose or stopping the Imdur for chest pain.    2. Please start calcium 600 mg-vitamin D 400 units 1 tablets twice daily.     3. The vitamin D may help with muscle aches and pain. If that does not seem to improve you could try a supplement called Co-enzyme Q10 100 mg daily which may help with reducing aches from statin therapy.     4. You due for a bone scan towards the end of this year.     Next MTM visit: 1 year    To schedule another MTM appointment, please call the clinic directly or you may call the MTM scheduling line at [phone] or toll-free at [phone].     My Clinical Pharmacist's contact information:                                                      It was a pleasure talking with you today!  Please feel free to contact me with any questions or concerns you have.      Maribel SesayD  Pharmaceutical Care Resident  Pager: [phone]    Maribel Harris  [phone] (phone)  [phone] (pager)  Medication Therapy Management Pharmacist    You may receive a survey about the MTM services you received by email and/or US Mail.  I would appreciate your feedback to help me serve you better in the future. Your comments will be anonymous.

## 2019-04-18 DIAGNOSIS — Z94.0 KIDNEY TRANSPLANTED: ICD-10-CM

## 2019-04-18 DIAGNOSIS — Z48.298 AFTERCARE FOLLOWING ORGAN TRANSPLANT: ICD-10-CM

## 2019-04-18 DIAGNOSIS — R79.89 ELEVATED SERUM CREATININE: ICD-10-CM

## 2019-04-18 LAB
ANION GAP SERPL CALCULATED.3IONS-SCNC: 5 MMOL/L (ref 3–14)
BUN SERPL-MCNC: 24 MG/DL (ref 7–30)
CALCIUM SERPL-MCNC: 9 MG/DL (ref 8.5–10.1)
CHLORIDE SERPL-SCNC: 107 MMOL/L (ref 94–109)
CO2 SERPL-SCNC: 30 MMOL/L (ref 20–32)
CREAT SERPL-MCNC: 1.25 MG/DL (ref 0.52–1.04)
GFR SERPL CREATININE-BSD FRML MDRD: 43 ML/MIN/{1.73_M2}
GLUCOSE SERPL-MCNC: 128 MG/DL (ref 70–99)
POTASSIUM SERPL-SCNC: 4 MMOL/L (ref 3.4–5.3)
SODIUM SERPL-SCNC: 142 MMOL/L (ref 133–144)

## 2019-04-22 ENCOUNTER — OFFICE VISIT (OUTPATIENT)
Dept: INTERNAL MEDICINE | Facility: CLINIC | Age: 69
End: 2019-04-22
Payer: MEDICARE

## 2019-04-22 VITALS
SYSTOLIC BLOOD PRESSURE: 134 MMHG | OXYGEN SATURATION: 98 % | RESPIRATION RATE: 17 BRPM | TEMPERATURE: 98.3 F | DIASTOLIC BLOOD PRESSURE: 72 MMHG | BODY MASS INDEX: 27.49 KG/M2 | WEIGHT: 161 LBS | HEART RATE: 76 BPM | HEIGHT: 64 IN

## 2019-04-22 DIAGNOSIS — M85.80 OSTEOPENIA, UNSPECIFIED LOCATION: ICD-10-CM

## 2019-04-22 DIAGNOSIS — E78.5 HYPERLIPIDEMIA LDL GOAL <100: ICD-10-CM

## 2019-04-22 DIAGNOSIS — I25.10 CORONARY ARTERY DISEASE INVOLVING NATIVE HEART WITHOUT ANGINA PECTORIS, UNSPECIFIED VESSEL OR LESION TYPE: ICD-10-CM

## 2019-04-22 DIAGNOSIS — I15.1 HYPERTENSION SECONDARY TO OTHER RENAL DISORDERS: ICD-10-CM

## 2019-04-22 DIAGNOSIS — E11.21 TYPE 2 DIABETES MELLITUS WITH DIABETIC NEPHROPATHY, WITH LONG-TERM CURRENT USE OF INSULIN (H): Primary | ICD-10-CM

## 2019-04-22 DIAGNOSIS — Z78.0 ASYMPTOMATIC POSTMENOPAUSAL STATUS: ICD-10-CM

## 2019-04-22 DIAGNOSIS — E03.8 OTHER SPECIFIED HYPOTHYROIDISM: ICD-10-CM

## 2019-04-22 DIAGNOSIS — Z79.4 TYPE 2 DIABETES MELLITUS WITH DIABETIC NEPHROPATHY, WITH LONG-TERM CURRENT USE OF INSULIN (H): Primary | ICD-10-CM

## 2019-04-22 DIAGNOSIS — D84.9 IMMUNOSUPPRESSED STATUS (H): ICD-10-CM

## 2019-04-22 LAB
ALBUMIN SERPL-MCNC: 4 G/DL (ref 3.4–5)
ALP SERPL-CCNC: 65 U/L (ref 40–150)
ALT SERPL W P-5'-P-CCNC: 19 U/L (ref 0–50)
AST SERPL W P-5'-P-CCNC: 10 U/L (ref 0–45)
BILIRUB DIRECT SERPL-MCNC: 0.2 MG/DL (ref 0–0.2)
BILIRUB SERPL-MCNC: 0.8 MG/DL (ref 0.2–1.3)
CHOLEST SERPL-MCNC: 203 MG/DL
HBA1C MFR BLD: 6.7 % (ref 0–5.6)
HDLC SERPL-MCNC: 60 MG/DL
LDLC SERPL CALC-MCNC: 115 MG/DL
NONHDLC SERPL-MCNC: 143 MG/DL
PROT SERPL-MCNC: 7.5 G/DL (ref 6.8–8.8)
TRIGL SERPL-MCNC: 142 MG/DL
TSH SERPL DL<=0.005 MIU/L-ACNC: 0.45 MU/L (ref 0.4–4)

## 2019-04-22 PROCEDURE — 84443 ASSAY THYROID STIM HORMONE: CPT | Performed by: INTERNAL MEDICINE

## 2019-04-22 PROCEDURE — 99214 OFFICE O/P EST MOD 30 MIN: CPT | Performed by: INTERNAL MEDICINE

## 2019-04-22 PROCEDURE — 80061 LIPID PANEL: CPT | Performed by: INTERNAL MEDICINE

## 2019-04-22 PROCEDURE — 80076 HEPATIC FUNCTION PANEL: CPT | Performed by: INTERNAL MEDICINE

## 2019-04-22 PROCEDURE — 83036 HEMOGLOBIN GLYCOSYLATED A1C: CPT | Performed by: INTERNAL MEDICINE

## 2019-04-22 PROCEDURE — 36415 COLL VENOUS BLD VENIPUNCTURE: CPT | Performed by: INTERNAL MEDICINE

## 2019-04-22 RX ORDER — AMLODIPINE BESYLATE 10 MG/1
10 TABLET ORAL DAILY
Qty: 90 TABLET | Refills: 1 | Status: SHIPPED | OUTPATIENT
Start: 2019-04-22 | End: 2019-06-02

## 2019-04-22 RX ORDER — LOSARTAN POTASSIUM 50 MG/1
50 TABLET ORAL DAILY
Qty: 90 TABLET | Refills: 1 | Status: SHIPPED | OUTPATIENT
Start: 2019-04-22 | End: 2019-10-30

## 2019-04-22 RX ORDER — ATORVASTATIN CALCIUM 40 MG/1
20 TABLET, FILM COATED ORAL DAILY
Qty: 45 TABLET | Refills: 1 | Status: SHIPPED | OUTPATIENT
Start: 2019-04-22 | End: 2019-06-21

## 2019-04-22 RX ORDER — METOPROLOL SUCCINATE 25 MG/1
TABLET, EXTENDED RELEASE ORAL
Qty: 90 TABLET | Refills: 1 | Status: SHIPPED | OUTPATIENT
Start: 2019-04-22 | End: 2019-10-30

## 2019-04-22 RX ORDER — LEVOTHYROXINE SODIUM 112 UG/1
TABLET ORAL DAILY
Status: CANCELLED | OUTPATIENT
Start: 2019-04-22

## 2019-04-22 ASSESSMENT — MIFFLIN-ST. JEOR: SCORE: 1235.29

## 2019-04-22 NOTE — NURSING NOTE
"/72   Pulse 76   Temp 98.3  F (36.8  C) (Oral)   Resp 17   Ht 1.626 m (5' 4\")   Wt 73 kg (161 lb)   SpO2 98%   BMI 27.64 kg/m    Patient in for follow up on HTN, lipids, DM2 and thyroid.  Socorro Perez, COLLIN    "

## 2019-04-22 NOTE — PROGRESS NOTES
SUBJECTIVE:   Viv Shaw is a 68 year old female who presents to clinic today for the following   health issues:      Diabetes Follow-up      Patient is checking blood sugars: Has a Dexcom    Diabetic concerns: None     Symptoms of hypoglycemia (low blood sugar): none     Paresthesias (numbness or burning in feet) or sores: No     Date of last diabetic eye exam: 2018       Hyperlipidemia Follow-Up      Rate your low fat/cholesterol diet?: good    Taking statin?  Yes, no muscle aches from statin  Other lipid medications/supplements?:  None      Hypertension Follow-up      Outpatient blood pressures are being checked at home and normal in 130s.    Low Salt Diet: no added salt    BP Readings from Last 2 Encounters:   04/22/19 150/72   04/16/19 142/68     Hemoglobin A1C (%)   Date Value   12/20/2018 7.2 (H)   04/02/2018 7.0 (H)     LDL Cholesterol Calculated (mg/dL)   Date Value   04/02/2018 90   04/13/2017 95     Hypothyroidism Follow-up      Since last visit, patient describes the following symptoms: Weight stable, no hair loss, no skin changes, no constipation, no loose stools       Vascular Disease Follow-up:  Coronary Artery Disease (CAD)      Chest pain or pressure, left side neck or arm pain: No    Shortness of breath/increased sweats/nausea with exertion: No    Pain in calves walking 1-2 blocks: No    Worsened or new symptoms since last visit: No    Nitroglycerin use: no    Daily aspirin use: Yes      Immunosuppressed status; follows up with Transplant clinic       Amount of exercise or physical activity: 4-5 days/week for an average of 15-30 minutes    Problems taking medications regularly: No    Medication side effects: none    Diet: regular (no restrictions)         Reviewed  and updated as needed this visit by clinical staff  Tobacco  Allergies  Meds  Med Hx  Surg Hx  Fam Hx  Soc Hx        Reviewed and updated as needed this visit by Provider         Patient Active Problem List   Diagnosis      Other vitamin B12 deficiency anemia     Irritable bowel syndrome     GERD (gastroesophageal reflux disease)     Advanced directives, counseling/discussion     Kidney replaced by transplant     Immunosuppressed status (HCC)     Hyperlipidemia LDL goal <100     Long-term use of immunosuppressant medication     CAD S/P percutaneous coronary angioplasty     Anemia in chronic kidney disease     Other specified hypothyroidism     Coronary artery disease involving native heart without angina pectoris, unspecified vessel or lesion type     Type 2 diabetes mellitus with diabetic nephropathy, with long-term current use of insulin (H)     Hypertension secondary to other renal disorders     Aftercare following organ transplant     Ventral hernia without obstruction or gangrene     S/P hernia repair     Opacity of lung on imaging study     Hernia, incisional     Past Surgical History:   Procedure Laterality Date     APPENDECTOMY NOS       ARTHROSCOPY SHOULDER ROTATOR CUFF REPAIR Left      COLONOSCOPY N/A 6/7/2016    Procedure: COLONOSCOPY;  Surgeon: Rashard Snider MD;  Location: [facility]     Coronary angioplasty with stent  2005     HYSTERECTOMY       Kidney Transplant NOS Right      LAPAROSCOPIC CHOLECYSTECTOMY       NOSE SURGERY  2013     OPEN SEPARATION COMPONENT HERNIORRHAPHY N/A 10/16/2017    Procedure: OPEN SEPARATION COMPONENT HERNIORRHAPHY;;  Surgeon: CARL Lott MD;  Location: [facility]     RECONSTRUCT ABDOMINAL WALL N/A 10/16/2017    Procedure: RECONSTRUCT ABDOMINAL WALL;  Exploratory Laparotomy, Lysis of Adhesions, Abdominal Wall reconstruction, Inlay Xen AB mesh, Mitek Suture [place] and Umbilical Hernia Repair.;  Surgeon: CARL Lott MD;  Location: [facility] OR     REMOVE CATARACT INTRACAP,INSERT LENS Right      TONSILLECTOMY         Social History     Tobacco Use     Smoking status: Never Smoker     Smokeless tobacco: Never Used   Substance Use Topics     Alcohol use: No     Family History   Problem  "Relation Age of Onset     Respiratory Mother          age 71     Cardiovascular Father          age 75 hyertension     Arthritis Sister         living, healthy     Family History Negative Brother          age 44     Colon Cancer No family hx of          Current Outpatient Medications   Medication Sig Dispense Refill     amLODIPine (NORVASC) 10 MG tablet Take 1 tablet (10 mg) by mouth daily 90 tablet 1     ASPIRIN PO Take 81 mg by mouth daily       atorvastatin (LIPITOR) 40 MG tablet Take 0.5 tablets (20 mg) by mouth daily 45 tablet 1     BASAGLAR 100 UNIT/ML injection Inject 12 Units Subcutaneous At Bedtime       FLORIN CONTOUR test strip   0     BD INSULIN SYRINGE ULTRAFINE 31G X 5/16\" 0.3 ML USING 4-5 PER DAY ([facility] 31G/0.3ML)  6     calcium carbonate (TUMS) 500 MG chewable tablet Take 1-2 chew tab by mouth 2 times daily as needed for heartburn       chlorthalidone (HYGROTON) 25 MG tablet TAKE 1 TABLET BY MOUTH EVERY DAY 90 tablet 0     cycloSPORINE modified (GENERIC EQUIVALENT) 25 MG capsule Take 3 capsules (75 mg) by mouth 2 times daily 180 capsule 11     GENGRAF (BRAND) 25 MG CAPSULE Take 3 capsules (75 mg) by mouth 2 times daily 180 capsule 11     insulin aspart (NOVOLOG PEN) 100 UNIT/ML pen Taking as directed with adjustment scale and carb coverage.       isosorbide mononitrate (IMDUR) 30 MG 24 hr tablet TAKE 0.5 TABLETS (15 MG) BY MOUTH 2 TIMES DAILY 90 tablet 1     levothyroxine (SYNTHROID) 112 MCG tablet Take by mouth daily       losartan (COZAAR) 50 MG tablet Take 1 tablet (50 mg) by mouth daily 90 tablet 1     metoprolol succinate ER (TOPROL-XL) 25 MG 24 hr tablet TAKE 1 TABLET BY MOUTH EVERYDAY AT BEDTIME 90 tablet 1     mycophenolic acid (GENERIC EQUIVALENT) 180 MG EC tablet Take 3 tablets (540 mg) by mouth 2 times daily 180 tablet 11     pantoprazole (PROTONIX) 40 MG EC tablet TAKE 1 TABLET BY MOUTH EVERY DAY 90 tablet 1       ROS:  CONSTITUTIONAL: NEGATIVE for fever, " "chills, change in weight  EYES: NEGATIVE for vision changes or irritation  ENT/MOUTH: NEGATIVE for ear, mouth and throat problems  RESP: NEGATIVE for significant cough or SOB  CV: NEGATIVE for chest pain, palpitations or peripheral edema  MUSCULOSKELETAL: NEGATIVE for significant arthralgias or myalgia  NEURO: NEGATIVE for weakness, dizziness or paresthesias  ENDOCRINE: NEGATIVE for temperature intolerance, skin/hair changes  ROS otherwise negative    OBJECTIVE:                                                    /72   Pulse 76   Temp 98.3  F (36.8  C) (Oral)   Resp 17   Ht 1.626 m (5' 4\")   Wt 73 kg (161 lb)   SpO2 98%   BMI 27.64 kg/m    Body mass index is 27.64 kg/m .   GENERAL: healthy, alert, well nourished, well hydrated, no distress  EYES: Eyes grossly normal to inspection, extraocular movements - intact, and PERRL  HENT: ear canals- normal; TMs- normal; Nose- normal; Mouth- no ulcers, no lesions  NECK: no tenderness, no adenopathy, no asymmetry, no masses, no stiffness   RESP: lungs clear to auscultation - no rales, no rhonchi, no wheezes  CV: regular rates and rhythm,    MS: extremities- no gross deformities noted, no edema, no calf tenderness  NEURO: strength and tone- normal, sensory exam- grossly normal, mentation- intact, speech- normal, reflexes- symmetric       ASSESSMENT/PLAN:                                                       (E11.21,  Z79.4) Type 2 diabetes mellitus with diabetic nephropathy, with long-term current use of insulin (H)  (primary encounter diagnosis)  Plan: on Basaglar 12 units, check Lipid panel reflex to direct LDL Fasting,         Hemoglobin A1c, Hepatic panel            (I15.1,  N28.89) Hypertension secondary to other renal disorders  Plan: amLODIPine (NORVASC) 10 MG tablet, losartan         (COZAAR) 50 MG tablet, metoprolol succinate ER         (TOPROL-XL) 25 MG 24 hr tablet            (E78.5) Hyperlipidemia LDL goal <100  Plan: atorvastatin (LIPITOR) 40 MG tablet, " Lipid         panel reflex to direct LDL Fasting, Hepatic         panel            (I25.10) Coronary artery disease involving native heart without angina pectoris, unspecified vessel or lesion type  Comment: Stable  Plan: On Imdur and metoprolol succinate ER (TOPROL-XL) 25 MG 24 hr tablet refilled.Advised to follow low salt diet and exercise and f/u in 6 mths.             (D89.9) Immunosuppressed status (HCC)  Plan: Follows up with the transplant clinic    (E03.8) Other specified hypothyroidism  Plan: On Levoxyl 112 mcg daily, will check TSH with free T4 reflex, and will adjust Levoxyl after results          (M85.80) Osteopenia, unspecified location  (Z78.0) Asymptomatic postmenopausal status  Plan: Patient has stopped Fosamax since 2 years, will repeat DX Hip/Pelvis/Spine.pt was told I will contact her after results and proceed accordingly.            All above meds refilled.explained clearly about the medication,insructions and side effects.      Summer Vega MD  [facility]

## 2019-04-22 NOTE — Clinical Note
"Diabetic eye exam: [facility] Dr. Garvin 8/29/19Results: \"No proliferative diabetic retinopathy seen today.\""

## 2019-04-24 DIAGNOSIS — Z94.0 KIDNEY REPLACED BY TRANSPLANT: ICD-10-CM

## 2019-04-24 RX ORDER — MYCOPHENOLIC ACID 180 MG/1
540 TABLET, DELAYED RELEASE ORAL 2 TIMES DAILY
Qty: 180 TABLET | Refills: 11 | Status: SHIPPED | OUTPATIENT
Start: 2019-04-24 | End: 2020-04-23

## 2019-05-20 ENCOUNTER — ANCILLARY PROCEDURE (OUTPATIENT)
Dept: BONE DENSITY | Facility: CLINIC | Age: 69
End: 2019-05-20
Attending: INTERNAL MEDICINE
Payer: MEDICARE

## 2019-05-20 DIAGNOSIS — Z78.0 ASYMPTOMATIC POSTMENOPAUSAL STATUS: ICD-10-CM

## 2019-05-20 PROCEDURE — 77085 DXA BONE DENSITY AXL VRT FX: CPT | Performed by: INTERNAL MEDICINE

## 2019-05-21 PROBLEM — Z98.890 S/P HERNIA REPAIR: Status: RESOLVED | Noted: 2017-10-16 | Resolved: 2019-05-21

## 2019-05-21 PROBLEM — Z87.19 S/P HERNIA REPAIR: Status: RESOLVED | Noted: 2017-10-16 | Resolved: 2019-05-21

## 2019-05-21 PROBLEM — R91.8 OPACITY OF LUNG ON IMAGING STUDY: Status: RESOLVED | Noted: 2017-10-22 | Resolved: 2019-05-21

## 2019-05-21 PROBLEM — K43.9 VENTRAL HERNIA WITHOUT OBSTRUCTION OR GANGRENE: Status: RESOLVED | Noted: 2017-05-17 | Resolved: 2019-05-21

## 2019-05-30 ENCOUNTER — TRANSFERRED RECORDS (OUTPATIENT)
Dept: HEALTH INFORMATION MANAGEMENT | Facility: CLINIC | Age: 69
End: 2019-05-30

## 2019-06-20 DIAGNOSIS — E78.5 HYPERLIPIDEMIA LDL GOAL <100: ICD-10-CM

## 2019-06-20 NOTE — TELEPHONE ENCOUNTER
"PATIENT STATES HER DOSE WAS INCREASED TO A FULL 40 MG TABLET, PLEASE SEND NEW ORDER.    Requested Prescriptions   Pending Prescriptions Disp Refills     atorvastatin (LIPITOR) 40 MG tablet 45 tablet 1     Sig: Take 0.5 tablets (20 mg) by mouth daily   Last Written Prescription Date:  04/22/19  Last Fill Quantity: 45,  # refills: 1   Last office visit: 4/22/2019 with prescribing provider:  yes   Future Office Visit:        Statins Protocol Passed - 6/20/2019 11:25 AM        Passed - LDL on file in past 12 months     Recent Labs   Lab Test 04/22/19  0848   *             Passed - No abnormal creatine kinase in past 12 months     No lab results found.             Passed - Recent (12 mo) or future (30 days) visit within the authorizing provider's specialty     Patient had office visit in the last 12 months or has a visit in the next 30 days with authorizing provider or within the authorizing provider's specialty.  See \"Patient Info\" tab in inbasket, or \"Choose Columns\" in Meds & Orders section of the refill encounter.              Passed - Medication is active on med list        Passed - Patient is age 18 or older        Passed - No active pregnancy on record        Passed - No positive pregnancy test in past 12 months          "

## 2019-06-21 RX ORDER — ATORVASTATIN CALCIUM 40 MG/1
40 TABLET, FILM COATED ORAL DAILY
Qty: 90 TABLET | Refills: 0 | Status: SHIPPED | OUTPATIENT
Start: 2019-06-21 | End: 2019-09-03

## 2019-06-21 NOTE — TELEPHONE ENCOUNTER
"Routing refill request to provider for review/approval because:  Drug interaction warning    Per 4/22/19 lab result note:  \"all tests stable  except slightly high LDL ( bad cholesterol) and it should be less tahn 100 in diabetics , please increase Lipitor 40 mg  to 1 tab daily instead of 1/2 tab and repeat lipids in 3 months. ,follow low fat diet and regular exercise,rpt lipids in 1 yr\"    Adjusted prescription to reflect new dose per primary care provider's note above.  "

## 2019-08-01 DIAGNOSIS — Z79.899 ENCOUNTER FOR LONG-TERM CURRENT USE OF MEDICATION: ICD-10-CM

## 2019-08-01 DIAGNOSIS — Z94.0 KIDNEY TRANSPLANTED: Primary | ICD-10-CM

## 2019-08-01 DIAGNOSIS — Z48.298 AFTERCARE FOLLOWING ORGAN TRANSPLANT: ICD-10-CM

## 2019-08-01 DIAGNOSIS — Z94.0 KIDNEY REPLACED BY TRANSPLANT: ICD-10-CM

## 2019-08-01 LAB
ANION GAP SERPL CALCULATED.3IONS-SCNC: 4 MMOL/L (ref 3–14)
BUN SERPL-MCNC: 30 MG/DL (ref 7–30)
CALCIUM SERPL-MCNC: 9 MG/DL (ref 8.5–10.1)
CHLORIDE SERPL-SCNC: 106 MMOL/L (ref 94–109)
CO2 SERPL-SCNC: 31 MMOL/L (ref 20–32)
CREAT SERPL-MCNC: 1.31 MG/DL (ref 0.52–1.04)
CYCLOSPORINE BLD LC/MS/MS-MCNC: 110 UG/L (ref 50–400)
ERYTHROCYTE [DISTWIDTH] IN BLOOD BY AUTOMATED COUNT: 12.6 % (ref 10–15)
GFR SERPL CREATININE-BSD FRML MDRD: 41 ML/MIN/{1.73_M2}
GLUCOSE SERPL-MCNC: 99 MG/DL (ref 70–99)
HCT VFR BLD AUTO: 37.1 % (ref 35–47)
HGB BLD-MCNC: 11.4 G/DL (ref 11.7–15.7)
MCH RBC QN AUTO: 28.8 PG (ref 26.5–33)
MCHC RBC AUTO-ENTMCNC: 30.7 G/DL (ref 31.5–36.5)
MCV RBC AUTO: 94 FL (ref 78–100)
PLATELET # BLD AUTO: 194 10E9/L (ref 150–450)
POTASSIUM SERPL-SCNC: 4.1 MMOL/L (ref 3.4–5.3)
RBC # BLD AUTO: 3.96 10E12/L (ref 3.8–5.2)
SODIUM SERPL-SCNC: 141 MMOL/L (ref 133–144)
TME LAST DOSE: 2200 H
WBC # BLD AUTO: 3.6 10E9/L (ref 4–11)

## 2019-08-01 PROCEDURE — 80158 DRUG ASSAY CYCLOSPORINE: CPT | Performed by: INTERNAL MEDICINE

## 2019-08-01 NOTE — TELEPHONE ENCOUNTER
ISSUE:   CSA level 110 on 8/1/19, goal 50-75, dose 75 mg BID    PLAN:   Please call pt and confirm this was a good 12-hour trough. Verify dose 75 mg BID. Confirm no new medications or illness (nacho. Diarrhea). If good trough, decrease dose to 75mg am and 50mg pm. To repeat BMP and CSA level next week. Update rx, enter lab orders.

## 2019-08-02 ENCOUNTER — TRANSFERRED RECORDS (OUTPATIENT)
Dept: HEALTH INFORMATION MANAGEMENT | Facility: CLINIC | Age: 69
End: 2019-08-02

## 2019-08-02 RX ORDER — CYCLOSPORINE 25 MG/1
CAPSULE ORAL
Qty: 150 CAPSULE | Refills: 11 | Status: SHIPPED | OUTPATIENT
Start: 2019-08-02 | End: 2019-12-02

## 2019-08-02 NOTE — TELEPHONE ENCOUNTER
Call placed to patient. Patient confirms current dose and accurate trough level. Notes treatment with abx ear drops for current ear infection. Denies any diarrhea. Patient v\u to decrease dose to 75/50 and repeat level in one week. Order/rx sent

## 2019-08-06 DIAGNOSIS — I15.1 HYPERTENSION SECONDARY TO OTHER RENAL DISORDERS: ICD-10-CM

## 2019-08-06 NOTE — TELEPHONE ENCOUNTER
"Requested Prescriptions   Pending Prescriptions Disp Refills     chlorthalidone (HYGROTON) 25 MG tablet [Pharmacy Med Name:  Last Written Prescription Date:  4/15/2019  Last Fill Quantity: 90,  # refills: 0   Last office visit: 4/22/2019 with prescribing provider:     Future Office Visit:   CHLORTHALIDONE 25 MG TABLET] 90 tablet 0     Sig: TAKE 1 TABLET BY MOUTH EVERY DAY       Diuretics (Including Combos) Protocol Failed - 8/6/2019  1:41 AM        Failed - Normal serum creatinine on file in past 12 months     Recent Labs   Lab Test 08/01/19  0558   CR 1.31*              Passed - Blood pressure under 140/90 in past 12 months     BP Readings from Last 3 Encounters:   04/22/19 134/72   04/16/19 142/68   12/03/18 138/76                 Passed - Recent (12 mo) or future (30 days) visit within the authorizing provider's specialty     Patient had office visit in the last 12 months or has a visit in the next 30 days with authorizing provider or within the authorizing provider's specialty.  See \"Patient Info\" tab in inbasket, or \"Choose Columns\" in Meds & Orders section of the refill encounter.              Passed - Medication is active on med list        Passed - Patient is age 18 or older        Passed - No active pregancy on record        Passed - Normal serum potassium on file in past 12 months     Recent Labs   Lab Test 08/01/19  0558   POTASSIUM 4.1                    Passed - Normal serum sodium on file in past 12 months     Recent Labs   Lab Test 08/01/19  0558                 Passed - No positive pregnancy test in past 12 months        "

## 2019-08-07 RX ORDER — CHLORTHALIDONE 25 MG/1
TABLET ORAL
Qty: 90 TABLET | Refills: 0 | Status: SHIPPED | OUTPATIENT
Start: 2019-08-07 | End: 2019-10-30

## 2019-08-07 NOTE — TELEPHONE ENCOUNTER
Routing refill request to covering provider - Dr. Chao for review/approval because:  Labs out of range:  Elevated creatinine

## 2019-08-08 DIAGNOSIS — Z94.0 KIDNEY TRANSPLANTED: ICD-10-CM

## 2019-08-08 LAB
ANION GAP SERPL CALCULATED.3IONS-SCNC: 5 MMOL/L (ref 3–14)
BUN SERPL-MCNC: 32 MG/DL (ref 7–30)
CALCIUM SERPL-MCNC: 9.6 MG/DL (ref 8.5–10.1)
CHLORIDE SERPL-SCNC: 106 MMOL/L (ref 94–109)
CO2 SERPL-SCNC: 31 MMOL/L (ref 20–32)
CREAT SERPL-MCNC: 1.38 MG/DL (ref 0.52–1.04)
CYCLOSPORINE BLD LC/MS/MS-MCNC: 72 UG/L (ref 50–400)
GFR SERPL CREATININE-BSD FRML MDRD: 38 ML/MIN/{1.73_M2}
GLUCOSE SERPL-MCNC: 119 MG/DL (ref 70–99)
POTASSIUM SERPL-SCNC: 4.1 MMOL/L (ref 3.4–5.3)
SODIUM SERPL-SCNC: 142 MMOL/L (ref 133–144)
TME LAST DOSE: 1900 H

## 2019-08-08 PROCEDURE — 80158 DRUG ASSAY CYCLOSPORINE: CPT | Performed by: INTERNAL MEDICINE

## 2019-08-10 DIAGNOSIS — I15.1 HYPERTENSION SECONDARY TO OTHER RENAL DISORDERS: ICD-10-CM

## 2019-08-12 RX ORDER — CHLORTHALIDONE 25 MG/1
TABLET ORAL
Qty: 90 TABLET | Refills: 0 | OUTPATIENT
Start: 2019-08-12

## 2019-08-12 NOTE — TELEPHONE ENCOUNTER
"Requested Prescriptions   Pending Prescriptions Disp Refills     chlorthalidone (HYGROTON) 25 MG tablet [Pharmacy Med Name:  Last Written Prescription Date:  8/7/2019  Last Fill Quantity: 90,  # refills: 0   Last office visit: 4/22/2019 with prescribing provider:     Future Office Visit:   CHLORTHALIDONE 25 MG TABLET] 90 tablet 0     Sig: TAKE 1 TABLET BY MOUTH EVERY DAY       Diuretics (Including Combos) Protocol Failed - 8/10/2019 11:14 AM        Failed - Normal serum creatinine on file in past 12 months     Recent Labs   Lab Test 08/08/19  0547   CR 1.38*              Passed - Blood pressure under 140/90 in past 12 months     BP Readings from Last 3 Encounters:   04/22/19 134/72   04/16/19 142/68   12/03/18 138/76                 Passed - Recent (12 mo) or future (30 days) visit within the authorizing provider's specialty     Patient had office visit in the last 12 months or has a visit in the next 30 days with authorizing provider or within the authorizing provider's specialty.  See \"Patient Info\" tab in inbasket, or \"Choose Columns\" in Meds & Orders section of the refill encounter.              Passed - Medication is active on med list        Passed - Patient is age 18 or older        Passed - No active pregancy on record        Passed - Normal serum potassium on file in past 12 months     Recent Labs   Lab Test 08/08/19  0547   POTASSIUM 4.1                    Passed - Normal serum sodium on file in past 12 months     Recent Labs   Lab Test 08/08/19  0547                 Passed - No positive pregnancy test in past 12 months        "

## 2019-08-29 ENCOUNTER — TRANSFERRED RECORDS (OUTPATIENT)
Dept: HEALTH INFORMATION MANAGEMENT | Facility: CLINIC | Age: 69
End: 2019-08-29

## 2019-09-03 DIAGNOSIS — E78.5 HYPERLIPIDEMIA LDL GOAL <100: ICD-10-CM

## 2019-09-04 NOTE — TELEPHONE ENCOUNTER
"Requested Prescriptions   Pending Prescriptions Disp Refills     atorvastatin (LIPITOR) 40 MG tablet [Pharmacy Med Name: ATORVASTATIN 40 MG TABLET] 90 tablet 0     Sig: TAKE 1 TABLET BY MOUTH EVERY DAY   Last Written Prescription Date:  06/21/2019  Last Fill Quantity: 90,  # refills: 0   Last office visit: 4/22/2019 with prescribing provider:     Future Office Visit:      Statins Protocol Passed - 9/3/2019  7:02 PM        Passed - LDL on file in past 12 months     Recent Labs   Lab Test 04/22/19  0848   *             Passed - No abnormal creatine kinase in past 12 months     No lab results found.             Passed - Recent (12 mo) or future (30 days) visit within the authorizing provider's specialty     Patient had office visit in the last 12 months or has a visit in the next 30 days with authorizing provider or within the authorizing provider's specialty.  See \"Patient Info\" tab in inbasket, or \"Choose Columns\" in Meds & Orders section of the refill encounter.              Passed - Medication is active on med list        Passed - Patient is age 18 or older        Passed - No active pregnancy on record        Passed - No positive pregnancy test in past 12 months        "

## 2019-09-05 RX ORDER — ATORVASTATIN CALCIUM 40 MG/1
TABLET, FILM COATED ORAL
Qty: 90 TABLET | Refills: 0 | Status: SHIPPED | OUTPATIENT
Start: 2019-09-05 | End: 2019-10-30

## 2019-09-09 ENCOUNTER — TRANSFERRED RECORDS (OUTPATIENT)
Dept: HEALTH INFORMATION MANAGEMENT | Facility: CLINIC | Age: 69
End: 2019-09-09

## 2019-09-18 ENCOUNTER — TRANSFERRED RECORDS (OUTPATIENT)
Dept: HEALTH INFORMATION MANAGEMENT | Facility: CLINIC | Age: 69
End: 2019-09-18

## 2019-09-19 DIAGNOSIS — Z94.0 KIDNEY REPLACED BY TRANSPLANT: ICD-10-CM

## 2019-09-19 LAB
ANION GAP SERPL CALCULATED.3IONS-SCNC: 3 MMOL/L (ref 3–14)
BASOPHILS # BLD AUTO: 0 10E9/L (ref 0–0.2)
BASOPHILS NFR BLD AUTO: 0.2 %
BUN SERPL-MCNC: 30 MG/DL (ref 7–30)
CALCIUM SERPL-MCNC: 9.8 MG/DL (ref 8.5–10.1)
CHLORIDE SERPL-SCNC: 105 MMOL/L (ref 94–109)
CO2 SERPL-SCNC: 32 MMOL/L (ref 20–32)
CREAT SERPL-MCNC: 1.46 MG/DL (ref 0.52–1.04)
CYCLOSPORINE BLD LC/MS/MS-MCNC: 100 UG/L (ref 50–400)
DIFFERENTIAL METHOD BLD: ABNORMAL
EOSINOPHIL # BLD AUTO: 0.1 10E9/L (ref 0–0.7)
EOSINOPHIL NFR BLD AUTO: 0.9 %
ERYTHROCYTE [DISTWIDTH] IN BLOOD BY AUTOMATED COUNT: 13 % (ref 10–15)
GFR SERPL CREATININE-BSD FRML MDRD: 36 ML/MIN/{1.73_M2}
GLUCOSE SERPL-MCNC: 157 MG/DL (ref 70–99)
HCT VFR BLD AUTO: 37.1 % (ref 35–47)
HGB BLD-MCNC: 11.5 G/DL (ref 11.7–15.7)
IMM GRANULOCYTES # BLD: 0 10E9/L (ref 0–0.4)
IMM GRANULOCYTES NFR BLD: 0.3 %
LYMPHOCYTES # BLD AUTO: 0.9 10E9/L (ref 0.8–5.3)
LYMPHOCYTES NFR BLD AUTO: 15.1 %
MCH RBC QN AUTO: 29.5 PG (ref 26.5–33)
MCHC RBC AUTO-ENTMCNC: 31 G/DL (ref 31.5–36.5)
MCV RBC AUTO: 95 FL (ref 78–100)
MONOCYTES # BLD AUTO: 0.7 10E9/L (ref 0–1.3)
MONOCYTES NFR BLD AUTO: 12.7 %
NEUTROPHILS # BLD AUTO: 4.1 10E9/L (ref 1.6–8.3)
NEUTROPHILS NFR BLD AUTO: 70.8 %
NRBC # BLD AUTO: 0 10*3/UL
NRBC BLD AUTO-RTO: 0 /100
PLATELET # BLD AUTO: 181 10E9/L (ref 150–450)
POTASSIUM SERPL-SCNC: 4.1 MMOL/L (ref 3.4–5.3)
RBC # BLD AUTO: 3.9 10E12/L (ref 3.8–5.2)
SODIUM SERPL-SCNC: 140 MMOL/L (ref 133–144)
TME LAST DOSE: NORMAL H
WBC # BLD AUTO: 5.8 10E9/L (ref 4–11)

## 2019-09-19 PROCEDURE — 80158 DRUG ASSAY CYCLOSPORINE: CPT | Performed by: INTERNAL MEDICINE

## 2019-09-20 DIAGNOSIS — Z94.0 KIDNEY REPLACED BY TRANSPLANT: Primary | ICD-10-CM

## 2019-09-20 RX ORDER — CYCLOSPORINE 25 MG/1
50 CAPSULE, LIQUID FILLED ORAL 2 TIMES DAILY
Qty: 120 CAPSULE | Refills: 11 | Status: SHIPPED | OUTPATIENT
Start: 2019-09-20 | End: 2019-10-30

## 2019-09-20 NOTE — LETTER
PHYSICIAN ORDERS      DATE & TIME ISSUED: 2019 9:44 AM  PATIENT NAME: Viv Shaw   : 1950     [facility] MR# [if applicable]: [id number]     DIAGNOSIS:  Kidney transplant   ICD-10 CODE: Z94.0     Please complete the following level in two weeks  Cyclosporine level (ensure 12 hours between last dose and blood draw)   BMP    Any questions please call: [phone] option 5    Please fax results to [phone].

## 2019-09-20 NOTE — TELEPHONE ENCOUNTER
"Call placed to patient. Patient confirms current dose at 75/50 and accurate trough level. Note that she has a \"bad cold\" otherwise no other illness, diarrhea or medication changes. Patient v\u to decrease tacrolimus dose to 50 mg bid and repeat level in two weeks. Order sent  "

## 2019-09-20 NOTE — TELEPHONE ENCOUNTER
ISSUE:   CSA level 100 on 9/19/19, goal 50-75, dose 75 mg BID    PLAN:   Please call pt and confirm this was a good 12-hour trough. Verify dose 75 mg BID. Confirm no new medications or illness (nacho. Diarrhea). If good trough, decrease dose to 50 mg BID and recheck CSA and BMP in the next two weeks. Update rx, send orders.

## 2019-09-23 DIAGNOSIS — K21.9 GASTROESOPHAGEAL REFLUX DISEASE WITHOUT ESOPHAGITIS: ICD-10-CM

## 2019-09-23 NOTE — TELEPHONE ENCOUNTER
"Requested Prescriptions   Pending Prescriptions Disp Refills     pantoprazole (PROTONIX) 40 MG EC tablet [Pharmacy Med Name: PANTOPRAZOLE SOD DR 40 MG TAB] 90 tablet 1     Sig: TAKE 1 TABLET BY MOUTH EVERY DAY   Last Written Prescription Date:  04/02/2019  Last Fill Quantity: 90,  # refills: 01   Last office visit: 4/22/2019 with prescribing provider:     Future Office Visit:      PPI Protocol Passed - 9/23/2019  2:22 AM        Passed - Not on Clopidogrel (unless Pantoprazole ordered)        Passed - No diagnosis of osteoporosis on record        Passed - Recent (12 mo) or future (30 days) visit within the authorizing provider's specialty     Patient had office visit in the last 12 months or has a visit in the next 30 days with authorizing provider or within the authorizing provider's specialty.  See \"Patient Info\" tab in inbasket, or \"Choose Columns\" in Meds & Orders section of the refill encounter.              Passed - Medication is active on med list        Passed - Patient is age 18 or older        Passed - No active pregnacy on record        Passed - No positive pregnancy test in past 12 months        "

## 2019-09-23 NOTE — LETTER
[place] [address]  [facility] [address]  Phone: [phone]        September 25, 2019      Viv Shaw                                                                                                                                [facility] [place]note 2[facility] [address] [address]            Dear Ms. Shaw,    We have received a refill request from your pharmacy for your medication. Many medications require routine follow-up with your provider. A review of your chart indicates that you will be due for an appointment in October with your primary care provider. We have sent a one time refill to your pharmacy to allow you time to schedule an appointment.     Please call [phone] to schedule an appointment.  We look forward to seeing you in the near future.      Thank you,     [facility]

## 2019-09-25 RX ORDER — PANTOPRAZOLE SODIUM 40 MG/1
TABLET, DELAYED RELEASE ORAL
Qty: 90 TABLET | Refills: 0 | Status: SHIPPED | OUTPATIENT
Start: 2019-09-25 | End: 2019-10-30

## 2019-09-25 NOTE — TELEPHONE ENCOUNTER
Prescription approved per [facility] Refill Protocol.    Per 4/22/19 office visit note, patient is due to return in October.    No future appointments scheduled. Letter mailed.

## 2019-09-26 ENCOUNTER — TELEPHONE (OUTPATIENT)
Dept: INTERNAL MEDICINE | Facility: CLINIC | Age: 69
End: 2019-09-26

## 2019-09-26 DIAGNOSIS — I15.1 HYPERTENSION SECONDARY TO OTHER RENAL DISORDERS: Primary | ICD-10-CM

## 2019-09-26 RX ORDER — VALSARTAN 80 MG/1
80 TABLET ORAL DAILY
Qty: 30 TABLET | Refills: 1 | Status: SHIPPED | OUTPATIENT
Start: 2019-09-26 | End: 2019-10-19

## 2019-09-26 NOTE — TELEPHONE ENCOUNTER
Losartan 50 mg on backorder.   Please advise.     Last OV 4/22/19     BP Readings from Last 3 Encounters:   04/22/19 134/72   04/16/19 142/68   12/03/18 138/76     Potassium   Date Value Ref Range Status   09/19/2019 4.1 3.4 - 5.3 mmol/L Final     Creatinine   Date Value Ref Range Status   09/19/2019 1.46 (H) 0.52 - 1.04 mg/dL Final

## 2019-09-26 NOTE — TELEPHONE ENCOUNTER
Fax received from [facility] asking for alternate RX for Losartan 50 MG which is on backorders.  Please advise.

## 2019-09-26 NOTE — TELEPHONE ENCOUNTER
Diovan 80 mg sent to pharmacy, please advise patient to monitor blood pressure with the new med patient is due for appointment next month and reevaluate blood pressure at that time

## 2019-10-04 DIAGNOSIS — Z48.298 AFTERCARE FOLLOWING ORGAN TRANSPLANT: ICD-10-CM

## 2019-10-04 DIAGNOSIS — Z79.899 ENCOUNTER FOR LONG-TERM CURRENT USE OF MEDICATION: ICD-10-CM

## 2019-10-04 DIAGNOSIS — Z94.0 KIDNEY REPLACED BY TRANSPLANT: ICD-10-CM

## 2019-10-04 LAB
ANION GAP SERPL CALCULATED.3IONS-SCNC: 5 MMOL/L (ref 3–14)
BUN SERPL-MCNC: 32 MG/DL (ref 7–30)
CALCIUM SERPL-MCNC: 8.5 MG/DL (ref 8.5–10.1)
CHLORIDE SERPL-SCNC: 104 MMOL/L (ref 94–109)
CO2 SERPL-SCNC: 30 MMOL/L (ref 20–32)
CREAT SERPL-MCNC: 1.32 MG/DL (ref 0.52–1.04)
CREAT UR-MCNC: 83 MG/DL
CYCLOSPORINE BLD LC/MS/MS-MCNC: 62 UG/L (ref 50–400)
GFR SERPL CREATININE-BSD FRML MDRD: 40 ML/MIN/{1.73_M2}
GLUCOSE SERPL-MCNC: 209 MG/DL (ref 70–99)
POTASSIUM SERPL-SCNC: 4.2 MMOL/L (ref 3.4–5.3)
PROT UR-MCNC: 0.12 G/L
PROT/CREAT 24H UR: 0.14 G/G CR (ref 0–0.2)
SODIUM SERPL-SCNC: 139 MMOL/L (ref 133–144)
TME LAST DOSE: NORMAL H

## 2019-10-04 PROCEDURE — 80158 DRUG ASSAY CYCLOSPORINE: CPT | Performed by: INTERNAL MEDICINE

## 2019-10-19 DIAGNOSIS — I15.1 HYPERTENSION SECONDARY TO OTHER RENAL DISORDERS: ICD-10-CM

## 2019-10-21 NOTE — TELEPHONE ENCOUNTER
"Requested Prescriptions   Pending Prescriptions Disp Refills     valsartan (DIOVAN) 80 MG tablet [Pharmacy Med Name: VALSARTAN 80 MG TABLET] 30 tablet 1     Sig: TAKE 1 TABLET BY MOUTH EVERY DAY   Last Written Prescription Date:  09/26/2019  Last Fill Quantity: 30,  # refills: 01   Last office visit: 4/22/2019 with prescribing provider:     Future Office Visit:   Next 5 appointments (look out 90 days)    Oct 30, 2019  1:00 PM CDT  SHORT with Summer Vega MD  [facility] [address]  [facility] [address]  [phone]           Angiotensin-II Receptors Failed - 10/19/2019  9:32 AM        Failed - Normal serum creatinine on file in past 12 months     Recent Labs   Lab Test 10/04/19  0550   CR 1.32*             Passed - Last blood pressure under 140/90 in past 12 months     BP Readings from Last 3 Encounters:   04/22/19 134/72   04/16/19 142/68   12/03/18 138/76                 Passed - Recent (12 mo) or future (30 days) visit within the authorizing provider's specialty     Patient has had an office visit with the authorizing provider or a provider within the authorizing providers department within the previous 12 mos or has a future within next 30 days. See \"Patient Info\" tab in inbasket, or \"Choose Columns\" in Meds & Orders section of the refill encounter.              Passed - Medication is active on med list        Passed - Patient is age 18 or older        Passed - No active pregnancy on record        Passed - Normal serum potassium on file in past 12 months     Recent Labs   Lab Test 10/04/19  0550   POTASSIUM 4.2                    Passed - No positive pregnancy test in past 12 months        "

## 2019-10-23 ENCOUNTER — TRANSFERRED RECORDS (OUTPATIENT)
Dept: HEALTH INFORMATION MANAGEMENT | Facility: CLINIC | Age: 69
End: 2019-10-23

## 2019-10-23 RX ORDER — VALSARTAN 80 MG/1
TABLET ORAL
Qty: 30 TABLET | Refills: 0 | Status: SHIPPED | OUTPATIENT
Start: 2019-10-23 | End: 2019-10-30

## 2019-10-23 NOTE — TELEPHONE ENCOUNTER
Valsartan refill request.   Pt has upcoming appt.     Last OV 4/22/19     BP Readings from Last 3 Encounters:   04/22/19 134/72   04/16/19 142/68   12/03/18 138/76     Potassium   Date Value Ref Range Status   10/04/2019 4.2 3.4 - 5.3 mmol/L Final     Creatinine   Date Value Ref Range Status   10/04/2019 1.32 (H) 0.52 - 1.04 mg/dL Final

## 2019-10-30 ENCOUNTER — OFFICE VISIT (OUTPATIENT)
Dept: INTERNAL MEDICINE | Facility: CLINIC | Age: 69
End: 2019-10-30
Payer: MEDICARE

## 2019-10-30 VITALS
TEMPERATURE: 97.5 F | WEIGHT: 165.5 LBS | RESPIRATION RATE: 16 BRPM | OXYGEN SATURATION: 99 % | HEART RATE: 66 BPM | HEIGHT: 64 IN | SYSTOLIC BLOOD PRESSURE: 130 MMHG | BODY MASS INDEX: 28.25 KG/M2 | DIASTOLIC BLOOD PRESSURE: 62 MMHG

## 2019-10-30 DIAGNOSIS — E11.21 TYPE 2 DIABETES MELLITUS WITH DIABETIC NEPHROPATHY, WITH LONG-TERM CURRENT USE OF INSULIN (H): Primary | ICD-10-CM

## 2019-10-30 DIAGNOSIS — Z79.4 TYPE 2 DIABETES MELLITUS WITH DIABETIC NEPHROPATHY, WITH LONG-TERM CURRENT USE OF INSULIN (H): Primary | ICD-10-CM

## 2019-10-30 DIAGNOSIS — K21.9 GASTROESOPHAGEAL REFLUX DISEASE WITHOUT ESOPHAGITIS: ICD-10-CM

## 2019-10-30 DIAGNOSIS — I25.10 CORONARY ARTERY DISEASE INVOLVING NATIVE HEART WITHOUT ANGINA PECTORIS, UNSPECIFIED VESSEL OR LESION TYPE: ICD-10-CM

## 2019-10-30 DIAGNOSIS — D84.9 IMMUNOSUPPRESSED STATUS (H): ICD-10-CM

## 2019-10-30 DIAGNOSIS — N18.30 CKD (CHRONIC KIDNEY DISEASE) STAGE 3, GFR 30-59 ML/MIN (H): ICD-10-CM

## 2019-10-30 DIAGNOSIS — E78.5 HYPERLIPIDEMIA LDL GOAL <100: ICD-10-CM

## 2019-10-30 DIAGNOSIS — I15.1 HYPERTENSION SECONDARY TO OTHER RENAL DISORDERS: ICD-10-CM

## 2019-10-30 PROCEDURE — 99214 OFFICE O/P EST MOD 30 MIN: CPT | Performed by: INTERNAL MEDICINE

## 2019-10-30 PROCEDURE — 99207 C FOOT EXAM  NO CHARGE: CPT | Mod: 25 | Performed by: INTERNAL MEDICINE

## 2019-10-30 RX ORDER — METOPROLOL SUCCINATE 25 MG/1
TABLET, EXTENDED RELEASE ORAL
Qty: 90 TABLET | Refills: 1 | Status: SHIPPED | OUTPATIENT
Start: 2019-10-30 | End: 2020-03-24

## 2019-10-30 RX ORDER — CYCLOSPORINE 25 MG/1
CAPSULE ORAL
Qty: 150 CAPSULE | Refills: 11 | Status: CANCELLED | OUTPATIENT
Start: 2019-10-30

## 2019-10-30 RX ORDER — LOSARTAN POTASSIUM 50 MG/1
50 TABLET ORAL DAILY
Qty: 90 TABLET | Refills: 1 | Status: SHIPPED | OUTPATIENT
Start: 2019-10-30 | End: 2019-12-02

## 2019-10-30 RX ORDER — AMLODIPINE BESYLATE 10 MG/1
10 TABLET ORAL DAILY
Qty: 90 TABLET | Refills: 1 | Status: SHIPPED | OUTPATIENT
Start: 2019-10-30 | End: 2020-03-24

## 2019-10-30 RX ORDER — VALSARTAN 80 MG/1
80 TABLET ORAL DAILY
Qty: 90 TABLET | Refills: 1 | Status: SHIPPED | OUTPATIENT
Start: 2019-10-30 | End: 2020-03-24

## 2019-10-30 RX ORDER — LEVOTHYROXINE SODIUM 112 UG/1
TABLET ORAL DAILY
Status: CANCELLED | OUTPATIENT
Start: 2019-10-30

## 2019-10-30 RX ORDER — ATORVASTATIN CALCIUM 40 MG/1
40 TABLET, FILM COATED ORAL DAILY
Qty: 90 TABLET | Refills: 0 | Status: SHIPPED | OUTPATIENT
Start: 2019-10-30 | End: 2020-02-12

## 2019-10-30 RX ORDER — PANTOPRAZOLE SODIUM 40 MG/1
40 TABLET, DELAYED RELEASE ORAL DAILY
Qty: 90 TABLET | Refills: 1 | Status: SHIPPED | OUTPATIENT
Start: 2019-10-30 | End: 2020-03-24

## 2019-10-30 RX ORDER — AMLODIPINE BESYLATE 10 MG/1
10 TABLET ORAL DAILY
Qty: 90 TABLET | Refills: 1 | Status: CANCELLED | OUTPATIENT
Start: 2019-10-30

## 2019-10-30 RX ORDER — ISOSORBIDE MONONITRATE 30 MG/1
TABLET, EXTENDED RELEASE ORAL
Qty: 90 TABLET | Refills: 1 | Status: CANCELLED | OUTPATIENT
Start: 2019-10-30

## 2019-10-30 RX ORDER — CHLORTHALIDONE 25 MG/1
25 TABLET ORAL DAILY
Qty: 90 TABLET | Refills: 1 | Status: SHIPPED | OUTPATIENT
Start: 2019-10-30 | End: 2020-03-24

## 2019-10-30 ASSESSMENT — MIFFLIN-ST. JEOR: SCORE: 1250.7

## 2019-10-30 NOTE — PROGRESS NOTES
Anup Shaw is a 69 year old female who presents to clinic today for the following health issues:      HPI     Hyperlipidemia Follow-Up      Are you having any of the following symptoms? (Select all that apply)  No complaints of shortness of breath, chest pain or pressure.  No increased sweating or nausea with activity.  No left-sided neck or arm pain.  No complaints of pain in calves when walking 1-2 blocks.    Are you regularly taking any medication or supplement to lower your cholesterol?   Yes- lipitor    Are you having muscle aches or other side effects that you think could be caused by your cholesterol lowering medication?  No      Hypertension Follow-up      Do you check your blood pressure regularly outside of the clinic? Yes     Are you following a low salt diet? Yes    Are your blood pressures ever more than 140 on the top number (systolic) OR more   than 90 on the bottom number (diastolic), for example 140/90? No    Hypothyroidism Follow-up      Since last visit, patient describes the following symptoms: Weight stable, no hair loss, no skin changes, no constipation, no loose stools      Diabetes Follow-up    How often are you checking your blood sugar? Continuous glucose monitor , pt seeing Endocrinologist Dr Allen   What time of day are you checking your blood sugars (select all that apply)?  Before meals  Have you had any blood sugars above 200?  Yes    Have you had any blood sugars below 70?  Yes     What symptoms do you notice when your blood sugar is low?  Dizzy, Weak and Lethargy    What concerns do you have today about your diabetes? None     Do you have any of these symptoms? (Select all that apply)  No numbness or tingling in feet.  No redness, sores or blisters on feet.  No complaints of excessive thirst.  No reports of blurry vision.  No significant changes to weight.     Have you had a diabetic eye exam in the last 12 months? Yes- Date of last eye exam: [facility]  8/2019           How many servings of fruits and vegetables do you eat daily?  2-3    On average, how many sweetened beverages do you drink each day (soda, juice, sweet tea, etc)?   0    How many days per week do you miss taking your medication? 0       Patient Active Problem List   Diagnosis     Other vitamin B12 deficiency anemia     Irritable bowel syndrome     GERD (gastroesophageal reflux disease)     Advanced directives, counseling/discussion     Kidney replaced by transplant     Immunosuppressed status (HCC)     Hyperlipidemia LDL goal <100     CAD S/P percutaneous coronary angioplasty     Anemia in chronic kidney disease     Other specified hypothyroidism     Coronary artery disease involving native heart without angina pectoris, unspecified vessel or lesion type     Type 2 diabetes mellitus with diabetic nephropathy, with long-term current use of insulin (H)     Hypertension secondary to other renal disorders     Hernia, incisional     Past Surgical History:   Procedure Laterality Date     APPENDECTOMY NOS       ARTHROSCOPY SHOULDER ROTATOR CUFF REPAIR Left      COLONOSCOPY N/A 6/7/2016    Procedure: COLONOSCOPY;  Surgeon: Rashard Snider MD;  Location: [facility]     Coronary angioplasty with stent  2005     HYSTERECTOMY       Kidney Transplant NOS Right      LAPAROSCOPIC CHOLECYSTECTOMY       NOSE SURGERY  2013     OPEN SEPARATION COMPONENT HERNIORRHAPHY N/A 10/16/2017    Procedure: OPEN SEPARATION COMPONENT HERNIORRHAPHY;;  Surgeon: CARL Lott MD;  Location: [facility]     RECONSTRUCT ABDOMINAL WALL N/A 10/16/2017    Procedure: RECONSTRUCT ABDOMINAL WALL;  Exploratory Laparotomy, Lysis of Adhesions, Abdominal Wall reconstruction, Inlay Xen AB mesh, Mitek Suture Sterling and Umbilical Hernia Repair.;  Surgeon: CARL Lott MD;  Location: [facility] OR     REMOVE CATARACT INTRACAP,INSERT LENS Right      TONSILLECTOMY         Social History     Tobacco Use     Smoking status: Never Smoker     Smokeless  "tobacco: Never Used   Substance Use Topics     Alcohol use: No     Family History   Problem Relation Age of Onset     Respiratory Mother          age 71     Cardiovascular Father          age 75 hyertension     Arthritis Sister         living, healthy     Family History Negative Brother          age 44     Colon Cancer No family hx of          Current Outpatient Medications   Medication Sig Dispense Refill     amLODIPine (NORVASC) 10 MG tablet Take 1 tablet (10 mg) by mouth daily 90 tablet 1     ASPIRIN PO Take 81 mg by mouth daily       atorvastatin (LIPITOR) 40 MG tablet Take 1 tablet (40 mg) by mouth daily 90 tablet 0     FLORIN CONTOUR test strip   0     BD INSULIN SYRINGE ULTRAFINE 31G X 5/16\" 0.3 ML USING 4-5 PER DAY ([facility] 31G/0.3ML)  6     calcium carbonate (TUMS) 500 MG chewable tablet Take 1-2 chew tab by mouth 2 times daily as needed for heartburn       chlorthalidone (HYGROTON) 25 MG tablet Take 1 tablet (25 mg) by mouth daily 90 tablet 1     GENGRAF (BRAND) 25 MG CAPSULE Take 3 capsules (75 mg) by mouth every morning AND 2 capsules (50 mg) every evening. 150 capsule 11     insulin aspart (NOVOLOG PEN) 100 UNIT/ML pen Taking as directed with adjustment scale and carb coverage.       insulin glargine (BASAGLAR KWIKPEN) 100 UNIT/ML pen Inject 14 Units Subcutaneous At Bedtime       isosorbide mononitrate (IMDUR) 30 MG 24 hr tablet TAKE 0.5 TABLETS (15 MG) BY MOUTH 2 TIMES DAILY 90 tablet 1     levothyroxine (SYNTHROID) 112 MCG tablet Take by mouth daily       losartan (COZAAR) 50 MG tablet Take 1 tablet (50 mg) by mouth daily 90 tablet 1     metoprolol succinate ER (TOPROL-XL) 25 MG 24 hr tablet TAKE 1 TABLET BY MOUTH EVERYDAY AT BEDTIME 90 tablet 1     mycophenolic acid (GENERIC EQUIVALENT) 180 MG EC tablet Take 3 tablets (540 mg) by mouth 2 times daily 180 tablet 11     pantoprazole (PROTONIX) 40 MG EC tablet Take 1 tablet (40 mg) by mouth daily 90 tablet 1     valsartan " "(DIOVAN) 80 MG tablet Take 1 tablet (80 mg) by mouth daily 90 tablet 1       Reviewed and updated as needed this visit by Provider         Review of Systems   ROS COMP: CONSTITUTIONAL: NEGATIVE for fever, chills, change in weight  EYES: NEGATIVE for vision changes or irritation  ENT/MOUTH: NEGATIVE for ear, mouth and throat problems  RESP: NEGATIVE for significant cough or SOB  CV: NEGATIVE for chest pain, palpitations or peripheral edema  MUSCULOSKELETAL: NEGATIVE for significant arthralgias or myalgia  NEURO: NEGATIVE for weakness, dizziness or paresthesias  ENDOCRINE: NEGATIVE for temperature intolerance, skin/hair changes      Objective    /62   Pulse 66   Temp 97.5  F (36.4  C) (Oral)   Resp 16   Ht 1.626 m (5' 4\")   Wt 75.1 kg (165 lb 8 oz)   SpO2 99%   BMI 28.41 kg/m    Body mass index is 28.41 kg/m .  Physical Exam   GENERAL: healthy, alert and no distress  EYES: Eyes grossly normal to inspection, PERRL and conjunctivae and sclerae normal  HENT: ear canals and TM's normal, nose and mouth without ulcers or lesions  NECK: no adenopathy, no asymmetry, masses, or scars and thyroid normal to palpation  RESP: lungs clear to auscultation - no rales, rhonchi or wheezes  CV: regular rate and rhythm, normal S1 S2, no S3 or S4, no murmur, click or rub, no peripheral edema and peripheral pulses strong  MS: no gross musculoskeletal defects noted, no edema  NEURO: Normal strength and tone, mentation intact and speech normal  Foot exam normal DP and PT pulses, no trophic changes or ulcerative lesions, normal sensory exam and normal monofilament exam       Assessment & Plan     (E11.21,  Z79.4) Type 2 diabetes mellitus with diabetic nephropathy, with long-term current use of insulin (H)  (primary encounter diagnosis)  Plan: on Basaglar 14 units daily, continuous glucose monitoring, will check hemoglobin A1c, FOOT EXAM.            (I15.1,  N28.89) Hypertension secondary to other renal disorders  Comment: Blood " "pressure stable  Plan: Refilled chlorthalidone (HYGROTON) 25 MG tablet,         losartan (COZAAR) 50 MG tablet, metoprolol         succinate ER (TOPROL-XL) 25 MG 24 hr tablet,         valsartan (DIOVAN) 80 MG tablet, amLODIPine         (NORVASC) 10 MG tablet.explained clearly about the medications,insructions and side effects.          (E78.5) Hyperlipidemia LDL goal <100  Plan: atorvastatin (LIPITOR) 40 MG tablet, Lipid         panel reflex to direct LDL Fasting          (I25.10) Coronary artery disease involving native heart without angina pectoris, unspecified vessel or lesion type  Plan: Stable , refilled metoprolol succinate ER (TOPROL-XL) 25 MG 24 h tablet            (K21.9) Gastroesophageal reflux disease without esophagiti  Plan: pantoprazole (PROTONIX) 40 MG EC tablet            (N18.3) CKD (chronic kidney disease) stage 3, GFR 30-59 ml/min (H)   Plan: status post kidney transplant      (D89.9) Immunosuppressed status (HCC)  Plan: On immunosuppressants and follows up with transplant clinic     BMI:   Estimated body mass index is 28.41 kg/m  as calculated from the following:    Height as of this encounter: 1.626 m (5' 4\").    Weight as of this encounter: 75.1 kg (165 lb 8 oz).         FUTURE APPOINTMENTS:       - Follow-up visit in 6 months        Summer Vega MD  [facility]        "

## 2019-10-30 NOTE — NURSING NOTE
"/62   Pulse 66   Temp 97.5  F (36.4  C) (Oral)   Resp 16   Ht 1.626 m (5' 4\")   Wt 75.1 kg (165 lb 8 oz)   SpO2 99%   BMI 28.41 kg/m    Patient in for follow up on HTN, lipids and DM2.  Socorro Perez CMA    "

## 2019-10-31 PROBLEM — N18.30 CKD (CHRONIC KIDNEY DISEASE) STAGE 3, GFR 30-59 ML/MIN (H): Status: ACTIVE | Noted: 2019-10-31

## 2019-10-31 RX ORDER — INSULIN GLARGINE 100 [IU]/ML
13 INJECTION, SOLUTION SUBCUTANEOUS AT BEDTIME
COMMUNITY
Start: 2019-10-31 | End: 2022-10-18

## 2019-11-03 ENCOUNTER — HEALTH MAINTENANCE LETTER (OUTPATIENT)
Age: 69
End: 2019-11-03

## 2019-11-20 DIAGNOSIS — Z79.4 TYPE 2 DIABETES MELLITUS WITH DIABETIC NEPHROPATHY, WITH LONG-TERM CURRENT USE OF INSULIN (H): ICD-10-CM

## 2019-11-20 DIAGNOSIS — E11.21 TYPE 2 DIABETES MELLITUS WITH DIABETIC NEPHROPATHY, WITH LONG-TERM CURRENT USE OF INSULIN (H): ICD-10-CM

## 2019-11-20 DIAGNOSIS — E78.5 HYPERLIPIDEMIA LDL GOAL <100: ICD-10-CM

## 2019-11-20 LAB
CHOLEST SERPL-MCNC: 174 MG/DL
HBA1C MFR BLD: 6.9 % (ref 0–5.6)
HDLC SERPL-MCNC: 53 MG/DL
LDLC SERPL CALC-MCNC: 97 MG/DL
NONHDLC SERPL-MCNC: 121 MG/DL
TRIGL SERPL-MCNC: 120 MG/DL

## 2019-11-20 PROCEDURE — 83036 HEMOGLOBIN GLYCOSYLATED A1C: CPT | Performed by: INTERNAL MEDICINE

## 2019-11-20 PROCEDURE — 36415 COLL VENOUS BLD VENIPUNCTURE: CPT | Performed by: INTERNAL MEDICINE

## 2019-11-20 PROCEDURE — 80061 LIPID PANEL: CPT | Performed by: INTERNAL MEDICINE

## 2019-11-25 DIAGNOSIS — Z48.298 AFTERCARE FOLLOWING ORGAN TRANSPLANT: ICD-10-CM

## 2019-11-25 DIAGNOSIS — Z79.899 ENCOUNTER FOR LONG-TERM CURRENT USE OF MEDICATION: ICD-10-CM

## 2019-11-25 DIAGNOSIS — Z94.0 KIDNEY REPLACED BY TRANSPLANT: ICD-10-CM

## 2019-11-25 LAB
ANION GAP SERPL CALCULATED.3IONS-SCNC: 5 MMOL/L (ref 3–14)
BUN SERPL-MCNC: 29 MG/DL (ref 7–30)
CALCIUM SERPL-MCNC: 9.7 MG/DL (ref 8.5–10.1)
CHLORIDE SERPL-SCNC: 104 MMOL/L (ref 94–109)
CO2 SERPL-SCNC: 28 MMOL/L (ref 20–32)
CREAT SERPL-MCNC: 1.38 MG/DL (ref 0.52–1.04)
CYCLOSPORINE BLD LC/MS/MS-MCNC: 51 UG/L (ref 50–400)
ERYTHROCYTE [DISTWIDTH] IN BLOOD BY AUTOMATED COUNT: 12.6 % (ref 10–15)
GFR SERPL CREATININE-BSD FRML MDRD: 38 ML/MIN/{1.73_M2}
GLUCOSE SERPL-MCNC: 312 MG/DL (ref 70–99)
HCT VFR BLD AUTO: 35.7 % (ref 35–47)
HGB BLD-MCNC: 11.4 G/DL (ref 11.7–15.7)
MCH RBC QN AUTO: 29.5 PG (ref 26.5–33)
MCHC RBC AUTO-ENTMCNC: 31.9 G/DL (ref 31.5–36.5)
MCV RBC AUTO: 92 FL (ref 78–100)
PLATELET # BLD AUTO: 233 10E9/L (ref 150–450)
POTASSIUM SERPL-SCNC: 4.2 MMOL/L (ref 3.4–5.3)
RBC # BLD AUTO: 3.87 10E12/L (ref 3.8–5.2)
SODIUM SERPL-SCNC: 138 MMOL/L (ref 133–144)
TME LAST DOSE: 1800 H
WBC # BLD AUTO: 4.2 10E9/L (ref 4–11)

## 2019-11-25 PROCEDURE — 80158 DRUG ASSAY CYCLOSPORINE: CPT | Performed by: INTERNAL MEDICINE

## 2019-11-26 DIAGNOSIS — Z48.298 AFTERCARE FOLLOWING ORGAN TRANSPLANT: Primary | ICD-10-CM

## 2019-12-02 ENCOUNTER — OFFICE VISIT (OUTPATIENT)
Dept: NEPHROLOGY | Facility: CLINIC | Age: 69
End: 2019-12-02
Attending: INTERNAL MEDICINE
Payer: MEDICARE

## 2019-12-02 VITALS
HEART RATE: 59 BPM | BODY MASS INDEX: 27.81 KG/M2 | SYSTOLIC BLOOD PRESSURE: 122 MMHG | OXYGEN SATURATION: 96 % | WEIGHT: 162 LBS | DIASTOLIC BLOOD PRESSURE: 73 MMHG | TEMPERATURE: 97.6 F

## 2019-12-02 DIAGNOSIS — Z23 ENCOUNTER FOR VACCINATION: ICD-10-CM

## 2019-12-02 DIAGNOSIS — Z94.0 KIDNEY TRANSPLANTED: Primary | ICD-10-CM

## 2019-12-02 PROCEDURE — G0463 HOSPITAL OUTPT CLINIC VISIT: HCPCS | Mod: ZF

## 2019-12-02 RX ORDER — CYCLOSPORINE 25 MG/1
CAPSULE ORAL
Qty: 150 CAPSULE | Refills: 11 | Status: SHIPPED | OUTPATIENT
Start: 2019-12-02 | End: 2020-12-02

## 2019-12-02 ASSESSMENT — PAIN SCALES - GENERAL: PAINLEVEL: NO PAIN (0)

## 2019-12-02 NOTE — NURSING NOTE
"Chief Complaint   Patient presents with     RECHECK     Follow up Kidney TX     Vital signs:  Temp: 97.6  F (36.4  C)   BP: 122/73 Pulse: 59     SpO2: 96 %       Weight: 73.5 kg (162 lb)  Estimated body mass index is 27.81 kg/m  as calculated from the following:    Height as of 10/30/19: 1.626 m (5' 4\").    Weight as of this encounter: 73.5 kg (162 lb).        Danielle Ashraf, COLLIN    "

## 2019-12-02 NOTE — PROGRESS NOTES
CHRONIC TRANSPLANT NEPHROLOGY VISIT    Assessment & Plan   # LDKT: Stable   - Baseline Cr ~ 1.2-1.3; stable   - Proteinuria: Normal (<0.2 grams)   - Date DSA Last Checked: May/2007      Latest DSA: No   - BK Viremia: No, last checked 06/2015   - Kidney Tx Biopsy: Yes, 2007 no rejection     # Immunosuppression: Cyclosporine (goal 50-75) and Mycophenolic acid (goal 540 mg bid)   - Changes: No    # Hypertension: Controlled;  Goal BP: < 130/80   - Changes: No     # Diabetes: Controlled (HbA1c <7%) Last HbA1c: 6.9%    # Mineral Bone Disorder:   - Secondary renal hyperparathyroidism; PTH level: Not checked recently  - Vitamin D; level: Not checked recently   - Calcium; level: Normal        - Phosphorus; level: Not checked recently        # Electrolytes:   - Potassium; level: Normal       - Magnesium; level: Not checked recently         - Bicarbonate; level: Normal        - Sodium; level: Normal       # Hx of coronary artery disease s/p remote stenting: Recommend periodic follow-up with cardiology.    # Skin Cancer Risk:    - Discussed sun protection and recommend regular follow up with Dermatology.    # Medical Compliance: Yes    # Transplant History:  Etiology of Kidney Failure: Diabetes mellitus  Tx: LDKT  Transplant: 12/27/2005 (Kidney)  Donor Type: Living Donor Class:   Significant changes in immunosuppression: None  Significant transplant-related complications: None    Transplant Office Phone Number: [phone]    Assessment and plan was discussed with the patient and she voiced her understanding and agreement.    Return visit: Return in about 1 year (around 12/2/2020).      Chief Complaint   Ms. Shaw is a 69 year old here for routine follow up.    History of Present Illness   Viv NORA Shaw is a 68 year old female with a history of end stage kidney disease secondary to diabetes mellitus and status post LDKT on 12/27/2005 who presents for follow-up. Patient was last evaluated by Dr. Harrell on 12/3/18; please see  that note for further details.     Patient denies any recent hospitalizations or new medical issues. Denies chest pain or shortness of breath with exertion. Energy level is stable. No recent changes in weight or appetite. She typically exercises by walking. No nausea, vomiting, or diarrhea. No fevers, chills, or sweats. No lower extremity edema. No recent UTIs. She notes that her cyclosporine was recently decreased. Has a history of skin cancer and has continued to regularly follow with dermatology.       Recent Hospitalizations:  [x] No [] Yes    New Medical Issues: [x] No [] Yes    Decreased energy: [x] No [] Yes    Chest pain or SOB with exertion:  [x] No [] Yes    Appetite change or weight change: [x] No [] Yes    Nausea, vomiting or diarrhea:  [x] No [] Yes    Fever, sweats or chills: [x] No [] Yes    Leg swelling: [x] No [] Yes      Home BP: Not checked     Review of Systems   A comprehensive review of systems was obtained and negative, except as noted in the HPI or PMH.    Problem List   Patient Active Problem List   Diagnosis     Other vitamin B12 deficiency anemia     Irritable bowel syndrome     GERD (gastroesophageal reflux disease)     Advanced directives, counseling/discussion     Kidney replaced by transplant     Immunosuppressed status (HCC)     Hyperlipidemia LDL goal <100     CAD S/P percutaneous coronary angioplasty     Anemia in chronic kidney disease     Other specified hypothyroidism     Coronary artery disease involving native heart without angina pectoris, unspecified vessel or lesion type     Type 2 diabetes mellitus with diabetic nephropathy, with long-term current use of insulin (H)     Hypertension secondary to other renal disorders     Hernia, incisional     CKD (chronic kidney disease) stage 3, GFR 30-59 ml/min (H)       Social History   Social History     Tobacco Use     Smoking status: Never Smoker     Smokeless tobacco: Never Used   Substance Use Topics     Alcohol use: No     Drug  "use: No       Allergies   Allergies   Allergen Reactions     Iron [Ferrous Sulfate] GI Disturbance       Medications   Current Outpatient Medications   Medication Sig     amLODIPine (NORVASC) 10 MG tablet Take 1 tablet (10 mg) by mouth daily     ASPIRIN PO Take 81 mg by mouth daily     atorvastatin (LIPITOR) 40 MG tablet Take 1 tablet (40 mg) by mouth daily     FLORIN CONTOUR test strip      BD INSULIN SYRINGE ULTRAFINE 31G X 5/16\" 0.3 ML USING 4-5 PER DAY ([employer] 31G/0.3ML)     calcium carbonate (TUMS) 500 MG chewable tablet Take 1-2 chew tab by mouth 2 times daily as needed for heartburn     chlorthalidone (HYGROTON) 25 MG tablet Take 1 tablet (25 mg) by mouth daily     GENGRAF (BRAND) 25 MG CAPSULE Take 3 capsules (75 mg) by mouth every morning AND 2 capsules (50 mg) every evening.     insulin aspart (NOVOLOG PEN) 100 UNIT/ML pen Taking as directed with adjustment scale and carb coverage.     insulin glargine (BASAGLAR KWIKPEN) 100 UNIT/ML pen Inject 14 Units Subcutaneous At Bedtime     isosorbide mononitrate (IMDUR) 30 MG 24 hr tablet TAKE 0.5 TABLETS (15 MG) BY MOUTH 2 TIMES DAILY     levothyroxine (SYNTHROID) 112 MCG tablet Take by mouth daily     metoprolol succinate ER (TOPROL-XL) 25 MG 24 hr tablet TAKE 1 TABLET BY MOUTH EVERYDAY AT BEDTIME     mycophenolic acid (GENERIC EQUIVALENT) 180 MG EC tablet Take 3 tablets (540 mg) by mouth 2 times daily     pantoprazole (PROTONIX) 40 MG EC tablet Take 1 tablet (40 mg) by mouth daily     valsartan (DIOVAN) 80 MG tablet Take 1 tablet (80 mg) by mouth daily     No current facility-administered medications for this visit.      Medications Discontinued During This Encounter   Medication Reason     GENGRAF (BRAND) 25 MG CAPSULE Reorder     losartan (COZAAR) 50 MG tablet      zoster vaccine recombinant adjuvanted (SHINGRIX) injection 0.5 mL        Physical Exam   Vital Signs: /73   Pulse 59   Temp 97.6  F (36.4  C)   Wt 73.5 kg (162 lb)   SpO2 96%   BMI 27.81 " kg/m      GENERAL APPEARANCE: alert and no distress  HENT: mouth without ulcers or lesions  LYMPHATICS: no cervical or supraclavicular nodes  RESP: lungs clear to auscultation - no rales, rhonchi or wheezes  CV: regular rhythm, normal rate, no rub, no murmur  EDEMA: no LE edema bilaterally  ABDOMEN: soft, nondistended, nontender, bowel sounds normal  MS: extremities normal - no gross deformities noted, no evidence of inflammation in joints, no muscle tenderness  SKIN: no rash  TX KIDNEY: normal  DIALYSIS ACCESS:  None      Data     Renal Latest Ref Rng & Units 11/25/2019 10/4/2019 9/19/2019   Na 133 - 144 mmol/L 138 139 140   Na (external) 135 - 146 mmol/L - - -   K 3.4 - 5.3 mmol/L 4.2 4.2 4.1   K (external) 3.5 - 5.3 mmol/L - - -   Cl 94 - 109 mmol/L 104 104 105   Cl (external) 98 - 110 mmol/L - - -   CO2 20 - 32 mmol/L 28 30 32   CO2 (external) 20 - 32 mmol/L - - -   BUN 7 - 30 mg/dL 29 32(H) 30   BUN (external) 7 - 25 mg/dL - - -   Cr 0.52 - 1.04 mg/dL 1.38(H) 1.32(H) 1.46(H)   Cr (external) 0.60 - 0.93 mg/dL - - -   Glucose 70 - 99 mg/dL 312(H) 209(H) 157(H)   Glucose (external) 65 - 99 mg/dL - - -   Ca  8.5 - 10.1 mg/dL 9.7 8.5 9.8   Ca (external) 8.6 - 10.4 mg/dL - - -   Mg 1.6 - 2.3 mg/dL - - -     Bone Health Latest Ref Rng & Units 10/19/2017 10/1/2008 6/4/2008   Phos 2.5 - 4.5 mg/dL 2.5 3.2 4.7(H)     Heme Latest Ref Rng & Units 11/25/2019 9/19/2019 8/1/2019   WBC 4.0 - 11.0 10e9/L 4.2 5.8 3.6(L)   Hgb 11.7 - 15.7 g/dL 11.4(L) 11.5(L) 11.4(L)   Plt 150 - 450 10e9/L 233 181 194     Liver Latest Ref Rng & Units 4/22/2019 4/2/2018 4/13/2017   AP 40 - 150 U/L 65 46 55   TBili 0.2 - 1.3 mg/dL 0.8 0.6 0.8   DBili 0.0 - 0.2 mg/dL 0.2 0.2 0.2   ALT 0 - 50 U/L 19 13 14   AST 0 - 45 U/L 10 12 8   Tot Protein 6.8 - 8.8 g/dL 7.5 6.7(L) 7.2   Albumin 3.4 - 5.0 g/dL 4.0 3.6 3.9     Pancreas Latest Ref Rng & Units 11/20/2019 4/22/2019 12/20/2018   A1C 0 - 5.6 % 6.9(H) 6.7(H) 7.2(H)   A1C (external) 4.0 - 6.0 % - - -    Amylase 30 - 110 U/L - - -   Lipase 73 - 393 U/L - - -     Iron studies Latest Ref Rng & Units 10/19/2017 12/5/2016 12/7/2015   Iron 35 - 180 ug/dL 29(L) 37 36   Iron sat 15 - 46 % 10(L) 13(L) 11(L)   Ferritin 8 - 252 ng/mL 181 130 55     [facility] Txp Virology Latest Ref Rng & Units 12/5/2016 6/18/2015 12/2/2013   CMV IgG EU/mL - - -   CVM DNA Quant - Plasma, EDTA anticoagulant - -   CMV Quant <100 Copies/mL - - -   CMV QT Log <2.0 Log copies/mL - - -   BK Spec - - Plasma Plasma, EDTA anticoagulant   BK Res BKNEG copies/mL - BK Virus DNA Not Detected <1000   BK Log <2.7 Log copies/mL - Not Calculated   The Real-Time quantitative BK Virus assay was developed and its performance   characteristics determined by the [facility] Diagnostic Laboratory at   the [facility] in [place], [place]. The   primers and probes for each analyte are Analyte Specific Reagents (ASRs)   manufactured by [employer].   ASRs are used in many laboratory tests necessary for standard medical care and   generally do not require U.S. Food and Drug Administration approval. The FDA   has determined that such clearance or approval is not necessary.   This test is used for clinical purposes. It should not be regarded as   investigational or for research. This laboratory is certified under the   Clinical Laboratory Improvement Amendments of 1988 (CLIA-88) as qualified to   perform high complexity clinical laboratory testing.   <3.0  The lower limit of detection for this assay is 1000 copies/mL.  Real-time TaqMan   PCR was performed using BK primers and probe for the detection of a 90 bp   portion of the  1 gene.  The performance characteristics were validated by   the [facility].   It has not been cleared or approved by the U.S. Food and Drug Administration.   EBV IgG - - - -   Hep B Core NEG - - -   Hep B Surf 0.0 - 4.9 mIU/mL - - -   HIV 1&2 NEG - - -            Recent Labs    Lab Test 09/10/15  0649 12/04/15  0650 04/07/16  0638   DOSMPA 1900 9/9/15 2000 12/3/15 19:00 04/6/16   MPACID 1.50 1.56 0.80*   MPAG 54.1 72.1 64.2       Scribe Disclosure:  I, Urbano Browne, am serving as a scribe to document services personally performed by Chapincito Subramanian MD at this visit, based upon the provider's statements to me. All documentation has been reviewed by the aforementioned provider prior to being entered into the official medical record.

## 2019-12-02 NOTE — LETTER
12/2/2019      RE: Viv Shaw  [address]  [address]  [place] [address]       CHRONIC TRANSPLANT NEPHROLOGY VISIT    Assessment & Plan   # LDKT: Stable   - Baseline Cr ~ 1.2-1.3; stable   - Proteinuria: Normal (<0.2 grams)   - Date DSA Last Checked: May/2007      Latest DSA: No   - BK Viremia: No, last checked 06/2015   - Kidney Tx Biopsy: Yes, 2007 no rejection     # Immunosuppression: Cyclosporine (goal 50-75) and Mycophenolic acid (goal 540 mg bid)   - Changes: No    # Hypertension: Controlled;  Goal BP: < 130/80   - Changes: No     # Diabetes: Controlled (HbA1c <7%) Last HbA1c: 6.9%    # Mineral Bone Disorder:   - Secondary renal hyperparathyroidism; PTH level: Not checked recently  - Vitamin D; level: Not checked recently   - Calcium; level: Normal        - Phosphorus; level: Not checked recently        # Electrolytes:   - Potassium; level: Normal       - Magnesium; level: Not checked recently         - Bicarbonate; level: Normal        - Sodium; level: Normal       # Hx of coronary artery disease s/p remote stenting: Recommend periodic follow-up with cardiology.    # Skin Cancer Risk:    - Discussed sun protection and recommend regular follow up with Dermatology.    # Medical Compliance: Yes    # Transplant History:  Etiology of Kidney Failure: Diabetes mellitus  Tx: LDKT  Transplant: 12/27/2005 (Kidney)  Donor Type: Living Donor Class:   Significant changes in immunosuppression: None  Significant transplant-related complications: None    Transplant Office Phone Number: [phone]    Assessment and plan was discussed with the patient and she voiced her understanding and agreement.    Return visit: Return in about 1 year (around 12/2/2020).      Chief Complaint   Ms. Shaw is a 69 year old here for routine follow up.    History of Present Illness   Viv Shaw is a 68 year old female with a history of end stage kidney disease secondary to diabetes mellitus and status post  LDKT on 12/27/2005 who presents for follow-up.  Patient was last evaluated by Dr. Harrell on 12/3/18; please see that note for further details.     Patient denies any recent hospitalizations or new medical issues. Denies chest pain or shortness of breath with exertion. Energy level is stable. No recent changes in weight or appetite. She typically exercises by walking. No nausea, vomiting, or diarrhea. No fevers, chills, or sweats. No lower extremity edema. No recent UTIs. She notes that her cyclosporine was recently decreased. Has a history of skin cancer and has continued to regularly follow with dermatology.       Recent Hospitalizations:  [x] No [] Yes    New Medical Issues: [x] No [] Yes    Decreased energy: [x] No [] Yes    Chest pain or SOB with exertion:  [x] No [] Yes    Appetite change or weight change: [x] No [] Yes    Nausea, vomiting or diarrhea:  [x] No [] Yes    Fever, sweats or chills: [x] No [] Yes    Leg swelling: [x] No [] Yes      Home BP: Not checked     Review of Systems   A comprehensive review of systems was obtained and negative, except as noted in the HPI or PMH.    Problem List   Patient Active Problem List   Diagnosis     Other vitamin B12 deficiency anemia     Irritable bowel syndrome     GERD (gastroesophageal reflux disease)     Advanced directives, counseling/discussion     Kidney replaced by transplant     Immunosuppressed status (HCC)     Hyperlipidemia LDL goal <100     CAD S/P percutaneous coronary angioplasty     Anemia in chronic kidney disease     Other specified hypothyroidism     Coronary artery disease involving native heart without angina pectoris, unspecified vessel or lesion type     Type 2 diabetes mellitus with diabetic nephropathy, with long-term current use of insulin (H)     Hypertension secondary to other renal disorders     Hernia, incisional     CKD (chronic kidney disease) stage 3, GFR 30-59 ml/min (H)       Social History   Social History     Tobacco Use      "Smoking status: Never Smoker     Smokeless tobacco: Never Used   Substance Use Topics     Alcohol use: No     Drug use: No       Allergies   Allergies   Allergen Reactions     Iron [Ferrous Sulfate] GI Disturbance       Medications   Current Outpatient Medications   Medication Sig     amLODIPine (NORVASC) 10 MG tablet Take 1 tablet (10 mg) by mouth daily     ASPIRIN PO Take 81 mg by mouth daily     atorvastatin (LIPITOR) 40 MG tablet Take 1 tablet (40 mg) by mouth daily     FLORIN CONTOUR test strip      BD INSULIN SYRINGE ULTRAFINE 31G X 5/16\" 0.3 ML USING 4-5 PER DAY ([employer] 31G/0.3ML)     calcium carbonate (TUMS) 500 MG chewable tablet Take 1-2 chew tab by mouth 2 times daily as needed for heartburn     chlorthalidone (HYGROTON) 25 MG tablet Take 1 tablet (25 mg) by mouth daily     GENGRAF (BRAND) 25 MG CAPSULE Take 3 capsules (75 mg) by mouth every morning AND 2 capsules (50 mg) every evening.     insulin aspart (NOVOLOG PEN) 100 UNIT/ML pen Taking as directed with adjustment scale and carb coverage.     insulin glargine (BASAGLAR KWIKPEN) 100 UNIT/ML pen Inject 14 Units Subcutaneous At Bedtime     isosorbide mononitrate (IMDUR) 30 MG 24 hr tablet TAKE 0.5 TABLETS (15 MG) BY MOUTH 2 TIMES DAILY     levothyroxine (SYNTHROID) 112 MCG tablet Take by mouth daily     metoprolol succinate ER (TOPROL-XL) 25 MG 24 hr tablet TAKE 1 TABLET BY MOUTH EVERYDAY AT BEDTIME     mycophenolic acid (GENERIC EQUIVALENT) 180 MG EC tablet Take 3 tablets (540 mg) by mouth 2 times daily     pantoprazole (PROTONIX) 40 MG EC tablet Take 1 tablet (40 mg) by mouth daily     valsartan (DIOVAN) 80 MG tablet Take 1 tablet (80 mg) by mouth daily     No current facility-administered medications for this visit.      Medications Discontinued During This Encounter   Medication Reason     GENGRAF (BRAND) 25 MG CAPSULE Reorder     losartan (COZAAR) 50 MG tablet      zoster vaccine recombinant adjuvanted (SHINGRIX) injection 0.5 mL        Physical " Exam   Vital Signs: /73   Pulse 59   Temp 97.6  F (36.4  C)   Wt 73.5 kg (162 lb)   SpO2 96%   BMI 27.81 kg/m       GENERAL APPEARANCE: alert and no distress  HENT: mouth without ulcers or lesions  LYMPHATICS: no cervical or supraclavicular nodes  RESP: lungs clear to auscultation - no rales, rhonchi or wheezes  CV: regular rhythm, normal rate, no rub, no murmur  EDEMA: no LE edema bilaterally  ABDOMEN: soft, nondistended, nontender, bowel sounds normal  MS: extremities normal - no gross deformities noted, no evidence of inflammation in joints, no muscle tenderness  SKIN: no rash  TX KIDNEY: normal  DIALYSIS ACCESS:  None      Data     Renal Latest Ref Rng & Units 11/25/2019 10/4/2019 9/19/2019   Na 133 - 144 mmol/L 138 139 140   Na (external) 135 - 146 mmol/L - - -   K 3.4 - 5.3 mmol/L 4.2 4.2 4.1   K (external) 3.5 - 5.3 mmol/L - - -   Cl 94 - 109 mmol/L 104 104 105   Cl (external) 98 - 110 mmol/L - - -   CO2 20 - 32 mmol/L 28 30 32   CO2 (external) 20 - 32 mmol/L - - -   BUN 7 - 30 mg/dL 29 32(H) 30   BUN (external) 7 - 25 mg/dL - - -   Cr 0.52 - 1.04 mg/dL 1.38(H) 1.32(H) 1.46(H)   Cr (external) 0.60 - 0.93 mg/dL - - -   Glucose 70 - 99 mg/dL 312(H) 209(H) 157(H)   Glucose (external) 65 - 99 mg/dL - - -   Ca  8.5 - 10.1 mg/dL 9.7 8.5 9.8   Ca (external) 8.6 - 10.4 mg/dL - - -   Mg 1.6 - 2.3 mg/dL - - -     Bone Health Latest Ref Rng & Units 10/19/2017 10/1/2008 6/4/2008   Phos 2.5 - 4.5 mg/dL 2.5 3.2 4.7(H)     Heme Latest Ref Rng & Units 11/25/2019 9/19/2019 8/1/2019   WBC 4.0 - 11.0 10e9/L 4.2 5.8 3.6(L)   Hgb 11.7 - 15.7 g/dL 11.4(L) 11.5(L) 11.4(L)   Plt 150 - 450 10e9/L 233 181 194     Liver Latest Ref Rng & Units 4/22/2019 4/2/2018 4/13/2017   AP 40 - 150 U/L 65 46 55   TBili 0.2 - 1.3 mg/dL 0.8 0.6 0.8   DBili 0.0 - 0.2 mg/dL 0.2 0.2 0.2   ALT 0 - 50 U/L 19 13 14   AST 0 - 45 U/L 10 12 8   Tot Protein 6.8 - 8.8 g/dL 7.5 6.7(L) 7.2   Albumin 3.4 - 5.0 g/dL 4.0 3.6 3.9     Pancreas Latest Ref Rng  & Units 11/20/2019 4/22/2019 12/20/2018   A1C 0 - 5.6 % 6.9(H) 6.7(H) 7.2(H)   A1C (external) 4.0 - 6.0 % - - -   Amylase 30 - 110 U/L - - -   Lipase 73 - 393 U/L - - -     Iron studies Latest Ref Rng & Units 10/19/2017 12/5/2016 12/7/2015   Iron 35 - 180 ug/dL 29(L) 37 36   Iron sat 15 - 46 % 10(L) 13(L) 11(L)   Ferritin 8 - 252 ng/mL 181 130 55     [facility] Txp Virology Latest Ref Rng & Units 12/5/2016 6/18/2015 12/2/2013   CMV IgG EU/mL - - -   CVM DNA Quant - Plasma, EDTA anticoagulant - -   CMV Quant <100 Copies/mL - - -   CMV QT Log <2.0 Log copies/mL - - -   BK Spec - - Plasma Plasma, EDTA anticoagulant   BK Res BKNEG copies/mL - BK Virus DNA Not Detected <1000   BK Log <2.7 Log copies/mL - Not Calculated   The Real-Time quantitative BK Virus assay was developed and its performance   characteristics determined by the Infectious Diseases Diagnostic Laboratory at   the [facility] in [place], [place]. The   primers and probes for each analyte are Analyte Specific Reagents (ASRs)   manufactured by [employer].   ASRs are used in many laboratory tests necessary for standard medical care and   generally do not require U.S. Food and Drug Administration approval. The FDA   has determined that such clearance or approval is not necessary.   This test is used for clinical purposes. It should not be regarded as   investigational or for research. This laboratory is certified under the   Clinical Laboratory Improvement Amendments of 1988 (CLIA-88) as qualified to   perform high complexity clinical laboratory testing.   <3.0  The lower limit of detection for this assay is 1000 copies/mL.  Real-time TaqMan   PCR was performed using BK primers and probe for the detection of a 90 bp   portion of the  1 gene.  The performance characteristics were validated by   the [facility].   It has not been cleared or approved by the U.S. Food and Drug Administration.   EBV IgG  - - - -   Hep B Core NEG - - -   Hep B Surf 0.0 - 4.9 mIU/mL - - -   HIV 1&2 NEG - - -            Recent Labs   Lab Test 09/10/15  0649 12/04/15  0650 04/07/16  0638   DOSMPA 1900 9/9/15 2000 12/3/15 19:00 04/6/16   MPACID 1.50 1.56 0.80*   MPAG 54.1 72.1 64.2       Scribe Disclosure:  I, Urbano Browne, am serving as a scribe to document services personally performed by Chapincito Subramanian MD at this visit, based upon the provider's statements to me. All documentation has been reviewed by the aforementioned provider prior to being entered into the official medical record.    Kidney/Pancreas Recipient

## 2019-12-02 NOTE — NURSING NOTE
Viv Shaw was seen today in clinic by this writer. Medications, lab orders, lab frequency, and necessary follow up discussed with patient. Patient was provided with a copy of the current lab letter. Patient voiced understanding and agreement of education and plan.  Per Viv Subramanian increased CSA dose. Dr. Subramanian wants it above 75. Recommended checking CSA level in 1 -2 weeks. Verbalized understanding.    Pina Reyes RN

## 2019-12-17 DIAGNOSIS — Z94.0 KIDNEY TRANSPLANTED: ICD-10-CM

## 2019-12-17 LAB
CYCLOSPORINE BLD LC/MS/MS-MCNC: 73 UG/L (ref 50–400)
TME LAST DOSE: NORMAL H

## 2019-12-17 PROCEDURE — 80158 DRUG ASSAY CYCLOSPORINE: CPT | Performed by: INTERNAL MEDICINE

## 2020-01-31 NOTE — NURSING NOTE
"Chief Complaint   Patient presents with     Hernia     Post op 10/16/17       Initial /69  Pulse 64  Temp 98  F (36.7  C) (Oral)  Resp 16  SpO2 100% Estimated body mass index is 30.42 kg/(m^2) as calculated from the following:    Height as of 10/16/17: 1.626 m (5' 4\").    Weight as of 10/22/17: 80.4 kg (177 lb 3.2 oz).      "
no

## 2020-02-10 ENCOUNTER — HEALTH MAINTENANCE LETTER (OUTPATIENT)
Age: 70
End: 2020-02-10

## 2020-02-10 DIAGNOSIS — E78.5 HYPERLIPIDEMIA LDL GOAL <100: ICD-10-CM

## 2020-02-11 NOTE — TELEPHONE ENCOUNTER
"Requested Prescriptions   Pending Prescriptions Disp Refills     atorvastatin (LIPITOR) 40 MG tablet [Pharmacy Med Name: ATORVASTATIN 40 MG  Last Written Prescription Date:  10/30/2019  Last Fill Quantity: 90,  # refills: 0   Last office visit: 10/30/2019 with prescribing provider:     Future Office Visit:   TABLET] 90 tablet 0     Sig: TAKE 1 TABLET BY MOUTH EVERY DAY       Statins Protocol Passed - 2/10/2020 12:04 PM        Passed - LDL on file in past 12 months     Recent Labs   Lab Test 11/20/19  0803   LDL 97             Passed - No abnormal creatine kinase in past 12 months     No lab results found.             Passed - Recent (12 mo) or future (30 days) visit within the authorizing provider's specialty     Patient has had an office visit with the authorizing provider or a provider within the authorizing providers department within the previous 12 mos or has a future within next 30 days. See \"Patient Info\" tab in inbasket, or \"Choose Columns\" in Meds & Orders section of the refill encounter.              Passed - Medication is active on med list        Passed - Patient is age 18 or older        Passed - No active pregnancy on record        Passed - No positive pregnancy test in past 12 months        "

## 2020-02-12 RX ORDER — ATORVASTATIN CALCIUM 40 MG/1
20 TABLET, FILM COATED ORAL DAILY
Qty: 90 TABLET | Refills: 0 | Status: SHIPPED | OUTPATIENT
Start: 2020-02-12 | End: 2020-03-24

## 2020-02-12 NOTE — TELEPHONE ENCOUNTER
Please inform patient I have decreased her Lipitor dose to 20 mg as she is taking cyclosporine which can have interactions and so the reduced dose is recommended.

## 2020-02-12 NOTE — TELEPHONE ENCOUNTER
Patient notified and expressed understanding.     Patient stated that the pharmacist gave her losartan 50mg in place of the valsartan 80mg.   Socorro Perez CMA

## 2020-02-12 NOTE — TELEPHONE ENCOUNTER
Why did the pharmacist give losartan instead of valsartan ? Is valsartan not available ?, if it is not available then it is ok to use losartan

## 2020-02-13 NOTE — TELEPHONE ENCOUNTER
Valsartan not available per patient and patient's . Plans to make follow up appointment in March when back in [place].

## 2020-03-24 ENCOUNTER — VIRTUAL VISIT (OUTPATIENT)
Dept: INTERNAL MEDICINE | Facility: CLINIC | Age: 70
End: 2020-03-24
Payer: MEDICARE

## 2020-03-24 DIAGNOSIS — E78.5 HYPERLIPIDEMIA LDL GOAL <100: ICD-10-CM

## 2020-03-24 DIAGNOSIS — N18.30 CKD (CHRONIC KIDNEY DISEASE) STAGE 3, GFR 30-59 ML/MIN (H): ICD-10-CM

## 2020-03-24 DIAGNOSIS — E11.21 TYPE 2 DIABETES MELLITUS WITH DIABETIC NEPHROPATHY, WITH LONG-TERM CURRENT USE OF INSULIN (H): ICD-10-CM

## 2020-03-24 DIAGNOSIS — I15.1 HYPERTENSION SECONDARY TO OTHER RENAL DISORDERS: Primary | ICD-10-CM

## 2020-03-24 DIAGNOSIS — K21.9 GASTROESOPHAGEAL REFLUX DISEASE WITHOUT ESOPHAGITIS: ICD-10-CM

## 2020-03-24 DIAGNOSIS — D84.9 IMMUNOSUPPRESSED STATUS (H): ICD-10-CM

## 2020-03-24 DIAGNOSIS — Z79.4 TYPE 2 DIABETES MELLITUS WITH DIABETIC NEPHROPATHY, WITH LONG-TERM CURRENT USE OF INSULIN (H): ICD-10-CM

## 2020-03-24 DIAGNOSIS — I25.10 CORONARY ARTERY DISEASE INVOLVING NATIVE HEART WITHOUT ANGINA PECTORIS, UNSPECIFIED VESSEL OR LESION TYPE: ICD-10-CM

## 2020-03-24 PROCEDURE — G2012 BRIEF CHECK IN BY MD/QHP: HCPCS | Performed by: INTERNAL MEDICINE

## 2020-03-24 RX ORDER — METOPROLOL SUCCINATE 25 MG/1
TABLET, EXTENDED RELEASE ORAL
Qty: 90 TABLET | Refills: 1 | Status: SHIPPED | OUTPATIENT
Start: 2020-03-24 | End: 2020-12-11

## 2020-03-24 RX ORDER — ATORVASTATIN CALCIUM 40 MG/1
20 TABLET, FILM COATED ORAL DAILY
Qty: 45 TABLET | Refills: 1 | Status: SHIPPED | OUTPATIENT
Start: 2020-03-24 | End: 2020-10-06

## 2020-03-24 RX ORDER — CHLORTHALIDONE 25 MG/1
25 TABLET ORAL DAILY
Qty: 90 TABLET | Refills: 1 | Status: SHIPPED | OUTPATIENT
Start: 2020-03-24 | End: 2020-12-06

## 2020-03-24 RX ORDER — PANTOPRAZOLE SODIUM 40 MG/1
40 TABLET, DELAYED RELEASE ORAL DAILY
Qty: 90 TABLET | Refills: 1 | Status: SHIPPED | OUTPATIENT
Start: 2020-03-24 | End: 2021-01-04

## 2020-03-24 RX ORDER — LOSARTAN POTASSIUM 50 MG/1
50 TABLET ORAL DAILY
Qty: 90 TABLET | Refills: 1 | Status: SHIPPED | OUTPATIENT
Start: 2020-03-24 | End: 2020-07-08

## 2020-03-24 RX ORDER — AMLODIPINE BESYLATE 10 MG/1
10 TABLET ORAL DAILY
Qty: 90 TABLET | Refills: 1 | Status: SHIPPED | OUTPATIENT
Start: 2020-03-24 | End: 2020-10-06

## 2020-03-24 NOTE — PROGRESS NOTES
Subjective     Viv Shaw is a 69 year old female who presents.  for phone visit today for the following health issues:    Phone visit 08;09  am - 8:21 am        [facility]   Diabetes Follow-up    How often are you checking your blood sugar? Continuous glucose monitor, BS- averages 150   What time of day are you checking your blood sugars (select all that apply)?  Before meals, After meals, Before and after meals and At bedtime  Have you had any blood sugars above 200?  Yes   Have you had any blood sugars below 70?  Yes     What symptoms do you notice when your blood sugar is low?  None    What concerns do you have today about your diabetes? None     Do you have any of these symptoms? (Select all that apply)  No numbness or tingling in feet.  No redness, sores or blisters on feet.  No complaints of excessive thirst.  No reports of blurry vision.  No significant changes to weight.  Last eye exan 08/19        Hyperlipidemia Follow-Up      Are you regularly taking any medication or supplement to lower your cholesterol?   Yes- atorvastatin    Are you having muscle aches or other side effects that you think could be caused by your cholesterol lowering medication?  No      Hypertension Follow-up      Do you check your blood pressure regularly outside of the clinic? Yes 133/70    Are you following a low salt diet? Yes    Are your blood pressures ever more than 140 on the top number (systolic) OR more   than 90 on the bottom number (diastolic), for example 140/90? No      Chronic Kidney Disease Follow-up/S/P kidney transplant       Do you take any over the counter pain medicine?: No        How many servings of fruits and vegetables do you eat daily?  0-1    On average, how many sweetened beverages do you drink each day (Examples: soda, juice, sweet tea, etc.  Do NOT count diet or artificially sweetened beverages)?   0    How many days per week do you exercise enough to make your heart beat faster? 3 or less    How many  minutes a day do you exercise enough to make your heart beat faster? 9 or less    How many days per week do you miss taking your medication? 0       Patient Active Problem List   Diagnosis     Other vitamin B12 deficiency anemia     Irritable bowel syndrome     GERD (gastroesophageal reflux disease)     Advanced directives, counseling/discussion     Kidney replaced by transplant     Immunosuppressed status (HCC)     Hyperlipidemia LDL goal <100     CAD S/P percutaneous coronary angioplasty     Anemia in chronic kidney disease     Other specified hypothyroidism     Coronary artery disease involving native heart without angina pectoris, unspecified vessel or lesion type     Type 2 diabetes mellitus with diabetic nephropathy, with long-term current use of insulin (H)     Hypertension secondary to other renal disorders     Hernia, incisional     CKD (chronic kidney disease) stage 3, GFR 30-59 ml/min (H)     Past Surgical History:   Procedure Laterality Date     APPENDECTOMY NOS       ARTHROSCOPY SHOULDER ROTATOR CUFF REPAIR Left      COLONOSCOPY N/A 6/7/2016    Procedure: COLONOSCOPY;  Surgeon: Rashard Snider MD;  Location: [facility]     Coronary angioplasty with stent  2005     HYSTERECTOMY       Kidney Transplant NOS Right      LAPAROSCOPIC CHOLECYSTECTOMY       NOSE SURGERY  2013     OPEN SEPARATION COMPONENT HERNIORRHAPHY N/A 10/16/2017    Procedure: OPEN SEPARATION COMPONENT HERNIORRHAPHY;;  Surgeon: CARL Lott MD;  Location: [facility]     RECONSTRUCT ABDOMINAL WALL N/A 10/16/2017    Procedure: RECONSTRUCT ABDOMINAL WALL;  Exploratory Laparotomy, Lysis of Adhesions, Abdominal Wall reconstruction, Inlay Xen AB mesh, Mitek Suture [place] and Umbilical Hernia Repair.;  Surgeon: CARL Lott MD;  Location: [facility] OR     REMOVE CATARACT INTRACAP,INSERT LENS Right      TONSILLECTOMY         Social History     Tobacco Use     Smoking status: Never Smoker     Smokeless tobacco: Never Used   Substance Use  "Topics     Alcohol use: No     Family History   Problem Relation Age of Onset     Respiratory Mother          age 71     Cardiovascular Father          age 75 hyertension     Arthritis Sister         living, healthy     Family History Negative Brother          age 44     Colon Cancer No family hx of          Current Outpatient Medications   Medication Sig Dispense Refill     amLODIPine (NORVASC) 10 MG tablet Take 1 tablet (10 mg) by mouth daily 90 tablet 1     ASPIRIN PO Take 81 mg by mouth daily       atorvastatin (LIPITOR) 40 MG tablet Take 0.5 tablets (20 mg) by mouth daily 45 tablet 1     FLORIN CONTOUR test strip   0     BD INSULIN SYRINGE ULTRAFINE 31G X 5/16\" 0.3 ML USING 4-5 PER DAY ([facility] 31G/0.3ML)  6     calcium carbonate (TUMS) 500 MG chewable tablet Take 1-2 chew tab by mouth 2 times daily as needed for heartburn       chlorthalidone (HYGROTON) 25 MG tablet Take 1 tablet (25 mg) by mouth daily 90 tablet 1     GENGRAF (BRAND) 25 MG CAPSULE Take 3 capsules (75 mg) by mouth every morning AND 2 capsules (50 mg) every evening. 150 capsule 11     insulin aspart (NOVOLOG PEN) 100 UNIT/ML pen Taking as directed with adjustment scale and carb coverage.       insulin glargine (BASAGLAR KWIKPEN) 100 UNIT/ML pen Inject 14 Units Subcutaneous At Bedtime       isosorbide mononitrate (IMDUR) 30 MG 24 hr tablet TAKE 0.5 TABLETS (15 MG) BY MOUTH 2 TIMES DAILY 90 tablet 1     levothyroxine (SYNTHROID) 112 MCG tablet Take by mouth daily       losartan (COZAAR) 50 MG tablet Take 1 tablet (50 mg) by mouth daily 90 tablet 1     metoprolol succinate ER (TOPROL-XL) 25 MG 24 hr tablet TAKE 1 TABLET BY MOUTH EVERYDAY AT BEDTIME 90 tablet 1     mycophenolic acid (GENERIC EQUIVALENT) 180 MG EC tablet Take 3 tablets (540 mg) by mouth 2 times daily 180 tablet 11     pantoprazole (PROTONIX) 40 MG EC tablet Take 1 tablet (40 mg) by mouth daily 90 tablet 1          Reviewed and updated as needed this visit by " Provider                 Assessment & Plan     (I15.1,  N28.89) Hypertension secondary to other renal disorders  (primary encounter diagnosis)  Comment: Blood pressures have been stable at home  Plan: Patient states that due to the unavailability of on the valsartan was changed to losartan (COZAAR) 50 MG tablet while she was in [place], medication refilled also refilled amLODIPine (NORVASC) 10 MG tablet, chlorthalidone         (HYGROTON) 25 MG tablet, metoprolol succinate         ER (TOPROL-XL) 25 MG 24 hr tablet        explained clearly about the medications,insructions and side effects.      (E78.5) Hyperlipidemia LDL goal <100  Plan: atorvastatin (LIPITOR) 40 MG tablet refilled.explained clearly about the medication,insructions and side effects.               (I25.10) Coronary artery disease involving native heart without angina pectoris, unspecified vessel or lesion type  Comment: Stable  Plan: metoprolol succinate ER (TOPROL-XL) 25 MG 24 hr        tablet           (K21.9) Gastroesophageal reflux disease without esophagitis  Plan: Stable on pantoprazole (PROTONIX) 40 MG EC tablet refilled            (D89.9) Immunosuppressed status (H)  Plan: Follows up with transplant clinic    (E11.21,  Z79.4) Type 2 diabetes mellitus with diabetic nephropathy, with long-term current use of insulin (H)  Plan: Sees endocrinologist, on insulin, check hemoglobin A1c            (N18.3) CKD (chronic kidney disease) stage 3, GFR 30-59 ml/min (H)  Kidney transplant status/immunosuppressant status, follows up with transplant clinic    Summer Vega MD  [facility]

## 2020-03-27 DIAGNOSIS — E11.21 TYPE 2 DIABETES MELLITUS WITH DIABETIC NEPHROPATHY, WITH LONG-TERM CURRENT USE OF INSULIN (H): ICD-10-CM

## 2020-03-27 DIAGNOSIS — Z79.4 TYPE 2 DIABETES MELLITUS WITH DIABETIC NEPHROPATHY, WITH LONG-TERM CURRENT USE OF INSULIN (H): ICD-10-CM

## 2020-03-27 DIAGNOSIS — Z48.298 AFTERCARE FOLLOWING ORGAN TRANSPLANT: ICD-10-CM

## 2020-03-27 DIAGNOSIS — Z94.0 KIDNEY REPLACED BY TRANSPLANT: ICD-10-CM

## 2020-03-27 DIAGNOSIS — Z79.899 ENCOUNTER FOR LONG-TERM CURRENT USE OF MEDICATION: ICD-10-CM

## 2020-03-27 LAB
ANION GAP SERPL CALCULATED.3IONS-SCNC: 3 MMOL/L (ref 3–14)
BUN SERPL-MCNC: 29 MG/DL (ref 7–30)
CALCIUM SERPL-MCNC: 9 MG/DL (ref 8.5–10.1)
CHLORIDE SERPL-SCNC: 108 MMOL/L (ref 94–109)
CO2 SERPL-SCNC: 29 MMOL/L (ref 20–32)
CREAT SERPL-MCNC: 1.41 MG/DL (ref 0.52–1.04)
CREAT UR-MCNC: 114 MG/DL
CYCLOSPORINE BLD LC/MS/MS-MCNC: 77 UG/L (ref 50–400)
ERYTHROCYTE [DISTWIDTH] IN BLOOD BY AUTOMATED COUNT: 13.7 % (ref 10–15)
GFR SERPL CREATININE-BSD FRML MDRD: 37 ML/MIN/{1.73_M2}
GLUCOSE SERPL-MCNC: 142 MG/DL (ref 70–99)
HBA1C MFR BLD: 6.8 % (ref 0–5.6)
HCT VFR BLD AUTO: 37.1 % (ref 35–47)
HGB BLD-MCNC: 11.4 G/DL (ref 11.7–15.7)
MCH RBC QN AUTO: 28.6 PG (ref 26.5–33)
MCHC RBC AUTO-ENTMCNC: 30.7 G/DL (ref 31.5–36.5)
MCV RBC AUTO: 93 FL (ref 78–100)
PLATELET # BLD AUTO: 230 10E9/L (ref 150–450)
POTASSIUM SERPL-SCNC: 4.3 MMOL/L (ref 3.4–5.3)
PROT UR-MCNC: 0.13 G/L
PROT/CREAT 24H UR: 0.11 G/G CR (ref 0–0.2)
RBC # BLD AUTO: 3.98 10E12/L (ref 3.8–5.2)
SODIUM SERPL-SCNC: 140 MMOL/L (ref 133–144)
TME LAST DOSE: 2030 H
WBC # BLD AUTO: 6.3 10E9/L (ref 4–11)

## 2020-03-27 PROCEDURE — 80158 DRUG ASSAY CYCLOSPORINE: CPT | Performed by: INTERNAL MEDICINE

## 2020-04-23 DIAGNOSIS — Z94.0 KIDNEY REPLACED BY TRANSPLANT: Primary | ICD-10-CM

## 2020-04-23 RX ORDER — MYCOPHENOLIC ACID 180 MG/1
540 TABLET, DELAYED RELEASE ORAL 2 TIMES DAILY
Qty: 180 TABLET | Refills: 11 | Status: SHIPPED | OUTPATIENT
Start: 2020-04-23 | End: 2020-12-02

## 2020-05-04 ENCOUNTER — ALLIED HEALTH/NURSE VISIT (OUTPATIENT)
Dept: PHARMACY | Facility: CLINIC | Age: 70
End: 2020-05-04
Payer: COMMERCIAL

## 2020-05-04 DIAGNOSIS — E11.21 TYPE 2 DIABETES MELLITUS WITH DIABETIC NEPHROPATHY, WITH LONG-TERM CURRENT USE OF INSULIN (H): ICD-10-CM

## 2020-05-04 DIAGNOSIS — K21.9 GASTROESOPHAGEAL REFLUX DISEASE WITHOUT ESOPHAGITIS: ICD-10-CM

## 2020-05-04 DIAGNOSIS — M85.80 OSTEOPENIA, UNSPECIFIED LOCATION: ICD-10-CM

## 2020-05-04 DIAGNOSIS — Z94.0 KIDNEY REPLACED BY TRANSPLANT: ICD-10-CM

## 2020-05-04 DIAGNOSIS — E03.8 OTHER SPECIFIED HYPOTHYROIDISM: ICD-10-CM

## 2020-05-04 DIAGNOSIS — E78.5 HYPERLIPIDEMIA LDL GOAL <100: ICD-10-CM

## 2020-05-04 DIAGNOSIS — Z71.85 VACCINE COUNSELING: ICD-10-CM

## 2020-05-04 DIAGNOSIS — I15.1 HYPERTENSION SECONDARY TO OTHER RENAL DISORDERS: Primary | ICD-10-CM

## 2020-05-04 DIAGNOSIS — I25.10 CORONARY ARTERY DISEASE INVOLVING NATIVE HEART WITHOUT ANGINA PECTORIS, UNSPECIFIED VESSEL OR LESION TYPE: ICD-10-CM

## 2020-05-04 DIAGNOSIS — Z79.4 TYPE 2 DIABETES MELLITUS WITH DIABETIC NEPHROPATHY, WITH LONG-TERM CURRENT USE OF INSULIN (H): ICD-10-CM

## 2020-05-04 PROCEDURE — 99607 MTMS BY PHARM ADDL 15 MIN: CPT | Performed by: PHARMACIST

## 2020-05-04 PROCEDURE — 99605 MTMS BY PHARM NP 15 MIN: CPT | Performed by: PHARMACIST

## 2020-05-04 NOTE — PATIENT INSTRUCTIONS
Recommendations from today's MTM visit:                                                      1. Follow up with your nephrologist as scheduled, discuss Shingrix vaccine.    2. Follow up with your endocrinologist as scheduled, consider discussing your frequency of low blood sugars.    3. Dexa results: Osteopenia, 10 year risk of major osteoporotic fracture was 15%, risk for hip fracture was 3% (meeting goal). Continue Vitamin D and calcium to protect bones.    It was great to speak with you today.  I value your experience and would be very thankful for your time with providing feedback on our clinic survey. You may receive a survey via email or text message in the next few days.     Next MTM visit: 1 year    To schedule another MTM appointment, please call the clinic directly or you may call the MTM scheduling line at [phone] or toll-free at [phone].     My Clinical Pharmacist's contact information:                                                      It was a pleasure talking with you today!  Please feel free to contact me with any questions or concerns you have.      Maribel HaleD  PGY1 Medication Therapy Management Resident  Voicemail: [phone]    Maribel Harris  [phone] (phone)  [phone] (pager)  Medication Therapy Management Pharmacist

## 2020-05-04 NOTE — PROGRESS NOTES
MTM ENCOUNTER  SUBJECTIVE/OBJECTIVE:                           Viv Shaw is a 69 year old female called for a follow-up visit. She was referred to me from [facility].  Today's visit is a follow-up MTM visit from 4/16/19.  Initial Visit for 2020.     Patient consented to a telehealth visit: yes  Telemedicine Visit Details  Type of service:  Telephone visit  Start Time: 10:04 AM  End Time: 10:39 AM  Originating Location (pt. Location): Home  Distant Location (provider location):  [facility]  Mode of Communication:  Telephone    Chief Complaint: none.    Allergies/ADRs: Reviewed in Epic  Tobacco:  reports that she has never smoked. She has never used smokeless tobacco.  Alcohol: none  Caffeine: no caffeine  Activity: Walking 20 min daily.  PMH: Reviewed in Epic      Medication Adherence/Access:  Patient uses pill box(es).  helps with medication set up.  Patient takes medications 2 time(s) per day.   Per patient, misses medication 0 times per week.   Medication barriers: insulin expensive, ordering from [facility].  The patient fills medications at [facility]: NO, fills medications at [facility], immunosuppressants at [facility].    Hypertension/CAD: Current medications include amlodipine 10 mg daily, chlorthalidone 12.5 mg twice daily, losartan 50 mg daily in the morning, metoprolol XL 25 mg daily QHS. Also taking aspirin 81 mg daily and Imdur 15 mg twice daily (12 hours apart).   She denies ever having chest pain, or symptoms of high or low blood pressure.    Patient does self-monitor BP occassionally. Home BP monitoring in range of 120-130's systolic over 70-80's diastolic.  Patient denies concerns with blood pressure today.  BP Readings from Last 3 Encounters:   12/02/19 122/73   10/30/19 130/62   04/22/19 134/72       Kidney Tx: Taking Gengraf 75 mg in the morning and 50mg every evening, and mycophenolic acid 540 mg BID.  S/p 2005.  Followed by nephrology, Dr. Subramanian, last seen 12/2/19 (last labs 3/27/20, noted to  be at goal), baseline Scr 1.2-1.3  No concerns with side effects at this time.    Signs of infections: none.  Lab Results   Component Value Date    DOSCYC 2,030 03/27/2020     Lab Results   Component Value Date    CYCLSP 77 03/27/2020     Creatinine   Date Value Ref Range Status   03/27/2020 1.41 (H) 0.52 - 1.04 mg/dL Final       Diabetes:  Pt currently taking Lantus 14 units qhs, Novolog on average 6-7 units with meals. Pt's  is using an adjustment scale and carb coverage provided by the Endocrinologist.  Pt is not experiencing side effects. They feel her sugars have been well controlled.   SMBG: CGM = dexcom G5. States blood sugars have been well-controlled. Occasionally goes up to upper 200s but she is able to take insulin to adjust for this when she gets an alert.      Patient is experiencing hypoglycemia. Frequency of hypoglycemia? Gets alert when <80 mg/dL a couple times per week. Has gotten as low as 40 in the last week. Feels like this can happen due to stress, denies skipping meals. Symptoms of low blood sugar?  just notices a look on her face.   Recent symptoms of high blood sugar? none  Eye exam: up to date  Foot exam: up to date  ACEi/ARB: Yes: losartan.   Urine Albumin:   Lab Results   Component Value Date    UMALCR 7.98 04/13/2017     Aspirin: Taking 81mg daily and denies side effects.    Lab Results   Component Value Date    A1C 6.8 03/27/2020    A1C 6.9 11/20/2019    A1C 6.7 04/22/2019    A1C 7.2 12/20/2018    A1C 7.0 04/02/2018       Hypothyroidism: Patient is taking levothyroxine 112 mcg daily. Patient is having the following symptoms: none.   TSH   Date Value Ref Range Status   04/22/2019 0.45 0.40 - 4.00 mU/L Final     T4 Free   Date Value Ref Range Status   04/02/2018 1.41 0.76 - 1.46 ng/dL Final       GERD: Current medications include: Protonix (pantoprazole) 40 mg once daily, and TUMS PRN. Pt c/o no current symptoms.  Patient feels that current regimen is  effective.    Osteopenia: Current therapy includes: calcium (unsure what formulation) daily, and Vitamin D 2000 units daily, TUMS PRN (inconsistent, sometimes 0-4 tablets a day). Was on Fosamax for > 10 years. Started drug holiday around end of 12/2017.  Pt is getting the following sources of dietary calcium: minimal. Pt does not eat cheese, yogurt, milk or leafy greens.  Last vitamin D level: n/a  DEXA History: 5/20/2019          Hyperlipidemia/Pain: Current therapy includes atorvastatin 20 mg once daily.  Denies muscle pains or weakness.  Recent Labs   Lab Test 11/20/19  0803 04/22/19  0848  03/11/15  0938 03/14/14  0820   CHOL 174 203*   < > 173 168   HDL 53 60   < > 55 51   LDL 97 115*   < > 91 93   TRIG 120 142   < > 137 123   CHOLHDLRATIO  --   --   --  3.1 3.3    < > = values in this interval not displayed.   The 10-year ASCVD risk score (Fabianomera ALFREDO Jr., et al., 2013) is: 17.3%    Values used to calculate the score:      Age: 69 years      Sex: Female      Is Non- : No      Diabetic: Yes      Tobacco smoker: No      Systolic Blood Pressure: 122 mmHg      Is BP treated: No      HDL Cholesterol: 53 mg/dL      Total Cholesterol: 174 mg/dL      Vaccines: Was previously told not to get the shingles vaccine (thinks it was Zostavax)   Most Recent Immunizations   Administered Date(s) Administered     Flu 65+ Years 08/12/2019     Influenza (High Dose) 3 valent vaccine 08/28/2018     Influenza (IIV3) PF 08/26/2013     Mantoux Tuberculin Skin Test 07/05/2005     Pneumo Conj 13-V (2010&after) 03/11/2015     Pneumococcal 23 valent 12/21/2017     TDAP Vaccine (Adacel) 06/08/2012     Twinrix A/B 02/28/2006       Today's Vitals: There were no vitals taken for this visit. Phone Visit      ASSESSMENT:                            Medication Adherence: excellent, no issues identified    Hypertension/CAD: Stable.  Patient meeting blood pressure goal less than 140/90 mmHg.    Kidney Tx: Stable.  Patient would  benefit from continuing scheduled follow-up with nephrologist.     Diabetes: Needs improvement. Pt is meeting A1c goal of < 7.5%, but concerned with frequency of low blood sugars.  Patient declined changing regimen at this time. Patient would benefit from continuing to follow with endocrinologist and utilize alerts from Dexcom G6 monitor.    Hypothyroidism: Needs improvement.  Patient due for yearly TSH, followed by endocrinologist.    GERD: stable.  Current regimen is effective.    Osteopenia: Needs improvement.  Reviewed DEXA results per patient request. Patient will benefit from being on calcium citrate formulation due to concomitant use of PPI, patient to check home supply.    Hyperlipidemia: Stable. Pt is on moderate intensity statin which is indicated based on 2019 ACC/AHA guidelines for lipid management.       Vaccines: Needs Improvement. Patient is due for the Shingrix vaccines per ACIP guidelines.    PLAN:                            1. Patient to follow up with nephrologist as scheduled    2. Patient to follow up with endocrinologist as scheduled, due for annual TSH    3.  Patient to check formulation of calcium, recommend citrate.    4.  Reviewed DEXA results with patient.    5.  Patient to discuss Shingrix vaccine with nephrologist.      I spent 35 minutes with this patient today. I offer these suggestions for consideration by Dr. Vega. A copy of the visit note was provided to the patient's primary care provider.    Will follow up in 1 year per patient request.    I concur with the note as dictated above.   Danielle Aguayo PharmD      The patient was sent via [employer] a summary of these recommendations.     Graciela Rice PharmD  PGY1 Medication Therapy Management Resident  Voicemail: [phone]

## 2020-05-04 NOTE — Clinical Note
Dear Dr. Vega,    It was a pleasure to meet with Viv today by phone. Reviewed current medications - no major updates.    Thanks!    Graciela Rice, PharmD  PGY1 Medication Therapy Management Resident  Voicemail: [phone]

## 2020-05-05 DIAGNOSIS — I25.10 CORONARY ARTERY DISEASE INVOLVING NATIVE HEART WITHOUT ANGINA PECTORIS, UNSPECIFIED VESSEL OR LESION TYPE: ICD-10-CM

## 2020-05-05 DIAGNOSIS — I15.1 HYPERTENSION SECONDARY TO OTHER RENAL DISORDERS: ICD-10-CM

## 2020-05-05 NOTE — TELEPHONE ENCOUNTER
"Requested Prescriptions   Pending Prescriptions Disp Refills     losartan (COZAAR) 50 MG tablet 90 tablet 1     Sig: Take 1 tablet (50 mg) by mouth daily  Last Written Prescription Date:  3/24/20  Last Fill Quantity: 90 tab,  # refills: 1   Last office visit: 10/30/2019 with prescribing provider:  Silvia   Future Office Visit:   Next 5 appointments (look out 90 days)    Jul 08, 2020  7:40 AM CDT  SHORT with Summer Vega MD  [facility] [address]  [facility] [address]  [phone]             Angiotensin-II Receptors Failed - 5/5/2020  9:33 AM        Failed - Normal serum creatinine on file in past 12 months     Recent Labs   Lab Test 03/27/20  0648   CR 1.41*       Ok to refill medication if creatinine is low          Passed - Last blood pressure under 140/90 in past 12 months     BP Readings from Last 3 Encounters:   12/02/19 122/73   10/30/19 130/62   04/22/19 134/72                 Passed - Recent (12 mo) or future (30 days) visit within the authorizing provider's specialty     Patient has had an office visit with the authorizing provider or a provider within the authorizing providers department within the previous 12 mos or has a future within next 30 days. See \"Patient Info\" tab in inbasket, or \"Choose Columns\" in Meds & Orders section of the refill encounter.              Passed - Medication is active on med list        Passed - Patient is age 18 or older        Passed - No active pregnancy on record        Passed - Normal serum potassium on file in past 12 months     Recent Labs   Lab Test 03/27/20  0648   POTASSIUM 4.3                    Passed - No positive pregnancy test in past 12 months            "

## 2020-05-05 NOTE — TELEPHONE ENCOUNTER
"Pharmacy Comment:    \"Please send a new prescription for LOSARTAN POTASSIUM, 50. We have 25MG and 100MG. 50MG is on backorder.\"  "

## 2020-05-06 RX ORDER — LOSARTAN POTASSIUM 100 MG/1
50 TABLET ORAL DAILY
Qty: 45 TABLET | Refills: 0 | Status: SHIPPED | OUTPATIENT
Start: 2020-05-06 | End: 2020-08-06

## 2020-05-06 RX ORDER — LOSARTAN POTASSIUM 50 MG/1
50 TABLET ORAL DAILY
Qty: 90 TABLET | Refills: 1 | Status: CANCELLED | OUTPATIENT
Start: 2020-05-06

## 2020-05-06 NOTE — TELEPHONE ENCOUNTER
Routing refill request to provider for review/approval because:  Patient has refills left, but the pharmacy needs the dosage changed to either 25 mg tablets or 100 mg tablets as the 50 mg dosage is on backorder.      Cozaar 100 mg pended below.  Please sign if you agree.  thanks

## 2020-05-26 ENCOUNTER — TRANSFERRED RECORDS (OUTPATIENT)
Dept: HEALTH INFORMATION MANAGEMENT | Facility: CLINIC | Age: 70
End: 2020-05-26

## 2020-05-26 LAB
CHOLESTEROL (EXTERNAL): 187 MG/DL
CREATININE (EXTERNAL): 1.68 MG/DL (ref 0.6–0.93)
GFR ESTIMATED (EXTERNAL): 30 ML/MIN/1.73M2
GFR ESTIMATED (IF AFRICAN AMERICAN) (EXTERNAL): 35 ML/MIN/1.73M2
GLUCOSE (EXTERNAL): 168 MG/DL (ref 65–99)
HBA1C MFR BLD: 6.7 % (ref 4–6)
HDLC SERPL-MCNC: 54 MG/DL
LDL CHOLESTEROL CALCULATED (EXTERNAL): 104 MG/DL
NON HDL CHOLESTEROL (EXTERNAL): 133 MG/DL
POTASSIUM (EXTERNAL): 4.5 MMOL/L (ref 3.5–5.3)
TRIGLYCERIDES (EXTERNAL): 176 MG/DL

## 2020-06-29 DIAGNOSIS — Z48.298 AFTERCARE FOLLOWING ORGAN TRANSPLANT: ICD-10-CM

## 2020-06-29 LAB
ANION GAP SERPL CALCULATED.3IONS-SCNC: 4 MMOL/L (ref 3–14)
BUN SERPL-MCNC: 27 MG/DL (ref 7–30)
CALCIUM SERPL-MCNC: 9.2 MG/DL (ref 8.5–10.1)
CHLORIDE SERPL-SCNC: 107 MMOL/L (ref 94–109)
CO2 SERPL-SCNC: 29 MMOL/L (ref 20–32)
CREAT SERPL-MCNC: 1.28 MG/DL (ref 0.52–1.04)
CREAT UR-MCNC: 54 MG/DL
CYCLOSPORINE BLD LC/MS/MS-MCNC: 74 UG/L (ref 50–400)
ERYTHROCYTE [DISTWIDTH] IN BLOOD BY AUTOMATED COUNT: 12.7 % (ref 10–15)
GFR SERPL CREATININE-BSD FRML MDRD: 42 ML/MIN/{1.73_M2}
GLUCOSE SERPL-MCNC: 156 MG/DL (ref 70–99)
HCT VFR BLD AUTO: 38.4 % (ref 35–47)
HGB BLD-MCNC: 11.8 G/DL (ref 11.7–15.7)
MCH RBC QN AUTO: 28.8 PG (ref 26.5–33)
MCHC RBC AUTO-ENTMCNC: 30.7 G/DL (ref 31.5–36.5)
MCV RBC AUTO: 94 FL (ref 78–100)
PLATELET # BLD AUTO: 234 10E9/L (ref 150–450)
POTASSIUM SERPL-SCNC: 4.1 MMOL/L (ref 3.4–5.3)
PROT UR-MCNC: 0.07 G/L
PROT/CREAT 24H UR: 0.12 G/G CR (ref 0–0.2)
RBC # BLD AUTO: 4.1 10E12/L (ref 3.8–5.2)
SODIUM SERPL-SCNC: 139 MMOL/L (ref 133–144)
TME LAST DOSE: 1830 H
WBC # BLD AUTO: 3.3 10E9/L (ref 4–11)

## 2020-06-29 PROCEDURE — 80158 DRUG ASSAY CYCLOSPORINE: CPT | Performed by: INTERNAL MEDICINE

## 2020-07-01 ENCOUNTER — TELEPHONE (OUTPATIENT)
Dept: TRANSPLANT | Facility: CLINIC | Age: 70
End: 2020-07-01

## 2020-07-01 NOTE — TELEPHONE ENCOUNTER
Voicemail  Date/Time: 7/1/20 at 9:12 pm  Reason for call: needing the CMN form for the Mycophenolate medication

## 2020-07-08 ENCOUNTER — OFFICE VISIT (OUTPATIENT)
Dept: INTERNAL MEDICINE | Facility: CLINIC | Age: 70
End: 2020-07-08
Payer: MEDICARE

## 2020-07-08 VITALS
BODY MASS INDEX: 27.86 KG/M2 | DIASTOLIC BLOOD PRESSURE: 64 MMHG | OXYGEN SATURATION: 99 % | TEMPERATURE: 97.7 F | WEIGHT: 163.2 LBS | RESPIRATION RATE: 15 BRPM | HEART RATE: 66 BPM | HEIGHT: 64 IN | SYSTOLIC BLOOD PRESSURE: 130 MMHG

## 2020-07-08 DIAGNOSIS — E11.21 TYPE 2 DIABETES MELLITUS WITH DIABETIC NEPHROPATHY, WITH LONG-TERM CURRENT USE OF INSULIN (H): Primary | ICD-10-CM

## 2020-07-08 DIAGNOSIS — E03.8 OTHER SPECIFIED HYPOTHYROIDISM: ICD-10-CM

## 2020-07-08 DIAGNOSIS — Z79.4 TYPE 2 DIABETES MELLITUS WITH DIABETIC NEPHROPATHY, WITH LONG-TERM CURRENT USE OF INSULIN (H): Primary | ICD-10-CM

## 2020-07-08 DIAGNOSIS — Z94.0 KIDNEY REPLACED BY TRANSPLANT: ICD-10-CM

## 2020-07-08 DIAGNOSIS — D84.9 IMMUNOSUPPRESSED STATUS (H): ICD-10-CM

## 2020-07-08 DIAGNOSIS — E78.5 HYPERLIPIDEMIA LDL GOAL <100: ICD-10-CM

## 2020-07-08 DIAGNOSIS — I15.1 HYPERTENSION SECONDARY TO OTHER RENAL DISORDERS: ICD-10-CM

## 2020-07-08 PROCEDURE — 99214 OFFICE O/P EST MOD 30 MIN: CPT | Performed by: INTERNAL MEDICINE

## 2020-07-08 ASSESSMENT — MIFFLIN-ST. JEOR: SCORE: 1240.27

## 2020-07-08 NOTE — PROGRESS NOTES
Subjective     Viv Shaw is a 69 year old female who presents to clinic today for the following health issues:    HPI     Diabetes Follow-up    How often are you checking your blood sugar? Continuous glucose monitor  What time of day are you checking your blood sugars (select all that apply)?  Before meals  Have you had any blood sugars above 200?  At times  Have you had any blood sugars below 70?  No    What symptoms do you notice when your blood sugar is low?  None    What concerns do you have today about your diabetes? None     Do you have any of these symptoms? (Select all that apply)  No numbness or tingling in feet.  No redness, sores or blisters on feet.  No complaints of excessive thirst.  No reports of blurry vision.  No significant changes to weight.    Hyperlipidemia Follow-Up      Are you regularly taking any medication or supplement to lower your cholesterol?   Yes- statin    Are you having muscle aches or other side effects that you think could be caused by your cholesterol lowering medication?  Yes- possibly    Hypertension Follow-up      Do you check your blood pressure regularly outside of the clinic? No     Are you following a low salt diet? Yes    Are your blood pressures ever more than 140 on the top number (systolic) OR more   than 90 on the bottom number (diastolic), for example 140/90? No      Kidney Transplant status; follows up at Transplant clinic and on immunosuppressants      BP Readings from Last 2 Encounters:   12/02/19 122/73   10/30/19 130/62     Hemoglobin A1C (%)   Date Value   03/27/2020 6.8 (H)   11/20/2019 6.9 (H)     LDL Cholesterol Calculated (mg/dL)   Date Value   11/20/2019 97   04/22/2019 115 (H)         How many servings of fruits and vegetables do you eat daily?  2-3    On average, how many sweetened beverages do you drink each day (Examples: soda, juice, sweet tea, etc.  Do NOT count diet or artificially sweetened beverages)?   1    How many days per week do you  exercise enough to make your heart beat faster? 3 or less    How many minutes a day do you exercise enough to make your heart beat faster? 30 - 60    How many days per week do you miss taking your medication? 0        Patient Active Problem List   Diagnosis     Other vitamin B12 deficiency anemia     Irritable bowel syndrome     GERD (gastroesophageal reflux disease)     Advanced directives, counseling/discussion     Kidney replaced by transplant     Immunosuppressed status (HCC)     Hyperlipidemia LDL goal <100     CAD S/P percutaneous coronary angioplasty     Anemia in chronic kidney disease     Other specified hypothyroidism     Coronary artery disease involving native heart without angina pectoris, unspecified vessel or lesion type     Type 2 diabetes mellitus with diabetic nephropathy, with long-term current use of insulin (H)     Hypertension secondary to other renal disorders     Hernia, incisional     CKD (chronic kidney disease) stage 3, GFR 30-59 ml/min (H)     Past Surgical History:   Procedure Laterality Date     APPENDECTOMY NOS       ARTHROSCOPY SHOULDER ROTATOR CUFF REPAIR Left      COLONOSCOPY N/A 6/7/2016    Procedure: COLONOSCOPY;  Surgeon: Rashard Snider MD;  Location:  GI     Coronary angioplasty with stent  2005     HYSTERECTOMY       Kidney Transplant NOS Right      LAPAROSCOPIC CHOLECYSTECTOMY       NOSE SURGERY  2013     OPEN SEPARATION COMPONENT HERNIORRHAPHY N/A 10/16/2017    Procedure: OPEN SEPARATION COMPONENT HERNIORRHAPHY;;  Surgeon: CARL Lott MD;  Location: [facility] OR     RECONSTRUCT ABDOMINAL WALL N/A 10/16/2017    Procedure: RECONSTRUCT ABDOMINAL WALL;  Exploratory Laparotomy, Lysis of Adhesions, Abdominal Wall reconstruction, Inlay Xen AB mesh, Mitek Suture [place] and Umbilical Hernia Repair.;  Surgeon: CARL Lott MD;  Location: [facility] OR     REMOVE CATARACT INTRACAP,INSERT LENS Right      TONSILLECTOMY         Social History     Tobacco Use     Smoking status:  "Never Smoker     Smokeless tobacco: Never Used   Substance Use Topics     Alcohol use: No     Family History   Problem Relation Age of Onset     Respiratory Mother          age 71     Cardiovascular Father          age 75 hyertension     Arthritis Sister         living, healthy     Family History Negative Brother          age 44     Colon Cancer No family hx of          Current Outpatient Medications   Medication Sig Dispense Refill     amLODIPine (NORVASC) 10 MG tablet Take 1 tablet (10 mg) by mouth daily 90 tablet 1     ASPIRIN PO Take 81 mg by mouth daily       atorvastatin (LIPITOR) 40 MG tablet Take 0.5 tablets (20 mg) by mouth daily 45 tablet 1     FLORIN CONTOUR test strip   0     BD INSULIN SYRINGE ULTRAFINE 31G X /16\" 0.3 ML USING 4-5 PER DAY (ThinkSmartEENS 31G/0.3ML)  6     calcium carbonate (TUMS) 500 MG chewable tablet Take 1-2 chew tab by mouth 2 times daily as needed for heartburn       chlorthalidone (HYGROTON) 25 MG tablet Take 1 tablet (25 mg) by mouth daily 90 tablet 1     GENGRAF (BRAND) 25 MG CAPSULE Take 3 capsules (75 mg) by mouth every morning AND 2 capsules (50 mg) every evening. 150 capsule 11     insulin aspart (NOVOLOG PEN) 100 UNIT/ML pen Taking as directed with adjustment scale and carb coverage.       insulin glargine (BASAGLAR KWIKPEN) 100 UNIT/ML pen Inject 14 Units Subcutaneous At Bedtime       isosorbide mononitrate (IMDUR) 30 MG 24 hr tablet TAKE 0.5 TABLETS (15 MG) BY MOUTH 2 TIMES DAILY 90 tablet 1     levothyroxine (SYNTHROID) 112 MCG tablet Take by mouth daily       losartan (COZAAR) 100 MG tablet Take 0.5 tablets (50 mg) by mouth daily 45 tablet 0     losartan (COZAAR) 50 MG tablet Take 1 tablet (50 mg) by mouth daily 90 tablet 1     metoprolol succinate ER (TOPROL-XL) 25 MG 24 hr tablet TAKE 1 TABLET BY MOUTH EVERYDAY AT BEDTIME 90 tablet 1     mycophenolic acid (GENERIC EQUIVALENT) 180 MG EC tablet Take 3 tablets (540 mg) by mouth 2 times daily 180 tablet " "11     pantoprazole (PROTONIX) 40 MG EC tablet Take 1 tablet (40 mg) by mouth daily 90 tablet 1         Reviewed and updated as needed this visit by Provider         Review of Systems   CONSTITUTIONAL: NEGATIVE for fever, chills, change in weight  EYES: NEGATIVE for vision changes or irritation  ENT/MOUTH: NEGATIVE for ear, mouth and throat problems  RESP: NEGATIVE for significant cough or SOB  CV: NEGATIVE for chest pain, palpitations or peripheral edema  MUSCULOSKELETAL: NEGATIVE for significant arthralgias or myalgia  NEURO: NEGATIVE for weakness, dizziness or paresthesias  ENDOCRINE: NEGATIVE for temperature intolerance, skin/hair changes  PSYCHIATRIC: NEGATIVE for changes in mood or affect      Objective    /64   Pulse 66   Temp 97.7  F (36.5  C) (Oral)   Resp 15   Ht 5' 4\" (1.626 m)   Wt 163 lb 3.2 oz (74 kg)   SpO2 99%   BMI 28.01 kg/m    Body mass index is 28.01 kg/m .  Physical Exam   GENERAL: healthy, alert and no distress  EYES: Eyes grossly normal to inspection, PERRL and conjunctivae and sclerae normal  NECK: no adenopathy, no asymmetry, masses, or scars and thyroid normal to palpation  RESP: lungs clear to auscultation - no rales, rhonchi or wheezes  CV: regular rate and rhythm,    MS: no gross musculoskeletal defects noted, no edema. No calf tenderness  NEURO: Normal strength and tone, mentation intact and speech normal  Diabetic foot exam: normal DP and PT pulses, no trophic changes or ulcerative lesions, normal sensory exam and normal monofilament exam          Assessment & Plan     (E11.21,  Z79.4) Type 2 diabetes mellitus with diabetic nephropathy, with long-term current use of insulin (H)  (primary encounter diagnosis)  Plan: Diabetes under control, continue same medications and continue to follow ADA diet regular exercise will repeat hemoglobin A1c in 1 month            (Z94.0) Kidney replaced by transplant  (D89.9) Immunosuppressed status (HCC)  Plan: On immunosuppressant " "medications and follows up with transplant clinic    (E78.5) Hyperlipidemia LDL goal <100  Plan: Lipids stable, continue atorvastatin    (E03.8) Other specified hypothyroidism  Plan: On Levoxyl 112 mcg daily    (I15.1,  N28.89) Hypertension secondary to other renal disorders  Plan: Blood pressure stable continue metoprolol, losartan, amlodipine.     BMI:   Estimated body mass index is 28.01 kg/m  as calculated from the following:    Height as of this encounter: 5' 4\" (1.626 m).    Weight as of this encounter: 163 lb 3.2 oz (74 kg).          Return in about 4 months (around 11/8/2020).    Summer Vega MD  [facility]        "

## 2020-07-08 NOTE — NURSING NOTE
"/64   Pulse 66   Temp 97.7  F (36.5  C) (Oral)   Resp 15   Ht 1.626 m (5' 4\")   Wt 74 kg (163 lb 3.2 oz)   SpO2 99%   BMI 28.01 kg/m    Patient in for HTN. Lipid and DM.  Socorro Perez, COLLIN    "

## 2020-07-09 ENCOUNTER — TELEPHONE (OUTPATIENT)
Dept: NEPHROLOGY | Facility: CLINIC | Age: 70
End: 2020-07-09

## 2020-07-09 NOTE — TELEPHONE ENCOUNTER
[facility] Call Center    Phone Message    May a detailed message be left on voicemail: yes     Reason for Call: Form or Letter   Type or form/letter needing completion: Transplant CMN form  Provider: Spong  Date form needed: NOW  Once completed: Fax form to: [employer] [employer], fax# [phone], ph# [phone]. Per [facility], they have called for this to be done a couple of times. Thanks.      Action Taken: Message routed to:  [facility] ([facility]): Teresa Chirinos    Travel Screening: Not Applicable

## 2020-07-10 NOTE — TELEPHONE ENCOUNTER
Voicemail  Date/Time: 7/9/20 at 3:04 pm  Reason for call: [facility] in [place] would like the CMN form faxed to the store as the patient is out of the medication to [facility] fax number [phone]

## 2020-08-05 DIAGNOSIS — I15.1 HYPERTENSION SECONDARY TO OTHER RENAL DISORDERS: ICD-10-CM

## 2020-08-06 ENCOUNTER — TRANSFERRED RECORDS (OUTPATIENT)
Dept: HEALTH INFORMATION MANAGEMENT | Facility: CLINIC | Age: 70
End: 2020-08-06

## 2020-08-06 RX ORDER — LOSARTAN POTASSIUM 100 MG/1
50 TABLET ORAL DAILY
Qty: 45 TABLET | Refills: 0 | Status: SHIPPED | OUTPATIENT
Start: 2020-08-06 | End: 2020-11-05

## 2020-08-06 NOTE — TELEPHONE ENCOUNTER
Pending Prescriptions:                       Disp   Refills    losartan (COZAAR) 100 MG tablet [Pharmacy *45 tab*0        Sig: Take 0.5 tablets (50 mg) by mouth daily    Routing refill request to provider for review/approval because:  Patient fails protocol

## 2020-08-28 ENCOUNTER — TRANSFERRED RECORDS (OUTPATIENT)
Dept: HEALTH INFORMATION MANAGEMENT | Facility: CLINIC | Age: 70
End: 2020-08-28

## 2020-08-28 LAB — RETINOPATHY: NORMAL

## 2020-10-02 DIAGNOSIS — I25.10 CORONARY ARTERY DISEASE INVOLVING NATIVE HEART WITHOUT ANGINA PECTORIS, UNSPECIFIED VESSEL OR LESION TYPE: ICD-10-CM

## 2020-10-02 DIAGNOSIS — E11.21 TYPE 2 DIABETES MELLITUS WITH DIABETIC NEPHROPATHY, WITH LONG-TERM CURRENT USE OF INSULIN (H): Primary | ICD-10-CM

## 2020-10-02 DIAGNOSIS — I15.1 HYPERTENSION SECONDARY TO OTHER RENAL DISORDERS: ICD-10-CM

## 2020-10-02 DIAGNOSIS — Z79.4 TYPE 2 DIABETES MELLITUS WITH DIABETIC NEPHROPATHY, WITH LONG-TERM CURRENT USE OF INSULIN (H): Primary | ICD-10-CM

## 2020-10-02 DIAGNOSIS — E78.5 HYPERLIPIDEMIA LDL GOAL <100: ICD-10-CM

## 2020-10-05 NOTE — TELEPHONE ENCOUNTER
"Requested Prescriptions   Pending Prescriptions Disp Refills     amLODIPine (NORVASC) 10 MG tablet 90 tablet 1     Sig: Take 1 tablet (10 mg) by mouth daily       Calcium Channel Blockers Protocol  Failed - 10/2/2020 11:52 AM        Failed - Normal serum creatinine on file in past 12 months     Recent Labs   Lab Test 06/29/20  0649   CR 1.28*       Ok to refill medication if creatinine is low          Passed - Blood pressure under 140/90 in past 12 months     BP Readings from Last 3 Encounters:   07/08/20 130/64   12/02/19 122/73   10/30/19 130/62                 Passed - Recent (12 mo) or future (30 days) visit within the authorizing provider's specialty     Patient has had an office visit with the authorizing provider or a provider within the authorizing providers department within the previous 12 mos or has a future within next 30 days. See \"Patient Info\" tab in inbasket, or \"Choose Columns\" in Meds & Orders section of the refill encounter.              Passed - Medication is active on med list        Passed - Patient is age 18 or older        Passed - No active pregnancy on record        Passed - No positive pregnancy test in past 12 months           atorvastatin (LIPITOR) 40 MG tablet 45 tablet 1     Sig: Take 0.5 tablets (20 mg) by mouth daily       Statins Protocol Passed - 10/2/2020 11:52 AM        Passed - LDL on file in past 12 months     Recent Labs   Lab Test 11/20/19  0803   LDL 97             Passed - No abnormal creatine kinase in past 12 months     No lab results found.             Passed - Recent (12 mo) or future (30 days) visit within the authorizing provider's specialty     Patient has had an office visit with the authorizing provider or a provider within the authorizing providers department within the previous 12 mos or has a future within next 30 days. See \"Patient Info\" tab in inbasket, or \"Choose Columns\" in Meds & Orders section of the refill encounter.              Passed - Medication is " "active on med list        Passed - Patient is age 18 or older        Passed - No active pregnancy on record        Passed - No positive pregnancy test in past 12 months           isosorbide mononitrate (IMDUR) 30 MG 24 hr tablet 90 tablet 1       Nitrates Passed - 10/2/2020 11:52 AM        Passed - Blood pressure under 140/90 in past 12 months     BP Readings from Last 3 Encounters:   07/08/20 130/64   12/02/19 122/73   10/30/19 130/62                 Passed - Pt is not on erectile dysfunction medications        Passed - Recent (12 mo) or future (30 days) visit within the authorizing provider's specialty     Patient has had an office visit with the authorizing provider or a provider within the authorizing providers department within the previous 12 mos or has a future within next 30 days. See \"Patient Info\" tab in inbasket, or \"Choose Columns\" in Meds & Orders section of the refill encounter.              Passed - Medication is active on med list        Passed - Patient is age 18 or older             "

## 2020-10-05 NOTE — TELEPHONE ENCOUNTER
Amlodipine  Routing refill request to provider for review/approval because:  Patient fails protocol    Imdur  Routing refill request to provider for review/approval because:  A break in medication

## 2020-10-06 RX ORDER — ATORVASTATIN CALCIUM 40 MG/1
20 TABLET, FILM COATED ORAL DAILY
Qty: 45 TABLET | Refills: 1 | Status: SHIPPED | OUTPATIENT
Start: 2020-10-06 | End: 2021-04-07

## 2020-10-06 RX ORDER — AMLODIPINE BESYLATE 10 MG/1
10 TABLET ORAL DAILY
Qty: 90 TABLET | Refills: 1 | Status: SHIPPED | OUTPATIENT
Start: 2020-10-06 | End: 2021-06-23

## 2020-10-06 RX ORDER — ISOSORBIDE MONONITRATE 30 MG/1
TABLET, EXTENDED RELEASE ORAL
Qty: 90 TABLET | Refills: 1 | Status: SHIPPED | OUTPATIENT
Start: 2020-10-06 | End: 2021-03-29

## 2020-10-06 NOTE — TELEPHONE ENCOUNTER
Filled meds, pt is over due for A1c , pl advise lab only appt.   A;lso ordered lipid panel.  Labs ordered , pl inform pt

## 2020-10-29 DIAGNOSIS — E78.5 HYPERLIPIDEMIA LDL GOAL <100: ICD-10-CM

## 2020-10-29 DIAGNOSIS — Z79.4 TYPE 2 DIABETES MELLITUS WITH DIABETIC NEPHROPATHY, WITH LONG-TERM CURRENT USE OF INSULIN (H): ICD-10-CM

## 2020-10-29 DIAGNOSIS — E11.21 TYPE 2 DIABETES MELLITUS WITH DIABETIC NEPHROPATHY, WITH LONG-TERM CURRENT USE OF INSULIN (H): ICD-10-CM

## 2020-10-29 LAB — HBA1C MFR BLD: 7 % (ref 0–5.6)

## 2020-10-29 PROCEDURE — 84450 TRANSFERASE (AST) (SGOT): CPT | Performed by: INTERNAL MEDICINE

## 2020-10-29 PROCEDURE — 36415 COLL VENOUS BLD VENIPUNCTURE: CPT | Performed by: INTERNAL MEDICINE

## 2020-10-29 PROCEDURE — 83036 HEMOGLOBIN GLYCOSYLATED A1C: CPT | Performed by: INTERNAL MEDICINE

## 2020-10-29 PROCEDURE — 82043 UR ALBUMIN QUANTITATIVE: CPT | Performed by: INTERNAL MEDICINE

## 2020-10-29 PROCEDURE — 84460 ALANINE AMINO (ALT) (SGPT): CPT | Performed by: INTERNAL MEDICINE

## 2020-10-29 PROCEDURE — 80061 LIPID PANEL: CPT | Performed by: INTERNAL MEDICINE

## 2020-10-30 LAB
ALT SERPL W P-5'-P-CCNC: 17 U/L (ref 0–50)
AST SERPL W P-5'-P-CCNC: 10 U/L (ref 0–45)
CHOLEST SERPL-MCNC: 184 MG/DL
CREAT UR-MCNC: 98 MG/DL
HDLC SERPL-MCNC: 67 MG/DL
LDLC SERPL CALC-MCNC: 95 MG/DL
MICROALBUMIN UR-MCNC: 14 MG/L
MICROALBUMIN/CREAT UR: 13.84 MG/G CR (ref 0–25)
NONHDLC SERPL-MCNC: 117 MG/DL
TRIGL SERPL-MCNC: 110 MG/DL

## 2020-11-03 ENCOUNTER — TRANSFERRED RECORDS (OUTPATIENT)
Dept: HEALTH INFORMATION MANAGEMENT | Facility: CLINIC | Age: 70
End: 2020-11-03

## 2020-11-10 ENCOUNTER — OFFICE VISIT (OUTPATIENT)
Dept: INTERNAL MEDICINE | Facility: CLINIC | Age: 70
End: 2020-11-10
Payer: MEDICARE

## 2020-11-10 VITALS
HEIGHT: 64 IN | OXYGEN SATURATION: 97 % | WEIGHT: 163.9 LBS | TEMPERATURE: 97.7 F | BODY MASS INDEX: 27.98 KG/M2 | HEART RATE: 64 BPM | DIASTOLIC BLOOD PRESSURE: 66 MMHG | SYSTOLIC BLOOD PRESSURE: 116 MMHG | RESPIRATION RATE: 13 BRPM

## 2020-11-10 DIAGNOSIS — Z00.00 ENCOUNTER FOR MEDICARE ANNUAL WELLNESS EXAM: Primary | ICD-10-CM

## 2020-11-10 DIAGNOSIS — Z12.31 ENCOUNTER FOR SCREENING MAMMOGRAM FOR BREAST CANCER: ICD-10-CM

## 2020-11-10 DIAGNOSIS — Z94.0 KIDNEY REPLACED BY TRANSPLANT: ICD-10-CM

## 2020-11-10 DIAGNOSIS — E03.8 OTHER SPECIFIED HYPOTHYROIDISM: ICD-10-CM

## 2020-11-10 PROCEDURE — G0438 PPPS, INITIAL VISIT: HCPCS | Performed by: INTERNAL MEDICINE

## 2020-11-10 ASSESSMENT — ENCOUNTER SYMPTOMS
WEAKNESS: 0
CONSTIPATION: 0
NAUSEA: 0
FREQUENCY: 0
JOINT SWELLING: 0
HEARTBURN: 1
SHORTNESS OF BREATH: 0
DIZZINESS: 0
HEADACHES: 0
HEMATOCHEZIA: 0
PALPITATIONS: 0
COUGH: 0
NERVOUS/ANXIOUS: 1
ABDOMINAL PAIN: 0
ARTHRALGIAS: 0
DIARRHEA: 0
CHILLS: 0
DYSURIA: 0
SORE THROAT: 0
BREAST MASS: 0
MYALGIAS: 0
PARESTHESIAS: 0
EYE PAIN: 0
HEMATURIA: 0
FEVER: 0

## 2020-11-10 ASSESSMENT — MIFFLIN-ST. JEOR: SCORE: 1238.45

## 2020-11-10 ASSESSMENT — ACTIVITIES OF DAILY LIVING (ADL): CURRENT_FUNCTION: NO ASSISTANCE NEEDED

## 2020-11-10 NOTE — NURSING NOTE
"/66   Pulse 64   Temp 97.7  F (36.5  C) (Oral)   Resp 13   Ht 1.626 m (5' 4\")   Wt 74.3 kg (163 lb 14.4 oz)   SpO2 97%   BMI 28.13 kg/m    Patient in for Medicare Visit.  Socorro Perez CMA    "

## 2020-11-10 NOTE — PROGRESS NOTES
"SUBJECTIVE:   Viv Shaw is a 70 year old female who presents for Preventive Visit.      Patient has been advised of split billing requirements and indicates understanding: Yes   Are you in the first 12 months of your Medicare coverage?  No    Healthy Habits:     In general, how would you rate your overall health?  Good    Frequency of exercise:  2-3 days/week    Duration of exercise:  15-30 minutes    Do you usually eat at least 4 servings of fruit and vegetables a day, include whole grains    & fiber and avoid regularly eating high fat or \"junk\" foods?  No    Taking medications regularly:  Yes    Medication side effects:  Not applicable and None    Ability to successfully perform activities of daily living:  No assistance needed    Home Safety:  No safety concerns identified    Hearing Impairment:  No hearing concerns    In the past 6 months, have you been bothered by leaking of urine?  No    In general, how would you rate your overall mental or emotional health?  Good      PHQ-2 Total Score: 0    Additional concerns today:  No    Do you feel safe in your environment? Yes    Have you ever done Advance Care Planning? (For example, a Health Directive, POLST, or a discussion with a medical provider or your loved ones about your wishes): Yes, advance care planning is on file.      Fall risk  Fallen 2 or more times in the past year?: No  Any fall with injury in the past year?: No    Cognitive Screening   1) Repeat 3 items (Leader, Season, Table)    2) Clock draw: NORMAL  3) 3 item recall: Recalls 3 objects  Results: 3 items recalled: COGNITIVE IMPAIRMENT LESS LIKELY    Mini-CogTM Copyright [employer] May. Licensed by the author for use in [facility]; reprinted with permission (abbe@.[facility]). All rights reserved.      Do you have sleep apnea, excessive snoring or daytime drowsiness?: no    Reviewed and updated as needed this visit by clinical staff                 Reviewed and updated as needed this visit by " "Provider                  Past Medical History:   Diagnosis Date     Abdominal wall hernia     RLQ     Allergic rhinitis      CAD (coronary artery disease)     coronary artery disease s/p PCI to LAD in 2005coronary artery disease s/p PCI to LAD in 2005     Diabetes mellitus, type 2 (H)     follows up with endocrinologist.     Dyslipidemia      GERD (gastroesophageal reflux disease)      H/O diabetic nephropathy     s/p kidney trnsplant     Hypertension      Hypothyroidism      Immunosuppressed status (H)      Kidney replaced by transplant     Rt     PONV (postoperative nausea and vomiting)      Shingles      Vitamin B12 deficiency anemia        Past Surgical History:   Procedure Laterality Date     APPENDECTOMY NOS       ARTHROSCOPY SHOULDER ROTATOR CUFF REPAIR Left      COLONOSCOPY N/A 6/7/2016    Procedure: COLONOSCOPY;  Surgeon: Rashard Snider MD;  Location: [facility]     Coronary angioplasty with stent  2005     HYSTERECTOMY       Kidney Transplant NOS Right      LAPAROSCOPIC CHOLECYSTECTOMY       NOSE SURGERY  2013     OPEN SEPARATION COMPONENT HERNIORRHAPHY N/A 10/16/2017    Procedure: OPEN SEPARATION COMPONENT HERNIORRHAPHY;;  Surgeon: CARL Lott MD;  Location: [facility]     RECONSTRUCT ABDOMINAL WALL N/A 10/16/2017    Procedure: RECONSTRUCT ABDOMINAL WALL;  Exploratory Laparotomy, Lysis of Adhesions, Abdominal Wall reconstruction, Inlay Xen AB mesh, Mitek Suture [place] and Umbilical Hernia Repair.;  Surgeon: CARL Lott MD;  Location: [facility] OR     REMOVE CATARACT INTRACAP,INSERT LENS Right      TONSILLECTOMY         Current Outpatient Medications   Medication Sig Dispense Refill     amLODIPine (NORVASC) 10 MG tablet Take 1 tablet (10 mg) by mouth daily 90 tablet 1     ASPIRIN PO Take 81 mg by mouth daily       atorvastatin (LIPITOR) 40 MG tablet Take 0.5 tablets (20 mg) by mouth daily 45 tablet 1     FLORIN CONTOUR test strip   0     BD INSULIN SYRINGE ULTRAFINE 31G X 5/16\" 0.3 ML USING 4-5 " PER DAY ([facility] 31G/0.3ML)  6     calcium carbonate (TUMS) 500 MG chewable tablet Take 1-2 chew tab by mouth 2 times daily as needed for heartburn       chlorthalidone (HYGROTON) 25 MG tablet Take 1 tablet (25 mg) by mouth daily 90 tablet 1     GENGRAF (BRAND) 25 MG CAPSULE Take 3 capsules (75 mg) by mouth every morning AND 2 capsules (50 mg) every evening. 150 capsule 11     insulin aspart (NOVOLOG PEN) 100 UNIT/ML pen Taking as directed with adjustment scale and carb coverage.       insulin glargine (BASAGLAR KWIKPEN) 100 UNIT/ML pen Inject 14 Units Subcutaneous At Bedtime       isosorbide mononitrate (IMDUR) 30 MG 24 hr tablet TAKE 0.5 TABLETS (15 MG) BY MOUTH 2 TIMES DAILY 90 tablet 1     levothyroxine (SYNTHROID) 112 MCG tablet Take by mouth daily       losartan (COZAAR) 100 MG tablet TAKE 0.5 TABLETS (50 MG) BY MOUTH DAILY 45 tablet 1     metoprolol succinate ER (TOPROL-XL) 25 MG 24 hr tablet TAKE 1 TABLET BY MOUTH EVERYDAY AT BEDTIME 90 tablet 1     mycophenolic acid (GENERIC EQUIVALENT) 180 MG EC tablet Take 3 tablets (540 mg) by mouth 2 times daily 180 tablet 11     pantoprazole (PROTONIX) 40 MG EC tablet Take 1 tablet (40 mg) by mouth daily 90 tablet 1     Family History   Problem Relation Age of Onset     Respiratory Mother          age 71     Cardiovascular Father          age 75 hyertension     Arthritis Sister         living, healthy     Family History Negative Brother          age 44     Colon Cancer No family hx of        Social History     Tobacco Use     Smoking status: Never Smoker     Smokeless tobacco: Never Used   Substance Use Topics     Alcohol use: No     If you drink alcohol do you typically have >3 drinks per day or >7 drinks per week? No    Alcohol Use 11/10/2020   Prescreen: >3 drinks/day or >7 drinks/week? No   Prescreen: >3 drinks/day or >7 drinks/week? -   No flowsheet data found.       Current providers sharing in care for this patient include:   Patient  Care Team:  Summer Vega MD as PCP - General  Junaid Trevino MD as MD (Cardiology)  Enrico Blankenship MD (Nephrology)  Summer Vega MD as Assigned PCP  Morro Veloz MD as MD (Nephrology)  Mary Cruz, [facility] as Pharmacist (Pharmacist)  Danielle Aguayo, [facility] as Pharmacist (Pharmacist)  Tamy Bruner, [facility] as Pharmacist (Pharmacist)    The following health maintenance items are reviewed in Epic and correct as of today:  Health Maintenance   Topic Date Due     ZOSTER IMMUNIZATION (1 of 2) 08/20/1998     MEDICARE ANNUAL WELLNESS VISIT  08/20/2013     TSH W/FREE T4 REFLEX  04/22/2020     MAMMO SCREENING  11/19/2020     A1C  04/29/2021     BMP  06/29/2021     DIABETIC FOOT EXAM  07/08/2021     FALL RISK ASSESSMENT  07/08/2021     EYE EXAM  08/28/2021     LIPID  10/29/2021     MICROALBUMIN  10/29/2021     DTAP/TDAP/TD IMMUNIZATION (2 - Td) 06/08/2022     ADVANCE CARE PLANNING  03/28/2023     COLORECTAL CANCER SCREENING  06/07/2026     DEXA  Completed     HEPATITIS C SCREENING  Completed     PHQ-2  Completed     INFLUENZA VACCINE  Completed     Pneumococcal Vaccine: Pediatrics (0 to 5 Years) and At-Risk Patients (6 to 64 Years)  Completed     Pneumococcal Vaccine: 65+ Years  Completed     IPV IMMUNIZATION  Aged Out     MENINGITIS IMMUNIZATION  Aged Out        Review of Systems   Constitutional: Negative for chills and fever.   HENT: Negative for congestion, ear pain, hearing loss and sore throat.    Eyes: Negative for pain and visual disturbance.   Respiratory: Negative for cough and shortness of breath.    Cardiovascular: Negative for chest pain, palpitations and peripheral edema.   Gastrointestinal: Negative for abdominal pain, constipation, diarrhea, hematochezia and nausea.   Breasts:  Negative for tenderness, breast mass and discharge.   Genitourinary: Negative for dysuria, frequency, genital sores, hematuria, pelvic pain, urgency, vaginal bleeding and vaginal  "discharge.   Musculoskeletal: Negative for arthralgias, joint swelling and myalgias.   Skin: Negative for rash.   Neurological: Negative for dizziness, weakness, headaches and paresthesias.   Psychiatric/Behavioral: Negative for mood changes.        OBJECTIVE:   /66   Pulse 64   Temp 97.7  F (36.5  C) (Oral)   Resp 13   Ht 1.626 m (5' 4\")   Wt 74.3 kg (163 lb 14.4 oz)   SpO2 97%   BMI 28.13 kg/m   Estimated body mass index is 28.13 kg/m  as calculated from the following:    Height as of this encounter: 1.626 m (5' 4\").    Weight as of this encounter: 74.3 kg (163 lb 14.4 oz).  Physical Exam  GENERAL APPEARANCE: alert and no distress  EYES: Eyes grossly normal to inspection, PERRL and conjunctivae and sclerae normal  NECK: no adenopathy, no asymmetry, masses, or scars and thyroid normal to palpation  RESP: lungs clear to auscultation - no rales, rhonchi or wheezes  BREAST: normal without masses, tenderness or nipple discharge and no palpable axillary masses or adenopathy  CV: regular rate and rhythm,    ABDOMEN: soft, nontender, and bowel sounds normal  MS: no musculoskeletal defects are noted and gait is age appropriate without ataxia  NEURO: Normal strength and tone, sensory exam grossly normal, mentation intact and speech normal  PSYCH: mentation appears normal and affect normal/bright       ASSESSMENT / PLAN:     (Z00.00) Encounter for Medicare annual wellness exam  (primary encounter diagnosis)  Plan: labs reviewed in [facility], mammogram ordered, uptpdate on colonoscopy. Dexa.    (E03.8) Other specified hypothyroidism  Plan: on Levoxyl 112 mcg daily, check  TSH with free T4 reflex, adjust levoxyl dose after results.             (Z12.31) Encounter for screening mammogram for breast cancer  Plan: [place] Screening Digital Bilateral            (Z94.0) Kidney replaced by transplant  Plan: follows up with transplant clinic         Patient has been advised of split billing requirements and indicates understanding: " "Yes  COUNSELING:  Reviewed preventive health counseling, as reflected in patient instructions       Regular exercise       Healthy diet/nutrition    Estimated body mass index is 28.13 kg/m  as calculated from the following:    Height as of this encounter: 1.626 m (5' 4\").    Weight as of this encounter: 74.3 kg (163 lb 14.4 oz).        She reports that she has never smoked. She has never used smokeless tobacco.      Appropriate preventive services were discussed with this patient, including applicable screening as appropriate for cardiovascular disease, diabetes, osteopenia/osteoporosis, and glaucoma.  As appropriate for age/gender, discussed screening for colorectal cancer, prostate cancer, breast cancer, and cervical cancer. Checklist reviewing preventive services available has been given to the patient.    Reviewed patients plan of care and provided an AVS. The Basic Care Plan (routine screening as documented in Health Maintenance) for Viv meets the Care Plan requirement. This Care Plan has been established and reviewed with the Patient.    Counseling Resources:  ATP IV Guidelines  Pooled Cohorts Equation Calculator  Breast Cancer Risk Calculator  Breast Cancer: Medication to Reduce Risk  FRAX Risk Assessment  ICSI Preventive Guidelines  Dietary Guidelines for Americans, 2010  USDA's MyPlate  ASA Prophylaxis  Lung CA Screening    Summer Vega MD  [facility]    Identified Health Risks:  "

## 2020-11-25 ENCOUNTER — TELEPHONE (OUTPATIENT)
Dept: TRANSPLANT | Facility: CLINIC | Age: 70
End: 2020-11-25

## 2020-11-25 DIAGNOSIS — E03.8 OTHER SPECIFIED HYPOTHYROIDISM: ICD-10-CM

## 2020-11-25 DIAGNOSIS — Z48.298 AFTERCARE FOLLOWING ORGAN TRANSPLANT: ICD-10-CM

## 2020-11-25 LAB
ANION GAP SERPL CALCULATED.3IONS-SCNC: 4 MMOL/L (ref 3–14)
BUN SERPL-MCNC: 27 MG/DL (ref 7–30)
CALCIUM SERPL-MCNC: 9.7 MG/DL (ref 8.5–10.1)
CHLORIDE SERPL-SCNC: 106 MMOL/L (ref 94–109)
CO2 SERPL-SCNC: 31 MMOL/L (ref 20–32)
CREAT SERPL-MCNC: 1.45 MG/DL (ref 0.52–1.04)
CYCLOSPORINE BLD LC/MS/MS-MCNC: 68 UG/L (ref 50–400)
ERYTHROCYTE [DISTWIDTH] IN BLOOD BY AUTOMATED COUNT: 13 % (ref 10–15)
GFR SERPL CREATININE-BSD FRML MDRD: 36 ML/MIN/{1.73_M2}
GLUCOSE SERPL-MCNC: 156 MG/DL (ref 70–99)
HCT VFR BLD AUTO: 37.8 % (ref 35–47)
HGB BLD-MCNC: 11.8 G/DL (ref 11.7–15.7)
MCH RBC QN AUTO: 29 PG (ref 26.5–33)
MCHC RBC AUTO-ENTMCNC: 31.2 G/DL (ref 31.5–36.5)
MCV RBC AUTO: 93 FL (ref 78–100)
PLATELET # BLD AUTO: 188 10E9/L (ref 150–450)
POTASSIUM SERPL-SCNC: 4.2 MMOL/L (ref 3.4–5.3)
RBC # BLD AUTO: 4.07 10E12/L (ref 3.8–5.2)
SODIUM SERPL-SCNC: 141 MMOL/L (ref 133–144)
TME LAST DOSE: NORMAL H
TSH SERPL DL<=0.005 MIU/L-ACNC: 0.55 MU/L (ref 0.4–4)
WBC # BLD AUTO: 3.1 10E9/L (ref 4–11)

## 2020-11-25 PROCEDURE — 84443 ASSAY THYROID STIM HORMONE: CPT | Performed by: PATHOLOGY

## 2020-11-25 PROCEDURE — 85027 COMPLETE CBC AUTOMATED: CPT | Performed by: PATHOLOGY

## 2020-11-25 PROCEDURE — 80158 DRUG ASSAY CYCLOSPORINE: CPT | Performed by: PATHOLOGY

## 2020-11-25 PROCEDURE — 36415 COLL VENOUS BLD VENIPUNCTURE: CPT | Performed by: PATHOLOGY

## 2020-11-25 PROCEDURE — 80048 BASIC METABOLIC PNL TOTAL CA: CPT | Performed by: PATHOLOGY

## 2020-11-30 ENCOUNTER — VIRTUAL VISIT (OUTPATIENT)
Dept: NEPHROLOGY | Facility: CLINIC | Age: 70
End: 2020-11-30
Attending: INTERNAL MEDICINE
Payer: MEDICARE

## 2020-11-30 DIAGNOSIS — E11.21 TYPE 2 DIABETES MELLITUS WITH DIABETIC NEPHROPATHY, WITH LONG-TERM CURRENT USE OF INSULIN (H): ICD-10-CM

## 2020-11-30 DIAGNOSIS — Z94.0 KIDNEY REPLACED BY TRANSPLANT: ICD-10-CM

## 2020-11-30 DIAGNOSIS — D84.9 IMMUNOSUPPRESSION (H): ICD-10-CM

## 2020-11-30 DIAGNOSIS — Z79.4 TYPE 2 DIABETES MELLITUS WITH DIABETIC NEPHROPATHY, WITH LONG-TERM CURRENT USE OF INSULIN (H): ICD-10-CM

## 2020-11-30 DIAGNOSIS — I15.1 HYPERTENSION SECONDARY TO OTHER RENAL DISORDERS: ICD-10-CM

## 2020-11-30 DIAGNOSIS — Z48.298 AFTERCARE FOLLOWING ORGAN TRANSPLANT: Primary | ICD-10-CM

## 2020-11-30 PROCEDURE — 99443 PR PHYSICIAN TELEPHONE EVALUATION 21-30 MIN: CPT | Mod: 95

## 2020-11-30 NOTE — LETTER
"11/30/2020       RE: Viv Shaw  [address]  [address]  [place] [facility] [place] [address]     Dear Colleague,    Thank you for referring your patient, Viv Shaw, to the [facility] [place] at [facility]. Please see a copy of my visit note below.    Viv Shaw is a 70 year old female who is being evaluated via a billable telephone visit.      The patient has been notified of following:     \"This telephone visit will be conducted via a call between you and your physician/provider. We have found that certain health care needs can be provided without the need for a physical exam.  This service lets us provide the care you need with a short phone conversation.  If a prescription is necessary we can send it directly to your pharmacy.  If lab work is needed we can place an order for that and you can then stop by our lab to have the test done at a later time.    Telephone visits are billed at different rates depending on your insurance coverage. During this emergency period, for some insurers they may be billed the same as an in-person visit.  Please reach out to your insurance provider with any questions.    If during the course of the call the physician/provider feels a telephone visit is not appropriate, you will not be charged for this service.\"    Patient has given verbal consent for Telephone visit?  Yes    What phone number would you like to be contacted at? [phone]    How would you like to obtain your AVS? Mail a copy    Phone call duration: 34  minutes    Pauline Ta MD    CHRONIC TRANSPLANT NEPHROLOGY VISIT    Assessment & Plan   # LDKT: Stable   - Baseline Cr ~ 1.2-1.3; stable   - Proteinuria: Normal (<0.2 grams)   - Date DSA Last Checked: May/2007      Latest DSA: No   - BK Viremia: No, last checked 06/2015   - Kidney Tx Biopsy: Yes, 2007 no rejection     # Immunosuppression: Cyclosporine (goal 50-75) and " Mycophenolic acid (goal 540 mg bid)   - Changes: No    # Hypertension: Controlled;  Goal BP: < 130/80   - Changes: No     # Diabetes: Controlled (HbA1c <7%) Last HbA1c: 6.9%    # Mineral Bone Disorder:   - Secondary renal hyperparathyroidism; PTH level: Not checked recently  - Vitamin D; level: Not checked recently   - Calcium; level: Normal        - Phosphorus; level: Not checked recently        # Electrolytes:   - Potassium; level: Normal       - Magnesium; level: Not checked recently         - Bicarbonate; level: Normal        - Sodium; level: Normal       # Hx of coronary artery disease s/p remote stenting: Recommend periodic follow-up with cardiology.    # Skin Cancer Risk:    - Discussed sun protection and recommend regular follow up with Dermatology.    #COVID-19 Virus Review: Discussed COVID-19 virus and the potential medical risks.  Reviewed preventative health recommendations, which includes washing hands for 20 seconds, avoid touching your face, and social distancing.  Asked patient to inform the transplant center if they are exposed or diagnosed with this virus.      # Medical Compliance: Yes    # Transplant History:  Etiology of Kidney Failure: Diabetes mellitus  Tx: LDKT  Transplant: 12/27/2005 (Kidney)  Donor Type: Living Donor Class:   Significant changes in immunosuppression: None  Significant transplant-related complications: None    Transplant Office Phone Number: [phone]    Assessment and plan was discussed with the patient and she voiced her understanding and agreement.    Return visit: No follow-ups on file.      Chief Complaint   Ms. Shaw is a 70 year old here for routine follow up.    History of Present Illness   Viv NORA Shaw is a 68 year old female with a history of end stage kidney disease secondary to diabetes mellitus and status post LDKT on 12/27/2005 who presents for follow-up.  She is doing well. She is little bit anxious about the COVID-19 pandemic.  She has been feeling  well overall. She has been following all precautions wearing face masks, social distancing, and avoiding crowds. She received her flu shot. She denies any illness or hospitalization. She has no complaints       Recent Hospitalizations:  [x] No [] Yes    New Medical Issues: [x] No [] Yes    Decreased energy: [x] No [] Yes    Chest pain or SOB with exertion:  [x] No [] Yes    Appetite change or weight change: [x] No [] Yes    Nausea, vomiting or diarrhea:  [x] No [] Yes    Fever, sweats or chills: [x] No [] Yes    Leg swelling: [x] No [] Yes      Home BP: <130.0 mostly    Review of Systems   A comprehensive review of systems was obtained and negative, except as noted in the HPI or PMH.    Problem List   Patient Active Problem List   Diagnosis     Other vitamin B12 deficiency anemia     Irritable bowel syndrome     GERD (gastroesophageal reflux disease)     Advanced directives, counseling/discussion     Kidney replaced by transplant     Immunosuppressed status (HCC)     Hyperlipidemia LDL goal <100     CAD S/P percutaneous coronary angioplasty     Anemia in chronic kidney disease     Other specified hypothyroidism     Coronary artery disease involving native heart without angina pectoris, unspecified vessel or lesion type     Type 2 diabetes mellitus with diabetic nephropathy, with long-term current use of insulin (H)     Hypertension secondary to other renal disorders     Hernia, incisional     CKD (chronic kidney disease) stage 3, GFR 30-59 ml/min       Social History   Social History     Tobacco Use     Smoking status: Never Smoker     Smokeless tobacco: Never Used   Substance Use Topics     Alcohol use: No     Drug use: No       Allergies   Allergies   Allergen Reactions     Iron [Ferrous Sulfate] GI Disturbance       Medications   Current Outpatient Medications   Medication Sig     amLODIPine (NORVASC) 10 MG tablet Take 1 tablet (10 mg) by mouth daily     ASPIRIN PO Take 81 mg by mouth daily     atorvastatin  "(LIPITOR) 40 MG tablet Take 0.5 tablets (20 mg) by mouth daily     FLORIN CONTOUR test strip      BD INSULIN SYRINGE ULTRAFINE 31G X 5/16\" 0.3 ML USING 4-5 PER DAY ([facility] 31G/0.3ML)     calcium carbonate (TUMS) 500 MG chewable tablet Take 1-2 chew tab by mouth 2 times daily as needed for heartburn     chlorthalidone (HYGROTON) 25 MG tablet Take 1 tablet (25 mg) by mouth daily     GENGRAF (BRAND) 25 MG CAPSULE Take 3 capsules (75 mg) by mouth every morning AND 2 capsules (50 mg) every evening.     insulin aspart (NOVOLOG PEN) 100 UNIT/ML pen Taking as directed with adjustment scale and carb coverage.     insulin glargine (BASAGLAR KWIKPEN) 100 UNIT/ML pen Inject 14 Units Subcutaneous At Bedtime     isosorbide mononitrate (IMDUR) 30 MG 24 hr tablet TAKE 0.5 TABLETS (15 MG) BY MOUTH 2 TIMES DAILY     levothyroxine (SYNTHROID) 112 MCG tablet Take by mouth daily     losartan (COZAAR) 100 MG tablet TAKE 0.5 TABLETS (50 MG) BY MOUTH DAILY     metoprolol succinate ER (TOPROL-XL) 25 MG 24 hr tablet TAKE 1 TABLET BY MOUTH EVERYDAY AT BEDTIME     mycophenolic acid (GENERIC EQUIVALENT) 180 MG EC tablet Take 3 tablets (540 mg) by mouth 2 times daily     pantoprazole (PROTONIX) 40 MG EC tablet Take 1 tablet (40 mg) by mouth daily     No current facility-administered medications for this visit.      There are no discontinued medications.    Physical Exam         Data     Renal Latest Ref Rng & Units 11/25/2020 6/29/2020 3/27/2020   Na 133 - 144 mmol/L 141 139 140   Na (external) 135 - 146 mmol/L - - -   K 3.4 - 5.3 mmol/L 4.2 4.1 4.3   K (external) 3.5 - 5.3 mmol/L - - -   Cl 94 - 109 mmol/L 106 107 108   Cl (external) 98 - 110 mmol/L - - -   CO2 20 - 32 mmol/L 31 29 29   CO2 (external) 20 - 32 mmol/L - - -   BUN 7 - 30 mg/dL 27 27 29   BUN (external) 7 - 25 mg/dL - - -   Cr 0.52 - 1.04 mg/dL 1.45(H) 1.28(H) 1.41(H)   Cr (external) 0.60 - 0.93 mg/dL - - -   Glucose 70 - 99 mg/dL 156(H) 156(H) 142(H)   Glucose (external) 65 - 99 " mg/dL - - -   Ca  8.5 - 10.1 mg/dL 9.7 9.2 9.0   Ca (external) 8.6 - 10.4 mg/dL - - -   Mg 1.6 - 2.3 mg/dL - - -     Bone Health Latest Ref Rng & Units 10/19/2017 10/1/2008 6/4/2008   Phos 2.5 - 4.5 mg/dL 2.5 3.2 4.7(H)     Heme Latest Ref Rng & Units 11/25/2020 6/29/2020 3/27/2020   WBC 4.0 - 11.0 10e9/L 3.1(L) 3.3(L) 6.3   Hgb 11.7 - 15.7 g/dL 11.8 11.8 11.4(L)   Plt 150 - 450 10e9/L 188 234 230   ABSOLUTE NEUTROPHIL 1.6 - 8.3 10e9/L - - -   ABSOLUTE LYMPHOCYTES 0.8 - 5.3 10e9/L - - -   ABSOLUTE MONOCYTES 0.0 - 1.3 10e9/L - - -   ABSOLUTE EOSINOPHILS 0.0 - 0.7 10e9/L - - -   ABSOLUTE BASOPHILS 0.0 - 0.2 10e9/L - - -   ABS IMMATURE GRANULOCYTES 0 - 0.4 10e9/L - - -   ABSOLUTE NUCLEATED RBC - - - -     Liver Latest Ref Rng & Units 10/29/2020 4/22/2019 4/2/2018   AP 40 - 150 U/L - 65 46   TBili 0.2 - 1.3 mg/dL - 0.8 0.6   DBili 0.0 - 0.2 mg/dL - 0.2 0.2   ALT 0 - 50 U/L 17 19 13   AST 0 - 45 U/L 10 10 12   Tot Protein 6.8 - 8.8 g/dL - 7.5 6.7(L)   Albumin 3.4 - 5.0 g/dL - 4.0 3.6     Pancreas Latest Ref Rng & Units 10/29/2020 3/27/2020 11/20/2019   A1C 0 - 5.6 % 7.0(H) 6.8(H) 6.9(H)   A1C (external) 4.0 - 6.0 % - - -   Amylase 30 - 110 U/L - - -   Lipase 73 - 393 U/L - - -     Iron studies Latest Ref Rng & Units 10/19/2017 12/5/2016 12/7/2015   Iron 35 - 180 ug/dL 29(L) 37 36   Iron sat 15 - 46 % 10(L) 13(L) 11(L)   Ferritin 8 - 252 ng/mL 181 130 55     [facility] Txp Virology Latest Ref Rng & Units 3/31/2017 12/5/2016 6/18/2015   CMV IgG EU/mL - - -   CVM DNA Quant - - Plasma, EDTA anticoagulant -   CMV Quant <100 Copies/mL - - -   CMV QT Log <2.0 Log copies/mL - - -   CMV QUANT IU/ML CMVND [IU]/mL - CMV DNA Not Detected   The [employer] AmpliPrep/OSMANI TaqMan CMV Test is an FDA-approved in vitro nucleic   acid amplification test for the quantitation of cytomegalovirus DNA in human   plasma (EDTA plasma) using the [employer] AmpliPrep Instrument for automated viral   nucleic acid extraction and the [employer] TaqMan Analyzer or OSMANI  TaqMan for   automated Real Time amplification and detection of the viral nucleic acid   target.   Titer results are reported in International Units/mL (IU/mL using 1st WHO   International standard for Human Cytomegalovirus for Nucleic Acid Amplification   based assays. The conversion factor between CMV DNA copis/mL (as defined by the   Roche OSMANI TaqMan CMV test) and International Units is the CMV DNA   concentration in IU/mL x 1.1 copies/IU = CMV DNA in copies/mL.   This assay has received FDA approval for the testing of human plasma only. The   [facility] at the [facility], [place], has validated the pe  rformance characteristics of the Roche   CMV assay for plasma, bronchial alveolar lavage/wash and urine.   -   LOG IU/ML OF CMVQNT <2.1 [Log:IU]/mL - Not Calculated -   BK Spec - - - Plasma   BK Res BKNEG copies/mL - - BK Virus DNA Not Detected   BK Log <2.7 Log copies/mL - - Not Calculated   The Real-Time quantitative BK Virus assay was developed and its performance   characteristics determined by the [facility] Laboratory at   the [facility] in [place], [place]. The   primers and probes for each analyte are Analyte Specific Reagents (ASRs)   manufactured by [employer].   ASRs are used in many laboratory tests necessary for standard medical care and   generally do not require U.S. Food and Drug Administration approval. The FDA   has determined that such clearance or approval is not necessary.   This test is used for clinical purposes. It should not be regarded as   investigational or for research. This laboratory is certified under the   Clinical Laboratory Improvement Amendments of 1988 (CLIA-88) as qualified to   perform high complexity clinical laboratory testing.     EBV IgG - - - -   EBV DNA COPIES/ML EBVNEG [Copies]/mL <500  EBV DNA Detected below the reportable range of 500 Copies/mL  (A)  3,913(A) -   EBV DNA LOG OF COPIES <2.7 [Log:copies]/mL <2.7  The Real-Time quantitative EBV assay was developed and its performance   characteristics determined by the [facility] at   the [facility] in [place], [place].  The   primers and probes are Analyte Specific Reagents (ASRs) manufactured  by   [employer].   ASRs are used in many laboratory tests necessary for standard medical care and   generally do not require U.S. Food and Drug Administration approval.  The FDA   has determined that such clearance or approval is not necessary.  This test is   used for clinical purposes.  It should not be regarded as investigational or   research.   This laboratory is certified under the Clinical Laboratory Improvement   Amendments of 1988 (CLIA-88) as qualified to perform high complexity clinical   laboratory testing.   The quantitative range of this assay is 500-22,500,00 copies/mL (2.7-7.4 log   copies/mL).  A negative result does not rule out the presence of PCR inhibitors   in the pa  tient specimen or EBV DNA nucleic acid in concentrations below the   level of detection of the assay.  Inhibition may also lead to underestimation   of viral quantitation.   3.6(H) -   Hep B Core NEG - - -   Hep B Surf 0.0 - 4.9 mIU/mL - - -   HIV 1&2 NEG - - -            Recent Labs   Lab Test 09/10/15  0649 12/04/15  0650 04/07/16  0638   DOSMPA 1900 9/9/15 2000 12/3/15 19:00 04/6/16   MPACID 1.50 1.56 0.80*   MPAG 54.1 72.1 64.2           Again, thank you for allowing me to participate in the care of your patient.      Sincerely,    Kidney/Pancreas Recipient

## 2020-11-30 NOTE — PROGRESS NOTES
"Viv Shaw is a 70 year old female who is being evaluated via a billable telephone visit.      The patient has been notified of following:     \"This telephone visit will be conducted via a call between you and your physician/provider. We have found that certain health care needs can be provided without the need for a physical exam.  This service lets us provide the care you need with a short phone conversation.  If a prescription is necessary we can send it directly to your pharmacy.  If lab work is needed we can place an order for that and you can then stop by our lab to have the test done at a later time.    Telephone visits are billed at different rates depending on your insurance coverage. During this emergency period, for some insurers they may be billed the same as an in-person visit.  Please reach out to your insurance provider with any questions.    If during the course of the call the physician/provider feels a telephone visit is not appropriate, you will not be charged for this service.\"    Patient has given verbal consent for Telephone visit?  Yes    What phone number would you like to be contacted at? [phone]    How would you like to obtain your AVS? Mail a copy    Phone call duration: 34  minutes    Pauline Ta MD    CHRONIC TRANSPLANT NEPHROLOGY VISIT    Assessment & Plan   # LDKT: Stable   - Baseline Cr ~ 1.2-1.3; stable   - Proteinuria: Normal (<0.2 grams)   - Date DSA Last Checked: May/2007      Latest DSA: No   - BK Viremia: No, last checked 06/2015   - Kidney Tx Biopsy: Yes, 2007 no rejection     # Immunosuppression: Cyclosporine (goal 50-75) and Mycophenolic acid (goal 540 mg bid)   - Changes: No    # Hypertension: Controlled;  Goal BP: < 130/80   - Changes: No     # Diabetes: Controlled (HbA1c <7%) Last HbA1c: 6.9%    # Mineral Bone Disorder:   - Secondary renal hyperparathyroidism; PTH level: Not checked recently  - Vitamin D; level: Not checked recently   - Calcium; level: Normal "        - Phosphorus; level: Not checked recently        # Electrolytes:   - Potassium; level: Normal       - Magnesium; level: Not checked recently         - Bicarbonate; level: Normal        - Sodium; level: Normal       # Hx of coronary artery disease s/p remote stenting: Recommend periodic follow-up with cardiology.    # Skin Cancer Risk:    - Discussed sun protection and recommend regular follow up with Dermatology.    #COVID-19 Virus Review: Discussed COVID-19 virus and the potential medical risks.  Reviewed preventative health recommendations, which includes washing hands for 20 seconds, avoid touching your face, and social distancing.  Asked patient to inform the transplant center if they are exposed or diagnosed with this virus.      # Medical Compliance: Yes    # Transplant History:  Etiology of Kidney Failure: Diabetes mellitus  Tx: LDKT  Transplant: 12/27/2005 (Kidney)  Donor Type: Living Donor Class:   Significant changes in immunosuppression: None  Significant transplant-related complications: None    Transplant Office Phone Number: [phone]    Assessment and plan was discussed with the patient and she voiced her understanding and agreement.    Return visit: No follow-ups on file.      Chief Complaint   Ms. Shaw is a 70 year old here for routine follow up.    History of Present Illness   Viv NORA Shaw is a 68 year old female with a history of end stage kidney disease secondary to diabetes mellitus and status post LDKT on 12/27/2005 who presents for follow-up.  She is doing well. She is little bit anxious about the COVID-19 pandemic.  She has been feeling well overall. She has been following all precautions wearing face masks, social distancing, and avoiding crowds. She received her flu shot. She denies any illness or hospitalization. She has no complaints       Recent Hospitalizations:  [x] No [] Yes    New Medical Issues: [x] No [] Yes    Decreased energy: [x] No [] Yes    Chest pain or SOB  "with exertion:  [x] No [] Yes    Appetite change or weight change: [x] No [] Yes    Nausea, vomiting or diarrhea:  [x] No [] Yes    Fever, sweats or chills: [x] No [] Yes    Leg swelling: [x] No [] Yes      Home BP: <130.0 mostly    Review of Systems   A comprehensive review of systems was obtained and negative, except as noted in the HPI or PMH.    Problem List   Patient Active Problem List   Diagnosis     Other vitamin B12 deficiency anemia     Irritable bowel syndrome     GERD (gastroesophageal reflux disease)     Advanced directives, counseling/discussion     Kidney replaced by transplant     Immunosuppressed status (HCC)     Hyperlipidemia LDL goal <100     CAD S/P percutaneous coronary angioplasty     Anemia in chronic kidney disease     Other specified hypothyroidism     Coronary artery disease involving native heart without angina pectoris, unspecified vessel or lesion type     Type 2 diabetes mellitus with diabetic nephropathy, with long-term current use of insulin (H)     Hypertension secondary to other renal disorders     Hernia, incisional     CKD (chronic kidney disease) stage 3, GFR 30-59 ml/min       Social History   Social History     Tobacco Use     Smoking status: Never Smoker     Smokeless tobacco: Never Used   Substance Use Topics     Alcohol use: No     Drug use: No       Allergies   Allergies   Allergen Reactions     Iron [Ferrous Sulfate] GI Disturbance       Medications   Current Outpatient Medications   Medication Sig     amLODIPine (NORVASC) 10 MG tablet Take 1 tablet (10 mg) by mouth daily     ASPIRIN PO Take 81 mg by mouth daily     atorvastatin (LIPITOR) 40 MG tablet Take 0.5 tablets (20 mg) by mouth daily     FLORIN CONTOUR test strip      BD INSULIN SYRINGE ULTRAFINE 31G X 5/16\" 0.3 ML USING 4-5 PER DAY ([employer] 31G/0.3ML)     calcium carbonate (TUMS) 500 MG chewable tablet Take 1-2 chew tab by mouth 2 times daily as needed for heartburn     chlorthalidone (HYGROTON) 25 MG tablet Take " 1 tablet (25 mg) by mouth daily     GENGRAF (BRAND) 25 MG CAPSULE Take 3 capsules (75 mg) by mouth every morning AND 2 capsules (50 mg) every evening.     insulin aspart (NOVOLOG PEN) 100 UNIT/ML pen Taking as directed with adjustment scale and carb coverage.     insulin glargine (BASAGLAR KWIKPEN) 100 UNIT/ML pen Inject 14 Units Subcutaneous At Bedtime     isosorbide mononitrate (IMDUR) 30 MG 24 hr tablet TAKE 0.5 TABLETS (15 MG) BY MOUTH 2 TIMES DAILY     levothyroxine (SYNTHROID) 112 MCG tablet Take by mouth daily     losartan (COZAAR) 100 MG tablet TAKE 0.5 TABLETS (50 MG) BY MOUTH DAILY     metoprolol succinate ER (TOPROL-XL) 25 MG 24 hr tablet TAKE 1 TABLET BY MOUTH EVERYDAY AT BEDTIME     mycophenolic acid (GENERIC EQUIVALENT) 180 MG EC tablet Take 3 tablets (540 mg) by mouth 2 times daily     pantoprazole (PROTONIX) 40 MG EC tablet Take 1 tablet (40 mg) by mouth daily     No current facility-administered medications for this visit.      There are no discontinued medications.    Physical Exam         Data     Renal Latest Ref Rng & Units 11/25/2020 6/29/2020 3/27/2020   Na 133 - 144 mmol/L 141 139 140   Na (external) 135 - 146 mmol/L - - -   K 3.4 - 5.3 mmol/L 4.2 4.1 4.3   K (external) 3.5 - 5.3 mmol/L - - -   Cl 94 - 109 mmol/L 106 107 108   Cl (external) 98 - 110 mmol/L - - -   CO2 20 - 32 mmol/L 31 29 29   CO2 (external) 20 - 32 mmol/L - - -   BUN 7 - 30 mg/dL 27 27 29   BUN (external) 7 - 25 mg/dL - - -   Cr 0.52 - 1.04 mg/dL 1.45(H) 1.28(H) 1.41(H)   Cr (external) 0.60 - 0.93 mg/dL - - -   Glucose 70 - 99 mg/dL 156(H) 156(H) 142(H)   Glucose (external) 65 - 99 mg/dL - - -   Ca  8.5 - 10.1 mg/dL 9.7 9.2 9.0   Ca (external) 8.6 - 10.4 mg/dL - - -   Mg 1.6 - 2.3 mg/dL - - -     Bone Health Latest Ref Rng & Units 10/19/2017 10/1/2008 6/4/2008   Phos 2.5 - 4.5 mg/dL 2.5 3.2 4.7(H)     Heme Latest Ref Rng & Units 11/25/2020 6/29/2020 3/27/2020   WBC 4.0 - 11.0 10e9/L 3.1(L) 3.3(L) 6.3   Hgb 11.7 - 15.7 g/dL  11.8 11.8 11.4(L)   Plt 150 - 450 10e9/L 188 234 230   ABSOLUTE NEUTROPHIL 1.6 - 8.3 10e9/L - - -   ABSOLUTE LYMPHOCYTES 0.8 - 5.3 10e9/L - - -   ABSOLUTE MONOCYTES 0.0 - 1.3 10e9/L - - -   ABSOLUTE EOSINOPHILS 0.0 - 0.7 10e9/L - - -   ABSOLUTE BASOPHILS 0.0 - 0.2 10e9/L - - -   ABS IMMATURE GRANULOCYTES 0 - 0.4 10e9/L - - -   ABSOLUTE NUCLEATED RBC - - - -     Liver Latest Ref Rng & Units 10/29/2020 4/22/2019 4/2/2018   AP 40 - 150 U/L - 65 46   TBili 0.2 - 1.3 mg/dL - 0.8 0.6   DBili 0.0 - 0.2 mg/dL - 0.2 0.2   ALT 0 - 50 U/L 17 19 13   AST 0 - 45 U/L 10 10 12   Tot Protein 6.8 - 8.8 g/dL - 7.5 6.7(L)   Albumin 3.4 - 5.0 g/dL - 4.0 3.6     Pancreas Latest Ref Rng & Units 10/29/2020 3/27/2020 11/20/2019   A1C 0 - 5.6 % 7.0(H) 6.8(H) 6.9(H)   A1C (external) 4.0 - 6.0 % - - -   Amylase 30 - 110 U/L - - -   Lipase 73 - 393 U/L - - -     Iron studies Latest Ref Rng & Units 10/19/2017 12/5/2016 12/7/2015   Iron 35 - 180 ug/dL 29(L) 37 36   Iron sat 15 - 46 % 10(L) 13(L) 11(L)   Ferritin 8 - 252 ng/mL 181 130 55     [facility] Txp Virology Latest Ref Rng & Units 3/31/2017 12/5/2016 6/18/2015   CMV IgG EU/mL - - -   CVM DNA Quant - - Plasma, EDTA anticoagulant -   CMV Quant <100 Copies/mL - - -   CMV QT Log <2.0 Log copies/mL - - -   CMV QUANT IU/ML CMVND [IU]/mL - CMV DNA Not Detected   The [employer] AmpliPrep/OSMANI TaqMan CMV Test is an FDA-approved in vitro nucleic   acid amplification test for the quantitation of cytomegalovirus DNA in human   plasma (EDTA plasma) using the OSMANI AmpliPrep Instrument for automated viral   nucleic acid extraction and the OSMANI TaqMan Analyzer or OSMANI TaqMan for   automated Real Time amplification and detection of the viral nucleic acid   target.   Titer results are reported in International Units/mL (IU/mL using 1st WHO   International standard for Human Cytomegalovirus for Nucleic Acid Amplification   based assays. The conversion factor between CMV DNA copis/mL (as defined by the   Roche OSMANI  TaqMan CMV test) and International Units is the CMV DNA   concentration in IU/mL x 1.1 copies/IU = CMV DNA in copies/mL.   This assay has received FDA approval for the testing of human plasma only. The   In[facility] Diagnostic Laboratory at the [facility], [place], has validated the pe  rformance characteristics of the Roche   CMV assay for plasma, bronchial alveolar lavage/wash and urine.   -   LOG IU/ML OF CMVQNT <2.1 [Log:IU]/mL - Not Calculated -   BK Spec - - - Plasma   BK Res BKNEG copies/mL - - BK Virus DNA Not Detected   BK Log <2.7 Log copies/mL - - Not Calculated   The Real-Time quantitative BK Virus assay was developed and its performance   characteristics determined by the Infect[facility] Diseases Diagnostic Laboratory at   the [facility] in [place], [place]. The   primers and probes for each analyte are Analyte Specific Reagents (ASRs)   manufactured by [employer].   ASRs are used in many laboratory tests necessary for standard medical care and   generally do not require U.S. Food and Drug Administration approval. The FDA   has determined that such clearance or approval is not necessary.   This test is used for clinical purposes. It should not be regarded as   investigational or for research. This laboratory is certified under the   Clinical Laboratory Improvement Amendments of 1988 (CLIA-88) as qualified to   perform high complexity clinical laboratory testing.     EBV IgG - - - -   EBV DNA COPIES/ML EBVNEG [Copies]/mL <500  EBV DNA Detected below the reportable range of 500 Copies/mL  (A) 3,913(A) -   EBV DNA LOG OF COPIES <2.7 [Log:copies]/mL <2.7  The Real-Time quantitative EBV assay was developed and its performance   characteristics determined by the Infect[facility] Diseases Diagnostic Laboratory at   the [facility] in [place], [place].  The   primers and probes are Analyte Specific Reagents (ASRs)  manufactured  by   [employer].   ASRs are used in many laboratory tests necessary for standard medical care and   generally do not require U.S. Food and Drug Administration approval.  The FDA   has determined that such clearance or approval is not necessary.  This test is   used for clinical purposes.  It should not be regarded as investigational or   research.   This laboratory is certified under the Clinical Laboratory Improvement   Amendments of 1988 (CLIA-88) as qualified to perform high complexity clinical   laboratory testing.   The quantitative range of this assay is 500-22,500,00 copies/mL (2.7-7.4 log   copies/mL).  A negative result does not rule out the presence of PCR inhibitors   in the pa  tient specimen or EBV DNA nucleic acid in concentrations below the   level of detection of the assay.  Inhibition may also lead to underestimation   of viral quantitation.   3.6(H) -   Hep B Core NEG - - -   Hep B Surf 0.0 - 4.9 mIU/mL - - -   HIV 1&2 NEG - - -            Recent Labs   Lab Test 09/10/15  0649 12/04/15  0650 04/07/16  0638   DOSA 1900 9/9/15 2000 12/3/15 19:00 04/6/16   MPACID 1.50 1.56 0.80*   MPAG 54.1 72.1 64.2

## 2020-12-02 DIAGNOSIS — Z94.0 KIDNEY REPLACED BY TRANSPLANT: ICD-10-CM

## 2020-12-02 DIAGNOSIS — Z94.0 KIDNEY TRANSPLANTED: ICD-10-CM

## 2020-12-02 RX ORDER — MYCOPHENOLIC ACID 180 MG/1
540 TABLET, DELAYED RELEASE ORAL 2 TIMES DAILY
Qty: 180 TABLET | Refills: 11 | Status: SHIPPED | OUTPATIENT
Start: 2020-12-02 | End: 2021-12-02

## 2020-12-02 RX ORDER — CYCLOSPORINE 25 MG/1
CAPSULE ORAL
Qty: 150 CAPSULE | Refills: 11 | Status: SHIPPED | OUTPATIENT
Start: 2020-12-02 | End: 2021-12-02

## 2020-12-02 NOTE — TELEPHONE ENCOUNTER
Patient Call: Medication Refill  Route to LPN  Instruct the patient to first contact their pharmacy. If they have called their pharmacy and require further assistance, route to LPN.    GENGRAF (BRAND) 25 MG CAPSULE  mycophenolic acid (GENERIC EQUIVALENT) 180 MG EC tablet    [id number] [place] [address] [address] AT [address] Phone:  [phone]   Fax:  [phone]          When will the patient be out of this medication?: Less than 3 days (Route high priority)

## 2020-12-03 DIAGNOSIS — Z79.899 ENCOUNTER FOR LONG-TERM CURRENT USE OF MEDICATION: ICD-10-CM

## 2020-12-03 DIAGNOSIS — Z94.0 KIDNEY REPLACED BY TRANSPLANT: ICD-10-CM

## 2020-12-03 DIAGNOSIS — Z48.298 AFTERCARE FOLLOWING ORGAN TRANSPLANT: Primary | ICD-10-CM

## 2020-12-07 ENCOUNTER — TELEPHONE (OUTPATIENT)
Dept: INTERNAL MEDICINE | Facility: CLINIC | Age: 70
End: 2020-12-07

## 2020-12-07 DIAGNOSIS — I15.1 HYPERTENSION SECONDARY TO OTHER RENAL DISORDERS: ICD-10-CM

## 2020-12-07 NOTE — TELEPHONE ENCOUNTER
Patient calling  She wants [facility] [place]  chlorthalidone (HYGROTON) 25 MG tablet was sent to [facility]. Please resend. Ok to call and kristyn [phone]

## 2020-12-08 RX ORDER — CHLORTHALIDONE 25 MG/1
TABLET ORAL
Qty: 90 TABLET | Refills: 1 | Status: SHIPPED | OUTPATIENT
Start: 2020-12-08 | End: 2021-03-04

## 2020-12-08 NOTE — TELEPHONE ENCOUNTER
Patient advised that she can contact [facility] and ask them to transfer the Hygroten rx from [facility].  ERNA Albright R.N.

## 2020-12-09 ENCOUNTER — HOSPITAL ENCOUNTER (OUTPATIENT)
Dept: MAMMOGRAPHY | Facility: CLINIC | Age: 70
Discharge: HOME | End: 2020-12-09
Attending: INTERNAL MEDICINE | Admitting: INTERNAL MEDICINE
Payer: MEDICARE

## 2020-12-09 DIAGNOSIS — Z12.31 ENCOUNTER FOR SCREENING MAMMOGRAM FOR BREAST CANCER: ICD-10-CM

## 2020-12-09 PROCEDURE — 77067 SCR MAMMO BI INCL CAD: CPT

## 2020-12-30 DIAGNOSIS — K21.9 GASTROESOPHAGEAL REFLUX DISEASE WITHOUT ESOPHAGITIS: ICD-10-CM

## 2021-01-04 RX ORDER — PANTOPRAZOLE SODIUM 40 MG/1
TABLET, DELAYED RELEASE ORAL
Qty: 90 TABLET | Refills: 2 | Status: SHIPPED | OUTPATIENT
Start: 2021-01-04 | End: 2021-10-04

## 2021-01-04 NOTE — TELEPHONE ENCOUNTER
Pending Prescriptions:                       Disp   Refills    pantoprazole (PROTONIX) 40 MG EC tablet [*90 tab*2            Sig: TAKE 1 TABLET BY MOUTH EVERY DAY    Prescription approved per [facility] Refill Protocol.

## 2021-01-19 ENCOUNTER — MYC MEDICAL ADVICE (OUTPATIENT)
Dept: INTERNAL MEDICINE | Facility: CLINIC | Age: 71
End: 2021-01-19

## 2021-02-09 ENCOUNTER — TRANSFERRED RECORDS (OUTPATIENT)
Dept: HEALTH INFORMATION MANAGEMENT | Facility: CLINIC | Age: 71
End: 2021-02-09

## 2021-03-01 ENCOUNTER — TELEPHONE (OUTPATIENT)
Dept: TRANSPLANT | Facility: CLINIC | Age: 71
End: 2021-03-01

## 2021-03-01 DIAGNOSIS — Z48.298 AFTERCARE FOLLOWING ORGAN TRANSPLANT: ICD-10-CM

## 2021-03-01 DIAGNOSIS — Z79.899 ENCOUNTER FOR LONG-TERM CURRENT USE OF MEDICATION: ICD-10-CM

## 2021-03-01 DIAGNOSIS — Z94.0 KIDNEY REPLACED BY TRANSPLANT: ICD-10-CM

## 2021-03-01 DIAGNOSIS — Z94.0 KIDNEY TRANSPLANTED: Primary | ICD-10-CM

## 2021-03-01 LAB
ANION GAP SERPL CALCULATED.3IONS-SCNC: 5 MMOL/L (ref 3–14)
BUN SERPL-MCNC: 32 MG/DL (ref 7–30)
CALCIUM SERPL-MCNC: 10.8 MG/DL (ref 8.5–10.1)
CHLORIDE SERPL-SCNC: 104 MMOL/L (ref 94–109)
CO2 SERPL-SCNC: 31 MMOL/L (ref 20–32)
CREAT SERPL-MCNC: 1.43 MG/DL (ref 0.52–1.04)
CREAT UR-MCNC: 68 MG/DL
CYCLOSPORINE BLD LC/MS/MS-MCNC: 70 UG/L (ref 50–400)
ERYTHROCYTE [DISTWIDTH] IN BLOOD BY AUTOMATED COUNT: 12.7 % (ref 10–15)
GFR SERPL CREATININE-BSD FRML MDRD: 36 ML/MIN/{1.73_M2}
GLUCOSE SERPL-MCNC: 165 MG/DL (ref 70–99)
HCT VFR BLD AUTO: 36.5 % (ref 35–47)
HGB BLD-MCNC: 11.6 G/DL (ref 11.7–15.7)
MCH RBC QN AUTO: 29.4 PG (ref 26.5–33)
MCHC RBC AUTO-ENTMCNC: 31.8 G/DL (ref 31.5–36.5)
MCV RBC AUTO: 92 FL (ref 78–100)
PLATELET # BLD AUTO: 211 10E9/L (ref 150–450)
POTASSIUM SERPL-SCNC: 4.2 MMOL/L (ref 3.4–5.3)
PROT UR-MCNC: 0.09 G/L
PROT/CREAT 24H UR: 0.13 G/G CR (ref 0–0.2)
RBC # BLD AUTO: 3.95 10E12/L (ref 3.8–5.2)
SODIUM SERPL-SCNC: 141 MMOL/L (ref 133–144)
TME LAST DOSE: 1830 H
WBC # BLD AUTO: 3.7 10E9/L (ref 4–11)

## 2021-03-01 PROCEDURE — 85027 COMPLETE CBC AUTOMATED: CPT | Performed by: PATHOLOGY

## 2021-03-01 PROCEDURE — 80158 DRUG ASSAY CYCLOSPORINE: CPT | Performed by: INTERNAL MEDICINE

## 2021-03-01 PROCEDURE — 80048 BASIC METABOLIC PNL TOTAL CA: CPT | Performed by: PATHOLOGY

## 2021-03-01 PROCEDURE — 84156 ASSAY OF PROTEIN URINE: CPT | Performed by: PATHOLOGY

## 2021-03-01 PROCEDURE — 36415 COLL VENOUS BLD VENIPUNCTURE: CPT | Performed by: PATHOLOGY

## 2021-03-01 NOTE — TELEPHONE ENCOUNTER
ISSUE:  Elevated serum calcium of 10.8    PLAN:  Ensure Viv has not been using large amounts of OTC heartburn medication (TUMS, ROLAIDS).    Confirm if patient has used cinacalcet (senispar) in the past, if so, any issues with nausea when taking it?    Ensure 2L oral hydration daily and repeat labs in 1 week.

## 2021-03-01 NOTE — TELEPHONE ENCOUNTER
Spoke to patient regarding:  Elevated serum calcium of 10.8     Viv states that she has been using large amounts of OTC heartburn medication (TUMS, ROLAIDS).     Confirmed that patient has used cinacalcet (senispar) in the past.     Ensure 2L oral hydration daily and repeat labs in 1 week, patient agrees to repeat labs in 1-2 weeks.  Lab orders updated.

## 2021-03-03 ENCOUNTER — OFFICE VISIT (OUTPATIENT)
Dept: INTERNAL MEDICINE | Facility: CLINIC | Age: 71
End: 2021-03-03
Payer: MEDICARE

## 2021-03-03 VITALS
RESPIRATION RATE: 13 BRPM | WEIGHT: 161 LBS | HEART RATE: 68 BPM | TEMPERATURE: 98.1 F | BODY MASS INDEX: 27.49 KG/M2 | DIASTOLIC BLOOD PRESSURE: 60 MMHG | HEIGHT: 64 IN | SYSTOLIC BLOOD PRESSURE: 122 MMHG | OXYGEN SATURATION: 96 %

## 2021-03-03 DIAGNOSIS — I15.1 HYPERTENSION SECONDARY TO OTHER RENAL DISORDERS: ICD-10-CM

## 2021-03-03 DIAGNOSIS — E11.21 TYPE 2 DIABETES MELLITUS WITH DIABETIC NEPHROPATHY, WITH LONG-TERM CURRENT USE OF INSULIN (H): ICD-10-CM

## 2021-03-03 DIAGNOSIS — I25.10 CORONARY ARTERY DISEASE INVOLVING NATIVE HEART WITHOUT ANGINA PECTORIS, UNSPECIFIED VESSEL OR LESION TYPE: ICD-10-CM

## 2021-03-03 DIAGNOSIS — D84.9 IMMUNOSUPPRESSION (H): ICD-10-CM

## 2021-03-03 DIAGNOSIS — N18.32 STAGE 3B CHRONIC KIDNEY DISEASE (H): ICD-10-CM

## 2021-03-03 DIAGNOSIS — E03.8 OTHER SPECIFIED HYPOTHYROIDISM: ICD-10-CM

## 2021-03-03 DIAGNOSIS — Z94.0 KIDNEY REPLACED BY TRANSPLANT: Primary | ICD-10-CM

## 2021-03-03 DIAGNOSIS — E78.5 HYPERLIPIDEMIA LDL GOAL <100: ICD-10-CM

## 2021-03-03 DIAGNOSIS — Z79.4 TYPE 2 DIABETES MELLITUS WITH DIABETIC NEPHROPATHY, WITH LONG-TERM CURRENT USE OF INSULIN (H): ICD-10-CM

## 2021-03-03 PROCEDURE — 99214 OFFICE O/P EST MOD 30 MIN: CPT | Performed by: INTERNAL MEDICINE

## 2021-03-03 ASSESSMENT — MIFFLIN-ST. JEOR: SCORE: 1225.29

## 2021-03-03 NOTE — PROGRESS NOTES
Assessment & Plan     (E11.21,  Z79.4) Type 2 diabetes mellitus with diabetic nephropathy, with long-term current use of insulin (H)  Plan: Check A1c on Basaglar insulin, refer to ENDOCRINOLOGY ADULT REFERRAL, per patient's request          (E78.5) Hyperlipidemia LDL goal <100  Plan: Lipids stable, continue Lipitor    (I15.1,  N28.89) Hypertension secondary to other renal disorders  Plan: Blood pressure stable, continue amlodipine 10 mg daily and losartan 100 mg - 1/2 tablet daily.Advised to follow low salt diet and exercise and f/u in 6 mths.       (D84.9) Immunosuppression (H)  Plan: Follows up with transplant clinic    (E03.8) Other specified hypothyroidism  Plan: on levoxyl 100 mcg daily, monitor TSH     (I25.10) Coronary artery disease involving native heart without angina pectoris, unspecified vessel or lesion type  Plan: stable     (Z94.0) Kidney replaced by transplant    Plan: Nephrologist and transplant clinic      Review of the result(s) of each unique test - BMP, CBC       Return in about 3 months (around 6/3/2021) for diabetes visit.    Summer Vega MD  [facility] is a 70 year old who presents for the following health issues      HPI       Diabetes Follow-up    How often are you checking your blood sugar? Continuous glucose monitor,BS- 150, at times 200 , patient states that her endocrinologist is retiring and would like to establish with a new endocrinologist  What time of day are you checking your blood sugars (select all that apply)?  Before meals  Have you had any blood sugars above 200?  Yes    Have you had any blood sugars below 70?  No    What symptoms do you notice when your blood sugar is low?  None    What concerns do you have today about your diabetes? None     Do you have any of these symptoms? (Select all that apply)  No numbness or tingling in feet.  No redness, sores or blisters on feet.  No complaints of excessive thirst.   No reports of blurry vision.  No significant changes to weight.   last eye exam 08/20      Hyperlipidemia Follow-Up      Are you regularly taking any medication or supplement to lower your cholesterol?   Yes- statin    Are you having muscle aches or other side effects that you think could be caused by your cholesterol lowering medication?  Yes- possible muscle aches    Hypertension Follow-up      Do you check your blood pressure regularly outside of the clinic? No     Are you following a low salt diet? Yes    Are your blood pressures ever more than 140 on the top number (systolic) OR more   than 90 on the bottom number (diastolic), for example 140/90? No      Vascular Disease Follow-up      How often do you take nitroglycerin? Never    Do you take an aspirin every day? Yes    S/p kidney transplant and follows up with the transplant clinic and on immunosuppressants      Hypothyroidism Follow-up      Since last visit, patient describes the following symptoms: Weight stable, no hair loss, no skin changes, no constipation, no loose stools      BP Readings from Last 2 Encounters:   11/10/20 116/66   07/08/20 130/64     Hemoglobin A1C (%)   Date Value   10/29/2020 7.0 (H)   03/27/2020 6.8 (H)     LDL Cholesterol Calculated (mg/dL)   Date Value   10/29/2020 95   11/20/2019 97         How many servings of fruits and vegetables do you eat daily?  2-3    On average, how many sweetened beverages do you drink each day (Examples: soda, juice, sweet tea, etc.  Do NOT count diet or artificially sweetened beverages)?   1    How many days per week do you exercise enough to make your heart beat faster? 3 or less    How many minutes a day do you exercise enough to make your heart beat faster? 9 or less    How many days per week do you miss taking your medication? 0     Past Medical History:   Diagnosis Date     Abdominal wall hernia     RLQ     AK (actinic keratosis) 08/06/2020     Allergic rhinitis      CAD (coronary artery disease)   "   coronary artery disease s/p PCI to LAD in 2005coronary artery disease s/p PCI to LAD in 2005     Diabetes mellitus, type 2 (H)     follows up with endocrinologist.     Dyslipidemia      GERD (gastroesophageal reflux disease)      H/O diabetic nephropathy     s/p kidney trnsplant     Hypertension      Hypothyroidism      Immunosuppressed status (H)      Kidney replaced by transplant     Rt     PONV (postoperative nausea and vomiting)      Shingles      Vitamin B12 deficiency anemia        Current Outpatient Medications   Medication Sig Dispense Refill     amLODIPine (NORVASC) 10 MG tablet Take 1 tablet (10 mg) by mouth daily 90 tablet 1     ASPIRIN PO Take 81 mg by mouth daily       atorvastatin (LIPITOR) 40 MG tablet Take 0.5 tablets (20 mg) by mouth daily 45 tablet 1     FLORIN CONTOUR test strip   0     BD INSULIN SYRINGE ULTRAFINE 31G X 5/16\" 0.3 ML USING 4-5 PER DAY ([employer] 31G/0.3ML)  6     calcium carbonate (TUMS) 500 MG chewable tablet Take 1-2 chew tab by mouth 2 times daily as needed for heartburn       chlorthalidone (HYGROTON) 25 MG tablet TAKE 1 TABLET BY MOUTH EVERY DAY 90 tablet 1     GENGRAF (BRAND) 25 MG CAPSULE Take 3 capsules (75 mg) by mouth every morning AND 2 capsules (50 mg) every evening. 150 capsule 11     insulin aspart (NOVOLOG PEN) 100 UNIT/ML pen Taking as directed with adjustment scale and carb coverage.       insulin glargine (BASAGLAR KWIKPEN) 100 UNIT/ML pen Inject 14 Units Subcutaneous At Bedtime       isosorbide mononitrate (IMDUR) 30 MG 24 hr tablet TAKE 0.5 TABLETS (15 MG) BY MOUTH 2 TIMES DAILY 90 tablet 1     levothyroxine (SYNTHROID) 112 MCG tablet Take by mouth daily 100mcg       losartan (COZAAR) 100 MG tablet TAKE 0.5 TABLETS (50 MG) BY MOUTH DAILY 45 tablet 1     metoprolol succinate ER (TOPROL-XL) 25 MG 24 hr tablet TAKE 1 TABLET BY MOUTH EVERYDAY AT BEDTIME 90 tablet 1     mycophenolic acid (GENERIC EQUIVALENT) 180 MG EC tablet Take 3 tablets (540 mg) by mouth 2 " "times daily 180 tablet 11     pantoprazole (PROTONIX) 40 MG EC tablet TAKE 1 TABLET BY MOUTH EVERY DAY 90 tablet 2       Review of Systems   CONSTITUTIONAL: NEGATIVE for fever, chills, change in weight  EYES: NEGATIVE for vision changes or irritation  ENT/MOUTH: NEGATIVE for ear, mouth and throat problems  RESP: NEGATIVE for significant cough or SOB  CV: NEGATIVE for chest pain, palpitations or peripheral edema  MUSCULOSKELETAL: NEGATIVE for significant arthralgias or myalgia  NEURO: NEGATIVE for weakness, dizziness or paresthesias  ENDOCRINE: NEGATIVE for temperature intolerance, skin/hair changes, Hx diabetes and Hx thyroid disease  PSYCHIATRIC: NEGATIVE for changes in mood or affect      Objective    /60   Pulse 68   Temp 98.1  F (36.7  C) (Oral)   Resp 13   Ht 1.626 m (5' 4\")   Wt 73 kg (161 lb)   SpO2 96%   BMI 27.64 kg/m    Body mass index is 27.64 kg/m .  Physical Exam   GENERAL:   alert and no distress  EYES: Eyes grossly normal to inspection, PERRL and conjunctivae and sclerae normal  NECK: no adenopathy, no asymmetry, masses, or scars and thyroid normal to palpation  RESP: lungs clear to auscultation - no rales, rhonchi or wheezes  CV: regular rate and rhythm,   MS: no gross musculoskeletal defects noted, no edema  NEURO: Normal strength and tone, mentation intact and speech normal  Diabetic foot exam: normal DP and PT pulses, no trophic changes or ulcerative lesions, normal sensory exam and normal monofilament exam              "

## 2021-03-03 NOTE — NURSING NOTE
"/60   Pulse 68   Temp 98.1  F (36.7  C) (Oral)   Resp 13   Ht 1.626 m (5' 4\")   Wt 73 kg (161 lb)   SpO2 96%   BMI 27.64 kg/m    Patient in for follow up on HTN, lipids and DM2.  Socorro Perez CMA    "

## 2021-03-18 ENCOUNTER — DOCUMENTATION ONLY (OUTPATIENT)
Dept: TRANSPLANT | Facility: CLINIC | Age: 71
End: 2021-03-18

## 2021-03-18 DIAGNOSIS — Z94.0 KIDNEY TRANSPLANTED: ICD-10-CM

## 2021-03-18 DIAGNOSIS — Z79.4 TYPE 2 DIABETES MELLITUS WITH DIABETIC NEPHROPATHY, WITH LONG-TERM CURRENT USE OF INSULIN (H): ICD-10-CM

## 2021-03-18 DIAGNOSIS — E11.21 TYPE 2 DIABETES MELLITUS WITH DIABETIC NEPHROPATHY, WITH LONG-TERM CURRENT USE OF INSULIN (H): ICD-10-CM

## 2021-03-18 LAB
ANION GAP SERPL CALCULATED.3IONS-SCNC: 2 MMOL/L (ref 3–14)
BUN SERPL-MCNC: 33 MG/DL (ref 7–30)
CALCIUM SERPL-MCNC: 9.1 MG/DL (ref 8.5–10.1)
CHLORIDE SERPL-SCNC: 107 MMOL/L (ref 94–109)
CO2 SERPL-SCNC: 29 MMOL/L (ref 20–32)
CREAT SERPL-MCNC: 1.45 MG/DL (ref 0.52–1.04)
CYCLOSPORINE BLD LC/MS/MS-MCNC: 86 UG/L (ref 50–400)
ERYTHROCYTE [DISTWIDTH] IN BLOOD BY AUTOMATED COUNT: 12.6 % (ref 10–15)
GFR SERPL CREATININE-BSD FRML MDRD: 36 ML/MIN/{1.73_M2}
GLUCOSE SERPL-MCNC: 208 MG/DL (ref 70–99)
HBA1C MFR BLD: 6.8 % (ref 0–5.6)
HCT VFR BLD AUTO: 36.4 % (ref 35–47)
HGB BLD-MCNC: 11.5 G/DL (ref 11.7–15.7)
MCH RBC QN AUTO: 29.1 PG (ref 26.5–33)
MCHC RBC AUTO-ENTMCNC: 31.6 G/DL (ref 31.5–36.5)
MCV RBC AUTO: 92 FL (ref 78–100)
PLATELET # BLD AUTO: 206 10E9/L (ref 150–450)
POTASSIUM SERPL-SCNC: 4.2 MMOL/L (ref 3.4–5.3)
RBC # BLD AUTO: 3.95 10E12/L (ref 3.8–5.2)
SODIUM SERPL-SCNC: 138 MMOL/L (ref 133–144)
TME LAST DOSE: 1830 H
WBC # BLD AUTO: 3.5 10E9/L (ref 4–11)

## 2021-03-18 PROCEDURE — 80048 BASIC METABOLIC PNL TOTAL CA: CPT | Performed by: PATHOLOGY

## 2021-03-18 PROCEDURE — 36415 COLL VENOUS BLD VENIPUNCTURE: CPT | Performed by: PATHOLOGY

## 2021-03-18 PROCEDURE — 80158 DRUG ASSAY CYCLOSPORINE: CPT | Performed by: INTERNAL MEDICINE

## 2021-03-18 PROCEDURE — 85027 COMPLETE CBC AUTOMATED: CPT | Performed by: PATHOLOGY

## 2021-03-18 PROCEDURE — 83036 HEMOGLOBIN GLYCOSYLATED A1C: CPT | Mod: 90 | Performed by: PATHOLOGY

## 2021-03-18 NOTE — PROGRESS NOTES
Pauline Ta MD   3/18/2021 10:11 AM CDT      Yes actually b/l Cr 1.2-1.4    Lisbeth Galaviz RN   3/18/2021  9:53 AM CDT      Sent to Dr. Ta for review.   Would you consider Cr at her baseline? Your last note said baseline was 1.2-1.3.

## 2021-03-27 DIAGNOSIS — I25.10 CORONARY ARTERY DISEASE INVOLVING NATIVE HEART WITHOUT ANGINA PECTORIS, UNSPECIFIED VESSEL OR LESION TYPE: ICD-10-CM

## 2021-03-29 RX ORDER — ISOSORBIDE MONONITRATE 30 MG/1
TABLET, EXTENDED RELEASE ORAL
Qty: 90 TABLET | Refills: 2 | Status: SHIPPED | OUTPATIENT
Start: 2021-03-29 | End: 2022-05-10

## 2021-03-29 NOTE — TELEPHONE ENCOUNTER
Pending Prescriptions:                       Disp   Refills    isosorbide mononitrate (IMDUR) 30 MG 24 h*90 tab*2            Sig: TAKE 0.5 TABLETS (15 MG) BY MOUTH 2 TIMES DAILY    Prescription approved per [facility] Refill Protocol.

## 2021-04-21 ENCOUNTER — OFFICE VISIT (OUTPATIENT)
Dept: INTERNAL MEDICINE | Facility: CLINIC | Age: 71
End: 2021-04-21
Payer: MEDICARE

## 2021-04-21 VITALS
HEART RATE: 63 BPM | WEIGHT: 163.8 LBS | HEIGHT: 64 IN | TEMPERATURE: 97.8 F | DIASTOLIC BLOOD PRESSURE: 60 MMHG | BODY MASS INDEX: 27.96 KG/M2 | SYSTOLIC BLOOD PRESSURE: 112 MMHG | OXYGEN SATURATION: 95 % | RESPIRATION RATE: 14 BRPM

## 2021-04-21 DIAGNOSIS — E78.5 HYPERLIPIDEMIA LDL GOAL <100: Primary | ICD-10-CM

## 2021-04-21 DIAGNOSIS — Z79.4 TYPE 2 DIABETES MELLITUS WITH DIABETIC NEPHROPATHY, WITH LONG-TERM CURRENT USE OF INSULIN (H): ICD-10-CM

## 2021-04-21 DIAGNOSIS — E03.8 OTHER SPECIFIED HYPOTHYROIDISM: ICD-10-CM

## 2021-04-21 DIAGNOSIS — E11.21 TYPE 2 DIABETES MELLITUS WITH DIABETIC NEPHROPATHY, WITH LONG-TERM CURRENT USE OF INSULIN (H): ICD-10-CM

## 2021-04-21 PROCEDURE — 99213 OFFICE O/P EST LOW 20 MIN: CPT | Performed by: INTERNAL MEDICINE

## 2021-04-21 RX ORDER — LEVOTHYROXINE SODIUM 112 UG/1
TABLET ORAL DAILY
Status: CANCELLED | OUTPATIENT
Start: 2021-04-21

## 2021-04-21 RX ORDER — LEVOTHYROXINE SODIUM 100 UG/1
100 TABLET ORAL DAILY
Qty: 90 TABLET | Refills: 1 | Status: SHIPPED | OUTPATIENT
Start: 2021-04-21 | End: 2021-10-15

## 2021-04-21 ASSESSMENT — MIFFLIN-ST. JEOR: SCORE: 1237.99

## 2021-04-21 NOTE — NURSING NOTE
"/60   Pulse 63   Temp 97.8  F (36.6  C) (Oral)   Resp 14   Ht 1.626 m (5' 4\")   Wt 74.3 kg (163 lb 12.8 oz)   SpO2 95%   BMI 28.12 kg/m    Patient in for follow up on DM2.  Socorro Perez CMA    "

## 2021-04-21 NOTE — PROGRESS NOTES
Assessment & Plan      (E03.8) Other specified hypothyroidism  Plan: levothyroxine (SYNTHROID/LEVOTHROID) 100 MCG tablet refilled.explained clearly about the medication,insructions and side effects.          (E11.21,  Z79.4) Type 2 diabetes mellitus with diabetic nephropathy, with long-term current use of insulin (H)  Plan: continue basaglar and novolog, rpt A1c in 09/21        Return in about 7 months (around 11/12/2021).    Summer Vega MD  [facility] is [facility] 70 year [facility] who presents for the following health issues  accompanied by her spouse:    HPI     Diabetes Follow-up      How often are you checking your blood sugar? belinda , patient states her endocrinologist retired and I would like to establish diabetic care in our clinic.  Currently on Basaglar 14 units daily and NovoLog as directed    What concerns do you have today about your diabetes? None     Do you have any of these symptoms? (Select all that apply)  No numbness or tingling in feet.  No redness, sores or blisters on feet.  No complaints of excessive thirst.  No reports of blurry vision.  No significant changes to weight.        Hypothyroidism Follow-up-patients endocrinologist retired     Since last visit, patient describes the following symptoms: Weight stable, no hair loss, no skin changes, no constipation, no loose stools    Past Medical History:   Diagnosis Date     Abdominal wall hernia     RLQ     AK (actinic keratosis) 08/06/2020     Allergic rhinitis      CAD (coronary artery disease)     coronary artery disease s/p PCI to LAD in 2005coronary artery disease s/p PCI to LAD in 2005     Diabetes mellitus, type 2 (H)     follows up with endocrinologist.     Dyslipidemia      GERD (gastroesophageal reflux disease)      H/O diabetic nephropathy     s/p kidney trnsplant     Hypertension      Hypothyroidism      Immunosuppressed status (H)      Kidney replaced by transplant     Rt     PONV  "(postoperative nausea and vomiting)      Shingles      Vitamin B12 deficiency anemia      Current Outpatient Medications   Medication Sig Dispense Refill     amLODIPine (NORVASC) 10 MG tablet Take 1 tablet (10 mg) by mouth daily 90 tablet 1     ASPIRIN PO Take 81 mg by mouth daily       atorvastatin (LIPITOR) 40 MG tablet TAKE 0.5 TABLETS BY MOUTH DAILY 45 tablet 1     FLORIN CONTOUR test strip   0     calcium carbonate (TUMS) 500 MG chewable tablet Take 1-2 chew tab by mouth 2 times daily as needed for heartburn       chlorthalidone (HYGROTON) 25 MG tablet TAKE 1 TABLET(25 MG) BY MOUTH DAILY 90 tablet 1     GENGRAF (BRAND) 25 MG CAPSULE Take 3 capsules (75 mg) by mouth every morning AND 2 capsules (50 mg) every evening. 150 capsule 11     insulin aspart (NOVOLOG PEN) 100 UNIT/ML pen Taking as directed with adjustment scale and carb coverage.       insulin glargine (BASAGLAR KWIKPEN) 100 UNIT/ML pen Inject 14 Units Subcutaneous At Bedtime       isosorbide mononitrate (IMDUR) 30 MG 24 hr tablet TAKE 0.5 TABLETS (15 MG) BY MOUTH 2 TIMES DAILY 90 tablet 2     levothyroxine (SYNTHROID/LEVOTHROID) 100 MCG tablet Take 1 tablet (100 mcg) by mouth daily 90 tablet 1     losartan (COZAAR) 100 MG tablet TAKE 0.5 TABLETS (50 MG) BY MOUTH DAILY 45 tablet 1     metoprolol succinate ER (TOPROL-XL) 25 MG 24 hr tablet TAKE 1 TABLET BY MOUTH EVERYDAY AT BEDTIME 90 tablet 1     mycophenolic acid (GENERIC EQUIVALENT) 180 MG EC tablet Take 3 tablets (540 mg) by mouth 2 times daily 180 tablet 11     pantoprazole (PROTONIX) 40 MG EC tablet TAKE 1 TABLET BY MOUTH EVERY DAY 90 tablet 2     BD INSULIN SYRINGE ULTRAFINE 31G X 5/16\" 0.3 ML USING 4-5 PER DAY ([facility] 31G/0.3ML)  6       Review of Systems   CONSTITUTIONAL: NEGATIVE for fever, chills, change in weight  RESP: NEGATIVE for significant cough or SOB  CV: NEGATIVE for chest pain, palpitations or peripheral edema  ENDOCRINE: NEGATIVE for temperature intolerance, skin/hair changes    " "  Objective    /60   Pulse 63   Temp 97.8  F (36.6  C) (Oral)   Resp 14   Ht 1.626 m (5' 4\")   Wt 74.3 kg (163 lb 12.8 oz)   SpO2 95%   BMI 28.12 kg/m    Body mass index is 28.12 kg/m .  Physical Exam   GENERAL: healthy, alert and no distress  NECK: no adenopathy, no asymmetry, masses, or scars and thyroid normal to palpation  RESP: lungs clear to auscultation - no rales, rhonchi or wheezes  CV: regular rate and rhythm,    "

## 2021-04-29 DIAGNOSIS — I15.1 HYPERTENSION SECONDARY TO OTHER RENAL DISORDERS: ICD-10-CM

## 2021-04-30 RX ORDER — LOSARTAN POTASSIUM 100 MG/1
50 TABLET ORAL DAILY
Qty: 45 TABLET | Refills: 1 | Status: SHIPPED | OUTPATIENT
Start: 2021-04-30 | End: 2021-10-22

## 2021-04-30 NOTE — TELEPHONE ENCOUNTER
Routing refill request to provider for review/approval because:  Labs out of range:          Creatinine   Date Value Ref Range Status   03/18/2021 1.45 (H) 0.52 - 1.04 mg/dL Final     Pending Prescriptions:                       Disp   Refills    losartan (COZAAR) 100 MG tablet [Pharmacy *45 tab*1        Sig: TAKE 0.5 TABLETS (50 MG) BY MOUTH DAILY        Jose Harden RN

## 2021-05-17 NOTE — PATIENT INSTRUCTIONS
Recommendations from today's MTM visit:                                                    MTM (medication therapy management) is a service provided by a clinical pharmacist designed to help you get the most of out of your medicines.   Today we reviewed what your medicines are for, how to know if they are working, that your medicines are safe and how to make your medicine regimen as easy as possible.      1. Consider reaching out to the endocrinologist ([phone]). Ask about Dr. Fitzpatrick.    2. Consider getting the Shingles vaccine. You can ask about this at your pharmacy.    Follow-up: Return in about 1 year (around 5/18/2022) for Medication Therapy Management.    It was great to speak with you today.  I value your experience and would be very thankful for your time with providing feedback on our clinic survey. You may receive a survey via email or text message in the next few days.     To schedule another MTM appointment, please call the clinic directly or you may call the MTM scheduling line at [phone] or toll-free at [phone].     My Clinical Pharmacist's contact information:                                                      Please feel free to contact me with any questions or concerns you have.      Tamy Bruner, PharmD  PGY1 Medication Therapy Management Resident  Voicemail: [phone]

## 2021-05-17 NOTE — PROGRESS NOTES
Medication Therapy Management (MTM) Encounter    ASSESSMENT:                            Medication Adherence/Access: No issues identified    Hypertension/CAD: Stable. Patient meeting blood pressure goal less than 140/90 mmHg.    Kidney Tx: Stable. Patient would benefit from continuing scheduled follow-up with nephrologist.     Type 1 Diabetes: Pt is meeting A1c goal of < 7.5%. Patient would benefit from reaching out to endocrinology due to her endocrinologist retiring. Referral was placed in March. Provided patient the phone number to schedule a visit.   Of note snapshot states patient has type 2 diabetes, but patient and per chart review patient is type 1. Chart review states type 1 diabetes at age 23 onset. Updated patient problem list, removed type 2 diabetes and changed to type 1.    Hypothyroidism: Stable.    GERD: stable. Current regimen is effective. Reviewed potential side effects associated with prolonged PPI use.    Osteopenia: Reviewed with patient that she is due for repeat DEXA in about a year (recommended every 3 years). Recommend to continue following calcium and if it goes low in the future add back on calcium supplementation.    Hyperlipidemia: Stable. Pt is on moderate intensity statin which is indicated based on 2019 ACC/AHA guidelines for lipid management.       Vaccines: Patient is due for the Shingrix vaccines per ACIP guidelines. Reviewed that Zostavax was a live vaccine, but Shingrix is not and is appropriate to get.    PLAN:                            1. Consider reaching out to the endocrinologist ([phone]). Ask about Dr. Fitzpatrick.  2. Consider getting the Shingles vaccine. You can ask about this at your pharmacy.    Follow-up: 1 year    SUBJECTIVE/OBJECTIVE:                          Viv Shaw is a 70 year old female coming in for a follow-up visit. She was referred to me from [facility]. Patient was accompanied by  Johnny. Today's visit is a follow-up MTM visit from  5/4/2020.     Reason for visit: annual check-in on meds. Wondering about endocrinology.    Allergies/ADRs: Reviewed in chart  Tobacco: She reports that she has never smoked. She has never used smokeless tobacco.  Alcohol: not currently using  Caffeine: 3 sodas/day  Activity: Try and walk, restarting since the pandemic    Medication Adherence/Access: Patient uses pill box(es). Patients  is in charge of her meds and setting them up.  Per patient, misses medication 0 times per week.   Medication barriers: Has been ordering insulin from [email or web address] - same products.   The patient fills medications at [facility]: NO, [facility]).    Hypertension/CAD: Current medications include amlodipine 10 mg daily, chlorthalidone 12.5 mg twice daily, losartan 50 mg daily in the morning, metoprolol XL 25 mg daily QHS. Also taking aspirin 81 mg daily and Imdur 15 mg once daily.   She denies ever having chest pain, or symptoms of high or low blood pressure. Sometimes if she stands up to fast or is in the heat she can be lightheaded, but this resolves quickly. No concern with ever feeling like she will fall over.  Patient does self-monitor BP occassionally. Only check if she isn't feeling well, no recent readings.  Patient denies concerns with blood pressure today.  BP Readings from Last 3 Encounters:   04/21/21 112/60   03/03/21 122/60   11/10/20 116/66     Potassium   Date Value Ref Range Status   03/18/2021 4.2 3.4 - 5.3 mmol/L Final     Kidney Tx: Taking Gengraf 75 mg in the morning and 50mg every evening, and mycophenolic acid 540 mg twice daily.  S/p 2005.  Followed by nephrology, Dr. Subramanian, last seen 11/30/20 (last labs 3/18/21, noted to be at goal), baseline Scr 1.2-1.4. Next visit scheduled in June to check labs again.  No concerns with side effects at this time.    Signs of infections: none.  Creatinine   Date Value Ref Range Status   03/18/2021 1.45 (H) 0.52 - 1.04  mg/dL Final     Type 1 Diabetes:  Pt currently taking Lantus 14 units qhs, Novolog on average ~7 units with meals. Pt's  is using an adjustment scale and carb coverage provided by the Endocrinologist.    Of note endocrinologist is retiring and patient is interested in establishing with new provider. Has seen Dr. Fitzpatrick in the past and would be interested in establishing with him. Also open to others as long as they are through [place].  Patient and  state that she has type 1 diabetes, problem list states type 2.  Pt is not experiencing side effects. They feel her sugars have been well controlled.   SMBG: CGM = dexcom G6.   Blood sugars have been all over, but A1c has looked good. At times it may spike up to 300. The high numbers make her stressed, which makes it difficult to bring down. Spikes high maybe once a week, not very often. Hasn't noticed any correlation to food, thinks it is stress with the news.   She had a low at 42 a couple days ago, this was abnormal for her, they aren't sure what happened. Probably goes below 80 a couple times a week, then alarms and she typically doesn't go much lower.  Patient is experiencing hypoglycemia. Frequency of hypoglycemia? Functions fine in the low blood sugars. Wouldn't notice lows if dexcom doesn't alert her. Knows to take juice if this happens.  Recent symptoms of high blood sugar? none  Eye exam: up to date  Foot exam: up to date  ACEi/ARB: Yes: losartan.   Urine Albumin:   Lab Results   Component Value Date    UMALCR 7.98 04/13/2017     Aspirin: Taking 81mg daily and denies side effects.    Lab Results   Component Value Date    A1C 6.8 03/18/2021    A1C 7.0 10/29/2020    A1C 6.8 03/27/2020    A1C 6.9 11/20/2019    A1C 6.7 04/22/2019     Hypothyroidism: Patient is taking levothyroxine 100 mcg daily. Patient is having the following symptoms: none. Does get cold a lot, but this isn't new. Has poor circulation.  TSH   Date Value Ref Range Status    11/25/2020 0.55 0.40 - 4.00 mU/L Final     GERD: Current medications include: Protonix (pantoprazole) 40 mg once daily, and TUMS PRN. Pt c/o no current symptoms.  Patient feels that current regimen is effective. Still gets some mild heartburn, but tums works well for these symptoms.    Osteopenia: Current therapy includes:  Vitamin D 2000 units daily, TUMS PRN (1-4 tablets most days). Was on Fosamax for > 10 years. Started drug holiday around end of 12/2017.   Stopped calcium supplementation as instructed due to elevated calcium level. Has continued with Tums as needed.  After stopping daily supplementation, most recent calcium level on 3/18/21 was in range (currently 9.1 down from 10.8 on 3/1).    Calcium   Date Value Ref Range Status   03/18/2021 9.1 8.5 - 10.1 mg/dL Final     Pt is getting the following sources of dietary calcium: minimal.   Last vitamin D level: n/a  DEXA History: 5/20/2019          Hyperlipidemia/Pain: Current therapy includes atorvastatin 20 mg once daily.  Denies muscle pains or weakness. Been on this a long time.    The 10-year ASCVD risk score ([place] JUNI Jr., et al., 2013) is: 16.4%    Values used to calculate the score:      Age: 70 years      Sex: Female      Is Non- : No      Diabetic: Yes      Tobacco smoker: No      Systolic Blood Pressure: 112 mmHg      Is BP treated: No      HDL Cholesterol: 67 mg/dL      Total Cholesterol: 184 mg/dL    Recent Labs   Lab Test 10/29/20  0710 11/20/19  0803 03/11/15  0938 03/11/15  0938 03/14/14  0820   CHOL 184 174   < > 173 168   HDL 67 53   < > 55 51   LDL 95 97   < > 91 93   TRIG 110 120   < > 137 123   CHOLHDLRATIO  --   --   --  3.1 3.3    < > = values in this interval not displayed.     Vaccines: Was previously told not to get the shingles vaccine - this was a while ago, probably Zostavax.  Most Recent Immunizations   Administered Date(s) Administered     COVID-19,PF,Pfizer 02/16/2021     Flu 65+ Years 08/12/2019      "Influenza (High Dose) 3 valent vaccine 08/28/2018     Influenza (IIV3) PF 08/26/2013     Influenza, Quad, High Dose, Pf, 65yr + 08/17/2020     Mantoux Tuberculin Skin Test 07/05/2005     Pneumo Conj 13-V (2010&after) 03/11/2015     Pneumococcal 23 valent 12/21/2017     TDAP Vaccine (Adacel) 06/08/2012     Twinrix A/B 02/28/2006     Today's Vitals:   BP Readings from Last 1 Encounters:   04/21/21 112/60     Pulse Readings from Last 1 Encounters:   04/21/21 63     Wt Readings from Last 1 Encounters:   05/18/21 168 lb 11.2 oz (76.5 kg)     Ht Readings from Last 1 Encounters:   04/21/21 5' 4\" (1.626 m)     Estimated body mass index is 28.96 kg/m  as calculated from the following:    Height as of 4/21/21: 5' 4\" (1.626 m).    Weight as of this encounter: 168 lb 11.2 oz (76.5 kg).    Temp Readings from Last 1 Encounters:   04/21/21 97.8  F (36.6  C) (Oral)     ----------------      I spent 49 minutes with this patient today. I offer these suggestions for consideration by Dr. Vega. A copy of the visit note was provided to the patient's primary care provider.    The patient was given a summary of these recommendations.   I concur with the note as dictated above.   Danielle Aguayo, PharmD      Tamy Bruner, PharmD  PGY1 Medication Therapy Management Resident  Voicemail: [phone]          Medication Therapy Recommendations  Vaccine counseling    Rationale: Preventive therapy - Needs additional medication therapy - Indication   Recommendation: Start Medication - Shingrix   Status: Patient Agreed - Adherence/Education               "

## 2021-05-18 ENCOUNTER — OFFICE VISIT (OUTPATIENT)
Dept: PHARMACY | Facility: CLINIC | Age: 71
End: 2021-05-18
Payer: COMMERCIAL

## 2021-05-18 VITALS — WEIGHT: 168.7 LBS | BODY MASS INDEX: 28.96 KG/M2

## 2021-05-18 DIAGNOSIS — I25.10 CORONARY ARTERY DISEASE INVOLVING NATIVE HEART WITHOUT ANGINA PECTORIS, UNSPECIFIED VESSEL OR LESION TYPE: ICD-10-CM

## 2021-05-18 DIAGNOSIS — M85.80 OSTEOPENIA, UNSPECIFIED LOCATION: ICD-10-CM

## 2021-05-18 DIAGNOSIS — I15.1 HYPERTENSION SECONDARY TO OTHER RENAL DISORDERS: Primary | ICD-10-CM

## 2021-05-18 DIAGNOSIS — E03.8 OTHER SPECIFIED HYPOTHYROIDISM: ICD-10-CM

## 2021-05-18 DIAGNOSIS — E10.69 TYPE 1 DIABETES MELLITUS WITH OTHER SPECIFIED COMPLICATION (H): Chronic | ICD-10-CM

## 2021-05-18 DIAGNOSIS — E78.5 HYPERLIPIDEMIA LDL GOAL <100: ICD-10-CM

## 2021-05-18 DIAGNOSIS — K21.9 GASTROESOPHAGEAL REFLUX DISEASE WITHOUT ESOPHAGITIS: ICD-10-CM

## 2021-05-18 DIAGNOSIS — Z94.0 KIDNEY REPLACED BY TRANSPLANT: ICD-10-CM

## 2021-05-18 DIAGNOSIS — E10.29 TYPE 1 DIABETES MELLITUS WITH OTHER KIDNEY COMPLICATION (H): ICD-10-CM

## 2021-05-18 DIAGNOSIS — Z71.85 VACCINE COUNSELING: ICD-10-CM

## 2021-05-18 PROCEDURE — 99605 MTMS BY PHARM NP 15 MIN: CPT | Performed by: PHARMACIST

## 2021-05-18 PROCEDURE — 99607 MTMS BY PHARM ADDL 15 MIN: CPT | Performed by: PHARMACIST

## 2021-05-19 PROBLEM — E10.69 TYPE 1 DIABETES MELLITUS WITH OTHER SPECIFIED COMPLICATION (H): Chronic | Status: ACTIVE | Noted: 2021-05-19

## 2021-06-10 DIAGNOSIS — I15.1 HYPERTENSION SECONDARY TO OTHER RENAL DISORDERS: ICD-10-CM

## 2021-06-10 DIAGNOSIS — I25.10 CORONARY ARTERY DISEASE INVOLVING NATIVE HEART WITHOUT ANGINA PECTORIS, UNSPECIFIED VESSEL OR LESION TYPE: ICD-10-CM

## 2021-06-11 RX ORDER — METOPROLOL SUCCINATE 25 MG/1
TABLET, EXTENDED RELEASE ORAL
Qty: 90 TABLET | Refills: 2 | Status: SHIPPED | OUTPATIENT
Start: 2021-06-11 | End: 2021-11-30

## 2021-06-11 NOTE — TELEPHONE ENCOUNTER
Pending Prescriptions:                       Disp   Refills    metoprolol succinate ER (TOPROL-XL) 25 MG*90 tab*2            Sig: TAKE 1 TABLET BY MOUTH EVERYDAY AT BEDTIME    Prescription approved per [facility] Refill Protocol.

## 2021-06-23 DIAGNOSIS — Z48.298 AFTERCARE FOLLOWING ORGAN TRANSPLANT: ICD-10-CM

## 2021-06-23 DIAGNOSIS — Z79.899 ENCOUNTER FOR LONG-TERM CURRENT USE OF MEDICATION: ICD-10-CM

## 2021-06-23 DIAGNOSIS — Z94.0 KIDNEY REPLACED BY TRANSPLANT: ICD-10-CM

## 2021-06-23 LAB
ANION GAP SERPL CALCULATED.3IONS-SCNC: 3 MMOL/L (ref 3–14)
BUN SERPL-MCNC: 28 MG/DL (ref 7–30)
CALCIUM SERPL-MCNC: 9.3 MG/DL (ref 8.5–10.1)
CHLORIDE SERPL-SCNC: 106 MMOL/L (ref 94–109)
CO2 SERPL-SCNC: 30 MMOL/L (ref 20–32)
CREAT SERPL-MCNC: 1.34 MG/DL (ref 0.52–1.04)
CYCLOSPORINE BLD LC/MS/MS-MCNC: 93 UG/L (ref 50–400)
ERYTHROCYTE [DISTWIDTH] IN BLOOD BY AUTOMATED COUNT: 12.7 % (ref 10–15)
GFR SERPL CREATININE-BSD FRML MDRD: 39 ML/MIN/{1.73_M2}
GLUCOSE SERPL-MCNC: 192 MG/DL (ref 70–99)
HCT VFR BLD AUTO: 35.4 % (ref 35–47)
HGB BLD-MCNC: 11.3 G/DL (ref 11.7–15.7)
MCH RBC QN AUTO: 29.3 PG (ref 26.5–33)
MCHC RBC AUTO-ENTMCNC: 31.9 G/DL (ref 31.5–36.5)
MCV RBC AUTO: 92 FL (ref 78–100)
PLATELET # BLD AUTO: 215 10E9/L (ref 150–450)
POTASSIUM SERPL-SCNC: 3.8 MMOL/L (ref 3.4–5.3)
RBC # BLD AUTO: 3.86 10E12/L (ref 3.8–5.2)
SODIUM SERPL-SCNC: 138 MMOL/L (ref 133–144)
TME LAST DOSE: NORMAL H
WBC # BLD AUTO: 5 10E9/L (ref 4–11)

## 2021-06-23 PROCEDURE — 36415 COLL VENOUS BLD VENIPUNCTURE: CPT | Performed by: PATHOLOGY

## 2021-06-23 PROCEDURE — 80048 BASIC METABOLIC PNL TOTAL CA: CPT | Performed by: PATHOLOGY

## 2021-06-23 PROCEDURE — 85027 COMPLETE CBC AUTOMATED: CPT | Performed by: PATHOLOGY

## 2021-06-23 PROCEDURE — 80158 DRUG ASSAY CYCLOSPORINE: CPT | Performed by: INTERNAL MEDICINE

## 2021-06-24 ENCOUNTER — TRANSFERRED RECORDS (OUTPATIENT)
Dept: HEALTH INFORMATION MANAGEMENT | Facility: CLINIC | Age: 71
End: 2021-06-24

## 2021-06-24 LAB — RETINOPATHY: POSITIVE

## 2021-06-26 ENCOUNTER — TRANSFERRED RECORDS (OUTPATIENT)
Dept: HEALTH INFORMATION MANAGEMENT | Facility: CLINIC | Age: 71
End: 2021-06-26

## 2021-07-03 ENCOUNTER — TRANSFERRED RECORDS (OUTPATIENT)
Dept: HEALTH INFORMATION MANAGEMENT | Facility: CLINIC | Age: 71
End: 2021-07-03

## 2021-07-06 ENCOUNTER — OFFICE VISIT (OUTPATIENT)
Dept: INTERNAL MEDICINE | Facility: CLINIC | Age: 71
End: 2021-07-06
Payer: MEDICARE

## 2021-07-06 VITALS
WEIGHT: 164.5 LBS | RESPIRATION RATE: 17 BRPM | SYSTOLIC BLOOD PRESSURE: 110 MMHG | TEMPERATURE: 98.1 F | OXYGEN SATURATION: 97 % | BODY MASS INDEX: 28.09 KG/M2 | DIASTOLIC BLOOD PRESSURE: 50 MMHG | HEIGHT: 64 IN | HEART RATE: 67 BPM

## 2021-07-06 DIAGNOSIS — Z98.61 CAD S/P PERCUTANEOUS CORONARY ANGIOPLASTY: ICD-10-CM

## 2021-07-06 DIAGNOSIS — Z79.4 TYPE 2 DIABETES MELLITUS WITHOUT COMPLICATION, WITH LONG-TERM CURRENT USE OF INSULIN (H): ICD-10-CM

## 2021-07-06 DIAGNOSIS — E11.9 TYPE 2 DIABETES MELLITUS WITHOUT COMPLICATION, WITH LONG-TERM CURRENT USE OF INSULIN (H): ICD-10-CM

## 2021-07-06 DIAGNOSIS — E78.5 HYPERLIPIDEMIA LDL GOAL <100: ICD-10-CM

## 2021-07-06 DIAGNOSIS — S62.102D CLOSED FRACTURE OF LEFT WRIST WITH ROUTINE HEALING, SUBSEQUENT ENCOUNTER: ICD-10-CM

## 2021-07-06 DIAGNOSIS — I25.10 CAD S/P PERCUTANEOUS CORONARY ANGIOPLASTY: ICD-10-CM

## 2021-07-06 DIAGNOSIS — Z79.4 TYPE 2 DIABETES MELLITUS WITH OTHER SPECIFIED COMPLICATION, WITH LONG-TERM CURRENT USE OF INSULIN (H): ICD-10-CM

## 2021-07-06 DIAGNOSIS — E11.69 TYPE 2 DIABETES MELLITUS WITH OTHER SPECIFIED COMPLICATION, WITH LONG-TERM CURRENT USE OF INSULIN (H): ICD-10-CM

## 2021-07-06 DIAGNOSIS — E03.8 OTHER SPECIFIED HYPOTHYROIDISM: ICD-10-CM

## 2021-07-06 DIAGNOSIS — Z01.818 PREOP GENERAL PHYSICAL EXAM: Primary | ICD-10-CM

## 2021-07-06 DIAGNOSIS — Z94.0 KIDNEY REPLACED BY TRANSPLANT: ICD-10-CM

## 2021-07-06 DIAGNOSIS — I15.1 HYPERTENSION SECONDARY TO OTHER RENAL DISORDERS: ICD-10-CM

## 2021-07-06 LAB — HBA1C MFR BLD: 6.7 % (ref 0–5.6)

## 2021-07-06 PROCEDURE — 36415 COLL VENOUS BLD VENIPUNCTURE: CPT | Performed by: INTERNAL MEDICINE

## 2021-07-06 PROCEDURE — 99214 OFFICE O/P EST MOD 30 MIN: CPT | Performed by: INTERNAL MEDICINE

## 2021-07-06 PROCEDURE — 93000 ELECTROCARDIOGRAM COMPLETE: CPT | Performed by: INTERNAL MEDICINE

## 2021-07-06 PROCEDURE — 83036 HEMOGLOBIN GLYCOSYLATED A1C: CPT | Performed by: INTERNAL MEDICINE

## 2021-07-06 ASSESSMENT — MIFFLIN-ST. JEOR
SCORE: 1241.17
SCORE: 1241.17

## 2021-07-06 NOTE — PROGRESS NOTES
"    {PROVIDER CHARTING PREFERENCE:702063}    Anup Nam is a 70 year old who presents for the following health issues {ACCOMPANIED BY STATEMENT (Optional):066085}    HPI     Hyperlipidemia Follow-Up      Are you regularly taking any medication or supplement to lower your cholesterol?   { :824829}    Are you having muscle aches or other side effects that you think could be caused by your cholesterol lowering medication?  { :305333}    Hypertension Follow-up      Do you check your blood pressure regularly outside of the clinic? { :684290}     Are you following a low salt diet? { :528666}    Are your blood pressures ever more than 140 on the top number (systolic) OR more   than 90 on the bottom number (diastolic), for example 140/90? { :543072}    {Depression and Anxiety Followup:217244}    How many servings of fruits and vegetables do you eat daily?  { :517416}    On average, how many sweetened beverages do you drink each day (Examples: soda, juice, sweet tea, etc.  Do NOT count diet or artificially sweetened beverages)?   { 1-11:531140}    How many days per week do you exercise enough to make your heart beat faster? { :620683}    How many minutes a day do you exercise enough to make your heart beat faster? { :275876}    How many days per week do you miss taking your medication? {0-7 :874925}    {additonal problems for provider to add (Optional):808539}    Review of Systems   {ROS COMP (Optional):695188}      Objective    Ht 1.626 m (5' 4\")   BMI 28.96 kg/m    Body mass index is 28.96 kg/m .  Physical Exam   {Exam List (Optional):507724}    {Diagnostic Test Results (Optional):173138}    {AMBULATORY ATTESTATION (Optional):757098}        "

## 2021-07-06 NOTE — PROGRESS NOTES
[place] [address]  [facility] [address]  Phone: [phone]  Primary Provider: Gail Elmore  Pre-op Performing Provider: GAIL ELMORE      PREOPERATIVE EVALUATION:  Today's date: 7/6/2021    Viv Shaw is a 70 year old female who presents for a preoperative evaluation.    Surgical Information:  Surgery/Procedure: Lt wrist fracture repair  Surgery Location: [facility]  Surgeon: Dr. Chao  Surgery Date: 7/8/21  Time of Surgery: 0800  Where patient plans to recover: At home with family  Fax number for surgical facility: [phone]    Type of Anesthesia Anticipated: General    Assessment & Plan     The proposed surgical procedure is considered INTERMEDIATE risk.    (Z01.818) Preop general physical exam  (primary encounter diagnosis)  Comment: Left wrist fracture repair  Plan: EKG 12-lead complete w/read - Clinics            (F82.026D) Closed fracture of left wrist with routine healing, subsequent encounter  Plan: Left wrist fracture repair    (E11.9,  Z79.4) Type 2 diabetes mellitus without complication, with long-term current use of insulin (H)  Plan: On Basaglar insulin, check Hemoglobin A1c     (I25.10,  Z98.61) CAD S/P percutaneous coronary angioplasty  Plan: Stable, follows up with cardiologist    (E03.8) Other specified hypothyroidism  Plan: On Levoxyl 100 mcg daily    (Z94.0) Kidney replaced by transplant  Plan: Follows up with the transplant clinic and on immunosuppressants        (I15.1,  N28.89) Hypertension secondary to other renal disorders  Plan: BP on lower side , will discontinue chlorthalidone at this time, monitor BP.      Risks and Recommendations:  The patient has the following additional risks and recommendations for perioperative complications:  Diabetes:  - Patient is on insulin therapy; diabetic NPO guidelines provided and discussed.    Medication Instructions:   - aspirin: Discontinue aspirin 7-  days prior to  procedure to reduce bleeding risk.    - ACE/ARB: Hold  losartan 24 hours before surgery   - Beta Blockers: Continue taking on the day of surgery.   - Calcium Channel Blockers: hold  on the day of surgery.   - Diuretics: stop chlorthalidone   - Statins: Continue taking on the day of surgery.    - Long acting insulin (e.g. glargine, detemir): Take 80% of the usual evening or morning dose before surgery.    RECOMMENDATION:  APPROVAL GIVEN to proceed with proposed procedure, without further diagnostic evaluation.    Review of the result(s) of each unique test - A1c  Independent interpretation of a test  EKG 56}    Subjective     HPI related to upcoming procedure: Left wrist fracture repair    Preop Questions 7/6/2021   1. Have you ever had a heart attack or stroke? No   2. Have you ever had surgery on your heart or blood vessels, such as a stent placement, a coronary artery bypass, or surgery on an artery in your head, neck, heart, or legs? YES -  stent   3. Do you have chest pain with activity? No   4. Do you have a history of  heart failure? No   5. Do you currently have a cold, bronchitis or symptoms of other infection? No   6. Do you have a cough, shortness of breath, or wheezing? No   7. Do you or anyone in your family have previous history of blood clots? No   8. Do you or does anyone in your family have a serious bleeding problem such as prolonged bleeding following surgeries or cuts? No   9. Have you ever had problems with anemia or been told to take iron pills? YES -   10. Have you had any abnormal blood loss such as black, tarry or bloody stools, or abnormal vaginal bleeding? No   11. Have you ever had a blood transfusion? No   12. Are you willing to have a blood transfusion if it is medically needed before, during, or after your surgery? Yes   13. Have you or any of your relatives ever had problems with anesthesia? No   14. Do you have sleep apnea, excessive snoring or daytime drowsiness? No   15. Do you have  any artifical heart valves or other implanted medical devices like a pacemaker, defibrillator, or continuous glucose monitor? No   16. Do you have artificial joints? No   17. Are you allergic to latex? No   18. Is there any chance that you may be pregnant? -       Health Care Directive:  Patient does not have a Health Care Directive or Living Will: Advance Directive received and scanned. Click on Code in the patient header to view.       Status of Chronic Conditions:  DIABETES - Patient has a longstanding history of DiabetesType Type II . Patient is being treated with insulin injections and denies significant side effects. Control has been good. Complicating factors include but are not limited to: hypertension and hyperlipidemia.     HYPERLIPIDEMIA - Patient has a long history of Hyperlipidemia requiring medication for treatment with recent good control. Patient reports no problems or side effects with the medication.     HYPERTENSION - Patient has longstanding history of HTN , currently denies any symptoms referable to elevated blood pressure. Specifically denies chest pain, palpitations, dyspnea, orthopnea, PND . Blood pressure readings have been in slightly low range. Current medication regimen is as listed below. Patient denies any side effects of medication.     HYPOTHYROIDISM - Patient has a longstanding history of chronic Hypothyroidism. Patient has been doing well, noting no tremor, insomnia, hair loss or changes in skin texture. Continues to take medications as directed, without adverse reactions or side effects. Last TSH   Lab Results   Component Value Date    TSH 0.55 11/25/2020   .        Review of Systems  CONSTITUTIONAL: NEGATIVE for fever, chills, change in weight  INTEGUMENTARY/SKIN: NEGATIVE for worrisome rashes, moles or lesions  EYES: NEGATIVE for vision changes or irritation  ENT/MOUTH: NEGATIVE for ear, mouth and throat problems  RESP: NEGATIVE for significant cough or SOB  CV: NEGATIVE for  chest pain, palpitations or peripheral edema  GI: NEGATIVE for nausea, abdominal pain, heartburn, or change in bowel habits  : NEGATIVE for frequency, dysuria, or hematuria  MUSCULOSKELETAL: lt wrist fracture   NEURO: NEGATIVE for weakness, dizziness or paresthesias  ENDOCRINE: NEGATIVE for temperature intolerance, skin/hair changes  HEME: NEGATIVE for bleeding problems  PSYCHIATRIC: NEGATIVE for changes in mood or affect    Patient Active Problem List    Diagnosis Date Noted     Kidney replaced by transplant      Priority: High     Rt       Type 1 diabetes mellitus with other specified complication (H) 05/19/2021     Priority: Medium     CKD (chronic kidney disease) stage 3, GFR 30-59 ml/min 10/31/2019     Priority: Medium     Hernia, incisional 10/30/2017     Priority: Medium     Hypertension secondary to other renal disorders 11/14/2016     Priority: Medium     Other specified hypothyroidism 04/12/2016     Priority: Medium     Coronary artery disease involving native heart without angina pectoris, unspecified vessel or lesion type 04/12/2016     Priority: Medium     Anemia in chronic kidney disease 12/07/2015     Priority: Medium     CAD S/P percutaneous coronary angioplasty 09/20/2015     Priority: Medium     Hyperlipidemia LDL goal <100 07/15/2013     Priority: Medium     Immunosuppressed status (HCC)      Priority: Medium     Advanced directives, counseling/discussion 08/10/2012     Priority: Medium     Patient states has Advance Directive and will bring in a copy to clinic.       GERD (gastroesophageal reflux disease)      Priority: Medium     Irritable bowel syndrome 04/30/2006     Priority: Medium     Other vitamin B12 deficiency anemia 12/02/2002     Priority: Medium      Past Medical History:   Diagnosis Date     Abdominal wall hernia     RLQ     AK (actinic keratosis) 08/06/2020     Allergic rhinitis      CAD (coronary artery disease)     coronary artery disease s/p PCI to LAD in 2005coronary artery  "disease s/p PCI to LAD in 2005     Diabetes mellitus, type 2 (H)     follows up with endocrinologist.     Dyslipidemia      GERD (gastroesophageal reflux disease)      H/O diabetic nephropathy     s/p kidney trnsplant     Hypertension      Hypothyroidism      Immunosuppressed status (H)      Kidney replaced by transplant     Rt     PONV (postoperative nausea and vomiting)      Shingles      Vitamin B12 deficiency anemia      Past Surgical History:   Procedure Laterality Date     APPENDECTOMY NOS       ARTHROSCOPY SHOULDER ROTATOR CUFF REPAIR Left      COLONOSCOPY N/A 6/7/2016    Procedure: COLONOSCOPY;  Surgeon: Rashard Snider MD;  Location: [facility]     Coronary angioplasty with stent  2005     HYSTERECTOMY       Kidney Transplant NOS Right      LAPAROSCOPIC CHOLECYSTECTOMY       NOSE SURGERY  2013     OPEN SEPARATION COMPONENT HERNIORRHAPHY N/A 10/16/2017    Procedure: OPEN SEPARATION COMPONENT HERNIORRHAPHY;;  Surgeon: CARL Lott MD;  Location: [facility]     RECONSTRUCT ABDOMINAL WALL N/A 10/16/2017    Procedure: RECONSTRUCT ABDOMINAL WALL;  Exploratory Laparotomy, Lysis of Adhesions, Abdominal Wall reconstruction, Inlay Xen AB mesh, Mitek Suture [place] and Umbilical Hernia Repair.;  Surgeon: CARL Lott MD;  Location: [facility] OR     REMOVE CATARACT INTRACAP,INSERT LENS Right      TONSILLECTOMY       Current Outpatient Medications   Medication Sig Dispense Refill     amLODIPine (NORVASC) 10 MG tablet TAKE 1 TABLET BY MOUTH EVERY DAY 90 tablet 1     ASPIRIN PO Take 81 mg by mouth daily       atorvastatin (LIPITOR) 40 MG tablet TAKE 0.5 TABLETS BY MOUTH DAILY 45 tablet 1     FLORIN CONTOUR test strip   0     BD INSULIN SYRINGE ULTRAFINE 31G X 5/16\" 0.3 ML USING 4-5 PER DAY ([facility] 31G/0.3ML)  6     calcium carbonate (TUMS) 500 MG chewable tablet Take 1-2 chew tab by mouth 2 times daily as needed for heartburn       cholecalciferol (VITAMIN D3) 25 mcg (1000 units) capsule Take 2 capsules by mouth " "daily       GENGRAF (BRAND) 25 MG CAPSULE Take 3 capsules (75 mg) by mouth every morning AND 2 capsules (50 mg) every evening. 150 capsule 11     insulin aspart (NOVOLOG PEN) 100 UNIT/ML pen Taking as directed with adjustment scale and carb coverage.       insulin glargine (BASAGLAR KWIKPEN) 100 UNIT/ML pen Inject 14 Units Subcutaneous At Bedtime       isosorbide mononitrate (IMDUR) 30 MG 24 hr tablet TAKE 0.5 TABLETS (15 MG) BY MOUTH 2 TIMES DAILY (Patient taking differently: Take 15 mg by mouth daily TAKE 0.5 TABLETS (15 MG) BY MOUTH 2 TIMES DAILY) 90 tablet 2     levothyroxine (SYNTHROID/LEVOTHROID) 100 MCG tablet Take 1 tablet (100 mcg) by mouth daily 90 tablet 1     losartan (COZAAR) 100 MG tablet TAKE 0.5 TABLETS (50 MG) BY MOUTH DAILY 45 tablet 1     metoprolol succinate ER (TOPROL-XL) 25 MG 24 hr tablet TAKE 1 TABLET BY MOUTH EVERYDAY AT BEDTIME 90 tablet 2     mycophenolic acid (GENERIC EQUIVALENT) 180 MG EC tablet Take 3 tablets (540 mg) by mouth 2 times daily 180 tablet 11     pantoprazole (PROTONIX) 40 MG EC tablet TAKE 1 TABLET BY MOUTH EVERY DAY 90 tablet 2       Allergies   Allergen Reactions     Iron [Ferrous Sulfate] GI Disturbance        Social History     Tobacco Use     Smoking status: Never Smoker     Smokeless tobacco: Never Used   Substance Use Topics     Alcohol use: No     Family History   Problem Relation Age of Onset     Respiratory Mother          age 71     Cardiovascular Father          age 75 hyertension     Arthritis Sister         living, healthy     Family History Negative Brother          age 44     Colon Cancer No family hx of      History   Drug Use No         Objective     /50 (Patient Position: Standing)   Pulse 67   Temp 98.1  F (36.7  C) (Oral)   Resp 17   Ht 1.626 m (5' 4\")   Wt 74.6 kg (164 lb 8 oz)   SpO2 97%   BMI 28.24 kg/m      Physical Exam    GENERAL APPEARANCE:   alert and no distress     EYES: EOMI, PERRL     NECK: no adenopathy, " no asymmetry, masses, or scars and thyroid normal to palpation     RESP: lungs clear to auscultation - no rales, rhonchi or wheezes     CV: regular rates and rhythm, normal S1 S2,       ABDOMEN:  soft, nontender, no HSM or masses and bowel sounds normal     MS: extremities -no edema at this time, no calf tenderness     NEURO: Normal strength and tone, sensory exam grossly normal, mentation intact and speech normal     PSYCH: mentation appears normal. and affect normal/bright     LYMPHATICS: No cervical adenopathy    Recent Labs   Lab Test 06/23/21  0613 03/18/21  0636 10/29/20  0710 10/29/20  0710   HGB 11.3* 11.5*   < >  --     206   < >  --     138   < >  --    POTASSIUM 3.8 4.2   < >  --    CR 1.34* 1.45*   < >  --    A1C  --  6.8*  --  7.0*    < > = values in this interval not displayed.        Diagnostics:  Recent Results (from the past 24 hour(s))   Hemoglobin A1c    Collection Time: 07/06/21  2:22 PM   Result Value Ref Range    Hemoglobin A1C 6.7 (H) 0 - 5.6 %      EKG: appears normal, NSR, normal axis, normal intervals, no acute ST/T changes c/w ischemia, no LVH by voltage criteria, unchanged from previous tracings    Revised Cardiac Risk Index (RCRI):  The patient has the following serious cardiovascular risks for perioperative complications:   - Coronary Artery Disease (MI, positive stress test, angina, Qs on EKG) = 1 point   - Diabetes Mellitus (on Insulin) = 1 point     RCRI Interpretation: 2 points: Class III (moderate risk - 6.6% complication rate)     Signed Electronically by: Summer Vgea MD  Copy of this evaluation report is provided to requesting physician.

## 2021-07-06 NOTE — NURSING NOTE
"/50 (Patient Position: Standing)   Pulse 67   Temp 98.1  F (36.7  C) (Oral)   Resp 17   Ht 1.626 m (5' 4\")   Wt 74.6 kg (164 lb 8 oz)   SpO2 97%   BMI 28.24 kg/m    Patient in for Pre-Op.  Socorro Perez CMA    "

## 2021-07-07 PROBLEM — E11.9 DIABETES MELLITUS, TYPE 2 (H): Status: ACTIVE | Noted: 2021-07-07

## 2021-08-09 ENCOUNTER — TRANSFERRED RECORDS (OUTPATIENT)
Dept: HEALTH INFORMATION MANAGEMENT | Facility: CLINIC | Age: 71
End: 2021-08-09

## 2021-08-10 ENCOUNTER — OFFICE VISIT (OUTPATIENT)
Dept: ENDOCRINOLOGY | Facility: CLINIC | Age: 71
End: 2021-08-10
Payer: MEDICARE

## 2021-08-10 VITALS
SYSTOLIC BLOOD PRESSURE: 152 MMHG | HEART RATE: 64 BPM | OXYGEN SATURATION: 97 % | HEIGHT: 64 IN | DIASTOLIC BLOOD PRESSURE: 52 MMHG | WEIGHT: 165 LBS | BODY MASS INDEX: 28.17 KG/M2

## 2021-08-10 DIAGNOSIS — E11.21 TYPE 2 DIABETES MELLITUS WITH DIABETIC NEPHROPATHY, WITH LONG-TERM CURRENT USE OF INSULIN (H): Primary | ICD-10-CM

## 2021-08-10 DIAGNOSIS — E11.59 TYPE 2 DIABETES MELLITUS WITH OTHER CIRCULATORY COMPLICATION, WITH LONG-TERM CURRENT USE OF INSULIN (H): ICD-10-CM

## 2021-08-10 DIAGNOSIS — Z94.0 KIDNEY REPLACED BY TRANSPLANT: ICD-10-CM

## 2021-08-10 DIAGNOSIS — Z79.4 TYPE 2 DIABETES MELLITUS WITH OTHER CIRCULATORY COMPLICATION, WITH LONG-TERM CURRENT USE OF INSULIN (H): ICD-10-CM

## 2021-08-10 DIAGNOSIS — Z79.4 TYPE 2 DIABETES MELLITUS WITH DIABETIC NEPHROPATHY, WITH LONG-TERM CURRENT USE OF INSULIN (H): Primary | ICD-10-CM

## 2021-08-10 PROCEDURE — 95251 CONT GLUC MNTR ANALYSIS I&R: CPT | Performed by: INTERNAL MEDICINE

## 2021-08-10 PROCEDURE — 99204 OFFICE O/P NEW MOD 45 MIN: CPT | Performed by: INTERNAL MEDICINE

## 2021-08-10 RX ORDER — PROCHLORPERAZINE 25 MG/1
1 SUPPOSITORY RECTAL CONTINUOUS PRN
Qty: 1 EACH | Refills: 3 | Status: SHIPPED | OUTPATIENT
Start: 2021-08-10 | End: 2021-11-08

## 2021-08-10 RX ORDER — PROCHLORPERAZINE 25 MG/1
1 SUPPOSITORY RECTAL CONTINUOUS PRN
Qty: 3 EACH | Refills: 5 | Status: SHIPPED | OUTPATIENT
Start: 2021-08-10 | End: 2021-11-08

## 2021-08-10 ASSESSMENT — MIFFLIN-ST. JEOR: SCORE: 1243.44

## 2021-08-10 NOTE — PROGRESS NOTES
Name: Viv Shaw is a 70 year old woman, seen at the request of Summer Vega for evaluation of     Chief Complaint   Patient presents with     Diabetes       HPI:  Recent issues:  Here for evaluation of diabetes.  Previously saw Dr. JAIME Garvin for diabetes evaluations, now transferring to my practice  Had recent left wrist fracture and ORIF surgery, now wearing left wrist brace  Reviewed medical history from patient and Epic chart record        Diagnosis of diabetes mellitus age 23, living in [facility]  Recalls having vision problem, saw eye physician and then family physician, also had frequent urination  Initial treatment with oral medication for few weeks, then change to insulin  Had seen Dr. Castillo in [facility]  Subsequent evaluations with Dr. GABRIELE Vickers, then saw Dr. Elmer Garvin/[facility] for endocrinology evaluations    Previous [facility] hgbA1c trends include:     Lab Test 07/06/21  1422 03/18/21  0636 10/29/20  0710 03/27/20  0648 11/20/19  0803   A1C 6.7* 6.8* 7.0* 6.8* 6.9*     Uses insulin injections 4x per day    Current DM medications:  Novolog Flexpen 6-7U premeal   Novolog sscale guestimates  Basaglar Kwikpen  14 U subcutaneous in evening    Blood glucose (BG) meter: Raad Contour?   Has tested 4x per day previously   Less frequent testing when using CGM sensor    Has used the DexcomG5, then the G6 CGM sensor  Recent DexcomG6 data:          FamHx DM:  Niece    Recent [facility] labs include:  Lab Results   Component Value Date    A1C 6.7 (H) 07/06/2021     06/23/2021    POTASSIUM 3.8 06/23/2021    CHLORIDE 106 06/23/2021    CO2 30 06/23/2021    ANIONGAP 3 06/23/2021     (H) 06/23/2021    BUN 28 06/23/2021    CR 1.34 (H) 06/23/2021    GFRESTIMATED 39 (L) 06/23/2021    GFRESTBLACK 45 (L) 06/23/2021    NAVI 9.3 06/23/2021    CPEPT <0.5 07/05/2005    CHOL 184 10/29/2020    TRIG 110 10/29/2020    HDL 67 10/29/2020    LDL 95 10/29/2020    NHDL 117 10/29/2020    UCRR 68 03/01/2021     MICROL 14 10/29/2020    UMALCR 13.84 10/29/2020    TSH 0.55 11/25/2020    T4 1.41 04/02/2018     DM Complications:   Nephropathy    Previous ESRD, then renal transplant 2005   Retinopathy    Previous PDR and laser surgeries [facility]    Sees Dr. Grayson Miles/[facility] clinic   Macrovascular disease    CAD, previous cardiac stent placement      Lives in [place], [place]  Sees Dr. Summer Vega/[facility] for general medicine evaluations.    PMH/PSH:  Past Medical History:   Diagnosis Date     Abdominal wall hernia     RLQ     AK (actinic keratosis) 08/06/2020     Allergic rhinitis      CAD (coronary artery disease)     coronary artery disease s/p PCI to LAD in 2005coronary artery disease s/p PCI to LAD in 2005     Diabetes mellitus, type 2 (H)     follows up with endocrinologist.     Dyslipidemia      GERD (gastroesophageal reflux disease)      H/O diabetic nephropathy     s/p kidney trnsplant     Hypertension      Hypothyroidism      Immunosuppressed status (H)      Kidney replaced by transplant     Rt     PONV (postoperative nausea and vomiting)      Shingles      Vitamin B12 deficiency anemia      Past Surgical History:   Procedure Laterality Date     APPENDECTOMY NOS       ARTHROSCOPY SHOULDER ROTATOR CUFF REPAIR Left      COLONOSCOPY N/A 6/7/2016    Procedure: COLONOSCOPY;  Surgeon: Rashard Snider MD;  Location: [facility]     Coronary angioplasty with stent  2005     HYSTERECTOMY       Kidney Transplant NOS Right      LAPAROSCOPIC CHOLECYSTECTOMY       NOSE SURGERY  2013     OPEN SEPARATION COMPONENT HERNIORRHAPHY N/A 10/16/2017    Procedure: OPEN SEPARATION COMPONENT HERNIORRHAPHY;;  Surgeon: CARL Lott MD;  Location: [facility]     RECONSTRUCT ABDOMINAL WALL N/A 10/16/2017    Procedure: RECONSTRUCT ABDOMINAL WALL;  Exploratory Laparotomy, Lysis of Adhesions, Abdominal Wall reconstruction, Inlay Xen AB mesh, Mitek Suture [place] and Umbilical Hernia Repair.;  Surgeon: CARL Lott  Angelique Diop MD;  Location:  OR     REMOVE CATARACT INTRACAP,INSERT LENS Right      TONSILLECTOMY         Family Hx:  Family History   Problem Relation Age of Onset     Respiratory Mother          age 71     Cardiovascular Father          age 75 hyertension     Arthritis Sister         living, healthy     Family History Negative Brother          age 44     Colon Cancer No family hx of          Social Hx:  Social History     Socioeconomic History     Marital status:      Spouse name: Not on file     Number of children: Not on file     Years of education: Not on file     Highest education level: Not on file   Occupational History     Not on file   Tobacco Use     Smoking status: Never Smoker     Smokeless tobacco: Never Used   Substance and Sexual Activity     Alcohol use: No     Drug use: No     Sexual activity: Yes     Partners: Male   Other Topics Concern     Parent/sibling w/ CABG, MI or angioplasty before 65F 55M? Not Asked   Social History Narrative     Not on file     Social Determinants of Health     Financial Resource Strain:      Difficulty of Paying Living Expenses:    Food Insecurity:      Worried About Running Out of Food in the Last Year:      Ran Out of Food in the Last Year:    Transportation Needs:      Lack of Transportation (Medical):      Lack of Transportation (Non-Medical):    Physical Activity:      Days of Exercise per Week:      Minutes of Exercise per Session:    Stress:      Feeling of Stress :    Social Connections:      Frequency of Communication with Friends and Family:      Frequency of Social Gatherings with Friends and Family:      Attends Shinto Services:      Active Member of Clubs or Organizations:      Attends Club or Organization Meetings:      Marital Status:    Intimate Partner Violence:      Fear of Current or Ex-Partner:      Emotionally Abused:      Physically Abused:      Sexually Abused:           MEDICATIONS:  has a current medication  "list which includes the following prescription(s): amlodipine, aspirin, atorvastatin, calcium carbonate, cholecalciferol, dexcom g6 sensor, dexcom g6 transmitter, cyclosporine modified, insulin aspart, insulin glargine, isosorbide mononitrate, levothyroxine, losartan, metoprolol succinate er, mycophenolic acid, pantoprazole, teddy contour, and bd insulin syringe ultrafine.    ROS:     ROS: 10 point ROS neg other than the symptoms noted above in the HPI.    GENERAL: some fatigue, wt stable; denies fevers, chills, night sweats.   HEENT: no dysphagia, odonophagia, diplopia, neck pain  THYROID:  no apparent hyper or hypothyroid symptoms  CV: no chest pain, pressure, palpitations  LUNGS: no SOB, JIMENEZ, cough, wheezing   ABDOMEN: no diarrhea, constipation, abdominal pain  EXTREMITIES: no rashes, ulcers, edema  NEUROLOGY: decreased sensation at feet, balance problems; no headaches, denies changes in vision, tingling  MSK: some leg weakness, denies specific arthralgias  SKIN: no rashes or lesions  :  No menses  PSYCH:  stable mood, no significant anxiety or depression  ENDOCRINE: no heat or cold intolerance    Physical Exam   VS: BP (!) 152/52   Pulse 64   Ht 1.626 m (5' 4\")   Wt 74.8 kg (165 lb)   SpO2 97%   BMI 28.32 kg/m    GENERAL: AXOX3, NAD, well dressed, answering questions appropriately, appears stated age.  THYROID:  normal gland, no apparent nodules or goiter  HEENT: neck non-tender, no exopthalmous, no proptosis, EOMI  CV: RRR, no rubs, gallops, no murmurs  LUNGS: CTAB, no wheezes, rales, or ronchi  ABDOMEN: soft, nontender, nondistended  EXTREMITIES: no edema, +pedal pulses, no lesions  NEUROLOGY: CN grossly intact, + DTR lower extremity, no tremors  MSK: grossly intact  SKIN: no rashes, no lesions    LABS:    All pertinent notes, labs, and images personally reviewed by me.     A/P:  Encounter Diagnoses   Name Primary?     Type 2 diabetes mellitus with diabetic nephropathy, with long-term current use of " insulin (H) Yes     Kidney replaced by transplant      Type 2 diabetes mellitus with other circulatory complication, with long-term current use of insulin (H)        Comments:  Reviewed complicated health history and diabetes issues.  Recent glucose trends good    Plan:  Discussed general issues with the diabetes diagnosis and management  Reviewed the pathophysiology of T2DM  We discussed the hgbA1c test which reflects previous overall glucose levels or control  Discussed the importance of blood glucose (BG) testing to assess glucose trends  Provided general overview of the diabetes medication options and medication treatment plan.  Reviewed recent DexcomG6 CGM glucose trend data, in detail.    Recommend:  Continue current Novolog and Basaglar insulin medication use  Discussed the ideal mealtime and correction scale dosing routine   Use ISF 1U per 50 mg/dl >150 mg/dl  Goal target premeal SG  mg/dl  Discussed hypoglycemia prevention  Continue use of the DexcomG6 CGM sensor   Patient's insulin regimen requires frequent dose adjustments by patient on basis of therapeutic BGM/CGM test results   Sent updated Rx's today  Plan future [facility] lab testing for C-Peptide, glucose, Islet cell Ab, to differentiate T2DM vs T1DM  Keep focus on diet, exercise, weight management.  Advise having fasting lipid panel testing and dilated eye examination, at least annually    Keep regular follow-up evaluations with nephrologist, cardiologist, ophthalmologist, and PCP   Addressed patient questions today    There are no Patient Instructions on file for this visit.    Future labs ordered today:   Orders Placed This Encounter   Procedures     GLUCOSE MONITOR, 72 HOUR, PHYS INTERP     Glucose     C-peptide     Islet cell antibody IgG     Radiology/Consults ordered today: None    Total time spent in with the patient evaluation:  30 min  Additional time spent reviewing pertinent lab tests and chart notes, and documentation:  15  min    Follow-up:  11/2021    DIANN Fitzpatrick MD, MS  Endocrinology  [facility]    CC: SHARON Vega and Magdiel R

## 2021-08-10 NOTE — LETTER
8/10/2021         RE: Viv Shaw  [address]  [address]  [place] [address]        Dear Colleague,    Thank you for referring your patient, Viv Shaw, to the [facility] [place]. Please see a copy of my visit note below.    Name: Viv Shaw is a 70 year old woman, seen at the request of Summer Vega for evaluation of     Chief Complaint   Patient presents with     Diabetes       HPI:  Recent issues:  Here for evaluation of diabetes.  Previously saw Dr. JAIME Garvin for diabetes evaluations, now transferring to my practice  Had recent left wrist fracture and ORIF surgery, now wearing left wrist brace  Reviewed medical history from patient and Epic chart record        Diagnosis of diabetes mellitus age 23, living in [facility]  Recalls having vision problem, saw eye physician and then family physician, also had frequent urination  Initial treatment with oral medication for few weeks, then change to insulin  Had seen Dr. Castillo in [facility]  Subsequent evaluations with Dr. GABRIELE Vickers, then saw Dr. Elmer Garvin/[facility] for endocrinology evaluations    Previous [facility] hgbA1c trends include:     Lab Test 07/06/21  1422 03/18/21  0636 10/29/20  0710 03/27/20  0648 11/20/19  0803   A1C 6.7* 6.8* 7.0* 6.8* 6.9*     Current DM medications:  Novolog Flexpen 6-7U premeal   Novolog sscale guestimates  Basaglar Kwikpen  14 U subcutaneous in evening    Blood glucose (BG) meter: Raad Contour?   Uses infrequently  Has used the DexcomG5, then the G6 CGM sensor  Recent DexcomG6 data:          FamHx DM:  [place]    Recent [facility] labs include:  Lab Results   Component Value Date    A1C 6.7 (H) 07/06/2021     06/23/2021    POTASSIUM 3.8 06/23/2021    CHLORIDE 106 06/23/2021    CO2 30 06/23/2021    ANIONGAP 3 06/23/2021     (H) 06/23/2021    BUN 28 06/23/2021    CR 1.34 (H) 06/23/2021    GFRESTIMATED 39 (L) 06/23/2021    GFRESTBLACK 45 (L) 06/23/2021     NAVI 9.3 06/23/2021    CPEPT <0.5 07/05/2005    CHOL 184 10/29/2020    TRIG 110 10/29/2020    HDL 67 10/29/2020    LDL 95 10/29/2020    NHDL 117 10/29/2020    UCRR 68 03/01/2021    MICROL 14 10/29/2020    UMALCR 13.84 10/29/2020    TSH 0.55 11/25/2020    T4 1.41 04/02/2018     DM Complications:   Nephropathy    Previous ESRD, then renal transplant 2005   Retinopathy    Previous PDR and laser surgeries [facility]    Sees Dr. Grayson Miles/[facility] clinic   Macrovascular disease    CAD, previous cardiac stent placement      Lives in [place], [place]  Sees Dr. Summer Vega/[facility] for general medicine evaluations.    PMH/PSH:  Past Medical History:   Diagnosis Date     Abdominal wall hernia     RLQ     AK (actinic keratosis) 08/06/2020     Allergic rhinitis      CAD (coronary artery disease)     coronary artery disease s/p PCI to LAD in 2005coronary artery disease s/p PCI to LAD in 2005     Diabetes mellitus, type 2 (H)     follows up with endocrinologist.     Dyslipidemia      GERD (gastroesophageal reflux disease)      H/O diabetic nephropathy     s/p kidney trnsplant     Hypertension      Hypothyroidism      Immunosuppressed status (H)      Kidney replaced by transplant     Rt     PONV (postoperative nausea and vomiting)      Shingles      Vitamin B12 deficiency anemia      Past Surgical History:   Procedure Laterality Date     APPENDECTOMY NOS       ARTHROSCOPY SHOULDER ROTATOR CUFF REPAIR Left      COLONOSCOPY N/A 6/7/2016    Procedure: COLONOSCOPY;  Surgeon: Rashard Snider MD;  Location: [facility]     Coronary angioplasty with stent  2005     HYSTERECTOMY       Kidney Transplant NOS Right      LAPAROSCOPIC CHOLECYSTECTOMY       NOSE SURGERY  2013     OPEN SEPARATION COMPONENT HERNIORRHAPHY N/A 10/16/2017    Procedure: OPEN SEPARATION COMPONENT HERNIORRHAPHY;;  Surgeon: CARL Lott MD;  Location: [facility]     RECONSTRUCT ABDOMINAL WALL N/A 10/16/2017    Procedure: RECONSTRUCT  ABDOMINAL WALL;  Exploratory Laparotomy, Lysis of Adhesions, Abdominal Wall reconstruction, Inlay Xen AB mesh, Mitek Suture [place] and Umbilical Hernia Repair.;  Surgeon: CARL Lott MD;  Location: [facility] OR     REMOVE CATARACT INTRACAP,INSERT LENS Right      TONSILLECTOMY         Family Hx:  Family History   Problem Relation Age of Onset     Respiratory Mother          age 71     Cardiovascular Father          age 75 hyertension     Arthritis Sister         living, healthy     Family History Negative Brother          age 44     Colon Cancer No family hx of          Social Hx:  Social History     Socioeconomic History     Marital status:      Spouse name: Not on file     Number of children: Not on file     Years of education: Not on file     Highest education level: Not on file   Occupational History     Not on file   Tobacco Use     Smoking status: Never Smoker     Smokeless tobacco: Never Used   Substance and Sexual Activity     Alcohol use: No     Drug use: No     Sexual activity: Yes     Partners: Male   Other Topics Concern     Parent/sibling w/ CABG, MI or angioplasty before 65F 55M? Not Asked   Social History Narrative     Not on file     Social Determinants of Health     Financial Resource Strain:      Difficulty of Paying Living Expenses:    Food Insecurity:      Worried About Running Out of Food in the Last Year:      Ran Out of Food in the Last Year:    Transportation Needs:      Lack of Transportation (Medical):      Lack of Transportation (Non-Medical):    Physical Activity:      Days of Exercise per Week:      Minutes of Exercise per Session:    Stress:      Feeling of Stress :    Social Connections:      Frequency of Communication with Friends and Family:      Frequency of Social Gatherings with Friends and Family:      Attends Yarsanism Services:      Active Member of Clubs or Organizations:      Attends Club or Organization Meetings:      Marital Status:   "  Intimate Partner Violence:      Fear of Current or Ex-Partner:      Emotionally Abused:      Physically Abused:      Sexually Abused:           MEDICATIONS:  has a current medication list which includes the following prescription(s): amlodipine, aspirin, atorvastatin, calcium carbonate, cholecalciferol, dexcom g6 sensor, dexcom g6 transmitter, cyclosporine modified, insulin aspart, insulin glargine, isosorbide mononitrate, levothyroxine, losartan, metoprolol succinate er, mycophenolic acid, pantoprazole, teddy contour, and bd insulin syringe ultrafine.    ROS:     ROS: 10 point ROS neg other than the symptoms noted above in the HPI.    GENERAL: some fatigue, wt stable; denies fevers, chills, night sweats.   HEENT: no dysphagia, odonophagia, diplopia, neck pain  THYROID:  no apparent hyper or hypothyroid symptoms  CV: no chest pain, pressure, palpitations  LUNGS: no SOB, JIMENEZ, cough, wheezing   ABDOMEN: no diarrhea, constipation, abdominal pain  EXTREMITIES: no rashes, ulcers, edema  NEUROLOGY: decreased sensation at feet, balance problems; no headaches, denies changes in vision, tingling  MSK: some leg weakness, denies specific arthralgias  SKIN: no rashes or lesions  :  No menses  PSYCH:  stable mood, no significant anxiety or depression  ENDOCRINE: no heat or cold intolerance    Physical Exam   VS: BP (!) 152/52   Pulse 64   Ht 1.626 m (5' 4\")   Wt 74.8 kg (165 lb)   SpO2 97%   BMI 28.32 kg/m    GENERAL: AXOX3, NAD, well dressed, answering questions appropriately, appears stated age.  THYROID:  normal gland, no apparent nodules or goiter  HEENT: neck non-tender, no exopthalmous, no proptosis, EOMI  CV: RRR, no rubs, gallops, no murmurs  LUNGS: CTAB, no wheezes, rales, or ronchi  ABDOMEN: soft, nontender, nondistended  EXTREMITIES: no edema, +pedal pulses, no lesions  NEUROLOGY: CN grossly intact, + DTR lower extremity, no tremors  MSK: grossly intact  SKIN: no rashes, no lesions    LABS:    All pertinent " notes, labs, and images personally reviewed by me.     A/P:  Encounter Diagnoses   Name Primary?     Type 2 diabetes mellitus with diabetic nephropathy, with long-term current use of insulin (H) Yes     Kidney replaced by transplant      Type 2 diabetes mellitus with other circulatory complication, with long-term current use of insulin (H)        Comments:  Reviewed complicated health history and diabetes issues.  Recent glucose trends good    Plan:  Discussed general issues with the diabetes diagnosis and management  Reviewed the pathophysiology of T2DM  We discussed the hgbA1c test which reflects previous overall glucose levels or control  Discussed the importance of blood glucose (BG) testing to assess glucose trends  Provided general overview of the diabetes medication options and medication treatment plan.    Recommend:  Continue current Novolog and Basaglar insulin medication use  Discussed the ideal mealtime and correction scale dosing routine   Use ISF 1U per 50 mg/dl >150 mg/dl  Goal target premeal SG  mg/dl  Discussed hypoglycemia prevention  Continue use of the DexcomG6 CGM sensor   Sent updated Rx's today  Plan future [facility] lab testing for C-Peptide, glucose, Islet cell Ab, to differentiate T2DM vs T1DM  Keep focus on diet, exercise, weight management.  Advise having fasting lipid panel testing and dilated eye examination, at least annually    Keep regular follow-up evaluations with nephrologist, cardiologist, ophthalmologist, and PCP   Addressed patient questions today    There are no Patient Instructions on file for this visit.    Future labs ordered today:   Orders Placed This Encounter   Procedures     Glucose     C-peptide     Islet cell antibody IgG     Radiology/Consults ordered today: None    Total time spent in with the patient evaluation:  30 min  Additional time spent reviewing pertinent lab tests and chart notes, and documentation:  15 min    Follow-up:  11/2021    DIANN Fitzpatrick MD,  MS  Endocrinology  [facility]    CC: SHARON Vega and KYMBERLY Veloz          Again, thank you for allowing me to participate in the care of your patient.        Sincerely,        Juan M Fitzpatrick MD

## 2021-08-13 DIAGNOSIS — I15.1 HYPERTENSION SECONDARY TO OTHER RENAL DISORDERS: ICD-10-CM

## 2021-08-13 RX ORDER — CHLORTHALIDONE 25 MG/1
TABLET ORAL
Qty: 90 TABLET | Refills: 1 | Status: SHIPPED | OUTPATIENT
Start: 2021-08-13 | End: 2021-11-30

## 2021-08-13 NOTE — TELEPHONE ENCOUNTER
Patient's blood pressure was high at her endocrinology office on 8/10/2021.  Medication refilled, please advise patient to come in for a nurse only blood pressure check next week.

## 2021-08-13 NOTE — TELEPHONE ENCOUNTER
Pending Prescriptions:                       Disp   Refills    chlorthalidone (HYGROTON) 25 MG tablet [Ph*90 tab*1        Sig: TAKE 1 TABLET BY MOUTH EVERY DAY    Routing refill request to provider for review/approval because:  BP Readings from Last 3 Encounters:   08/10/21 (!) 152/52   07/06/21 110/50   04/21/21 112/60     Medication is not active on med list

## 2021-08-16 NOTE — TELEPHONE ENCOUNTER
Next 5 appointments (look out 90 days)    Aug 17, 2021  1:00 PM  Nurse Only with Ri Rn  [facility] ([facility] ) [address]  [facility] [id number] [phone]

## 2021-08-17 ENCOUNTER — ALLIED HEALTH/NURSE VISIT (OUTPATIENT)
Dept: NURSING | Facility: CLINIC | Age: 71
End: 2021-08-17
Payer: MEDICARE

## 2021-08-17 VITALS — DIASTOLIC BLOOD PRESSURE: 58 MMHG | SYSTOLIC BLOOD PRESSURE: 140 MMHG

## 2021-08-17 DIAGNOSIS — Z23 NEED FOR VACCINATION: Primary | ICD-10-CM

## 2021-11-04 DIAGNOSIS — I15.1 HYPERTENSION SECONDARY TO OTHER RENAL DISORDERS: ICD-10-CM

## 2021-11-05 RX ORDER — LOSARTAN POTASSIUM 100 MG/1
50 TABLET ORAL DAILY
Qty: 45 TABLET | Refills: 1 | OUTPATIENT
Start: 2021-11-05

## 2021-11-29 ENCOUNTER — LAB (OUTPATIENT)
Dept: LAB | Facility: CLINIC | Age: 71
End: 2021-11-29
Payer: MEDICARE

## 2021-11-29 DIAGNOSIS — E78.5 HYPERLIPIDEMIA LDL GOAL <100: ICD-10-CM

## 2021-11-29 DIAGNOSIS — E11.59 TYPE 2 DIABETES MELLITUS WITH OTHER CIRCULATORY COMPLICATION, WITH LONG-TERM CURRENT USE OF INSULIN (H): ICD-10-CM

## 2021-11-29 DIAGNOSIS — Z48.298 AFTERCARE FOLLOWING ORGAN TRANSPLANT: ICD-10-CM

## 2021-11-29 DIAGNOSIS — Z79.4 TYPE 2 DIABETES MELLITUS WITH OTHER CIRCULATORY COMPLICATION, WITH LONG-TERM CURRENT USE OF INSULIN (H): ICD-10-CM

## 2021-11-29 DIAGNOSIS — Z79.899 ENCOUNTER FOR LONG-TERM CURRENT USE OF MEDICATION: ICD-10-CM

## 2021-11-29 DIAGNOSIS — Z94.0 KIDNEY REPLACED BY TRANSPLANT: ICD-10-CM

## 2021-11-29 DIAGNOSIS — E03.8 OTHER SPECIFIED HYPOTHYROIDISM: ICD-10-CM

## 2021-11-29 DIAGNOSIS — Z79.4 TYPE 2 DIABETES MELLITUS WITH DIABETIC NEPHROPATHY, WITH LONG-TERM CURRENT USE OF INSULIN (H): ICD-10-CM

## 2021-11-29 DIAGNOSIS — E11.21 TYPE 2 DIABETES MELLITUS WITH DIABETIC NEPHROPATHY, WITH LONG-TERM CURRENT USE OF INSULIN (H): ICD-10-CM

## 2021-11-29 LAB
ALBUMIN SERPL-MCNC: 3.5 G/DL (ref 3.4–5)
ALP SERPL-CCNC: 52 U/L (ref 40–150)
ALT SERPL W P-5'-P-CCNC: 19 U/L (ref 0–50)
ANION GAP SERPL CALCULATED.3IONS-SCNC: 7 MMOL/L (ref 3–14)
AST SERPL W P-5'-P-CCNC: 8 U/L (ref 0–45)
BILIRUB SERPL-MCNC: 0.7 MG/DL (ref 0.2–1.3)
BUN SERPL-MCNC: 25 MG/DL (ref 7–30)
C PEPTIDE SERPL-MCNC: 0.2 NG/ML (ref 0.9–6.9)
CALCIUM SERPL-MCNC: 9 MG/DL (ref 8.5–10.1)
CHLORIDE BLD-SCNC: 108 MMOL/L (ref 94–109)
CHOLEST SERPL-MCNC: 187 MG/DL
CO2 SERPL-SCNC: 27 MMOL/L (ref 20–32)
CREAT SERPL-MCNC: 1.08 MG/DL (ref 0.52–1.04)
CYCLOSPORINE BLD LC/MS/MS-MCNC: 76 UG/L (ref 50–400)
ERYTHROCYTE [DISTWIDTH] IN BLOOD BY AUTOMATED COUNT: 12.4 % (ref 10–15)
FASTING STATUS PATIENT QL REPORTED: YES
FASTING STATUS PATIENT QL REPORTED: YES
GFR SERPL CREATININE-BSD FRML MDRD: 51 ML/MIN/1.73M2
GLUCOSE BLD-MCNC: 208 MG/DL (ref 70–99)
GLUCOSE BLD-MCNC: NORMAL MG/DL
HBA1C MFR BLD: 7.3 % (ref 0–5.6)
HCT VFR BLD AUTO: 36.5 % (ref 35–47)
HDLC SERPL-MCNC: 73 MG/DL
HGB BLD-MCNC: 11.9 G/DL (ref 11.7–15.7)
LDLC SERPL CALC-MCNC: 84 MG/DL
MCH RBC QN AUTO: 29 PG (ref 26.5–33)
MCHC RBC AUTO-ENTMCNC: 32.6 G/DL (ref 31.5–36.5)
MCV RBC AUTO: 89 FL (ref 78–100)
NONHDLC SERPL-MCNC: 114 MG/DL
PLATELET # BLD AUTO: 202 10E3/UL (ref 150–450)
POTASSIUM BLD-SCNC: 3.7 MMOL/L (ref 3.4–5.3)
PROT SERPL-MCNC: 7.2 G/DL (ref 6.8–8.8)
RBC # BLD AUTO: 4.11 10E6/UL (ref 3.8–5.2)
SODIUM SERPL-SCNC: 142 MMOL/L (ref 133–144)
TME LAST DOSE: NORMAL H
TME LAST DOSE: NORMAL H
TRIGL SERPL-MCNC: 149 MG/DL
TSH SERPL DL<=0.005 MIU/L-ACNC: 1.1 MU/L (ref 0.4–4)
WBC # BLD AUTO: 4 10E3/UL (ref 4–11)

## 2021-11-29 PROCEDURE — 84681 ASSAY OF C-PEPTIDE: CPT | Performed by: INTERNAL MEDICINE

## 2021-11-29 PROCEDURE — 86769 SARS-COV-2 COVID-19 ANTIBODY: CPT | Performed by: INTERNAL MEDICINE

## 2021-11-29 PROCEDURE — 86341 ISLET CELL ANTIBODY: CPT | Mod: 90 | Performed by: PATHOLOGY

## 2021-11-29 PROCEDURE — 82947 ASSAY GLUCOSE BLOOD QUANT: CPT | Performed by: PATHOLOGY

## 2021-11-29 PROCEDURE — 83036 HEMOGLOBIN GLYCOSYLATED A1C: CPT | Performed by: PATHOLOGY

## 2021-11-29 PROCEDURE — 80061 LIPID PANEL: CPT | Performed by: PATHOLOGY

## 2021-11-29 PROCEDURE — 84443 ASSAY THYROID STIM HORMONE: CPT | Performed by: PATHOLOGY

## 2021-11-29 PROCEDURE — 36415 COLL VENOUS BLD VENIPUNCTURE: CPT | Performed by: PATHOLOGY

## 2021-11-29 PROCEDURE — 80158 DRUG ASSAY CYCLOSPORINE: CPT | Performed by: INTERNAL MEDICINE

## 2021-11-29 PROCEDURE — 80053 COMPREHEN METABOLIC PANEL: CPT | Performed by: PATHOLOGY

## 2021-11-29 PROCEDURE — 85027 COMPLETE CBC AUTOMATED: CPT | Performed by: PATHOLOGY

## 2021-11-30 ENCOUNTER — TELEPHONE (OUTPATIENT)
Dept: TRANSPLANT | Facility: CLINIC | Age: 71
End: 2021-11-30

## 2021-11-30 ENCOUNTER — OFFICE VISIT (OUTPATIENT)
Dept: INTERNAL MEDICINE | Facility: CLINIC | Age: 71
End: 2021-11-30
Payer: MEDICARE

## 2021-11-30 VITALS
SYSTOLIC BLOOD PRESSURE: 136 MMHG | TEMPERATURE: 97.3 F | HEIGHT: 64 IN | WEIGHT: 162.6 LBS | HEART RATE: 66 BPM | RESPIRATION RATE: 13 BRPM | OXYGEN SATURATION: 98 % | DIASTOLIC BLOOD PRESSURE: 70 MMHG | BODY MASS INDEX: 27.76 KG/M2

## 2021-11-30 DIAGNOSIS — I25.10 CORONARY ARTERY DISEASE INVOLVING NATIVE HEART WITHOUT ANGINA PECTORIS, UNSPECIFIED VESSEL OR LESION TYPE: ICD-10-CM

## 2021-11-30 DIAGNOSIS — I15.1 HYPERTENSION SECONDARY TO OTHER RENAL DISORDERS: Primary | ICD-10-CM

## 2021-11-30 DIAGNOSIS — Z98.61 CAD S/P PERCUTANEOUS CORONARY ANGIOPLASTY: ICD-10-CM

## 2021-11-30 DIAGNOSIS — E03.8 OTHER SPECIFIED HYPOTHYROIDISM: ICD-10-CM

## 2021-11-30 DIAGNOSIS — I25.10 CAD S/P PERCUTANEOUS CORONARY ANGIOPLASTY: ICD-10-CM

## 2021-11-30 DIAGNOSIS — E11.69 TYPE 2 DIABETES MELLITUS WITH OTHER SPECIFIED COMPLICATION, WITH LONG-TERM CURRENT USE OF INSULIN (H): ICD-10-CM

## 2021-11-30 DIAGNOSIS — Z79.4 TYPE 2 DIABETES MELLITUS WITH OTHER SPECIFIED COMPLICATION, WITH LONG-TERM CURRENT USE OF INSULIN (H): ICD-10-CM

## 2021-11-30 DIAGNOSIS — E78.5 HYPERLIPIDEMIA LDL GOAL <100: ICD-10-CM

## 2021-11-30 DIAGNOSIS — Z94.0 KIDNEY REPLACED BY TRANSPLANT: ICD-10-CM

## 2021-11-30 PROCEDURE — 99215 OFFICE O/P EST HI 40 MIN: CPT | Performed by: INTERNAL MEDICINE

## 2021-11-30 RX ORDER — LEVOTHYROXINE SODIUM 100 UG/1
100 TABLET ORAL DAILY
Qty: 90 TABLET | Refills: 1 | Status: SHIPPED | OUTPATIENT
Start: 2021-11-30 | End: 2022-05-10

## 2021-11-30 RX ORDER — CHLORTHALIDONE 25 MG/1
25 TABLET ORAL DAILY
Qty: 90 TABLET | Refills: 1 | Status: SHIPPED | OUTPATIENT
Start: 2021-11-30 | End: 2022-05-10

## 2021-11-30 RX ORDER — PROCHLORPERAZINE 25 MG/1
SUPPOSITORY RECTAL SEE ADMIN INSTRUCTIONS
COMMUNITY
Start: 2021-11-22 | End: 2022-02-18

## 2021-11-30 RX ORDER — METOPROLOL SUCCINATE 25 MG/1
TABLET, EXTENDED RELEASE ORAL
Qty: 90 TABLET | Refills: 1 | Status: SHIPPED | OUTPATIENT
Start: 2021-11-30 | End: 2022-03-23

## 2021-11-30 RX ORDER — AMLODIPINE BESYLATE 10 MG/1
10 TABLET ORAL DAILY
Qty: 90 TABLET | Refills: 1 | Status: SHIPPED | OUTPATIENT
Start: 2021-11-30 | End: 2022-05-10

## 2021-11-30 RX ORDER — PROCHLORPERAZINE 25 MG/1
SUPPOSITORY RECTAL
COMMUNITY
Start: 2021-11-22 | End: 2022-02-18

## 2021-11-30 ASSESSMENT — MIFFLIN-ST. JEOR: SCORE: 1227.55

## 2021-11-30 NOTE — NURSING NOTE
"BP (!) 147/70   Pulse 66   Temp 97.3  F (36.3  C) (Axillary)   Resp 13   Ht 1.626 m (5' 4\")   Wt 73.8 kg (162 lb 9.6 oz)   SpO2 98%   BMI 27.91 kg/m    Patient in for HTN, lipid and DM2 follow up.  "

## 2021-11-30 NOTE — PROGRESS NOTES
"  Assessment & Plan     (I15.1,  N28.89) Hypertension secondary to other renal disorders  (primary encounter diagnosis)  Comment: Blood pressure stable  Plan: amLODIPine (NORVASC) 10 MG tablet, chlorthalidone (HYGROTON) 25 MG tablet, metoprolol succinate ER (TOPROL-XL) 25 MG 24 hr Tablet refilled as directed.explained clearly about the medication,insructions and side effects. Advised to follow low salt diet and exercise and f/u in 6 mths.             (E78.5) Hyperlipidemia LDL goal <100  Plan: Lipids stable, continue Lipitor as directed.explained clearly about the medication,insructions and side effects.      (Z94.0) Kidney replaced by transplant  Plan: On immunosuppressants and follows up with transplant clinic. Last creatinine 1.08 -Has appt to see nephrologist on 12/2/21    (E03.8) Other specified hypothyroidism  Plan: Normal TSH, refilled levothyroxine (SYNTHROID/LEVOTHROID) 100 MCG  Tablet.explained clearly about the medication,insructions and side effects.            (E11.69,  Z79.4) Type 2 diabetes mellitus with other specified complication, with long-term current use of insulin (H)  Plan: follows up with endocrinologist and is on Insulin     (I25.10,  Z98.61) CAD S/P percutaneous coronary angioplasty  (I25.10) Coronary artery disease involving native heart without angina pectoris, unspecified vessel or lesion type  Plan: metoprolol succinate ER (TOPROL-XL) 25 MG 24 hr Tablet refilled.explained clearly about the medication,insructions and side effects.       Review of the result(s) of each unique test - lipids, A1c, TSH, CMP   Prescription drug management  43 minutes spent on the date of the encounter doing chart review, history and exam, documentation and further activities per the note     BMI:   Estimated body mass index is 27.91 kg/m  as calculated from the following:    Height as of this encounter: 1.626 m (5' 4\").    Weight as of this encounter: 73.8 kg (162 lb 9.6 oz).      Return in about 6 months " (around 5/30/2022).    Summer Vega MD  [facility] TAMMIE Nam is a 71 year old who presents for the following health issues  accompanied by her spouse.    HPI       Hyperlipidemia Follow-Up      Are you regularly taking any medication or supplement to lower your cholesterol?   Yes- statin    Are you having muscle aches or other side effects that you think could be caused by your cholesterol lowering medication?  No    Hypertension Follow-up      Do you check your blood pressure regularly outside of the clinic? No     Are you following a low salt diet? Yes    Are your blood pressures ever more than 140 on the top number (systolic) OR more   than 90 on the bottom number (diastolic), for example 140/90? No    BP Readings from Last 2 Encounters:   11/30/21 136/70   08/17/21 (!) 140/58     Hemoglobin A1C (%)   Date Value   11/29/2021 7.3 (H)   07/06/2021 6.7 (H)   03/18/2021 6.8 (H)     LDL Cholesterol Calculated (mg/dL)   Date Value   11/29/2021 84   10/29/2020 95   11/20/2019 97     Hypothyroidism Follow-up      Since last visit, patient describes the following symptoms: Weight stable, no hair loss, no skin changes, no constipation, no loose stools    Vascular Disease/CAD ; Follows-up with cardiologist       How often do you take nitroglycerin? Never    Do you take an aspirin every day? Yes    Diabetes- pt follows up with endocrinologist     History of CKD /history of kidney transplant on immunosuppressants: Follows up with the transplant clinic and nephrologist        How many servings of fruits and vegetables do you eat daily?  2-3    On average, how many sweetened beverages do you drink each day (Examples: soda, juice, sweet tea, etc.  Do NOT count diet or artificially sweetened beverages)?   1    How many days per week do you exercise enough to make your heart beat faster? 4    How many minutes a day do you exercise enough to make your heart beat faster? 30 -  "60    How many days per week do you miss taking your medication? 0    Past Medical History:   Diagnosis Date     Abdominal wall hernia     RLQ     AK (actinic keratosis) 08/06/2020     Allergic rhinitis      CAD (coronary artery disease)     coronary artery disease s/p PCI to LAD in 2005coronary artery disease s/p PCI to LAD in 2005     Diabetes mellitus, type 2 (H)     follows up with endocrinologist.     Dyslipidemia      GERD (gastroesophageal reflux disease)      H/O diabetic nephropathy     s/p kidney trnsplant     Hypertension      Hypothyroidism      Immunosuppressed status (H)      Kidney replaced by transplant     Rt     PONV (postoperative nausea and vomiting)      Shingles      Vitamin B12 deficiency anemia        Current Outpatient Medications   Medication Sig Dispense Refill     amLODIPine (NORVASC) 10 MG tablet TAKE 1 TABLET BY MOUTH EVERY DAY 90 tablet 0     ASPIRIN PO Take 81 mg by mouth daily       atorvastatin (LIPITOR) 40 MG tablet TAKE 1/2 TABLET BY MOUTH EVERY DAY 45 tablet 1     FLORIN CONTOUR test strip   0     BD INSULIN SYRINGE ULTRAFINE 31G X 5/16\" 0.3 ML USING 4-5 PER DAY ([facility] 31G/0.3ML)  6     calcium carbonate (TUMS) 500 MG chewable tablet Take 1-2 chew tab by mouth 2 times daily as needed for heartburn       chlorthalidone (HYGROTON) 25 MG tablet TAKE 1 TABLET BY MOUTH EVERY DAY 90 tablet 1     cholecalciferol (VITAMIN D3) 25 mcg (1000 units) capsule Take 2 capsules by mouth daily       GENGRAF (BRAND) 25 MG CAPSULE Take 3 capsules (75 mg) by mouth every morning AND 2 capsules (50 mg) every evening. 150 capsule 11     insulin aspart (NOVOLOG PEN) 100 UNIT/ML pen Taking as directed with adjustment scale and carb coverage.       insulin glargine (BASAGLAR KWIKPEN) 100 UNIT/ML pen Inject 14 Units Subcutaneous At Bedtime       isosorbide mononitrate (IMDUR) 30 MG 24 hr tablet TAKE 0.5 TABLETS (15 MG) BY MOUTH 2 TIMES DAILY (Patient taking differently: Take 15 mg by mouth daily TAKE 0.5 " "TABLETS (15 MG) BY MOUTH 2 TIMES DAILY) 90 tablet 2     levothyroxine (SYNTHROID/LEVOTHROID) 100 MCG tablet TAKE 1 TABLET (100 MCG) BY MOUTH DAILY 90 tablet 0     losartan (COZAAR) 100 MG tablet Take 0.5 tablets (50 mg) by mouth daily 45 tablet 1     metoprolol succinate ER (TOPROL-XL) 25 MG 24 hr tablet TAKE 1 TABLET BY MOUTH EVERYDAY AT BEDTIME 90 tablet 2     mycophenolic acid (GENERIC EQUIVALENT) 180 MG EC tablet Take 3 tablets (540 mg) by mouth 2 times daily 180 tablet 11     pantoprazole (PROTONIX) 40 MG EC tablet TAKE 1 TABLET BY MOUTH EVERY DAY 90 tablet 1     Continuous Blood Gluc Sensor (DEXCOM G6 SENSOR) MISC USE AS DIRECTED FOR 90 DAYS       Continuous Blood Gluc Transmit (DEXCOM G6 TRANSMITTER) MISC See Admin Instructions           Review of Systems   CONSTITUTIONAL: NEGATIVE for fever, chills, change in weight  ENT/MOUTH: NEGATIVE for ear, mouth and throat problems  RESP: NEGATIVE for significant cough or SOB  CV: NEGATIVE for chest pain, palpitations or peripheral edema  MUSCULOSKELETAL: NEGATIVE for significant arthralgias or myalgia  NEURO: NEGATIVE for weakness, dizziness or paresthesias  ENDOCRINE: NEGATIVE for temperature intolerance, skin/hair changes  PSYCHIATRIC: NEGATIVE for changes in mood or affect      Objective    /70   Pulse 66   Temp 97.3  F (36.3  C) (Axillary)   Resp 13   Ht 1.626 m (5' 4\")   Wt 73.8 kg (162 lb 9.6 oz)   SpO2 98%   BMI 27.91 kg/m    Body mass index is 27.91 kg/m .  Physical Exam   GENERAL:   alert and no distress  RESP: lungs clear to auscultation - no rales, rhonchi or wheezes  CV: regular rate and rhythm, normal S1 S2,    MS: no gross musculoskeletal defects noted, no edema  NEURO: Normal strength and tone, mentation intact and speech normal  PSYCH: mentation appears normal, affect normal/bright  Diabetic foot exam: normal DP and PT pulses, no trophic changes or ulcerative lesions, normal sensory exam and normal monofilament exam             "

## 2021-11-30 NOTE — TELEPHONE ENCOUNTER
Patient Call: Medication Refill  Gengraf 25mg   Mycophenolic 180 mg EC   May need new scripts     Pharmacy Name: [id number] [place], [place]  [address]     When will the patient be out of this medication?: Less than 3 days (Route high priority)     Please call  'Tir[place]' [phone]     Ok to leave VM when refill goes through

## 2021-12-02 ENCOUNTER — OFFICE VISIT (OUTPATIENT)
Dept: NEPHROLOGY | Facility: CLINIC | Age: 71
End: 2021-12-02
Attending: INTERNAL MEDICINE
Payer: MEDICARE

## 2021-12-02 VITALS
DIASTOLIC BLOOD PRESSURE: 78 MMHG | BODY MASS INDEX: 28.43 KG/M2 | HEART RATE: 59 BPM | OXYGEN SATURATION: 96 % | WEIGHT: 165.6 LBS | SYSTOLIC BLOOD PRESSURE: 146 MMHG

## 2021-12-02 DIAGNOSIS — Z94.0 KIDNEY REPLACED BY TRANSPLANT: ICD-10-CM

## 2021-12-02 DIAGNOSIS — Z94.0 KIDNEY TRANSPLANTED: ICD-10-CM

## 2021-12-02 PROCEDURE — 99214 OFFICE O/P EST MOD 30 MIN: CPT | Performed by: INTERNAL MEDICINE

## 2021-12-02 RX ORDER — MYCOPHENOLIC ACID 180 MG/1
540 TABLET, DELAYED RELEASE ORAL 2 TIMES DAILY
Qty: 180 TABLET | Refills: 11 | Status: ON HOLD | OUTPATIENT
Start: 2021-12-02 | End: 2022-02-05

## 2021-12-02 RX ORDER — CYCLOSPORINE 25 MG/1
CAPSULE ORAL
Qty: 150 CAPSULE | Refills: 11 | Status: SHIPPED | OUTPATIENT
Start: 2021-12-02 | End: 2021-12-07

## 2021-12-02 NOTE — NURSING NOTE
Chief Complaint   Patient presents with     RECHECK     1 year f/u     Blood pressure (!) 146/78, pulse 59, weight 75.1 kg (165 lb 9.6 oz), SpO2 96 %, not currently breastfeeding.    Elena Hernandes, CMA

## 2021-12-02 NOTE — LETTER
12/2/2021     RE: Viv Shaw  [address]  [address]  [place] [facility] [address]     Dear Colleague,    Thank you for referring your patient, Viv Shaw, to the [facility] [place] [facility]. Please see a copy of my visit note below.    TRANSPLANT NEPHROLOGY CHRONIC POST TRANSPLANT VISIT    Assessment & Plan   # LDKT: Stable   - Baseline Cr ~ 1.2-1.3; stable   - Proteinuria: Normal (<0.2 grams)   - Date DSA Last Checked: May/2007      Latest DSA: No   - BK Viremia: No, last checked 06/2015   - Kidney Tx Biopsy: Yes, 2007 no rejection     # Immunosuppression: Cyclosporine (goal 50-75) and Mycophenolic acid (goal 540 mg bid)   - Changes: No    # Hypertension: Controlled;  Goal BP: < 130/80   - Changes: No     # Diabetes: Controlled (HbA1c <7%) Last HbA1c: 6.9%    # Mineral Bone Disorder:   - Secondary renal hyperparathyroidism; PTH level: Not checked recently  - Vitamin D; level: Not checked recently   - Calcium; level: Normal        - Phosphorus; level: Not checked recently        # Electrolytes:   - Potassium; level: Normal       - Magnesium; level: Not checked recently         - Bicarbonate; level: Normal        - Sodium; level: Normal       # Hx of coronary artery disease s/p remote stenting: Recommend periodic follow-up with cardiology.    # Skin Cancer Risk:    - Discussed sun protection and recommend regular follow up with Dermatology.    #COVID-19 Virus Review: Discussed COVID-19 virus and the potential medical risks.  Reviewed preventative health recommendations, which includes washing hands for 20 seconds, avoid touching your face, and social distancing.  Asked patient to inform the transplant center if they are exposed or diagnosed with this virus.      # Medical Compliance: Yes    # Transplant History:  Etiology of Kidney Failure: Diabetes mellitus  Tx: LDKT  Transplant: 12/27/2005 (Kidney)  Donor Type:  Living Donor Class:   Significant changes in immunosuppression: None  Significant transplant-related complications: None    Transplant Office Phone Number: [phone]    Assessment and plan was discussed with the patient and she voiced her understanding and agreement.    # COVID-19 Virus Review: Discussed COVID-19 virus and the potential medical risks.  Reviewed preventative health recommendations, including wearing a mask where appropriate.  Recommended COVID vaccination should be up to date with either an initial vaccination or booster shot when appropriate.  Asked the patient to inform the transplant center if they are exposed or diagnosed with this virus.    # COVID Vaccination Up To Date: Yes    # Transplant History:  Etiology of Kidney Failure: Diabetic nephropathy  Tx: LDKT  Transplant: 12/27/2005 (Kidney)  Significant changes in immunosuppression: None  Significant transplant-related complications: None    Transplant Office Phone Number: [phone]    Assessment and plan was discussed with the patient and she voiced her understanding and agreement.    Return visit: Return in about 1 year (around 12/2/2022).    Chapincito Subramanian MD    Chief Complaint   Ms. Shaw is a 71 year old here for routine follow up and immunosuppression management.    History of Present Illness     Viv Shaw is a 71-year-old lady with history of diabetic nephropathy status post living donor kidney transplantation 2005.  She is doing really well.  She was accompanied by her  today.  She has no specific complaints or concerns.  Recently she requested her C-peptide to be tested and was nearly undetectable suggesting insulin insufficiency as the etiology of her diabetes as of now.  This may be burnout state after longstanding type 2 diabetes or type 1 diabetes that was most classified as type II.  She is doing well with her sugar control.  She is taking her medications regularly.  She completed her Covid vaccination and is  "interested in checking her spike antibody.  She specifically denied chest pains nausea vomiting diarrhea or abdominal pain.    Home BP: controlled    Problem List   Patient Active Problem List   Diagnosis     Other vitamin B12 deficiency anemia     Irritable bowel syndrome     GERD (gastroesophageal reflux disease)     Advanced directives, counseling/discussion     Kidney replaced by transplant     Immunosuppressed status (HCC)     Hyperlipidemia LDL goal <100     CAD S/P percutaneous coronary angioplasty     Anemia in chronic kidney disease     Other specified hypothyroidism     Coronary artery disease involving native heart without angina pectoris, unspecified vessel or lesion type     Hypertension secondary to other renal disorders     Hernia, incisional     CKD (chronic kidney disease) stage 3, GFR 30-59 ml/min (H)     Diabetes mellitus, type 2 (H)       Allergies   Allergies   Allergen Reactions     Iron [Ferrous Sulfate] GI Disturbance       Medications   Current Outpatient Medications   Medication Sig     amLODIPine (NORVASC) 10 MG tablet Take 1 tablet (10 mg) by mouth daily     ASPIRIN PO Take 81 mg by mouth daily     atorvastatin (LIPITOR) 40 MG tablet TAKE 1/2 TABLET BY MOUTH EVERY DAY     FLORIN CONTOUR test strip      BD INSULIN SYRINGE ULTRAFINE 31G X 5/16\" 0.3 ML USING 4-5 PER DAY ([employer] 31G/0.3ML)     calcium carbonate (TUMS) 500 MG chewable tablet Take 1-2 chew tab by mouth 2 times daily as needed for heartburn     chlorthalidone (HYGROTON) 25 MG tablet Take 1 tablet (25 mg) by mouth daily     cholecalciferol (VITAMIN D3) 25 mcg (1000 units) capsule Take 2 capsules by mouth daily     Continuous Blood Gluc Sensor (DEXCOM G6 SENSOR) MISC USE AS DIRECTED FOR 90 DAYS     Continuous Blood Gluc Transmit (DEXCOM G6 TRANSMITTER) MISC See Admin Instructions     GENGRAF (BRAND) 25 MG CAPSULE Take 3 capsules (75 mg) by mouth every morning AND 2 capsules (50 mg) every evening.     insulin aspart (NOVOLOG PEN) " 100 UNIT/ML pen Taking as directed with adjustment scale and carb coverage. (Patient taking differently: Through Endocrinologist     Taking as directed with adjustment scale and carb coverage.)     insulin glargine (BASAGLAR KWIKPEN) 100 UNIT/ML pen Inject 14 Units Subcutaneous At Bedtime Through Endocrinologist     isosorbide mononitrate (IMDUR) 30 MG 24 hr tablet TAKE 0.5 TABLETS (15 MG) BY MOUTH 2 TIMES DAILY (Patient taking differently: Take 15 mg by mouth daily TAKE 0.5 TABLETS (15 MG) BY MOUTH 2 TIMES DAILY)     levothyroxine (SYNTHROID/LEVOTHROID) 100 MCG tablet Take 1 tablet (100 mcg) by mouth daily     losartan (COZAAR) 100 MG tablet Take 0.5 tablets (50 mg) by mouth daily     metoprolol succinate ER (TOPROL-XL) 25 MG 24 hr tablet TAKE 1 TABLET BY MOUTH EVERYDAY AT BEDTIME     mycophenolic acid (GENERIC EQUIVALENT) 180 MG EC tablet Take 3 tablets (540 mg) by mouth 2 times daily     pantoprazole (PROTONIX) 40 MG EC tablet TAKE 1 TABLET BY MOUTH EVERY DAY     No current facility-administered medications for this visit.     There are no discontinued medications.    Physical Exam   Vital Signs: BP (!) 146/78   Pulse 59   Wt 75.1 kg (165 lb 9.6 oz)   SpO2 96%   BMI 28.43 kg/m      GENERAL APPEARANCE: alert and no distress  HENT: mouth without ulcers or lesions  LYMPHATICS: no cervical or supraclavicular nodes  RESP: lungs clear to auscultation - no rales, rhonchi or wheezes  CV: regular rhythm, normal rate, no rub, no murmur  EDEMA: no LE edema bilaterally  ABDOMEN: soft, nondistended, nontender, bowel sounds normal  MS: extremities normal - no gross deformities noted, no evidence of inflammation in joints, no muscle tenderness  SKIN: no rash      Data     Renal Latest Ref Rng & Units 11/29/2021 6/23/2021 3/18/2021   Na 133 - 144 mmol/L 142 138 138   Na (external) 135 - 146 mmol/L - - -   K 3.4 - 5.3 mmol/L 3.7 3.8 4.2   K (external) 3.5 - 5.3 mmol/L - - -   Cl 94 - 109 mmol/L 108 106 107   Cl (external) 98  - 110 mmol/L - - -   CO2 20 - 32 mmol/L 27 30 29   CO2 (external) 20 - 32 mmol/L - - -   BUN 7 - 30 mg/dL 25 28 33(H)   BUN (external) 7 - 25 mg/dL - - -   Cr 0.52 - 1.04 mg/dL 1.08(H) 1.34(H) 1.45(H)   Cr (external) 0.60 - 0.93 mg/dL - - -   Glucose 70 - 99 mg/dL 208(H) 192(H) 208(H)   Glucose (external) 65 - 99 mg/dL - - -   Ca  8.5 - 10.1 mg/dL 9.0 9.3 9.1   Ca (external) 8.6 - 10.4 mg/dL - - -   Mg 1.6 - 2.3 mg/dL - - -     Bone Health Latest Ref Rng & Units 10/19/2017 10/1/2008 6/4/2008   Phos 2.5 - 4.5 mg/dL 2.5 3.2 4.7(H)     Heme Latest Ref Rng & Units 11/29/2021 6/23/2021 3/18/2021   WBC 4.0 - 11.0 10e3/uL 4.0 5.0 3.5(L)   Hgb 11.7 - 15.7 g/dL 11.9 11.3(L) 11.5(L)   Plt 150 - 450 10e3/uL 202 215 206   ABSOLUTE NEUTROPHIL 1.6 - 8.3 10e9/L - - -   ABSOLUTE LYMPHOCYTES 0.8 - 5.3 10e9/L - - -   ABSOLUTE MONOCYTES 0.0 - 1.3 10e9/L - - -   ABSOLUTE EOSINOPHILS 0.0 - 0.7 10e9/L - - -   ABSOLUTE BASOPHILS 0.0 - 0.2 10e9/L - - -   ABS IMMATURE GRANULOCYTES 0 - 0.4 10e9/L - - -   ABSOLUTE NUCLEATED RBC - - - -     Liver Latest Ref Rng & Units 11/29/2021 10/29/2020 4/22/2019   AP 40 - 150 U/L 52 - 65   TBili 0.2 - 1.3 mg/dL 0.7 - 0.8   DBili 0.0 - 0.2 mg/dL - - 0.2   ALT 0 - 50 U/L 19 17 19   AST 0 - 45 U/L 8 10 10   Tot Protein 6.8 - 8.8 g/dL 7.2 - 7.5   Albumin 3.4 - 5.0 g/dL 3.5 - 4.0     Pancreas Latest Ref Rng & Units 11/29/2021 7/6/2021 3/18/2021   A1C 0.0 - 5.6 % 7.3(H) 6.7(H) 6.8(H)   A1C (external) 4.0 - 6.0 % - - -   Amylase 30 - 110 U/L - - -   Lipase 73 - 393 U/L - - -     Iron studies Latest Ref Rng & Units 10/19/2017 12/5/2016 12/7/2015   Iron 35 - 180 ug/dL 29(L) 37 36   Iron sat 15 - 46 % 10(L) 13(L) 11(L)   Ferritin 8 - 252 ng/mL 181 130 55     [facility] Txp Virology Latest Ref Rng & Units 3/31/2017 12/5/2016 6/18/2015   CMV IgG EU/mL - - -   CVM DNA Quant - - Plasma, EDTA anticoagulant -   CMV Quant <100 Copies/mL - - -   CMV QT Log <2.0 Log copies/mL - - -   CMV QUANT IU/ML CMVND [IU]/mL - CMV DNA Not  Detected   The OSMANI AmpliPrep/OSMANI TaqMan CMV Test is an FDA-approved in vitro nucleic   acid amplification test for the quantitation of cytomegalovirus DNA in human   plasma (EDTA plasma) using the OSMANI AmpliPrep Instrument for automated viral   nucleic acid extraction and the OSMANI TaqMan Analyzer or OSMANI TaqMan for   automated Real Time amplification and detection of the viral nucleic acid   target.   Titer results are reported in International Units/mL (IU/mL using 1st WHO   International standard for Human Cytomegalovirus for Nucleic Acid Amplification   based assays. The conversion factor between CMV DNA copis/mL (as defined by the   Roche OSMANI TaqMan CMV test) and International Units is the CMV DNA   concentration in IU/mL x 1.1 copies/IU = CMV DNA in copies/mL.   This assay has received FDA approval for the testing of human plasma only. The   [facility] at the [facility], [place], has validated the pe  rformance characteristics of the Roche   CMV assay for plasma, bronchial alveolar lavage/wash and urine.   -   LOG IU/ML OF CMVQNT <2.1 [Log:IU]/mL - Not Calculated -   BK Spec - - - Plasma   BK Res BKNEG copies/mL - - BK Virus DNA Not Detected   BK Log <2.7 Log copies/mL - - Not Calculated   The Real-Time quantitative BK Virus assay was developed and its performance   characteristics determined by the [facility] Laboratory at   the [facility] in [place], [place]. The   primers and probes for each analyte are Analyte Specific Reagents (ASRs)   manufactured by [employer].   ASRs are used in many laboratory tests necessary for standard medical care and   generally do not require U.S. Food and Drug Administration approval. The FDA   has determined that such clearance or approval is not necessary.   This test is used for clinical purposes. It should not be regarded as   investigational or for  research. This laboratory is certified under the   Clinical Laboratory Improvement Amendments of 1988 (CLIA-88) as qualified to   perform high complexity clinical laboratory testing.     EBV IgG - - - -   EBV DNA COPIES/ML EBVNEG [Copies]/mL <500  EBV DNA Detected below the reportable range of 500 Copies/mL  (A) 3,913(A) -   EBV DNA LOG OF COPIES <2.7 [Log:copies]/mL <2.7  The Real-Time quantitative EBV assay was developed and its performance   characteristics determined by the [facility] Diagnostic Laboratory at   the [facility] in [place], [place].  The   primers and probes are Analyte Specific Reagents (ASRs) manufactured  by   [employer].   ASRs are used in many laboratory tests necessary for standard medical care and   generally do not require U.S. Food and Drug Administration approval.  The FDA   has determined that such clearance or approval is not necessary.  This test is   used for clinical purposes.  It should not be regarded as investigational or   research.   This laboratory is certified under the Clinical Laboratory Improvement   Amendments of 1988 (CLIA-88) as qualified to perform high complexity clinical   laboratory testing.   The quantitative range of this assay is 500-22,500,00 copies/mL (2.7-7.4 log   copies/mL).  A negative result does not rule out the presence of PCR inhibitors   in the pa  tient specimen or EBV DNA nucleic acid in concentrations below the   level of detection of the assay.  Inhibition may also lead to underestimation   of viral quantitation.   3.6(H) -   Hep B Core NEG - - -   Hep B Surf 0.0 - 4.9 mIU/mL - - -   HIV 1&2 NEG - - -            Recent Labs   Lab Test 09/10/15  0649 12/04/15  0650 04/07/16  0638   DOSMPA 1900 9/9/15 2000 12/3/15 19:00 04/6/16   MPACID 1.50 1.56 0.80*   MPAG 54.1 72.1 64.2     Again, thank you for allowing me to participate in the care of your patient.      Sincerely,    Chapincito Subramanian MD

## 2021-12-02 NOTE — PROGRESS NOTES
TRANSPLANT NEPHROLOGY CHRONIC POST TRANSPLANT VISIT    Assessment & Plan   # LDKT: Stable   - Baseline Cr ~ 1.2-1.3; stable   - Proteinuria: Normal (<0.2 grams)   - Date DSA Last Checked: May/2007      Latest DSA: No   - BK Viremia: No, last checked 06/2015   - Kidney Tx Biopsy: Yes, 2007 no rejection     # Immunosuppression: Cyclosporine (goal 50-75) and Mycophenolic acid (goal 540 mg bid)   - Changes: No    # Hypertension: Controlled;  Goal BP: < 130/80   - Changes: No     # Diabetes: Controlled (HbA1c <7%) Last HbA1c: 6.9%    # Mineral Bone Disorder:   - Secondary renal hyperparathyroidism; PTH level: Not checked recently  - Vitamin D; level: Not checked recently   - Calcium; level: Normal        - Phosphorus; level: Not checked recently        # Electrolytes:   - Potassium; level: Normal       - Magnesium; level: Not checked recently         - Bicarbonate; level: Normal        - Sodium; level: Normal       # Hx of coronary artery disease s/p remote stenting: Recommend periodic follow-up with cardiology.    # Skin Cancer Risk:    - Discussed sun protection and recommend regular follow up with Dermatology.    #COVID-19 Virus Review: Discussed COVID-19 virus and the potential medical risks.  Reviewed preventative health recommendations, which includes washing hands for 20 seconds, avoid touching your face, and social distancing.  Asked patient to inform the transplant center if they are exposed or diagnosed with this virus.      # Medical Compliance: Yes    # Transplant History:  Etiology of Kidney Failure: Diabetes mellitus  Tx: LDKT  Transplant: 12/27/2005 (Kidney)  Donor Type: Living Donor Class:   Significant changes in immunosuppression: None  Significant transplant-related complications: None    Transplant Office Phone Number: [phone]    Assessment and plan was discussed with the patient and she voiced her understanding and agreement.    # COVID-19 Virus Review: Discussed COVID-19 virus and the potential  medical risks.  Reviewed preventative health recommendations, including wearing a mask where appropriate.  Recommended COVID vaccination should be up to date with either an initial vaccination or booster shot when appropriate.  Asked the patient to inform the transplant center if they are exposed or diagnosed with this virus.    # COVID Vaccination Up To Date: Yes    # Transplant History:  Etiology of Kidney Failure: Diabetic nephropathy  Tx: LDKT  Transplant: 12/27/2005 (Kidney)  Significant changes in immunosuppression: None  Significant transplant-related complications: None    Transplant Office Phone Number: [phone]    Assessment and plan was discussed with the patient and she voiced her understanding and agreement.    Return visit: Return in about 1 year (around 12/2/2022).    Chapincito Subramanian MD    Chief Complaint   Ms. Shaw is a 71 year old here for routine follow up and immunosuppression management.    History of Present Illness     Viv Shaw is a 71-year-old lady with history of diabetic nephropathy status post living donor kidney transplantation 2005.  She is doing really well.  She was accompanied by her  today.  She has no specific complaints or concerns.  Recently she requested her C-peptide to be tested and was nearly undetectable suggesting insulin insufficiency as the etiology of her diabetes as of now.  This may be burnout state after longstanding type 2 diabetes or type 1 diabetes that was most classified as type II.  She is doing well with her sugar control.  She is taking her medications regularly.  She completed her Covid vaccination and is interested in checking her spike antibody.  She specifically denied chest pains nausea vomiting diarrhea or abdominal pain.    Home BP: controlled    Problem List   Patient Active Problem List   Diagnosis     Other vitamin B12 deficiency anemia     Irritable bowel syndrome     GERD (gastroesophageal reflux disease)     Advanced directives,  "counseling/discussion     Kidney replaced by transplant     Immunosuppressed status (HCC)     Hyperlipidemia LDL goal <100     CAD S/P percutaneous coronary angioplasty     Anemia in chronic kidney disease     Other specified hypothyroidism     Coronary artery disease involving native heart without angina pectoris, unspecified vessel or lesion type     Hypertension secondary to other renal disorders     Hernia, incisional     CKD (chronic kidney disease) stage 3, GFR 30-59 ml/min (H)     Diabetes mellitus, type 2 (H)       Allergies   Allergies   Allergen Reactions     Iron [Ferrous Sulfate] GI Disturbance       Medications   Current Outpatient Medications   Medication Sig     amLODIPine (NORVASC) 10 MG tablet Take 1 tablet (10 mg) by mouth daily     ASPIRIN PO Take 81 mg by mouth daily     atorvastatin (LIPITOR) 40 MG tablet TAKE 1/2 TABLET BY MOUTH EVERY DAY     FLORIN CONTOUR test strip      BD INSULIN SYRINGE ULTRAFINE 31G X 5/16\" 0.3 ML USING 4-5 PER DAY ([facility] 31G/0.3ML)     calcium carbonate (TUMS) 500 MG chewable tablet Take 1-2 chew tab by mouth 2 times daily as needed for heartburn     chlorthalidone (HYGROTON) 25 MG tablet Take 1 tablet (25 mg) by mouth daily     cholecalciferol (VITAMIN D3) 25 mcg (1000 units) capsule Take 2 capsules by mouth daily     Continuous Blood Gluc Sensor (DEXCOM G6 SENSOR) MISC USE AS DIRECTED FOR 90 DAYS     Continuous Blood Gluc Transmit (DEXCOM G6 TRANSMITTER) MISC See Admin Instructions     GENGRAF (BRAND) 25 MG CAPSULE Take 3 capsules (75 mg) by mouth every morning AND 2 capsules (50 mg) every evening.     insulin aspart (NOVOLOG PEN) 100 UNIT/ML pen Taking as directed with adjustment scale and carb coverage. (Patient taking differently: Through Endocrinologist     Taking as directed with adjustment scale and carb coverage.)     insulin glargine (BASAGLAR KWIKPEN) 100 UNIT/ML pen Inject 14 Units Subcutaneous At Bedtime Through Endocrinologist     isosorbide mononitrate " (IMDUR) 30 MG 24 hr tablet TAKE 0.5 TABLETS (15 MG) BY MOUTH 2 TIMES DAILY (Patient taking differently: Take 15 mg by mouth daily TAKE 0.5 TABLETS (15 MG) BY MOUTH 2 TIMES DAILY)     levothyroxine (SYNTHROID/LEVOTHROID) 100 MCG tablet Take 1 tablet (100 mcg) by mouth daily     losartan (COZAAR) 100 MG tablet Take 0.5 tablets (50 mg) by mouth daily     metoprolol succinate ER (TOPROL-XL) 25 MG 24 hr tablet TAKE 1 TABLET BY MOUTH EVERYDAY AT BEDTIME     mycophenolic acid (GENERIC EQUIVALENT) 180 MG EC tablet Take 3 tablets (540 mg) by mouth 2 times daily     pantoprazole (PROTONIX) 40 MG EC tablet TAKE 1 TABLET BY MOUTH EVERY DAY     No current facility-administered medications for this visit.     There are no discontinued medications.    Physical Exam   Vital Signs: BP (!) 146/78   Pulse 59   Wt 75.1 kg (165 lb 9.6 oz)   SpO2 96%   BMI 28.43 kg/m      GENERAL APPEARANCE: alert and no distress  HENT: mouth without ulcers or lesions  LYMPHATICS: no cervical or supraclavicular nodes  RESP: lungs clear to auscultation - no rales, rhonchi or wheezes  CV: regular rhythm, normal rate, no rub, no murmur  EDEMA: no LE edema bilaterally  ABDOMEN: soft, nondistended, nontender, bowel sounds normal  MS: extremities normal - no gross deformities noted, no evidence of inflammation in joints, no muscle tenderness  SKIN: no rash      Data     Renal Latest Ref Rng & Units 11/29/2021 6/23/2021 3/18/2021   Na 133 - 144 mmol/L 142 138 138   Na (external) 135 - 146 mmol/L - - -   K 3.4 - 5.3 mmol/L 3.7 3.8 4.2   K (external) 3.5 - 5.3 mmol/L - - -   Cl 94 - 109 mmol/L 108 106 107   Cl (external) 98 - 110 mmol/L - - -   CO2 20 - 32 mmol/L 27 30 29   CO2 (external) 20 - 32 mmol/L - - -   BUN 7 - 30 mg/dL 25 28 33(H)   BUN (external) 7 - 25 mg/dL - - -   Cr 0.52 - 1.04 mg/dL 1.08(H) 1.34(H) 1.45(H)   Cr (external) 0.60 - 0.93 mg/dL - - -   Glucose 70 - 99 mg/dL 208(H) 192(H) 208(H)   Glucose (external) 65 - 99 mg/dL - - -   Ca  8.5 -  10.1 mg/dL 9.0 9.3 9.1   Ca (external) 8.6 - 10.4 mg/dL - - -   Mg 1.6 - 2.3 mg/dL - - -     Bone Health Latest Ref Rng & Units 10/19/2017 10/1/2008 6/4/2008   Phos 2.5 - 4.5 mg/dL 2.5 3.2 4.7(H)     Heme Latest Ref Rng & Units 11/29/2021 6/23/2021 3/18/2021   WBC 4.0 - 11.0 10e3/uL 4.0 5.0 3.5(L)   Hgb 11.7 - 15.7 g/dL 11.9 11.3(L) 11.5(L)   Plt 150 - 450 10e3/uL 202 215 206   ABSOLUTE NEUTROPHIL 1.6 - 8.3 10e9/L - - -   ABSOLUTE LYMPHOCYTES 0.8 - 5.3 10e9/L - - -   ABSOLUTE MONOCYTES 0.0 - 1.3 10e9/L - - -   ABSOLUTE EOSINOPHILS 0.0 - 0.7 10e9/L - - -   ABSOLUTE BASOPHILS 0.0 - 0.2 10e9/L - - -   ABS IMMATURE GRANULOCYTES 0 - 0.4 10e9/L - - -   ABSOLUTE NUCLEATED RBC - - - -     Liver Latest Ref Rng & Units 11/29/2021 10/29/2020 4/22/2019   AP 40 - 150 U/L 52 - 65   TBili 0.2 - 1.3 mg/dL 0.7 - 0.8   DBili 0.0 - 0.2 mg/dL - - 0.2   ALT 0 - 50 U/L 19 17 19   AST 0 - 45 U/L 8 10 10   Tot Protein 6.8 - 8.8 g/dL 7.2 - 7.5   Albumin 3.4 - 5.0 g/dL 3.5 - 4.0     Pancreas Latest Ref Rng & Units 11/29/2021 7/6/2021 3/18/2021   A1C 0.0 - 5.6 % 7.3(H) 6.7(H) 6.8(H)   A1C (external) 4.0 - 6.0 % - - -   Amylase 30 - 110 U/L - - -   Lipase 73 - 393 U/L - - -     Iron studies Latest Ref Rng & Units 10/19/2017 12/5/2016 12/7/2015   Iron 35 - 180 ug/dL 29(L) 37 36   Iron sat 15 - 46 % 10(L) 13(L) 11(L)   Ferritin 8 - 252 ng/mL 181 130 55     [facility] Txp Virology Latest Ref Rng & Units 3/31/2017 12/5/2016 6/18/2015   CMV IgG EU/mL - - -   CVM DNA Quant - - Plasma, EDTA anticoagulant -   CMV Quant <100 Copies/mL - - -   CMV QT Log <2.0 Log copies/mL - - -   CMV QUANT IU/ML CMVND [IU]/mL - CMV DNA Not Detected   The OSMANI AmpliPrep/OSMANI TaqMan CMV Test is an FDA-approved in vitro nucleic   acid amplification test for the quantitation of cytomegalovirus DNA in human   plasma (EDTA plasma) using the OSMANI AmpliPrep Instrument for automated viral   nucleic acid extraction and the OSMANI TaqMan Analyzer or [employer] TaqMan for   automated Real  Time amplification and detection of the viral nucleic acid   target.   Titer results are reported in International Units/mL (IU/mL using 1st WHO   International standard for Human Cytomegalovirus for Nucleic Acid Amplification   based assays. The conversion factor between CMV DNA copis/mL (as defined by the   Roche OSMANI TaqMan CMV test) and International Units is the CMV DNA   concentration in IU/mL x 1.1 copies/IU = CMV DNA in copies/mL.   This assay has received FDA approval for the testing of human plasma only. The   Infect[facility] Diagnostic Laboratory at the [facility], [place], has validated the pe  rformance characteristics of the Roche   CMV assay for plasma, bronchial alveolar lavage/wash and urine.   -   LOG IU/ML OF CMVQNT <2.1 [Log:IU]/mL - Not Calculated -   BK Spec - - - Plasma   BK Res BKNEG copies/mL - - BK Virus DNA Not Detected   BK Log <2.7 Log copies/mL - - Not Calculated   The Real-Time quantitative BK Virus assay was developed and its performance   characteristics determined by the Infect[facility] Diseases Diagnostic Laboratory at   the [facility] in [place], [place]. The   primers and probes for each analyte are Analyte Specific Reagents (ASRs)   manufactured by [employer].   ASRs are used in many laboratory tests necessary for standard medical care and   generally do not require U.S. Food and Drug Administration approval. The FDA   has determined that such clearance or approval is not necessary.   This test is used for clinical purposes. It should not be regarded as   investigational or for research. This laboratory is certified under the   Clinical Laboratory Improvement Amendments of 1988 (CLIA-88) as qualified to   perform high complexity clinical laboratory testing.     EBV IgG - - - -   EBV DNA COPIES/ML EBVNEG [Copies]/mL <500  EBV DNA Detected below the reportable range of 500 Copies/mL  (A) 3,913(A) -   EBV DNA LOG OF COPIES  <2.7 [Log:copies]/mL <2.7  The Real-Time quantitative EBV assay was developed and its performance   characteristics determined by the Infectious Diseases Diagnostic Laboratory at   the [facility] in [place], [place].  The   primers and probes are Analyte Specific Reagents (ASRs) manufactured  by   [employer].   ASRs are used in many laboratory tests necessary for standard medical care and   generally do not require U.S. Food and Drug Administration approval.  The FDA   has determined that such clearance or approval is not necessary.  This test is   used for clinical purposes.  It should not be regarded as investigational or   research.   This laboratory is certified under the Clinical Laboratory Improvement   Amendments of 1988 (CLIA-88) as qualified to perform high complexity clinical   laboratory testing.   The quantitative range of this assay is 500-22,500,00 copies/mL (2.7-7.4 log   copies/mL).  A negative result does not rule out the presence of PCR inhibitors   in the pa  tient specimen or EBV DNA nucleic acid in concentrations below the   level of detection of the assay.  Inhibition may also lead to underestimation   of viral quantitation.   3.6(H) -   Hep B Core NEG - - -   Hep B Surf 0.0 - 4.9 mIU/mL - - -   HIV 1&2 NEG - - -            Recent Labs   Lab Test 09/10/15  0649 12/04/15  0650 04/07/16  0638   DOSMPA 1900 9/9/15 2000 12/3/15 19:00 04/6/16   MPACID 1.50 1.56 0.80*   MPAG 54.1 72.1 64.2

## 2021-12-06 LAB
SARS-COV-2 AB SERPL IA-ACNC: >250 U/ML
SARS-COV-2 AB SERPL QL IA: POSITIVE

## 2021-12-07 DIAGNOSIS — Z94.0 KIDNEY TRANSPLANTED: Primary | ICD-10-CM

## 2021-12-07 RX ORDER — CYCLOSPORINE 25 MG/1
CAPSULE ORAL
Qty: 450 CAPSULE | Refills: 3 | Status: SHIPPED | OUTPATIENT
Start: 2021-12-07 | End: 2022-02-28

## 2021-12-08 LAB — PANC ISLET CELL AB TITR SER: NORMAL {TITER}

## 2021-12-15 ENCOUNTER — TELEPHONE (OUTPATIENT)
Dept: TRANSPLANT | Facility: CLINIC | Age: 71
End: 2021-12-15

## 2021-12-15 ENCOUNTER — OFFICE VISIT (OUTPATIENT)
Dept: ENDOCRINOLOGY | Facility: CLINIC | Age: 71
End: 2021-12-15
Payer: MEDICARE

## 2021-12-15 VITALS
SYSTOLIC BLOOD PRESSURE: 136 MMHG | HEART RATE: 75 BPM | DIASTOLIC BLOOD PRESSURE: 73 MMHG | BODY MASS INDEX: 28.36 KG/M2 | WEIGHT: 165.2 LBS

## 2021-12-15 DIAGNOSIS — E13.9 DIABETES MELLITUS TYPE 1.5, MANAGED AS TYPE 1 (H): Primary | ICD-10-CM

## 2021-12-15 PROCEDURE — 95251 CONT GLUC MNTR ANALYSIS I&R: CPT | Performed by: INTERNAL MEDICINE

## 2021-12-15 PROCEDURE — 99214 OFFICE O/P EST MOD 30 MIN: CPT | Performed by: INTERNAL MEDICINE

## 2021-12-15 NOTE — LETTER
OUTPATIENT LABORATORY TEST ORDER    Patient Name: Viv Shaw  Transplant Date: 12/27/2005 (Kidney)  YOB: 1950                                                                Issue Date & Time:12/16/2021  7:22 AM   [facility] MR:  [id number]  Exp. Date (1 year after date issued)      Diagnoses: Kidney Transplant (ICD-10 Z94.0)   Long term use of medications (ICD-10 Z79.899)     Lab results to be available on the same day drawn.   Patient should release information to the [facility], [place], Transplant Center.  Please fax to the Transplant Center at [phone].    Every 3 Months   ?Hemogram and Platelet   ?Basic Metabolic Panel            ?CSA/Cyclosporine Level                   Every 6 Months                                          ?Urine for protein/creatinine      If you have any questions, please call The Transplant Center at [phone] or [phone].    Please fax labs to [phone]  .

## 2021-12-15 NOTE — LETTER
12/15/2021         RE: Viv Shaw  [address]  [address]  [place] [address]        Dear Colleague,    Thank you for referring your patient, Viv Shaw, to the [facility] [place]. Please see a copy of my visit note below.      Recent issues:   Diabetes follow-up evaluation, with her  Johnny  Reviewed medical history from patient and Epic chart record        Diagnosis of diabetes mellitus age 23, living in [facility]  Recalls having vision problem, saw eye physician and then family physician, also had frequent urination  Initial treatment with oral medication for few weeks, then change to insulin  Had seen Dr. Castillo in [facility]  Subsequent evaluations with Dr. GABRIELE Vickers, then saw Dr. Elmer Garvin/[facility] for endocrinology evaluations  Has used insulin injections 4x per day  Previous FV hgbA1c trends include:     Lab Test 07/06/21  1422 03/18/21  0636 10/29/20  0710 03/27/20  0648 11/20/19  0803   A1C 6.7* 6.8* 7.0* 6.8* 6.9*         8/10/21. Initial diabetes evaluation with me at [facility]  Reviewed health history and diabetes issues    Current DM medications:  Novolog Flexpen 6U premeal   Novolog sscale guestimates  Basaglar Kwikpen  13U subcutaneous in evening    Blood glucose (BG) meter: Raad Contour?   Has tested 4x per day previously   Less frequent testing when using CGM sensor    Has used the DexcomG5, then the G6 CGM sensor  Recent DexcomG6 data:                FamHx DM:  Niece    Recent FV labs include:  Lab Results   Component Value Date    A1C 7.3 (H) 11/29/2021     11/29/2021    POTASSIUM 3.7 11/29/2021    CHLORIDE 108 11/29/2021    CO2 27 11/29/2021    ANIONGAP 7 11/29/2021    GLC  11/29/2021      Comment:      Duplicate  This is a corrected result. Previous result was 217 mg/dL on 11/29/2021 at  6:51 AM CST    BUN 25 11/29/2021    CR 1.08 (H) 11/29/2021    GFRESTIMATED 51 (L) 11/29/2021    GFRESTBLACK 45 (L) 06/23/2021    NAVI 9.0  11/29/2021    CPEPT 0.2 (L) 11/29/2021    CHOL 187 11/29/2021    TRIG 149 11/29/2021    HDL 73 11/29/2021    LDL 84 11/29/2021    NHDL 114 11/29/2021    UCRR 68 03/01/2021    MICROL 14 10/29/2020    UMALCR 13.84 10/29/2020    TSH 1.10 11/29/2021    T4 1.41 04/02/2018     DM Complications:   Nephropathy    Previous ESRD, then renal transplant 2005   Retinopathy    Previous PDR and laser surgeries [facility]    Had seen Dr. Grayson Miles/[facility] clinic, plans to see Dr. Arevalo at that clinic building    Macrovascular disease    CAD, previous cardiac stent placement      Lives in [place], [place]  Sees Dr. Summer Vega/[facility] for general medicine evaluations.    PMH/PSH:  Past Medical History:   Diagnosis Date     Abdominal wall hernia     RLQ     AK (actinic keratosis) 08/06/2020     Allergic rhinitis      CAD (coronary artery disease)     coronary artery disease s/p PCI to LAD in 2005coronary artery disease s/p PCI to LAD in 2005     Diabetes mellitus, type 2 (H)     follows up with endocrinologist.     Dyslipidemia      GERD (gastroesophageal reflux disease)      H/O diabetic nephropathy     s/p kidney trnsplant     Hypertension      Hypothyroidism      Immunosuppressed status (H)      Kidney replaced by transplant     Rt     PONV (postoperative nausea and vomiting)      Shingles      Vitamin B12 deficiency anemia      Past Surgical History:   Procedure Laterality Date     APPENDECTOMY NOS       ARTHROSCOPY SHOULDER ROTATOR CUFF REPAIR Left      COLONOSCOPY N/A 6/7/2016    Procedure: COLONOSCOPY;  Surgeon: Rashard Snider MD;  Location: [facility]     Coronary angioplasty with stent  2005     HYSTERECTOMY       Kidney Transplant NOS Right      LAPAROSCOPIC CHOLECYSTECTOMY       NOSE SURGERY  2013     OPEN SEPARATION COMPONENT HERNIORRHAPHY N/A 10/16/2017    Procedure: OPEN SEPARATION COMPONENT HERNIORRHAPHY;;  Surgeon: CARL Lott MD;  [facility]     RECONSTRUCT ABDOMINAL WALL  N/A 10/16/2017    Procedure: RECONSTRUCT ABDOMINAL WALL;  Exploratory Laparotomy, Lysis of Adhesions, Abdominal Wall reconstruction, Inlay Xen AB mesh, Mitek Suture [place] and Umbilical Hernia Repair.;  Surgeon: CARL Lott MD;  Location: [facility]     REMOVE CATARACT INTRACAP,INSERT LENS Right      TONSILLECTOMY         Family Hx:  Family History   Problem Relation Age of Onset     Respiratory Mother          age 71     Cardiovascular Father          age 75 hyertension     Arthritis Sister         living, healthy     Family History Negative Brother          age 44     Colon Cancer No family hx of          Social Hx:  Social History     Socioeconomic History     Marital status:      Spouse name: Not on file     Number of children: Not on file     Years of education: Not on file     Highest education level: Not on file   Occupational History     Not on file   Tobacco Use     Smoking status: Never Smoker     Smokeless tobacco: Never Used   Substance and Sexual Activity     Alcohol use: No     Drug use: No     Sexual activity: Yes     Partners: Male   Other Topics Concern     Parent/sibling w/ CABG, MI or angioplasty before 65F 55M? Not Asked   Social History Narrative     Not on file     Social Determinants of Health     Financial Resource Strain: Not on file   Food Insecurity: Not on file   Transportation Needs: Not on file   Physical Activity: Not on file   Stress: Not on file   Social Connections: Not on file   Intimate Partner Violence: Not on file   Housing Stability: Not on file          MEDICATIONS:  has a current medication list which includes the following prescription(s): amlodipine, aspirin, atorvastatin, chlorthalidone, cholecalciferol, dexcom g6 sensor, dexcom g6 transmitter, insulin aspart, insulin glargine, isosorbide mononitrate, levothyroxine, losartan, metoprolol succinate er, mycophenolic acid, pantoprazole, teddy contour, bd insulin syringe ultrafine, calcium  carbonate, and cyclosporine modified.    ROS:     ROS: 10 point ROS neg other than the symptoms noted above in the HPI.    GENERAL: some fatigue, wt stable; denies fevers, chills, night sweats.   HEENT: no dysphagia, odonophagia, diplopia, neck pain  THYROID:  no apparent hyper or hypothyroid symptoms  CV: no chest pain, pressure, palpitations  LUNGS: no SOB, JIMENEZ, cough, wheezing   ABDOMEN: no diarrhea, constipation, abdominal pain  EXTREMITIES: no rashes, ulcers, edema  NEUROLOGY: decreased sensation at feet, balance problems; no headaches, denies changes in vision, tingling  MSK: some leg weakness, denies specific arthralgias  SKIN: no rashes or lesions  :  No menses  PSYCH:  stable mood, no significant anxiety or depression  ENDOCRINE: no heat or cold intolerance    Physical Exam   VS: /73   Pulse 75   Wt 74.9 kg (165 lb 3.2 oz)   BMI 28.36 kg/m    GENERAL: AXOX3, NAD, well dressed, answering questions appropriately, appears stated age.  THYROID:  normal gland, no apparent nodules or goiter  HEENT: neck non-tender, no exopthalmous, no proptosis, EOMI  CV: RRR, no rubs, gallops, no murmurs  LUNGS: CTAB, no wheezes, rales, or ronchi  ABDOMEN: mildly obese, soft, nontender, nondistended  EXTREMITIES: no edema, no lesions  NEUROLOGY: CN grossly intact, no tremors  MSK: grossly intact  SKIN: no rashes, no lesions    LABS:    All pertinent notes, labs, and images personally reviewed by me.     A/P:  Encounter Diagnosis   Name Primary?     Diabetes mellitus type 1.5, managed as type 1 (H) Yes       Comments:  Reviewed complicated health history and diabetes issues  Previous low C-Peptide, negative islet cell antibody, and diabetes history indicate Type 1.5 DM.  Recent glucose trends good, DexcomG6 CGM sensor helpful  Reviewed and interpreted tests that I previously ordered.   Ordered appropriate tests for the endocrinology disease management.    Management options discussed and implemented after shared  medical decision making with the patient.  T1.5DM problem is chronic-stable    Plan:  Discussed general issues with the diabetes diagnosis and management  Reviewed the pathophysiology of T2DM  We discussed the hgbA1c test which reflects previous overall glucose levels or control  Discussed the importance of blood glucose (BG) testing to assess glucose trends  Provided general overview of the diabetes medication options and medication treatment plan.  Reviewed recent DexcomG6 CGM glucose trend data, in detail.    Recommend:  Continue current Novolog and Basaglar insulin medication use  Discussed the ideal mealtime and correction scale dosing routine   Use ISF 1U per 50 mg/dl >150 mg/dl    Reviewed her questions about diabetes etiology, T1.5DM, overall management  Goal target premeal SG  mg/dl  Continue use of the DexcomG6 CGM sensor   Patient's insulin regimen requires frequent dose adjustments by patient on basis of therapeutic BGM/CGM test results  Discussed hypoglycemia prevention  She gets her insulin from [facility] in [place], mail order   They will contact me if needing new Rx locally in [place]  Plan repeat labs in 3/2022 prior to next appt   Lab orders placed  Keep focus on diet, exercise, weight management.  Advise having fasting lipid panel testing and dilated eye examination, at least annually    Keep regular follow-up evaluations with nephrologist, cardiologist, ophthalmologist, and PCP   Addressed patient questions today    There are no Patient Instructions on file for this visit.    Future labs ordered today:   Orders Placed This Encounter   Procedures     GLUCOSE MONITOR, 72 HOUR, PHYS INTERP     Basic metabolic panel     ALT     Hemoglobin A1c     Radiology/Consults ordered today: None    Total time spent in with the patient evaluation:  20 min  Additional time spent reviewing pertinent lab tests and chart notes, and documentation:  8 min    Follow-up:  Early 4/2022    DIANN Fitzpatrick  MD, MS  Endocrinology  [facility]    CC: SHARON Vega and KYMBERLY Veloz          Again, thank you for allowing me to participate in the care of your patient.        Sincerely,        Juan M Fitzpatrick MD

## 2021-12-15 NOTE — PROGRESS NOTES
Recent issues:   Diabetes follow-up evaluation, with her  Johnny  Reviewed medical history from patient and Epic chart record        Diagnosis of diabetes mellitus age 23, living in [facility]  Recalls having vision problem, saw eye physician and then family physician, also had frequent urination  Initial treatment with oral medication for few weeks, then change to insulin  Had seen Dr. Castillo in [facility]  Subsequent evaluations with Dr. GABRIELE Vickers, then saw Dr. Elmer Garvin/[facility] for endocrinology evaluations  Has used insulin injections 4x per day  Previous FV hgbA1c trends include:     Lab Test 07/06/21  1422 03/18/21  0636 10/29/20  0710 03/27/20  0648 11/20/19  0803   A1C 6.7* 6.8* 7.0* 6.8* 6.9*         8/10/21. Initial diabetes evaluation with me at [facility]  Reviewed health history and diabetes issues    Current DM medications:  Novolog Flexpen 6U premeal   Novolog sscale guestimates  Basaglar Kwikpen  13U subcutaneous in evening    Blood glucose (BG) meter: Raad Contour?   Has tested 4x per day previously   Less frequent testing when using CGM sensor    Has used the DexcomG5, then the G6 CGM sensor  Recent DexcomG6 data:                FamHx DM:  Niece    Recent FV labs include:  Lab Results   Component Value Date    A1C 7.3 (H) 11/29/2021     11/29/2021    POTASSIUM 3.7 11/29/2021    CHLORIDE 108 11/29/2021    CO2 27 11/29/2021    ANIONGAP 7 11/29/2021    GLC  11/29/2021      Comment:      Duplicate  This is a corrected result. Previous result was 217 mg/dL on 11/29/2021 at  6:51 AM CST    BUN 25 11/29/2021    CR 1.08 (H) 11/29/2021    GFRESTIMATED 51 (L) 11/29/2021    GFRESTBLACK 45 (L) 06/23/2021    NAVI 9.0 11/29/2021    CPEPT 0.2 (L) 11/29/2021    CHOL 187 11/29/2021    TRIG 149 11/29/2021    HDL 73 11/29/2021    LDL 84 11/29/2021    NHDL 114 11/29/2021    UCRR 68 03/01/2021    MICROL 14 10/29/2020    UMALCR 13.84 10/29/2020    TSH 1.10 11/29/2021    T4 1.41 04/02/2018     DM  Complications:   Nephropathy    Previous ESRD, then renal transplant 2005   Retinopathy    Previous PDR and laser surgeries O[facility]    Had seen Dr. Grayson Miles/[facility] clinic, plans to see Dr. Arevalo at that clinic building    Macrovascular disease    CAD, previous cardiac stent placement      Lives in [place], [place]  Sees Dr. Summer Vega/[facility] for general medicine evaluations.    PMH/PSH:  Past Medical History:   Diagnosis Date     Abdominal wall hernia     RLQ     AK (actinic keratosis) 08/06/2020     Allergic rhinitis      CAD (coronary artery disease)     coronary artery disease s/p PCI to LAD in 2005coronary artery disease s/p PCI to LAD in 2005     Diabetes mellitus, type 2 (H)     follows up with endocrinologist.     Dyslipidemia      GERD (gastroesophageal reflux disease)      H/O diabetic nephropathy     s/p kidney trnsplant     Hypertension      Hypothyroidism      Immunosuppressed status (H)      Kidney replaced by transplant     Rt     PONV (postoperative nausea and vomiting)      Shingles      Vitamin B12 deficiency anemia      Past Surgical History:   Procedure Laterality Date     APPENDECTOMY NOS       ARTHROSCOPY SHOULDER ROTATOR CUFF REPAIR Left      COLONOSCOPY N/A 6/7/2016    Procedure: COLONOSCOPY;  Surgeon: Rashard Snider MD;  Location: [facility]     Coronary angioplasty with stent  2005     HYSTERECTOMY       Kidney Transplant NOS Right      LAPAROSCOPIC CHOLECYSTECTOMY       NOSE SURGERY  2013     OPEN SEPARATION COMPONENT HERNIORRHAPHY N/A 10/16/2017    Procedure: OPEN SEPARATION COMPONENT HERNIORRHAPHY;;  Surgeon: CARL Lott MD;  [facility]     RECONSTRUCT ABDOMINAL WALL N/A 10/16/2017    Procedure: RECONSTRUCT ABDOMINAL WALL;  Exploratory Laparotomy, Lysis of Adhesions, Abdominal Wall reconstruction, Inlay Xen AB mesh, Mitek Suture [place] and Umbilical Hernia Repair.;  Surgeon: CARL Lott MD;  Location[facility]     REMOVE CATARACT  INTRACAP,INSERT LENS Right      TONSILLECTOMY         Family Hx:  Family History   Problem Relation Age of Onset     Respiratory Mother          age 71     Cardiovascular Father          age 75 hyertension     Arthritis Sister         living, healthy     Family History Negative Brother          age 44     Colon Cancer No family hx of          Social Hx:  Social History     Socioeconomic History     Marital status:      Spouse name: Not on file     Number of children: Not on file     Years of education: Not on file     Highest education level: Not on file   Occupational History     Not on file   Tobacco Use     Smoking status: Never Smoker     Smokeless tobacco: Never Used   Substance and Sexual Activity     Alcohol use: No     Drug use: No     Sexual activity: Yes     Partners: Male   Other Topics Concern     Parent/sibling w/ CABG, MI or angioplasty before 65F 55M? Not Asked   Social History Narrative     Not on file     Social Determinants of Health     Financial Resource Strain: Not on file   Food Insecurity: Not on file   Transportation Needs: Not on file   Physical Activity: Not on file   Stress: Not on file   Social Connections: Not on file   Intimate Partner Violence: Not on file   Housing Stability: Not on file          MEDICATIONS:  has a current medication list which includes the following prescription(s): amlodipine, aspirin, atorvastatin, chlorthalidone, cholecalciferol, dexcom g6 sensor, dexcom g6 transmitter, insulin aspart, insulin glargine, isosorbide mononitrate, levothyroxine, losartan, metoprolol succinate er, mycophenolic acid, pantoprazole, teddy contour, bd insulin syringe ultrafine, calcium carbonate, and cyclosporine modified.    ROS:     ROS: 10 point ROS neg other than the symptoms noted above in the HPI.    GENERAL: some fatigue, wt stable; denies fevers, chills, night sweats.   HEENT: no dysphagia, odonophagia, diplopia, neck pain  THYROID:  no apparent  hyper or hypothyroid symptoms  CV: no chest pain, pressure, palpitations  LUNGS: no SOB, JIMENEZ, cough, wheezing   ABDOMEN: no diarrhea, constipation, abdominal pain  EXTREMITIES: no rashes, ulcers, edema  NEUROLOGY: decreased sensation at feet, balance problems; no headaches, denies changes in vision, tingling  MSK: some leg weakness, denies specific arthralgias  SKIN: no rashes or lesions  :  No menses  PSYCH:  stable mood, no significant anxiety or depression  ENDOCRINE: no heat or cold intolerance    Physical Exam   VS: /73   Pulse 75   Wt 74.9 kg (165 lb 3.2 oz)   BMI 28.36 kg/m    GENERAL: AXOX3, NAD, well dressed, answering questions appropriately, appears stated age.  THYROID:  normal gland, no apparent nodules or goiter  HEENT: neck non-tender, no exopthalmous, no proptosis, EOMI  CV: RRR, no rubs, gallops, no murmurs  LUNGS: CTAB, no wheezes, rales, or ronchi  ABDOMEN: mildly obese, soft, nontender, nondistended  EXTREMITIES: no edema, no lesions  NEUROLOGY: CN grossly intact, no tremors  MSK: grossly intact  SKIN: no rashes, no lesions    LABS:    All pertinent notes, labs, and images personally reviewed by me.     A/P:  Encounter Diagnosis   Name Primary?     Diabetes mellitus type 1.5, managed as type 1 (H) Yes       Comments:  Reviewed complicated health history and diabetes issues  Previous low C-Peptide, negative islet cell antibody, and diabetes history indicate Type 1.5 DM.  Recent glucose trends good, DexcomG6 CGM sensor helpful  Reviewed and interpreted tests that I previously ordered.   Ordered appropriate tests for the endocrinology disease management.    Management options discussed and implemented after shared medical decision making with the patient.  T1.5DM problem is chronic-stable    Plan:  Discussed general issues with the diabetes diagnosis and management  Reviewed the pathophysiology of T2DM  We discussed the hgbA1c test which reflects previous overall glucose levels or  control  Discussed the importance of blood glucose (BG) testing to assess glucose trends  Provided general overview of the diabetes medication options and medication treatment plan.  Reviewed recent DexcomG6 CGM glucose trend data, in detail.    Recommend:  Continue current Novolog and Basaglar insulin medication use  Discussed the ideal mealtime and correction scale dosing routine   Use ISF 1U per 50 mg/dl >150 mg/dl    Reviewed her questions about diabetes etiology, T1.5DM, overall management  Goal target premeal SG  mg/dl  Continue use of the DexcomG6 CGM sensor   Patient's insulin regimen requires frequent dose adjustments by patient on basis of therapeutic BGM/CGM test results  Discussed hypoglycemia prevention  She gets her insulin from [facility] in [place], mail order   They will contact me if needing new Rx locally in [place]  Plan repeat labs in 3/2022 prior to next appt   Lab orders placed  Keep focus on diet, exercise, weight management.  Advise having fasting lipid panel testing and dilated eye examination, at least annually    Keep regular follow-up evaluations with nephrologist, cardiologist, ophthalmologist, and PCP   Addressed patient questions today    There are no Patient Instructions on file for this visit.    Future labs ordered today:   Orders Placed This Encounter   Procedures     GLUCOSE MONITOR, 72 HOUR, PHYS INTERP     Basic metabolic panel     ALT     Hemoglobin A1c     Radiology/Consults ordered today: None    Total time spent in with the patient evaluation:  20 min  Additional time spent reviewing pertinent lab tests and chart notes, and documentation:  8 min    Follow-up:  Early 4/2022    DIANN Fitzpatrick MD, MS  Endocrinology  [facility]    CC: SHARON Vega and KYMBERLY Veloz

## 2022-01-08 ENCOUNTER — HEALTH MAINTENANCE LETTER (OUTPATIENT)
Age: 72
End: 2022-01-08

## 2022-01-26 ENCOUNTER — TELEPHONE (OUTPATIENT)
Dept: NURSING | Facility: CLINIC | Age: 72
End: 2022-01-26
Payer: MEDICARE

## 2022-01-26 ENCOUNTER — TELEPHONE (OUTPATIENT)
Dept: INTERNAL MEDICINE | Facility: CLINIC | Age: 72
End: 2022-01-26
Payer: MEDICARE

## 2022-01-26 NOTE — TELEPHONE ENCOUNTER
Patient and  call. Patient's  developed mild cold symptoms on Saturday, their daughter brought a home Covid test to them on Monday that came back positive. His symptoms are mild with occasional cough and nasal congestion, he has not had a fever. They have been isolating from each other and his symptoms are improving at this time.     However, patient is concerned as she has history of kidney transplant and diabetes. She follows with [facility] annually with labs every 3 months for kidney transplant. She has tested 3 different days since and has been negative each time. She has received her Covid vaccine booster and the [facility] did Covid antibody testing in November that showed high levels of antibodies. Patient asks if there is anything they should be doing to prevent her from getting Covid since she is immunocompromised. Will route to PCP to review, but informed patient she may need to contact Transplant team to discuss this.     Socorro Roman RN  [facility]

## 2022-01-26 NOTE — TELEPHONE ENCOUNTER
Attempted to contact patient. Left voice message to call back.     Socorro Roman RN  [facility] [place]

## 2022-01-26 NOTE — TELEPHONE ENCOUNTER
At this time patient has tested negative 3 different days, please advise to get PCR test.  Please advise patient that her  needs to be quarantined, and patient follow COVID-19 guidelines, wearing mask and hand hygiene, also advise patient to check with the transplant clinic

## 2022-01-26 NOTE — TELEPHONE ENCOUNTER
Patient is s/p kidney/pancreas transplant, diabetic. She sees Dr. Vega. Her  tested positive for covid on Monday, he has had mild symptoms since Saturday. Viv has thus far tested negative for covid x3 with home tests. Last tested this morning. She is asymptomatic. The patient and her  are wondering if there is anything they should be doing besides isolating and wearing masks together. Caller transferred to nurse at Dr. Vega's clinic to discuss and find out what she recommends or if [facility]  [facility]

## 2022-01-27 NOTE — TELEPHONE ENCOUNTER
Left message on cell voicemail asking patient to return call to clinic for a message from her provider. ERNA Albright R.N.

## 2022-01-28 ENCOUNTER — NURSE TRIAGE (OUTPATIENT)
Dept: INTERNAL MEDICINE | Facility: CLINIC | Age: 72
End: 2022-01-28
Payer: MEDICARE

## 2022-01-28 NOTE — TELEPHONE ENCOUNTER
"Patient's spouse calling (on consent to communicate). See triage below.      S-(situation): 71 year old female with non-productive cough, home oximeter level 93%, feels \"a little weak\", blood sugar 290.     B-(background): History of diabetes, cardiac stents placed, on anti-rejection medication (transplant recipient -kidney- 15 years ago).   Patient's spouse tested COVID positive last weekend (1/22, 1/23). Spouse reports his symptoms are now resolved.   Patient is fully vaccinated to COVID, including booster.   Patient's spouse reports his symptoms began the same as patient's current symptoms.    A-(assessment): Symptoms started on 1/24 or 1/25. Dry, non-productive cough. Reports feeling some weakness. Denies fever, shortness of breath, wheezing, chest pain/tightness/pressure. Reports eating and drinking normally. No dizziness, fevers, sore throat.   Diabetic on DEXCOM. Last blood sugar was 290. Reports \"they've been running a little bit higher than normal for the past 3-4 days.\" Spouse and patient report they know how to treat the elevated blood sugars.   Using home oximeter. Reports O2 levels have ranged 90-92% on room air. Now up to 93% on room air.   4 home COVID tests within the last 4 days and all came back negative for COVID  They have a drive up COVID test scheduled for today.    R-(recommendations): Routing to primary care provider per protocol due to patient's past medical history. Does patient need to be seen? She will be getting a drive up COVID test today at their pharmacy.     Spouse wants call back to: [phone] with provider's recommendations.     Stephanie IBARRA RN   [facility]    Reason for Disposition    HIGH RISK for severe COVID complications (e.g., age > 64 years, obesity with BMI > 25, pregnant, chronic lung disease or other chronic medical condition)  (Exception: Already seen by PCP and no new or worsening symptoms.)    Additional Information    Negative: SEVERE " "difficulty breathing (e.g., struggling for each breath, speaks in single words)    Negative: Difficult to awaken or acting confused (e.g., disoriented, slurred speech)    Negative: Bluish (or gray) lips or face now    Negative: Shock suspected (e.g., cold/pale/clammy skin, too weak to stand, low BP, rapid pulse)    Negative: Sounds like a life-threatening emergency to the triager    Negative: [1] COVID-19 exposure AND [2] no symptoms    Negative: COVID-19 vaccine reaction suspected (e.g., fever, headache, muscle aches) occurring 1 to 3 days after getting vaccine    Negative: COVID-19 vaccine, questions about    Negative: [1] Lives with someone known to have influenza (flu test positive) AND [2] flu-like symptoms (e.g., cough, runny nose, sore throat, SOB; with or without fever)    Negative: [1] Adult with possible COVID-19 symptoms AND [2] triager concerned about severity of symptoms or other causes    Negative: COVID-19 and breastfeeding, questions about    Negative: Fever > 103 F (39.4 C)    Negative: [1] Fever > 101 F (38.3 C) AND [2] age > 60 years    Negative: [1] Fever > 100.0 F (37.8 C) AND [2] bedridden (e.g., nursing home patient, CVA, chronic illness, recovering from surgery)    Negative: SEVERE or constant chest pain or pressure (Exception: mild central chest pain, present only when coughing)    Negative: MODERATE difficulty breathing (e.g., speaks in phrases, SOB even at rest, pulse 100-120)    Negative: [1] Headache AND [2] stiff neck (can't touch chin to chest)    Negative: MILD difficulty breathing (e.g., minimal/no SOB at rest, SOB with walking, pulse <100)    Negative: Chest pain or pressure    Negative: Patient sounds very sick or weak to the triager    Answer Assessment - Initial Assessment Questions  1. COVID-19 DIAGNOSIS: \"Who made your Coronavirus (COVID-19) diagnosis?\" \"Was it confirmed by a positive lab test?\" If not diagnosed by a HCP, ask \"Are there lots of cases (community spread) where " "you live?\" (See [place] health department website, if unsure)      4 at home tests negative within the last 4 days  2. COVID-19 EXPOSURE: \"Was there any known exposure to COVID before the symptoms began?\" CDC Definition of close contact: within 6 feet (2 meters) for a total of 15 minutes or more over a 24-hour period.       Patient's spouse had COVID - on 1/22/2022, now resolved per patient's spouse  3. ONSET: \"When did the COVID-19 symptoms start?\"       1/24-1/25 started  4. WORST SYMPTOM: \"What is your worst symptom?\" (e.g., cough, fever, shortness of breath, muscle aches)      Cough  5. COUGH: \"Do you have a cough?\" If Yes, ask: \"How bad is the cough?\"        Non-productive   6. FEVER: \"Do you have a fever?\" If Yes, ask: \"What is your temperature, how was it measured, and when did it start?\"      No fever  7. RESPIRATORY STATUS: \"Describe your breathing?\" (e.g., shortness of breath, wheezing, unable to speak)       Denies wheezing, SOB  8. BETTER-SAME-WORSE: \"Are you getting better, staying the same or getting worse compared to yesterday?\"  If getting worse, ask, \"In what way?\"      About the same  9. HIGH RISK DISEASE: \"Do you have any chronic medical problems?\" (e.g., asthma, heart or lung disease, weak immune system, obesity, etc.)      Diabetes, cardiac stents placed, on anti-rejection medication (transplant recipient -kidney 15 years ago)  10. PREGNANCY: \"Is there any chance you are pregnant?\" \"When was your last menstrual period?\"        N/A  11. OTHER SYMPTOMS: \"Do you have any other symptoms?\"  (e.g., chills, fatigue, headache, loss of smell or taste, muscle pain, sore throat; new loss of smell or taste especially support the diagnosis of COVID-19)        Denies headache, sore throat, muscle pain, loss of taste  Reports some feeling of weakness, reports baseline loss of taste.    Protocols used: CORONAVIRUS (COVID-19) DIAGNOSED OR [id number] 8.25.2021      "

## 2022-01-28 NOTE — TELEPHONE ENCOUNTER
"Call to [facility]. Spoke with Lawanda. Lawanda advises:   \"Our provider here says that they are doing the virtual UC visits over the weekend, so if the pt tests positive late today or tomorrow, they can call central scheduling to schedule a virtual urgent care visit (VUC). Those providers will be able to prescribe antivirals.\"      Call to patient. Pt informed of the above should her COVID test come back positive this weekend. Patient verbalized understanding and denies questions/concerns. Patient reports she is feeling a little better now. This RN also advised patient that should she have any questions/concerns, there is always a triage nurse available 24/7 and should her COVID test come back positive outside the 5 day window (if she does not qualify for antiviral medication), she can reach out to the [facility] Resource Allocation Platform For COVID-19 Treatment ([facility]) to see if she would qualify for monoclonial antibodies. Patient verbalized understanding.     Routing to provider as SEGUN IBARRA RN   [facility]    "

## 2022-01-28 NOTE — TELEPHONE ENCOUNTER
We will do the referral for [facility]  for medication prescription if patient tests positive for Covid  [facility] has limited supply of antiviral medications and this patient does qualify if Covid test is positive.  But during the weekend [facility] is closed, please check with [facility] if patients Covid test comes back positive over the weekend who she should contact for the medications?  MASSBP  risk score- 14

## 2022-01-30 ENCOUNTER — NURSE TRIAGE (OUTPATIENT)
Dept: NURSING | Facility: CLINIC | Age: 72
End: 2022-01-30
Payer: MEDICARE

## 2022-01-30 NOTE — TELEPHONE ENCOUNTER
Patient did test positive for Covid and is calling back today with request for an appointment to discuss possible antiviral medication per provider recommendation.    Patient declined triage as she feels stable. O2 sats 94-95%. Denies chest pain or shortness of breath.    Transferred patient to scheduling.    Abbie Torres RN  01/30/22 8:16 AM  [facility] Nurse Advisor    Reason for Disposition    Health Information question, no triage required and triager able to answer question    Protocols used: INFORMATION ONLY CALL - NO TRIAGE-A-

## 2022-01-31 ENCOUNTER — TELEPHONE (OUTPATIENT)
Dept: FAMILY MEDICINE | Facility: CLINIC | Age: 72
End: 2022-01-31

## 2022-01-31 ENCOUNTER — VIRTUAL VISIT (OUTPATIENT)
Dept: FAMILY MEDICINE | Facility: CLINIC | Age: 72
End: 2022-01-31
Payer: MEDICARE

## 2022-01-31 DIAGNOSIS — U07.1 INFECTION DUE TO 2019 NOVEL CORONAVIRUS: Primary | ICD-10-CM

## 2022-01-31 PROCEDURE — 99441 PR PHYSICIAN TELEPHONE EVALUATION 5-10 MIN: CPT | Performed by: PHYSICIAN ASSISTANT

## 2022-01-31 NOTE — TELEPHONE ENCOUNTER
Pt was notified of provider's message.  also got on the line and he was notified. They preferred to call the hotline, so number was provided.    Callie Ibarra RN  [facility]

## 2022-01-31 NOTE — TELEPHONE ENCOUNTER
Please call pt.  She does not qualify for oral medications.  They go by the day symptoms started.  She still would qualify for monoclonal antibodies and can go to the [facility] website to refer herself.        Bridget Knight PA-C

## 2022-01-31 NOTE — PROGRESS NOTES
Viv is a 71 year old who is being evaluated via a billable video visit.    7:25-7:34  How would you like to obtain your AVS? MyChart  If the video visit is dropped, the invitation should be resent by: Text to cell phone: [phone]   Will anyone else be joining your video visit? No      Video Start Time: not able to do video     Assessment & Plan     Infection due to 2019 novel coronavirus  Stable.  Not feeling well but oxygen above 90% and hallucinations not new or worsening.                     Return in about 10 days (around 2/10/2022) for covid .    Bridget Knight PA-C  [facility]    Subjective   Viv is a 71 year old who presents for the following health issues  accompanied by her self .    HPI       Concern for COVID-19  POSITIVE COVID TEST  on 1-30-22 -Now feeling very weak and hallucinating   About how many days ago did these symptoms start? 1-24-22   Is this your first visit for this illness? Yes   How would you describe your symptoms since your last visit? My symptoms have stayed the same  In the 14 days before your symptoms started, have you had close contact with someone with COVID-19 (Coronavirus)? Yes, I have been in contact with someone who has COVID-19/Coronavirus (confirmed by lab test).   was positive   Do you have a fever or chills? No  Are you having new or worsening difficulty breathing? No  Do you have new or worsening cough? No  Have you had any new or unexplained body aches? No    Have you experienced any of the following NEW symptoms?    Headache: No    Sore throat: No    Loss of taste or smell: No    Chest pain: No    Diarrhea: No    Rash: No  What treatments have you tried? Cough syrup OTC   Who do you live with?    Are you, or a household member, a healthcare worker or a ? No  Do you live in a nursing home, group home, or shelter? No  Do you have a way to get food/medications if quarantined? Yes, I have a friend or family  member who can help me.    Was sick all week and then tested positive this weekend.    Hallucinations were there before.    Raspy cough and weak and oxygen 92%  No chest pain or shortness of breath.    Feeling weak.  Trying to staying hydrated.                Review of Systems   As above      Objective           Vitals:  No vitals were obtained today due to virtual visit.    Physical Exam   GENERAL: Healthy, alert and no distress  RESP: No audible wheeze, cough, or visible cyanosis.  No visible retractions or increased work of breathing.    SKIN: Visible skin clear. No significant rash, abnormal pigmentation or lesions.  NEURO: Cranial nerves grossly intact.  Mentation and speech appropriate for age.  PSYCH: Mentation appears normal, affect normal/bright, judgement and insight intact, normal speech and appearance well-groomed.                Video-Visit Details    Type of service:  Video Visit    Video End Time:not able to do video-did telephone     Originating Location (pt. Location): Home    Distant Location (provider location):  [facility]     Platform used for Video Visit: Unable to complete video visit

## 2022-02-01 ENCOUNTER — TELEPHONE (OUTPATIENT)
Dept: TRANSPLANT | Facility: CLINIC | Age: 72
End: 2022-02-01
Payer: MEDICARE

## 2022-02-01 NOTE — TELEPHONE ENCOUNTER
ISSUE: + Covid 1/30/2022    Current symptoms include: cough, congestion, body aches, fatigue    CURRENT IS:  CSA 75 mg/ 50 mg BID   mg BID    Patient referred for monoclonal antibodies.  Due for booster February 16 or later, will discuss with patient    Per Dr. Veloz decrease MPA to 360 mg BID, reassess in one week. Patient v/u of decreaseing MPA    Shayna Kent RN   Transplant Coordinator  [phone]

## 2022-02-01 NOTE — TELEPHONE ENCOUNTER
Patient Call: Needs help with getting the monoclonal infusion   Viv is positive for covid       Call back needed? Yes    Return Call Needed  Same as documented in contacts section  When to return call?: Same day: [facility]

## 2022-02-02 ENCOUNTER — NURSE TRIAGE (OUTPATIENT)
Dept: INTERNAL MEDICINE | Facility: CLINIC | Age: 72
End: 2022-02-02

## 2022-02-02 ENCOUNTER — HOSPITAL ENCOUNTER (INPATIENT)
Facility: CLINIC | Age: 72
LOS: 3 days | Discharge: HOME HEALTH | DRG: 177 | End: 2022-02-05
Attending: INTERNAL MEDICINE | Admitting: INTERNAL MEDICINE
Payer: MEDICARE

## 2022-02-02 ENCOUNTER — APPOINTMENT (OUTPATIENT)
Dept: GENERAL RADIOLOGY | Facility: CLINIC | Age: 72
DRG: 177 | End: 2022-02-02
Attending: INTERNAL MEDICINE
Payer: MEDICARE

## 2022-02-02 ENCOUNTER — HOME INFUSION (PRE-WILLOW HOME INFUSION) (OUTPATIENT)
Dept: PHARMACY | Facility: CLINIC | Age: 72
End: 2022-02-02

## 2022-02-02 DIAGNOSIS — Z98.61 CAD S/P PERCUTANEOUS CORONARY ANGIOPLASTY: ICD-10-CM

## 2022-02-02 DIAGNOSIS — E11.69 TYPE 2 DIABETES MELLITUS WITH OTHER SPECIFIED COMPLICATION, WITH LONG-TERM CURRENT USE OF INSULIN (H): ICD-10-CM

## 2022-02-02 DIAGNOSIS — U07.1 PNEUMONIA DUE TO 2019 NOVEL CORONAVIRUS: ICD-10-CM

## 2022-02-02 DIAGNOSIS — Z94.0 STATUS POST KIDNEY TRANSPLANT: ICD-10-CM

## 2022-02-02 DIAGNOSIS — Z94.0 KIDNEY REPLACED BY TRANSPLANT: ICD-10-CM

## 2022-02-02 DIAGNOSIS — I25.10 CAD S/P PERCUTANEOUS CORONARY ANGIOPLASTY: ICD-10-CM

## 2022-02-02 DIAGNOSIS — D84.9 IMMUNOSUPPRESSED STATUS (H): Primary | ICD-10-CM

## 2022-02-02 DIAGNOSIS — Z79.4 TYPE 2 DIABETES MELLITUS WITH OTHER SPECIFIED COMPLICATION, WITH LONG-TERM CURRENT USE OF INSULIN (H): ICD-10-CM

## 2022-02-02 DIAGNOSIS — J96.01 ACUTE RESPIRATORY FAILURE WITH HYPOXIA (H): ICD-10-CM

## 2022-02-02 DIAGNOSIS — J12.82 PNEUMONIA DUE TO 2019 NOVEL CORONAVIRUS: ICD-10-CM

## 2022-02-02 LAB
ALBUMIN SERPL-MCNC: 2.7 G/DL (ref 3.4–5)
ALP SERPL-CCNC: 105 U/L (ref 40–150)
ALT SERPL W P-5'-P-CCNC: 22 U/L (ref 0–50)
ANION GAP SERPL CALCULATED.3IONS-SCNC: 8 MMOL/L (ref 3–14)
AST SERPL W P-5'-P-CCNC: 19 U/L (ref 0–45)
BASOPHILS # BLD AUTO: 0 10E3/UL (ref 0–0.2)
BASOPHILS NFR BLD AUTO: 0 %
BILIRUB SERPL-MCNC: 0.6 MG/DL (ref 0.2–1.3)
BUN SERPL-MCNC: 32 MG/DL (ref 7–30)
CALCIUM SERPL-MCNC: 8.9 MG/DL (ref 8.5–10.1)
CHLORIDE BLD-SCNC: 105 MMOL/L (ref 94–109)
CO2 SERPL-SCNC: 26 MMOL/L (ref 20–32)
CREAT SERPL-MCNC: 1.43 MG/DL (ref 0.52–1.04)
CRP SERPL-MCNC: 81 MG/L (ref 0–8)
D DIMER PPP FEU-MCNC: 0.31 UG/ML FEU (ref 0–0.5)
EOSINOPHIL # BLD AUTO: 0.1 10E3/UL (ref 0–0.7)
EOSINOPHIL NFR BLD AUTO: 0 %
ERYTHROCYTE [DISTWIDTH] IN BLOOD BY AUTOMATED COUNT: 13.1 % (ref 10–15)
ERYTHROCYTE [SEDIMENTATION RATE] IN BLOOD BY WESTERGREN METHOD: 62 MM/HR (ref 0–30)
GFR SERPL CREATININE-BSD FRML MDRD: 39 ML/MIN/1.73M2
GLUCOSE BLD-MCNC: 142 MG/DL (ref 70–99)
GLUCOSE BLDC GLUCOMTR-MCNC: 324 MG/DL (ref 70–99)
GLUCOSE BLDC GLUCOMTR-MCNC: 371 MG/DL (ref 70–99)
GLUCOSE BLDC GLUCOMTR-MCNC: 384 MG/DL (ref 70–99)
HCO3 BLDV-SCNC: 27 MMOL/L (ref 21–28)
HCT VFR BLD AUTO: 35.5 % (ref 35–47)
HGB BLD-MCNC: 11.1 G/DL (ref 11.7–15.7)
IMM GRANULOCYTES # BLD: 0.3 10E3/UL
IMM GRANULOCYTES NFR BLD: 2 %
INR PPP: 1 (ref 0.85–1.15)
LACTATE BLD-SCNC: 2.3 MMOL/L
LYMPHOCYTES # BLD AUTO: 0.7 10E3/UL (ref 0.8–5.3)
LYMPHOCYTES NFR BLD AUTO: 5 %
MAGNESIUM SERPL-MCNC: 1.7 MG/DL (ref 1.6–2.3)
MCH RBC QN AUTO: 28.9 PG (ref 26.5–33)
MCHC RBC AUTO-ENTMCNC: 31.3 G/DL (ref 31.5–36.5)
MCV RBC AUTO: 92 FL (ref 78–100)
MONOCYTES # BLD AUTO: 1.2 10E3/UL (ref 0–1.3)
MONOCYTES NFR BLD AUTO: 8 %
NEUTROPHILS # BLD AUTO: 12.5 10E3/UL (ref 1.6–8.3)
NEUTROPHILS NFR BLD AUTO: 85 %
NRBC # BLD AUTO: 0 10E3/UL
NRBC BLD AUTO-RTO: 0 /100
NT-PROBNP SERPL-MCNC: 1359 PG/ML (ref 0–900)
PCO2 BLDV: 48 MM HG (ref 40–50)
PH BLDV: 7.35 [PH] (ref 7.32–7.43)
PLATELET # BLD AUTO: 322 10E3/UL (ref 150–450)
PO2 BLDV: 30 MM HG (ref 25–47)
POTASSIUM BLD-SCNC: 3.5 MMOL/L (ref 3.4–5.3)
POTASSIUM BLD-SCNC: 4.1 MMOL/L (ref 3.4–5.3)
PROCALCITONIN SERPL-MCNC: 0.32 NG/ML
PROT SERPL-MCNC: 7.2 G/DL (ref 6.8–8.8)
RBC # BLD AUTO: 3.84 10E6/UL (ref 3.8–5.2)
SAO2 % BLDV: 53 % (ref 94–100)
SODIUM SERPL-SCNC: 139 MMOL/L (ref 133–144)
TROPONIN I SERPL HS-MCNC: 74 NG/L
WBC # BLD AUTO: 14.8 10E3/UL (ref 4–11)

## 2022-02-02 PROCEDURE — 96367 TX/PROPH/DG ADDL SEQ IV INF: CPT | Performed by: INTERNAL MEDICINE

## 2022-02-02 PROCEDURE — 96366 THER/PROPH/DIAG IV INF ADDON: CPT | Performed by: INTERNAL MEDICINE

## 2022-02-02 PROCEDURE — 99223 1ST HOSP IP/OBS HIGH 75: CPT | Mod: AI | Performed by: INTERNAL MEDICINE

## 2022-02-02 PROCEDURE — 85610 PROTHROMBIN TIME: CPT | Performed by: INTERNAL MEDICINE

## 2022-02-02 PROCEDURE — 99285 EMERGENCY DEPT VISIT HI MDM: CPT | Mod: 25 | Performed by: INTERNAL MEDICINE

## 2022-02-02 PROCEDURE — 120N000002 HC R&B MED SURG/OB UMMC

## 2022-02-02 PROCEDURE — 83605 ASSAY OF LACTIC ACID: CPT

## 2022-02-02 PROCEDURE — 96361 HYDRATE IV INFUSION ADD-ON: CPT | Performed by: INTERNAL MEDICINE

## 2022-02-02 PROCEDURE — 71045 X-RAY EXAM CHEST 1 VIEW: CPT

## 2022-02-02 PROCEDURE — 84132 ASSAY OF SERUM POTASSIUM: CPT | Performed by: INTERNAL MEDICINE

## 2022-02-02 PROCEDURE — 250N000012 HC RX MED GY IP 250 OP 636 PS 637: Performed by: INTERNAL MEDICINE

## 2022-02-02 PROCEDURE — 999N000128 HC STATISTIC PERIPHERAL IV START W/O US GUIDANCE

## 2022-02-02 PROCEDURE — 80053 COMPREHEN METABOLIC PANEL: CPT | Performed by: INTERNAL MEDICINE

## 2022-02-02 PROCEDURE — 84484 ASSAY OF TROPONIN QUANT: CPT | Performed by: INTERNAL MEDICINE

## 2022-02-02 PROCEDURE — 84145 PROCALCITONIN (PCT): CPT | Performed by: INTERNAL MEDICINE

## 2022-02-02 PROCEDURE — 93005 ELECTROCARDIOGRAM TRACING: CPT | Performed by: INTERNAL MEDICINE

## 2022-02-02 PROCEDURE — 87040 BLOOD CULTURE FOR BACTERIA: CPT | Performed by: INTERNAL MEDICINE

## 2022-02-02 PROCEDURE — XW033E5 INTRODUCTION OF REMDESIVIR ANTI-INFECTIVE INTO PERIPHERAL VEIN, PERCUTANEOUS APPROACH, NEW TECHNOLOGY GROUP 5: ICD-10-PCS | Performed by: INTERNAL MEDICINE

## 2022-02-02 PROCEDURE — 82040 ASSAY OF SERUM ALBUMIN: CPT | Performed by: INTERNAL MEDICINE

## 2022-02-02 PROCEDURE — 85025 COMPLETE CBC W/AUTO DIFF WBC: CPT | Performed by: INTERNAL MEDICINE

## 2022-02-02 PROCEDURE — 36415 COLL VENOUS BLD VENIPUNCTURE: CPT | Performed by: INTERNAL MEDICINE

## 2022-02-02 PROCEDURE — 83735 ASSAY OF MAGNESIUM: CPT | Performed by: INTERNAL MEDICINE

## 2022-02-02 PROCEDURE — 250N000011 HC RX IP 250 OP 636: Performed by: INTERNAL MEDICINE

## 2022-02-02 PROCEDURE — 71045 X-RAY EXAM CHEST 1 VIEW: CPT | Mod: 26 | Performed by: RADIOLOGY

## 2022-02-02 PROCEDURE — 82803 BLOOD GASES ANY COMBINATION: CPT

## 2022-02-02 PROCEDURE — 85652 RBC SED RATE AUTOMATED: CPT | Performed by: INTERNAL MEDICINE

## 2022-02-02 PROCEDURE — 250N000013 HC RX MED GY IP 250 OP 250 PS 637: Performed by: INTERNAL MEDICINE

## 2022-02-02 PROCEDURE — 258N000003 HC RX IP 258 OP 636: Performed by: INTERNAL MEDICINE

## 2022-02-02 PROCEDURE — 250N000009 HC RX 250: Performed by: INTERNAL MEDICINE

## 2022-02-02 PROCEDURE — 93010 ELECTROCARDIOGRAM REPORT: CPT | Performed by: INTERNAL MEDICINE

## 2022-02-02 PROCEDURE — 83880 ASSAY OF NATRIURETIC PEPTIDE: CPT | Performed by: INTERNAL MEDICINE

## 2022-02-02 PROCEDURE — 96365 THER/PROPH/DIAG IV INF INIT: CPT | Performed by: INTERNAL MEDICINE

## 2022-02-02 PROCEDURE — 96375 TX/PRO/DX INJ NEW DRUG ADDON: CPT | Performed by: INTERNAL MEDICINE

## 2022-02-02 PROCEDURE — 85379 FIBRIN DEGRADATION QUANT: CPT | Performed by: INTERNAL MEDICINE

## 2022-02-02 PROCEDURE — 86140 C-REACTIVE PROTEIN: CPT | Performed by: INTERNAL MEDICINE

## 2022-02-02 RX ORDER — DEXTROSE MONOHYDRATE 25 G/50ML
25-50 INJECTION, SOLUTION INTRAVENOUS
Status: DISCONTINUED | OUTPATIENT
Start: 2022-02-02 | End: 2022-02-05 | Stop reason: HOSPADM

## 2022-02-02 RX ORDER — VITAMIN B COMPLEX
50 TABLET ORAL DAILY
Status: DISCONTINUED | OUTPATIENT
Start: 2022-02-03 | End: 2022-02-05 | Stop reason: HOSPADM

## 2022-02-02 RX ORDER — SODIUM CHLORIDE 9 MG/ML
INJECTION, SOLUTION INTRAVENOUS CONTINUOUS
Status: ACTIVE | OUTPATIENT
Start: 2022-02-02 | End: 2022-02-02

## 2022-02-02 RX ORDER — METOPROLOL SUCCINATE 25 MG/1
25 TABLET, EXTENDED RELEASE ORAL AT BEDTIME
Status: DISCONTINUED | OUTPATIENT
Start: 2022-02-02 | End: 2022-02-05 | Stop reason: HOSPADM

## 2022-02-02 RX ORDER — CHLORTHALIDONE 25 MG/1
25 TABLET ORAL DAILY
Status: DISCONTINUED | OUTPATIENT
Start: 2022-02-03 | End: 2022-02-05 | Stop reason: HOSPADM

## 2022-02-02 RX ORDER — CYCLOSPORINE 25 MG/1
25 CAPSULE ORAL DAILY
Status: DISCONTINUED | OUTPATIENT
Start: 2022-02-03 | End: 2022-02-05 | Stop reason: HOSPADM

## 2022-02-02 RX ORDER — LOSARTAN POTASSIUM 25 MG/1
50 TABLET ORAL DAILY
Status: DISCONTINUED | OUTPATIENT
Start: 2022-02-03 | End: 2022-02-05 | Stop reason: HOSPADM

## 2022-02-02 RX ORDER — DEXAMETHASONE SODIUM PHOSPHATE 10 MG/ML
6 INJECTION, SOLUTION INTRAMUSCULAR; INTRAVENOUS ONCE
Status: COMPLETED | OUTPATIENT
Start: 2022-02-02 | End: 2022-02-02

## 2022-02-02 RX ORDER — LEVOTHYROXINE SODIUM 100 UG/1
100 TABLET ORAL DAILY
Status: DISCONTINUED | OUTPATIENT
Start: 2022-02-03 | End: 2022-02-05 | Stop reason: HOSPADM

## 2022-02-02 RX ORDER — POLYETHYLENE GLYCOL 3350 17 G/17G
17 POWDER, FOR SOLUTION ORAL DAILY
Status: DISCONTINUED | OUTPATIENT
Start: 2022-02-02 | End: 2022-02-05 | Stop reason: HOSPADM

## 2022-02-02 RX ORDER — SODIUM CHLORIDE 9 MG/ML
INJECTION, SOLUTION INTRAVENOUS CONTINUOUS
Status: DISCONTINUED | OUTPATIENT
Start: 2022-02-02 | End: 2022-02-03

## 2022-02-02 RX ORDER — AZITHROMYCIN 250 MG/1
250 TABLET, FILM COATED ORAL DAILY
Status: DISCONTINUED | OUTPATIENT
Start: 2022-02-03 | End: 2022-02-05 | Stop reason: HOSPADM

## 2022-02-02 RX ORDER — CEFTRIAXONE 2 G/1
2 INJECTION, POWDER, FOR SOLUTION INTRAMUSCULAR; INTRAVENOUS ONCE
Status: COMPLETED | OUTPATIENT
Start: 2022-02-02 | End: 2022-02-02

## 2022-02-02 RX ORDER — AMLODIPINE BESYLATE 10 MG/1
10 TABLET ORAL DAILY
Status: DISCONTINUED | OUTPATIENT
Start: 2022-02-03 | End: 2022-02-05 | Stop reason: HOSPADM

## 2022-02-02 RX ORDER — PANTOPRAZOLE SODIUM 40 MG/1
40 TABLET, DELAYED RELEASE ORAL DAILY
Status: DISCONTINUED | OUTPATIENT
Start: 2022-02-03 | End: 2022-02-05 | Stop reason: HOSPADM

## 2022-02-02 RX ORDER — ONDANSETRON 2 MG/ML
4 INJECTION INTRAMUSCULAR; INTRAVENOUS EVERY 6 HOURS PRN
Status: DISCONTINUED | OUTPATIENT
Start: 2022-02-02 | End: 2022-02-05 | Stop reason: HOSPADM

## 2022-02-02 RX ORDER — LIDOCAINE 40 MG/G
CREAM TOPICAL
Status: DISCONTINUED | OUTPATIENT
Start: 2022-02-02 | End: 2022-02-05 | Stop reason: HOSPADM

## 2022-02-02 RX ORDER — INSULIN GLARGINE 100 [IU]/ML
10 INJECTION, SOLUTION SUBCUTANEOUS AT BEDTIME
Status: DISCONTINUED | OUTPATIENT
Start: 2022-02-02 | End: 2022-02-02

## 2022-02-02 RX ORDER — CYCLOSPORINE 25 MG/1
50 CAPSULE ORAL
Status: DISCONTINUED | OUTPATIENT
Start: 2022-02-02 | End: 2022-02-05 | Stop reason: HOSPADM

## 2022-02-02 RX ORDER — ATORVASTATIN CALCIUM 20 MG/1
20 TABLET, FILM COATED ORAL DAILY
Status: DISCONTINUED | OUTPATIENT
Start: 2022-02-03 | End: 2022-02-05 | Stop reason: HOSPADM

## 2022-02-02 RX ORDER — ASPIRIN 81 MG/1
81 TABLET, CHEWABLE ORAL DAILY
Status: DISCONTINUED | OUTPATIENT
Start: 2022-02-03 | End: 2022-02-05 | Stop reason: HOSPADM

## 2022-02-02 RX ORDER — CALCIUM CARBONATE 500 MG/1
500-1000 TABLET, CHEWABLE ORAL 2 TIMES DAILY PRN
Status: DISCONTINUED | OUTPATIENT
Start: 2022-02-02 | End: 2022-02-05 | Stop reason: HOSPADM

## 2022-02-02 RX ORDER — ONDANSETRON 4 MG/1
4 TABLET, ORALLY DISINTEGRATING ORAL EVERY 6 HOURS PRN
Status: DISCONTINUED | OUTPATIENT
Start: 2022-02-02 | End: 2022-02-05 | Stop reason: HOSPADM

## 2022-02-02 RX ORDER — CEFTRIAXONE 1 G/1
1 INJECTION, POWDER, FOR SOLUTION INTRAMUSCULAR; INTRAVENOUS EVERY 24 HOURS
Status: DISCONTINUED | OUTPATIENT
Start: 2022-02-03 | End: 2022-02-05 | Stop reason: HOSPADM

## 2022-02-02 RX ORDER — SODIUM CHLORIDE 9 MG/ML
INJECTION, SOLUTION INTRAVENOUS CONTINUOUS
Status: DISCONTINUED | OUTPATIENT
Start: 2022-02-02 | End: 2022-02-02

## 2022-02-02 RX ORDER — NICOTINE POLACRILEX 4 MG
15-30 LOZENGE BUCCAL
Status: DISCONTINUED | OUTPATIENT
Start: 2022-02-02 | End: 2022-02-05 | Stop reason: HOSPADM

## 2022-02-02 RX ADMIN — AZITHROMYCIN MONOHYDRATE 500 MG: 500 INJECTION, POWDER, LYOPHILIZED, FOR SOLUTION INTRAVENOUS at 12:44

## 2022-02-02 RX ADMIN — CEFTRIAXONE SODIUM 2 G: 2 INJECTION, POWDER, FOR SOLUTION INTRAMUSCULAR; INTRAVENOUS at 11:13

## 2022-02-02 RX ADMIN — DEXAMETHASONE SODIUM PHOSPHATE 6 MG: 10 INJECTION, SOLUTION INTRAMUSCULAR; INTRAVENOUS at 09:43

## 2022-02-02 RX ADMIN — SODIUM CHLORIDE 1000 ML: 9 INJECTION, SOLUTION INTRAVENOUS at 10:11

## 2022-02-02 RX ADMIN — METOPROLOL SUCCINATE 25 MG: 25 TABLET, EXTENDED RELEASE ORAL at 22:44

## 2022-02-02 RX ADMIN — CYCLOSPORINE 50 MG: 25 CAPSULE ORAL at 18:08

## 2022-02-02 RX ADMIN — POLYETHYLENE GLYCOL 3350 17 G: 17 POWDER, FOR SOLUTION ORAL at 17:07

## 2022-02-02 RX ADMIN — REMDESIVIR 200 MG: 100 INJECTION, POWDER, LYOPHILIZED, FOR SOLUTION INTRAVENOUS at 15:26

## 2022-02-02 RX ADMIN — ENOXAPARIN SODIUM 40 MG: 40 INJECTION SUBCUTANEOUS at 17:06

## 2022-02-02 RX ADMIN — INSULIN GLARGINE 10 UNITS: 100 INJECTION, SOLUTION SUBCUTANEOUS at 22:44

## 2022-02-02 RX ADMIN — INSULIN ASPART 5 UNITS: 100 INJECTION, SOLUTION INTRAVENOUS; SUBCUTANEOUS at 17:04

## 2022-02-02 RX ADMIN — SODIUM CHLORIDE 50 ML: 9 INJECTION, SOLUTION INTRAVENOUS at 20:32

## 2022-02-02 RX ADMIN — SODIUM CHLORIDE: 9 INJECTION, SOLUTION INTRAVENOUS at 18:08

## 2022-02-02 ASSESSMENT — ACTIVITIES OF DAILY LIVING (ADL)
ADLS_ACUITY_SCORE: 11
ADLS_ACUITY_SCORE: 13
ADLS_ACUITY_SCORE: 11

## 2022-02-02 ASSESSMENT — ENCOUNTER SYMPTOMS
COLOR CHANGE: 0
WEAKNESS: 1
DIFFICULTY URINATING: 0
HEADACHES: 0
NECK STIFFNESS: 0
COUGH: 1
EYE REDNESS: 0
ABDOMINAL PAIN: 0
CONFUSION: 0
FEVER: 0
SHORTNESS OF BREATH: 1
ARTHRALGIAS: 0

## 2022-02-02 ASSESSMENT — MIFFLIN-ST. JEOR: SCORE: 1222.57

## 2022-02-02 NOTE — ED NOTES
Pt presents, sent by MD after recently testing positive for COVID, with increased weakness, SOB and hypoxia. Pt AOX4 upon arrival, denies fevers.

## 2022-02-02 NOTE — ED TRIAGE NOTES
Triage Assessment & Note:    Patient presents with: PT sent by her MD.  PT dx with covid x 3 days. PT is noted to be weak. Room air Spo2 89    Home Treatments/Remedies: None    Febrile / Afebrile? Afebrile     Duration of C/o: unknown     Lanre Smith RN  February 2, 2022

## 2022-02-02 NOTE — PROGRESS NOTES
Reviewed the immunosuppression with Dr. Cabot.  Viv is on 75 alternating with 50 mg of cyclosporine.  Additionally, she is on Myfortic 540 mg twice a day.  Patient is currently admitted with Covid pneumonia and started dexamethasone and remdesivir.  Accordingly, we will hold the Myfortic and we will continue cyclosporine 75 mg alternating with 50 mg daily and we will check cyclosporine level tomorrow.  Full consult to follow.

## 2022-02-02 NOTE — ED PROVIDER NOTES
[place] EMERGENCY DEPARTMENT ([facility])  2/02/22  History     Chief Complaint   Patient presents with     Suspected Covid     The history is provided by the patient and medical records.     Viv Shaw is a 71 year old female with a past medical history significant for CAD s/p percutaneous coronary angioplasty, HTN, CKD stage 3 s/p kidney transplant (2006, immunosuppressed), and type 2 diabetes mellitus who presents to the Emergency Department for evaluation of COVID-19 concerns. [facility] Her oxygen saturation on room air is 89%. She does not use home oxygen at baseline. She endorses a one week history of cough, weakness, and progressive shortness of breath.    Past Medical History:   Diagnosis Date     Abdominal wall hernia     RLQ     AK (actinic keratosis) 08/06/2020     Allergic rhinitis      CAD (coronary artery disease)     coronary artery disease s/p PCI to LAD in 2005coronary artery disease s/p PCI to LAD in 2005     Diabetes mellitus, type 2 (H)     follows up with endocrinologist.     Dyslipidemia      GERD (gastroesophageal reflux disease)      H/O diabetic nephropathy     s/p kidney trnsplant     Hypertension      Hypothyroidism      Immunosuppressed status (H)      Kidney replaced by transplant     Rt     PONV (postoperative nausea and vomiting)      Shingles      Vitamin B12 deficiency anemia        Past Surgical History:   Procedure Laterality Date     APPENDECTOMY NOS       ARTHROSCOPY SHOULDER ROTATOR CUFF REPAIR Left      COLONOSCOPY N/A 6/7/2016    Procedure: COLONOSCOPY;  Surgeon: Rashard Snider MD;  Location: [facility]     Coronary angioplasty with stent  2005     HYSTERECTOMY       Kidney Transplant NOS Right      LAPAROSCOPIC CHOLECYSTECTOMY       NOSE SURGERY  2013     OPEN SEPARATION COMPONENT HERNIORRHAPHY N/A 10/16/2017    Procedure: OPEN SEPARATION COMPONENT HERNIORRHAPHY;;  Surgeon: CARL Lott MD;   Location: [facility] OR     RECONSTRUCT ABDOMINAL WALL N/A 10/16/2017    Procedure: RECONSTRUCT ABDOMINAL WALL;  Exploratory Laparotomy, Lysis of Adhesions, Abdominal Wall reconstruction, Inlay Xen AB mesh, Mitek Suture [place] and Umbilical Hernia Repair.;  Surgeon: CARL Lott MD;  Location: [facility] OR     REMOVE CATARACT INTRACAP,INSERT LENS Right      TONSILLECTOMY         Family History   Problem Relation Age of Onset     Respiratory Mother          age 71     Cardiovascular Father          age 75 hyertension     Arthritis Sister         living, healthy     Family History Negative Brother          age 44     Colon Cancer No family hx of        Social History     Tobacco Use     Smoking status: Never Smoker     Smokeless tobacco: Never Used   Substance Use Topics     Alcohol use: No       Current Facility-Administered Medications   Medication     remdesivir 200 mg in sodium chloride 0.9 % 250 mL intermittent infusion    Followed by     0.9% sodium chloride BOLUS     [START ON 2/3/2022] remdesivir 100 mg in sodium chloride 0.9 % 250 mL intermittent infusion    And     [START ON 2/3/2022] 0.9% sodium chloride BOLUS     amLODIPine (NORVASC) tablet 10 mg     aspirin (ASA) chewable tablet 81 mg     atorvastatin (LIPITOR) tablet 20 mg     [START ON 2/3/2022] azithromycin (ZITHROMAX) tablet 250 mg     calcium carbonate (TUMS) chewable tablet 500-1,000 mg     [START ON 2/3/2022] cefTRIAXone (ROCEPHIN) 1 g vial to attach to  mL bag for ADULTS or NS 50 mL bag for PEDS     chlorthalidone (HYGROTON) tablet 25 mg     cholecalciferol (VITAMIN D3) 25 mcg (1000 units) capsule 50 mcg     [START ON 2/3/2022] cycloSPORINE modified (GENGRAF BRAND) capsule 25 mg     cycloSPORINE modified (GENGRAF BRAND) capsule 50 mg     [START ON 2/3/2022] dexamethasone (DECADRON) tablet 6 mg     glucose gel 15-30 g    Or     dextrose 50 % injection 25-50 mL    Or     glucagon injection 1 mg     enoxaparin ANTICOAGULANT  "(LOVENOX) injection 40 mg     insulin aspart (NovoLOG) injection (RAPID ACTING)     insulin aspart (NovoLOG) injection (RAPID ACTING)     insulin glargine (BASAGLAR KWIKPEN) injection 10 Units     isosorbide mononitrate (IMDUR) 24 hr half-tab 15 mg     levothyroxine (SYNTHROID/LEVOTHROID) tablet 100 mcg     lidocaine (LMX4) cream     lidocaine 1 % 0.1-1 mL     losartan (COZAAR) tablet 50 mg     melatonin tablet 1 mg     metoprolol succinate ER (TOPROL-XL) 24 hr tablet 25 mg     ondansetron (ZOFRAN-ODT) ODT tab 4 mg    Or     ondansetron (ZOFRAN) injection 4 mg     pantoprazole (PROTONIX) EC tablet 40 mg     polyethylene glycol (MIRALAX) Packet 17 g     sodium chloride (PF) 0.9% PF flush 3 mL     sodium chloride (PF) 0.9% PF flush 3 mL     sodium chloride 0.9% infusion     sodium chloride 0.9% infusion     Current Outpatient Medications   Medication     amLODIPine (NORVASC) 10 MG tablet     ASPIRIN PO     atorvastatin (LIPITOR) 40 MG tablet     FLORIN CONTOUR test strip     BD INSULIN SYRINGE ULTRAFINE 31G X 5/16\" 0.3 ML     chlorthalidone (HYGROTON) 25 MG tablet     cholecalciferol (VITAMIN D3) 25 mcg (1000 units) capsule     Continuous Blood Gluc Sensor (DEXCOM G6 SENSOR) MISC     Continuous Blood Gluc Transmit (DEXCOM G6 TRANSMITTER) MISC     GENGRAF (BRAND) 25 MG CAPSULE     insulin aspart (NOVOLOG PEN) 100 UNIT/ML pen     insulin glargine (BASAGLAR KWIKPEN) 100 UNIT/ML pen     isosorbide mononitrate (IMDUR) 30 MG 24 hr tablet     levothyroxine (SYNTHROID/LEVOTHROID) 100 MCG tablet     losartan (COZAAR) 100 MG tablet     metoprolol succinate ER (TOPROL-XL) 25 MG 24 hr tablet     mycophenolic acid (GENERIC EQUIVALENT) 180 MG EC tablet     pantoprazole (PROTONIX) 40 MG EC tablet     calcium carbonate (TUMS) 500 MG chewable tablet        Allergies   Allergen Reactions     Iron [Ferrous Sulfate] GI Disturbance       I have reviewed the Medications, Allergies, Past Medical and Surgical History, and Social History in " "the Epic system.    Review of Systems   Constitutional: Negative for fever.   HENT: Negative for congestion.    Eyes: Negative for redness.   Respiratory: Positive for cough and shortness of breath.    Cardiovascular: Negative for chest pain.   Gastrointestinal: Negative for abdominal pain.   Genitourinary: Negative for difficulty urinating.   Musculoskeletal: Negative for arthralgias and neck stiffness.   Skin: Negative for color change.   Neurological: Positive for weakness. Negative for headaches.   Psychiatric/Behavioral: Negative for confusion.   All other systems reviewed and are negative.    A complete review of systems was performed with pertinent positives and negatives noted in the HPI, and all other systems negative.    Physical Exam   BP: 137/52  Pulse: 66  Temp: 97.8  F (36.6  C)  Resp: 18  Height: 160 cm (5' 3\")  Weight: 74.8 kg (165 lb)  SpO2: 91 %      Physical Exam  Constitutional:       General: She is not in acute distress.     Appearance: She is not diaphoretic.      Interventions: She is not intubated.  HENT:      Head: Atraumatic.      Mouth/Throat:      Pharynx: No oropharyngeal exudate.   Eyes:      General: No scleral icterus.     Pupils: Pupils are equal, round, and reactive to light.   Cardiovascular:      Rate and Rhythm: Normal rate and regular rhythm.      Heart sounds: Normal heart sounds. No murmur heard.  No friction rub. No gallop.    Pulmonary:      Effort: Tachypnea and accessory muscle usage present. No bradypnea, prolonged expiration, respiratory distress or retractions. She is not intubated.      Breath sounds: Normal breath sounds. No stridor, decreased air movement or transmitted upper airway sounds. No decreased breath sounds, wheezing, rhonchi or rales.   Chest:      Chest wall: No tenderness.   Abdominal:      General: Abdomen is flat. Bowel sounds are normal. There is no distension.      Palpations: Abdomen is soft. There is no mass.      Tenderness: There is no abdominal " tenderness. There is no right CVA tenderness, left CVA tenderness, guarding or rebound.      Hernia: No hernia is present.   Musculoskeletal:         General: No tenderness.      Cervical back: Neck supple.   Skin:     General: Skin is warm.      Findings: No rash.   Neurological:      General: No focal deficit present.      Cranial Nerves: No cranial nerve deficit.   Psychiatric:         Mood and Affect: Mood normal.         Thought Content: Thought content normal.         ED Course        Procedures              EKG Interpretation:      Interpreted by Quita Bailey MD, MD  Time reviewed: on arrival  Symptoms at time of EKG: sob   Rhythm: paced  Rate: Normal  Axis: Normal  Ectopy: none  Conduction: normal  ST Segments/ T Waves: No ST-T wave changes  Q Waves: none  Comparison to prior: Unchanged from 7/2021    Clinical Impression: no acute changes                  Results for orders placed or performed during the hospital encounter of 02/02/22 (from the past 24 hour(s))   iStat Gases (lactate) venous, POCT   Result Value Ref Range    Lactic Acid POCT 2.3 (H) <=2.0 mmol/L    Bicarbonate Venous POCT 27 21 - 28 mmol/L    O2 Sat, Venous POCT 53 (L) 94 - 100 %    pCO2V Venous POCT 48 40 - 50 mm Hg    pH Venous POCT 7.35 7.32 - 7.43    pO2 Venous POCT 30 25 - 47 mm Hg   CBC with platelets differential    Narrative    The following orders were created for panel order CBC with platelets differential.  Procedure                               Abnormality         Status                     ---------                               -----------         ------                     CBC with platelets and d...[[id number]]  Abnormal            Final result                 Please view results for these tests on the individual orders.   D dimer quantitative   Result Value Ref Range    D-Dimer Quantitative 0.31 0.00 - 0.50 ug/mL FEU    Narrative    This D-dimer assay is intended for use in conjunction with a clinical pretest probability  assessment model to exclude pulmonary embolism (PE) and deep venous thrombosis (DVT) in outpatients suspected of PE or DVT. The cut-off value is 0.50 ug/mL FEU.   INR   Result Value Ref Range    INR 1.00 0.85 - 1.15   Comprehensive metabolic panel   Result Value Ref Range    Sodium 139 133 - 144 mmol/L    Potassium 3.5 3.4 - 5.3 mmol/L    Chloride 105 94 - 109 mmol/L    Carbon Dioxide (CO2) 26 20 - 32 mmol/L    Anion Gap 8 3 - 14 mmol/L    Urea Nitrogen 32 (H) 7 - 30 mg/dL    Creatinine 1.43 (H) 0.52 - 1.04 mg/dL    Calcium 8.9 8.5 - 10.1 mg/dL    Glucose 142 (H) 70 - 99 mg/dL    Alkaline Phosphatase 105 40 - 150 U/L    AST 19 0 - 45 U/L    ALT 22 0 - 50 U/L    Protein Total 7.2 6.8 - 8.8 g/dL    Albumin 2.7 (L) 3.4 - 5.0 g/dL    Bilirubin Total 0.6 0.2 - 1.3 mg/dL    GFR Estimate 39 (L) >60 mL/min/1.73m2   Troponin I   Result Value Ref Range    Troponin I High Sensitivity 74 (H) <54 ng/L   Magnesium   Result Value Ref Range    Magnesium 1.7 1.6 - 2.3 mg/dL   CRP inflammation   Result Value Ref Range    CRP Inflammation 81.0 (H) 0.0 - 8.0 mg/L   Erythrocyte sedimentation rate auto   Result Value Ref Range    Erythrocyte Sedimentation Rate 62 (H) 0 - 30 mm/hr   Nt probnp inpatient (BNP)   Result Value Ref Range    N terminal Pro BNP Inpatient 1,359 (H) 0 - 900 pg/mL   CBC with platelets and differential   Result Value Ref Range    WBC Count 14.8 (H) 4.0 - 11.0 10e3/uL    RBC Count 3.84 3.80 - 5.20 10e6/uL    Hemoglobin 11.1 (L) 11.7 - 15.7 g/dL    Hematocrit 35.5 35.0 - 47.0 %    MCV 92 78 - 100 fL    MCH 28.9 26.5 - 33.0 pg    MCHC 31.3 (L) 31.5 - 36.5 g/dL    RDW 13.1 10.0 - 15.0 %    Platelet Count 322 150 - 450 10e3/uL    % Neutrophils 85 %    % Lymphocytes 5 %    % Monocytes 8 %    % Eosinophils 0 %    % Basophils 0 %    % Immature Granulocytes 2 %    NRBCs per 100 WBC 0 <1 /100    Absolute Neutrophils 12.5 (H) 1.6 - 8.3 10e3/uL    Absolute Lymphocytes 0.7 (L) 0.8 - 5.3 10e3/uL    Absolute Monocytes 1.2 0.0 -  1.3 10e3/uL    Absolute Eosinophils 0.1 0.0 - 0.7 10e3/uL    Absolute Basophils 0.0 0.0 - 0.2 10e3/uL    Absolute Immature Granulocytes 0.3 <=0.4 10e3/uL    Absolute NRBCs 0.0 10e3/uL   EKG 12-lead, tracing only   Result Value Ref Range    Systolic Blood Pressure  mmHg    Diastolic Blood Pressure  mmHg    Ventricular Rate 64 BPM    Atrial Rate 64 BPM    WV Interval 112 ms    QRS Duration 78 ms     ms    QTc 491 ms    P Axis 78 degrees    R AXIS 78 degrees    T Axis 49 degrees    Interpretation ECG Sinus rhythm  Normal ECG      XR Chest Port 1 View    Narrative    EXAM: XR CHEST PORT 1 VIEW  2/2/2022 11:03 AM     HISTORY: Shortness of breath. 71-year-old female with past medical  HISTORY of coronary artery disease, hypertension, chronic kidney  disease stage III status post kidney transplant 2006  (immunosuppressed), and diabetes, presenting for concern of COVID 19,  1 week history of cough, weakness, and progressive shortness of  breath. COVID POSITIVE. 1/30/2022.    COMPARISON:  CT chest 4/20/2018 and chest x-ray 10/19/2017.    FINDINGS:   AP portable chest. Trachea midline. Stable cardiomediastinal  silhouette. Aortic atherosclerosis. Moderate perihilar/retrocardiac  opacities. Probable trace bilateral pleural effusions. No  pneumothorax. Degenerative changes of shoulder joints. No acute  osseous or soft tissue abnormalities.      Impression    IMPRESSION:  1. Moderate perihilar/retrocardiac opacities which may represent an  infectious etiology, which would be compatible with provided history,  probably also represent mild pulmonary edema/atelectasis.  2. Probable trace bilateral pleural effusions. Lateral view may be  helpful in assessment of retrocardiac opacities/pleural effusions.    I have personally reviewed the examination and initial interpretation  and I agree with the findings.    ARTUR HOOVER MD         SYSTEM ID:  [id number]     Medications   amLODIPine (NORVASC) tablet 10 mg (has no  administration in time range)   aspirin (ASA) chewable tablet 81 mg (has no administration in time range)   atorvastatin (LIPITOR) tablet 20 mg (has no administration in time range)   calcium carbonate (TUMS) chewable tablet 500-1,000 mg (has no administration in time range)   chlorthalidone (HYGROTON) tablet 25 mg (has no administration in time range)   cholecalciferol (VITAMIN D3) 25 mcg (1000 units) capsule 50 mcg (has no administration in time range)   cycloSPORINE modified (GENGRAF BRAND) capsule 50 mg (has no administration in time range)   insulin glargine (BASAGLAR KWIKPEN) injection 10 Units (has no administration in time range)   isosorbide mononitrate (IMDUR) 24 hr half-tab 15 mg (has no administration in time range)   levothyroxine (SYNTHROID/LEVOTHROID) tablet 100 mcg (has no administration in time range)   losartan (COZAAR) tablet 50 mg (has no administration in time range)   metoprolol succinate ER (TOPROL-XL) 24 hr tablet 25 mg (has no administration in time range)   pantoprazole (PROTONIX) EC tablet 40 mg (has no administration in time range)   lidocaine 1 % 0.1-1 mL (has no administration in time range)   lidocaine (LMX4) cream (has no administration in time range)   sodium chloride (PF) 0.9% PF flush 3 mL (has no administration in time range)   sodium chloride (PF) 0.9% PF flush 3 mL (has no administration in time range)   melatonin tablet 1 mg (has no administration in time range)   enoxaparin ANTICOAGULANT (LOVENOX) injection 40 mg (has no administration in time range)   sodium chloride 0.9% infusion (has no administration in time range)   polyethylene glycol (MIRALAX) Packet 17 g (has no administration in time range)   ondansetron (ZOFRAN-ODT) ODT tab 4 mg (has no administration in time range)     Or   ondansetron (ZOFRAN) injection 4 mg (has no administration in time range)   cycloSPORINE modified (GENGRAF BRAND) capsule 25 mg (has no administration in time range)   dexamethasone (DECADRON)  tablet 6 mg (has no administration in time range)   remdesivir 200 mg in sodium chloride 0.9 % 250 mL intermittent infusion (200 mg Intravenous New Bag 2/2/22 1526)     Followed by   0.9% sodium chloride BOLUS (has no administration in time range)   remdesivir 100 mg in sodium chloride 0.9 % 250 mL intermittent infusion (has no administration in time range)     And   0.9% sodium chloride BOLUS (has no administration in time range)   glucose gel 15-30 g (has no administration in time range)     Or   dextrose 50 % injection 25-50 mL (has no administration in time range)     Or   glucagon injection 1 mg (has no administration in time range)   insulin aspart (NovoLOG) injection (RAPID ACTING) (has no administration in time range)   insulin aspart (NovoLOG) injection (RAPID ACTING) (has no administration in time range)   azithromycin (ZITHROMAX) tablet 250 mg (has no administration in time range)   cefTRIAXone (ROCEPHIN) 1 g vial to attach to  mL bag for ADULTS or NS 50 mL bag for PEDS (has no administration in time range)   0.9% sodium chloride BOLUS (0 mLs Intravenous Stopped 2/2/22 1243)     Followed by   sodium chloride 0.9% infusion (has no administration in time range)   dexamethasone PF (DECADRON) injection 6 mg (6 mg Intravenous Given 2/2/22 0943)   cefTRIAXone (ROCEPHIN) 2 g vial to attach to  ml bag for ADULTS or NS 50 ml bag for PEDS (0 g Intravenous Stopped 2/2/22 1244)   azithromycin (ZITHROMAX) 500 mg in sodium chloride 0.9 % 250 mL intermittent infusion (0 mg Intravenous Stopped 2/2/22 1448)             Assessments & Plan (with Medical Decision Making)    Acute respiratory failure with hypoxia -one week, COVID PNA but also elevated inflammatory markers-possible superimposed bacterial-cx'ed and started rocephin and zithro, NS for mildly elevated lactic acid, decadron 6 mg given D/W Pharmacist-oral antiviral not eligible as this has been going on over 5 days, monoclonal apparently not available.  D/W Medicine-admit.    S/p kidney transplant 16 years ago-stable clinically and cr.    Mild elevation of trop and bnp-probable demand ischemia.         I have reviewed the nursing notes.    I have reviewed the findings, diagnosis, plan and need for follow up with the patient.    New Prescriptions    No medications on file       Final diagnoses:   Acute respiratory failure with hypoxia (H)   Pneumonia due to 2019 novel coronavirus   Status post kidney transplant       I, Adrianne Brambila am serving as a trained medical scribe to document services personally performed by Quita Bailey MD, based on the provider's statements to me.      IQuita MD, was physically present and have reviewed and verified the accuracy of this note documented by Adrianne Brambila.     Quita Bailey MD  2/2/2022   [facility] EMERGENCY DEPARTMENT     Quita Bailey MD  02/02/22 2887

## 2022-02-02 NOTE — TELEPHONE ENCOUNTER
S-(situation): low oxygen saturation, Covid 19    B-(background): Patient diagnosed with Covid 19 1/30/22.  She is a transplant patient also (kidney).    A-(assessment):  reports patient's O2 sat is 80-87% this morning and she has no energy, feels weak and fatigued.  Her breathing is very noisy, sounds like there is fluid or phlegm in her lungs.  She has a loose sounding non-productive cough.  Unable to take in deep breath as it sets off coughing spells. They have not checked her temperature this morning but  reports she may have a low grade fever after touching her.  She has been drinking and urinating well, very little food intake.      R-(recommendations): Advised to have patient transported to ER.  Patient refuses ambulance, again advised calling ambulance but patient refuses and  abides by that.  States he has a wheelchair and he will transport her to the  of [facility] R.N.

## 2022-02-02 NOTE — TELEPHONE ENCOUNTER
"  Reason for Disposition    Difficulty breathing and within 14 days of COVID-19 Exposure    Difficulty breathing and only present when coughing    Sounds like a life-threatening emergency to the triager    Difficulty breathing    Patient sounds very sick or weak to the triager    Additional Information    Negative: Slow, shallow and weak breathing    Negative: Stridor    Negative: Breathing stopped and hasn't returned    Negative: Choking on something    Negative: SEVERE difficulty breathing (e.g., struggling for each breath, speaks in single words, pulse > 120)    Negative: Bluish (or gray) lips or face    Negative: Difficult to awaken or acting confused (e.g., disoriented, slurred speech)    Negative: Passed out (i.e., fainted, collapsed and was not responding)    Negative: Wheezing started suddenly after medicine, an allergic food, or bee sting    Negative: Difficulty breathing and only from stuffy or runny nose    Negative: Bluish (or gray) lips or face    Negative: Severe difficulty breathing (e.g., struggling for each breath, speaks in single words)    Negative: Rapid onset of cough and has hives    Negative: Coughing started suddenly after medicine, an allergic food or bee sting    Negative: Difficulty breathing after exposure to flames, smoke, or fumes    Negative: Previous asthma attacks and this feels like asthma attack    Negative: Chest pain present when not coughing    Negative: Passed out (i.e., fainted, collapsed and was not responding)    Answer Assessment - Initial Assessment Questions  1. RESPIRATORY STATUS: \"Describe your breathing?\" (e.g., wheezing, shortness of breath, unable to speak, severe coughing)       Severe coughing, O2 sat 80-87% this morning  2. ONSET: \"When did this breathing problem begin?\"       Worse this morning, has Covid 19 diagnosed 1/30/22  3. PATTERN \"Does the difficult breathing come and go, or has it been constant since it started?\"       Constant this morning  4. SEVERITY: " "\"How bad is your breathing?\" (e.g., mild, moderate, severe)     - MILD: No SOB at rest, mild SOB with walking, speaks normally in sentences, can lay down, no retractions, pulse < 100.     - MODERATE: SOB at rest, SOB with minimal exertion and prefers to sit, cannot lie down flat, speaks in phrases, mild retractions, audible wheezing, pulse 100-120.     - SEVERE: Very SOB at rest, speaks in single words, struggling to breathe, sitting hunched forward, retractions, pulse > 120       To weak to even do much moving, unable to position herself in bed today.  5. RECURRENT SYMPTOM: \"Have you had difficulty breathing before?\" If so, ask: \"When was the last time?\" and \"What happened that time?\"       Worsening symptoms since Covid diagnosis  6. CARDIAC HISTORY: \"Do you have any history of heart disease?\" (e.g., heart attack, angina, bypass surgery, angioplasty)      CAD, hypertension, immunocompromised (transplant patient)  7. LUNG HISTORY: \"Do you have any history of lung disease?\"  (e.g., pulmonary embolus, asthma, emphysema)      no  8. CAUSE: \"What do you think is causing the breathing problem?\"       Covid  9. OTHER SYMPTOMS: \"Do you have any other symptoms? (e.g., dizziness, runny nose, cough, chest pain, fever)      Weak, fatigued, low O2 saturation  10. PREGNANCY: \"Is there any chance you are pregnant?\" \"When was your last menstrual period?\"        NA  11. TRAVEL: \"Have you traveled out of the country in the last month?\" (e.g., travel history, exposures)        Did not ask    Protocols used: BREATHING DIFFICULTY-A-OH, COUGH-A-OH    "

## 2022-02-03 ENCOUNTER — APPOINTMENT (OUTPATIENT)
Dept: PHYSICAL THERAPY | Facility: CLINIC | Age: 72
DRG: 177 | End: 2022-02-03
Attending: INTERNAL MEDICINE
Payer: MEDICARE

## 2022-02-03 LAB
ALBUMIN SERPL-MCNC: 2.2 G/DL (ref 3.4–5)
ALBUMIN UR-MCNC: 10 MG/DL
ALP SERPL-CCNC: 101 U/L (ref 40–150)
ALT SERPL W P-5'-P-CCNC: 20 U/L (ref 0–50)
ANION GAP SERPL CALCULATED.3IONS-SCNC: 7 MMOL/L (ref 3–14)
APPEARANCE UR: CLEAR
AST SERPL W P-5'-P-CCNC: 14 U/L (ref 0–45)
ATRIAL RATE - MUSE: 64 BPM
BASOPHILS # BLD AUTO: 0 10E3/UL (ref 0–0.2)
BASOPHILS NFR BLD AUTO: 0 %
BILIRUB SERPL-MCNC: 0.3 MG/DL (ref 0.2–1.3)
BILIRUB UR QL STRIP: NEGATIVE
BUN SERPL-MCNC: 32 MG/DL (ref 7–30)
CALCIUM SERPL-MCNC: 8.2 MG/DL (ref 8.5–10.1)
CHLORIDE BLD-SCNC: 107 MMOL/L (ref 94–109)
CO2 SERPL-SCNC: 25 MMOL/L (ref 20–32)
COLOR UR AUTO: YELLOW
CREAT SERPL-MCNC: 1.19 MG/DL (ref 0.52–1.04)
CYCLOSPORINE BLD LC/MS/MS-MCNC: 93 UG/L (ref 50–400)
DIASTOLIC BLOOD PRESSURE - MUSE: NORMAL MMHG
EOSINOPHIL # BLD AUTO: 0 10E3/UL (ref 0–0.7)
EOSINOPHIL NFR BLD AUTO: 0 %
ERYTHROCYTE [DISTWIDTH] IN BLOOD BY AUTOMATED COUNT: 13 % (ref 10–15)
GFR SERPL CREATININE-BSD FRML MDRD: 48 ML/MIN/1.73M2
GLUCOSE BLD-MCNC: 336 MG/DL (ref 70–99)
GLUCOSE BLDC GLUCOMTR-MCNC: 223 MG/DL (ref 70–99)
GLUCOSE BLDC GLUCOMTR-MCNC: 238 MG/DL (ref 70–99)
GLUCOSE BLDC GLUCOMTR-MCNC: 300 MG/DL (ref 70–99)
GLUCOSE BLDC GLUCOMTR-MCNC: 318 MG/DL (ref 70–99)
GLUCOSE BLDC GLUCOMTR-MCNC: 320 MG/DL (ref 70–99)
GLUCOSE BLDC GLUCOMTR-MCNC: 369 MG/DL (ref 70–99)
GLUCOSE UR STRIP-MCNC: 1000 MG/DL
HCT VFR BLD AUTO: 33.4 % (ref 35–47)
HGB BLD-MCNC: 10.4 G/DL (ref 11.7–15.7)
HGB UR QL STRIP: NEGATIVE
HYALINE CASTS: 7 /LPF
IMM GRANULOCYTES # BLD: 0.4 10E3/UL
IMM GRANULOCYTES NFR BLD: 4 %
INTERPRETATION ECG - MUSE: NORMAL
KETONES UR STRIP-MCNC: NEGATIVE MG/DL
LEUKOCYTE ESTERASE UR QL STRIP: NEGATIVE
LYMPHOCYTES # BLD AUTO: 0.5 10E3/UL (ref 0.8–5.3)
LYMPHOCYTES NFR BLD AUTO: 4 %
MCH RBC QN AUTO: 28.2 PG (ref 26.5–33)
MCHC RBC AUTO-ENTMCNC: 31.1 G/DL (ref 31.5–36.5)
MCV RBC AUTO: 91 FL (ref 78–100)
MONOCYTES # BLD AUTO: 0.6 10E3/UL (ref 0–1.3)
MONOCYTES NFR BLD AUTO: 5 %
MUCOUS THREADS #/AREA URNS LPF: PRESENT /LPF
NEUTROPHILS # BLD AUTO: 9.9 10E3/UL (ref 1.6–8.3)
NEUTROPHILS NFR BLD AUTO: 87 %
NITRATE UR QL: NEGATIVE
NRBC # BLD AUTO: 0 10E3/UL
NRBC BLD AUTO-RTO: 0 /100
P AXIS - MUSE: 78 DEGREES
PH UR STRIP: 5.5 [PH] (ref 5–7)
PLATELET # BLD AUTO: 300 10E3/UL (ref 150–450)
POTASSIUM BLD-SCNC: 4.1 MMOL/L (ref 3.4–5.3)
PR INTERVAL - MUSE: 112 MS
PROT SERPL-MCNC: 6.3 G/DL (ref 6.8–8.8)
QRS DURATION - MUSE: 78 MS
QT - MUSE: 476 MS
QTC - MUSE: 491 MS
R AXIS - MUSE: 78 DEGREES
RBC # BLD AUTO: 3.69 10E6/UL (ref 3.8–5.2)
RBC URINE: 1 /HPF
SODIUM SERPL-SCNC: 139 MMOL/L (ref 133–144)
SP GR UR STRIP: 1.02 (ref 1–1.03)
SQUAMOUS EPITHELIAL: 1 /HPF
SYSTOLIC BLOOD PRESSURE - MUSE: NORMAL MMHG
T AXIS - MUSE: 49 DEGREES
TME LAST DOSE: NORMAL H
TME LAST DOSE: NORMAL H
TRANSITIONAL EPI: 1 /HPF
UROBILINOGEN UR STRIP-MCNC: NORMAL MG/DL
VENTRICULAR RATE- MUSE: 64 BPM
WBC # BLD AUTO: 11.4 10E3/UL (ref 4–11)
WBC URINE: 4 /HPF

## 2022-02-03 PROCEDURE — 97116 GAIT TRAINING THERAPY: CPT | Mod: GP

## 2022-02-03 PROCEDURE — 80158 DRUG ASSAY CYCLOSPORINE: CPT | Performed by: INTERNAL MEDICINE

## 2022-02-03 PROCEDURE — 250N000009 HC RX 250: Performed by: INTERNAL MEDICINE

## 2022-02-03 PROCEDURE — 99223 1ST HOSP IP/OBS HIGH 75: CPT | Mod: GC | Performed by: INTERNAL MEDICINE

## 2022-02-03 PROCEDURE — 258N000003 HC RX IP 258 OP 636: Performed by: INTERNAL MEDICINE

## 2022-02-03 PROCEDURE — 81001 URINALYSIS AUTO W/SCOPE: CPT | Performed by: INTERNAL MEDICINE

## 2022-02-03 PROCEDURE — 250N000012 HC RX MED GY IP 250 OP 636 PS 637: Performed by: INTERNAL MEDICINE

## 2022-02-03 PROCEDURE — 120N000002 HC R&B MED SURG/OB UMMC

## 2022-02-03 PROCEDURE — 250N000011 HC RX IP 250 OP 636: Performed by: INTERNAL MEDICINE

## 2022-02-03 PROCEDURE — 36415 COLL VENOUS BLD VENIPUNCTURE: CPT | Performed by: INTERNAL MEDICINE

## 2022-02-03 PROCEDURE — 85041 AUTOMATED RBC COUNT: CPT | Performed by: INTERNAL MEDICINE

## 2022-02-03 PROCEDURE — 97530 THERAPEUTIC ACTIVITIES: CPT | Mod: GP

## 2022-02-03 PROCEDURE — 80053 COMPREHEN METABOLIC PANEL: CPT | Performed by: INTERNAL MEDICINE

## 2022-02-03 PROCEDURE — 250N000013 HC RX MED GY IP 250 OP 250 PS 637: Performed by: INTERNAL MEDICINE

## 2022-02-03 PROCEDURE — 97161 PT EVAL LOW COMPLEX 20 MIN: CPT | Mod: GP

## 2022-02-03 PROCEDURE — 99233 SBSQ HOSP IP/OBS HIGH 50: CPT | Performed by: INTERNAL MEDICINE

## 2022-02-03 RX ADMIN — ASPIRIN 81 MG CHEWABLE TABLET 81 MG: 81 TABLET CHEWABLE at 08:04

## 2022-02-03 RX ADMIN — ATORVASTATIN CALCIUM 20 MG: 20 TABLET, FILM COATED ORAL at 08:04

## 2022-02-03 RX ADMIN — CHLORTHALIDONE 25 MG: 25 TABLET ORAL at 08:04

## 2022-02-03 RX ADMIN — POLYETHYLENE GLYCOL 3350 17 G: 17 POWDER, FOR SOLUTION ORAL at 08:05

## 2022-02-03 RX ADMIN — CYCLOSPORINE 25 MG: 25 CAPSULE ORAL at 08:07

## 2022-02-03 RX ADMIN — CYCLOSPORINE 50 MG: 25 CAPSULE ORAL at 08:06

## 2022-02-03 RX ADMIN — CYCLOSPORINE 50 MG: 25 CAPSULE ORAL at 17:33

## 2022-02-03 RX ADMIN — CEFTRIAXONE SODIUM 1 G: 1 INJECTION, POWDER, FOR SOLUTION INTRAMUSCULAR; INTRAVENOUS at 11:59

## 2022-02-03 RX ADMIN — AZITHROMYCIN MONOHYDRATE 250 MG: 250 TABLET ORAL at 08:04

## 2022-02-03 RX ADMIN — DEXAMETHASONE 6 MG: 2 TABLET ORAL at 11:59

## 2022-02-03 RX ADMIN — Medication 50 MCG: at 08:04

## 2022-02-03 RX ADMIN — METOPROLOL SUCCINATE 25 MG: 25 TABLET, EXTENDED RELEASE ORAL at 22:06

## 2022-02-03 RX ADMIN — ENOXAPARIN SODIUM 40 MG: 40 INJECTION SUBCUTANEOUS at 15:47

## 2022-02-03 RX ADMIN — AMLODIPINE BESYLATE 10 MG: 10 TABLET ORAL at 08:04

## 2022-02-03 RX ADMIN — LOSARTAN POTASSIUM 50 MG: 25 TABLET, FILM COATED ORAL at 08:04

## 2022-02-03 RX ADMIN — PANTOPRAZOLE SODIUM 40 MG: 40 TABLET, DELAYED RELEASE ORAL at 08:04

## 2022-02-03 RX ADMIN — REMDESIVIR 100 MG: 100 INJECTION, POWDER, LYOPHILIZED, FOR SOLUTION INTRAVENOUS at 15:46

## 2022-02-03 RX ADMIN — SODIUM CHLORIDE 50 ML: 9 INJECTION, SOLUTION INTRAVENOUS at 15:47

## 2022-02-03 RX ADMIN — LEVOTHYROXINE SODIUM 100 MCG: 100 TABLET ORAL at 08:04

## 2022-02-03 RX ADMIN — Medication 15 MG: at 08:03

## 2022-02-03 ASSESSMENT — ACTIVITIES OF DAILY LIVING (ADL)
TOILETING_ISSUES: NO
VISION_MANAGEMENT: GLASSES
HEARING_DIFFICULTY_OR_DEAF: NO
ADLS_ACUITY_SCORE: 11
ADLS_ACUITY_SCORE: 6
ADLS_ACUITY_SCORE: 7
DIFFICULTY_COMMUNICATING: NO
ADLS_ACUITY_SCORE: 11
ADLS_ACUITY_SCORE: 6
ADLS_ACUITY_SCORE: 13
DIFFICULTY_EATING/SWALLOWING: NO
CONCENTRATING,_REMEMBERING_OR_MAKING_DECISIONS_DIFFICULTY: NO
ADLS_ACUITY_SCORE: 11
NUMBER_OF_TIMES_PATIENT_HAS_FALLEN_WITHIN_LAST_SIX_MONTHS: 2
ADLS_ACUITY_SCORE: 11
ADLS_ACUITY_SCORE: 13
ADLS_ACUITY_SCORE: 11
ADLS_ACUITY_SCORE: 7
ADLS_ACUITY_SCORE: 13
DRESSING/BATHING_DIFFICULTY: NO
ADLS_ACUITY_SCORE: 7
ADLS_ACUITY_SCORE: 11
ADLS_ACUITY_SCORE: 13
FALL_HISTORY_WITHIN_LAST_SIX_MONTHS: YES
ADLS_ACUITY_SCORE: 13
ADLS_ACUITY_SCORE: 11
ADLS_ACUITY_SCORE: 11
WEAR_GLASSES_OR_BLIND: YES
ADLS_ACUITY_SCORE: 6
ADLS_ACUITY_SCORE: 6
ADLS_ACUITY_SCORE: 11
WALKING_OR_CLIMBING_STAIRS_DIFFICULTY: NO
ADLS_ACUITY_SCORE: 6
DEPENDENT_IADLS:: INDEPENDENT
ADLS_ACUITY_SCORE: 11
DOING_ERRANDS_INDEPENDENTLY_DIFFICULTY: NO
ADLS_ACUITY_SCORE: 11

## 2022-02-03 NOTE — PROGRESS NOTES
[facility] team paged about pts high BG levels throughout day even after sliding scale insulin admin.

## 2022-02-03 NOTE — CONSULTS
[facility]  Transplant Nephrology Consult  Date of Admission:  2/2/2022  Today's Date: 02/03/2022  Requesting physician: Lev Luna MD    Recommendations:  Continue cyclosporine 75 mg in a.m. and 50 mg in p.m.    On dexamethasone.  Hold Myfortic    Assessment & Plan   # LDKT: Trend up   - Baseline Creatinine: ~  1.2 - 1.3   - Proteinuria: Normal (<0.2 grams)   - Date DSA Last Checked: May/2007      Latest DSA: No   - BK Viremia: No, last checked 06/2015   - Kidney Tx Biopsy: Yes, 2007 no rejection       # Immunosuppression: Cyclosporine (goal 50-75) and Mycophenolic acid (dose 540 mg every 12 hours)   - Changes:Yes Continue cyclosporine 75 mg in a.m. and 50 mg in p.m.  On dexamethasone.  Hold Myfortic  Cyclosporine trough level 93    # Infection Prophylaxis:   - PJP: Sulfa/TMP (Bactrim)    # COVID19 infection  Symptom onset 1/25/22  First Test positive on 1/31/22  Fully vaccinated   On dexa + remdesivir      # Hypertension: Controlled;  Goal BP: < 130/80   - Volume status: Euvolemic    - Changes: No    # Diabetes: Controlled (HbA1c <7%) Last HbA1c: 6.9%   - Management as per primary team.    # Anemia in Chronic Renal Disease: Hgb: Stable      CAMACHO: No   - Iron studies: Not checked recently    # Mineral Bone Disorder:   - Secondary renal hyperparathyroidism; PTH level: Not checked recently        On treatment: None  - Vitamin D; level: Not checked recently        On supplement: No  - Calcium; level: Normal        On supplement: No  - Phosphorus; level: Normal        On supplement: No    # Electrolytes:   - Potassium; level: Normal        On supplement: No  - Magnesium; level: Normal        On supplement: No  - Bicarbonate; level: Normal        On supplement: No  - Sodium; level: Normal     # Hx of coronary artery disease s/p remote stenting: Recommend periodic follow-up with cardiology.    # Transplant History:  Etiology of Kidney Failure: Diabetes mellitus  Tx:  LDKT  Transplant: 12/27/2005 (Kidney)  Donor Type: Living      Donor Class:   Significant changes in immunosuppression: None  Significant transplant-related complications: None      Recommendations were communicated to the primary team via this note.    Seen and discussed with Dr. Marilynn Vang MD  Pager: [phone]    REASON FOR CONSULT   LDKT, IS management    History of Present Illness   Viv Shaw is a 71 year old female admitted on 2/2/2022. 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   SOB improved  No CP  No Abd pain/n/v        Review of Systems    The 10 point Review of Systems is negative other than noted in the HPI or here.     Past Medical History    I have reviewed this patient's medical history and updated it with pertinent information if needed.   Past Medical History:   Diagnosis Date     Abdominal wall hernia     RLQ     AK (actinic keratosis) 08/06/2020     Allergic rhinitis      CAD (coronary artery disease)     coronary artery disease s/p PCI to LAD in 2005coronary artery disease s/p PCI to LAD in 2005     Diabetes mellitus, type 2 (H)     follows up with endocrinologist.     Dyslipidemia      GERD (gastroesophageal reflux disease)      H/O diabetic nephropathy     s/p kidney trnsplant     Hypertension      Hypothyroidism      Immunosuppressed status (H)      Kidney replaced by transplant     Rt     PONV (postoperative nausea and vomiting)      Shingles      Vitamin B12 deficiency anemia        Past Surgical History   I have reviewed this patient's surgical history and updated it with pertinent information if needed.  Past Surgical History:   Procedure Laterality Date     APPENDECTOMY NOS       ARTHROSCOPY SHOULDER ROTATOR CUFF REPAIR Left      COLONOSCOPY N/A 6/7/2016    Procedure: COLONOSCOPY;  Surgeon: Rashard Snider MD;  Location:  GI     Coronary angioplasty with stent  2005     HYSTERECTOMY        Kidney Transplant NOS Right      LAPAROSCOPIC CHOLECYSTECTOMY       NOSE SURGERY       OPEN SEPARATION COMPONENT HERNIORRHAPHY N/A 10/16/2017    Procedure: OPEN SEPARATION COMPONENT HERNIORRHAPHY;;  Surgeon: CARL Lott MD;  Location: [facility] OR     RECONSTRUCT ABDOMINAL WALL N/A 10/16/2017    Procedure: RECONSTRUCT ABDOMINAL WALL;  Exploratory Laparotomy, Lysis of Adhesions, Abdominal Wall reconstruction, Inlay Xen AB mesh, Mitek Suture Sem[place] and Umbilical Hernia Repair.;  Surgeon: CARL Lott MD;  Location: [facility] OR     REMOVE CATARACT INTRACAP,INSERT LENS Right      TONSILLECTOMY         Family History   I have reviewed this patient's family history and updated it with pertinent information if needed.   Family History   Problem Relation Age of Onset     Respiratory Mother          age 71     Cardiovascular Father          age 75 hyertension     Arthritis Sister         living, healthy     Family History Negative Brother          age 44     Colon Cancer No family hx of        Social History   I have reviewed this patient's social history and updated it with pertinent information if needed. Viv Shaw  reports that she has never smoked. She has never used smokeless tobacco. She reports that she does not drink alcohol and does not use drugs.    Allergies   Allergies   Allergen Reactions     Iron [Ferrous Sulfate] GI Disturbance     Prior to Admission Medications     remdesivir  100 mg Intravenous Q24H    And     sodium chloride 0.9%  50 mL Intravenous Q24H     amLODIPine  10 mg Oral Daily     aspirin  81 mg Oral Daily     atorvastatin  20 mg Oral Daily     azithromycin  250 mg Oral Daily     cefTRIAXone  1 g Intravenous Q24H     chlorthalidone  25 mg Oral Daily     cycloSPORINE modified  25 mg Oral Daily     cycloSPORINE modified  50 mg Oral BID IS     dexamethasone  6 mg Oral Daily     enoxaparin ANTICOAGULANT  40 mg Subcutaneous Q24H     insulin aspart  1-10 Units  "Subcutaneous TID AC     insulin aspart  1-7 Units Subcutaneous At Bedtime     insulin glargine  15 Units Subcutaneous At Bedtime     isosorbide mononitrate  15 mg Oral Daily     levothyroxine  100 mcg Oral Daily     losartan  50 mg Oral Daily     metoprolol succinate ER  25 mg Oral At Bedtime     pantoprazole  40 mg Oral Daily     polyethylene glycol  17 g Oral Daily     sodium chloride (PF)  3 mL Intracatheter Q8H     Vitamin D3  50 mcg Oral Daily         Physical Exam   Temp  Av.2  F (36.8  C)  Min: 97.8  F (36.6  C)  Max: 98.7  F (37.1  C)      Pulse  Av.2  Min: 66  Max: 70 Resp  Av.6  Min: 16  Max: 18  SpO2  Av.6 %  Min: 88 %  Max: 99 %     /47 (BP Location: Left arm)   Pulse 68   Temp 98.7  F (37.1  C) (Oral)   Resp 18   Ht 1.6 m (5' 3\")   Wt 74.8 kg (165 lb)   SpO2 90%   BMI 29.23 kg/m     Date 22 07 - 22 0659   Shift 0154-9934 9176-5508 4357-0783 24 Hour Total   INTAKE   P.O. 300   300   Shift Total(mL/kg) 300(4.01)   300(4.01)   OUTPUT   Urine 400   400   Shift Total(mL/kg) 400(5.34)   400(5.34)   Weight (kg) 74.84 74.84 74.84 74.84      Admit Weight: 74.8 kg (165 lb)     GENERAL APPEARANCE: alert and no distress  HENT: mouth without ulcers or lesions  LYMPHATICS: no cervical or supraclavicular nodes  RESP: lungs clear to auscultation - no rales, rhonchi or wheezes  CV: regular rhythm, normal rate, no rub, no murmur  EDEMA: no LE edema bilaterally  ABDOMEN: soft, nondistended, nontender, bowel sounds normal  MS: extremities normal - no gross deformities noted, no evidence of inflammation in joints, no muscle tenderness  SKIN: no rash    Data   CMP  Recent Labs   Lab 22  1550 22  1201 22  0738 22  0544 22  0157 22  2237 22  1703 22  0940   NA  --   --   --  139  --   --   --  139   POTASSIUM  --   --   --  4.1  --  4.1  --  3.5   CHLORIDE  --   --   --  107  --   --   --  105   CO2  --   --   --  25  --   --   --  " 26   ANIONGAP  --   --   --  7  --   --   --  8   * 238* 300* 336*   < >  --    < > 142*   BUN  --   --   --  32*  --   --   --  32*   CR  --   --   --  1.19*  --   --   --  1.43*   GFRESTIMATED  --   --   --  48*  --   --   --  39*   NAVI  --   --   --  8.2*  --   --   --  8.9   MAG  --   --   --   --   --   --   --  1.7   PROTTOTAL  --   --   --  6.3*  --   --   --  7.2   ALBUMIN  --   --   --  2.2*  --   --   --  2.7*   BILITOTAL  --   --   --  0.3  --   --   --  0.6   ALKPHOS  --   --   --  101  --   --   --  105   AST  --   --   --  14  --   --   --  19   ALT  --   --   --  20  --   --   --  22    < > = values in this interval not displayed.     CBC  Recent Labs   Lab 02/03/22  0544 02/02/22  0940   HGB 10.4* 11.1*   WBC 11.4* 14.8*   RBC 3.69* 3.84   HCT 33.4* 35.5   MCV 91 92   MCH 28.2 28.9   MCHC 31.1* 31.3*   RDW 13.0 13.1    322     INR  Recent Labs   Lab 02/02/22  0940   INR 1.00     ABG  Recent Labs   Lab 02/02/22  0932   PH 7.35      Urine Studies  Recent Labs   Lab Test 02/03/22  0347 10/19/17  1819   COLOR Yellow Yellow   APPEARANCE Clear Clear   URINEGLC 1000 * >1000*   URINEBILI Negative Negative   URINEKETONE Negative Negative   SG 1.023 1.013   UBLD Negative Negative   URINEPH 5.5 5.0   PROTEIN 10 * 10*   NITRITE Negative Negative   LEUKEST Negative Negative   RBCU 1 1   WBCU 4 1     Recent Labs   Lab Test 03/01/21  0620 06/29/20  0630 03/27/20  0640 10/04/19  0556 04/04/19  0605 09/20/18  0653 11/30/17  0616 06/29/17  0620 07/05/16  0618 06/18/15  0645 12/04/14  0721 12/02/13  0654   UTPG 0.13 0.12 0.11 0.14 0.07 0.10 0.11 0.13 0.13 0.11 0.09 0.11     PTH  No lab results found.  Iron Studies  Recent Labs   Lab Test 10/19/17  0747 12/05/16  1400 12/07/15  1357   IRON 29* 37 36    290 315   IRONSAT 10* 13* 11*   PROSPER 181 130 55       IMAGING:  All imaging studies reviewed by me.  Patient was seen and evaluated by me, Chapincito Subramanian MD. I have reviewed the note and agree with  the the plan of care as documented by the fellow.

## 2022-02-03 NOTE — PROGRESS NOTES
02/03/22 0901   Quick Adds   Type of Visit Initial PT Evaluation       Present no   Living Environment   People in home spouse   Current Living Arrangements condominium   Home Accessibility no concerns  (elevator access)   Transportation Anticipated family or friend will provide   Living Environment Comments Patient lives in one story condo with spouse. Walk in shower. Grab bars in shower, built in shower seat.    Self-Care   Usual Activity Tolerance moderate   Current Activity Tolerance fair   Regular Exercise No   Equipment Currently Used at Home none   Activity/Exercise/Self-Care Comment Patient reports being independent with mobility without AD. Patient ind with ADL's and IADL's at baseline. She does report getting help from  with donning bra, otherise ind with dressing.    Disability/Function   Hearing Difficulty or Deaf no   Wear Glasses or Blind yes   Vision Management glasses   Concentrating, Remembering or Making Decisions Difficulty no   Difficulty Communicating no   Difficulty Eating/Swallowing no   Walking or Climbing Stairs Difficulty no   Dressing/Bathing Difficulty no   Toileting issues no   Doing Errands Independently Difficulty (such as shopping) yes   Errands Management spouse assists   Fall history within last six months yes   Number of times patient has fallen within last six months 1   Change in Functional Status Since Onset of Current Illness/Injury yes   General Information   Onset of Illness/Injury or Date of Surgery 02/02/22   Referring Physician Lev Luna MD   Patient/Family Therapy Goals Statement (PT) to return home   Pertinent History of Current Problem (include personal factors and/or comorbidities that impact the POC) per chart: Viv Shaw is a 71 year old female admitted on 2/2/2022. 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   Existing Precautions/Restrictions  fall;oxygen therapy device and L/min  (1L NC with activity, room air at rest)   Weight-Bearing Status - LUE full weight-bearing   Weight-Bearing Status - RUE full weight-bearing   Weight-Bearing Status - LLE full weight-bearing   Weight-Bearing Status - RLE full weight-bearing   General Observations Activity: Up Ad Pamela   Cognition   Orientation Status (Cognition) oriented to;person;place;time   Cognitive Status Comments Patient answers questions appropriately during session. Did observe mild confusion during conversation, therapist often repeating cues and orienting to task.    Pain Assessment   Patient Currently in Pain No   Integumentary/Edema   Integumentary/Edema no deficits were identifed   Posture    Posture Forward head position;Protracted shoulders   Range of Motion (ROM)   ROM Comment B LE WFL   Strength   Strength Comments B LE grossly 4/5, general weakness/decreased endurance noted   Bed Mobility   Comment (Bed Mobility) sup <> sit with SBA-mod I with bed rail and HOB elevated   Transfers   Transfer Safety Comments sit <> stand with SBA   Gait/Stairs (Locomotion)   Comment (Gait/Stairs) Pt ambulates in room 10ft with no AD and CGA. Pt demos short shuffled steps, decreased foot clearance bilaterally   Balance   Balance Comments Pt able to maintain seated balance at EOB with SBA. CGA standing balance/gait   Sensory Examination   Sensory Perception patient reports no sensory changes   Clinical Impression   Criteria for Skilled Therapeutic Intervention yes, treatment indicated   PT Diagnosis (PT) impaired functional mobility   Influenced by the following impairments decreased endurance/activity tolerance, weakness, impaired balance   Functional limitations due to impairments gait, stairs, community distances/gait endurance   Clinical Presentation Stable/Uncomplicated   Clinical Presentation Rationale clinical judgement   Clinical Decision Making (Complexity) low complexity   Therapy Frequency (PT) 5x/week    Predicted Duration of Therapy Intervention (days/wks) 1-2 weeks   Planned Therapy Interventions (PT) balance training;bed mobility training;gait training;home exercise program;neuromuscular re-education;patient/family education;stair training;strengthening;transfer training;progressive activity/exercise   Risk & Benefits of therapy have been explained evaluation/treatment results reviewed;care plan/treatment goals reviewed   Clinical Impression Comments Patient presents with general deconditioning secondary to COVID. Pt currently requiring 1L NC with activity. Pt to benefit from skilled inpatient PT for progression of functional endurance and return to PLOF.   PT Discharge Planning    PT Discharge Recommendation (DC Rec) home with assist;home with home care physical therapy   PT Rationale for DC Rec Patient mobilizing well with CGA. Anticipate pt will be safe to d/c home with assist once medically cleared. Recommend home PT for continued progression towards PLOF   PT Brief overview of current status  CGA in room mobility with gait belt   Total Evaluation Time   Total Evaluation Time (Minutes) 10

## 2022-02-03 NOTE — PROGRESS NOTES
[facility]    Medicine Progress Note - Hospitalist Service, GOLD TEAM 12    Date of Admission:  2/2/2022    Assessment & Plan          Viv Shaw is a 71 year old female admitted on 2/2/2022. 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     # Confirmed COVID-19 infection    # Acute Hypoxic Respiratory Failure secondary to COVID-19 infection   # Leukocytosis, resolving  # Possible CAP     Symptom Onset 1/25/22   Date of 1st Positive Test 1/31/22   Vaccination Status Fully Vaccinated       - COVID-19 special precautions, continuous pulse-ox  - Oxygen: continue current support with nasal cannula at 1-2 L/min; titrate to keep SpO2 between 90-96%  - Labs: Labs notable for leukocytosis    - Imaging: chest x-ray: perihilar and retrocardiac opacity  - Breathing treatments: no inhalers needed; avoid nebulizers in favor of MDIs   - IV fluids: ordered at a rate of 100 mL/hr; hydrate cautiously to avoid worsening respiratory status with volume overload.  - Antibiotics:Ceftriaxone, azithro  - COVID-Focused Medications: Dexamethasone 6 mg x 10 days or until hospital discharge, started on 2/2/22 and Remdesivir x 5 days or until hospital discharge, started on 2/2/22  - DVT Prophylaxis: at high risk of thrombotic complications due to COVID-19 (DDimer = 0.31 ug/mL FEU (Ref range: 0.00 - 0.50 ug/mL FEU) ).          - PROPHYLACTIC dosing: lovenox 40mg daily        - consider anticoag on discharge for 30 days & until return to normal mobility    # Diabetic Nephropathy s/p LDKT  - Baseline Cr 1.2-1.4, currently stable   - Continue Cyclosporine (75/50)  - Hold MMF for now   - Consult Trsplt Nephrology     # DMII  # Steroid induced hyperglycemia   - On Lantus 13 U nightly and sliding scale PTA  - Increase Lantus to 15U nightly, high sliding scale   - Monitor for hypoglycemia   - Diabetic diet     # HTN  # CAD  # HLD  -  "Cont ASA, statin   - Cont amlodipine, losartan, hydrochlorothiazide  - Cont metoprolol, Imdur     # Hypothyroid  - Cont synthroid     # GERD  - Cont PPI       Diet: Consistent Carbohydrate Diet Moderate Consistent Carb (60 g CHO per Meal) Diet    DVT Prophylaxis: Enoxaparin (Lovenox) SQ  Arevalo Catheter: Not present  Central Lines: None  Cardiac Monitoring: None  Code Status: Full Code      Disposition Plan   Expected Discharge: 02/04/2022     Anticipated discharge location:  Awaiting care coordination huddle  Delays:          The patient's care was discussed with the Patient.    Lev Luna MD  Hospitalist Service, GOLD TEAM [address] [place] [address]  Securely message with the Vocera Web Console (learn more here)  Text page via Ever[employer] Paging/Directory   Please see signed in provider for up to date coverage information      Clinically Significant Risk Factors Present on Admission               # Platelet Defect: home medication list includes an antiplatelet medication   # Overweight: Estimated body mass index is 29.23 kg/m  as calculated from the following:    Height as of this encounter: 1.6 m (5' 3\").    Weight as of this encounter: 74.8 kg (165 lb).      ______________________________________________________________________    Interval History   No acute events overnight, breathing stable, continues to require oxygen which is new since this admission. Requesting help to get out of bed and walk around     Data reviewed today: I reviewed all medications, new labs and imaging results over the last 24 hours. I personally reviewed no images or EKG's today.    Physical Exam   Vital Signs: Temp: 98  F (36.7  C) Temp src: Oral BP: 139/57 Pulse: 69   Resp: 17 SpO2: 95 % O2 Device: None (Room air) Oxygen Delivery: 2 LPM  Weight: 165 lbs 0 oz    Constitutional: Awake, alert, cooperative, no apparent distress  Respiratory: Clear to auscultation bilaterally  Cardiovascular: Regular rate " and rhythm  GI: Normal bowel sounds, soft, non-distended, non-tender  Skin/Integumen: No rashes, no cyanosis      Data   Recent Labs   Lab 02/03/22  0738 02/03/22  0544 02/03/22  0157 02/02/22  2237 02/02/22  1703 02/02/22  0940   WBC  --  11.4*  --   --   --  14.8*   HGB  --  10.4*  --   --   --  11.1*   MCV  --  91  --   --   --  92   PLT  --  300  --   --   --  322   INR  --   --   --   --   --  1.00   NA  --  139  --   --   --  139   POTASSIUM  --  4.1  --  4.1  --  3.5   CHLORIDE  --  107  --   --   --  105   CO2  --  25  --   --   --  26   BUN  --  32*  --   --   --  32*   CR  --  1.19*  --   --   --  1.43*   ANIONGAP  --  7  --   --   --  8   NAVI  --  8.2*  --   --   --  8.9   * 336* 318*  --    < > 142*   ALBUMIN  --  2.2*  --   --   --  2.7*   PROTTOTAL  --  6.3*  --   --   --  7.2   BILITOTAL  --  0.3  --   --   --  0.6   ALKPHOS  --  101  --   --   --  105   ALT  --  20  --   --   --  22   AST  --  14  --   --   --  19    < > = values in this interval not displayed.     Recent Results (from the past 24 hour(s))   XR Chest Port 1 View    Narrative    EXAM: XR CHEST PORT 1 VIEW  2/2/2022 11:03 AM     HISTORY: Shortness of breath. 71-year-old female with past medical  HISTORY of coronary artery disease, hypertension, chronic kidney  disease stage III status post kidney transplant 2006  (immunosuppressed), and diabetes, presenting for concern of COVID 19,  1 week history of cough, weakness, and progressive shortness of  breath. COVID POSITIVE. 1/30/2022.    COMPARISON:  CT chest 4/20/2018 and chest x-ray 10/19/2017.    FINDINGS:   AP portable chest. Trachea midline. Stable cardiomediastinal  silhouette. Aortic atherosclerosis. Moderate perihilar/retrocardiac  opacities. Probable trace bilateral pleural effusions. No  pneumothorax. Degenerative changes of shoulder joints. No acute  osseous or soft tissue abnormalities.      Impression    IMPRESSION:  1. Moderate perihilar/retrocardiac opacities which  may represent an  infectious etiology, which would be compatible with provided history,  probably also represent mild pulmonary edema/atelectasis.  2. Probable trace bilateral pleural effusions. Lateral view may be  helpful in assessment of retrocardiac opacities/pleural effusions.    I have personally reviewed the examination and initial interpretation  and I agree with the findings.    ARTUR HOOVER MD         SYSTEM ID:  [id number]

## 2022-02-03 NOTE — CONSULTS
Care Management Initial Consult    General Information  Assessment completed with: Patient,    Type of CM/SW Visit: Initial Assessment    Primary Care Provider verified and updated as needed: Yes   Readmission within the last 30 days:        Reason for Consult: discharge planning  Advance Care Planning: Advance Care Planning Reviewed: no concerns identified          Communication Assessment  Patient's communication style: spoken language (English or Bilingual)    Hearing Difficulty or Deaf: no   Wear Glasses or Blind: yes    Cognitive  Cognitive/Neuro/Behavioral: WDL                      Living Environment:   People in home: spouse     Current living Arrangements: condominium      Able to return to prior arrangements: yes       Family/Social Support:  Care provided by: self,spouse/significant other  Provides care for: no one  Marital Status:   ,Children          Description of Support System: Supportive,Involved    Support Assessment: Adequate family and caregiver support,Adequate social supports    Current Resources:   Patient receiving home care services: No     Community Resources: None  Equipment currently used at home: none  Supplies currently used at home: None    Employment/Financial:  Employment Status: retired        Financial Concerns: No concerns identified           Lifestyle & Psychosocial Needs:  Social Determinants of Health     Tobacco Use: Low Risk      Smoking Tobacco Use: Never Smoker     Smokeless Tobacco Use: Never Used   Alcohol Use: Not on file   Financial Resource Strain: Not on file   Food Insecurity: Not on file   Transportation Needs: Not on file   Physical Activity: Not on file   Stress: Not on file   Social Connections: Not on file   Intimate Partner Violence: Not on file   Depression: Not at risk     PHQ-2 Score: 0   Housing Stability: Not on file       Functional Status:  Prior to admission patient needed assistance:   Dependent ADLs:: Independent  Dependent IADLs::  Independent  Assesssment of Functional Status: At functional baseline    Mental Health Status:  Mental Health Status: No Current Concerns       Chemical Dependency Status:  Chemical Dependency Status: No Current Concerns             Values/Beliefs:  Spiritual, Cultural Beliefs, Christian Practices, Values that affect care: no               Additional Information:  Patient is a 71 year old female admitted on 2/2/2022. 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  PT evaluated the patient and are recommending home PT at time of discharge.  Called patient by phone to complete care management assessment.  Patient lives with her spouse, Tirso, in a condominium in [place], [place].  Her daughter and son live close by and are supportive of her.  She had no services prior to admission and was independent with cares.  Tirso also recently had COVID, but is now feeling well.  She reports that he will be able to assist her post discharge.  Dicussed home care recommendations.  She is agreeable to a home health referral for home RN/PT.  Pt did not have a preference of agency.  Referral made to [facility] for RN/PT, they can accept with start of care on 2/10.  Pt's spouse can provide transportation to home at time of discharge.  RNCC will continue to follow and assist with discharge planning.     [facility](RN/PT-pending referral)  Phone: [phone]  Fax: [phone]     KAYLYNN Barboza  Phone: [phone]  Pager: [phone]  To contact the weekend RNCC, Page: [phone]

## 2022-02-03 NOTE — PLAN OF CARE
"BP (!) 161/55 (BP Location: Left arm)   Pulse 70   Temp 98.3  F (36.8  C) (Oral)   Resp 18   Ht 1.6 m (5' 3\")   Wt 74.8 kg (165 lb)   SpO2 97%   BMI 29.23 kg/m        Neuros: Alert and oriented. Denies any numbness/tingling.     Cardiac: Elevated Bps but within parameters. Denies any chest pain.     Respiratory: On 2.5L via NC, denies any SOB.     GI/Gu: Bladder scanned in beginning of shift for 190- voided 200. Then went 90 again and bladder scanned for 60- each time pts brief has also been wet. LBM 2/1 and states that she doesn't normally go everyday.     Skin/Incisions: See skin note below. No new skin deficits. PIV was running with NS @ 75mL/hr but was to be discontinued at 0200 per MAR.     Pain: Denies pain.     Diet: CHO 60g.    Lab:  and 318.     Activity: Assist x1- gait slightly wobbly. Ordered a walk to help.     Plan: Continue with POC.       Admitted/transferred from: ED  2 RN full   skin assessment completed by Mechelle Gaona RN and Jim SCHOFIELD RN  Skin assessment finding: issues found Old incision on abd on R side, PIV and dry heels. Pt also has ambulatory glucometer on L side of abdomen.      Interventions/actions: other encourage repositioning and encourage fluids.      Will continue to monitor.      "

## 2022-02-04 ENCOUNTER — APPOINTMENT (OUTPATIENT)
Dept: PHYSICAL THERAPY | Facility: CLINIC | Age: 72
DRG: 177 | End: 2022-02-04
Payer: MEDICARE

## 2022-02-04 LAB
ALBUMIN SERPL-MCNC: 2.6 G/DL (ref 3.4–5)
ALP SERPL-CCNC: 121 U/L (ref 40–150)
ALT SERPL W P-5'-P-CCNC: 23 U/L (ref 0–50)
ANION GAP SERPL CALCULATED.3IONS-SCNC: 7 MMOL/L (ref 3–14)
AST SERPL W P-5'-P-CCNC: 17 U/L (ref 0–45)
BASOPHILS # BLD MANUAL: 0 10E3/UL (ref 0–0.2)
BASOPHILS NFR BLD MANUAL: 0 %
BILIRUB SERPL-MCNC: 0.7 MG/DL (ref 0.2–1.3)
BUN SERPL-MCNC: 40 MG/DL (ref 7–30)
CALCIUM SERPL-MCNC: 8.8 MG/DL (ref 8.5–10.1)
CHLORIDE BLD-SCNC: 109 MMOL/L (ref 94–109)
CO2 SERPL-SCNC: 24 MMOL/L (ref 20–32)
CREAT SERPL-MCNC: 1.17 MG/DL (ref 0.52–1.04)
EOSINOPHIL # BLD MANUAL: 0 10E3/UL (ref 0–0.7)
EOSINOPHIL NFR BLD MANUAL: 0 %
ERYTHROCYTE [DISTWIDTH] IN BLOOD BY AUTOMATED COUNT: 13.2 % (ref 10–15)
GFR SERPL CREATININE-BSD FRML MDRD: 49 ML/MIN/1.73M2
GLUCOSE BLD-MCNC: 318 MG/DL (ref 70–99)
GLUCOSE BLDC GLUCOMTR-MCNC: 136 MG/DL (ref 70–99)
GLUCOSE BLDC GLUCOMTR-MCNC: 269 MG/DL (ref 70–99)
GLUCOSE BLDC GLUCOMTR-MCNC: 279 MG/DL (ref 70–99)
GLUCOSE BLDC GLUCOMTR-MCNC: 291 MG/DL (ref 70–99)
HCT VFR BLD AUTO: 39.1 % (ref 35–47)
HGB BLD-MCNC: 12 G/DL (ref 11.7–15.7)
LYMPHOCYTES # BLD MANUAL: 0.6 10E3/UL (ref 0.8–5.3)
LYMPHOCYTES NFR BLD MANUAL: 4 %
MCH RBC QN AUTO: 28.2 PG (ref 26.5–33)
MCHC RBC AUTO-ENTMCNC: 30.7 G/DL (ref 31.5–36.5)
MCV RBC AUTO: 92 FL (ref 78–100)
MONOCYTES # BLD MANUAL: 0.1 10E3/UL (ref 0–1.3)
MONOCYTES NFR BLD MANUAL: 1 %
NEUTROPHILS # BLD MANUAL: 13.4 10E3/UL (ref 1.6–8.3)
NEUTROPHILS NFR BLD MANUAL: 95 %
PLAT MORPH BLD: ABNORMAL
PLATELET # BLD AUTO: 387 10E3/UL (ref 150–450)
POTASSIUM BLD-SCNC: 4.7 MMOL/L (ref 3.4–5.3)
PROT SERPL-MCNC: 7.1 G/DL (ref 6.8–8.8)
RBC # BLD AUTO: 4.25 10E6/UL (ref 3.8–5.2)
RBC MORPH BLD: ABNORMAL
SODIUM SERPL-SCNC: 140 MMOL/L (ref 133–144)
WBC # BLD AUTO: 14.1 10E3/UL (ref 4–11)

## 2022-02-04 PROCEDURE — 85027 COMPLETE CBC AUTOMATED: CPT | Performed by: INTERNAL MEDICINE

## 2022-02-04 PROCEDURE — 80053 COMPREHEN METABOLIC PANEL: CPT | Performed by: INTERNAL MEDICINE

## 2022-02-04 PROCEDURE — 258N000003 HC RX IP 258 OP 636: Performed by: INTERNAL MEDICINE

## 2022-02-04 PROCEDURE — 97116 GAIT TRAINING THERAPY: CPT | Mod: GP

## 2022-02-04 PROCEDURE — 99233 SBSQ HOSP IP/OBS HIGH 50: CPT | Mod: GC | Performed by: INTERNAL MEDICINE

## 2022-02-04 PROCEDURE — 120N000002 HC R&B MED SURG/OB UMMC

## 2022-02-04 PROCEDURE — 250N000012 HC RX MED GY IP 250 OP 636 PS 637: Performed by: INTERNAL MEDICINE

## 2022-02-04 PROCEDURE — 97530 THERAPEUTIC ACTIVITIES: CPT | Mod: GP

## 2022-02-04 PROCEDURE — 250N000009 HC RX 250: Performed by: INTERNAL MEDICINE

## 2022-02-04 PROCEDURE — 250N000011 HC RX IP 250 OP 636: Performed by: INTERNAL MEDICINE

## 2022-02-04 PROCEDURE — 97110 THERAPEUTIC EXERCISES: CPT | Mod: GP

## 2022-02-04 PROCEDURE — 250N000013 HC RX MED GY IP 250 OP 250 PS 637: Performed by: INTERNAL MEDICINE

## 2022-02-04 PROCEDURE — 99207 PR CDG-MDM COMPONENT: MEETS MODERATE - DOWN CODED: CPT | Performed by: STUDENT IN AN ORGANIZED HEALTH CARE EDUCATION/TRAINING PROGRAM

## 2022-02-04 PROCEDURE — 99232 SBSQ HOSP IP/OBS MODERATE 35: CPT | Performed by: STUDENT IN AN ORGANIZED HEALTH CARE EDUCATION/TRAINING PROGRAM

## 2022-02-04 PROCEDURE — 36415 COLL VENOUS BLD VENIPUNCTURE: CPT | Performed by: INTERNAL MEDICINE

## 2022-02-04 RX ADMIN — CYCLOSPORINE 50 MG: 25 CAPSULE ORAL at 17:34

## 2022-02-04 RX ADMIN — Medication 50 MCG: at 09:37

## 2022-02-04 RX ADMIN — Medication 15 MG: at 09:37

## 2022-02-04 RX ADMIN — POLYETHYLENE GLYCOL 3350 17 G: 17 POWDER, FOR SOLUTION ORAL at 09:45

## 2022-02-04 RX ADMIN — PANTOPRAZOLE SODIUM 40 MG: 40 TABLET, DELAYED RELEASE ORAL at 09:37

## 2022-02-04 RX ADMIN — CHLORTHALIDONE 25 MG: 25 TABLET ORAL at 09:37

## 2022-02-04 RX ADMIN — CYCLOSPORINE 25 MG: 25 CAPSULE ORAL at 09:38

## 2022-02-04 RX ADMIN — CYCLOSPORINE 50 MG: 25 CAPSULE ORAL at 09:45

## 2022-02-04 RX ADMIN — ENOXAPARIN SODIUM 40 MG: 40 INJECTION SUBCUTANEOUS at 17:34

## 2022-02-04 RX ADMIN — AMLODIPINE BESYLATE 10 MG: 10 TABLET ORAL at 09:37

## 2022-02-04 RX ADMIN — AZITHROMYCIN MONOHYDRATE 250 MG: 250 TABLET ORAL at 09:37

## 2022-02-04 RX ADMIN — REMDESIVIR 100 MG: 100 INJECTION, POWDER, LYOPHILIZED, FOR SOLUTION INTRAVENOUS at 17:33

## 2022-02-04 RX ADMIN — LOSARTAN POTASSIUM 50 MG: 25 TABLET, FILM COATED ORAL at 09:37

## 2022-02-04 RX ADMIN — METOPROLOL SUCCINATE 25 MG: 25 TABLET, EXTENDED RELEASE ORAL at 21:13

## 2022-02-04 RX ADMIN — DEXAMETHASONE 6 MG: 2 TABLET ORAL at 12:39

## 2022-02-04 RX ADMIN — ATORVASTATIN CALCIUM 20 MG: 20 TABLET, FILM COATED ORAL at 09:37

## 2022-02-04 RX ADMIN — ASPIRIN 81 MG CHEWABLE TABLET 81 MG: 81 TABLET CHEWABLE at 09:37

## 2022-02-04 RX ADMIN — CEFTRIAXONE SODIUM 1 G: 1 INJECTION, POWDER, FOR SOLUTION INTRAMUSCULAR; INTRAVENOUS at 12:39

## 2022-02-04 RX ADMIN — LEVOTHYROXINE SODIUM 100 MCG: 100 TABLET ORAL at 09:37

## 2022-02-04 RX ADMIN — SODIUM CHLORIDE 50 ML: 9 INJECTION, SOLUTION INTRAVENOUS at 17:40

## 2022-02-04 ASSESSMENT — ACTIVITIES OF DAILY LIVING (ADL)
ADLS_ACUITY_SCORE: 11

## 2022-02-04 NOTE — PLAN OF CARE
"[id number]  /47 (BP Location: Left arm)   Pulse 68   Temp 98.7  F (37.1  C) (Oral)   Resp 18   Ht 1.6 m (5' 3\")   Wt 74.8 kg (165 lb)   SpO2 92%   BMI 29.23 kg/m    Reason for admission: COVID PNA, hypoxic RF  Neuro: AOx4, forgetful (baseline per )  Pain: denies  CMS: intact  Cardiac: WDL  Resp: O2>90 on RA while at rest and awake, requires 1-2 LPM NC to maintain O2>90 with activity or while sleeping  GI/: voiding adequtely, LCM 2/3/2022  Skin/Wound: skin intact  Access: PIV infusing TKO  Labs: BG high d/t dexamethasone and COVID 300, 238, 223, and 369 treated with Lantus (am/pm) and sliding scale  Activity: Up ax1, bed alarm on, fall risk  Nutrition: 60g CHO  Changes this shift: sliding scale and lantus orders increased  Plan:  Continue to monitor, likely discharge once stable on RA with activity  "

## 2022-02-04 NOTE — PROGRESS NOTES
[facility]  Transplant Nephrology Consult  Date of Admission:  2/2/2022  Today's Date: 02/04/2022  Requesting physician: Lev Luna MD    Recommendations:  Continue cyclosporine 75 mg in a.m. and 50 mg in p.m.    On dexamethasone.  Continue to hold Myfortic    Assessment & Plan   # LDKT: Trend down   - Baseline Creatinine: ~  1.2 - 1.3   - Proteinuria: Normal (<0.2 grams)   - Date DSA Last Checked: May/2007      Latest DSA: No   - BK Viremia: No, last checked 06/2015   - Kidney Tx Biopsy: Yes, 2007 no rejection       # Immunosuppression: Cyclosporine (goal 50-75) and Mycophenolic acid (dose 540 mg every 12 hours)   - Changes:Yes Continue cyclosporine 75 mg in a.m. and 50 mg in p.m.  On dexamethasone.  Hold Myfortic  Cyclosporine trough level 93    # Infection Prophylaxis:   - PJP: Sulfa/TMP (Bactrim)    # COVID19 infection  Symptom onset 1/25/22  First Test positive on 1/31/22  Fully vaccinated   On dexa + remdesivir      # Hypertension: Controlled;  Goal BP: < 130/80   - Volume status: Euvolemic    - Changes: No    # Diabetes: Controlled (HbA1c <7%) Last HbA1c: 6.9%   - Management as per primary team.    # Anemia in Chronic Renal Disease: Hgb: Stable      CAMACHO: No   - Iron studies: Not checked recently    # Mineral Bone Disorder:   - Secondary renal hyperparathyroidism; PTH level: Not checked recently        On treatment: None  - Vitamin D; level: Not checked recently        On supplement: No  - Calcium; level: Normal        On supplement: No  - Phosphorus; level: Normal        On supplement: No    # Electrolytes:   - Potassium; level: Normal        On supplement: No  - Magnesium; level: Normal        On supplement: No  - Bicarbonate; level: Normal        On supplement: No  - Sodium; level: Normal     # Hx of coronary artery disease s/p remote stenting: Recommend periodic follow-up with cardiology.    # Transplant History:  Etiology of Kidney Failure: Diabetes  mellitus  Tx: LDKT  Transplant: 12/27/2005 (Kidney)  Donor Type: Living      Donor Class:   Significant changes in immunosuppression: None  Significant transplant-related complications: None    Recommendations were communicated to the primary team via this note.    Seen and discussed with Dr. Marilynn Vang MD  Pager: [phone]      History of Present Illness   Viv Shaw is a 71 year old female admitted on 2/2/2022. 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     Interval History  On RA  SOB improved  No CP  No Abd pain/n/v        Review of Systems    The 4 point Review of Systems is negative other than noted in the HPI or here.     Allergies   Allergies   Allergen Reactions     Iron [Ferrous Sulfate] GI Disturbance     Prior to Admission Medications     remdesivir  100 mg Intravenous Q24H    And     sodium chloride 0.9%  50 mL Intravenous Q24H     amLODIPine  10 mg Oral Daily     aspirin  81 mg Oral Daily     atorvastatin  20 mg Oral Daily     azithromycin  250 mg Oral Daily     cefTRIAXone  1 g Intravenous Q24H     chlorthalidone  25 mg Oral Daily     cycloSPORINE modified  25 mg Oral Daily     cycloSPORINE modified  50 mg Oral BID IS     dexamethasone  6 mg Oral Daily     enoxaparin ANTICOAGULANT  40 mg Subcutaneous Q24H     insulin aspart  5 Units Subcutaneous TID w/meals     insulin aspart  1-10 Units Subcutaneous TID AC     insulin aspart  1-7 Units Subcutaneous At Bedtime     insulin glargine  18 Units Subcutaneous At Bedtime     isosorbide mononitrate  15 mg Oral Daily     levothyroxine  100 mcg Oral Daily     losartan  50 mg Oral Daily     metoprolol succinate ER  25 mg Oral At Bedtime     pantoprazole  40 mg Oral Daily     polyethylene glycol  17 g Oral Daily     sodium chloride (PF)  3 mL Intracatheter Q8H     Vitamin D3  50 mcg Oral Daily        Current Facility-Administered Medications   Medication     remdesivir 100  "mg in sodium chloride 0.9 % 250 mL intermittent infusion    And     0.9% sodium chloride BOLUS     amLODIPine (NORVASC) tablet 10 mg     aspirin (ASA) chewable tablet 81 mg     atorvastatin (LIPITOR) tablet 20 mg     azithromycin (ZITHROMAX) tablet 250 mg     calcium carbonate (TUMS) chewable tablet 500-1,000 mg     cefTRIAXone (ROCEPHIN) 1 g vial to attach to  mL bag for ADULTS or NS 50 mL bag for PEDS     chlorthalidone (HYGROTON) tablet 25 mg     cycloSPORINE modified (GENGRAF BRAND) capsule 25 mg     cycloSPORINE modified (GENGRAF BRAND) capsule 50 mg     dexamethasone (DECADRON) tablet 6 mg     glucose gel 15-30 g    Or     dextrose 50 % injection 25-50 mL    Or     glucagon injection 1 mg     enoxaparin ANTICOAGULANT (LOVENOX) injection 40 mg     insulin aspart (NovoLOG) injection (RAPID ACTING)     insulin aspart (NovoLOG) injection (RAPID ACTING)     insulin aspart (NovoLOG) injection (RAPID ACTING)     insulin glargine (LANTUS PEN) injection 18 Units     isosorbide mononitrate (IMDUR) 24 hr half-tab 15 mg     levothyroxine (SYNTHROID/LEVOTHROID) tablet 100 mcg     lidocaine (LMX4) cream     lidocaine 1 % 0.1-1 mL     losartan (COZAAR) tablet 50 mg     melatonin tablet 1 mg     metoprolol succinate ER (TOPROL-XL) 24 hr tablet 25 mg     ondansetron (ZOFRAN-ODT) ODT tab 4 mg    Or     ondansetron (ZOFRAN) injection 4 mg     pantoprazole (PROTONIX) EC tablet 40 mg     polyethylene glycol (MIRALAX) Packet 17 g     sodium chloride (PF) 0.9% PF flush 3 mL     sodium chloride (PF) 0.9% PF flush 3 mL     Vitamin D3 (CHOLECALCIFEROL) tablet 50 mcg         Physical Exam   Temp  Av.2  F (36.8  C)  Min: 97.8  F (36.6  C)  Max: 98.7  F (37.1  C)      Pulse  Av.2  Min: 66  Max: 70 Resp  Av.6  Min: 16  Max: 18  SpO2  Av.6 %  Min: 88 %  Max: 99 %     BP (!) 148/72   Pulse 74   Temp 98.1  F (36.7  C) (Oral)   Resp 18   Ht 1.6 m (5' 3\")   Wt 74.8 kg (165 lb)   SpO2 93%   BMI 29.23 kg/m   "   Date 02/03/22 0700 - 02/04/22 0659   Shift 5459-1978 3744-4095 8111-9187 24 Hour Total   INTAKE   P.O. 300   300   Shift Total(mL/kg) 300(4.01)   300(4.01)   OUTPUT   Urine 400   400   Shift Total(mL/kg) 400(5.34)   400(5.34)   Weight (kg) 74.84 74.84 74.84 74.84      Admit Weight: 74.8 kg (165 lb)     GENERAL APPEARANCE: alert and no distress  HENT: mouth without ulcers or lesions  LYMPHATICS: no cervical or supraclavicular nodes  RESP: lungs clear to auscultation - no rales, rhonchi or wheezes  CV: regular rhythm, normal rate, no rub, no murmur  EDEMA: no LE edema bilaterally  ABDOMEN: soft, nondistended, nontender, bowel sounds normal  MS: extremities normal - no gross deformities noted, no evidence of inflammation in joints, no muscle tenderness  SKIN: no rash    Data   CMP  Recent Labs   Lab 02/04/22  1237 02/04/22  0910 02/04/22  0713 02/03/22  2205 02/03/22  0738 02/03/22  0544 02/03/22  0157 02/02/22  2237 02/02/22  1703 02/02/22  0940   NA  --   --  140  --   --  139  --   --   --  139   POTASSIUM  --   --  4.7  --   --  4.1  --  4.1  --  3.5   CHLORIDE  --   --  109  --   --  107  --   --   --  105   CO2  --   --  24  --   --  25  --   --   --  26   ANIONGAP  --   --  7  --   --  7  --   --   --  8   * 269* 318* 369*   < > 336*   < >  --    < > 142*   BUN  --   --  40*  --   --  32*  --   --   --  32*   CR  --   --  1.17*  --   --  1.19*  --   --   --  1.43*   GFRESTIMATED  --   --  49*  --   --  48*  --   --   --  39*   NAVI  --   --  8.8  --   --  8.2*  --   --   --  8.9   MAG  --   --   --   --   --   --   --   --   --  1.7   PROTTOTAL  --   --  7.1  --   --  6.3*  --   --   --  7.2   ALBUMIN  --   --  2.6*  --   --  2.2*  --   --   --  2.7*   BILITOTAL  --   --  0.7  --   --  0.3  --   --   --  0.6   ALKPHOS  --   --  121  --   --  101  --   --   --  105   AST  --   --  17  --   --  14  --   --   --  19   ALT  --   --  23  --   --  20  --   --   --  22    < > = values in this interval not  displayed.     CBC  Recent Labs   Lab 02/04/22  0713 02/03/22  0544 02/02/22  0940   HGB 12.0 10.4* 11.1*   WBC 14.1* 11.4* 14.8*   RBC 4.25 3.69* 3.84   HCT 39.1 33.4* 35.5   MCV 92 91 92   MCH 28.2 28.2 28.9   MCHC 30.7* 31.1* 31.3*   RDW 13.2 13.0 13.1    300 322     INR  Recent Labs   Lab 02/02/22  0940   INR 1.00     ABG  Recent Labs   Lab 02/02/22  0932   PH 7.35      Urine Studies  Recent Labs   Lab Test 02/03/22  0347 10/19/17  1819   COLOR Yellow Yellow   APPEARANCE Clear Clear   URINEGLC 1000 * >1000*   URINEBILI Negative Negative   URINEKETONE Negative Negative   SG 1.023 1.013   UBLD Negative Negative   URINEPH 5.5 5.0   PROTEIN 10 * 10*   NITRITE Negative Negative   LEUKEST Negative Negative   RBCU 1 1   WBCU 4 1     Recent Labs   Lab Test 03/01/21  0620 06/29/20  0630 03/27/20  0640 10/04/19  0556 04/04/19  0605 09/20/18  0653 11/30/17  0616 06/29/17  0620 07/05/16  0618 06/18/15  0645 12/04/14  0721 12/02/13  0654   UTPG 0.13 0.12 0.11 0.14 0.07 0.10 0.11 0.13 0.13 0.11 0.09 0.11     PTH  No lab results found.  Iron Studies  Recent Labs   Lab Test 10/19/17  0747 12/05/16  1400 12/07/15  1357   IRON 29* 37 36    290 315   IRONSAT 10* 13* 11*   PROSPER 181 130 55       IMAGING:  All imaging studies reviewed by me.    Patient was seen and evaluated by me, Chapincito Subramanian MD. I have reviewed the note and agree with the the plan of care as documented by the fellow.

## 2022-02-04 NOTE — PLAN OF CARE
"  Problem: Gas Exchange Impaired  Goal: Optimal Gas Exchange  Outcome: Improving     BP (!) 142/41 (BP Location: Left arm)   Pulse 69   Temp (!) 96.2  F (35.7  C) (Axillary)   Resp 18   Ht 1.6 m (5' 3\")   Wt 74.8 kg (165 lb)   SpO2 95%. Pt was received tonight while in bed sleeping. Pt denies having any pain or discomfort. Occasional cough noted. Lung sound is coarse. In isolation due to positive COVID. Pt is monitored frequently. Will continue to monitor.   "

## 2022-02-04 NOTE — PROGRESS NOTES
Reason for admission: COVID PNA, hypoxic RF  Neuro: AOx4, forgetful (baseline per )  Pain: denies  CMS: intact  Cardiac: WDL  Resp: On RA O2>90 while at rest. 1-2 LPM NC while sleeping.  GI/: voiding adequtely, LCM 2/4/2022  Skin/Wound: skin intact  Access: PIV infusing TKO  Labs: high blood sugar d/t dexamethasone and COVID treated with Lantus (am/pm) and sliding scale  Activity: Up ax1, bed alarm on, fall risk  Nutrition: 60g CHO  Changes this shift: None   Plan:  Continue to monitor,  stable on RA with activity

## 2022-02-04 NOTE — PROGRESS NOTES
[facility]    Medicine Progress Note - Hospitalist Service, GOLD TEAM 11    Date of Admission:  2/2/2022    Assessment & Plan         Viv Shaw is a 71 year old female admitted on 2/2/2022. 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     Interval Changes:  -Increase lantus to 18 units  -Add aspart 5 units TID AC  -Continue dexamethasone, remdesivir  -Continue IV antibiotics     # Confirmed COVID-19 infection    # Acute Hypoxic Respiratory Failure secondary to COVID-19 infection   # Leukocytosis, resolving  # Possible CAP     Symptom Onset 1/25/22   Date of 1st Positive Test 1/31/22   Vaccination Status Fully Vaccinated         - COVID-19 special precautions, continuous pulse-ox  - Oxygen: continue current support with nasal cannula at 1-2 L/min; titrate to keep SpO2 between 90-96%  - Labs: Labs notable for leukocytosis    - Imaging: chest x-ray: perihilar and retrocardiac opacity  - Breathing treatments: no inhalers needed; avoid nebulizers in favor of MDIs   - IV fluids: completed IV fluids  - Antibiotics:Ceftriaxone, azithro  - COVID-Focused Medications: Dexamethasone 6 mg x 10 days or until hospital discharge, started on 2/2/22 and Remdesivir x 5 days or until hospital discharge, started on 2/2/22  - DVT Prophylaxis: at high risk of thrombotic complications due to COVID-19 (DDimer = 0.31 ug/mL FEU (Ref range: 0.00 - 0.50 ug/mL FEU) ).          - PROPHYLACTIC dosing: lovenox 40mg daily        - consider anticoag on discharge for 30 days & until return to normal mobility     # Diabetic Nephropathy s/p LDKT  - Baseline Cr 1.2-1.4, currently stable   - Continue Cyclosporine (75/50)  - Hold MMF for now   - Consult Transplant Nephrology        # DMII  # Steroid induced hyperglycemia   - -PTA lantus 13 units  Plan:  -Lantus 18 units at bedtime  -Aspart 5 units TID AC, high dose sliding scale  -  "Diabetic diet      # HTN - Cont amlodipine, losartan, hydrochlorothiazide  # CAD, HLD Cont ASA, statin, metoprolol, Imdur      # Hypothyroid- Cont synthroid   # GERD- Cont PPI              Diet: Consistent Carbohydrate Diet Moderate Consistent Carb (60 g CHO per Meal) Diet    DVT Prophylaxis: Enoxaparin (Lovenox) SQ  Arevalo Catheter: Not present  Central Lines: None  Cardiac Monitoring: None  Code Status: Full Code      Disposition Plan   Expected Discharge: 02/04/2022     Anticipated discharge location: home with help/services    Delays:         The patient's care was discussed with the Care Coordinator/, Patient and Patient's Family.    Reagan Velasquez MD  Hospitalist Service, [address]  Securely message with the Vocera Web Console (learn more here)  Text page via [employer] Paging/Directory   Please see signed in provider for up to date coverage information      Clinically Significant Risk Factors Present on Admission             # Overweight: Estimated body mass index is 29.23 kg/m  as calculated from the following:    Height as of this encounter: 1.6 m (5' 3\").    Weight as of this encounter: 74.8 kg (165 lb).     ______________________________________________________________________    Interval History   No acute events overnight. This morning patient states her breathing is improving. She denies fever or chills. No chest pain. No shortness of breath. No nausea or vomiting. She would like me to update her . She would like to ultimately get off oxygen before going home.     Data reviewed today: I reviewed all medications, new labs and imaging results over the last 24 hours. I personally reviewed no images or EKG's today.    Physical Exam   Vital Signs: Temp: 98.1  F (36.7  C) Temp src: Oral BP: (!) 148/72 Pulse: 74   Resp: 18 SpO2: 93 % O2 Device: None (Room air) Oxygen Delivery: 2 LPM  Weight: 165 lbs 0 oz  Constitutional: alert, " oriented, no acute distress  Eyes: no icterus  ENT: supple, no JVD  Respiratory: crackles in posterior lung fields, no wheezing, no respiratory distress  Cardiovascular: normal s1, s2, RRR, no murmur  GI: soft, non-tender, non-distended, bowel sounds   Musculoskeletal: no lower extremity edema     Data   Recent Labs   Lab 02/04/22  1237 02/04/22  0910 02/04/22  0713 02/03/22  0738 02/03/22  0544 02/03/22  0157 02/02/22  2237 02/02/22  1703 02/02/22  0940   WBC  --   --  14.1*  --  11.4*  --   --   --  14.8*   HGB  --   --  12.0  --  10.4*  --   --   --  11.1*   MCV  --   --  92  --  91  --   --   --  92   PLT  --   --  387  --  300  --   --   --  322   INR  --   --   --   --   --   --   --   --  1.00   NA  --   --  140  --  139  --   --   --  139   POTASSIUM  --   --  4.7  --  4.1  --  4.1  --  3.5   CHLORIDE  --   --  109  --  107  --   --   --  105   CO2  --   --  24  --  25  --   --   --  26   BUN  --   --  40*  --  32*  --   --   --  32*   CR  --   --  1.17*  --  1.19*  --   --   --  1.43*   ANIONGAP  --   --  7  --  7  --   --   --  8   NAVI  --   --  8.8  --  8.2*  --   --   --  8.9   * 269* 318*   < > 336*   < >  --    < > 142*   ALBUMIN  --   --  2.6*  --  2.2*  --   --   --  2.7*   PROTTOTAL  --   --  7.1  --  6.3*  --   --   --  7.2   BILITOTAL  --   --  0.7  --  0.3  --   --   --  0.6   ALKPHOS  --   --  121  --  101  --   --   --  105   ALT  --   --  23  --  20  --   --   --  22   AST  --   --  17  --  14  --   --   --  19    < > = values in this interval not displayed.

## 2022-02-05 ENCOUNTER — APPOINTMENT (OUTPATIENT)
Dept: OCCUPATIONAL THERAPY | Facility: CLINIC | Age: 72
DRG: 177 | End: 2022-02-05
Attending: INTERNAL MEDICINE
Payer: MEDICARE

## 2022-02-05 ENCOUNTER — APPOINTMENT (OUTPATIENT)
Dept: PHYSICAL THERAPY | Facility: CLINIC | Age: 72
DRG: 177 | End: 2022-02-05
Payer: MEDICARE

## 2022-02-05 VITALS
TEMPERATURE: 97.7 F | HEIGHT: 63 IN | DIASTOLIC BLOOD PRESSURE: 70 MMHG | SYSTOLIC BLOOD PRESSURE: 135 MMHG | OXYGEN SATURATION: 94 % | WEIGHT: 165 LBS | RESPIRATION RATE: 18 BRPM | BODY MASS INDEX: 29.23 KG/M2 | HEART RATE: 67 BPM

## 2022-02-05 LAB
ALBUMIN SERPL-MCNC: 2.5 G/DL (ref 3.4–5)
ALP SERPL-CCNC: 102 U/L (ref 40–150)
ALT SERPL W P-5'-P-CCNC: 27 U/L (ref 0–50)
ANION GAP SERPL CALCULATED.3IONS-SCNC: 6 MMOL/L (ref 3–14)
AST SERPL W P-5'-P-CCNC: 19 U/L (ref 0–45)
BASOPHILS # BLD MANUAL: 0 10E3/UL (ref 0–0.2)
BASOPHILS NFR BLD MANUAL: 0 %
BILIRUB SERPL-MCNC: 0.3 MG/DL (ref 0.2–1.3)
BUN SERPL-MCNC: 43 MG/DL (ref 7–30)
CALCIUM SERPL-MCNC: 9.1 MG/DL (ref 8.5–10.1)
CHLORIDE BLD-SCNC: 108 MMOL/L (ref 94–109)
CO2 SERPL-SCNC: 25 MMOL/L (ref 20–32)
CREAT SERPL-MCNC: 1.25 MG/DL (ref 0.52–1.04)
EOSINOPHIL # BLD MANUAL: 0 10E3/UL (ref 0–0.7)
EOSINOPHIL NFR BLD MANUAL: 0 %
ERYTHROCYTE [DISTWIDTH] IN BLOOD BY AUTOMATED COUNT: 13.2 % (ref 10–15)
GFR SERPL CREATININE-BSD FRML MDRD: 45 ML/MIN/1.73M2
GLUCOSE BLD-MCNC: 269 MG/DL (ref 70–99)
GLUCOSE BLDC GLUCOMTR-MCNC: 233 MG/DL (ref 70–99)
GLUCOSE BLDC GLUCOMTR-MCNC: 282 MG/DL (ref 70–99)
HCT VFR BLD AUTO: 38.2 % (ref 35–47)
HGB BLD-MCNC: 12 G/DL (ref 11.7–15.7)
HOLD SPECIMEN: NORMAL
LYMPHOCYTES # BLD MANUAL: 0.7 10E3/UL (ref 0.8–5.3)
LYMPHOCYTES NFR BLD MANUAL: 5 %
MCH RBC QN AUTO: 28.4 PG (ref 26.5–33)
MCHC RBC AUTO-ENTMCNC: 31.4 G/DL (ref 31.5–36.5)
MCV RBC AUTO: 90 FL (ref 78–100)
MONOCYTES # BLD MANUAL: 0.4 10E3/UL (ref 0–1.3)
MONOCYTES NFR BLD MANUAL: 3 %
MYELOCYTES # BLD MANUAL: 0.1 10E3/UL
MYELOCYTES NFR BLD MANUAL: 1 %
NEUTROPHILS # BLD MANUAL: 12 10E3/UL (ref 1.6–8.3)
NEUTROPHILS NFR BLD MANUAL: 91 %
PLAT MORPH BLD: ABNORMAL
PLATELET # BLD AUTO: 393 10E3/UL (ref 150–450)
POTASSIUM BLD-SCNC: 4.4 MMOL/L (ref 3.4–5.3)
PROT SERPL-MCNC: 7.1 G/DL (ref 6.8–8.8)
RBC # BLD AUTO: 4.23 10E6/UL (ref 3.8–5.2)
RBC MORPH BLD: ABNORMAL
SODIUM SERPL-SCNC: 139 MMOL/L (ref 133–144)
WBC # BLD AUTO: 13.2 10E3/UL (ref 4–11)

## 2022-02-05 PROCEDURE — 250N000011 HC RX IP 250 OP 636: Performed by: INTERNAL MEDICINE

## 2022-02-05 PROCEDURE — 250N000013 HC RX MED GY IP 250 OP 250 PS 637: Performed by: INTERNAL MEDICINE

## 2022-02-05 PROCEDURE — 80053 COMPREHEN METABOLIC PANEL: CPT | Performed by: INTERNAL MEDICINE

## 2022-02-05 PROCEDURE — 36415 COLL VENOUS BLD VENIPUNCTURE: CPT | Performed by: INTERNAL MEDICINE

## 2022-02-05 PROCEDURE — 85027 COMPLETE CBC AUTOMATED: CPT | Performed by: INTERNAL MEDICINE

## 2022-02-05 PROCEDURE — 97530 THERAPEUTIC ACTIVITIES: CPT | Mod: GO

## 2022-02-05 PROCEDURE — 97530 THERAPEUTIC ACTIVITIES: CPT | Mod: GP

## 2022-02-05 PROCEDURE — 99239 HOSP IP/OBS DSCHRG MGMT >30: CPT | Performed by: STUDENT IN AN ORGANIZED HEALTH CARE EDUCATION/TRAINING PROGRAM

## 2022-02-05 PROCEDURE — 97165 OT EVAL LOW COMPLEX 30 MIN: CPT | Mod: GO

## 2022-02-05 PROCEDURE — 250N000012 HC RX MED GY IP 250 OP 636 PS 637: Performed by: INTERNAL MEDICINE

## 2022-02-05 PROCEDURE — 99233 SBSQ HOSP IP/OBS HIGH 50: CPT | Performed by: INTERNAL MEDICINE

## 2022-02-05 PROCEDURE — 97535 SELF CARE MNGMENT TRAINING: CPT | Mod: GO

## 2022-02-05 RX ORDER — PREDNISONE 10 MG/1
10 TABLET ORAL DAILY
Qty: 30 TABLET | Refills: 1 | Status: SHIPPED | OUTPATIENT
Start: 2022-02-05 | End: 2022-02-16

## 2022-02-05 RX ORDER — MYCOPHENOLIC ACID 180 MG/1
540 TABLET, DELAYED RELEASE ORAL 2 TIMES DAILY
Qty: 180 TABLET | Refills: 11 | Status: SHIPPED | OUTPATIENT
Start: 2022-02-05 | End: 2022-02-16

## 2022-02-05 RX ADMIN — Medication 50 MCG: at 08:34

## 2022-02-05 RX ADMIN — ASPIRIN 81 MG CHEWABLE TABLET 81 MG: 81 TABLET CHEWABLE at 08:34

## 2022-02-05 RX ADMIN — PANTOPRAZOLE SODIUM 40 MG: 40 TABLET, DELAYED RELEASE ORAL at 08:33

## 2022-02-05 RX ADMIN — Medication 15 MG: at 08:33

## 2022-02-05 RX ADMIN — AZITHROMYCIN MONOHYDRATE 250 MG: 250 TABLET ORAL at 08:34

## 2022-02-05 RX ADMIN — DEXAMETHASONE 6 MG: 2 TABLET ORAL at 11:22

## 2022-02-05 RX ADMIN — ATORVASTATIN CALCIUM 20 MG: 20 TABLET, FILM COATED ORAL at 08:34

## 2022-02-05 RX ADMIN — AMLODIPINE BESYLATE 10 MG: 10 TABLET ORAL at 08:33

## 2022-02-05 RX ADMIN — CYCLOSPORINE 50 MG: 25 CAPSULE ORAL at 08:37

## 2022-02-05 RX ADMIN — LEVOTHYROXINE SODIUM 100 MCG: 100 TABLET ORAL at 08:34

## 2022-02-05 RX ADMIN — LOSARTAN POTASSIUM 50 MG: 25 TABLET, FILM COATED ORAL at 08:34

## 2022-02-05 RX ADMIN — CHLORTHALIDONE 25 MG: 25 TABLET ORAL at 08:34

## 2022-02-05 RX ADMIN — POLYETHYLENE GLYCOL 3350 17 G: 17 POWDER, FOR SOLUTION ORAL at 08:36

## 2022-02-05 ASSESSMENT — ACTIVITIES OF DAILY LIVING (ADL)
ADLS_ACUITY_SCORE: 11

## 2022-02-05 NOTE — PROGRESS NOTES
[facility]  Transplant Nephrology Consult  Date of Admission:  2/2/2022  Today's Date: 02/05/2022  Requesting physician: Lev Luna MD    Recommendations:  Continue cyclosporine 75 mg in a.m. and 50 mg in p.m.   Will be discharged off dexamethasone (s/p 3 days only), please add prednisone 10 mg po every day on discharge,  If doing well -plan to resume  mg po bid on Mon with uptitration to 360 mg po bid later this week if continues to do well. 1 week later, if doing well , could consider resuming home dose of MPA and stopping prednisone   Labs Mon        Assessment & Plan   # LDKT: Trend down   - Baseline Creatinine: ~  1.2 - 1.3   - Proteinuria: Normal (<0.2 grams)   - Date DSA Last Checked: May/2007      Latest DSA: No   - BK Viremia: No, last checked 06/2015   - Kidney Tx Biopsy: Yes, 2007 no rejection       # Immunosuppression: Cyclosporine (goal 50-75) and Mycophenolic acid (dose 540 mg every 12 hours)   Current immunosuppressant regimen: Hold Myfortic.Continue cyclosporine 75 mg in a.m. and 50 mg in p.m. on dexamethasone  Cyclosporine trough level 93   - Changes: Once dexamethasone course is completed and patient remains clinically stable, start Myfortic 360 mg twice daily with prednisone 5 mg daily. After 1 week increase Myfortic to 540 mg twice daily and stop prednisone at that time.      # Infection Prophylaxis:   - PJP: Sulfa/TMP (Bactrim)    # COVID19 infection  Symptom onset 1/25/22  First Test positive on 1/31/22  Fully vaccinated   On dexa + remdesivir      # Hypertension: Controlled;  Goal BP: < 130/80   - Volume status: Euvolemic    - Changes: No    # Diabetes: Controlled (HbA1c <7%) Last HbA1c: 6.9%   - Management as per primary team.    # Anemia in Chronic Renal Disease: Hgb: Stable      CAMACHO: No   - Iron studies: Not checked recently    # Mineral Bone Disorder:   - Secondary renal hyperparathyroidism; PTH level: Not checked recently         On treatment: None  - Vitamin D; level: Not checked recently        On supplement: No  - Calcium; level: Normal        On supplement: No  - Phosphorus; level: Normal        On supplement: No    # Electrolytes:   - Potassium; level: Normal        On supplement: No  - Magnesium; level: Normal        On supplement: No  - Bicarbonate; level: Normal        On supplement: No  - Sodium; level: Normal     # Hx of coronary artery disease s/p remote stenting: Recommend periodic follow-up with cardiology.    # Transplant History:  Etiology of Kidney Failure: Diabetes mellitus  Tx: LDKT  Transplant: 12/27/2005 (Kidney)  Donor Type: Living      Donor Class:   Significant changes in immunosuppression: None  Significant transplant-related complications: None    Recommendations were communicated to the primary team via this note.      Pauline Ta MD  Pager: [phone]      History of Present Illness   iVv Shaw is a 71 year old female admitted on 2/2/2022. 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     Interval History  Doing well on RA, denies any cp or sob, no cough or diarrhea  Appetite ok      Review of Systems    The 4 point Review of Systems is negative other than noted in the HPI or here.     Allergies   Allergies   Allergen Reactions     Iron [Ferrous Sulfate] GI Disturbance     Prior to Admission Medications     remdesivir  100 mg Intravenous Q24H    And     sodium chloride 0.9%  50 mL Intravenous Q24H     amLODIPine  10 mg Oral Daily     aspirin  81 mg Oral Daily     atorvastatin  20 mg Oral Daily     azithromycin  250 mg Oral Daily     cefTRIAXone  1 g Intravenous Q24H     chlorthalidone  25 mg Oral Daily     cycloSPORINE modified  25 mg Oral Daily     cycloSPORINE modified  50 mg Oral BID IS     dexamethasone  6 mg Oral Daily     enoxaparin ANTICOAGULANT  40 mg Subcutaneous Q24H     insulin aspart  8 Units Subcutaneous TID w/meals      insulin aspart  1-10 Units Subcutaneous TID AC     insulin aspart  1-7 Units Subcutaneous At Bedtime     insulin glargine  21 Units Subcutaneous At Bedtime     isosorbide mononitrate  15 mg Oral Daily     levothyroxine  100 mcg Oral Daily     losartan  50 mg Oral Daily     metoprolol succinate ER  25 mg Oral At Bedtime     pantoprazole  40 mg Oral Daily     polyethylene glycol  17 g Oral Daily     sodium chloride (PF)  3 mL Intracatheter Q8H     Vitamin D3  50 mcg Oral Daily        Current Facility-Administered Medications   Medication     remdesivir 100 mg in sodium chloride 0.9 % 250 mL intermittent infusion    And     0.9% sodium chloride BOLUS     amLODIPine (NORVASC) tablet 10 mg     aspirin (ASA) chewable tablet 81 mg     atorvastatin (LIPITOR) tablet 20 mg     azithromycin (ZITHROMAX) tablet 250 mg     calcium carbonate (TUMS) chewable tablet 500-1,000 mg     cefTRIAXone (ROCEPHIN) 1 g vial to attach to  mL bag for ADULTS or NS 50 mL bag for PEDS     chlorthalidone (HYGROTON) tablet 25 mg     cycloSPORINE modified (GENGRAF BRAND) capsule 25 mg     cycloSPORINE modified (GENGRAF BRAND) capsule 50 mg     dexamethasone (DECADRON) tablet 6 mg     glucose gel 15-30 g    Or     dextrose 50 % injection 25-50 mL    Or     glucagon injection 1 mg     enoxaparin ANTICOAGULANT (LOVENOX) injection 40 mg     insulin aspart (NovoLOG) injection (RAPID ACTING)     insulin aspart (NovoLOG) injection (RAPID ACTING)     insulin aspart (NovoLOG) injection (RAPID ACTING)     insulin glargine (LANTUS PEN) injection 21 Units     isosorbide mononitrate (IMDUR) 24 hr half-tab 15 mg     levothyroxine (SYNTHROID/LEVOTHROID) tablet 100 mcg     lidocaine (LMX4) cream     lidocaine 1 % 0.1-1 mL     losartan (COZAAR) tablet 50 mg     melatonin tablet 1 mg     metoprolol succinate ER (TOPROL-XL) 24 hr tablet 25 mg     ondansetron (ZOFRAN-ODT) ODT tab 4 mg    Or     ondansetron (ZOFRAN) injection 4 mg     pantoprazole (PROTONIX) EC  "tablet 40 mg     polyethylene glycol (MIRALAX) Packet 17 g     sodium chloride (PF) 0.9% PF flush 3 mL     sodium chloride (PF) 0.9% PF flush 3 mL     Vitamin D3 (CHOLECALCIFEROL) tablet 50 mcg         Physical Exam   Temp  Av.2  F (36.8  C)  Min: 97.8  F (36.6  C)  Max: 98.7  F (37.1  C)      Pulse  Av.2  Min: 66  Max: 70 Resp  Av.6  Min: 16  Max: 18  SpO2  Av.6 %  Min: 88 %  Max: 99 %     /70 (BP Location: Right arm)   Pulse 67   Temp 97.7  F (36.5  C) (Oral)   Resp 18   Ht 1.6 m (5' 3\")   Wt 74.8 kg (165 lb)   SpO2 95%   BMI 29.23 kg/m     Date 22 0700 - 22 0659   Shift 3789-9142 4323-0111 1995-2496 24 Hour Total   INTAKE   P.O. 300   300   Shift Total(mL/kg) 300(4.01)   300(4.01)   OUTPUT   Urine 400   400   Shift Total(mL/kg) 400(5.34)   400(5.34)   Weight (kg) 74.84 74.84 74.84 74.84      Admit Weight: 74.8 kg (165 lb)     GENERAL APPEARANCE: alert and no distress  HENT: mouth without ulcers or lesions  LYMPHATICS: no cervical or supraclavicular nodes  RESP: lungs clear to auscultation - no rales, rhonchi or wheezes  CV: regular rhythm, normal rate, no rub, no murmur  EDEMA: no LE edema bilaterally  ABDOMEN: soft, nondistended, nontender, bowel sounds normal  MS: extremities normal - no gross deformities noted, no evidence of inflammation in joints, no muscle tenderness  SKIN: no rash    Data   CMP  Recent Labs   Lab 22  1030 22  0749 22  0206 22  2112 22  0910 22  0713 22  0738 22  0544 22  0157 22  2237 22  1703 22  0940   NA  --  139  --   --   --  140  --  139  --   --   --  139   POTASSIUM  --  4.4  --   --   --  4.7  --  4.1  --  4.1  --  3.5   CHLORIDE  --  108  --   --   --  109  --  107  --   --   --  105   CO2  --  25  --   --   --  24  --  25  --   --   --  26   ANIONGAP  --  6  --   --   --  7  --  7  --   --   --  8   * 269* 233* 291*   < > 318*   < > 336*   < >  --    < > " 142*   BUN  --  43*  --   --   --  40*  --  32*  --   --   --  32*   CR  --  1.25*  --   --   --  1.17*  --  1.19*  --   --   --  1.43*   GFRESTIMATED  --  45*  --   --   --  49*  --  48*  --   --   --  39*   NAVI  --  9.1  --   --   --  8.8  --  8.2*  --   --   --  8.9   MAG  --   --   --   --   --   --   --   --   --   --   --  1.7   PROTTOTAL  --  7.1  --   --   --  7.1  --  6.3*  --   --   --  7.2   ALBUMIN  --  2.5*  --   --   --  2.6*  --  2.2*  --   --   --  2.7*   BILITOTAL  --  0.3  --   --   --  0.7  --  0.3  --   --   --  0.6   ALKPHOS  --  102  --   --   --  121  --  101  --   --   --  105   AST  --  19  --   --   --  17  --  14  --   --   --  19   ALT  --  27  --   --   --  23  --  20  --   --   --  22    < > = values in this interval not displayed.     CBC  Recent Labs   Lab 02/05/22  0749 02/04/22  0713 02/03/22  0544 02/02/22  0940   HGB 12.0 12.0 10.4* 11.1*   WBC 13.2* 14.1* 11.4* 14.8*   RBC 4.23 4.25 3.69* 3.84   HCT 38.2 39.1 33.4* 35.5   MCV 90 92 91 92   MCH 28.4 28.2 28.2 28.9   MCHC 31.4* 30.7* 31.1* 31.3*   RDW 13.2 13.2 13.0 13.1    387 300 322     INR  Recent Labs   Lab 02/02/22  0940   INR 1.00     ABG  Recent Labs   Lab 02/02/22  0932   PH 7.35      Urine Studies  Recent Labs   Lab Test 02/03/22  0347 10/19/17  1819   COLOR Yellow Yellow   APPEARANCE Clear Clear   URINEGLC 1000 * >1000*   URINEBILI Negative Negative   URINEKETONE Negative Negative   SG 1.023 1.013   UBLD Negative Negative   URINEPH 5.5 5.0   PROTEIN 10 * 10*   NITRITE Negative Negative   LEUKEST Negative Negative   RBCU 1 1   WBCU 4 1     Recent Labs   Lab Test 03/01/21  0620 06/29/20  0630 03/27/20  0640 10/04/19  0556 04/04/19  0605 09/20/18  0653 11/30/17  0616 06/29/17  0620 07/05/16  0618 06/18/15  0645 12/04/14  0721 12/02/13  0654   UTPG 0.13 0.12 0.11 0.14 0.07 0.10 0.11 0.13 0.13 0.11 0.09 0.11     PTH  No lab results found.  Iron Studies  Recent Labs   Lab Test 10/19/17  0747 12/05/16  1400  12/07/15  1357   IRON 29* 37 36    290 315   IRONSAT 10* 13* 11*   PROSPER 181 130 55       IMAGING:  All imaging studies reviewed by me.

## 2022-02-05 NOTE — PLAN OF CARE
Occupational Therapy Discharge Summary    Reason for therapy discharge:    Discharged to home with home therapy.    Progress towards therapy goal(s). See goals on Care Plan in [facility] electronic health record for goal details.  Goals partially met.  Barriers to achieving goals:   discharge from facility.    Therapy recommendation(s):    Continued therapy is recommended.  Rationale/Recommendations:  Pt would benefit from  PT/OT to maximize ax tolerance, ADL I, and mobility.

## 2022-02-05 NOTE — PROGRESS NOTES
"VS BP (!) 149/57 (BP Location: Left arm)   Pulse 83   Temp 97.5  F (36.4  C) (Oral)   Resp 18   Ht 1.6 m (5' 3\")   Wt 74.8 kg (165 lb)   SpO2 91%   BMI 29.23 kg/m    Activity: A x1 w/ walker. Bed alarm on d/t pt being forgetful at times  Neuros: A&O x4. Able to make needs known.  Cardiac: Elevated Bps. Denies cardiac chest pain.  Respiratory: 4L NC overnight. RA during daytime  GI/: LBM 2/4. Voiding  Diet: Consistent carb  Lines: PIV SL  Labs: , 233  Pain/nausea: Denies      "

## 2022-02-05 NOTE — PLAN OF CARE
Cares 0700 to 1430  Pain: Denies  Neuros: A&o x4. Forgetful. On bed alarms.  Cardiac: WDL. Denies chest pain.  Resp: Unlabored, denies SOB. O2 >95% on room air.   GI/: WDL  Skin: No new concerns.   Activity: Ax1 walker in room.     Discharged to: Home  Transportation:  (Bill) picking pt up from lobby. Pt went down to lobby in wheelchair escorted by NA.   Time: 1435  Prescriptions: Prednisone picked up from pharmacy & sent with pt.   Belongings: Packed & sent with pt.   PIV/Access: Removed  Care Plan and Education discontinued: Yes  Paperwork: AVS signed in chart.

## 2022-02-05 NOTE — PROGRESS NOTES
"   02/05/22 0913   Quick Adds   Type of Visit Initial Occupational Therapy Evaluation       Present no   Language English   Living Environment   People in home spouse   Current Living Arrangements condominium   Home Accessibility no concerns  (elevator access)   Transportation Anticipated family or friend will provide   Living Environment Comments Pt lives in one story condo with spouse. Reports walk in shower with grab bars and built in shower seat   Self-Care   Usual Activity Tolerance moderate   Current Activity Tolerance fair   Regular Exercise No   Equipment Currently Used at Home none   Activity/Exercise/Self-Care Comment Patient reports being independent with mobility without AD. Patient ind with ADL's and IADL's at baseline. She does report getting help from  with donning bra, otherise ind with dressing.   Instrumental Activities of Daily Living (IADL)   IADL Comments Receives assist from spouse   Disability/Function   Hearing Difficulty or Deaf no   Wear Glasses or Blind yes   Vision Management glasses   Concentrating, Remembering or Making Decisions Difficulty no   Difficulty Communicating no   Difficulty Eating/Swallowing no   Walking or Climbing Stairs Difficulty no   Dressing/Bathing Difficulty no   Toileting issues no   Doing Errands Independently Difficulty (such as shopping) yes   Errands Management spouse assists   Fall history within last six months yes   Number of times patient has fallen within last six months 1   Change in Functional Status Since Onset of Current Illness/Injury yes   General Information   Onset of Illness/Injury or Date of Surgery 02/02/22   Referring Physician Lev Luna MD   Patient/Family Therapy Goal Statement (OT) to return home   Additional Occupational Profile Info/Pertinent History of Current Problem per chart, \"Viv Shaw is a 71 year old female admitted on 2/2/2022. 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 \"   Existing Precautions/Restrictions fall   Left Upper Extremity (Weight-bearing Status) full weight-bearing (FWB)   Right Upper Extremity (Weight-bearing Status) full weight-bearing (FWB)   Left Lower Extremity (Weight-bearing Status) full weight-bearing (FWB)   Right Lower Extremity (Weight-bearing Status) full weight-bearing (FWB)   General Observations and Info Demonstrating decreased insight and functional cog., requiring increased supervision completing ADL cares    Cognitive Status Examination   Orientation Status person;place   Visual Perception   Visual Impairment/Limitations corrective lenses full-time   Sensory   Sensory Quick Adds No deficits were identified   Pain Assessment   Patient Currently in Pain No   Range of Motion Comprehensive   General Range of Motion no range of motion deficits identified   Strength Comprehensive (MMT)   General Manual Muscle Testing (MMT) Assessment no strength deficits identified   Coordination   Upper Extremity Coordination No deficits were identified   Bed Mobility   Bed Mobility No deficits identified   Transfers   Transfers No deficits identified   Activities of Daily Living   BADL Assessment bathing;upper body dressing;lower body dressing;grooming;toileting   Bathing Assessment   Raymond Level (Bathing) supervision   Upper Body Dressing Assessment   Raymond Level (Upper Body Dressing) set up   Lower Body Dressing Assessment   Raymond Level (Lower Body Dressing) set up   Grooming Assessment   Raymond Level (Grooming) supervision   Toileting   Raymond Level (Toileting) supervision   Clinical Impression   Criteria for Skilled Therapeutic Interventions Met (OT) yes;meets criteria;skilled treatment is necessary   OT Diagnosis Demonstrating change in cognitive status, need for evaluation   OT Problem List-Impairments impacting ADL problems related to;activity tolerance impaired;cognition "   Assessment of Occupational Performance 1-3 Performance Deficits   Identified Performance Deficits functional cog., ax tolernace completing toileting, dressing, grooming/hygiene   Planned Therapy Interventions (OT) ADL retraining;cognition   Clinical Decision Making Complexity (OT) low complexity   Therapy Frequency (OT) 3x/week   Predicted Duration of Therapy 1-2 days   Anticipated Equipment Needs Upon Discharge (OT)   (TBD)   Risk & Benefits of therapy have been explained evaluation/treatment results reviewed;care plan/treatment goals reviewed;risks/benefits reviewed;current/potential barriers reviewed;participants voiced agreement with care plan;participants included;patient   Comment-Clinical Impression Pt would benefit form continued skilled OT while IP to maximize safety, funcitonal cog. and ax tolerance prior to return home with assist from spouse to maximize ADL I.   OT Discharge Planning    OT Discharge Recommendation (DC Rec) Home with assist   OT Rationale for DC Rec Anticipate safe discharge home with assist from spouse. Would recommend increased supervision completing ADL/mobility due to increased hx of falls and SLUMS score 20/30 indicating cog. decline.   OT Brief overview of current status  20/30 SLUMS   Total Evaluation Time (Minutes)   Total Evaluation Time (Minutes) 5

## 2022-02-05 NOTE — PROGRESS NOTES
Patient has been assessed for Home Oxygen needs.     Pulse oximetry (SpO2) and Oxygen flow readings:    SpO2 = 95% on room air at rest while awake.    SpO2 = 95% on room air during activity/with exercise.

## 2022-02-05 NOTE — PROGRESS NOTES
[facility]  Transplant Nephrology Consult  Date of Admission:  2/2/2022  Today's Date: 02/04/2022  Requesting physician: Lev Luna MD    Recommendations:  Continue cyclosporine 75 mg in a.m. and 50 mg in p.m.    On dexamethasone.  Continue to hold Myfortic    Assessment & Plan   # LDKT: Trend down   - Baseline Creatinine: ~  1.2 - 1.3   - Proteinuria: Normal (<0.2 grams)   - Date DSA Last Checked: May/2007      Latest DSA: No   - BK Viremia: No, last checked 06/2015   - Kidney Tx Biopsy: Yes, 2007 no rejection       # Immunosuppression: Cyclosporine (goal 50-75) and Mycophenolic acid (dose 540 mg every 12 hours)   Current immunosuppressant regimen: Hold Myfortic.Continue cyclosporine 75 mg in a.m. and 50 mg in p.m. on dexamethasone  Cyclosporine trough level 93   - Changes: Once dexamethasone course is completed and patient remains clinically stable, start Myfortic 360 mg twice daily with prednisone 5 mg daily. After 1 week increase Myfortic to 540 mg twice daily and stop prednisone at that time.      # Infection Prophylaxis:   - PJP: Sulfa/TMP (Bactrim)    # COVID19 infection  Symptom onset 1/25/22  First Test positive on 1/31/22  Fully vaccinated   On dexa + remdesivir      # Hypertension: Controlled;  Goal BP: < 130/80   - Volume status: Euvolemic    - Changes: No    # Diabetes: Controlled (HbA1c <7%) Last HbA1c: 6.9%   - Management as per primary team.    # Anemia in Chronic Renal Disease: Hgb: Stable      CAMACHO: No   - Iron studies: Not checked recently    # Mineral Bone Disorder:   - Secondary renal hyperparathyroidism; PTH level: Not checked recently        On treatment: None  - Vitamin D; level: Not checked recently        On supplement: No  - Calcium; level: Normal        On supplement: No  - Phosphorus; level: Normal        On supplement: No    # Electrolytes:   - Potassium; level: Normal        On supplement: No  - Magnesium; level: Normal        On  supplement: No  - Bicarbonate; level: Normal        On supplement: No  - Sodium; level: Normal     # Hx of coronary artery disease s/p remote stenting: Recommend periodic follow-up with cardiology.    # Transplant History:  Etiology of Kidney Failure: Diabetes mellitus  Tx: LDKT  Transplant: 12/27/2005 (Kidney)  Donor Type: Living      Donor Class:   Significant changes in immunosuppression: None  Significant transplant-related complications: None    Recommendations were communicated to the primary team via this note.    Seen and discussed with Dr. Marilynn Vang MD  Pager: [phone]      History of Present Illness   Viv Shaw is a 71 year old female admitted on 2/2/2022. 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     Interval History  On RA  SOB improved  No CP  No Abd pain/n/v        Review of Systems    The 4 point Review of Systems is negative other than noted in the HPI or here.     Allergies   Allergies   Allergen Reactions     Iron [Ferrous Sulfate] GI Disturbance     Prior to Admission Medications     remdesivir  100 mg Intravenous Q24H    And     sodium chloride 0.9%  50 mL Intravenous Q24H     amLODIPine  10 mg Oral Daily     aspirin  81 mg Oral Daily     atorvastatin  20 mg Oral Daily     azithromycin  250 mg Oral Daily     cefTRIAXone  1 g Intravenous Q24H     chlorthalidone  25 mg Oral Daily     cycloSPORINE modified  25 mg Oral Daily     cycloSPORINE modified  50 mg Oral BID IS     dexamethasone  6 mg Oral Daily     enoxaparin ANTICOAGULANT  40 mg Subcutaneous Q24H     insulin aspart  5 Units Subcutaneous TID w/meals     insulin aspart  1-10 Units Subcutaneous TID AC     insulin aspart  1-7 Units Subcutaneous At Bedtime     insulin glargine  18 Units Subcutaneous At Bedtime     isosorbide mononitrate  15 mg Oral Daily     levothyroxine  100 mcg Oral Daily     losartan  50 mg Oral Daily     metoprolol succinate ER  25  mg Oral At Bedtime     pantoprazole  40 mg Oral Daily     polyethylene glycol  17 g Oral Daily     sodium chloride (PF)  3 mL Intracatheter Q8H     Vitamin D3  50 mcg Oral Daily        Current Facility-Administered Medications   Medication     remdesivir 100 mg in sodium chloride 0.9 % 250 mL intermittent infusion    And     0.9% sodium chloride BOLUS     amLODIPine (NORVASC) tablet 10 mg     aspirin (ASA) chewable tablet 81 mg     atorvastatin (LIPITOR) tablet 20 mg     azithromycin (ZITHROMAX) tablet 250 mg     calcium carbonate (TUMS) chewable tablet 500-1,000 mg     cefTRIAXone (ROCEPHIN) 1 g vial to attach to  mL bag for ADULTS or NS 50 mL bag for PEDS     chlorthalidone (HYGROTON) tablet 25 mg     cycloSPORINE modified (GENGRAF BRAND) capsule 25 mg     cycloSPORINE modified (GENGRAF BRAND) capsule 50 mg     dexamethasone (DECADRON) tablet 6 mg     glucose gel 15-30 g    Or     dextrose 50 % injection 25-50 mL    Or     glucagon injection 1 mg     enoxaparin ANTICOAGULANT (LOVENOX) injection 40 mg     insulin aspart (NovoLOG) injection (RAPID ACTING)     insulin aspart (NovoLOG) injection (RAPID ACTING)     insulin aspart (NovoLOG) injection (RAPID ACTING)     insulin glargine (LANTUS PEN) injection 18 Units     isosorbide mononitrate (IMDUR) 24 hr half-tab 15 mg     levothyroxine (SYNTHROID/LEVOTHROID) tablet 100 mcg     lidocaine (LMX4) cream     lidocaine 1 % 0.1-1 mL     losartan (COZAAR) tablet 50 mg     melatonin tablet 1 mg     metoprolol succinate ER (TOPROL-XL) 24 hr tablet 25 mg     ondansetron (ZOFRAN-ODT) ODT tab 4 mg    Or     ondansetron (ZOFRAN) injection 4 mg     pantoprazole (PROTONIX) EC tablet 40 mg     polyethylene glycol (MIRALAX) Packet 17 g     sodium chloride (PF) 0.9% PF flush 3 mL     sodium chloride (PF) 0.9% PF flush 3 mL     Vitamin D3 (CHOLECALCIFEROL) tablet 50 mcg         Physical Exam   Temp  Av.2  F (36.8  C)  Min: 97.8  F (36.6  C)  Max: 98.7  F (37.1  C)      Pulse  " Av.2  Min: 66  Max: 70 Resp  Av.6  Min: 16  Max: 18  SpO2  Av.6 %  Min: 88 %  Max: 99 %     BP (!) 162/58 (BP Location: Left arm)   Pulse 73   Temp 96.8  F (36  C) (Oral)   Resp 18   Ht 1.6 m (5' 3\")   Wt 74.8 kg (165 lb)   SpO2 92%   BMI 29.23 kg/m     Date 22 07 - 22 0659   Shift 6877-3017 4194-0641 8951-0053 24 Hour Total   INTAKE   P.O. 300   300   Shift Total(mL/kg) 300(4.01)   300(4.01)   OUTPUT   Urine 400   400   Shift Total(mL/kg) 400(5.34)   400(5.34)   Weight (kg) 74.84 74.84 74.84 74.84      Admit Weight: 74.8 kg (165 lb)     GENERAL APPEARANCE: alert and no distress  HENT: mouth without ulcers or lesions  LYMPHATICS: no cervical or supraclavicular nodes  RESP: lungs clear to auscultation - no rales, rhonchi or wheezes  CV: regular rhythm, normal rate, no rub, no murmur  EDEMA: no LE edema bilaterally  ABDOMEN: soft, nondistended, nontender, bowel sounds normal  MS: extremities normal - no gross deformities noted, no evidence of inflammation in joints, no muscle tenderness  SKIN: no rash    Data   CMP  Recent Labs   Lab 22  1559 22  1237 22  0910 22  0713 22  0738 22  0544 22  0157 22  2237 22  1703 22  0940   NA  --   --   --  140  --  139  --   --   --  139   POTASSIUM  --   --   --  4.7  --  4.1  --  4.1  --  3.5   CHLORIDE  --   --   --  109  --  107  --   --   --  105   CO2  --   --   --  24  --  25  --   --   --  26   ANIONGAP  --   --   --  7  --  7  --   --   --  8   * 279* 269* 318*   < > 336*   < >  --    < > 142*   BUN  --   --   --  40*  --  32*  --   --   --  32*   CR  --   --   --  1.17*  --  1.19*  --   --   --  1.43*   GFRESTIMATED  --   --   --  49*  --  48*  --   --   --  39*   NAVI  --   --   --  8.8  --  8.2*  --   --   --  8.9   MAG  --   --   --   --   --   --   --   --   --  1.7   PROTTOTAL  --   --   --  7.1  --  6.3*  --   --   --  7.2   ALBUMIN  --   --   --  2.6*  --  2.2*  -- "   --   --  2.7*   BILITOTAL  --   --   --  0.7  --  0.3  --   --   --  0.6   ALKPHOS  --   --   --  121  --  101  --   --   --  105   AST  --   --   --  17  --  14  --   --   --  19   ALT  --   --   --  23  --  20  --   --   --  22    < > = values in this interval not displayed.     CBC  Recent Labs   Lab 02/04/22  0713 02/03/22  0544 02/02/22  0940   HGB 12.0 10.4* 11.1*   WBC 14.1* 11.4* 14.8*   RBC 4.25 3.69* 3.84   HCT 39.1 33.4* 35.5   MCV 92 91 92   MCH 28.2 28.2 28.9   MCHC 30.7* 31.1* 31.3*   RDW 13.2 13.0 13.1    300 322     INR  Recent Labs   Lab 02/02/22  0940   INR 1.00     ABG  Recent Labs   Lab 02/02/22  0932   PH 7.35      Urine Studies  Recent Labs   Lab Test 02/03/22  0347 10/19/17  1819   COLOR Yellow Yellow   APPEARANCE Clear Clear   URINEGLC 1000 * >1000*   URINEBILI Negative Negative   URINEKETONE Negative Negative   SG 1.023 1.013   UBLD Negative Negative   URINEPH 5.5 5.0   PROTEIN 10 * 10*   NITRITE Negative Negative   LEUKEST Negative Negative   RBCU 1 1   WBCU 4 1     Recent Labs   Lab Test 03/01/21  0620 06/29/20  0630 03/27/20  0640 10/04/19  0556 04/04/19  0605 09/20/18  0653 11/30/17  0616 06/29/17  0620 07/05/16  0618 06/18/15  0645 12/04/14  0721 12/02/13  0654   UTPG 0.13 0.12 0.11 0.14 0.07 0.10 0.11 0.13 0.13 0.11 0.09 0.11     PTH  No lab results found.  Iron Studies  Recent Labs   Lab Test 10/19/17  0747 12/05/16  1400 12/07/15  1357   IRON 29* 37 36    290 315   IRONSAT 10* 13* 11*   PROSPER 181 130 55       IMAGING:  All imaging studies reviewed by me.    Patient was seen and evaluated by me, Chapincito Subramanian MD. I have reviewed the note and agree with the the plan of care as documented by the fellow.  Will continue to monitor. The best timing for myfortic to restart is after the conclusion of the dexamethasone.

## 2022-02-05 NOTE — PLAN OF CARE
Activity: bed alarm, assist of 1  Neuros: alert and oriented x 4 with intermittent confusion - continue to monitor  Cardiac: denies chest pain  Respiratory:room air day, placed on 4 L NC for sleep  GI/: LBM 2/4, voiding  Diet: consistent carb, blood glucose  Skin:generalized bruising  Lines/Drains:PIV saline locked

## 2022-02-05 NOTE — PROVIDER NOTIFICATION
Provider notified (name): Arsh Clemens 11  Reason for notification: IV leaking so removed, ceftriaxone not given. Does pt need IV abx & remdesivir before discharging?     Response from provider:

## 2022-02-06 NOTE — DISCHARGE SUMMARY
[facility] [place]  Hospitalist Discharge Summary      Date of Admission:  2/2/2022  Date of Discharge:  2/5/2022  2:45 PM  Discharging Provider: Reagan Velasquez MD  Discharge Service: Hospitalist Service, GOLD TEAM 11    Discharge Diagnoses   COVID 19 pneumonia  Acute hypoxemic respiratory failure  S/p kidney transplant  Immunosuppression  Steroid induced hyperglycemia      Follow-ups Needed After Discharge   Follow-up Appointments     Adult [facility] Follow-up and recommended labs and tests      Follow up with Transplant team with labs on Monday 2/7  . The following   labs/tests are recommended: cyclosporine, cmp.    Appointments on [place] and/or [facility] (with [facility] or [facility]   provider or service). Call [phone] if you haven't heard regarding   these appointments within 7 days of discharge.             Unresulted Labs Ordered in the Past 30 Days of this Admission     Date and Time Order Name Status Description    2/2/2022 10:49 AM Blood Culture Peripheral Blood Preliminary     2/2/2022 10:49 AM Blood Culture Peripheral Blood Preliminary           Discharge Disposition   Admited to home care:    Discharged to home  Condition at discharge: Stable      Hospital Course   Viv Shaw is a 71 year old female admitted on 2/2/2022. 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 She required oxygen initially. She received steroids, remdesivir, and antibiotics. Her respiratory status improved and she was discharged without oxygen.    Hospital stay complicated by steroid induced hyperglycemia in setting of underlying diabetes requiring additional insulin.    Hospital stay complicated by immunosuppression management. Transplant nephrology consulted and helped manage immunosuppression and plan for discharge on prednisone 10 mg while holding mycophenolate with plans to follow up in transplant clinic  next week and begin resuming mycophenolate.     Consultations This Hospital Stay   NEPHROLOGY KIDNEY/PANCREAS TRANSPLANT ADULT IP CONSULT  PHYSICAL THERAPY ADULT IP CONSULT  OCCUPATIONAL THERAPY ADULT IP CONSULT  VASCULAR ACCESS CARE ADULT IP CONSULT  CARE MANAGEMENT / SOCIAL WORK IP CONSULT    Code Status   Prior    Time Spent on this Encounter   IReagan MD, personally saw the patient today and spent greater than 30 minutes discharging this patient.       Reagan Velasquez MD  [facility] [address] [address]  Phone: [phone]  ______________________________________________________________________    Physical Exam   Vital Signs: Temp: 97.7  F (36.5  C) Temp src: Oral BP: 135/70 Pulse: 67   Resp: 18 SpO2: 94 % O2 Device: None (Room air) Oxygen Delivery: 4 LPM  Weight: 165 lbs 0 oz  Constitutional: alert, oriented  Eyes: no icterus  ENT: supple, no JVDz  Respiratory: crackles in posterior lung fields, no wheezing, no respiratory distress  Cardiovascular: RRR  GI: soft, non-tender, non-distended, no tenderness over transplant site  Musculoskeletal: no edema  Neurologic: holding eye contact, following commands, moving all extremities        Primary Care Physician   Summer Vega    Discharge Orders      Home care nursing referral      MD face to face encounter    Documentation of Face to Face and Certification for Home Health Services    I certify that patient: Viv Shaw is under my care and that I, or a nurse practitioner or physician's assistant working with me, had a face-to-face encounter that meets the physician face-to-face encounter requirements with this patient on: 2/4/2022.    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    I certify that, based on my findings, the following services are medically necessary home health services: Nursing and Physical Therapy.    My clinical findings support the need for the above services because: Nurse is needed: To assess respiratory status after changes in medications or other medical regimen.. and Physical Therapy Services are needed to assess and treat the following functional impairments: mobility.    Further, I certify that my clinical findings support that this patient is homebound (i.e. absences from home require considerable and taxing effort and are for medical reasons or [employer] services or infrequently or of short duration when for other reasons) because: Requires assistance of another person or specialized equipment to access medical services because patient: Requires supervision of another for safe transfer...    Based on the above findings. I certify that this patient is confined to the home and needs intermittent skilled nursing care, physical therapy and/or speech therapy.  The patient is under my care, and I have initiated the establishment of the plan of care.  This patient will be followed by a physician who will periodically review the plan of care.  Physician/Provider to provide follow up care: Summer Vega    Attending hospital physician (the Medicare certified [place] provider): Dr. Reagan Velasquez   Physician Signature: See electronic signature associated with these discharge orders.  Date: 2/4/2022     Reason for your hospital stay    COVID 19 pneumonia     Activity    Your activity upon discharge: activity with assistive device     Adult [facility] Follow-up and recommended labs and tests    Follow up with Transplant team with labs on Monday 2/7  . The following labs/tests are recommended: cyclosporine, cmp.    Appointments on [place] and/or [facility] (with [facility] or [facility] provider or service). Call [phone] if you haven't heard regarding these  appointments within 7 days of discharge.     Discharge Instructions    please add prednisone 10 mg po every day on discharge,    If doing well -plan to resume  mg  twice a day  on Mon with uptitration to 360 mg twice a day later in the week. 1 week later, if doing well , could consider resuming home dose of MPA and stopping prednisone     Walker Order    DME Documentation:   Describe the reason for need to support medical necessity: gait abnormality; increased risk for falls.     I, the undersigned, certify that the above prescribed supplies are medically necessary for this patient and is both reasonable and necessary in reference to accepted standards of medical and necessary in reference to accepted standards of medical practice in the treatment of this patient's condition and is not prescribed as a convenience.     Diet    Follow this diet upon discharge: Orders Placed This Encounter      Consistent Carbohydrate Diet Moderate Consistent Carb (60 g CHO per Meal) Diet       Significant Results and Procedures   Most Recent 3 CBC's:Recent Labs   Lab Test 02/05/22  0749 02/04/22 0713 02/03/22  0544   WBC 13.2* 14.1* 11.4*   HGB 12.0 12.0 10.4*   MCV 90 92 91    387 300     Most Recent 3 BMP's:Recent Labs   Lab Test 02/05/22  1030 02/05/22  0749 02/05/22  0206 02/04/22  0910 02/04/22  0713 02/03/22  0738 02/03/22  0544   NA  --  139  --   --  140  --  139   POTASSIUM  --  4.4  --   --  4.7  --  4.1   CHLORIDE  --  108  --   --  109  --  107   CO2  --  25  --   --  24  --  25   BUN  --  43*  --   --  40*  --  32*   CR  --  1.25*  --   --  1.17*  --  1.19*   ANIONGAP  --  6  --   --  7  --  7   NAVI  --  9.1  --   --  8.8  --  8.2*   * 269* 233*   < > 318*   < > 336*    < > = values in this interval not displayed.     Most Recent 2 LFT's:Recent Labs   Lab Test 02/05/22  0749 02/04/22 0713   AST 19 17   ALT 27 23   ALKPHOS 102 121   BILITOTAL 0.3 0.7       Discharge Medications   Discharge Medication  "List as of 2/5/2022  1:13 PM      START taking these medications    Details   predniSONE (DELTASONE) 10 MG tablet Take 1 tablet (10 mg) by mouth daily Take while mycophenolate on hold. Will decrease to 5 mg when mycophenlate resumed., Disp-30 tablet, R-1, E-Prescribe         CONTINUE these medications which have CHANGED    Details   mycophenolic acid (GENERIC EQUIVALENT) 180 MG EC tablet Take 3 tablets (540 mg) by mouth 2 times daily Hold until instrcuted to resume by transplant team, Disp-180 tablet, R-11, E-Prescribe         CONTINUE these medications which have NOT CHANGED    Details   amLODIPine (NORVASC) 10 MG tablet Take 1 tablet (10 mg) by mouth daily, Disp-90 tablet, R-1, E-PrescribeProfile Rx: patient will contact pharmacy when needed      ASPIRIN PO Take 81 mg by mouth daily, Historical      atorvastatin (LIPITOR) 40 MG tablet TAKE 1/2 TABLET BY MOUTH EVERY DAY, Disp-45 tablet, R-1, E-Prescribe      FLORIN CONTOUR test strip R-0, KAIT, Historical      BD INSULIN SYRINGE ULTRAFINE 31G X 5/16\" 0.3 ML USING 4-5 PER DAY (aDealio 31G/0.3ML), R-6, KAIT, Historical      chlorthalidone (HYGROTON) 25 MG tablet Take 1 tablet (25 mg) by mouth daily, Disp-90 tablet, R-1, E-PrescribeProfile Rx: patient will contact pharmacy when needed      cholecalciferol (VITAMIN D3) 25 mcg (1000 units) capsule Take 2 capsules by mouth daily, Historical      Continuous Blood Gluc Sensor (DEXCOM G6 SENSOR) MISC USE AS DIRECTED FOR 90 DAYS, Historical      Continuous Blood Gluc Transmit (DEXCOM G6 TRANSMITTER) MISC See Admin Instructions, Historical      GENGRAF (BRAND) 25 MG CAPSULE Take 3 capsules (75 mg) by mouth every morning AND 2 capsules (50 mg) every evening., Disp-450 capsule, R-3, KAIT, E-Prescribe      insulin aspart (NOVOLOG PEN) 100 UNIT/ML pen Taking as directed with adjustment scale and carb coverage., No Print Out      insulin glargine (BASAGLAR KWIKPEN) 100 UNIT/ML pen Inject 14 Units Subcutaneous At Bedtime Through " Endocrinologist, HistoricalIf Basaglar is not covered by insurance, may substitute Lantus at same dose and frequency.        isosorbide mononitrate (IMDUR) 30 MG 24 hr tablet TAKE 0.5 TABLETS (15 MG) BY MOUTH 2 TIMES DAILY, Disp-90 tablet, R-2, E-Prescribe      levothyroxine (SYNTHROID/LEVOTHROID) 100 MCG tablet Take 1 tablet (100 mcg) by mouth daily, Disp-90 tablet, R-1, E-PrescribeProfile Rx: patient will contact pharmacy when needed      losartan (COZAAR) 100 MG tablet Take 0.5 tablets (50 mg) by mouth daily, Disp-45 tablet, R-1, E-Prescribe      metoprolol succinate ER (TOPROL-XL) 25 MG 24 hr tablet TAKE 1 TABLET BY MOUTH EVERYDAY AT BEDTIME, Disp-90 tablet, R-1, E-PrescribeProfile Rx: patient will contact pharmacy when needed      pantoprazole (PROTONIX) 40 MG EC tablet TAKE 1 TABLET BY MOUTH EVERY DAY, Disp-90 tablet, R-1, E-Prescribe      calcium carbonate (TUMS) 500 MG chewable tablet Take 1-2 chew tab by mouth 2 times daily as needed for heartburn, Historical           Allergies   Allergies   Allergen Reactions     Iron [Ferrous Sulfate] GI Disturbance

## 2022-02-07 ENCOUNTER — TELEPHONE (OUTPATIENT)
Dept: TRANSPLANT | Facility: CLINIC | Age: 72
End: 2022-02-07
Payer: MEDICARE

## 2022-02-07 DIAGNOSIS — Z94.0 KIDNEY TRANSPLANTED: Primary | ICD-10-CM

## 2022-02-07 LAB
BACTERIA BLD CULT: NO GROWTH
BACTERIA BLD CULT: NO GROWTH

## 2022-02-07 NOTE — TELEPHONE ENCOUNTER
----- Message from Pauline Armstrong MD sent at 2/5/2022 11:09 AM CST -----  HI Lisbeth    She will go on pred 10 & CSA--please labs Mon & check if she is doing well-could resume MPA at 180 mg po bid then again if doing well during the week increase to 360 mg po bid in few days. 1 week later could uptitrate back to home dose and stop pred    thanks

## 2022-02-07 NOTE — LETTER
PHYSICIAN ORDERS      DATE & TIME ISSUED: 2022 1:24 PM  PATIENT NAME: Viv Shaw   : 1950     [facility] MR# [if applicable]: [id number]     DIAGNOSIS:  Kidney transplant  ICD-10 CODE: Z94.0     Draw BMP and CBC on 2/10.    Any questions please call: Transplant Office  Fax results to:  [phone].      Pauline Ta MD

## 2022-02-07 NOTE — LETTER
PHYSICIAN ORDERS      DATE & TIME ISSUED: 2022 1:24 PM  PATIENT NAME: Viv Shaw   : 1950     [facility] MR# [if applicable]: [id number]     DIAGNOSIS:  Kidney transplant  ICD-10 CODE: Z94.0     Draw BMP and CBC on 2/10.    Any questions please call: Transplant Office  Fax results to:  [phone].      Pauline Ta MD

## 2022-02-07 NOTE — TELEPHONE ENCOUNTER
Call placed to Viv MELENDEZ Valeria to follow up after hospital discharge. She is feeling well at home. No SOB, oxygen levels > 92% with home oximeter. No fevers or GI symptoms reported. She is eating in small amounts and hydrating well. Viv will increase MPA to 180 mg bid and stay on CSA 75/50 mg bid and 10 mg of pred. Will not complete labs today as she already took CSA and is feeling well. Will complete labs tomorrow.

## 2022-02-08 ENCOUNTER — LAB (OUTPATIENT)
Dept: LAB | Facility: CLINIC | Age: 72
End: 2022-02-08
Payer: MEDICARE

## 2022-02-08 ENCOUNTER — TELEPHONE (OUTPATIENT)
Dept: TRANSPLANT | Facility: CLINIC | Age: 72
End: 2022-02-08
Payer: MEDICARE

## 2022-02-08 DIAGNOSIS — Z79.899 ENCOUNTER FOR LONG-TERM CURRENT USE OF MEDICATION: ICD-10-CM

## 2022-02-08 DIAGNOSIS — Z94.0 KIDNEY REPLACED BY TRANSPLANT: ICD-10-CM

## 2022-02-08 DIAGNOSIS — Z48.298 AFTERCARE FOLLOWING ORGAN TRANSPLANT: ICD-10-CM

## 2022-02-08 DIAGNOSIS — Z48.298 AFTERCARE FOLLOWING ORGAN TRANSPLANT: Primary | ICD-10-CM

## 2022-02-08 PROCEDURE — 36415 COLL VENOUS BLD VENIPUNCTURE: CPT

## 2022-02-08 PROCEDURE — 80158 DRUG ASSAY CYCLOSPORINE: CPT

## 2022-02-08 NOTE — TELEPHONE ENCOUNTER
Patient Call: Transplant Lab/Orders  Route to LPN  Post Transplant Days: [id number]  When patient is less than 60 days post-transplant, route high priority    Reason for Call: Annual lab reorder orders in Epic  Specimens drawn  Waiting for orders   Callback needed? No

## 2022-02-09 LAB
CYCLOSPORINE BLD LC/MS/MS-MCNC: 88 UG/L (ref 50–400)
TME LAST DOSE: NORMAL H
TME LAST DOSE: NORMAL H

## 2022-02-09 NOTE — TELEPHONE ENCOUNTER
Post Kidney and Pancreas Transplant Team Conference  Date: 2/9/2022  Transplant Coordinator: Lisbeth Chirinos     Attendees:  [x]  Dr. Veloz [] Adrianne Young, STEPHANIE [] Sandra Sewell LPN     []  Dr. Subramanian [] Carlotta Mata RN [] Stormy Rhodes LPN   []  Dr. Ta [] Whitley Orr RN    []  Dr. Pandey [x] Lisbeth Dillard RN [] Cesar Martinez, PharmD   [] Dr. Denney [] Shayna Kent RN    [] Dr. Villa [] Jim Reyes RN    [] Dr. Marin [] Edwige Bianchi RN [] Stephanie Lan RN   [] Dr. Delgado [] Charisse Marrufo RN    []  Dr. Chao [] Graciela Borja RN    [] Dr. Tucker [] Marjorie Gallo RN    [] Herlinda Ugalde NP [] Mariah Ellington RN        Verbal Plan Read Back:   Viv Shaw feeling well today - only symptoms she reports is fatigue. O2 sat was 95%.    Current IS is  mg bid, pred 10 and CSA 75/50 mg bid.    Baseline IS:  CSA (goal 50-75)   mg bid     Will increase MPA to 360 mg bid and decrease pred to 5 mg daily. No CSA dose change.  If stable next week, may increase MPA back to 540 mg bid and stop pred.    BMP and CBC were not drawn, message left to see if [facility] can draw tomorrow.   RNCC spoke to Viv Shaw's  Bill, he v/u or medication dose changes.     Fax [phone] for [facility].      Routed to RN Coordinator   Lisbeth Chirinos RN

## 2022-02-10 ENCOUNTER — LAB REQUISITION (OUTPATIENT)
Dept: LAB | Facility: CLINIC | Age: 72
End: 2022-02-10
Payer: MEDICARE

## 2022-02-10 ENCOUNTER — TELEPHONE (OUTPATIENT)
Dept: INTERNAL MEDICINE | Facility: CLINIC | Age: 72
End: 2022-02-10

## 2022-02-10 ENCOUNTER — MEDICAL CORRESPONDENCE (OUTPATIENT)
Dept: HEALTH INFORMATION MANAGEMENT | Facility: CLINIC | Age: 72
End: 2022-02-10
Payer: MEDICARE

## 2022-02-10 DIAGNOSIS — Z94.0 KIDNEY TRANSPLANT STATUS: ICD-10-CM

## 2022-02-10 LAB
ANION GAP SERPL CALCULATED.3IONS-SCNC: 6 MMOL/L (ref 3–14)
BUN SERPL-MCNC: 22 MG/DL (ref 7–30)
CALCIUM SERPL-MCNC: 9.3 MG/DL (ref 8.5–10.1)
CHLORIDE BLD-SCNC: 101 MMOL/L (ref 94–109)
CO2 SERPL-SCNC: 30 MMOL/L (ref 20–32)
CREAT SERPL-MCNC: 1.14 MG/DL (ref 0.52–1.04)
ERYTHROCYTE [DISTWIDTH] IN BLOOD BY AUTOMATED COUNT: 13.2 % (ref 10–15)
GFR SERPL CREATININE-BSD FRML MDRD: 51 ML/MIN/1.73M2
GLUCOSE BLD-MCNC: 194 MG/DL (ref 70–99)
HCT VFR BLD AUTO: 40.8 % (ref 35–47)
HGB BLD-MCNC: 12.7 G/DL (ref 11.7–15.7)
MCH RBC QN AUTO: 28.5 PG (ref 26.5–33)
MCHC RBC AUTO-ENTMCNC: 31.1 G/DL (ref 31.5–36.5)
MCV RBC AUTO: 92 FL (ref 78–100)
PLATELET # BLD AUTO: 290 10E3/UL (ref 150–450)
POTASSIUM BLD-SCNC: 4.3 MMOL/L (ref 3.4–5.3)
RBC # BLD AUTO: 4.46 10E6/UL (ref 3.8–5.2)
SODIUM SERPL-SCNC: 137 MMOL/L (ref 133–144)
WBC # BLD AUTO: 10.1 10E3/UL (ref 4–11)

## 2022-02-10 PROCEDURE — 85027 COMPLETE CBC AUTOMATED: CPT | Mod: ORL | Performed by: INTERNAL MEDICINE

## 2022-02-10 PROCEDURE — 80048 BASIC METABOLIC PNL TOTAL CA: CPT | Mod: ORL | Performed by: INTERNAL MEDICINE

## 2022-02-10 NOTE — TELEPHONE ENCOUNTER
[facility] calling for orders. SKILLED NURSING 1/week for 3 weeks plus 3 PRN for medication management, symptoms, and cardiopulmonary management education. PHYSICAL THERAPY to evaluate and treat.  Patient has one contraindication and it is with the atorvastatin and gengraf    [facility] [phone]

## 2022-02-11 NOTE — TELEPHONE ENCOUNTER
Call to STEPHANIE Bryant case manager [phone] with [facility].     Detailed message left with Dr. Vega's okay for below orders.     Stephanie IBARRA RN   [facility]

## 2022-02-16 ENCOUNTER — TELEPHONE (OUTPATIENT)
Dept: TRANSPLANT | Facility: CLINIC | Age: 72
End: 2022-02-16
Payer: MEDICARE

## 2022-02-16 DIAGNOSIS — Z94.0 KIDNEY REPLACED BY TRANSPLANT: ICD-10-CM

## 2022-02-16 RX ORDER — MYCOPHENOLIC ACID 180 MG/1
540 TABLET, DELAYED RELEASE ORAL 2 TIMES DAILY
Qty: 180 TABLET | Refills: 11 | Status: SHIPPED | OUTPATIENT
Start: 2022-02-16 | End: 2023-01-24

## 2022-02-16 NOTE — TELEPHONE ENCOUNTER
Call placed to Viv MELENDEZ Valeria. She reports feeling really well at home. She has some fatigue but overall recovering well from COVID. Viv will resume  mg bid and stop pred. RNCC left message for [facility] home care to draw one more set of tx labs before home care end.     LPN task:  Please fax lab order for BMP, CBC and CSA to [phone]

## 2022-02-16 NOTE — LETTER
PHYSICIAN ORDERS      DATE & TIME ISSUED: 2022 3:37 PM  PATIENT NAME: Viv Shaw   : 1950     [facility] MR# [if applicable]: [id number]     DIAGNOSIS:  Kidney transplant   ICD-10 CODE: Z94.0      Please complete the following labs  BMP  CBC with platelets and differential  Cyclosporine level (ensure 12 hour between last dose and blood draw)    Any questions please call: [phone] option 5    Please fax results to [phone].

## 2022-02-17 ENCOUNTER — TELEPHONE (OUTPATIENT)
Dept: INTERNAL MEDICINE | Facility: CLINIC | Age: 72
End: 2022-02-17
Payer: MEDICARE

## 2022-02-18 ENCOUNTER — TELEPHONE (OUTPATIENT)
Dept: ENDOCRINOLOGY | Facility: CLINIC | Age: 72
End: 2022-02-18
Payer: MEDICARE

## 2022-02-18 DIAGNOSIS — E13.9 DIABETES MELLITUS TYPE 1.5, MANAGED AS TYPE 1 (H): Primary | ICD-10-CM

## 2022-02-18 RX ORDER — PROCHLORPERAZINE 25 MG/1
SUPPOSITORY RECTAL
Qty: 1 EACH | Refills: 1 | Status: SHIPPED | OUTPATIENT
Start: 2022-02-18 | End: 2022-04-21

## 2022-02-18 RX ORDER — PROCHLORPERAZINE 25 MG/1
SUPPOSITORY RECTAL
Qty: 3 EACH | Refills: 1 | Status: SHIPPED | OUTPATIENT
Start: 2022-02-18 | End: 2022-04-21

## 2022-02-18 NOTE — TELEPHONE ENCOUNTER
[facility] Call Center    Phone Message    May a detailed message be left on voicemail: yes     Reason for Call: Medication Refill Request    Has the patient contacted the pharmacy for the refill? Yes   Name of medication being requested: Continuous Blood Gluc Sensor (DEXCOM G6 SENSOR) MISC  And  Continuous Blood Gluc Transmit (DEXCOM G6 TRANSMITTER) MISC    Provider who prescribed the medication: Dr. Fitzpatrick   Pharmacy: [id number] [place] [id number] [employer]. [employer] [facility] AT [address] [employer]  Date medication is needed: ASAP pharmacy reports that the RX needs to be in Dr. Fitzpatrick's Name      Action Taken: Other: ENDO    Travel Screening: Not Applicable

## 2022-02-22 ENCOUNTER — TELEPHONE (OUTPATIENT)
Dept: INTERNAL MEDICINE | Facility: CLINIC | Age: 72
End: 2022-02-22
Payer: MEDICARE

## 2022-02-22 DIAGNOSIS — Z53.9 DIAGNOSIS NOT YET DEFINED: Primary | ICD-10-CM

## 2022-02-22 PROCEDURE — G0180 MD CERTIFICATION HHA PATIENT: HCPCS | Performed by: INTERNAL MEDICINE

## 2022-02-24 ENCOUNTER — TELEPHONE (OUTPATIENT)
Dept: INTERNAL MEDICINE | Facility: CLINIC | Age: 72
End: 2022-02-24
Payer: MEDICARE

## 2022-02-25 ENCOUNTER — LAB REQUISITION (OUTPATIENT)
Dept: LAB | Facility: CLINIC | Age: 72
End: 2022-02-25
Payer: MEDICARE

## 2022-02-25 DIAGNOSIS — Z94.0 KIDNEY TRANSPLANT STATUS: ICD-10-CM

## 2022-02-25 LAB
ANION GAP SERPL CALCULATED.3IONS-SCNC: 9 MMOL/L (ref 5–18)
BUN SERPL-MCNC: 17 MG/DL (ref 8–28)
CALCIUM SERPL-MCNC: 9.9 MG/DL (ref 8.5–10.5)
CHLORIDE BLD-SCNC: 105 MMOL/L (ref 98–107)
CO2 SERPL-SCNC: 26 MMOL/L (ref 22–31)
CREAT SERPL-MCNC: 1.28 MG/DL (ref 0.6–1.1)
CYCLOSPORINE BLD LC/MS/MS-MCNC: 114 UG/L (ref 50–400)
ERYTHROCYTE [DISTWIDTH] IN BLOOD BY AUTOMATED COUNT: 14.3 % (ref 10–15)
GFR SERPL CREATININE-BSD FRML MDRD: 44 ML/MIN/1.73M2
GLUCOSE BLD-MCNC: 117 MG/DL (ref 70–125)
HCT VFR BLD AUTO: 37.4 % (ref 35–47)
HGB BLD-MCNC: 11.8 G/DL (ref 11.7–15.7)
MCH RBC QN AUTO: 29.1 PG (ref 26.5–33)
MCHC RBC AUTO-ENTMCNC: 31.6 G/DL (ref 31.5–36.5)
MCV RBC AUTO: 92 FL (ref 78–100)
PLATELET # BLD AUTO: 210 10E3/UL (ref 150–450)
POTASSIUM BLD-SCNC: 4.6 MMOL/L (ref 3.5–5)
RBC # BLD AUTO: 4.05 10E6/UL (ref 3.8–5.2)
SODIUM SERPL-SCNC: 140 MMOL/L (ref 136–145)
TME LAST DOSE: NORMAL H
TME LAST DOSE: NORMAL H
WBC # BLD AUTO: 3.5 10E3/UL (ref 4–11)

## 2022-02-25 PROCEDURE — 85027 COMPLETE CBC AUTOMATED: CPT | Mod: ORL | Performed by: INTERNAL MEDICINE

## 2022-02-25 PROCEDURE — 80158 DRUG ASSAY CYCLOSPORINE: CPT | Mod: ORL | Performed by: INTERNAL MEDICINE

## 2022-02-25 PROCEDURE — 80048 BASIC METABOLIC PNL TOTAL CA: CPT | Mod: ORL | Performed by: INTERNAL MEDICINE

## 2022-02-28 ENCOUNTER — MEDICAL CORRESPONDENCE (OUTPATIENT)
Dept: HEALTH INFORMATION MANAGEMENT | Facility: CLINIC | Age: 72
End: 2022-02-28
Payer: MEDICARE

## 2022-02-28 DIAGNOSIS — Z94.0 KIDNEY TRANSPLANTED: Primary | ICD-10-CM

## 2022-02-28 RX ORDER — CYCLOSPORINE 25 MG/1
50 CAPSULE ORAL 2 TIMES DAILY
Qty: 360 CAPSULE | Refills: 3 | Status: SHIPPED | OUTPATIENT
Start: 2022-02-28 | End: 2022-12-01

## 2022-02-28 NOTE — TELEPHONE ENCOUNTER
ISSUE:    Cyclosporine level 114 on 2/25, goal 50-75, dose 75/50 mg BID.    PLAN:   Please call patient and confirm this was an accurate 12-hour trough. Verify Cyclosporine dose 75/50 mg BID. Confirm no new medications or illness. Confirm no missed doses. If accurate trough and accurate dose, decrease Cyclosporine dose to 50 mg BID and repeat BMP and CSA in 2 weeks.    Call placed Viv A Valeria, she v/u of dose decrease to 50 mg bid. Call reports she still has home care, would like nurse to draw labs if able.    OUTCOME/LPN task:  Please update Gengraf dose to 50 mg bid.     Please fax orders to [phone]   Please call RN Case manager STEPHANIE Bryant case manager [phone] to confirm lab draw in two weeks.

## 2022-02-28 NOTE — LETTER
PHYSICIAN ORDERS      DATE & TIME ISSUED: 2022 11:13 AM  PATIENT NAME: Viv Shaw   : 1950     [facility] MR# [if applicable]: [id number]     DIAGNOSIS:  Kidney Transplant  ICD-10 CODE: Z94.0     Please update the following medication dose:  Decrease Cyclosporine to 50 mg BID  Please repeat the following labs in 2 weeks:  Cyclosporine drug level  CBC  BMP    Any questions please call: [phone]  Please fax lab results to [phone].      Pauline Ta MD

## 2022-03-01 ENCOUNTER — TELEPHONE (OUTPATIENT)
Dept: INTERNAL MEDICINE | Facility: CLINIC | Age: 72
End: 2022-03-01
Payer: MEDICARE

## 2022-03-02 ENCOUNTER — MEDICAL CORRESPONDENCE (OUTPATIENT)
Dept: HEALTH INFORMATION MANAGEMENT | Facility: CLINIC | Age: 72
End: 2022-03-02
Payer: MEDICARE

## 2022-03-02 ENCOUNTER — TELEPHONE (OUTPATIENT)
Dept: INTERNAL MEDICINE | Facility: CLINIC | Age: 72
End: 2022-03-02
Payer: MEDICARE

## 2022-03-03 ENCOUNTER — TELEPHONE (OUTPATIENT)
Dept: INTERNAL MEDICINE | Facility: CLINIC | Age: 72
End: 2022-03-03
Payer: MEDICARE

## 2022-03-03 NOTE — TELEPHONE ENCOUNTER
Noted, pt follows up with endocrinologist Dr Nunez, pl advise home care to call her endocrinologist with [facility].

## 2022-03-03 NOTE — TELEPHONE ENCOUNTER
Call to Alyssa-[facility] at [phone]. Alyssa informed of Dr. Vega's below response. Alyssa verbalized understanding.     Also routing to Dr. Fitzpatrick and care team.     Stephanie IBARRA RN   [facility]

## 2022-03-04 NOTE — TELEPHONE ENCOUNTER
Messages and patient's hyperglycemia management noted.  I assume patient will call me if additional blood glucose and/or insulin dosing questions.    DIANN Fitzpatrick MD, MS  Endocrinology  [facility]

## 2022-03-09 ENCOUNTER — TELEPHONE (OUTPATIENT)
Dept: INTERNAL MEDICINE | Facility: CLINIC | Age: 72
End: 2022-03-09
Payer: MEDICARE

## 2022-03-11 ENCOUNTER — MEDICAL CORRESPONDENCE (OUTPATIENT)
Dept: HEALTH INFORMATION MANAGEMENT | Facility: CLINIC | Age: 72
End: 2022-03-11

## 2022-03-11 NOTE — TELEPHONE ENCOUNTER
Form states face-to-face encounter happened on 2/2/2022, I did not see her on this day patient was seen in the hospital, form signed stating I had face-to-face encounter on 11/30/2021

## 2022-03-14 ENCOUNTER — LAB REQUISITION (OUTPATIENT)
Dept: LAB | Facility: CLINIC | Age: 72
End: 2022-03-14
Payer: MEDICARE

## 2022-03-14 DIAGNOSIS — Z94.0 KIDNEY TRANSPLANT STATUS: ICD-10-CM

## 2022-03-14 DIAGNOSIS — Z79.899 OTHER LONG TERM (CURRENT) DRUG THERAPY: ICD-10-CM

## 2022-03-14 LAB
ANION GAP SERPL CALCULATED.3IONS-SCNC: 10 MMOL/L (ref 5–18)
BASOPHILS # BLD AUTO: 0 10E3/UL (ref 0–0.2)
BASOPHILS NFR BLD AUTO: 0 %
BUN SERPL-MCNC: 15 MG/DL (ref 8–28)
CALCIUM SERPL-MCNC: 9.2 MG/DL (ref 8.5–10.5)
CHLORIDE BLD-SCNC: 107 MMOL/L (ref 98–107)
CO2 SERPL-SCNC: 25 MMOL/L (ref 22–31)
CREAT SERPL-MCNC: 1.11 MG/DL (ref 0.6–1.1)
CYCLOSPORINE BLD LC/MS/MS-MCNC: 60 UG/L (ref 50–400)
EOSINOPHIL # BLD AUTO: 0.1 10E3/UL (ref 0–0.7)
EOSINOPHIL NFR BLD AUTO: 1 %
ERYTHROCYTE [DISTWIDTH] IN BLOOD BY AUTOMATED COUNT: 14.3 % (ref 10–15)
GFR SERPL CREATININE-BSD FRML MDRD: 52 ML/MIN/1.73M2
GLUCOSE BLD-MCNC: 147 MG/DL (ref 70–125)
HCT VFR BLD AUTO: 37.4 % (ref 35–47)
HGB BLD-MCNC: 11.7 G/DL (ref 11.7–15.7)
IMM GRANULOCYTES # BLD: 0 10E3/UL
IMM GRANULOCYTES NFR BLD: 1 %
LYMPHOCYTES # BLD AUTO: 0.7 10E3/UL (ref 0.8–5.3)
LYMPHOCYTES NFR BLD AUTO: 13 %
MCH RBC QN AUTO: 28.5 PG (ref 26.5–33)
MCHC RBC AUTO-ENTMCNC: 31.3 G/DL (ref 31.5–36.5)
MCV RBC AUTO: 91 FL (ref 78–100)
MONOCYTES # BLD AUTO: 0.5 10E3/UL (ref 0–1.3)
MONOCYTES NFR BLD AUTO: 9 %
NEUTROPHILS # BLD AUTO: 4.5 10E3/UL (ref 1.6–8.3)
NEUTROPHILS NFR BLD AUTO: 76 %
NRBC # BLD AUTO: 0 10E3/UL
NRBC BLD AUTO-RTO: 0 /100
PLATELET # BLD AUTO: 275 10E3/UL (ref 150–450)
POTASSIUM BLD-SCNC: 4.4 MMOL/L (ref 3.5–5)
RBC # BLD AUTO: 4.11 10E6/UL (ref 3.8–5.2)
SODIUM SERPL-SCNC: 142 MMOL/L (ref 136–145)
TME LAST DOSE: NORMAL H
TME LAST DOSE: NORMAL H
WBC # BLD AUTO: 5.9 10E3/UL (ref 4–11)

## 2022-03-14 PROCEDURE — 85025 COMPLETE CBC W/AUTO DIFF WBC: CPT | Mod: ORL | Performed by: INTERNAL MEDICINE

## 2022-03-14 PROCEDURE — 80158 DRUG ASSAY CYCLOSPORINE: CPT | Mod: ORL | Performed by: INTERNAL MEDICINE

## 2022-03-14 PROCEDURE — 80048 BASIC METABOLIC PNL TOTAL CA: CPT | Mod: ORL | Performed by: INTERNAL MEDICINE

## 2022-03-18 NOTE — TELEPHONE ENCOUNTER
"Requested Prescriptions   Pending Prescriptions Disp Refills     metoprolol succinate (TOPROL-XL) 25 MG 24 hr tablet [Pharmacy Med Name: METOPROLOL SUCC ER 25 MG TAB]  Last Written Prescription Date:  6/22/2018  Last Fill Quantity: 90,  # refills: 0   Last office visit: No previous visit found with prescribing provider:     Future Office Visit:   90 tablet 0     Sig: TAKE 1 TABLET BY MOUTH EVERYDAY AT BEDTIME    Beta-Blockers Protocol Passed    9/18/2018  2:12 AM       Passed - Blood pressure under 140/90 in past 12 months    BP Readings from Last 3 Encounters:   09/11/18 126/80   04/04/18 152/75   03/28/18 132/68                Passed - Patient is age 6 or older       Passed - Recent (12 mo) or future (30 days) visit within the authorizing provider's specialty    Patient had office visit in the last 12 months or has a visit in the next 30 days with authorizing provider or within the authorizing provider's specialty.  See \"Patient Info\" tab in inbasket, or \"Choose Columns\" in Meds & Orders section of the refill encounter.            " We do not have open MRI's at [facility]. He had declined to get one  I have placed order, pls print and they can pick it up and take it to an open MRI near there home- I am not sure which one they would like

## 2022-04-08 ENCOUNTER — MEDICAL CORRESPONDENCE (OUTPATIENT)
Dept: HEALTH INFORMATION MANAGEMENT | Facility: CLINIC | Age: 72
End: 2022-04-08
Payer: MEDICARE

## 2022-04-11 ENCOUNTER — LAB (OUTPATIENT)
Dept: LAB | Facility: CLINIC | Age: 72
End: 2022-04-11
Attending: INTERNAL MEDICINE
Payer: MEDICARE

## 2022-04-11 DIAGNOSIS — Z48.298 AFTERCARE FOLLOWING ORGAN TRANSPLANT: ICD-10-CM

## 2022-04-11 DIAGNOSIS — Z79.899 ENCOUNTER FOR LONG-TERM CURRENT USE OF MEDICATION: ICD-10-CM

## 2022-04-11 DIAGNOSIS — E13.9 DIABETES MELLITUS TYPE 1.5, MANAGED AS TYPE 1 (H): ICD-10-CM

## 2022-04-11 DIAGNOSIS — Z94.0 KIDNEY REPLACED BY TRANSPLANT: ICD-10-CM

## 2022-04-11 LAB
ALT SERPL W P-5'-P-CCNC: 19 U/L (ref 0–50)
ANION GAP SERPL CALCULATED.3IONS-SCNC: 9 MMOL/L (ref 3–14)
BUN SERPL-MCNC: 20 MG/DL (ref 7–30)
CALCIUM SERPL-MCNC: 9.2 MG/DL (ref 8.5–10.1)
CHLORIDE BLD-SCNC: 107 MMOL/L (ref 94–109)
CO2 SERPL-SCNC: 26 MMOL/L (ref 20–32)
CREAT SERPL-MCNC: 1.24 MG/DL (ref 0.52–1.04)
CYCLOSPORINE BLD LC/MS/MS-MCNC: 62 UG/L (ref 50–400)
ERYTHROCYTE [DISTWIDTH] IN BLOOD BY AUTOMATED COUNT: 13.8 % (ref 10–15)
GFR SERPL CREATININE-BSD FRML MDRD: 46 ML/MIN/1.73M2
GLUCOSE BLD-MCNC: 150 MG/DL (ref 70–99)
HBA1C MFR BLD: 7.1 % (ref 0–5.6)
HCT VFR BLD AUTO: 38.1 % (ref 35–47)
HGB BLD-MCNC: 11.9 G/DL (ref 11.7–15.7)
MCH RBC QN AUTO: 28.3 PG (ref 26.5–33)
MCHC RBC AUTO-ENTMCNC: 31.2 G/DL (ref 31.5–36.5)
MCV RBC AUTO: 91 FL (ref 78–100)
PLATELET # BLD AUTO: 218 10E3/UL (ref 150–450)
POTASSIUM BLD-SCNC: 3.8 MMOL/L (ref 3.4–5.3)
RBC # BLD AUTO: 4.2 10E6/UL (ref 3.8–5.2)
SODIUM SERPL-SCNC: 142 MMOL/L (ref 133–144)
TME LAST DOSE: NORMAL H
TME LAST DOSE: NORMAL H
WBC # BLD AUTO: 4.4 10E3/UL (ref 4–11)

## 2022-04-11 PROCEDURE — 84460 ALANINE AMINO (ALT) (SGPT): CPT | Performed by: PATHOLOGY

## 2022-04-11 PROCEDURE — 36415 COLL VENOUS BLD VENIPUNCTURE: CPT | Performed by: PATHOLOGY

## 2022-04-11 PROCEDURE — 83036 HEMOGLOBIN GLYCOSYLATED A1C: CPT | Performed by: PATHOLOGY

## 2022-04-11 PROCEDURE — 80048 BASIC METABOLIC PNL TOTAL CA: CPT | Performed by: PATHOLOGY

## 2022-04-11 PROCEDURE — 85027 COMPLETE CBC AUTOMATED: CPT | Performed by: PATHOLOGY

## 2022-04-11 PROCEDURE — 80158 DRUG ASSAY CYCLOSPORINE: CPT | Performed by: INTERNAL MEDICINE

## 2022-04-19 ENCOUNTER — MEDICAL CORRESPONDENCE (OUTPATIENT)
Dept: HEALTH INFORMATION MANAGEMENT | Facility: CLINIC | Age: 72
End: 2022-04-19

## 2022-04-19 ENCOUNTER — TELEPHONE (OUTPATIENT)
Dept: INTERNAL MEDICINE | Facility: CLINIC | Age: 72
End: 2022-04-19
Payer: MEDICARE

## 2022-04-19 ENCOUNTER — NURSE TRIAGE (OUTPATIENT)
Dept: INTERNAL MEDICINE | Facility: CLINIC | Age: 72
End: 2022-04-19
Payer: MEDICARE

## 2022-04-19 ENCOUNTER — TELEPHONE (OUTPATIENT)
Dept: INTERNAL MEDICINE | Facility: CLINIC | Age: 72
End: 2022-04-19

## 2022-04-19 NOTE — TELEPHONE ENCOUNTER
"S-(situation): hypotension, dizziness    B-(background): History of hypertension, stage 3 CKD diabetes.  Has had infrequent bouts of dizziness with gait instability but none recently.  Got up this morning very \"wobbly\", difficulty walking straight and dizzy.    A-(assessment): BP had been running significantly higher than today's BP reading of 88/53.  She has had other episodes of hypotension and dizziness in the past but none recently.  She denies any diarrhea, fever, bleeding or illness. She drinks a lot of soda pop per  and has been urinating.    BP Readings from Last 6 Encounters:   02/05/22 135/70   12/15/21 136/73   12/02/21 (!) 146/78   11/30/21 136/70   08/17/21 (!) 140/58   08/10/21 (!) 152/52     R-(recommendations): Sit down or lay down with feet elevated.  Drink plenty of fluids.  Pause after getting on feet to let body adjust to position change before walking.  ERNA Albright R.N.    Answer Assessment - Initial Assessment Questions  1. BLOOD PRESSURE: \"What is the blood pressure?\" \"Did you take at least two measurements 5 minutes apart?\"      Only 1 reading today 88/53  2. ONSET: \"When did you take your blood pressure?\"      8 a.m. today  3. HOW: \"How did you obtain the blood pressure?\" (e.g., visiting nurse, automatic home BP monitor)      Home BP monitor  4. HISTORY: \"Do you have a history of low blood pressure?\" \"What is your blood pressure normally?\"      Much higher  5. MEDICATIONS: \"Are you taking any medications for blood pressure?\" If yes: \"Have they been changed recently?\"      Yes, no recent changes in medication or dose  6. PULSE RATE: \"Do you know what your pulse rate is?\"       Doesn't know  7. OTHER SYMPTOMS: \"Have you been sick recently?\" \"Have you had a recent injury?\"      No recent illness.  No falls or injuries  8. PREGNANCY: \"Is there any chance you are pregnant?\" \"When was your last menstrual period?\"      NA    Protocols used: LOW BLOOD PRESSURE-A-OH      "

## 2022-04-19 NOTE — TELEPHONE ENCOUNTER
Fax received from [facility] 02/28/22 for review and signature.  Put in Dr. Vega's in basket.

## 2022-04-19 NOTE — TELEPHONE ENCOUNTER
Patient states she has been drinking water and has eaten and she now feels good.  BP now is 140/70.  She states she knows she needs to sit on the bed for a few minutes after waking up and just thinks she got up too quickly.  Discussed the importance of staying hydrated and of resting for a minute after changing positions. ERNA Albright R.N.

## 2022-04-19 NOTE — TELEPHONE ENCOUNTER
No openings in clinic , please schedule with any other [place] clinics with opening or urgent care evaluation

## 2022-04-21 ENCOUNTER — MEDICAL CORRESPONDENCE (OUTPATIENT)
Dept: ENDOCRINOLOGY | Facility: CLINIC | Age: 72
End: 2022-04-21

## 2022-04-21 ENCOUNTER — OFFICE VISIT (OUTPATIENT)
Dept: ENDOCRINOLOGY | Facility: CLINIC | Age: 72
End: 2022-04-21
Payer: MEDICARE

## 2022-04-21 VITALS
HEART RATE: 65 BPM | SYSTOLIC BLOOD PRESSURE: 135 MMHG | DIASTOLIC BLOOD PRESSURE: 60 MMHG | WEIGHT: 160.8 LBS | HEIGHT: 63 IN | BODY MASS INDEX: 28.49 KG/M2

## 2022-04-21 DIAGNOSIS — E10.59 TYPE 1 DIABETES MELLITUS WITH OTHER CIRCULATORY COMPLICATION (H): ICD-10-CM

## 2022-04-21 DIAGNOSIS — E10.319 TYPE 1 DIABETES MELLITUS WITH RETINOPATHY, MACULAR EDEMA PRESENCE UNSPECIFIED, UNSPECIFIED LATERALITY, UNSPECIFIED RETINOPATHY SEVERITY (H): ICD-10-CM

## 2022-04-21 DIAGNOSIS — E10.21 TYPE 1 DIABETES MELLITUS WITH DIABETIC NEPHROPATHY (H): ICD-10-CM

## 2022-04-21 DIAGNOSIS — E13.9 DIABETES MELLITUS TYPE 1.5, MANAGED AS TYPE 1 (H): Primary | ICD-10-CM

## 2022-04-21 PROCEDURE — 95251 CONT GLUC MNTR ANALYSIS I&R: CPT | Performed by: INTERNAL MEDICINE

## 2022-04-21 PROCEDURE — 99214 OFFICE O/P EST MOD 30 MIN: CPT | Performed by: INTERNAL MEDICINE

## 2022-04-21 RX ORDER — PROCHLORPERAZINE 25 MG/1
SUPPOSITORY RECTAL
Qty: 1 EACH | Refills: 1 | Status: SHIPPED | OUTPATIENT
Start: 2022-04-21 | End: 2023-01-01

## 2022-04-21 RX ORDER — PROCHLORPERAZINE 25 MG/1
SUPPOSITORY RECTAL
Qty: 3 EACH | Refills: 1 | Status: SHIPPED | OUTPATIENT
Start: 2022-04-21 | End: 2022-04-21

## 2022-04-21 NOTE — LETTER
4/21/2022         RE: Viv Shaw  [address]  [place] [address]        Dear Colleague,    Thank you for referring your patient, Viv Shaw, to the [facility] [place]. Please see a copy of my visit note below.      Recent issues:   Diabetes follow-up evaluation, with her  Johnny  She had COVID-19 illness in early 2/2022, hospitalized at [facility]  Reviewed medical history from patient and Epic chart record        Diagnosis of diabetes mellitus age 23, living in [place]  Recalls having vision problem, saw eye physician and then family physician, also had frequent urination  Initial treatment with oral medication for few weeks, then change to insulin  Had seen Dr. Castillo in [place]  Subsequent evaluations with Dr. GABRIELE Vickers, then saw Dr. Elmer Garvin/[facility] for endocrinology evaluations  Has used insulin injections 4x per day  Previous [facility] hgbA1c trends include:     Lab Test 07/06/21  1422 03/18/21  0636 10/29/20  0710 03/27/20  0648 11/20/19  0803   A1C 6.7* 6.8* 7.0* 6.8* 6.9*         8/10/21. Initial diabetes evaluation with me at [place]  Reviewed health history and diabetes issues  Current DM medications:  Novolog Flexpen 5U premeal   Novolog sscale guestimates  Basaglar Kwikpen  12U subcutaneous in evening    Blood glucose (BG) meter: Raad Contour?   Has tested 4x per day previously   Less frequent testing when using CGM sensor    Has used the DexcomG5, then the G6 CGM sensor  Recent DexcomG6 data:                  FamHx DM:  Niece  Recent [facility] labs include:  Lab Results   Component Value Date    A1C 7.1 (H) 04/11/2022     04/11/2022    POTASSIUM 3.8 04/11/2022    CHLORIDE 107 04/11/2022    CO2 26 04/11/2022    ANIONGAP 9 04/11/2022     (H) 04/11/2022    BUN 20 04/11/2022    CR 1.24 (H) 04/11/2022    GFRESTIMATED 46 (L) 04/11/2022    GFRESTBLACK 45 (L) 06/23/2021    NAVI 9.2 04/11/2022    CPEPT 0.2 (L) 11/29/2021    CHOL 187  11/29/2021    TRIG 149 11/29/2021    HDL 73 11/29/2021    LDL 84 11/29/2021    NHDL 114 11/29/2021    UCRR 68 03/01/2021    MICROL 14 10/29/2020    UMALCR 13.84 10/29/2020    TSH 1.10 11/29/2021    T4 1.41 04/02/2018     DM Complications:   Nephropathy    Previous ESRD, then renal transplant 2005   Retinopathy    Previous PDR and laser surgeries [facility]    Had seen Dr. Grayson Miles/[facility] clinic, plans to see Dr. Arevalo at that clinic building    Macrovascular disease    CAD, previous cardiac stent placement      Lives in [place], [place]  Sees Dr. Summer Vega/[facility] for general medicine evaluations.    PMH/PSH:  Past Medical History:   Diagnosis Date     Abdominal wall hernia     RLQ     AK (actinic keratosis) 08/06/2020     Allergic rhinitis      CAD (coronary artery disease)     coronary artery disease s/p PCI to LAD in 2005coronary artery disease s/p PCI to LAD in 2005     Diabetes mellitus, type 2 (H)     follows up with endocrinologist.     Dyslipidemia      GERD (gastroesophageal reflux disease)      H/O diabetic nephropathy     s/p kidney trnsplant     Hypertension      Hypothyroidism      Immunosuppressed status (H)      Kidney replaced by transplant     Rt     PONV (postoperative nausea and vomiting)      Shingles      Vitamin B12 deficiency anemia      Past Surgical History:   Procedure Laterality Date     APPENDECTOMY NOS       ARTHROSCOPY SHOULDER ROTATOR CUFF REPAIR Left      COLONOSCOPY N/A 6/7/2016    Procedure: COLONOSCOPY;  Surgeon: Rashard Snider MD;  Location: [facility]     Coronary angioplasty with stent  2005     HYSTERECTOMY       Kidney Transplant NOS Right      LAPAROSCOPIC CHOLECYSTECTOMY       NOSE SURGERY  2013     OPEN SEPARATION COMPONENT HERNIORRHAPHY N/A 10/16/2017    Procedure: OPEN SEPARATION COMPONENT HERNIORRHAPHY;;  Surgeon: CARL Lott MD;  Location: [facility]     RECONSTRUCT ABDOMINAL WALL N/A 10/16/2017    Procedure: RECONSTRUCT ABDOMINAL  WALL;  Exploratory Laparotomy, Lysis of Adhesions, Abdominal Wall reconstruction, Inlay Xen AB mesh, Mitek Suture [place] and Umbilical Hernia Repair.;  Surgeon: CARL Lott MD;  Location: [facility] OR     REMOVE CATARACT INTRACAP,INSERT LENS Right      TONSILLECTOMY         Family Hx:  Family History   Problem Relation Age of Onset     Respiratory Mother          age 71     Cardiovascular Father          age 75 hyertension     Arthritis Sister         living, healthy     Family History Negative Brother          age 44     Colon Cancer No family hx of          Social Hx:  Social History     Socioeconomic History     Marital status:      Spouse name: Not on file     Number of children: Not on file     Years of education: Not on file     Highest education level: Not on file   Occupational History     Not on file   Tobacco Use     Smoking status: Never Smoker     Smokeless tobacco: Never Used   Substance and Sexual Activity     Alcohol use: No     Drug use: No     Sexual activity: Yes     Partners: Male   Other Topics Concern     Parent/sibling w/ CABG, MI or angioplasty before 65F 55M? Not Asked   Social History Narrative     Not on file     Social Determinants of Health     Financial Resource Strain: Not on file   Food Insecurity: Not on file   Transportation Needs: Not on file   Physical Activity: Not on file   Stress: Not on file   Social Connections: Not on file   Intimate Partner Violence: Not on file   Housing Stability: Not on file          MEDICATIONS:  has a current medication list which includes the following prescription(s): amlodipine, aspirin, atorvastatin, calcium carbonate, chlorthalidone, cholecalciferol, dexcom g6 transmitter, cyclosporine modified, insulin aspart, insulin glargine, isosorbide mononitrate, levothyroxine, losartan, metoprolol succinate er, mycophenolic acid, pantoprazole, teddy contour, and bd insulin syringe ultrafine.    ROS:     ROS: 10 point ROS  "neg other than the symptoms noted above in the HPI.    GENERAL: some fatigue, wt stable; denies fevers, chills, night sweats.   HEENT: no dysphagia, odonophagia, diplopia, neck pain  THYROID:  no apparent hyper or hypothyroid symptoms  CV: no chest pain, pressure, palpitations  LUNGS: no SOB, JIMENEZ, cough, wheezing   ABDOMEN: no diarrhea, constipation, abdominal pain  EXTREMITIES: no rashes, ulcers, edema  NEUROLOGY: decreased sensation at feet, balance problems; no headaches, denies changes in vision, tingling  MSK: some leg weakness, denies specific arthralgias  SKIN: no rashes or lesions  :  no menses  PSYCH:  stable mood, no significant anxiety or depression  ENDOCRINE: no heat or cold intolerance    Physical Exam   VS: /60   Pulse 65   Ht 1.6 m (5' 3\")   Wt 72.9 kg (160 lb 12.8 oz)   BMI 28.48 kg/m    GENERAL: AXOX3, NAD, well dressed, answering questions appropriately, appears stated age.  THYROID:  normal gland, no apparent nodules or goiter  HEENT: neck non-tender, no exopthalmous, no proptosis, EOMI  ABDOMEN: mildly obese, soft, nontender, nondistended  EXTREMITIES: no edema, no lesions  NEUROLOGY: CN grossly intact, no tremors  MSK: grossly intact  SKIN: no rashes, no lesions    LABS:    All pertinent notes, labs, and images personally reviewed by me.     A/P:  Encounter Diagnoses   Name Primary?     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  Recent glucose trends good, DexcomG6 CGM sensor helpful  Reviewed and interpreted tests that I previously " ordered.   Ordered appropriate tests for the endocrinology disease management.    Management options discussed and implemented after shared medical decision making with the patient.  T1.5DM problem is chronic-stable    Plan:  Reviewed general issues with the diabetes diagnosis and management  We discussed the hgbA1c test which reflects previous overall glucose levels or control  Discussed the importance of blood glucose (BG) testing to assess glucose trends  Provided general overview of the diabetes medication options and medication treatment plan.  Reviewed recent DexcomG6 CGM glucose trend data, in detail.    Recommend:  Continue current Novolog and Basaglar insulin medication use  Discussed the ideal mealtime and correction scale dosing routine   Use ISF 1U per 50 mg/dl >150 mg/dl    Goal target premeal SG  mg/dl  Continue use of the DexcomG6 CGM sensor   Patient's insulin regimen requires frequent dose adjustments by patient on basis of therapeutic BGM/CGM test results   Updated DexcomG6 Rx's sent to her  pharmacy   Discussed hypoglycemia prevention  She gets her insulin from [email or web address] pharmacy in [place], mail order   They will contact me if needing new Rx locally in [place]  Plan repeat labs in early 8//2022 prior to next appt   Lab orders placed  Keep focus on diet, exercise, weight management.  Advise having fasting lipid panel testing and dilated eye examination, at least annually    Keep regular follow-up evaluations with nephrologist, cardiologist, ophthalmologist, and PCP   Addressed patient questions today    There are no Patient Instructions on file for this visit.    Future labs ordered today:   Orders Placed This Encounter   Procedures     GLUCOSE MONITOR, 72 HOUR, PHYS INTERP     Hemoglobin A1c     Basic metabolic panel     Albumin Random Urine Quantitative with Creat Ratio     Radiology/Consults ordered today: None    Total time spent in with the patient evaluation:  22 min  Additional  time spent reviewing pertinent lab tests and chart notes, and documentation:  5 min    Follow-up:  8/15/22 at 8:30 am, Return    DIANN Fitzpatrick MD, MS  Endocrinology  [facility]    CC: SHARON Vega and KYMBERLY Veloz          Again, thank you for allowing me to participate in the care of your patient.        Sincerely,        Juan M Fitzpatrick MD

## 2022-04-21 NOTE — PROGRESS NOTES
Recent issues:   Diabetes follow-up evaluation, with her  Johnny  She had COVID-19 illness in early 2/2022, hospitalized at [facility]  Reviewed medical history from patient and Epic chart record        Diagnosis of diabetes mellitus age 23, living in [facility]  Recalls having vision problem, saw eye physician and then family physician, also had frequent urination  Initial treatment with oral medication for few weeks, then change to insulin  Had seen Dr. Castillo in [facility]  Subsequent evaluations with Dr. GABRIELE Vickers, then saw Dr. Elmer Garvin/[facility] for endocrinology evaluations  Has used insulin injections 4x per day  Previous [facility] hgbA1c trends include:     Lab Test 07/06/21  1422 03/18/21  0636 10/29/20  0710 03/27/20  0648 11/20/19  0803   A1C 6.7* 6.8* 7.0* 6.8* 6.9*         8/10/21. Initial diabetes evaluation with me at [facility]  Reviewed health history and diabetes issues  Current DM medications:  Novolog Flexpen 5U premeal   Novolog sscale guestimates  Basaglar Kwikpen  12U subcutaneous in evening    Blood glucose (BG) meter: [employer] Contour?   Has tested 4x per day previously   Less frequent testing when using CGM sensor    Has used the DexcomG5, then the G6 CGM sensor  Recent DexcomG6 data:                  FamHx DM:  Niece  Recent [facility] labs include:  Lab Results   Component Value Date    A1C 7.1 (H) 04/11/2022     04/11/2022    POTASSIUM 3.8 04/11/2022    CHLORIDE 107 04/11/2022    CO2 26 04/11/2022    ANIONGAP 9 04/11/2022     (H) 04/11/2022    BUN 20 04/11/2022    CR 1.24 (H) 04/11/2022    GFRESTIMATED 46 (L) 04/11/2022    GFRESTBLACK 45 (L) 06/23/2021    NAVI 9.2 04/11/2022    CPEPT 0.2 (L) 11/29/2021    CHOL 187 11/29/2021    TRIG 149 11/29/2021    HDL 73 11/29/2021    LDL 84 11/29/2021    NHDL 114 11/29/2021    UCRR 68 03/01/2021    MICROL 14 10/29/2020    UMALCR 13.84 10/29/2020    TSH 1.10 11/29/2021    T4 1.41 04/02/2018     DM Complications:   Nephropathy    Previous ESRD, then renal  transplant 2005   Retinopathy    Previous PDR and laser surgeries [facility]    Had seen Dr. Grayson Miles/[facility], plans to see Dr. Arevalo at that clinic building    Macrovascular disease    CAD, previous cardiac stent placement      Lives in [place], [place]  Sees Dr. Summer Vega/[facility] for general medicine evaluations.    PMH/PSH:  Past Medical History:   Diagnosis Date     Abdominal wall hernia     RLQ     AK (actinic keratosis) 08/06/2020     Allergic rhinitis      CAD (coronary artery disease)     coronary artery disease s/p PCI to LAD in 2005coronary artery disease s/p PCI to LAD in 2005     Diabetes mellitus, type 2 (H)     follows up with endocrinologist.     Dyslipidemia      GERD (gastroesophageal reflux disease)      H/O diabetic nephropathy     s/p kidney trnsplant     Hypertension      Hypothyroidism      Immunosuppressed status (H)      Kidney replaced by transplant     Rt     PONV (postoperative nausea and vomiting)      Shingles      Vitamin B12 deficiency anemia      Past Surgical History:   Procedure Laterality Date     APPENDECTOMY NOS       ARTHROSCOPY SHOULDER ROTATOR CUFF REPAIR Left      COLONOSCOPY N/A 6/7/2016    Procedure: COLONOSCOPY;  Surgeon: Rashard Snider MD;  Location: [facility]     Coronary angioplasty with stent  2005     HYSTERECTOMY       Kidney Transplant NOS Right      LAPAROSCOPIC CHOLECYSTECTOMY       NOSE SURGERY  2013     OPEN SEPARATION COMPONENT HERNIORRHAPHY N/A 10/16/2017    Procedure: OPEN SEPARATION COMPONENT HERNIORRHAPHY;;  Surgeon: CARL Lott MD;  Location: [facility]     RECONSTRUCT ABDOMINAL WALL N/A 10/16/2017    Procedure: RECONSTRUCT ABDOMINAL WALL;  Exploratory Laparotomy, Lysis of Adhesions, Abdominal Wall reconstruction, Inlay Xen AB mesh, Mitek Suture Neotsu and Umbilical Hernia Repair.;  Surgeon: CARL Lott MD;  Location: [facility] OR     REMOVE CATARACT INTRACAP,INSERT LENS Right      TONSILLECTOMY          Family Hx:  Family History   Problem Relation Age of Onset     Respiratory Mother          age 71     Cardiovascular Father          age 75 hyertension     Arthritis Sister         living, healthy     Family History Negative Brother          age 44     Colon Cancer No family hx of          Social Hx:  Social History     Socioeconomic History     Marital status:      Spouse name: Not on file     Number of children: Not on file     Years of education: Not on file     Highest education level: Not on file   Occupational History     Not on file   Tobacco Use     Smoking status: Never Smoker     Smokeless tobacco: Never Used   Substance and Sexual Activity     Alcohol use: No     Drug use: No     Sexual activity: Yes     Partners: Male   Other Topics Concern     Parent/sibling w/ CABG, MI or angioplasty before 65F 55M? Not Asked   Social History Narrative     Not on file     Social Determinants of Health     Financial Resource Strain: Not on file   Food Insecurity: Not on file   Transportation Needs: Not on file   Physical Activity: Not on file   Stress: Not on file   Social Connections: Not on file   Intimate Partner Violence: Not on file   Housing Stability: Not on file          MEDICATIONS:  has a current medication list which includes the following prescription(s): amlodipine, aspirin, atorvastatin, calcium carbonate, chlorthalidone, cholecalciferol, dexcom g6 transmitter, cyclosporine modified, insulin aspart, insulin glargine, isosorbide mononitrate, levothyroxine, losartan, metoprolol succinate er, mycophenolic acid, pantoprazole, teddy contour, and bd insulin syringe ultrafine.    ROS:     ROS: 10 point ROS neg other than the symptoms noted above in the HPI.    GENERAL: some fatigue, wt stable; denies fevers, chills, night sweats.   HEENT: no dysphagia, odonophagia, diplopia, neck pain  THYROID:  no apparent hyper or hypothyroid symptoms  CV: no chest pain, pressure,  "palpitations  LUNGS: no SOB, JIMENEZ, cough, wheezing   ABDOMEN: no diarrhea, constipation, abdominal pain  EXTREMITIES: no rashes, ulcers, edema  NEUROLOGY: decreased sensation at feet, balance problems; no headaches, denies changes in vision, tingling  MSK: some leg weakness, denies specific arthralgias  SKIN: no rashes or lesions  :  no menses  PSYCH:  stable mood, no significant anxiety or depression  ENDOCRINE: no heat or cold intolerance    Physical Exam   VS: /60   Pulse 65   Ht 1.6 m (5' 3\")   Wt 72.9 kg (160 lb 12.8 oz)   BMI 28.48 kg/m    GENERAL: AXOX3, NAD, well dressed, answering questions appropriately, appears stated age.  THYROID:  normal gland, no apparent nodules or goiter  HEENT: neck non-tender, no exopthalmous, no proptosis, EOMI  ABDOMEN: mildly obese, soft, nontender, nondistended  EXTREMITIES: no edema, no lesions  NEUROLOGY: CN grossly intact, no tremors  MSK: grossly intact  SKIN: no rashes, no lesions    LABS:    All pertinent notes, labs, and images personally reviewed by me.     A/P:  Encounter Diagnoses   Name Primary?     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  Recent glucose trends good, DexcomG6 CGM sensor helpful  Reviewed and interpreted tests that I previously ordered.   Ordered appropriate tests for the endocrinology disease management.    Management options discussed and implemented after shared medical decision making with the patient.  T1.5DM problem is chronic-stable    Plan:  Reviewed general issues with the diabetes " diagnosis and management  We discussed the hgbA1c test which reflects previous overall glucose levels or control  Discussed the importance of blood glucose (BG) testing to assess glucose trends  Provided general overview of the diabetes medication options and medication treatment plan.  Reviewed recent DexcomG6 CGM glucose trend data, in detail.    Recommend:  Continue current Novolog and Basaglar insulin medication use  Discussed the ideal mealtime and correction scale dosing routine   Use ISF 1U per 50 mg/dl >150 mg/dl    Goal target premeal SG  mg/dl  Continue use of the DexcomG6 CGM sensor   Patient's insulin regimen requires frequent dose adjustments by patient on basis of therapeutic BGM/CGM test results   Updated DexcomG6 Rx's sent to her  pharmacy   Discussed hypoglycemia prevention  She gets her insulin from [email or web address] pharmacy in [place], mail order   They will contact me if needing new Rx locally in [place]  Plan repeat labs in early 8//2022 prior to next appt   Lab orders placed  Keep focus on diet, exercise, weight management.  Advise having fasting lipid panel testing and dilated eye examination, at least annually    Keep regular follow-up evaluations with nephrologist, cardiologist, ophthalmologist, and PCP   Addressed patient questions today    There are no Patient Instructions on file for this visit.    Future labs ordered today:   Orders Placed This Encounter   Procedures     GLUCOSE MONITOR, 72 HOUR, PHYS INTERP     Hemoglobin A1c     Basic metabolic panel     Albumin Random Urine Quantitative with Creat Ratio     Radiology/Consults ordered today: None    Total time spent in with the patient evaluation:  22 min  Additional time spent reviewing pertinent lab tests and chart notes, and documentation:  5 min    Follow-up:  8/15/22 at 8:30 am, Return    DIANN Fitzpatrick MD, MS  Endocrinology  [facility]    CC: SHARON Vega and KYMBERLY Veloz

## 2022-05-10 ENCOUNTER — VIRTUAL VISIT (OUTPATIENT)
Dept: INTERNAL MEDICINE | Facility: CLINIC | Age: 72
End: 2022-05-10
Payer: MEDICARE

## 2022-05-10 DIAGNOSIS — E03.8 OTHER SPECIFIED HYPOTHYROIDISM: ICD-10-CM

## 2022-05-10 DIAGNOSIS — D84.9 IMMUNOSUPPRESSION (H): ICD-10-CM

## 2022-05-10 DIAGNOSIS — N18.32 STAGE 3B CHRONIC KIDNEY DISEASE (H): ICD-10-CM

## 2022-05-10 DIAGNOSIS — I25.10 CORONARY ARTERY DISEASE INVOLVING NATIVE HEART WITHOUT ANGINA PECTORIS, UNSPECIFIED VESSEL OR LESION TYPE: ICD-10-CM

## 2022-05-10 DIAGNOSIS — Z94.0 STATUS POST KIDNEY TRANSPLANT: Primary | ICD-10-CM

## 2022-05-10 DIAGNOSIS — K21.9 GASTROESOPHAGEAL REFLUX DISEASE WITHOUT ESOPHAGITIS: ICD-10-CM

## 2022-05-10 DIAGNOSIS — I15.1 HYPERTENSION SECONDARY TO OTHER RENAL DISORDERS: ICD-10-CM

## 2022-05-10 DIAGNOSIS — E78.5 HYPERLIPIDEMIA LDL GOAL <100: ICD-10-CM

## 2022-05-10 PROBLEM — J96.01 ACUTE RESPIRATORY FAILURE WITH HYPOXIA (H): Status: RESOLVED | Noted: 2022-02-02 | Resolved: 2022-05-10

## 2022-05-10 PROCEDURE — 99214 OFFICE O/P EST MOD 30 MIN: CPT | Mod: 95 | Performed by: INTERNAL MEDICINE

## 2022-05-10 RX ORDER — LOSARTAN POTASSIUM 100 MG/1
TABLET ORAL
Qty: 45 TABLET | Refills: 1 | Status: SHIPPED | OUTPATIENT
Start: 2022-05-10 | End: 2023-01-19

## 2022-05-10 RX ORDER — LEVOTHYROXINE SODIUM 100 UG/1
100 TABLET ORAL DAILY
Qty: 90 TABLET | Refills: 1 | Status: SHIPPED | OUTPATIENT
Start: 2022-05-10 | End: 2023-02-17

## 2022-05-10 RX ORDER — AMLODIPINE BESYLATE 10 MG/1
10 TABLET ORAL DAILY
Qty: 90 TABLET | Refills: 1 | Status: SHIPPED | OUTPATIENT
Start: 2022-05-10 | End: 2022-12-14

## 2022-05-10 RX ORDER — ATORVASTATIN CALCIUM 40 MG/1
40 TABLET, FILM COATED ORAL DAILY
Qty: 90 TABLET | Refills: 1 | Status: SHIPPED | OUTPATIENT
Start: 2022-05-10 | End: 2022-11-21

## 2022-05-10 RX ORDER — PANTOPRAZOLE SODIUM 40 MG/1
40 TABLET, DELAYED RELEASE ORAL DAILY
Qty: 90 TABLET | Refills: 1 | Status: SHIPPED | OUTPATIENT
Start: 2022-05-10 | End: 2022-12-09

## 2022-05-10 RX ORDER — METOPROLOL SUCCINATE 25 MG/1
TABLET, EXTENDED RELEASE ORAL
Qty: 90 TABLET | Refills: 1 | Status: SHIPPED | OUTPATIENT
Start: 2022-05-10 | End: 2023-01-19

## 2022-05-10 NOTE — PROGRESS NOTES
Viv is a 71 year old who is being evaluated via a billable telephone visit.      What phone number would you like to be contacted at? ***  How would you like to obtain your AVS? {AVS Preference:987071}    {PROVIDER CHARTING PREFERENCE:151017}    Subjective   Viv is a 71 year old who presents for the following health issues {ACCOMPANIED BY STATEMENT (Optional):729810}    History of Present Illness       Reason for visit:  Med check    She eats 2-3 servings of fruits and vegetables daily.She consumes 0 sweetened beverage(s) daily.She exercises with enough effort to increase her heart rate 9 or less minutes per day.  She exercises with enough effort to increase her heart rate 3 or less days per week.   She is taking medications regularly.       Diabetes Follow-up    How often are you checking your blood sugar? Continuous glucose monitor  What time of day are you checking your blood sugars (select all that apply)?  Not applicable  Have you had any blood sugars above 200?  Yes   Have you had any blood sugars below 70?  Yes     What symptoms do you notice when your blood sugar is low?  Dizzy, Weak and Lethargy    What concerns do you have today about your diabetes? None and Blood sugar is often over 200     Do you have any of these symptoms? (Select all that apply)  No numbness or tingling in feet.  No redness, sores or blisters on feet.  No complaints of excessive thirst.  No reports of blurry vision.  No significant changes to weight.      {Reference  Diabetes Management Resources :610613}    {Reference  Diabetes Log - 7 days :704822}    Hyperlipidemia Follow-Up      Are you regularly taking any medication or supplement to lower your cholesterol?   Yes- statin    Are you having muscle aches or other side effects that you think could be caused by your cholesterol lowering medication?  No    Hypertension Follow-up      Do you check your blood pressure regularly outside of the clinic? Yes     Are you following a low  "salt diet? Yes    Are your blood pressures ever more than 140 on the top number (systolic) OR more   than 90 on the bottom number (diastolic), for example 140/90? No    BP Readings from Last 2 Encounters:   04/21/22 135/60   02/05/22 135/70     Hemoglobin A1C POCT (%)   Date Value   07/06/2021 6.7 (H)   03/18/2021 6.8 (H)     Hemoglobin A1C (%)   Date Value   04/11/2022 7.1 (H)   11/29/2021 7.3 (H)     LDL Cholesterol Calculated (mg/dL)   Date Value   11/29/2021 84   10/29/2020 95   11/20/2019 97       Hypothyroidism Follow-up      Since last visit, patient describes the following symptoms: Weight stable, no hair loss, no skin changes, no constipation, no loose stools      Review of Systems   {ROS COMP (Optional):356646}      Objective           Vitals:  No vitals were obtained today due to virtual visit.    Physical Exam   {GENERAL APPEARANCE:50::\"healthy\",\"alert\",\"no distress\"}  PSYCH: Alert and oriented times 3; coherent speech, normal   rate and volume, able to articulate logical thoughts, able   to abstract reason, no tangential thoughts, no hallucinations   or delusions  Her affect is { :4087324::\"normal\"}  RESP: No cough, no audible wheezing, able to talk in full sentences  Remainder of exam unable to be completed due to telephone visits    {Diagnostic Test Results (Optional):207546}    {AMBULATORY ATTESTATION (Optional):100638}        Phone call duration: *** minutes  "

## 2022-05-31 ENCOUNTER — TELEPHONE (OUTPATIENT)
Dept: TRANSPLANT | Facility: CLINIC | Age: 72
End: 2022-05-31

## 2022-06-01 ENCOUNTER — TELEPHONE (OUTPATIENT)
Dept: TRANSPLANT | Facility: CLINIC | Age: 72
End: 2022-06-01

## 2022-06-01 NOTE — TELEPHONE ENCOUNTER
Patient Call: General  Route to LPN    Reason for call: Patient calling to schedule yearly check up    Call back needed? Yes    Return Call Needed  Same as documented in contacts section  When to return call?: Greater than one day: Route standard priority

## 2022-06-21 ENCOUNTER — HOSPITAL ENCOUNTER (OUTPATIENT)
Dept: MAMMOGRAPHY | Facility: CLINIC | Age: 72
Discharge: HOME | End: 2022-06-21
Attending: INTERNAL MEDICINE | Admitting: INTERNAL MEDICINE
Payer: MEDICARE

## 2022-06-21 DIAGNOSIS — Z12.31 BREAST CANCER SCREENING BY MAMMOGRAM: ICD-10-CM

## 2022-06-21 PROCEDURE — 77067 SCR MAMMO BI INCL CAD: CPT

## 2022-07-05 ASSESSMENT — ENCOUNTER SYMPTOMS
HEADACHES: 0
HEMATOCHEZIA: 0
DYSURIA: 0
FEVER: 0
JOINT SWELLING: 0
NAUSEA: 0
BREAST MASS: 0
ABDOMINAL PAIN: 0
SHORTNESS OF BREATH: 0
HEMATURIA: 0
DIARRHEA: 0
SORE THROAT: 0
CHILLS: 0
WEAKNESS: 0
COUGH: 0
PARESTHESIAS: 0
MYALGIAS: 0
FREQUENCY: 0
CONSTIPATION: 0
PALPITATIONS: 0
EYE PAIN: 0
DIZZINESS: 0
ARTHRALGIAS: 0
HEARTBURN: 0

## 2022-07-05 ASSESSMENT — ACTIVITIES OF DAILY LIVING (ADL): CURRENT_FUNCTION: NO ASSISTANCE NEEDED

## 2022-07-09 ENCOUNTER — TELEPHONE ENCOUNTER (OUTPATIENT)
Dept: URBAN - METROPOLITAN AREA CLINIC 121 | Facility: CLINIC | Age: 72
End: 2022-07-09

## 2022-07-10 ENCOUNTER — TELEPHONE ENCOUNTER (OUTPATIENT)
Dept: URBAN - METROPOLITAN AREA CLINIC 121 | Facility: CLINIC | Age: 72
End: 2022-07-10

## 2022-07-12 ENCOUNTER — OFFICE VISIT (OUTPATIENT)
Dept: INTERNAL MEDICINE | Facility: CLINIC | Age: 72
End: 2022-07-12
Payer: MEDICARE

## 2022-07-12 VITALS
WEIGHT: 160 LBS | RESPIRATION RATE: 12 BRPM | BODY MASS INDEX: 28.35 KG/M2 | SYSTOLIC BLOOD PRESSURE: 136 MMHG | OXYGEN SATURATION: 96 % | HEART RATE: 68 BPM | TEMPERATURE: 97.3 F | DIASTOLIC BLOOD PRESSURE: 66 MMHG | HEIGHT: 63 IN

## 2022-07-12 DIAGNOSIS — Z00.00 ENCOUNTER FOR MEDICARE ANNUAL WELLNESS EXAM: Primary | ICD-10-CM

## 2022-07-12 DIAGNOSIS — E03.8 OTHER SPECIFIED HYPOTHYROIDISM: ICD-10-CM

## 2022-07-12 DIAGNOSIS — I15.1 HYPERTENSION SECONDARY TO OTHER RENAL DISORDERS: ICD-10-CM

## 2022-07-12 DIAGNOSIS — E78.5 HYPERLIPIDEMIA LDL GOAL <100: ICD-10-CM

## 2022-07-12 DIAGNOSIS — Z94.0 STATUS POST KIDNEY TRANSPLANT: ICD-10-CM

## 2022-07-12 DIAGNOSIS — Z78.0 ASYMPTOMATIC POSTMENOPAUSAL STATUS: ICD-10-CM

## 2022-07-12 PROCEDURE — 90471 IMMUNIZATION ADMIN: CPT | Performed by: INTERNAL MEDICINE

## 2022-07-12 PROCEDURE — 90714 TD VACC NO PRESV 7 YRS+ IM: CPT | Performed by: INTERNAL MEDICINE

## 2022-07-12 PROCEDURE — G0439 PPPS, SUBSEQ VISIT: HCPCS | Performed by: INTERNAL MEDICINE

## 2022-07-12 ASSESSMENT — ENCOUNTER SYMPTOMS
MYALGIAS: 0
BREAST MASS: 0
COUGH: 0
JOINT SWELLING: 0
HEADACHES: 0
PARESTHESIAS: 0
ABDOMINAL PAIN: 0
FEVER: 0
DIARRHEA: 0
ARTHRALGIAS: 0
HEMATOCHEZIA: 0
NAUSEA: 0
CONSTIPATION: 0
DYSURIA: 0
DIZZINESS: 0
SORE THROAT: 0
HEMATURIA: 0
SHORTNESS OF BREATH: 0
CHILLS: 0
PALPITATIONS: 0
FREQUENCY: 0
WEAKNESS: 0
EYE PAIN: 0
HEARTBURN: 0

## 2022-07-12 ASSESSMENT — ACTIVITIES OF DAILY LIVING (ADL): CURRENT_FUNCTION: NO ASSISTANCE NEEDED

## 2022-07-12 NOTE — NURSING NOTE
"BP (!) 142/62   Pulse 68   Temp 97.3  F (36.3  C) (Tympanic)   Resp 12   Ht 1.6 m (5' 3\")   Wt 72.6 kg (160 lb)   SpO2 96%   BMI 28.34 kg/m    Patient in for Medicare Visit.  "

## 2022-07-12 NOTE — Clinical Note
Please abstract the following data from this visit with this patient into the appropriate field in Epic:  Eye exam with ophthalmology on this date: 08/21- eye exam - [facility] in [facility]-

## 2022-07-12 NOTE — PROGRESS NOTES
"SUBJECTIVE:   Viv Shaw is a 71 year old female who presents for Preventive Visit.      Patient has been advised of split billing requirements and indicates understanding: Yes  Are you in the first 12 months of your Medicare coverage?  No    Healthy Habits:     In general, how would you rate your overall health?  Good    Frequency of exercise:  2-3 days/week    Duration of exercise:  Less than 15 minutes    Do you usually eat at least 4 servings of fruit and vegetables a day, include whole grains    & fiber and avoid regularly eating high fat or \"junk\" foods?  Yes    Taking medications regularly:  Yes    Medication side effects:  Not applicable    Ability to successfully perform activities of daily living:  No assistance needed    Home Safety:  No safety concerns identified    Hearing Impairment:  No hearing concerns    In the past 6 months, have you been bothered by leaking of urine?  No    In general, how would you rate your overall mental or emotional health?  Excellent      PHQ-2 Total Score: 0    Additional concerns today:  No    Do you feel safe in your environment? Yes    Have you ever done Advance Care Planning? (For example, a Health Directive, POLST, or a discussion with a medical provider or your loved ones about your wishes): Yes, advance care planning is on file.       Fall risk  Fallen 2 or more times in the past year?: No  Any fall with injury in the past year?: No    Cognitive Screening   1) Repeat 3 items (Leader, Season, Table)    2) Clock draw: NORMAL  3) 3 item recall: Recalls 2 objects   Results: NORMAL clock, 1-2 items recalled: COGNITIVE IMPAIRMENT LESS LIKELY    Mini-CogTM Copyright SANTOS May. Licensed by the author for use in [facility]; reprinted with permission (abbe@.[email or web address]). All rights reserved.      Do you have sleep apnea, excessive snoring or daytime drowsiness?: no    Reviewed and updated as needed this visit by clinical staff                    Reviewed and updated " as needed this visit by Provider                     Past Medical History:   Diagnosis Date     Abdominal wall hernia     RLQ     AK (actinic keratosis) 08/06/2020     Allergic rhinitis      CAD (coronary artery disease)     coronary artery disease s/p PCI to LAD in 2005coronary artery disease s/p PCI to LAD in 2005     Diabetes mellitus, type 2 (H)     follows up with endocrinologist.     Dyslipidemia      GERD (gastroesophageal reflux disease)      H/O diabetic nephropathy     s/p kidney trnsplant     Hypertension      Hypothyroidism      Immunosuppressed status (H)      Kidney replaced by transplant     Rt     PONV (postoperative nausea and vomiting)      Shingles      Vitamin B12 deficiency anemia        Past Surgical History:   Procedure Laterality Date     APPENDECTOMY NOS       ARTHROSCOPY SHOULDER ROTATOR CUFF REPAIR Left      COLONOSCOPY N/A 6/7/2016    Procedure: COLONOSCOPY;  Surgeon: Rashard Snider MD;  Location: [facility]     Coronary angioplasty with stent  2005     HYSTERECTOMY       Kidney Transplant NOS Right      LAPAROSCOPIC CHOLECYSTECTOMY       NOSE SURGERY  2013     OPEN SEPARATION COMPONENT HERNIORRHAPHY N/A 10/16/2017    Procedure: OPEN SEPARATION COMPONENT HERNIORRHAPHY;;  Surgeon: CARL Lott MD;  Location: [facility]     RECONSTRUCT ABDOMINAL WALL N/A 10/16/2017    Procedure: RECONSTRUCT ABDOMINAL WALL;  Exploratory Laparotomy, Lysis of Adhesions, Abdominal Wall reconstruction, Inlay Xen AB mesh, Mitek Suture [place] and Umbilical Hernia Repair.;  Surgeon: CARL Lott MD;  Location: [facility] OR     REMOVE CATARACT INTRACAP,INSERT LENS Right      TONSILLECTOMY         Current Outpatient Medications   Medication Sig Dispense Refill     amLODIPine (NORVASC) 10 MG tablet Take 1 tablet (10 mg) by mouth daily 90 tablet 1     ASPIRIN PO Take 81 mg by mouth daily       atorvastatin (LIPITOR) 40 MG tablet Take 1 tablet (40 mg) by mouth daily 90 tablet 1     FLORIN CONTOUR test strip   0  "    BD INSULIN SYRINGE ULTRAFINE 31G X /16\" 0.3 ML USING 4-5 PER DAY ([facility] 31G/0.3ML)  6     calcium carbonate (TUMS) 500 MG chewable tablet Take 1-2 chew tab by mouth 2 times daily as needed for heartburn       cholecalciferol (VITAMIN D3) 25 mcg (1000 units) capsule Take 2 capsules by mouth daily       Continuous Blood Gluc Transmit (DEXCOM G6 TRANSMITTER) MISC See Admin Instructions 1 each 1     GENGRAF (BRAND) 25 MG CAPSULE Take 2 capsules (50 mg) by mouth 2 times daily 360 capsule 3     insulin aspart (NOVOLOG PEN) 100 UNIT/ML pen Taking as directed with adjustment scale and carb coverage. (Patient taking differently: Through Endocrinologist     Taking as directed with adjustment scale and carb coverage.)       insulin glargine (BASAGLAR KWIKPEN) 100 UNIT/ML pen Inject 14 Units Subcutaneous At Bedtime Through Endocrinologist       isosorbide mononitrate (IMDUR) 30 MG 24 hr tablet Take 0.5 tablets (15 mg) by mouth daily 45 tablet 3     levothyroxine (SYNTHROID/LEVOTHROID) 100 MCG tablet Take 1 tablet (100 mcg) by mouth daily 90 tablet 1     losartan (COZAAR) 100 MG tablet TAKE 1/2 OF A TABLET (50 MG) BY MOUTH DAILY 45 tablet 1     metoprolol succinate ER (TOPROL XL) 25 MG 24 hr tablet TAKE 1 TABLET BY MOUTH EVERYDAY AT BEDTIME 90 tablet 1     mycophenolic acid (GENERIC EQUIVALENT) 180 MG EC tablet Take 3 tablets (540 mg) by mouth 2 times daily 180 tablet 11     pantoprazole (PROTONIX) 40 MG EC tablet Take 1 tablet (40 mg) by mouth daily 90 tablet 1       Family History   Problem Relation Age of Onset     Respiratory Mother          age 71     Cardiovascular Father          age 75 hyertension     Arthritis Sister         living, healthy     Family History Negative Brother          age 44     Colon Cancer No family hx of        Social History     Tobacco Use     Smoking status: Never Smoker     Smokeless tobacco: Never Used   Substance Use Topics     Alcohol use: No     If you drink " alcohol do you typically have >3 drinks per day or >7 drinks per week? No    Alcohol Use 7/5/2022   Prescreen: >3 drinks/day or >7 drinks/week? Not Applicable   Prescreen: >3 drinks/day or >7 drinks/week? -   No flowsheet data found.        Diabetes -follows up with endocrinologist  08/21- [facility] exam - [facility]      Hypothyroidism Follow-up      Since last visit, patient describes the following symptoms: Weight stable, no hair loss, no skin changes, no constipation, no loose stools      Hyperlipidemia Follow-Up      Are you regularly taking any medication or supplement to lower your cholesterol?   Yes- Lipitor    Are you having muscle aches or other side effects that you think could be caused by your cholesterol lowering medication?  No    Hypertension Follow-up      Do you check your blood pressure regularly outside of the clinic? Yes on losartan 50 mg daily, metoprolol 25 mg at bedtime, amlodipine 10 mg daily, tolerating well.    Are you following a low salt diet? Yes    Are your blood pressures ever more than 140 on the top number (systolic) OR more   than 90 on the bottom number (diastolic), for example 140/90? No    Kidney transplant, follows up with transplant clinic and on immunosuppressants      Current providers sharing in care for this patient include:   Patient Care Team:  Summer Vega MD as PCP - General  Junaid Trevino MD as MD (Cardiology)  Enrico Blankenship MD (Nephrology)  Summer Vega MD as Assigned PCP  Morro Veloz MD as MD (Nephrology)  Danielle Aguayo [facility] as Pharmacist (Pharmacist)  Juan M Fitzpatrick MD as MD (Endocrinology, Diabetes, and Metabolism)  Juan M Fitzpatrick MD as Assigned Endocrinology Provider  Priyanka Cortez [facility] as Pharmacist (Pharmacist Ambulatory Care)  Chapincito Subramanian MD as Assigned Nephrology Provider  Tamy Bruner RP as Assigned [facility] Pharmacist    The following health maintenance items are  "reviewed in Epic and correct as of today:  Health Maintenance Due   Topic Date Due     ANNUAL REVIEW OF HM ORDERS  Never done     ZOSTER IMMUNIZATION (1 of 2) Never done     MEDICARE ANNUAL WELLNESS VISIT  11/10/2021     MICROALBUMIN  03/01/2022     DTAP/TDAP/TD IMMUNIZATION (2 - Td or Tdap) 06/08/2022     EYE EXAM  06/24/2022     COVID-19 Vaccine (5 - Booster for Pfizer series) 07/15/2022        Review of Systems   Constitutional: Negative for chills and fever.   HENT: Negative for congestion, ear pain, hearing loss and sore throat.    Eyes: Negative for pain and visual disturbance.   Respiratory: Negative for cough and shortness of breath.    Cardiovascular: Negative for chest pain, palpitations and peripheral edema.   Gastrointestinal: Negative for abdominal pain, constipation, diarrhea, heartburn, hematochezia and nausea.   Breasts:  Negative for tenderness, breast mass and discharge.   Genitourinary: Negative for dysuria, frequency, genital sores, hematuria, pelvic pain, urgency, vaginal bleeding and vaginal discharge.   Musculoskeletal: Negative for arthralgias, joint swelling and myalgias.   Skin: Negative for rash.   Neurological: Negative for dizziness, weakness, headaches and paresthesias.   Psychiatric/Behavioral: Negative for mood changes.        OBJECTIVE:   /66   Pulse 68   Temp 97.3  F (36.3  C) (Tympanic)   Resp 12   Ht 1.6 m (5' 3\")   Wt 72.6 kg (160 lb)   SpO2 96%   BMI 28.34 kg/m   Estimated body mass index is 28.34 kg/m  as calculated from the following:    Height as of this encounter: 1.6 m (5' 3\").    Weight as of this encounter: 72.6 kg (160 lb).  Physical Exam  GENERAL APPEARANCE:   alert and no distress  EYES: Eyes grossly normal to inspection, PERRL and conjunctivae and sclerae normal  RESP: lungs clear to auscultation - no rales, rhonchi or wheezes  BREAST: normal without masses, tenderness or nipple discharge and no palpable axillary masses or adenopathy  CV: regular rate and " "rhythm, normal S1 S2,   ABDOMEN: soft, nontender, no hepatosplenomegaly, no masses and bowel sounds normal  MS: no musculoskeletal defects are noted and gait is age appropriate without ataxia  NEURO: Normal strength and tone, sensory exam grossly normal, mentation intact and speech normal  PSYCH: mentation appears normal and affect normal/bright    ASSESSMENT / PLAN:       (Z00.00) Encounter for Medicare annual wellness exam  (primary encounter diagnosis)  Plan: Up-to-date on mammogram and colonoscopy, due for DEXA ordered.  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.89) Hypertension secondary to other renal disorders  Plan: Blood pressure stable    (Z94.0) Status post kidney transplant  Plan: On immunosuppressants and follows up with transplant clinic       Patient has been advised of split billing requirements and indicates understanding: Yes    COUNSELING:  Reviewed preventive health counseling, as reflected in patient instructions       Regular exercise       Healthy diet/nutrition       Immunizations    Vaccinated for: Td          Estimated body mass index is 28.34 kg/m  as calculated from the following:    Height as of this encounter: 1.6 m (5' 3\").    Weight as of this encounter: 72.6 kg (160 lb).        She reports that she has never smoked. She has never used smokeless tobacco.    Please abstract the following data from this visit with this patient into the appropriate field in Epic:   Eye exam with ophthalmology on this date: 08/21- eye exam - [facility]-       Appropriate preventive services were discussed with this patient, including applicable screening as appropriate for cardiovascular disease, diabetes, " osteopenia/osteoporosis, and glaucoma.  As appropriate for age/gender, discussed screening for colorectal cancer, prostate cancer, breast cancer, and cervical cancer. Checklist reviewing preventive services available has been given to the patient.    Reviewed patients plan of care and provided an AVS. The Basic Care Plan (routine screening as documented in Health Maintenance) for Viv meets the Care Plan requirement. This Care Plan has been established and reviewed with the Patient.    Counseling Resources:  ATP IV Guidelines  Pooled Cohorts Equation Calculator  Breast Cancer Risk Calculator  Breast Cancer: Medication to Reduce Risk  FRAX Risk Assessment  ICSI Preventive Guidelines  Dietary Guidelines for Americans, 2010  USDA's MyPlate  ASA Prophylaxis  Lung CA Screening    Summer Vega MD  [facility]    Identified Health Risks:

## 2022-07-22 NOTE — PROGRESS NOTES
This is a recent snapshot of the patient's [facility] medical record.  For current drug dose and complete information and questions, call [phone] or In Basket pool, [facility] ([id number])  CSN Number:  [id number]

## 2022-07-29 ENCOUNTER — LAB (OUTPATIENT)
Dept: LAB | Facility: CLINIC | Age: 72
End: 2022-07-29
Payer: MEDICARE

## 2022-07-29 DIAGNOSIS — E03.8 OTHER SPECIFIED HYPOTHYROIDISM: ICD-10-CM

## 2022-07-29 DIAGNOSIS — E10.319 TYPE 1 DIABETES MELLITUS WITH RETINOPATHY, MACULAR EDEMA PRESENCE UNSPECIFIED, UNSPECIFIED LATERALITY, UNSPECIFIED RETINOPATHY SEVERITY (H): ICD-10-CM

## 2022-07-29 DIAGNOSIS — Z94.0 KIDNEY TRANSPLANTED: ICD-10-CM

## 2022-07-29 DIAGNOSIS — E13.9 DIABETES MELLITUS TYPE 1.5, MANAGED AS TYPE 1 (H): ICD-10-CM

## 2022-07-29 DIAGNOSIS — E10.21 TYPE 1 DIABETES MELLITUS WITH DIABETIC NEPHROPATHY (H): ICD-10-CM

## 2022-07-29 DIAGNOSIS — E10.59 TYPE 1 DIABETES MELLITUS WITH OTHER CIRCULATORY COMPLICATION (H): ICD-10-CM

## 2022-07-29 DIAGNOSIS — Z48.298 AFTERCARE FOLLOWING ORGAN TRANSPLANT: ICD-10-CM

## 2022-07-29 DIAGNOSIS — Z94.0 KIDNEY REPLACED BY TRANSPLANT: ICD-10-CM

## 2022-07-29 DIAGNOSIS — Z79.899 ENCOUNTER FOR LONG-TERM CURRENT USE OF MEDICATION: ICD-10-CM

## 2022-07-29 DIAGNOSIS — E78.5 HYPERLIPIDEMIA LDL GOAL <100: ICD-10-CM

## 2022-07-29 LAB
ALBUMIN MFR UR ELPH: 14 MG/DL
ANION GAP SERPL CALCULATED.3IONS-SCNC: 10 MMOL/L (ref 7–15)
BUN SERPL-MCNC: 25.6 MG/DL (ref 8–23)
CALCIUM SERPL-MCNC: 10.3 MG/DL (ref 8.8–10.2)
CHLORIDE SERPL-SCNC: 100 MMOL/L (ref 98–107)
CHOLEST SERPL-MCNC: 189 MG/DL
CREAT SERPL-MCNC: 1.36 MG/DL (ref 0.51–0.95)
CREAT UR-MCNC: 133.5 MG/DL
CYCLOSPORINE BLD LC/MS/MS-MCNC: 56 UG/L (ref 50–400)
DEPRECATED HCO3 PLAS-SCNC: 29 MMOL/L (ref 22–29)
ERYTHROCYTE [DISTWIDTH] IN BLOOD BY AUTOMATED COUNT: 13.4 % (ref 10–15)
GFR SERPL CREATININE-BSD FRML MDRD: 41 ML/MIN/1.73M2
GLUCOSE SERPL-MCNC: 161 MG/DL (ref 70–99)
HBA1C MFR BLD: 7.8 %
HCT VFR BLD AUTO: 39 % (ref 35–47)
HDLC SERPL-MCNC: 67 MG/DL
HGB BLD-MCNC: 12 G/DL (ref 11.7–15.7)
LDLC SERPL CALC-MCNC: 104 MG/DL
MCH RBC QN AUTO: 28.3 PG (ref 26.5–33)
MCHC RBC AUTO-ENTMCNC: 30.8 G/DL (ref 31.5–36.5)
MCV RBC AUTO: 92 FL (ref 78–100)
NONHDLC SERPL-MCNC: 122 MG/DL
PLATELET # BLD AUTO: 211 10E3/UL (ref 150–450)
POTASSIUM SERPL-SCNC: 4.1 MMOL/L (ref 3.4–5.3)
PROT/CREAT 24H UR: 0.1 MG/MG CR (ref 0–0.2)
RBC # BLD AUTO: 4.24 10E6/UL (ref 3.8–5.2)
SODIUM SERPL-SCNC: 139 MMOL/L (ref 136–145)
TME LAST DOSE: NORMAL H
TME LAST DOSE: NORMAL H
TRIGL SERPL-MCNC: 89 MG/DL
TSH SERPL DL<=0.005 MIU/L-ACNC: 2.54 UIU/ML (ref 0.3–4.2)
WBC # BLD AUTO: 4 10E3/UL (ref 4–11)

## 2022-07-29 PROCEDURE — 83036 HEMOGLOBIN GLYCOSYLATED A1C: CPT

## 2022-07-29 PROCEDURE — 85014 HEMATOCRIT: CPT

## 2022-07-29 PROCEDURE — 82374 ASSAY BLOOD CARBON DIOXIDE: CPT

## 2022-07-29 PROCEDURE — 84443 ASSAY THYROID STIM HORMONE: CPT

## 2022-07-29 PROCEDURE — 36415 COLL VENOUS BLD VENIPUNCTURE: CPT

## 2022-07-29 PROCEDURE — 80061 LIPID PANEL: CPT

## 2022-07-29 PROCEDURE — 80158 DRUG ASSAY CYCLOSPORINE: CPT

## 2022-07-29 PROCEDURE — 82310 ASSAY OF CALCIUM: CPT

## 2022-07-29 PROCEDURE — 84156 ASSAY OF PROTEIN URINE: CPT

## 2022-08-01 ENCOUNTER — TELEPHONE (OUTPATIENT)
Dept: TRANSPLANT | Facility: CLINIC | Age: 72
End: 2022-08-01

## 2022-08-01 DIAGNOSIS — Z94.0 KIDNEY TRANSPLANTED: Primary | ICD-10-CM

## 2022-08-01 NOTE — TELEPHONE ENCOUNTER
Issue:  Cr up slightly at 1.4.    Plan/LPN task:  Please call Viv Shaw. Any recent illness/dehydation? Any new meds? Any UTI symptoms?    Would hydrate and repeat labs with [facility] in 1-2 weeks.

## 2022-08-01 NOTE — TELEPHONE ENCOUNTER
Spoke to patient who confirms that patients Cr up slightly at 1.4.     Patient denies any recent illness/dehydation, no new meds, no UTI symptoms.     Patient confirms that she will hydrate and repeat labs with [facility] in 1-2 weeks.

## 2022-08-02 ENCOUNTER — TELEPHONE (OUTPATIENT)
Dept: NEPHROLOGY | Facility: CLINIC | Age: 72
End: 2022-08-02

## 2022-08-02 NOTE — TELEPHONE ENCOUNTER
[facility] Call Center    Phone Message    May a detailed message be left on voicemail: yes     Reason for Call: Other: Pt called and stated she got a letter from Dr. Subramanian stating that he was leaving the practice and stating she can see a hanful of providers, pt is wondering if she can see Dr. Veloz as she saw him previously, per protocols writer can not schedule due to it being over 3 years since she has seen him, please call pt to further discuss, thanks!     Action Taken: Message routed to:  [facility] ([facility]): Neph    Travel Screening: Not Applicable

## 2022-08-10 ENCOUNTER — TRANSFERRED RECORDS (OUTPATIENT)
Dept: INTERNAL MEDICINE | Facility: CLINIC | Age: 72
End: 2022-08-10

## 2022-08-10 LAB — RETINOPATHY: NEGATIVE

## 2022-08-12 ENCOUNTER — APPOINTMENT (OUTPATIENT)
Dept: LAB | Facility: CLINIC | Age: 72
End: 2022-08-12
Payer: MEDICARE

## 2022-08-12 ENCOUNTER — LAB (OUTPATIENT)
Dept: LAB | Facility: CLINIC | Age: 72
End: 2022-08-12
Payer: MEDICARE

## 2022-08-12 ENCOUNTER — TELEPHONE (OUTPATIENT)
Dept: TRANSPLANT | Facility: CLINIC | Age: 72
End: 2022-08-12

## 2022-08-12 DIAGNOSIS — Z94.0 KIDNEY REPLACED BY TRANSPLANT: Primary | ICD-10-CM

## 2022-08-12 DIAGNOSIS — E13.9 DIABETES MELLITUS TYPE 1.5, MANAGED AS TYPE 1 (H): ICD-10-CM

## 2022-08-12 DIAGNOSIS — Z94.0 KIDNEY TRANSPLANTED: ICD-10-CM

## 2022-08-12 DIAGNOSIS — Z94.0 KIDNEY TRANSPLANTED: Primary | ICD-10-CM

## 2022-08-12 DIAGNOSIS — E10.21 TYPE 1 DIABETES MELLITUS WITH DIABETIC NEPHROPATHY (H): ICD-10-CM

## 2022-08-12 DIAGNOSIS — E10.319 TYPE 1 DIABETES MELLITUS WITH RETINOPATHY, MACULAR EDEMA PRESENCE UNSPECIFIED, UNSPECIFIED LATERALITY, UNSPECIFIED RETINOPATHY SEVERITY (H): ICD-10-CM

## 2022-08-12 DIAGNOSIS — E10.59 TYPE 1 DIABETES MELLITUS WITH OTHER CIRCULATORY COMPLICATION (H): ICD-10-CM

## 2022-08-12 LAB
ALBUMIN UR-MCNC: 10 MG/DL
ANION GAP SERPL CALCULATED.3IONS-SCNC: 8 MMOL/L (ref 7–15)
APPEARANCE UR: ABNORMAL
BACTERIA #/AREA URNS HPF: ABNORMAL /HPF
BILIRUB UR QL STRIP: NEGATIVE
BUN SERPL-MCNC: 22.8 MG/DL (ref 8–23)
CALCIUM SERPL-MCNC: 9.7 MG/DL (ref 8.8–10.2)
CHLORIDE SERPL-SCNC: 101 MMOL/L (ref 98–107)
COLOR UR AUTO: YELLOW
CREAT SERPL-MCNC: 1.3 MG/DL (ref 0.51–0.95)
CREAT UR-MCNC: 95.1 MG/DL
CYCLOSPORINE BLD LC/MS/MS-MCNC: 499 UG/L (ref 50–400)
DEPRECATED HCO3 PLAS-SCNC: 28 MMOL/L (ref 22–29)
ERYTHROCYTE [DISTWIDTH] IN BLOOD BY AUTOMATED COUNT: 13.5 % (ref 10–15)
GFR SERPL CREATININE-BSD FRML MDRD: 43 ML/MIN/1.73M2
GLUCOSE SERPL-MCNC: 260 MG/DL (ref 70–99)
GLUCOSE UR STRIP-MCNC: NEGATIVE MG/DL
HCT VFR BLD AUTO: 39.1 % (ref 35–47)
HGB BLD-MCNC: 12 G/DL (ref 11.7–15.7)
HGB UR QL STRIP: NEGATIVE
KETONES UR STRIP-MCNC: NEGATIVE MG/DL
LEUKOCYTE ESTERASE UR QL STRIP: ABNORMAL
MCH RBC QN AUTO: 28.5 PG (ref 26.5–33)
MCHC RBC AUTO-ENTMCNC: 30.7 G/DL (ref 31.5–36.5)
MCV RBC AUTO: 93 FL (ref 78–100)
MICROALBUMIN UR-MCNC: 30.8 MG/L
MICROALBUMIN/CREAT UR: 32.39 MG/G CR (ref 0–25)
MUCOUS THREADS #/AREA URNS LPF: PRESENT /LPF
NITRATE UR QL: NEGATIVE
PH UR STRIP: 5.5 [PH] (ref 5–7)
PLATELET # BLD AUTO: 222 10E3/UL (ref 150–450)
POTASSIUM SERPL-SCNC: 4.6 MMOL/L (ref 3.4–5.3)
RBC # BLD AUTO: 4.21 10E6/UL (ref 3.8–5.2)
RBC URINE: 6 /HPF
SODIUM SERPL-SCNC: 137 MMOL/L (ref 136–145)
SP GR UR STRIP: 1.01 (ref 1–1.03)
SQUAMOUS EPITHELIAL: 7 /HPF
TME LAST DOSE: ABNORMAL H
TME LAST DOSE: ABNORMAL H
TRANSITIONAL EPI: 1 /HPF
UROBILINOGEN UR STRIP-MCNC: NORMAL MG/DL
WBC # BLD AUTO: 4.8 10E3/UL (ref 4–11)
WBC CLUMPS #/AREA URNS HPF: PRESENT /HPF
WBC URINE: >182 /HPF

## 2022-08-12 PROCEDURE — 87086 URINE CULTURE/COLONY COUNT: CPT

## 2022-08-12 PROCEDURE — 36415 COLL VENOUS BLD VENIPUNCTURE: CPT

## 2022-08-12 PROCEDURE — 80158 DRUG ASSAY CYCLOSPORINE: CPT

## 2022-08-12 PROCEDURE — 81001 URINALYSIS AUTO W/SCOPE: CPT

## 2022-08-12 PROCEDURE — 85027 COMPLETE CBC AUTOMATED: CPT

## 2022-08-12 PROCEDURE — 82043 UR ALBUMIN QUANTITATIVE: CPT

## 2022-08-12 PROCEDURE — 80048 BASIC METABOLIC PNL TOTAL CA: CPT

## 2022-08-12 RX ORDER — CIPROFLOXACIN 500 MG/1
500 TABLET, FILM COATED ORAL 2 TIMES DAILY
Qty: 14 TABLET | Refills: 0 | Status: SHIPPED | OUTPATIENT
Start: 2022-08-12 | End: 2022-12-05

## 2022-08-12 NOTE — TELEPHONE ENCOUNTER
Morro Veloz MD Lavelle Peterson, Meghan M, RN  Probable urinary tract infection and recommend treating with ciprofloxacin 500 mg bid x 7 days    Call placed to Viv Shaw. She v/u of instructions. Cipro sent to local pharmacy.

## 2022-08-12 NOTE — TELEPHONE ENCOUNTER
DATE:  8/12/2022     TIME OF RECEIPT FROM LAB:  1214    ORDERING PROVIDER:     LAB TEST:  CSA    LAB VALUE:  499    RESULTS GIVEN WITH READ-BACK TO (PROVIDER):  SHARON PEREZ    TIME LAB VALUE REPORTED TO PROVIDER:   1214     yes

## 2022-08-12 NOTE — TELEPHONE ENCOUNTER
ISSUE      OUTCOME  Contacted pt to discuss. She denies any recent illness, medication changes, or errors in taking her CSA. She denies any abnormal symptoms. After long discussion we figured out that she accidentally took her CSA immediately before her lab draw as she did not think she was having drug levels completed.     Scheduled pt for a lab appt Monday at 7:40am at [facility] for a repeat level ensuring 12 hour trough. Pt appreciated help.

## 2022-08-13 LAB — BACTERIA UR CULT: ABNORMAL

## 2022-08-15 ENCOUNTER — LAB (OUTPATIENT)
Dept: LAB | Facility: CLINIC | Age: 72
End: 2022-08-15
Payer: MEDICARE

## 2022-08-15 ENCOUNTER — OFFICE VISIT (OUTPATIENT)
Dept: ENDOCRINOLOGY | Facility: CLINIC | Age: 72
End: 2022-08-15
Payer: MEDICARE

## 2022-08-15 VITALS
HEIGHT: 63 IN | WEIGHT: 161.2 LBS | BODY MASS INDEX: 28.56 KG/M2 | SYSTOLIC BLOOD PRESSURE: 138 MMHG | HEART RATE: 64 BPM | DIASTOLIC BLOOD PRESSURE: 74 MMHG

## 2022-08-15 DIAGNOSIS — E10.21 TYPE 1 DIABETES MELLITUS WITH DIABETIC NEPHROPATHY (H): ICD-10-CM

## 2022-08-15 DIAGNOSIS — E10.319 TYPE 1 DIABETES MELLITUS WITH RETINOPATHY, MACULAR EDEMA PRESENCE UNSPECIFIED, UNSPECIFIED LATERALITY, UNSPECIFIED RETINOPATHY SEVERITY (H): ICD-10-CM

## 2022-08-15 DIAGNOSIS — Z94.0 KIDNEY REPLACED BY TRANSPLANT: ICD-10-CM

## 2022-08-15 DIAGNOSIS — E10.59 TYPE 1 DIABETES MELLITUS WITH OTHER CIRCULATORY COMPLICATION (H): ICD-10-CM

## 2022-08-15 DIAGNOSIS — E13.9 DIABETES MELLITUS TYPE 1.5, MANAGED AS TYPE 1 (H): Primary | ICD-10-CM

## 2022-08-15 LAB
CYCLOSPORINE BLD LC/MS/MS-MCNC: 45 UG/L (ref 50–400)
TME LAST DOSE: ABNORMAL H
TME LAST DOSE: ABNORMAL H

## 2022-08-15 PROCEDURE — 36415 COLL VENOUS BLD VENIPUNCTURE: CPT

## 2022-08-15 PROCEDURE — 99214 OFFICE O/P EST MOD 30 MIN: CPT | Performed by: INTERNAL MEDICINE

## 2022-08-15 PROCEDURE — 80158 DRUG ASSAY CYCLOSPORINE: CPT

## 2022-08-15 NOTE — PROGRESS NOTES
Recent issues:   Diabetes follow-up evaluation, with her  Johnny  Recent (asymptomatic?) UTI, on Abx currently  Reviewed medical history from patient and Epic chart record        Diagnosis of diabetes mellitus age 23, living in [facility]  Recalls having vision problem, saw eye physician and then family physician, also had frequent urination  Initial treatment with oral medication for few weeks, then change to insulin  Had seen Dr. Castillo in [facility]  Subsequent evaluations with Dr. GABRIELE Vickers, then saw Dr. Elmer Garvin/[facility] for endocrinology evaluations  Has used insulin injections 4x per day  Previous FV hgbA1c trends include:     Lab Test 07/06/21  1422 03/18/21  0636 10/29/20  0710 03/27/20  0648 11/20/19  0803   A1C 6.7* 6.8* 7.0* 6.8* 6.9*         8/10/21. Initial diabetes evaluation with me at [place]  Reviewed health history and diabetes issues  Gets her insulin pens from [facility] mail order  Current DM medications:  Novolog Flexpen 5U premeal   Novolog sscale guestimates  Basaglar Kwikpen  13U subcutaneous in evening    Blood glucose (BG) meter: Raad Contour?   Has tested 4x per day previously   Less frequent testing when using CGM sensor    Has used the DexcomG5, then the G6 CGM sensor  Recent DexcomG6 data:            FamHx DM:  Niece  Recent FV labs include:  Lab Results   Component Value Date    A1C 7.8 (H) 07/29/2022     08/12/2022    POTASSIUM 4.6 08/12/2022    CHLORIDE 101 08/12/2022    CO2 28 08/12/2022    ANIONGAP 8 08/12/2022     (H) 08/12/2022    BUN 22.8 08/12/2022    CR 1.30 (H) 08/12/2022    GFRESTIMATED 43 (L) 08/12/2022    GFRESTBLACK 45 (L) 06/23/2021    NAVI 9.7 08/12/2022    CPEPT 0.2 (L) 11/29/2021    CHOL 189 07/29/2022    TRIG 89 07/29/2022    HDL 67 07/29/2022     (H) 07/29/2022    NHDL 122 07/29/2022    UCRR 95.1 08/12/2022    MICROL 30.8 08/12/2022    UMALCR 32.39 (H) 08/12/2022    TSH 2.54 07/29/2022    T4 1.41 04/02/2018     DM  Complications:   Nephropathy    Previous ESRD, then renal transplant 2005    Sees Dr. Morro Veloz/nephrology   Retinopathy    Previous PDR and laser surgeries [facility]    Had seen Dr. Grayson Miles/[facility], plans to see Dr. Arevalo at that clinic building    Macrovascular disease    CAD, previous cardiac stent placement      Lives in [place], [place]  Sees Dr. Summer Vega/[facility] for general medicine evaluations.    PMH/PSH:  Past Medical History:   Diagnosis Date     Abdominal wall hernia     RLQ     AK (actinic keratosis) 08/06/2020     Allergic rhinitis      CAD (coronary artery disease)     coronary artery disease s/p PCI to LAD in 2005coronary artery disease s/p PCI to LAD in 2005     Diabetes mellitus, type 2 (H)     follows up with endocrinologist.     Dyslipidemia      GERD (gastroesophageal reflux disease)      H/O diabetic nephropathy     s/p kidney trnsplant     Hypertension      Hypothyroidism      Immunosuppressed status (H)      Kidney replaced by transplant     Rt     PONV (postoperative nausea and vomiting)      Shingles      Vitamin B12 deficiency anemia      Past Surgical History:   Procedure Laterality Date     APPENDECTOMY NOS       ARTHROSCOPY SHOULDER ROTATOR CUFF REPAIR Left      COLONOSCOPY N/A 6/7/2016    Procedure: COLONOSCOPY;  Surgeon: Rashard Snider MD;  Location: [facility]     Coronary angioplasty with stent  2005     HYSTERECTOMY       Kidney Transplant NOS Right      LAPAROSCOPIC CHOLECYSTECTOMY       NOSE SURGERY  2013     OPEN SEPARATION COMPONENT HERNIORRHAPHY N/A 10/16/2017    Procedure: OPEN SEPARATION COMPONENT HERNIORRHAPHY;;  Surgeon: CARL Lott MD;  Location: [facility]     RECONSTRUCT ABDOMINAL WALL N/A 10/16/2017    Procedure: RECONSTRUCT ABDOMINAL WALL;  Exploratory Laparotomy, Lysis of Adhesions, Abdominal Wall reconstruction, Inlay Xen AB mesh, Mitek Suture [place] and Umbilical Hernia Repair.;  Surgeon: CARL Lott MD;   Location: [facility] OR     REMOVE CATARACT INTRACAP,INSERT LENS Right      TONSILLECTOMY         Family Hx:  Family History   Problem Relation Age of Onset     Respiratory Mother          age 71     Cardiovascular Father          age 75 hyertension     Arthritis Sister         living, healthy     Family History Negative Brother          age 44     Colon Cancer No family hx of          Social Hx:  Social History     Socioeconomic History     Marital status:      Spouse name: Not on file     Number of children: Not on file     Years of education: Not on file     Highest education level: Not on file   Occupational History     Not on file   Tobacco Use     Smoking status: Never Smoker     Smokeless tobacco: Never Used   Substance and Sexual Activity     Alcohol use: No     Drug use: No     Sexual activity: Yes     Partners: Male   Other Topics Concern     Parent/sibling w/ CABG, MI or angioplasty before 65F 55M? Not Asked   Social History Narrative     Not on file     Social Determinants of Health     Financial Resource Strain: Not on file   Food Insecurity: Not on file   Transportation Needs: Not on file   Physical Activity: Not on file   Stress: Not on file   Social Connections: Not on file   Intimate Partner Violence: Not on file   Housing Stability: Not on file          MEDICATIONS:  has a current medication list which includes the following prescription(s): amlodipine, aspirin, atorvastatin, dexcom g6 transmitter, cyclosporine modified, insulin aspart, insulin glargine, isosorbide mononitrate, levothyroxine, losartan, metoprolol succinate er, mycophenolic acid, pantoprazole, teddy contour, bd insulin syringe ultrafine, calcium carbonate, cholecalciferol, and ciprofloxacin.    ROS:     ROS: 10 point ROS neg other than the symptoms noted above in the HPI.    GENERAL: some fatigue, wt stable; denies fevers, chills, night sweats.   HEENT: no dysphagia, odonophagia, diplopia, neck pain  THYROID:  " no apparent hyper or hypothyroid symptoms  CV: no chest pain, pressure, palpitations  LUNGS: no SOB, JIMENEZ, cough, wheezing   ABDOMEN: no diarrhea, constipation, abdominal pain  EXTREMITIES: no rashes, ulcers, edema  NEUROLOGY: decreased sensation at feet, balance problems; no headaches, denies changes in vision, tingling  MSK: some leg weakness, denies specific arthralgias  SKIN: no rashes or lesions  :  no menses  PSYCH:  stable mood, no significant anxiety or depression  ENDOCRINE: no heat or cold intolerance    Physical Exam   VS: /74   Pulse 64   Ht 1.6 m (5' 3\")   Wt 73.1 kg (161 lb 3.2 oz)   BMI 28.56 kg/m    GENERAL: AXOX3, NAD, well dressed, answering questions appropriately, appears stated age.  THYROID:  normal gland, no apparent nodules or goiter  HEENT: neck non-tender, no exopthalmous, no proptosis, EOMI  ABDOMEN: mildly obese, soft, nontender, nondistended  EXTREMITIES: normal appearance of feet, pulses R/L DP 3/3, no edema, no lesions  NEUROLOGY: CN grossly intact, no tremors  MSK: grossly intact  SKIN: no rashes, no lesions    LABS:    All pertinent notes, labs, and images personally reviewed by me.     A/P:  Encounter Diagnoses   Name Primary?     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  Recent glucose trends good, DexcomG6 CGM sensor helpful  Reviewed and interpreted tests that I previously ordered.   Ordered appropriate tests for the endocrinology disease management.    Management options discussed " and implemented after shared medical decision making with the patient.  T1.5DM problem is chronic-stable    Plan:  Reviewed general issues with the diabetes diagnosis and management  We discussed the hgbA1c test which reflects previous overall glucose levels or control  Discussed the importance of blood glucose (BG) testing to assess glucose trends  Provided general overview of the diabetes medication options and medication treatment plan.  Reviewed recent DexcomG6 CGM glucose trend data, in detail.    Recommend:  Continue current Novolog and Basaglar insulin medication use  Avoid taking Novolog after start of meal... take dose premeal  Discussed the ideal mealtime and correction scale dosing routine   Use ISF 1U per 50 mg/dl >150 mg/dl    Goal target premeal SG  mg/dl  Continue use of the DexcomG6 CGM sensor   Patient's insulin regimen requires frequent dose adjustments by patient on basis of therapeutic BGM/CGM test results  Discussed hypoglycemia prevention  She gets her insulin from [facility] pharmacy in [place], mail order   They will contact me if needing new Rx locally in [place]  Plan repeat labs in early 12/2022 prior to next appt   Lab orders placed  Keep focus on diet, exercise, weight management.  Advise having fasting lipid panel testing and dilated eye examination, at least annually    Keep regular follow-up evaluations with nephrologist, cardiologist, ophthalmologist, and PCP   Addressed patient questions today    There are no Patient Instructions on file for this visit.    Future labs ordered today:   Orders Placed This Encounter   Procedures     Hemoglobin A1c     Radiology/Consults ordered today: None    Total time spent in with the patient evaluation:  15 min  Additional time spent reviewing pertinent lab tests and chart notes, and documentation:  10 min    Follow-up:  12/2022, Return    DIANN Fitzpatrick MD, MS  Endocrinology  [facility]    CC: SHARON Vega and KYMBERLY Veloz

## 2022-08-15 NOTE — LETTER
8/15/2022         RE: Viv Shaw  [address]  [place] [address]        Dear Colleague,    Thank you for referring your patient, Viv Shaw, to the [facility] [place]. Please see a copy of my visit note below.      Recent issues:   Diabetes follow-up evaluation, with her  Johnny  Recent (asymptomatic?) UTI, on Abx currently  Reviewed medical history from patient and Epic chart record        Diagnosis of diabetes mellitus age 23, living in [facility]  Recalls having vision problem, saw eye physician and then family physician, also had frequent urination  Initial treatment with oral medication for few weeks, then change to insulin  Had seen Dr. Castillo in [facility]  Subsequent evaluations with Dr. GABRIELE Vickers, then saw Dr. Elmer Garvin/[facility] for endocrinology evaluations  Has used insulin injections 4x per day  Previous FV hgbA1c trends include:     Lab Test 07/06/21  1422 03/18/21  0636 10/29/20  0710 03/27/20  0648 11/20/19  0803   A1C 6.7* 6.8* 7.0* 6.8* 6.9*         8/10/21. Initial diabetes evaluation with me at [place]  Reviewed health history and diabetes issues  Gets her insulin pens from [facility] mail order  Current DM medications:  Novolog Flexpen 5U premeal   Novolog sscale guestimates  Basaglar Kwikpen  13U subcutaneous in evening    Blood glucose (BG) meter: Raad Contour?   Has tested 4x per day previously   Less frequent testing when using CGM sensor    Has used the DexcomG5, then the G6 CGM sensor  Recent DexcomG6 data:            FamHx DM:  Niece  Recent FV labs include:  Lab Results   Component Value Date    A1C 7.8 (H) 07/29/2022     08/12/2022    POTASSIUM 4.6 08/12/2022    CHLORIDE 101 08/12/2022    CO2 28 08/12/2022    ANIONGAP 8 08/12/2022     (H) 08/12/2022    BUN 22.8 08/12/2022    CR 1.30 (H) 08/12/2022    GFRESTIMATED 43 (L) 08/12/2022    GFRESTBLACK 45 (L) 06/23/2021    NAVI 9.7 08/12/2022    CPEPT 0.2 (L)  11/29/2021    CHOL 189 07/29/2022    TRIG 89 07/29/2022    HDL 67 07/29/2022     (H) 07/29/2022    NHDL 122 07/29/2022    UCRR 95.1 08/12/2022    MICROL 30.8 08/12/2022    UMALCR 32.39 (H) 08/12/2022    TSH 2.54 07/29/2022    T4 1.41 04/02/2018     DM Complications:   Nephropathy    Previous ESRD, then renal transplant 2005    Sees Dr. Morro Veloz/nephrology   Retinopathy    Previous PDR and laser surgeries [facility]    Had seen Dr. Grayson Miles/[facility] clinic, plans to see Dr. Arevalo at that clinic building    Macrovascular disease    CAD, previous cardiac stent placement      Lives in [place], [place]  Sees Dr. Summer Vega/[facility] for general medicine evaluations.    PMH/PSH:  Past Medical History:   Diagnosis Date     Abdominal wall hernia     RLQ     AK (actinic keratosis) 08/06/2020     Allergic rhinitis      CAD (coronary artery disease)     coronary artery disease s/p PCI to LAD in 2005coronary artery disease s/p PCI to LAD in 2005     Diabetes mellitus, type 2 (H)     follows up with endocrinologist.     Dyslipidemia      GERD (gastroesophageal reflux disease)      H/O diabetic nephropathy     s/p kidney trnsplant     Hypertension      Hypothyroidism      Immunosuppressed status (H)      Kidney replaced by transplant     Rt     PONV (postoperative nausea and vomiting)      Shingles      Vitamin B12 deficiency anemia      Past Surgical History:   Procedure Laterality Date     APPENDECTOMY NOS       ARTHROSCOPY SHOULDER ROTATOR CUFF REPAIR Left      COLONOSCOPY N/A 6/7/2016    Procedure: COLONOSCOPY;  Surgeon: Rashard Snider MD;  Location:  GI     Coronary angioplasty with stent  2005     HYSTERECTOMY       Kidney Transplant NOS Right      LAPAROSCOPIC CHOLECYSTECTOMY       NOSE SURGERY  2013     OPEN SEPARATION COMPONENT HERNIORRHAPHY N/A 10/16/2017    Procedure: OPEN SEPARATION COMPONENT HERNIORRHAPHY;;  Surgeon: CARL Lott MD;  Location:  OR      RECONSTRUCT ABDOMINAL WALL N/A 10/16/2017    Procedure: RECONSTRUCT ABDOMINAL WALL;  Exploratory Laparotomy, Lysis of Adhesions, Abdominal Wall reconstruction, Inlay Xen AB mesh, Mitek Suture [place] and Umbilical Hernia Repair.;  Surgeon: CARL Lott MD;  Location: [facility] OR     REMOVE CATARACT INTRACAP,INSERT LENS Right      TONSILLECTOMY         Family Hx:  Family History   Problem Relation Age of Onset     Respiratory Mother          age 71     Cardiovascular Father          age 75 hyertension     Arthritis Sister         living, healthy     Family History Negative Brother          age 44     Colon Cancer No family hx of          Social Hx:  Social History     Socioeconomic History     Marital status:      Spouse name: Not on file     Number of children: Not on file     Years of education: Not on file     Highest education level: Not on file   Occupational History     Not on file   Tobacco Use     Smoking status: Never Smoker     Smokeless tobacco: Never Used   Substance and Sexual Activity     Alcohol use: No     Drug use: No     Sexual activity: Yes     Partners: Male   Other Topics Concern     Parent/sibling w/ CABG, MI or angioplasty before 65F 55M? Not Asked   Social History Narrative     Not on file     Social Determinants of Health     Financial Resource Strain: Not on file   Food Insecurity: Not on file   Transportation Needs: Not on file   Physical Activity: Not on file   Stress: Not on file   Social Connections: Not on file   Intimate Partner Violence: Not on file   Housing Stability: Not on file          MEDICATIONS:  has a current medication list which includes the following prescription(s): amlodipine, aspirin, atorvastatin, dexcom g6 transmitter, cyclosporine modified, insulin aspart, insulin glargine, isosorbide mononitrate, levothyroxine, losartan, metoprolol succinate er, mycophenolic acid, pantoprazole, teddy contour, bd insulin syringe ultrafine, calcium  "carbonate, cholecalciferol, and ciprofloxacin.    ROS:     ROS: 10 point ROS neg other than the symptoms noted above in the HPI.    GENERAL: some fatigue, wt stable; denies fevers, chills, night sweats.   HEENT: no dysphagia, odonophagia, diplopia, neck pain  THYROID:  no apparent hyper or hypothyroid symptoms  CV: no chest pain, pressure, palpitations  LUNGS: no SOB, JIMENEZ, cough, wheezing   ABDOMEN: no diarrhea, constipation, abdominal pain  EXTREMITIES: no rashes, ulcers, edema  NEUROLOGY: decreased sensation at feet, balance problems; no headaches, denies changes in vision, tingling  MSK: some leg weakness, denies specific arthralgias  SKIN: no rashes or lesions  :  no menses  PSYCH:  stable mood, no significant anxiety or depression  ENDOCRINE: no heat or cold intolerance    Physical Exam   VS: /74   Pulse 64   Ht 1.6 m (5' 3\")   Wt 73.1 kg (161 lb 3.2 oz)   BMI 28.56 kg/m    GENERAL: AXOX3, NAD, well dressed, answering questions appropriately, appears stated age.  THYROID:  normal gland, no apparent nodules or goiter  HEENT: neck non-tender, no exopthalmous, no proptosis, EOMI  ABDOMEN: mildly obese, soft, nontender, nondistended  EXTREMITIES: normal appearance of feet, pulses R/L DP 3/3, no edema, no lesions  NEUROLOGY: CN grossly intact, no tremors  MSK: grossly intact  SKIN: no rashes, no lesions    LABS:    All pertinent notes, labs, and images personally reviewed by me.     A/P:  Encounter Diagnoses   Name Primary?     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  Recent glucose trends good, DexcomG6 CGM sensor helpful  Reviewed and interpreted tests that I previously ordered.   Ordered appropriate tests for the endocrinology disease management.    Management options discussed and implemented after shared medical decision making with the patient.  T1.5DM problem is chronic-stable    Plan:  Reviewed general issues with the diabetes diagnosis and management  We discussed the hgbA1c test which reflects previous overall glucose levels or control  Discussed the importance of blood glucose (BG) testing to assess glucose trends  Provided general overview of the diabetes medication options and medication treatment plan.  Reviewed recent DexcomG6 CGM glucose trend data, in detail.    Recommend:  Continue current Novolog and Basaglar insulin medication use  Avoid taking Novolog after start of meal... take dose premeal  Discussed the ideal mealtime and correction scale dosing routine   Use ISF 1U per 50 mg/dl >150 mg/dl    Goal target premeal SG  mg/dl  Continue use of the DexcomG6 CGM sensor   Patient's insulin regimen requires frequent dose adjustments by patient on basis of therapeutic BGM/CGM test results  Discussed hypoglycemia prevention  She gets her insulin from [facility] pharmacy in [place], mail order   They will contact me if needing new Rx locally in [place]  Plan repeat labs in early 12/2022 prior to next appt   Lab orders placed  Keep focus on diet, exercise, weight management.  Advise having fasting lipid panel testing and dilated eye examination, at least annually    Keep regular follow-up evaluations with nephrologist, cardiologist, ophthalmologist, and PCP   Addressed patient questions today    There are no Patient Instructions on file for this visit.    Future labs ordered today:   Orders Placed This Encounter   Procedures     Hemoglobin A1c     Radiology/Consults ordered today: None    Total time spent  in with the patient evaluation:  15 min  Additional time spent reviewing pertinent lab tests and chart notes, and documentation:  10 min    Follow-up:  12/2022, Return    DIANN Fitzpatrick MD, MS  Endocrinology  [facility]    CC: SHARON Vega and KYMBERLY Veloz          Again, thank you for allowing me to participate in the care of your patient.        Sincerely,        Juan M Fitzpatrick MD

## 2022-10-14 ENCOUNTER — TELEPHONE (OUTPATIENT)
Dept: ENDOCRINOLOGY | Facility: CLINIC | Age: 72
End: 2022-10-14

## 2022-10-14 DIAGNOSIS — E13.9 DIABETES MELLITUS TYPE 1.5, MANAGED AS TYPE 1 (H): Primary | ICD-10-CM

## 2022-10-14 NOTE — TELEPHONE ENCOUNTER
Health Call Center    Phone Message    May a detailed message be left on voicemail: yes     Reason for Call: Other: Patient's  called to ask if Dr. Fitzpatrick would take over prescribing the patient's Novolog pens. She had previously been getting this filled in [place]. If so they would like the prescription to be sent to: [id number] [place] [id number] [place] AT [address] OF [address]  Please follow up. Thank you.    Action Taken: Other: Endo    Travel Screening: Not Applicable

## 2022-10-18 ENCOUNTER — TELEPHONE (OUTPATIENT)
Dept: ENDOCRINOLOGY | Facility: CLINIC | Age: 72
End: 2022-10-18

## 2022-10-18 DIAGNOSIS — E13.9 DIABETES MELLITUS TYPE 1.5, MANAGED AS TYPE 1 (H): Primary | ICD-10-CM

## 2022-10-18 RX ORDER — INSULIN GLARGINE 100 [IU]/ML
13-15 INJECTION, SOLUTION SUBCUTANEOUS DAILY
Qty: 15 ML | Refills: 5 | Status: SHIPPED | OUTPATIENT
Start: 2022-10-18 | End: 2023-01-01

## 2022-10-18 NOTE — TELEPHONE ENCOUNTER
Last Written Prescription Date:  10/31/19  Last Fill Quantity: HISTORICAL  Last office visit: 8/15/2022 with prescribing provider: Dr. Fitzpatrick  Future Office Visit:  1/2/23      Requested Prescriptions   Pending Prescriptions Disp Refills     insulin glargine (BASAGLAR KWIKPEN) 100 UNIT/ML pen 15 mL      Sig: Inject 13 Units Subcutaneous At Bedtime Through Endocrinologist       There is no refill protocol information for this order        Med is listed as historical, so will route to provider.  Last ov notes from 8/15/22:  Basaglar Kwikpen       13U subcutaneous in evening.    Danitza Acevedo RN

## 2022-10-18 NOTE — TELEPHONE ENCOUNTER
Message noted.  I have now sent a Novolog pen Rx to her pharmacy, as requested.    DIANN Fitzpatrick MD, MS  Endocrinology  [facility]

## 2022-10-18 NOTE — TELEPHONE ENCOUNTER
[facility]    Phone Message    May a detailed message be left on voicemail: yes     Reason for Call: Medication Refill Request    Has the patient contacted the pharmacy for the refill? Yes   Name of medication being requested: insulin glargine (BASAGLAR KWIKPEN) 100 UNIT/ML pen  Provider who prescribed the medication: Summer Vega MD  Pharmacy: [facility][id number] [address] [employer] [facility] [place] AT [address]  Date medication is needed: As soon as possible.      Action Taken: Other: Endo    Travel Screening: Not Applicable

## 2022-10-20 ENCOUNTER — TRANSFERRED RECORDS (OUTPATIENT)
Dept: HEALTH INFORMATION MANAGEMENT | Facility: CLINIC | Age: 72
End: 2022-10-20

## 2022-11-14 ENCOUNTER — TELEPHONE (OUTPATIENT)
Dept: ENDOCRINOLOGY | Facility: CLINIC | Age: 72
End: 2022-11-14

## 2022-11-14 NOTE — TELEPHONE ENCOUNTER
Patients spouse states that [facility] received script but they now need a prior auth in order to fill. Please complete prior auth and send to pharmacy ASAP

## 2022-11-14 NOTE — TELEPHONE ENCOUNTER
[facility] Call [facility]    Phone Message    May a detailed message be left on voicemail: yes     Reason for Call: Medication Refill Request    Has the patient contacted the pharmacy for the refill? Yes   Name of medication being requested: Continuous Blood Gluc Sensor (DEXCOM G6 SENSOR) [facility] [place] [facility]   Provider who prescribed the medication: Juan M Fitzpatrick MD  Pharmacy: [facility] #[id number] [place] [address] [address] [facility] AT SEC OF [address]  Date medication is needed: ASAP       Action Taken: Other: Endo    Travel Screening: Not Applicable

## 2022-11-15 ENCOUNTER — TELEPHONE (OUTPATIENT)
Dept: ENDOCRINOLOGY | Facility: CLINIC | Age: 72
End: 2022-11-15

## 2022-11-15 NOTE — TELEPHONE ENCOUNTER
Prior Authorization Specialty Medication Request    Medication/Dose: Dexcom Sensor  ICD code (if different than what is on RX):    Insurance Name: Medicare  Insurance ID:   Insurance Phone Number:    Pharmacy Information (if different than what is on RX)  Name:  [facility]  Phone:  [phone]    Danitza Acevedo RN

## 2022-11-16 NOTE — TELEPHONE ENCOUNTER
PA Initiation    Medication: Dexcom G6 sensor  Insurance Company: [employer] - Phone [phone] Fax [phone]  Pharmacy Filling the Rx: [id number] [place], [place] - [address]  Filling Pharmacy Phone: [phone]  Filling Pharmacy Fax: [phone]  Start Date: 11/16/2022

## 2022-11-16 NOTE — TELEPHONE ENCOUNTER
PRIOR AUTHORIZATION DENIED    Medication: Dexcom G6 sensor    Denial Date: 11/16/2022    Denial Rationale: Per Medicare rules Part D does not cover this, they are covered under Part B. Spoke with pharmacy and they were able to process under Part B, no further PA needed.    Appeal Information: N/A

## 2022-11-30 ENCOUNTER — LAB (OUTPATIENT)
Dept: LAB | Facility: CLINIC | Age: 72
End: 2022-11-30
Payer: MEDICARE

## 2022-11-30 DIAGNOSIS — Z94.0 KIDNEY REPLACED BY TRANSPLANT: ICD-10-CM

## 2022-11-30 DIAGNOSIS — E13.9 DIABETES MELLITUS TYPE 1.5, MANAGED AS TYPE 1 (H): ICD-10-CM

## 2022-11-30 DIAGNOSIS — Z79.899 ENCOUNTER FOR LONG-TERM CURRENT USE OF MEDICATION: ICD-10-CM

## 2022-11-30 DIAGNOSIS — Z48.298 AFTERCARE FOLLOWING ORGAN TRANSPLANT: ICD-10-CM

## 2022-11-30 LAB
ALBUMIN MFR UR ELPH: 10.2 MG/DL
ANION GAP SERPL CALCULATED.3IONS-SCNC: 11 MMOL/L (ref 7–15)
BUN SERPL-MCNC: 22.5 MG/DL (ref 8–23)
CALCIUM SERPL-MCNC: 10 MG/DL (ref 8.8–10.2)
CHLORIDE SERPL-SCNC: 103 MMOL/L (ref 98–107)
CREAT SERPL-MCNC: 1.16 MG/DL (ref 0.51–0.95)
CREAT UR-MCNC: 103.9 MG/DL
CYCLOSPORINE BLD LC/MS/MS-MCNC: 37 UG/L (ref 50–400)
DEPRECATED HCO3 PLAS-SCNC: 26 MMOL/L (ref 22–29)
ERYTHROCYTE [DISTWIDTH] IN BLOOD BY AUTOMATED COUNT: 13.1 % (ref 10–15)
GFR SERPL CREATININE-BSD FRML MDRD: 49 ML/MIN/1.73M2
GLUCOSE SERPL-MCNC: 188 MG/DL (ref 70–99)
HBA1C MFR BLD: 6.5 %
HCT VFR BLD AUTO: 38.1 % (ref 35–47)
HGB BLD-MCNC: 12 G/DL (ref 11.7–15.7)
MCH RBC QN AUTO: 28.9 PG (ref 26.5–33)
MCHC RBC AUTO-ENTMCNC: 31.5 G/DL (ref 31.5–36.5)
MCV RBC AUTO: 92 FL (ref 78–100)
PLATELET # BLD AUTO: 207 10E3/UL (ref 150–450)
POTASSIUM SERPL-SCNC: 4.1 MMOL/L (ref 3.4–5.3)
PROT/CREAT 24H UR: 0.1 MG/MG CR (ref 0–0.2)
RBC # BLD AUTO: 4.15 10E6/UL (ref 3.8–5.2)
SODIUM SERPL-SCNC: 140 MMOL/L (ref 136–145)
TME LAST DOSE: ABNORMAL H
TME LAST DOSE: ABNORMAL H
WBC # BLD AUTO: 4.2 10E3/UL (ref 4–11)

## 2022-11-30 PROCEDURE — 83036 HEMOGLOBIN GLYCOSYLATED A1C: CPT

## 2022-11-30 PROCEDURE — 36415 COLL VENOUS BLD VENIPUNCTURE: CPT

## 2022-11-30 PROCEDURE — 80158 DRUG ASSAY CYCLOSPORINE: CPT

## 2022-11-30 PROCEDURE — 80048 BASIC METABOLIC PNL TOTAL CA: CPT

## 2022-11-30 PROCEDURE — 85027 COMPLETE CBC AUTOMATED: CPT

## 2022-11-30 PROCEDURE — 84156 ASSAY OF PROTEIN URINE: CPT

## 2022-12-01 ENCOUNTER — TELEPHONE (OUTPATIENT)
Dept: TRANSPLANT | Facility: CLINIC | Age: 72
End: 2022-12-01

## 2022-12-01 DIAGNOSIS — Z48.298 AFTERCARE FOLLOWING ORGAN TRANSPLANT: ICD-10-CM

## 2022-12-01 DIAGNOSIS — Z94.0 KIDNEY TRANSPLANTED: ICD-10-CM

## 2022-12-01 DIAGNOSIS — Z94.0 KIDNEY REPLACED BY TRANSPLANT: Primary | ICD-10-CM

## 2022-12-01 RX ORDER — CYCLOSPORINE 25 MG/1
75 CAPSULE ORAL 2 TIMES DAILY
Qty: 360 CAPSULE | Refills: 3 | Status: SHIPPED | OUTPATIENT
Start: 2022-12-01 | End: 2022-12-05

## 2022-12-01 NOTE — TELEPHONE ENCOUNTER
ISSUE: CSA below goal 37  Goal 50-75      PLAN: Call pt with following message:    Your recent  Cyclosporine drug level was 37.    Please confirm this was an accurate 12-hour trough and verify your current Cyclosporine dose of 50 mg BID.    If both are accurate, please increase your  Cyclosporine dose to 75 mg  BID and repeat labs in 2  weeks.     OUTCOME: pt responded to myC message that she will increase CSA dose to 75 mg BID.     Labs and meds updated.

## 2022-12-05 ENCOUNTER — OFFICE VISIT (OUTPATIENT)
Dept: NEPHROLOGY | Facility: CLINIC | Age: 72
End: 2022-12-05
Attending: INTERNAL MEDICINE
Payer: MEDICARE

## 2022-12-05 VITALS
DIASTOLIC BLOOD PRESSURE: 76 MMHG | SYSTOLIC BLOOD PRESSURE: 149 MMHG | BODY MASS INDEX: 28.8 KG/M2 | OXYGEN SATURATION: 96 % | WEIGHT: 162.6 LBS | HEART RATE: 65 BPM

## 2022-12-05 DIAGNOSIS — D84.9 IMMUNOSUPPRESSION (H): ICD-10-CM

## 2022-12-05 DIAGNOSIS — Z48.298 AFTERCARE FOLLOWING ORGAN TRANSPLANT: Primary | ICD-10-CM

## 2022-12-05 DIAGNOSIS — E11.21 TYPE 2 DIABETES MELLITUS WITH DIABETIC NEPHROPATHY, WITHOUT LONG-TERM CURRENT USE OF INSULIN (H): ICD-10-CM

## 2022-12-05 DIAGNOSIS — I25.10 CAD S/P PERCUTANEOUS CORONARY ANGIOPLASTY: ICD-10-CM

## 2022-12-05 DIAGNOSIS — Z98.61 CAD S/P PERCUTANEOUS CORONARY ANGIOPLASTY: ICD-10-CM

## 2022-12-05 DIAGNOSIS — Z94.0 KIDNEY TRANSPLANTED: ICD-10-CM

## 2022-12-05 PROCEDURE — G0463 HOSPITAL OUTPT CLINIC VISIT: HCPCS

## 2022-12-05 PROCEDURE — 99215 OFFICE O/P EST HI 40 MIN: CPT | Performed by: INTERNAL MEDICINE

## 2022-12-05 RX ORDER — CYCLOSPORINE 25 MG/1
75 CAPSULE ORAL 2 TIMES DAILY
Qty: 540 CAPSULE | Refills: 0 | Status: SHIPPED | OUTPATIENT
Start: 2022-12-05 | End: 2022-12-21

## 2022-12-05 NOTE — PATIENT INSTRUCTIONS
Patient Recommendations:    I sent a refills for cyclosporin    Repeat labs 12/14 wed to recheck cyclosporin level on the higher dose    Follow up with your primary doctor if recurrent ear problem    Transplant Patient Information  Your Post Transplant Coordinator is: Teresa Patel  You and your care team can contact your transplant coordinator Monday - Friday, 8am - 5pm at [phone] (Option 2 to reach the coordinator or Option 4 to schedule an appointment).  You can also reach your care team online via [employer].  After hours for urgent matters, please call [facility] at [phone].

## 2022-12-05 NOTE — NURSING NOTE
Chief Complaint   Patient presents with     RECHECK     Kidney TX     BP (!) 149/76   Pulse 65   Wt 73.8 kg (162 lb 9.6 oz)   SpO2 96%   BMI 28.80 kg/m        Stevo Cha MA

## 2022-12-05 NOTE — PROGRESS NOTES
TRANSPLANT NEPHROLOGY CHRONIC POST TRANSPLANT VISIT    Assessment & Plan   # LDKT: Stable   - Baseline Cr ~ 1.1-1.3   - Proteinuria: Normal (<0.2 grams)   - Date DSA Last Checked: May/2007      Latest DSA: No   - BK Viremia: No, last checked 06/2015   - Kidney Tx Biopsy: Yes, 2007 no rejection     # Immunosuppression: Cyclosporine (goal 50-75) and Mycophenolic acid (goal 540 mg bid)   - Changes: yes f/up repeat CSA trough as last level ws subtherapeutic and dose was increased    # Prophylaxis:   - PJP: none, check T cell subset    # Hypertension: Controlled;  Goal BP: < 130/80   - Changes: No     # Diabetes: Controlled (HbA1c <7%) Last HbA1c: 6.5%   - on insulin, f/up PCP    # Hx of coronary artery disease s/p remote stenting:    - asymptomatic   - follow-up with cardiology.    # High normal Ca:   - avoid TUMS   -PTH, 25-Oh vit D w/ next labs    # Skin Cancer Risk:    - recommend regular follow up with Dermatology.    # Vaccines: UTD covid and Flu    # Transplant History:  Etiology of Kidney Failure: Diabetes mellitus  Tx: LDKT  Transplant: 12/27/2005 (Kidney)  Donor Type: Living Donor Class:   Significant changes in immunosuppression: None  Significant transplant-related complications: None    Transplant Office Phone Number: [phone]    Assessment and plan was discussed with the patient and she voiced her understanding and agreement.    Return visit: 1 year    Pauline Ta MD    Chief Complaint   Ms. Shaw is a 72 year old here for routine follow up for kidney transplant and immunosuppression management.    History of Present Illness      Feels good overall  Reports L ear pain on Sat and some drainage but had a lot of wax now cleared and denies any earache, no fevers/chills  Sugar readings  labile, trj4b-0.5%  UTI in Aug and completed 2 week course of ciprofloxacin, asymptomatic at present   Uses TUMS prn for heartburn and remains on ppi daily  No recent illness or hospitalizations    Labs stable Cr, low  "CSA and elevated glucose 188 but hcf7b-5.5%  Current IS: gengraf 75/75 /540-scheduled for repeat next week  Vaccines: COVID & Flu UTO    Home BP: controlled    Problem List   Patient Active Problem List   Diagnosis     Other vitamin B12 deficiency anemia     Irritable bowel syndrome     GERD (gastroesophageal reflux disease)     Advanced directives, counseling/discussion     Kidney replaced by transplant     Immunosuppressed status (HCC)     Hyperlipidemia LDL goal <100     CAD S/P percutaneous coronary angioplasty     Anemia in chronic kidney disease     Other specified hypothyroidism     Coronary artery disease involving native heart without angina pectoris, unspecified vessel or lesion type     Hypertension secondary to other renal disorders     Hernia, incisional     CKD (chronic kidney disease) stage 3, GFR 30-59 ml/min (H)     Diabetes mellitus, type 2 (H)     Status post kidney transplant     Pneumonia due to 2019 novel coronavirus       Allergies   No Known Allergies    Medications   Current Outpatient Medications   Medication Sig     amLODIPine (NORVASC) 10 MG tablet Take 1 tablet (10 mg) by mouth daily     ASPIRIN PO Take 81 mg by mouth daily     atorvastatin (LIPITOR) 40 MG tablet TAKE 1 TABLET BY MOUTH EVERY DAY     FLORIN CONTOUR test strip      BD INSULIN SYRINGE ULTRAFINE 31G X 5/16\" 0.3 ML USING 4-5 PER DAY ([facility] 31G/0.3ML)     calcium carbonate (TUMS) 500 MG chewable tablet Take 1-2 chew tab by mouth 2 times daily as needed for heartburn     cholecalciferol (VITAMIN D3) 25 mcg (1000 units) capsule Take 2 capsules by mouth daily     ciprofloxacin (CIPRO) 500 MG tablet Take 1 tablet (500 mg) by mouth 2 times daily     Continuous Blood Gluc Transmit (DEXCOM G6 TRANSMITTER) MISC See Admin Instructions     GENGRAF (BRAND) 25 MG CAPSULE Take 3 capsules (75 mg) by mouth 2 times daily     insulin aspart (NOVOLOG PEN) 100 UNIT/ML pen Inject 5-10 units subcutaneous with meals and correction doses " as directed, total daily dose approx 25 units     insulin glargine (BASAGLAR KWIKPEN) 100 UNIT/ML pen Inject 13-15 Units Subcutaneous daily     isosorbide mononitrate (IMDUR) 30 MG 24 hr tablet Take 0.5 tablets (15 mg) by mouth daily     levothyroxine (SYNTHROID/LEVOTHROID) 100 MCG tablet Take 1 tablet (100 mcg) by mouth daily     losartan (COZAAR) 100 MG tablet TAKE 1/2 OF A TABLET (50 MG) BY MOUTH DAILY     metoprolol succinate ER (TOPROL XL) 25 MG 24 hr tablet TAKE 1 TABLET BY MOUTH EVERYDAY AT BEDTIME     mycophenolic acid (GENERIC EQUIVALENT) 180 MG EC tablet Take 3 tablets (540 mg) by mouth 2 times daily     pantoprazole (PROTONIX) 40 MG EC tablet Take 1 tablet (40 mg) by mouth daily     No current facility-administered medications for this visit.     There are no discontinued medications.    Physical Exam   Vital Signs: There were no vitals taken for this visit.    GENERAL APPEARANCE: alert and no distress  HENT: mouth without ulcers or lesions, L ear tympanic membrane clear, no discharge noted  LYMPHATICS: no cervical or supraclavicular nodes  RESP: lungs clear to auscultation - no rales, rhonchi or wheezes  CV: regular rhythm, normal rate, no rub, no murmur  EDEMA: no LE edema bilaterally  ABDOMEN: soft, nondistended, nontender, bowel sounds normal  MS: extremities normal - no gross deformities noted, no evidence of inflammation in joints, no muscle tenderness  SKIN: no rash      Data     Renal Latest Ref Rng & Units 11/30/2022 8/12/2022 7/29/2022   Na 136 - 145 mmol/L 140 137 139   Na (external) 135 - 146 mmol/L - - -   K 3.4 - 5.3 mmol/L 4.1 4.6 4.1   K (external) 3.5 - 5.3 mmol/L - - -   Cl 98 - 107 mmol/L 103 101 100   CO2 22 - 29 mmol/L 26 28 29   CO2 (external) 20 - 32 mmol/L - - -   BUN 8.0 - 23.0 mg/dL 22.5 22.8 25.6(H)   BUN (external) 7 - 25 mg/dL - - -   Cr 0.51 - 0.95 mg/dL 1.16(H) 1.30(H) 1.36(H)   Cr (external) 0.60 - 0.93 mg/dL - - -   Glucose 70 - 99 mg/dL 188(H) 260(H) 161(H)   Glucose  (external) 65 - 99 mg/dL - - -   Ca  8.8 - 10.2 mg/dL 10.0 9.7 10.3(H)   Ca (external) 8.6 - 10.4 mg/dL - - -   Mg 1.6 - 2.3 mg/dL - - -     Bone Health Latest Ref Rng & Units 10/19/2017 10/1/2008 6/4/2008   Phos 2.5 - 4.5 mg/dL 2.5 3.2 4.7(H)     Heme Latest Ref Rng & Units 11/30/2022 8/12/2022 7/29/2022   WBC 4.0 - 11.0 10e3/uL 4.2 4.8 4.0   Hgb 11.7 - 15.7 g/dL 12.0 12.0 12.0   Plt 150 - 450 10e3/uL 207 222 211   ABSOLUTE NEUTROPHIL 1.6 - 8.3 10e3/uL - - -   ABSOLUTE LYMPHOCYTES 0.8 - 5.3 10e3/uL - - -   ABSOLUTE MONOCYTES 0.0 - 1.3 10e3/uL - - -   ABSOLUTE EOSINOPHILS 0.0 - 0.7 10e3/uL - - -   ABSOLUTE BASOPHILS 0.0 - 0.2 10e9/L - - -   ABS IMMATURE GRANULOCYTES 0 - 0.4 10e9/L - - -   ABSOLUTE NUCLEATED RBC - - - -     Liver Latest Ref Rng & Units 4/11/2022 2/5/2022 2/4/2022   AP 40 - 150 U/L - 102 121   TBili 0.2 - 1.3 mg/dL - 0.3 0.7   DBili 0.0 - 0.2 mg/dL - - -   ALT 0 - 50 U/L 19 27 23   AST 0 - 45 U/L - 19 17   Tot Protein 6.8 - 8.8 g/dL - 7.1 7.1   Albumin 3.4 - 5.0 g/dL - 2.5(L) 2.6(L)     Pancreas Latest Ref Rng & Units 11/30/2022 7/29/2022 4/11/2022   A1C <5.7 % 6.5(H) 7.8(H) 7.1(H)   A1C (external) 4.0 - 6.0 % - - -   Amylase 30 - 110 U/L - - -   Lipase 73 - 393 U/L - - -     Iron studies Latest Ref Rng & Units 10/19/2017 12/5/2016 12/7/2015   Iron 35 - 180 ug/dL 29(L) 37 36   Iron sat 15 - 46 % 10(L) 13(L) 11(L)   Ferritin 8 - 252 ng/mL 181 130 55     [facility] Txp Virology Latest Ref Rng & Units 3/31/2017 12/5/2016 6/18/2015   CMV IgG EU/mL - - -   CVM DNA Quant - - Plasma, EDTA anticoagulant -   CMV Quant <100 Copies/mL - - -   CMV QT Log <2.0 Log copies/mL - - -   CMV QUANT IU/ML CMVND [IU]/mL - CMV DNA Not Detected   The OSMANI AmpliPrep/OSMANI TaqMan CMV Test is an FDA-approved in vitro nucleic   acid amplification test for the quantitation of cytomegalovirus DNA in human   plasma (EDTA plasma) using the OSMANI AmpliPrep Instrument for automated viral   nucleic acid extraction and the OSMANI TaqMan  Analyzer or OSMANI TaqMan for   automated Real Time amplification and detection of the viral nucleic acid   target.   Titer results are reported in International Units/mL (IU/mL using 1st WHO   International standard for Human Cytomegalovirus for Nucleic Acid Amplification   based assays. The conversion factor between CMV DNA copis/mL (as defined by the   Roche OSMANI TaqMan CMV test) and International Units is the CMV DNA   concentration in IU/mL x 1.1 copies/IU = CMV DNA in copies/mL.   This assay has received FDA approval for the testing of human plasma only. The   [facility] at the [facility], [place], has validated the pe  rformance characteristics of the Roche   CMV assay for plasma, bronchial alveolar lavage/wash and urine.   -   LOG IU/ML OF CMVQNT <2.1 [Log:IU]/mL - Not Calculated -   BK Spec - - - Plasma   BK Res BKNEG copies/mL - - BK Virus DNA Not Detected   BK Log <2.7 Log copies/mL - - Not Calculated   The Real-Time quantitative BK Virus assay was developed and its performance   characteristics determined by the [facility] at   the [facility] in [place], [place]. The   primers and probes for each analyte are Analyte Specific Reagents (ASRs)   manufactured by [employer].   ASRs are used in many laboratory tests necessary for standard medical care and   generally do not require U.S. Food and Drug Administration approval. The FDA   has determined that such clearance or approval is not necessary.   This test is used for clinical purposes. It should not be regarded as   investigational or for research. This laboratory is certified under the   Clinical Laboratory Improvement Amendments of 1988 (CLIA-88) as qualified to   perform high complexity clinical laboratory testing.     EBV IgG - - - -   EBV DNA COPIES/ML EBVNEG [Copies]/mL <500  EBV DNA Detected below the reportable range of 500  Copies/mL  (A) 3,913(A) -   EBV DNA LOG OF COPIES <2.7 [Log:copies]/mL <2.7  The Real-Time quantitative EBV assay was developed and its performance   characteristics determined by the [facility] Diagnostic Laboratory at   the [facility] in [place], [place].  The   primers and probes are Analyte Specific Reagents (ASRs) manufactured  by   [employer].   ASRs are used in many laboratory tests necessary for standard medical care and   generally do not require U.S. Food and Drug Administration approval.  The FDA   has determined that such clearance or approval is not necessary.  This test is   used for clinical purposes.  It should not be regarded as investigational or   research.   This laboratory is certified under the Clinical Laboratory Improvement   Amendments of 1988 (CLIA-88) as qualified to perform high complexity clinical   laboratory testing.   The quantitative range of this assay is 500-22,500,00 copies/mL (2.7-7.4 log   copies/mL).  A negative result does not rule out the presence of PCR inhibitors   in the pa  tient specimen or EBV DNA nucleic acid in concentrations below the   level of detection of the assay.  Inhibition may also lead to underestimation   of viral quantitation.   3.6(H) -   Hep B Core NEG - - -   Hep B Surf 0.0 - 4.9 mIU/mL - - -   HIV 1&2 NEG - - -            Recent Labs   Lab Test 09/10/15  0649 12/04/15  0650 04/07/16  0638   DOSMPA 1900 9/9/15 2000 12/3/15 19:00 04/6/16   MPACID 1.50 1.56 0.80*   MPAG 54.1 72.1 64.2     I spent a total of43 minutes on the date of the encounter doing chart review, performing a history and physical exam, completing documentation and any further activities as noted above.

## 2022-12-05 NOTE — LETTER
12/5/2022       RE: Viv Shaw  [address]  [address]  [place] [address]     Dear Colleague,    Thank you for referring your patient, Viv Shaw, to the [facility] [place] [facility]. Please see a copy of my visit note below.    TRANSPLANT NEPHROLOGY CHRONIC POST TRANSPLANT VISIT    Assessment & Plan   # LDKT: Stable   - Baseline Cr ~ 1.1-1.3   - Proteinuria: Normal (<0.2 grams)   - Date DSA Last Checked: May/2007      Latest DSA: No   - BK Viremia: No, last checked 06/2015   - Kidney Tx Biopsy: Yes, 2007 no rejection     # Immunosuppression: Cyclosporine (goal 50-75) and Mycophenolic acid (goal 540 mg bid)   - Changes: yes f/up repeat CSA trough as last level ws subtherapeutic and dose was increased    # Prophylaxis:   - PJP: none, check T cell subset    # Hypertension: Controlled;  Goal BP: < 130/80   - Changes: No     # Diabetes: Controlled (HbA1c <7%) Last HbA1c: 6.5%   - on insulin, f/up PCP    # Hx of coronary artery disease s/p remote stenting:    - asymptomatic   - follow-up with cardiology.    # High normal Ca:   - avoid TUMS   -PTH, 25-Oh vit D w/ next labs    # Skin Cancer Risk:    - recommend regular follow up with Dermatology.    # Vaccines: UTD covid and Flu    # Transplant History:  Etiology of Kidney Failure: Diabetes mellitus  Tx: LDKT  Transplant: 12/27/2005 (Kidney)  Donor Type: Living Donor Class:   Significant changes in immunosuppression: None  Significant transplant-related complications: None    Transplant Office Phone Number: [phone]    Assessment and plan was discussed with the patient and she voiced her understanding and agreement.    Return visit: 1 year    Pauline Ta MD    Chief Complaint   Ms. Shaw is a 72 year old here for routine follow up for kidney transplant and immunosuppression management.    History of Present Illness      Feels good overall  Reports L ear pain  "on Sat and some drainage but had a lot of wax now cleared and denies any earache, no fevers/chills  Sugar readings  labile, fys8s-0.5%  UTI in Aug and completed 2 week course of ciprofloxacin, asymptomatic at present   Uses TUMS prn for heartburn and remains on ppi daily  No recent illness or hospitalizations    Labs stable Cr, low CSA and elevated glucose 188 but kqd4y-1.5%  Current IS: gengraf 75/75 /540-scheduled for repeat next week  Vaccines: COVID & Flu UTO    Home BP: controlled    Problem List   Patient Active Problem List   Diagnosis     Other vitamin B12 deficiency anemia     Irritable bowel syndrome     GERD (gastroesophageal reflux disease)     Advanced directives, counseling/discussion     Kidney replaced by transplant     Immunosuppressed status (HCC)     Hyperlipidemia LDL goal <100     CAD S/P percutaneous coronary angioplasty     Anemia in chronic kidney disease     Other specified hypothyroidism     Coronary artery disease involving native heart without angina pectoris, unspecified vessel or lesion type     Hypertension secondary to other renal disorders     Hernia, incisional     CKD (chronic kidney disease) stage 3, GFR 30-59 ml/min (H)     Diabetes mellitus, type 2 (H)     Status post kidney transplant     Pneumonia due to 2019 novel coronavirus       Allergies   No Known Allergies    Medications   Current Outpatient Medications   Medication Sig     amLODIPine (NORVASC) 10 MG tablet Take 1 tablet (10 mg) by mouth daily     ASPIRIN PO Take 81 mg by mouth daily     atorvastatin (LIPITOR) 40 MG tablet TAKE 1 TABLET BY MOUTH EVERY DAY     FLORIN CONTOUR test strip      BD INSULIN SYRINGE ULTRAFINE 31G X 5/16\" 0.3 ML USING 4-5 PER DAY ([facility] 31G/0.3ML)     calcium carbonate (TUMS) 500 MG chewable tablet Take 1-2 chew tab by mouth 2 times daily as needed for heartburn     cholecalciferol (VITAMIN D3) 25 mcg (1000 units) capsule Take 2 capsules by mouth daily     ciprofloxacin (CIPRO) 500 MG " tablet Take 1 tablet (500 mg) by mouth 2 times daily     Continuous Blood Gluc Transmit (DEXCOM G6 TRANSMITTER) MISC See Admin Instructions     GENGRAF (BRAND) 25 MG CAPSULE Take 3 capsules (75 mg) by mouth 2 times daily     insulin aspart (NOVOLOG PEN) 100 UNIT/ML pen Inject 5-10 units subcutaneous with meals and correction doses as directed, total daily dose approx 25 units     insulin glargine (BASAGLAR KWIKPEN) 100 UNIT/ML pen Inject 13-15 Units Subcutaneous daily     isosorbide mononitrate (IMDUR) 30 MG 24 hr tablet Take 0.5 tablets (15 mg) by mouth daily     levothyroxine (SYNTHROID/LEVOTHROID) 100 MCG tablet Take 1 tablet (100 mcg) by mouth daily     losartan (COZAAR) 100 MG tablet TAKE 1/2 OF A TABLET (50 MG) BY MOUTH DAILY     metoprolol succinate ER (TOPROL XL) 25 MG 24 hr tablet TAKE 1 TABLET BY MOUTH EVERYDAY AT BEDTIME     mycophenolic acid (GENERIC EQUIVALENT) 180 MG EC tablet Take 3 tablets (540 mg) by mouth 2 times daily     pantoprazole (PROTONIX) 40 MG EC tablet Take 1 tablet (40 mg) by mouth daily     No current facility-administered medications for this visit.     There are no discontinued medications.    Physical Exam   Vital Signs: There were no vitals taken for this visit.    GENERAL APPEARANCE: alert and no distress  HENT: mouth without ulcers or lesions, L ear tympanic membrane clear, no discharge noted  LYMPHATICS: no cervical or supraclavicular nodes  RESP: lungs clear to auscultation - no rales, rhonchi or wheezes  CV: regular rhythm, normal rate, no rub, no murmur  EDEMA: no LE edema bilaterally  ABDOMEN: soft, nondistended, nontender, bowel sounds normal  MS: extremities normal - no gross deformities noted, no evidence of inflammation in joints, no muscle tenderness  SKIN: no rash      Data     Renal Latest Ref Rng & Units 11/30/2022 8/12/2022 7/29/2022   Na 136 - 145 mmol/L 140 137 139   Na (external) 135 - 146 mmol/L - - -   K 3.4 - 5.3 mmol/L 4.1 4.6 4.1   K (external) 3.5 - 5.3  mmol/L - - -   Cl 98 - 107 mmol/L 103 101 100   CO2 22 - 29 mmol/L 26 28 29   CO2 (external) 20 - 32 mmol/L - - -   BUN 8.0 - 23.0 mg/dL 22.5 22.8 25.6(H)   BUN (external) 7 - 25 mg/dL - - -   Cr 0.51 - 0.95 mg/dL 1.16(H) 1.30(H) 1.36(H)   Cr (external) 0.60 - 0.93 mg/dL - - -   Glucose 70 - 99 mg/dL 188(H) 260(H) 161(H)   Glucose (external) 65 - 99 mg/dL - - -   Ca  8.8 - 10.2 mg/dL 10.0 9.7 10.3(H)   Ca (external) 8.6 - 10.4 mg/dL - - -   Mg 1.6 - 2.3 mg/dL - - -     Bone Health Latest Ref Rng & Units 10/19/2017 10/1/2008 6/4/2008   Phos 2.5 - 4.5 mg/dL 2.5 3.2 4.7(H)     Heme Latest Ref Rng & Units 11/30/2022 8/12/2022 7/29/2022   WBC 4.0 - 11.0 10e3/uL 4.2 4.8 4.0   Hgb 11.7 - 15.7 g/dL 12.0 12.0 12.0   Plt 150 - 450 10e3/uL 207 222 211   ABSOLUTE NEUTROPHIL 1.6 - 8.3 10e3/uL - - -   ABSOLUTE LYMPHOCYTES 0.8 - 5.3 10e3/uL - - -   ABSOLUTE MONOCYTES 0.0 - 1.3 10e3/uL - - -   ABSOLUTE EOSINOPHILS 0.0 - 0.7 10e3/uL - - -   ABSOLUTE BASOPHILS 0.0 - 0.2 10e9/L - - -   ABS IMMATURE GRANULOCYTES 0 - 0.4 10e9/L - - -   ABSOLUTE NUCLEATED RBC - - - -     Liver Latest Ref Rng & Units 4/11/2022 2/5/2022 2/4/2022   AP 40 - 150 U/L - 102 121   TBili 0.2 - 1.3 mg/dL - 0.3 0.7   DBili 0.0 - 0.2 mg/dL - - -   ALT 0 - 50 U/L 19 27 23   AST 0 - 45 U/L - 19 17   Tot Protein 6.8 - 8.8 g/dL - 7.1 7.1   Albumin 3.4 - 5.0 g/dL - 2.5(L) 2.6(L)     Pancreas Latest Ref Rng & Units 11/30/2022 7/29/2022 4/11/2022   A1C <5.7 % 6.5(H) 7.8(H) 7.1(H)   A1C (external) 4.0 - 6.0 % - - -   Amylase 30 - 110 U/L - - -   Lipase 73 - 393 U/L - - -     Iron studies Latest Ref Rng & Units 10/19/2017 12/5/2016 12/7/2015   Iron 35 - 180 ug/dL 29(L) 37 36   Iron sat 15 - 46 % 10(L) 13(L) 11(L)   Ferritin 8 - 252 ng/mL 181 130 55     [facility] Txp Virology Latest Ref Rng & Units 3/31/2017 12/5/2016 6/18/2015   CMV IgG EU/mL - - -   CVM DNA Quant - - Plasma, EDTA anticoagulant -   CMV Quant <100 Copies/mL - - -   CMV QT Log <2.0 Log copies/mL - - -   CMV QUANT  IU/ML CMVND [IU]/mL - CMV DNA Not Detected   The OSMANI AmpliPrep/OSMANI TaqMan CMV Test is an FDA-approved in vitro nucleic   acid amplification test for the quantitation of cytomegalovirus DNA in human   plasma (EDTA plasma) using the OSMANI AmpliPrep Instrument for automated viral   nucleic acid extraction and the OSMANI TaqMan Analyzer or OSMANI TaqMan for   automated Real Time amplification and detection of the viral nucleic acid   target.   Titer results are reported in International Units/mL (IU/mL using 1st WHO   International standard for Human Cytomegalovirus for Nucleic Acid Amplification   based assays. The conversion factor between CMV DNA copis/mL (as defined by the   Roche OSMANI TaqMan CMV test) and International Units is the CMV DNA   concentration in IU/mL x 1.1 copies/IU = CMV DNA in copies/mL.   This assay has received FDA approval for the testing of human plasma only. The   Infectious Disease Diagnostic Laboratory at the [facility] [place], has validated the pe  rformance characteristics of the Roche   CMV assay for plasma, bronchial alveolar lavage/wash and urine.   -   LOG IU/ML OF CMVQNT <2.1 [Log:IU]/mL - Not Calculated -   BK Spec - - - Plasma   BK Res BKNEG copies/mL - - BK Virus DNA Not Detected   BK Log <2.7 Log copies/mL - - Not Calculated   The Real-Time quantitative BK Virus assay was developed and its performance   characteristics determined by the Infectious Diseases Diagnostic Laboratory at   the [facility] in [place], [place]. The   primers and probes for each analyte are Analyte Specific Reagents (ASRs)   manufactured by [employer].   ASRs are used in many laboratory tests necessary for standard medical care and   generally do not require U.S. Food and Drug Administration approval. The FDA   has determined that such clearance or approval is not necessary.   This test is used for clinical purposes. It should not be regarded  as   investigational or for research. This laboratory is certified under the   Clinical Laboratory Improvement Amendments of 1988 (CLIA-88) as qualified to   perform high complexity clinical laboratory testing.     EBV IgG - - - -   EBV DNA COPIES/ML EBVNEG [Copies]/mL <500  EBV DNA Detected below the reportable range of 500 Copies/mL  (A) 3,913(A) -   EBV DNA LOG OF COPIES <2.7 [Log:copies]/mL <2.7  The Real-Time quantitative EBV assay was developed and its performance   characteristics determined by the Infectious Diseases Diagnostic Laboratory at   the [facility] in [place], [place].  The   primers and probes are Analyte Specific Reagents (ASRs) manufactured  by   [employer].   ASRs are used in many laboratory tests necessary for standard medical care and   generally do not require U.S. Food and Drug Administration approval.  The FDA   has determined that such clearance or approval is not necessary.  This test is   used for clinical purposes.  It should not be regarded as investigational or   research.   This laboratory is certified under the Clinical Laboratory Improvement   Amendments of 1988 (CLIA-88) as qualified to perform high complexity clinical   laboratory testing.   The quantitative range of this assay is 500-22,500,00 copies/mL (2.7-7.4 log   copies/mL).  A negative result does not rule out the presence of PCR inhibitors   in the pa  tient specimen or EBV DNA nucleic acid in concentrations below the   level of detection of the assay.  Inhibition may also lead to underestimation   of viral quantitation.   3.6(H) -   Hep B Core NEG - - -   Hep B Surf 0.0 - 4.9 mIU/mL - - -   HIV 1&2 NEG - - -            Recent Labs   Lab Test 09/10/15  0649 12/04/15  0650 04/07/16  0638   DOSMPA 1900 9/9/15 2000 12/3/15 19:00 04/6/16   MPACID 1.50 1.56 0.80*   MPAG 54.1 72.1 64.2     I spent a total of43 minutes on the date of the encounter doing chart review, performing a history and  physical exam, completing documentation and any further activities as noted above.        Again, thank you for allowing me to participate in the care of your patient.      Sincerely,    Pauline Ta MD

## 2022-12-08 DIAGNOSIS — K21.9 GASTROESOPHAGEAL REFLUX DISEASE WITHOUT ESOPHAGITIS: ICD-10-CM

## 2022-12-09 RX ORDER — PANTOPRAZOLE SODIUM 40 MG/1
TABLET, DELAYED RELEASE ORAL
Qty: 90 TABLET | Refills: 1 | Status: SHIPPED | OUTPATIENT
Start: 2022-12-09 | End: 2023-01-01

## 2022-12-09 NOTE — TELEPHONE ENCOUNTER
Prescription approved per [facility] Refill Protocol.  Luke IBARRA RN, BSN

## 2022-12-15 ENCOUNTER — VIRTUAL VISIT (OUTPATIENT)
Dept: ENDOCRINOLOGY | Facility: CLINIC | Age: 72
End: 2022-12-15
Payer: MEDICARE

## 2022-12-15 ENCOUNTER — LAB (OUTPATIENT)
Dept: LAB | Facility: CLINIC | Age: 72
End: 2022-12-15
Payer: MEDICARE

## 2022-12-15 ENCOUNTER — TELEPHONE (OUTPATIENT)
Dept: TRANSPLANT | Facility: CLINIC | Age: 72
End: 2022-12-15

## 2022-12-15 DIAGNOSIS — Z94.0 KIDNEY REPLACED BY TRANSPLANT: ICD-10-CM

## 2022-12-15 DIAGNOSIS — E13.9 DIABETES MELLITUS TYPE 1.5, MANAGED AS TYPE 1 (H): Primary | ICD-10-CM

## 2022-12-15 DIAGNOSIS — D84.9 IMMUNOSUPPRESSION (H): ICD-10-CM

## 2022-12-15 DIAGNOSIS — Z94.0 KIDNEY REPLACED BY TRANSPLANT: Primary | ICD-10-CM

## 2022-12-15 DIAGNOSIS — E10.21 TYPE 1 DIABETES MELLITUS WITH DIABETIC NEPHROPATHY (H): ICD-10-CM

## 2022-12-15 DIAGNOSIS — Z94.0 KIDNEY TRANSPLANTED: ICD-10-CM

## 2022-12-15 DIAGNOSIS — E10.59 TYPE 1 DIABETES MELLITUS WITH OTHER CIRCULATORY COMPLICATION (H): ICD-10-CM

## 2022-12-15 DIAGNOSIS — E10.319 TYPE 1 DIABETES MELLITUS WITH RETINOPATHY, MACULAR EDEMA PRESENCE UNSPECIFIED, UNSPECIFIED LATERALITY, UNSPECIFIED RETINOPATHY SEVERITY (H): ICD-10-CM

## 2022-12-15 DIAGNOSIS — Z48.298 AFTERCARE FOLLOWING ORGAN TRANSPLANT: ICD-10-CM

## 2022-12-15 LAB
ANION GAP SERPL CALCULATED.3IONS-SCNC: 11 MMOL/L (ref 7–15)
BUN SERPL-MCNC: 19.1 MG/DL (ref 8–23)
CALCIUM SERPL-MCNC: 9.9 MG/DL (ref 8.8–10.2)
CD3 CELLS # BLD: 548 CELLS/UL (ref 603–2990)
CD3 CELLS NFR BLD: 59 % (ref 49–84)
CD3+CD4+ CELLS # BLD: 339 CELLS/UL (ref 441–2156)
CD3+CD4+ CELLS NFR BLD: 37 % (ref 28–63)
CD3+CD4+ CELLS/CD3+CD8+ CLL BLD: 1.93 % (ref 1.4–2.6)
CD3+CD8+ CELLS # BLD: 176 CELLS/UL (ref 125–1312)
CD3+CD8+ CELLS NFR BLD: 19 % (ref 10–40)
CHLORIDE SERPL-SCNC: 105 MMOL/L (ref 98–107)
CREAT SERPL-MCNC: 1.17 MG/DL (ref 0.51–0.95)
CYCLOSPORINE BLD LC/MS/MS-MCNC: 111 UG/L (ref 50–400)
DEPRECATED HCO3 PLAS-SCNC: 27 MMOL/L (ref 22–29)
ERYTHROCYTE [DISTWIDTH] IN BLOOD BY AUTOMATED COUNT: 13.2 % (ref 10–15)
GFR SERPL CREATININE-BSD FRML MDRD: 49 ML/MIN/1.73M2
GLUCOSE SERPL-MCNC: 139 MG/DL (ref 70–99)
HCT VFR BLD AUTO: 40.4 % (ref 35–47)
HGB BLD-MCNC: 12.1 G/DL (ref 11.7–15.7)
MCH RBC QN AUTO: 28.1 PG (ref 26.5–33)
MCHC RBC AUTO-ENTMCNC: 30 G/DL (ref 31.5–36.5)
MCV RBC AUTO: 94 FL (ref 78–100)
PLATELET # BLD AUTO: 235 10E3/UL (ref 150–450)
POTASSIUM SERPL-SCNC: 4.4 MMOL/L (ref 3.4–5.3)
RBC # BLD AUTO: 4.3 10E6/UL (ref 3.8–5.2)
SODIUM SERPL-SCNC: 143 MMOL/L (ref 136–145)
T CELL COMMENT: ABNORMAL
TME LAST DOSE: NORMAL H
TME LAST DOSE: NORMAL H
WBC # BLD AUTO: 4 10E3/UL (ref 4–11)

## 2022-12-15 PROCEDURE — 86360 T CELL ABSOLUTE COUNT/RATIO: CPT

## 2022-12-15 PROCEDURE — 95251 CONT GLUC MNTR ANALYSIS I&R: CPT | Performed by: INTERNAL MEDICINE

## 2022-12-15 PROCEDURE — 80158 DRUG ASSAY CYCLOSPORINE: CPT

## 2022-12-15 PROCEDURE — 80048 BASIC METABOLIC PNL TOTAL CA: CPT

## 2022-12-15 PROCEDURE — 99214 OFFICE O/P EST MOD 30 MIN: CPT | Mod: 95 | Performed by: INTERNAL MEDICINE

## 2022-12-15 PROCEDURE — 36415 COLL VENOUS BLD VENIPUNCTURE: CPT

## 2022-12-15 PROCEDURE — 85027 COMPLETE CBC AUTOMATED: CPT

## 2022-12-15 NOTE — TELEPHONE ENCOUNTER
ISSUE: CSA above goal 111  Goal 50-75    PLAN:  Your recent  Cyclosporine drug level was 111.    Please confirm this was an accurate 12-hour trough and verify your current Cyclosporine dose of 75 mg BID.    If both are accurate, please decrease your  Cyclosporine dose to 75 mg in the am and 50 mg in the PM and repeat labs in 2 weeks.     OUTCOME: myC message sent to pt with above information.   Repeat labs ordered

## 2022-12-15 NOTE — LETTER
12/15/2022         RE: Viv Shaw  [address]  [place] [address]        Dear Colleague,    Thank you for referring your patient, Viv Shaw, to the [facility] [place]. Please see a copy of my visit note below.    Patient is being evaluated via a billable video visit.      How would you like to obtain your AVS? Verbally Reviewed  If the video visit is dropped, the invitation should be resent by: Cellphone  Will anyone else be joining your video visit? No        Video-Visit Details    Video Start Time: 11:00 am     Type of service:  Video Visit    Video End Time:  11:20 am    Originating Location (pt. Location):  Home    PROVIDER LOCATION On-site vs Off-site    Distant Location (provider location):  Home    Platform used for Video Visit: [facility]        Recent issues:   Diabetes follow-up evaluation, with her  Johnny  She has been feeling well but they report the blood level of Gengraft medication low  Reviewed medical history from patient and Epic chart record        Diagnosis of diabetes mellitus age 23, living in [place]  Recalls having vision problem, saw eye physician and then family physician, also had frequent urination  Initial treatment with oral medication for few weeks, then change to insulin  Had seen Dr. Castillo in [facility] [place] [facility]  Subsequent evaluations with Dr. GABRIELE Vickers, then saw Dr. Elmer Garvin/[facility] for endocrinology evaluations  Has used insulin injections 4x per day  Previous [facility] hgbA1c trends include:     Lab Test 07/06/21  1422 03/18/21  0636 10/29/20  0710 03/27/20  0648 11/20/19  0803   A1C 6.7* 6.8* 7.0* 6.8* 6.9*         8/10/21. Initial diabetes evaluation with me at [place]  Reviewed health history and diabetes issues  Gets her insulin pens from [facility] mail order  Current DM medications:  Novolog Flexpen 5U premeal   Novolog sscale guestimates  Basaglar Kwikpen  15U subcutaneous in evening    Blood glucose (BG) meter: Raad  Contour?   Has tested 4x per day previously   Less frequent testing when using CGM sensor    Has used the DexcomG5, then the G6 CGM sensor  Recent DexcomG6 data:              FamHx DM:  Niece  Recent [facility] labs include:  Lab Results   Component Value Date    A1C 6.5 (H) 11/30/2022     12/15/2022    POTASSIUM 4.4 12/15/2022    CHLORIDE 105 12/15/2022    CO2 27 12/15/2022    ANIONGAP 11 12/15/2022     (H) 12/15/2022    BUN 19.1 12/15/2022    CR 1.17 (H) 12/15/2022    GFRESTIMATED 49 (L) 12/15/2022    GFRESTBLACK 45 (L) 06/23/2021    NAVI 9.9 12/15/2022    CPEPT 0.2 (L) 11/29/2021    CHOL 189 07/29/2022    TRIG 89 07/29/2022    HDL 67 07/29/2022     (H) 07/29/2022    NHDL 122 07/29/2022    UCRR 103.9 11/30/2022    MICROL 30.8 08/12/2022    UMALCR 32.39 (H) 08/12/2022    TSH 2.54 07/29/2022    T4 1.41 04/02/2018     Last eye exam with Dr. Arevalo/[facility] in 8/2022     DM Complications:   Nephropathy    Previous ESRD, then renal transplant 2005    Sees Dr. Morro Veloz/nephrology   Retinopathy    Previous PDR and laser surgeries [facility]    Had seen Dr. Grayson Miles/[facility], now sees Dr. Arevalo/[facility] at that clinic building    Macrovascular disease    CAD, previous cardiac stent placement      Lives in [place], [place]  Sees Dr. Summer Vega/[facility] for general medicine evaluations.    PMH/PSH:  Past Medical History:   Diagnosis Date     Abdominal wall hernia     RLQ     AK (actinic keratosis) 08/06/2020     Allergic rhinitis      CAD (coronary artery disease)     coronary artery disease s/p PCI to LAD in 2005coronary artery disease s/p PCI to LAD in 2005     Diabetes mellitus, type 2 (H)     follows up with endocrinologist.     Dyslipidemia      GERD (gastroesophageal reflux disease)      H/O diabetic nephropathy     s/p kidney trnsplant     Hypertension      Hypothyroidism      Immunosuppressed status (H)      Kidney replaced by transplant     Rt      PONV (postoperative nausea and vomiting)      Shingles      Vitamin B12 deficiency anemia      Past Surgical History:   Procedure Laterality Date     APPENDECTOMY NOS       ARTHROSCOPY SHOULDER ROTATOR CUFF REPAIR Left      COLONOSCOPY N/A 2016    Procedure: COLONOSCOPY;  Surgeon: Rashard Snider MD;  Location: [facility]     Coronary angioplasty with stent       HYSTERECTOMY       Kidney Transplant NOS Right      LAPAROSCOPIC CHOLECYSTECTOMY       NOSE SURGERY       OPEN SEPARATION COMPONENT HERNIORRHAPHY N/A 10/16/2017    Procedure: OPEN SEPARATION COMPONENT HERNIORRHAPHY;;  Surgeon: CARL Lott MD;  Location: [facility] OR     RECONSTRUCT ABDOMINAL WALL N/A 10/16/2017    Procedure: RECONSTRUCT ABDOMINAL WALL;  Exploratory Laparotomy, Lysis of Adhesions, Abdominal Wall reconstruction, Inlay Xen AB mesh, Mitek Suture [place] and Umbilical Hernia Repair.;  Surgeon: CARL Lott MD;  Location: [facility] OR     REMOVE CATARACT INTRACAP,INSERT LENS Right      TONSILLECTOMY         Family Hx:  Family History   Problem Relation Age of Onset     Respiratory Mother          age 71     Cardiovascular Father          age 75 hyertension     Arthritis Sister         living, healthy     Family History Negative Brother          age 44     Colon Cancer No family hx of          Social Hx:  Social History     Socioeconomic History     Marital status:      Spouse name: Not on file     Number of children: Not on file     Years of education: Not on file     Highest education level: Not on file   Occupational History     Not on file   Tobacco Use     Smoking status: Never     Smokeless tobacco: Never   Substance and Sexual Activity     Alcohol use: No     Drug use: No     Sexual activity: Yes     Partners: Male   Other Topics Concern     Parent/sibling w/ CABG, MI or angioplasty before 65F 55M? Not Asked   Social History Narrative     Not on file     Social Determinants of Health      Financial Resource Strain: Not on file   Food Insecurity: Not on file   Transportation Needs: Not on file   Physical Activity: Not on file   Stress: Not on file   Social Connections: Not on file   Intimate Partner Violence: Not on file   Housing Stability: Not on file          MEDICATIONS:  has a current medication list which includes the following prescription(s): amlodipine, aspirin, atorvastatin, teddy contour, bd insulin syringe ultrafine, cholecalciferol, dexcom g6 transmitter, cyclosporine modified, insulin aspart, insulin glargine, isosorbide mononitrate, levothyroxine, losartan, metoprolol succinate er, mycophenolic acid, and pantoprazole.    ROS:     ROS: 10 point ROS neg other than the symptoms noted above in the HPI.    GENERAL: some fatigue, wt stable; denies fevers, chills, night sweats.   HEENT: no dysphagia, odonophagia, diplopia, neck pain  THYROID:  no apparent hyper or hypothyroid symptoms  CV: no chest pain, pressure, palpitations  LUNGS: no SOB, JIMENEZ, cough, wheezing   ABDOMEN: no diarrhea, constipation, abdominal pain  EXTREMITIES: no rashes, ulcers, edema  NEUROLOGY: decreased sensation at feet, balance problems; no headaches, denies changes in vision, tingling  MSK: some leg weakness, denies specific arthralgias  SKIN: no rashes or lesions  :  no menses  PSYCH:  stable mood, no significant anxiety or depression  ENDOCRINE: no heat or cold intolerance    Physical Exam (visual exam)  VS:  no vital signs taken for video visit  CONSTITUTIONAL: healthy, alert and NAD, well dressed, answering questions appropriately  ENT: no nose swelling or nasal discharge, mouth redness or gum changes.  EYES: eyes grossly normal to inspection, conjunctivae and sclerae normal, no exophthalmos or proptosis  THYROID:  no apparent nodules or goiter  LUNGS: no audible wheeze, cough or visible cyanosis, no visible retractions or increased work of breathing  ABDOMEN: abdomen not evaluated  EXTREMITIES: no hand  tremors, limited exam  NEUROLOGY: CN grossly intact, mentation intact and speech normal   SKIN:  no apparent skin lesions, rash, or edema with visualized skin appearance  PSYCH: mentation appears normal, affect normal/bright, judgement and insight intact,   normal speech and appearance well groomed      LABS:    All pertinent notes, labs, and images personally reviewed by me.     A/P:  Encounter Diagnoses   Name Primary?     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  Recent glucose trends good, DexcomG6 CGM sensor helpful  Reviewed and interpreted tests that I previously ordered.   Ordered appropriate tests for the endocrinology disease management.    Management options discussed and implemented after shared medical decision making with the patient.  T1.5DM problem is chronic-stable    Plan:  Reviewed general issues with the diabetes diagnosis and management  We discussed the hgbA1c test which reflects previous overall glucose levels or control  Discussed the importance of blood glucose (BG) testing to assess glucose trends  Provided general overview of the diabetes medication options and medication treatment plan.  Reviewed recent DexcomG6 CGM glucose trend data, in detail.    Recommend:  Continue current Novolog and Basaglar insulin medication use  Avoid taking Novolog after start of meal... take dose premeal  Change supper base as Nv 6U  Discussed the ideal mealtime and correction scale dosing routine   Use ISF 1U per 50 mg/dl  >150 mg/dl   Avoid using nighttime Nv sscale unless SG >250 mg/dl  Goal target premeal SG  mg/dl  Continue use of the DexcomG6 CGM sensor   Patient's insulin regimen requires frequent dose adjustments by patient on basis of therapeutic BGM/CGM test results  Discussed hypoglycemia prevention  Previous insulin medication from [facility] in [place], mail order   They will contact me if needing new Rx locally in [place]  Plan repeat labs in early 4/2023   Lab orders placed  Keep focus on diet, exercise, weight management.  Advise having fasting lipid panel testing and dilated eye examination, at least annually    Keep regular follow-up evaluations with nephrologist, cardiologist, ophthalmologist, and PCP   Addressed patient questions today    There are no Patient Instructions on file for this visit.    Future labs ordered today:   Orders Placed This Encounter   Procedures     GLUCOSE MONITOR, 72 HOUR, PHYS INTERP     Hemoglobin A1c     Radiology/Consults ordered today: None    Total time spent on day of encounter:  23 min    Follow-up:  4/11/23 at 8:30 am, Return    DIANN Fitzpatrick MD, MS  Endocrinology  [facility]            Again, thank you for allowing me to participate in the care of your patient.        Sincerely,        Juan M Fitzpatrick MD

## 2022-12-15 NOTE — PROGRESS NOTES
Patient is being evaluated via a billable video visit.      How would you like to obtain your AVS? Verbally Reviewed  If the video visit is dropped, the invitation should be resent by: Cellphone  Will anyone else be joining your video visit? No        Video-Visit Details    Video Start Time: 11:00 am     Type of service:  Video Visit    Video End Time:  11:20 am    Originating Location (pt. Location):  Home    PROVIDER LOCATION On-site vs Off-site    Distant Location (provider location):  Home    Platform used for Video Visit: [facility]        Recent issues:   Diabetes follow-up evaluation, with her  Johnny  She has been feeling well but they report the blood level of Gengraft medication low  Reviewed medical history from patient and Epic chart record        Diagnosis of diabetes mellitus age 23, living in [facility]  Recalls having vision problem, saw eye physician and then family physician, also had frequent urination  Initial treatment with oral medication for few weeks, then change to insulin  Had seen Dr. Castillo in [facility]  Subsequent evaluations with Dr. GABRIELE Vickers, then saw Dr. Elmer Garvin/[facility] for endocrinology evaluations  Has used insulin injections 4x per day  Previous [facility] hgbA1c trends include:     Lab Test 07/06/21  1422 03/18/21  0636 10/29/20  0710 03/27/20  0648 11/20/19  0803   A1C 6.7* 6.8* 7.0* 6.8* 6.9*         8/10/21. Initial diabetes evaluation with me at [facility]  Reviewed health history and diabetes issues  Gets her insulin pens from [facility] mail order  Current DM medications:  Novolog Flexpen 5U premeal   Novolog sscale guestimates  Basaglar Kwikpen  15U subcutaneous in evening    Blood glucose (BG) meter: Raad Contour?   Has tested 4x per day previously   Less frequent testing when using CGM sensor    Has used the DexcomG5, then the G6 CGM sensor  Recent DexcomG6 data:              FamHx DM:  Niece  Recent [facility] labs include:  Lab Results   Component Value Date    A1C 6.5 (H) 11/30/2022      12/15/2022    POTASSIUM 4.4 12/15/2022    CHLORIDE 105 12/15/2022    CO2 27 12/15/2022    ANIONGAP 11 12/15/2022     (H) 12/15/2022    BUN 19.1 12/15/2022    CR 1.17 (H) 12/15/2022    GFRESTIMATED 49 (L) 12/15/2022    GFRESTBLACK 45 (L) 06/23/2021    NAVI 9.9 12/15/2022    CPEPT 0.2 (L) 11/29/2021    CHOL 189 07/29/2022    TRIG 89 07/29/2022    HDL 67 07/29/2022     (H) 07/29/2022    NHDL 122 07/29/2022    UCRR 103.9 11/30/2022    MICROL 30.8 08/12/2022    UMALCR 32.39 (H) 08/12/2022    TSH 2.54 07/29/2022    T4 1.41 04/02/2018     Last eye exam with Dr. Arevalo/[facility] in 8/2022     DM Complications:   Nephropathy    Previous ESRD, then renal transplant 2005    Sees Dr. Morro Veloz/nephrology   Retinopathy    Previous PDR and laser surgeries [facility]    Had seen Dr. Grayson Miles/[facility], now sees Dr. Arevalo/[facility] at that clinic building    Macrovascular disease    CAD, previous cardiac stent placement      Lives in [place], [place]  Sees Dr. Summer Vega/[facility] for general medicine evaluations.    PMH/PSH:  Past Medical History:   Diagnosis Date     Abdominal wall hernia     RLQ     AK (actinic keratosis) 08/06/2020     Allergic rhinitis      CAD (coronary artery disease)     coronary artery disease s/p PCI to LAD in 2005coronary artery disease s/p PCI to LAD in 2005     Diabetes mellitus, type 2 (H)     follows up with endocrinologist.     Dyslipidemia      GERD (gastroesophageal reflux disease)      H/O diabetic nephropathy     s/p kidney trnsplant     Hypertension      Hypothyroidism      Immunosuppressed status (H)      Kidney replaced by transplant     Rt     PONV (postoperative nausea and vomiting)      Shingles      Vitamin B12 deficiency anemia      Past Surgical History:   Procedure Laterality Date     APPENDECTOMY NOS       ARTHROSCOPY SHOULDER ROTATOR CUFF REPAIR Left      COLONOSCOPY N/A 6/7/2016    Procedure: COLONOSCOPY;   Surgeon: Rashard Snider MD;  Location: [facility]     Coronary angioplasty with stent       HYSTERECTOMY       Kidney Transplant NOS Right      LAPAROSCOPIC CHOLECYSTECTOMY       NOSE SURGERY       OPEN SEPARATION COMPONENT HERNIORRHAPHY N/A 10/16/2017    Procedure: OPEN SEPARATION COMPONENT HERNIORRHAPHY;;  Surgeon: CARL Lott MD;  Location: [facility]     RECONSTRUCT ABDOMINAL WALL N/A 10/16/2017    Procedure: RECONSTRUCT ABDOMINAL WALL;  Exploratory Laparotomy, Lysis of Adhesions, Abdominal Wall reconstruction, Inlay Xen AB mesh, Mitek Suture [place] and Umbilical Hernia Repair.;  Surgeon: CARL Lott MD;  Location: [facility] OR     REMOVE CATARACT INTRACAP,INSERT LENS Right      TONSILLECTOMY         Family Hx:  Family History   Problem Relation Age of Onset     Respiratory Mother          age 71     Cardiovascular Father          age 75 hyertension     Arthritis Sister         living, healthy     Family History Negative Brother          age 44     Colon Cancer No family hx of          Social Hx:  Social History     Socioeconomic History     Marital status:      Spouse name: Not on file     Number of children: Not on file     Years of education: Not on file     Highest education level: Not on file   Occupational History     Not on file   Tobacco Use     Smoking status: Never     Smokeless tobacco: Never   Substance and Sexual Activity     Alcohol use: No     Drug use: No     Sexual activity: Yes     Partners: Male   Other Topics Concern     Parent/sibling w/ CABG, MI or angioplasty before 65F 55M? Not Asked   Social History Narrative     Not on file     Social Determinants of Health     Financial Resource Strain: Not on file   Food Insecurity: Not on file   Transportation Needs: Not on file   Physical Activity: Not on file   Stress: Not on file   Social Connections: Not on file   Intimate Partner Violence: Not on file   Housing Stability: Not on file           MEDICATIONS:  has a current medication list which includes the following prescription(s): amlodipine, aspirin, atorvastatin, teddy contour, bd insulin syringe ultrafine, cholecalciferol, dexcom g6 transmitter, cyclosporine modified, insulin aspart, insulin glargine, isosorbide mononitrate, levothyroxine, losartan, metoprolol succinate er, mycophenolic acid, and pantoprazole.    ROS:     ROS: 10 point ROS neg other than the symptoms noted above in the HPI.    GENERAL: some fatigue, wt stable; denies fevers, chills, night sweats.   HEENT: no dysphagia, odonophagia, diplopia, neck pain  THYROID:  no apparent hyper or hypothyroid symptoms  CV: no chest pain, pressure, palpitations  LUNGS: no SOB, JIMENEZ, cough, wheezing   ABDOMEN: no diarrhea, constipation, abdominal pain  EXTREMITIES: no rashes, ulcers, edema  NEUROLOGY: decreased sensation at feet, balance problems; no headaches, denies changes in vision, tingling  MSK: some leg weakness, denies specific arthralgias  SKIN: no rashes or lesions  :  no menses  PSYCH:  stable mood, no significant anxiety or depression  ENDOCRINE: no heat or cold intolerance    Physical Exam (visual exam)  VS:  no vital signs taken for video visit  CONSTITUTIONAL: healthy, alert and NAD, well dressed, answering questions appropriately  ENT: no nose swelling or nasal discharge, mouth redness or gum changes.  EYES: eyes grossly normal to inspection, conjunctivae and sclerae normal, no exophthalmos or proptosis  THYROID:  no apparent nodules or goiter  LUNGS: no audible wheeze, cough or visible cyanosis, no visible retractions or increased work of breathing  ABDOMEN: abdomen not evaluated  EXTREMITIES: no hand tremors, limited exam  NEUROLOGY: CN grossly intact, mentation intact and speech normal   SKIN:  no apparent skin lesions, rash, or edema with visualized skin appearance  PSYCH: mentation appears normal, affect normal/bright, judgement and insight intact,   normal speech and  appearance well groomed      LABS:    All pertinent notes, labs, and images personally reviewed by me.     A/P:  Encounter Diagnoses   Name Primary?     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  Recent glucose trends good, DexcomG6 CGM sensor helpful  Reviewed and interpreted tests that I previously ordered.   Ordered appropriate tests for the endocrinology disease management.    Management options discussed and implemented after shared medical decision making with the patient.  T1.5DM problem is chronic-stable    Plan:  Reviewed general issues with the diabetes diagnosis and management  We discussed the hgbA1c test which reflects previous overall glucose levels or control  Discussed the importance of blood glucose (BG) testing to assess glucose trends  Provided general overview of the diabetes medication options and medication treatment plan.  Reviewed recent DexcomG6 CGM glucose trend data, in detail.    Recommend:  Continue current Novolog and Basaglar insulin medication use  Avoid taking Novolog after start of meal... take dose premeal  Change supper base as Nv 6U  Discussed the ideal mealtime and correction scale dosing routine   Use ISF 1U per 50 mg/dl >150 mg/dl   Avoid using nighttime Nv sscale unless SG >250 mg/dl  Goal target premeal SG  mg/dl  Continue use of the DexcomG6 CGM sensor   Patient's insulin regimen requires frequent dose adjustments by patient on basis of therapeutic BGM/CGM test results  Discussed  hypoglycemia prevention  Previous insulin medication from [facility] pharmacy in [place], mail order   They will contact me if needing new Rx locally in [place]  Plan repeat labs in early 4/2023   Lab orders placed  Keep focus on diet, exercise, weight management.  Advise having fasting lipid panel testing and dilated eye examination, at least annually    Keep regular follow-up evaluations with nephrologist, cardiologist, ophthalmologist, and PCP   Addressed patient questions today    There are no Patient Instructions on file for this visit.    Future labs ordered today:   Orders Placed This Encounter   Procedures     GLUCOSE MONITOR, 72 HOUR, PHYS INTERP     Hemoglobin A1c     Radiology/Consults ordered today: None    Total time spent on day of encounter:  23 min    Follow-up:  4/11/23 at 8:30 am, Return    DIANN Fitzpatrick MD, MS  Endocrinology  [facility]

## 2022-12-21 RX ORDER — CYCLOSPORINE 25 MG/1
CAPSULE ORAL
Qty: 540 CAPSULE | Refills: 0 | Status: SHIPPED | OUTPATIENT
Start: 2022-12-21 | End: 2023-01-24

## 2023-01-01 ENCOUNTER — ANESTHESIA EVENT (OUTPATIENT)
Dept: SURGERY | Facility: CLINIC | Age: 73
DRG: 113 | End: 2023-01-01
Payer: MEDICARE

## 2023-01-01 ENCOUNTER — PATIENT OUTREACH (OUTPATIENT)
Dept: CARE COORDINATION | Facility: CLINIC | Age: 73
End: 2023-01-01
Payer: MEDICARE

## 2023-01-01 ENCOUNTER — OFFICE VISIT (OUTPATIENT)
Dept: ENDOCRINOLOGY | Facility: CLINIC | Age: 73
End: 2023-01-01
Payer: MEDICARE

## 2023-01-01 ENCOUNTER — OFFICE VISIT (OUTPATIENT)
Dept: INTERNAL MEDICINE | Facility: CLINIC | Age: 73
End: 2023-01-01
Payer: MEDICARE

## 2023-01-01 ENCOUNTER — TRANSFERRED RECORDS (OUTPATIENT)
Dept: HEALTH INFORMATION MANAGEMENT | Facility: CLINIC | Age: 73
End: 2023-01-01
Payer: MEDICARE

## 2023-01-01 ENCOUNTER — TELEPHONE (OUTPATIENT)
Dept: OTOLARYNGOLOGY | Facility: CLINIC | Age: 73
End: 2023-01-01
Payer: MEDICARE

## 2023-01-01 ENCOUNTER — HOSPITAL ENCOUNTER (OUTPATIENT)
Facility: CLINIC | Age: 73
Discharge: HOME | End: 2023-12-19
Attending: STUDENT IN AN ORGANIZED HEALTH CARE EDUCATION/TRAINING PROGRAM | Admitting: STUDENT IN AN ORGANIZED HEALTH CARE EDUCATION/TRAINING PROGRAM
Payer: MEDICARE

## 2023-01-01 ENCOUNTER — APPOINTMENT (OUTPATIENT)
Dept: MRI IMAGING | Facility: CLINIC | Age: 73
DRG: 113 | End: 2023-01-01
Payer: MEDICARE

## 2023-01-01 ENCOUNTER — TELEPHONE (OUTPATIENT)
Dept: INTERNAL MEDICINE | Facility: CLINIC | Age: 73
End: 2023-01-01
Payer: MEDICARE

## 2023-01-01 ENCOUNTER — LAB (OUTPATIENT)
Dept: LAB | Facility: CLINIC | Age: 73
End: 2023-01-01
Payer: MEDICARE

## 2023-01-01 ENCOUNTER — TELEPHONE (OUTPATIENT)
Dept: RHEUMATOLOGY | Facility: CLINIC | Age: 73
End: 2023-01-01
Payer: MEDICARE

## 2023-01-01 ENCOUNTER — TELEPHONE (OUTPATIENT)
Dept: TRANSPLANT | Facility: CLINIC | Age: 73
End: 2023-01-01
Payer: MEDICARE

## 2023-01-01 ENCOUNTER — TELEPHONE (OUTPATIENT)
Dept: ENDOCRINOLOGY | Facility: CLINIC | Age: 73
End: 2023-01-01
Payer: MEDICARE

## 2023-01-01 ENCOUNTER — TELEPHONE (OUTPATIENT)
Dept: TRANSPLANT | Facility: CLINIC | Age: 73
End: 2023-01-01

## 2023-01-01 ENCOUNTER — THERAPY VISIT (OUTPATIENT)
Dept: OCCUPATIONAL THERAPY | Facility: CLINIC | Age: 73
End: 2023-01-01
Attending: STUDENT IN AN ORGANIZED HEALTH CARE EDUCATION/TRAINING PROGRAM
Payer: MEDICARE

## 2023-01-01 ENCOUNTER — TELEPHONE (OUTPATIENT)
Dept: OPHTHALMOLOGY | Facility: CLINIC | Age: 73
End: 2023-01-01
Payer: MEDICARE

## 2023-01-01 ENCOUNTER — MYC MEDICAL ADVICE (OUTPATIENT)
Dept: OTOLARYNGOLOGY | Facility: CLINIC | Age: 73
End: 2023-01-01
Payer: MEDICARE

## 2023-01-01 ENCOUNTER — MYC MEDICAL ADVICE (OUTPATIENT)
Dept: ENDOCRINOLOGY | Facility: CLINIC | Age: 73
End: 2023-01-01
Payer: MEDICARE

## 2023-01-01 ENCOUNTER — OFFICE VISIT (OUTPATIENT)
Dept: OTOLARYNGOLOGY | Facility: CLINIC | Age: 73
End: 2023-01-01
Payer: MEDICARE

## 2023-01-01 ENCOUNTER — PATIENT OUTREACH (OUTPATIENT)
Dept: CARE COORDINATION | Facility: CLINIC | Age: 73
End: 2023-01-01

## 2023-01-01 ENCOUNTER — TELEPHONE (OUTPATIENT)
Dept: INTERNAL MEDICINE | Facility: CLINIC | Age: 73
End: 2023-01-01

## 2023-01-01 ENCOUNTER — VIRTUAL VISIT (OUTPATIENT)
Dept: PHYSICAL MEDICINE AND REHAB | Facility: CLINIC | Age: 73
End: 2023-01-01
Attending: INTERNAL MEDICINE
Payer: MEDICARE

## 2023-01-01 ENCOUNTER — OFFICE VISIT (OUTPATIENT)
Dept: OPHTHALMOLOGY | Facility: CLINIC | Age: 73
End: 2023-01-01
Payer: MEDICARE

## 2023-01-01 ENCOUNTER — MEDICAL CORRESPONDENCE (OUTPATIENT)
Dept: HEALTH INFORMATION MANAGEMENT | Facility: CLINIC | Age: 73
End: 2023-01-01

## 2023-01-01 ENCOUNTER — OFFICE VISIT (OUTPATIENT)
Dept: TRANSPLANT | Facility: CLINIC | Age: 73
End: 2023-01-01
Attending: INTERNAL MEDICINE
Payer: MEDICARE

## 2023-01-01 ENCOUNTER — TELEPHONE (OUTPATIENT)
Dept: OPHTHALMOLOGY | Facility: CLINIC | Age: 73
End: 2023-01-01

## 2023-01-01 ENCOUNTER — ANESTHESIA (OUTPATIENT)
Dept: SURGERY | Facility: CLINIC | Age: 73
End: 2023-01-01
Payer: MEDICARE

## 2023-01-01 ENCOUNTER — PREP FOR PROCEDURE (OUTPATIENT)
Dept: OTOLARYNGOLOGY | Facility: CLINIC | Age: 73
End: 2023-01-01

## 2023-01-01 ENCOUNTER — HEALTH MAINTENANCE LETTER (OUTPATIENT)
Age: 73
End: 2023-01-01

## 2023-01-01 ENCOUNTER — TELEPHONE (OUTPATIENT)
Dept: ENDOCRINOLOGY | Facility: CLINIC | Age: 73
End: 2023-01-01

## 2023-01-01 ENCOUNTER — APPOINTMENT (OUTPATIENT)
Dept: CT IMAGING | Facility: CLINIC | Age: 73
DRG: 113 | End: 2023-01-01
Payer: MEDICARE

## 2023-01-01 ENCOUNTER — NURSE TRIAGE (OUTPATIENT)
Dept: INTERNAL MEDICINE | Facility: CLINIC | Age: 73
End: 2023-01-01
Payer: MEDICARE

## 2023-01-01 ENCOUNTER — APPOINTMENT (OUTPATIENT)
Dept: OCCUPATIONAL THERAPY | Facility: CLINIC | Age: 73
DRG: 113 | End: 2023-01-01
Attending: STUDENT IN AN ORGANIZED HEALTH CARE EDUCATION/TRAINING PROGRAM
Payer: MEDICARE

## 2023-01-01 ENCOUNTER — HOSPITAL ENCOUNTER (INPATIENT)
Facility: CLINIC | Age: 73
LOS: 5 days | Discharge: HOME | DRG: 113 | End: 2023-10-12
Attending: EMERGENCY MEDICINE | Admitting: STUDENT IN AN ORGANIZED HEALTH CARE EDUCATION/TRAINING PROGRAM
Payer: MEDICARE

## 2023-01-01 ENCOUNTER — ANESTHESIA EVENT (OUTPATIENT)
Dept: SURGERY | Facility: CLINIC | Age: 73
End: 2023-01-01
Payer: MEDICARE

## 2023-01-01 ENCOUNTER — ANESTHESIA (OUTPATIENT)
Dept: SURGERY | Facility: CLINIC | Age: 73
DRG: 113 | End: 2023-01-01
Payer: MEDICARE

## 2023-01-01 VITALS
HEIGHT: 63 IN | TEMPERATURE: 97.1 F | BODY MASS INDEX: 26.52 KG/M2 | RESPIRATION RATE: 13 BRPM | WEIGHT: 149.7 LBS | DIASTOLIC BLOOD PRESSURE: 72 MMHG | HEART RATE: 62 BPM | OXYGEN SATURATION: 98 % | SYSTOLIC BLOOD PRESSURE: 106 MMHG

## 2023-01-01 VITALS
SYSTOLIC BLOOD PRESSURE: 142 MMHG | HEIGHT: 64 IN | DIASTOLIC BLOOD PRESSURE: 57 MMHG | BODY MASS INDEX: 24.28 KG/M2 | RESPIRATION RATE: 16 BRPM | OXYGEN SATURATION: 96 % | HEART RATE: 69 BPM | WEIGHT: 142.2 LBS | TEMPERATURE: 98 F

## 2023-01-01 VITALS
OXYGEN SATURATION: 97 % | DIASTOLIC BLOOD PRESSURE: 71 MMHG | HEIGHT: 64 IN | BODY MASS INDEX: 26.46 KG/M2 | TEMPERATURE: 98.4 F | WEIGHT: 155 LBS | HEART RATE: 66 BPM | SYSTOLIC BLOOD PRESSURE: 152 MMHG | RESPIRATION RATE: 16 BRPM

## 2023-01-01 VITALS
SYSTOLIC BLOOD PRESSURE: 138 MMHG | WEIGHT: 145 LBS | OXYGEN SATURATION: 97 % | HEART RATE: 63 BPM | TEMPERATURE: 96.9 F | RESPIRATION RATE: 12 BRPM | HEIGHT: 63 IN | BODY MASS INDEX: 25.69 KG/M2 | DIASTOLIC BLOOD PRESSURE: 60 MMHG

## 2023-01-01 VITALS
HEIGHT: 63 IN | OXYGEN SATURATION: 96 % | BODY MASS INDEX: 26.22 KG/M2 | WEIGHT: 148 LBS | HEART RATE: 54 BPM | SYSTOLIC BLOOD PRESSURE: 117 MMHG | DIASTOLIC BLOOD PRESSURE: 53 MMHG

## 2023-01-01 VITALS
OXYGEN SATURATION: 98 % | TEMPERATURE: 97.2 F | HEIGHT: 63 IN | SYSTOLIC BLOOD PRESSURE: 126 MMHG | DIASTOLIC BLOOD PRESSURE: 60 MMHG | HEART RATE: 72 BPM | WEIGHT: 157.9 LBS | RESPIRATION RATE: 13 BRPM | BODY MASS INDEX: 27.98 KG/M2

## 2023-01-01 VITALS
HEIGHT: 63 IN | WEIGHT: 160.4 LBS | HEART RATE: 65 BPM | SYSTOLIC BLOOD PRESSURE: 146 MMHG | BODY MASS INDEX: 28.42 KG/M2 | RESPIRATION RATE: 12 BRPM | DIASTOLIC BLOOD PRESSURE: 60 MMHG | TEMPERATURE: 97.6 F | OXYGEN SATURATION: 97 %

## 2023-01-01 VITALS
WEIGHT: 141 LBS | SYSTOLIC BLOOD PRESSURE: 159 MMHG | BODY MASS INDEX: 24.98 KG/M2 | DIASTOLIC BLOOD PRESSURE: 71 MMHG | HEART RATE: 57 BPM | OXYGEN SATURATION: 96 % | TEMPERATURE: 97.5 F

## 2023-01-01 VITALS
DIASTOLIC BLOOD PRESSURE: 60 MMHG | WEIGHT: 148.4 LBS | RESPIRATION RATE: 18 BRPM | HEART RATE: 58 BPM | HEIGHT: 63 IN | TEMPERATURE: 96.6 F | BODY MASS INDEX: 26.29 KG/M2 | SYSTOLIC BLOOD PRESSURE: 136 MMHG | OXYGEN SATURATION: 97 %

## 2023-01-01 VITALS
RESPIRATION RATE: 16 BRPM | WEIGHT: 147 LBS | BODY MASS INDEX: 26.04 KG/M2 | DIASTOLIC BLOOD PRESSURE: 70 MMHG | OXYGEN SATURATION: 98 % | HEART RATE: 61 BPM | SYSTOLIC BLOOD PRESSURE: 158 MMHG

## 2023-01-01 VITALS
DIASTOLIC BLOOD PRESSURE: 63 MMHG | HEIGHT: 63 IN | BODY MASS INDEX: 27.85 KG/M2 | SYSTOLIC BLOOD PRESSURE: 127 MMHG | HEART RATE: 65 BPM | WEIGHT: 157.2 LBS

## 2023-01-01 DIAGNOSIS — Z94.0 KIDNEY REPLACED BY TRANSPLANT: ICD-10-CM

## 2023-01-01 DIAGNOSIS — I25.10 CORONARY ARTERY DISEASE INVOLVING NATIVE HEART WITHOUT ANGINA PECTORIS, UNSPECIFIED VESSEL OR LESION TYPE: ICD-10-CM

## 2023-01-01 DIAGNOSIS — E78.5 HYPERLIPIDEMIA LDL GOAL <100: ICD-10-CM

## 2023-01-01 DIAGNOSIS — E13.9 DIABETES MELLITUS TYPE 1.5, MANAGED AS TYPE 1 (H): ICD-10-CM

## 2023-01-01 DIAGNOSIS — M62.81 GENERALIZED MUSCLE WEAKNESS: ICD-10-CM

## 2023-01-01 DIAGNOSIS — Z94.0 STATUS POST KIDNEY TRANSPLANT: ICD-10-CM

## 2023-01-01 DIAGNOSIS — H05.89 ORBITAL MASS: ICD-10-CM

## 2023-01-01 DIAGNOSIS — J34.1 MUCOCELE OF ETHMOID SINUS: Primary | ICD-10-CM

## 2023-01-01 DIAGNOSIS — Z94.0 KIDNEY TRANSPLANTED: Primary | ICD-10-CM

## 2023-01-01 DIAGNOSIS — G31.83 LEWY BODY DEMENTIA, UNSPECIFIED DEMENTIA SEVERITY, UNSPECIFIED WHETHER BEHAVIORAL, PSYCHOTIC, OR MOOD DISTURBANCE OR ANXIETY (H): ICD-10-CM

## 2023-01-01 DIAGNOSIS — E03.8 OTHER SPECIFIED HYPOTHYROIDISM: ICD-10-CM

## 2023-01-01 DIAGNOSIS — N18.32 CHRONIC KIDNEY DISEASE, STAGE 3B (H): ICD-10-CM

## 2023-01-01 DIAGNOSIS — I15.1 HYPERTENSION SECONDARY TO OTHER RENAL DISORDERS: ICD-10-CM

## 2023-01-01 DIAGNOSIS — H44.001 INFECTION OF RIGHT EYE: ICD-10-CM

## 2023-01-01 DIAGNOSIS — Z94.0 IMMUNOSUPPRESSIVE MANAGEMENT ENCOUNTER FOLLOWING KIDNEY TRANSPLANT: Primary | ICD-10-CM

## 2023-01-01 DIAGNOSIS — H54.61 VISION LOSS OF RIGHT EYE: ICD-10-CM

## 2023-01-01 DIAGNOSIS — Z94.0 KIDNEY REPLACED BY TRANSPLANT: Primary | ICD-10-CM

## 2023-01-01 DIAGNOSIS — Z79.899 ENCOUNTER FOR LONG-TERM CURRENT USE OF MEDICATION: ICD-10-CM

## 2023-01-01 DIAGNOSIS — G20.A1 PARKINSON'S DISEASE, UNSPECIFIED WHETHER DYSKINESIA PRESENT, UNSPECIFIED WHETHER MANIFESTATIONS FLUCTUATE (H): ICD-10-CM

## 2023-01-01 DIAGNOSIS — R44.3 HALLUCINATIONS: ICD-10-CM

## 2023-01-01 DIAGNOSIS — Z94.0 IMMUNOSUPPRESSIVE MANAGEMENT ENCOUNTER FOLLOWING KIDNEY TRANSPLANT: ICD-10-CM

## 2023-01-01 DIAGNOSIS — D84.9 IMMUNOSUPPRESSED STATUS (H): ICD-10-CM

## 2023-01-01 DIAGNOSIS — Z48.298 AFTERCARE FOLLOWING ORGAN TRANSPLANT: Primary | ICD-10-CM

## 2023-01-01 DIAGNOSIS — Z48.298 AFTERCARE FOLLOWING ORGAN TRANSPLANT: ICD-10-CM

## 2023-01-01 DIAGNOSIS — Z74.09 IMPAIRED FUNCTIONAL MOBILITY, BALANCE, AND ENDURANCE: ICD-10-CM

## 2023-01-01 DIAGNOSIS — G20.A1 PARKINSON DISEASE (H): ICD-10-CM

## 2023-01-01 DIAGNOSIS — G93.32 POST-COVID CHRONIC FATIGUE: Primary | ICD-10-CM

## 2023-01-01 DIAGNOSIS — Z94.0 KIDNEY TRANSPLANTED: ICD-10-CM

## 2023-01-01 DIAGNOSIS — U09.9 POST-COVID CHRONIC FATIGUE: ICD-10-CM

## 2023-01-01 DIAGNOSIS — U09.9 LONG COVID: ICD-10-CM

## 2023-01-01 DIAGNOSIS — E11.21 TYPE 2 DIABETES MELLITUS WITH DIABETIC NEPHROPATHY, WITHOUT LONG-TERM CURRENT USE OF INSULIN (H): ICD-10-CM

## 2023-01-01 DIAGNOSIS — G93.32 POST-COVID CHRONIC FATIGUE: ICD-10-CM

## 2023-01-01 DIAGNOSIS — H05.231: Primary | ICD-10-CM

## 2023-01-01 DIAGNOSIS — Z00.00 ENCOUNTER FOR MEDICARE ANNUAL WELLNESS EXAM: Primary | ICD-10-CM

## 2023-01-01 DIAGNOSIS — H05.89 ORBITAL MASS: Primary | ICD-10-CM

## 2023-01-01 DIAGNOSIS — J34.1 MUCOCELE OF ETHMOID SINUS: ICD-10-CM

## 2023-01-01 DIAGNOSIS — E10.21 TYPE 1 DIABETES MELLITUS WITH DIABETIC NEPHROPATHY (H): ICD-10-CM

## 2023-01-01 DIAGNOSIS — E13.9 DIABETES MELLITUS TYPE 1.5, MANAGED AS TYPE 1 (H): Primary | ICD-10-CM

## 2023-01-01 DIAGNOSIS — F02.80 LEWY BODY DEMENTIA, UNSPECIFIED DEMENTIA SEVERITY, UNSPECIFIED WHETHER BEHAVIORAL, PSYCHOTIC, OR MOOD DISTURBANCE OR ANXIETY (H): ICD-10-CM

## 2023-01-01 DIAGNOSIS — R41.3 MEMORY CHANGES: ICD-10-CM

## 2023-01-01 DIAGNOSIS — Z53.9 DIAGNOSIS NOT YET DEFINED: Primary | ICD-10-CM

## 2023-01-01 DIAGNOSIS — Z79.899 IMMUNOSUPPRESSIVE MANAGEMENT ENCOUNTER FOLLOWING KIDNEY TRANSPLANT: ICD-10-CM

## 2023-01-01 DIAGNOSIS — Z79.899 IMMUNOSUPPRESSIVE MANAGEMENT ENCOUNTER FOLLOWING KIDNEY TRANSPLANT: Primary | ICD-10-CM

## 2023-01-01 DIAGNOSIS — Z01.818 PREOP GENERAL PHYSICAL EXAM: Primary | ICD-10-CM

## 2023-01-01 DIAGNOSIS — E10.319 TYPE 1 DIABETES MELLITUS WITH RETINOPATHY, MACULAR EDEMA PRESENCE UNSPECIFIED, UNSPECIFIED LATERALITY, UNSPECIFIED RETINOPATHY SEVERITY (H): ICD-10-CM

## 2023-01-01 DIAGNOSIS — K21.9 GASTROESOPHAGEAL REFLUX DISEASE WITHOUT ESOPHAGITIS: ICD-10-CM

## 2023-01-01 DIAGNOSIS — H05.10 ORBITAL INFLAMMATION: ICD-10-CM

## 2023-01-01 DIAGNOSIS — R53.83 OTHER FATIGUE: Primary | ICD-10-CM

## 2023-01-01 DIAGNOSIS — U09.9 POST-COVID CHRONIC FATIGUE: Primary | ICD-10-CM

## 2023-01-01 DIAGNOSIS — H05.019: ICD-10-CM

## 2023-01-01 DIAGNOSIS — E10.59 TYPE 1 DIABETES MELLITUS WITH OTHER CIRCULATORY COMPLICATION (H): ICD-10-CM

## 2023-01-01 DIAGNOSIS — D84.9 IMMUNOSUPPRESSION (H): ICD-10-CM

## 2023-01-01 LAB
ACE SERPL-CCNC: 37 U/L
ALBUMIN MFR UR ELPH: 5.2 MG/DL
ALBUMIN MFR UR ELPH: 8 MG/DL
ALBUMIN SERPL BCG-MCNC: 3.4 G/DL (ref 3.5–5.2)
ALBUMIN SERPL BCG-MCNC: 3.8 G/DL (ref 3.5–5.2)
ALP SERPL-CCNC: 57 U/L (ref 35–104)
ALP SERPL-CCNC: 71 U/L (ref 35–104)
ALT SERPL W P-5'-P-CCNC: 10 U/L (ref 0–50)
ALT SERPL W P-5'-P-CCNC: 8 U/L (ref 0–50)
ALT SERPL W P-5'-P-CCNC: 8 U/L (ref 0–50)
ALT SERPL W P-5'-P-CCNC: 9 U/L (ref 0–50)
ANCA AB PATTERN SER IF-IMP: NORMAL
ANION GAP SERPL CALCULATED.3IONS-SCNC: 11 MMOL/L (ref 7–15)
ANION GAP SERPL CALCULATED.3IONS-SCNC: 11 MMOL/L (ref 7–15)
ANION GAP SERPL CALCULATED.3IONS-SCNC: 12 MMOL/L (ref 7–15)
ANION GAP SERPL CALCULATED.3IONS-SCNC: 13 MMOL/L (ref 7–15)
ANION GAP SERPL CALCULATED.3IONS-SCNC: 15 MMOL/L (ref 7–15)
ANION GAP SERPL CALCULATED.3IONS-SCNC: 17 MMOL/L (ref 7–15)
ANION GAP SERPL CALCULATED.3IONS-SCNC: 8 MMOL/L (ref 7–15)
ANION GAP SERPL CALCULATED.3IONS-SCNC: 9 MMOL/L (ref 7–15)
AST SERPL W P-5'-P-CCNC: 21 U/L (ref 0–45)
AST SERPL W P-5'-P-CCNC: 25 U/L (ref 0–45)
ATRIAL RATE - MUSE: 58 BPM
ATRIAL RATE - MUSE: 64 BPM
BACTERIA BLD CULT: NO GROWTH
BACTERIA BLD CULT: NO GROWTH
BACTERIA TISS BX CULT: ABNORMAL
BACTERIA TISS BX CULT: ABNORMAL
BACTERIA TISS BX CULT: NO GROWTH
BASO+EOS+MONOS # BLD AUTO: ABNORMAL 10*3/UL
BASO+EOS+MONOS # BLD AUTO: NORMAL 10*3/UL
BASO+EOS+MONOS NFR BLD AUTO: ABNORMAL %
BASO+EOS+MONOS NFR BLD AUTO: NORMAL %
BASOPHILS # BLD AUTO: 0 10E3/UL (ref 0–0.2)
BASOPHILS # BLD AUTO: 0 10E3/UL (ref 0–0.2)
BASOPHILS NFR BLD AUTO: 0 %
BASOPHILS NFR BLD AUTO: 0 %
BILIRUB DIRECT SERPL-MCNC: <0.2 MG/DL (ref 0–0.3)
BILIRUB SERPL-MCNC: 0.7 MG/DL
BILIRUB SERPL-MCNC: 0.9 MG/DL
BUN SERPL-MCNC: 13.6 MG/DL (ref 8–23)
BUN SERPL-MCNC: 14.3 MG/DL (ref 8–23)
BUN SERPL-MCNC: 15.7 MG/DL (ref 8–23)
BUN SERPL-MCNC: 18.2 MG/DL (ref 8–23)
BUN SERPL-MCNC: 20.9 MG/DL (ref 8–23)
BUN SERPL-MCNC: 21.6 MG/DL (ref 8–23)
BUN SERPL-MCNC: 22.5 MG/DL (ref 8–23)
BUN SERPL-MCNC: 23.3 MG/DL (ref 8–23)
BUN SERPL-MCNC: 23.3 MG/DL (ref 8–23)
BUN SERPL-MCNC: 23.8 MG/DL (ref 8–23)
C-ANCA TITR SER IF: NORMAL {TITER}
CALCIUM SERPL-MCNC: 10 MG/DL (ref 8.8–10.2)
CALCIUM SERPL-MCNC: 8.7 MG/DL (ref 8.8–10.2)
CALCIUM SERPL-MCNC: 8.8 MG/DL (ref 8.8–10.2)
CALCIUM SERPL-MCNC: 8.9 MG/DL (ref 8.8–10.2)
CALCIUM SERPL-MCNC: 9 MG/DL (ref 8.8–10.2)
CALCIUM SERPL-MCNC: 9 MG/DL (ref 8.8–10.2)
CALCIUM SERPL-MCNC: 9.2 MG/DL (ref 8.8–10.2)
CALCIUM SERPL-MCNC: 9.7 MG/DL (ref 8.8–10.2)
CALCIUM SERPL-MCNC: 9.7 MG/DL (ref 8.8–10.2)
CALCIUM SERPL-MCNC: 9.8 MG/DL (ref 8.8–10.2)
CHLORIDE SERPL-SCNC: 101 MMOL/L (ref 98–107)
CHLORIDE SERPL-SCNC: 102 MMOL/L (ref 98–107)
CHLORIDE SERPL-SCNC: 104 MMOL/L (ref 98–107)
CHLORIDE SERPL-SCNC: 106 MMOL/L (ref 98–107)
CHLORIDE SERPL-SCNC: 98 MMOL/L (ref 98–107)
CHLORIDE SERPL-SCNC: 99 MMOL/L (ref 98–107)
CREAT SERPL-MCNC: 1.02 MG/DL (ref 0.51–0.95)
CREAT SERPL-MCNC: 1.05 MG/DL (ref 0.51–0.95)
CREAT SERPL-MCNC: 1.05 MG/DL (ref 0.51–0.95)
CREAT SERPL-MCNC: 1.08 MG/DL (ref 0.51–0.95)
CREAT SERPL-MCNC: 1.09 MG/DL (ref 0.51–0.95)
CREAT SERPL-MCNC: 1.1 MG/DL (ref 0.51–0.95)
CREAT SERPL-MCNC: 1.11 MG/DL (ref 0.51–0.95)
CREAT SERPL-MCNC: 1.15 MG/DL (ref 0.51–0.95)
CREAT SERPL-MCNC: 1.19 MG/DL (ref 0.51–0.95)
CREAT SERPL-MCNC: 1.21 MG/DL (ref 0.51–0.95)
CREAT SERPL-MCNC: 1.25 MG/DL (ref 0.51–0.95)
CREAT SERPL-MCNC: 1.28 MG/DL (ref 0.51–0.95)
CREAT SERPL-MCNC: 1.43 MG/DL (ref 0.51–0.95)
CREAT UR-MCNC: 52.2 MG/DL
CREAT UR-MCNC: 52.3 MG/DL
CREAT UR-MCNC: 52.4 MG/DL
CYCLOSPORINE BLD LC/MS/MS-MCNC: 169 UG/L (ref 50–400)
CYCLOSPORINE BLD LC/MS/MS-MCNC: 58 UG/L (ref 50–400)
CYCLOSPORINE BLD LC/MS/MS-MCNC: 69 UG/L (ref 50–400)
CYCLOSPORINE BLD LC/MS/MS-MCNC: 71 UG/L (ref 50–400)
CYCLOSPORINE BLD LC/MS/MS-MCNC: 76 UG/L (ref 50–400)
CYCLOSPORINE BLD LC/MS/MS-MCNC: 94 UG/L (ref 50–400)
DEPRECATED CALCIDIOL+CALCIFEROL SERPL-MC: 73 UG/L (ref 20–75)
DEPRECATED HCO3 PLAS-SCNC: 22 MMOL/L (ref 22–29)
DEPRECATED HCO3 PLAS-SCNC: 24 MMOL/L (ref 22–29)
DEPRECATED HCO3 PLAS-SCNC: 25 MMOL/L (ref 22–29)
DEPRECATED HCO3 PLAS-SCNC: 26 MMOL/L (ref 22–29)
DEPRECATED HCO3 PLAS-SCNC: 27 MMOL/L (ref 22–29)
DEPRECATED HCO3 PLAS-SCNC: 28 MMOL/L (ref 22–29)
DEPRECATED HCO3 PLAS-SCNC: 29 MMOL/L (ref 22–29)
DEPRECATED HCO3 PLAS-SCNC: 29 MMOL/L (ref 22–29)
DIASTOLIC BLOOD PRESSURE - MUSE: NORMAL MMHG
DIASTOLIC BLOOD PRESSURE - MUSE: NORMAL MMHG
EGFRCR SERPLBLD CKD-EPI 2021: 47 ML/MIN/1.73M2
EGFRCR SERPLBLD CKD-EPI 2021: 47 ML/MIN/1.73M2
EGFRCR SERPLBLD CKD-EPI 2021: 49 ML/MIN/1.73M2
EGFRCR SERPLBLD CKD-EPI 2021: 52 ML/MIN/1.73M2
EGFRCR SERPLBLD CKD-EPI 2021: 52 ML/MIN/1.73M2
EGFRCR SERPLBLD CKD-EPI 2021: 53 ML/MIN/1.73M2
EGFRCR SERPLBLD CKD-EPI 2021: 53 ML/MIN/1.73M2
EGFRCR SERPLBLD CKD-EPI 2021: 55 ML/MIN/1.73M2
EGFRCR SERPLBLD CKD-EPI 2021: 55 ML/MIN/1.73M2
EGFRCR SERPLBLD CKD-EPI 2021: 57 ML/MIN/1.73M2
EOSINOPHIL # BLD AUTO: 0 10E3/UL (ref 0–0.7)
EOSINOPHIL # BLD AUTO: 0.1 10E3/UL (ref 0–0.7)
EOSINOPHIL NFR BLD AUTO: 0 %
EOSINOPHIL NFR BLD AUTO: 2 %
ERYTHROCYTE [DISTWIDTH] IN BLOOD BY AUTOMATED COUNT: 12.8 % (ref 10–15)
ERYTHROCYTE [DISTWIDTH] IN BLOOD BY AUTOMATED COUNT: 12.8 % (ref 10–15)
ERYTHROCYTE [DISTWIDTH] IN BLOOD BY AUTOMATED COUNT: 12.9 % (ref 10–15)
ERYTHROCYTE [DISTWIDTH] IN BLOOD BY AUTOMATED COUNT: 13.2 % (ref 10–15)
ERYTHROCYTE [DISTWIDTH] IN BLOOD BY AUTOMATED COUNT: 13.4 % (ref 10–15)
ERYTHROCYTE [DISTWIDTH] IN BLOOD BY AUTOMATED COUNT: 13.6 % (ref 10–15)
GAMMA INTERFERON BACKGROUND BLD IA-ACNC: 0 IU/ML
GFR SERPL CREATININE-BSD FRML MDRD: 38 ML/MIN/1.73M2
GFR SERPL CREATININE-BSD FRML MDRD: 44 ML/MIN/1.73M2
GFR SERPL CREATININE-BSD FRML MDRD: 45 ML/MIN/1.73M2
GLUCOSE BLDC GLUCOMTR-MCNC: 100 MG/DL (ref 70–99)
GLUCOSE BLDC GLUCOMTR-MCNC: 102 MG/DL (ref 70–99)
GLUCOSE BLDC GLUCOMTR-MCNC: 108 MG/DL (ref 70–99)
GLUCOSE BLDC GLUCOMTR-MCNC: 125 MG/DL (ref 70–99)
GLUCOSE BLDC GLUCOMTR-MCNC: 128 MG/DL (ref 70–99)
GLUCOSE BLDC GLUCOMTR-MCNC: 131 MG/DL (ref 70–99)
GLUCOSE BLDC GLUCOMTR-MCNC: 140 MG/DL (ref 70–99)
GLUCOSE BLDC GLUCOMTR-MCNC: 154 MG/DL (ref 70–99)
GLUCOSE BLDC GLUCOMTR-MCNC: 157 MG/DL (ref 70–99)
GLUCOSE BLDC GLUCOMTR-MCNC: 162 MG/DL (ref 70–99)
GLUCOSE BLDC GLUCOMTR-MCNC: 167 MG/DL (ref 70–99)
GLUCOSE BLDC GLUCOMTR-MCNC: 173 MG/DL (ref 70–99)
GLUCOSE BLDC GLUCOMTR-MCNC: 177 MG/DL (ref 70–99)
GLUCOSE BLDC GLUCOMTR-MCNC: 188 MG/DL (ref 70–99)
GLUCOSE BLDC GLUCOMTR-MCNC: 191 MG/DL (ref 70–99)
GLUCOSE BLDC GLUCOMTR-MCNC: 197 MG/DL (ref 70–99)
GLUCOSE BLDC GLUCOMTR-MCNC: 204 MG/DL (ref 70–99)
GLUCOSE BLDC GLUCOMTR-MCNC: 204 MG/DL (ref 70–99)
GLUCOSE BLDC GLUCOMTR-MCNC: 207 MG/DL (ref 70–99)
GLUCOSE BLDC GLUCOMTR-MCNC: 209 MG/DL (ref 70–99)
GLUCOSE BLDC GLUCOMTR-MCNC: 210 MG/DL (ref 70–99)
GLUCOSE BLDC GLUCOMTR-MCNC: 222 MG/DL (ref 70–99)
GLUCOSE BLDC GLUCOMTR-MCNC: 227 MG/DL (ref 70–99)
GLUCOSE BLDC GLUCOMTR-MCNC: 229 MG/DL (ref 70–99)
GLUCOSE BLDC GLUCOMTR-MCNC: 235 MG/DL (ref 70–99)
GLUCOSE BLDC GLUCOMTR-MCNC: 248 MG/DL (ref 70–99)
GLUCOSE BLDC GLUCOMTR-MCNC: 251 MG/DL (ref 70–99)
GLUCOSE BLDC GLUCOMTR-MCNC: 262 MG/DL (ref 70–99)
GLUCOSE BLDC GLUCOMTR-MCNC: 263 MG/DL (ref 70–99)
GLUCOSE BLDC GLUCOMTR-MCNC: 271 MG/DL (ref 70–99)
GLUCOSE BLDC GLUCOMTR-MCNC: 280 MG/DL (ref 70–99)
GLUCOSE BLDC GLUCOMTR-MCNC: 293 MG/DL (ref 70–99)
GLUCOSE BLDC GLUCOMTR-MCNC: 294 MG/DL (ref 70–99)
GLUCOSE BLDC GLUCOMTR-MCNC: 295 MG/DL (ref 70–99)
GLUCOSE BLDC GLUCOMTR-MCNC: 296 MG/DL (ref 70–99)
GLUCOSE BLDC GLUCOMTR-MCNC: 325 MG/DL (ref 70–99)
GLUCOSE BLDC GLUCOMTR-MCNC: 335 MG/DL (ref 70–99)
GLUCOSE BLDC GLUCOMTR-MCNC: 357 MG/DL (ref 70–99)
GLUCOSE BLDC GLUCOMTR-MCNC: 360 MG/DL (ref 70–99)
GLUCOSE BLDC GLUCOMTR-MCNC: 397 MG/DL (ref 70–99)
GLUCOSE BLDC GLUCOMTR-MCNC: 66 MG/DL (ref 70–99)
GLUCOSE BLDC GLUCOMTR-MCNC: 73 MG/DL (ref 70–99)
GLUCOSE BLDC GLUCOMTR-MCNC: 92 MG/DL (ref 70–99)
GLUCOSE SERPL-MCNC: 102 MG/DL (ref 70–99)
GLUCOSE SERPL-MCNC: 107 MG/DL (ref 70–99)
GLUCOSE SERPL-MCNC: 146 MG/DL (ref 70–99)
GLUCOSE SERPL-MCNC: 174 MG/DL (ref 70–99)
GLUCOSE SERPL-MCNC: 179 MG/DL (ref 70–99)
GLUCOSE SERPL-MCNC: 240 MG/DL (ref 70–99)
GLUCOSE SERPL-MCNC: 296 MG/DL (ref 70–99)
GLUCOSE SERPL-MCNC: 312 MG/DL (ref 70–99)
GLUCOSE SERPL-MCNC: 427 MG/DL (ref 70–99)
GLUCOSE SERPL-MCNC: 93 MG/DL (ref 70–99)
GRAM STAIN RESULT: NORMAL
GRAM STAIN RESULT: NORMAL
HBA1C MFR BLD: 6.7 %
HBA1C MFR BLD: 7.2 %
HBA1C MFR BLD: 7.3 %
HCT VFR BLD AUTO: 34.7 % (ref 35–47)
HCT VFR BLD AUTO: 35.5 % (ref 35–47)
HCT VFR BLD AUTO: 36.3 % (ref 35–47)
HCT VFR BLD AUTO: 37.5 % (ref 35–47)
HCT VFR BLD AUTO: 37.7 % (ref 35–47)
HCT VFR BLD AUTO: 38.6 % (ref 35–47)
HCT VFR BLD AUTO: 38.7 % (ref 35–47)
HCT VFR BLD AUTO: 39.9 % (ref 35–47)
HGB BLD-MCNC: 11.4 G/DL (ref 11.7–15.7)
HGB BLD-MCNC: 11.4 G/DL (ref 11.7–15.7)
HGB BLD-MCNC: 11.5 G/DL (ref 11.7–15.7)
HGB BLD-MCNC: 11.8 G/DL (ref 11.7–15.7)
HGB BLD-MCNC: 12.2 G/DL (ref 11.7–15.7)
HGB BLD-MCNC: 12.3 G/DL (ref 11.7–15.7)
HGB BLD-MCNC: 12.3 G/DL (ref 11.7–15.7)
HGB BLD-MCNC: 12.6 G/DL (ref 11.7–15.7)
HOLD SPECIMEN: NORMAL
IGG SERPL-MCNC: 1065 MG/DL (ref 610–1616)
IGG1 SER-MCNC: 369 MG/DL (ref 382–929)
IGG2 SER-MCNC: 686 MG/DL (ref 242–700)
IGG3 SER-MCNC: 38 MG/DL (ref 22–176)
IGG4 SER-MCNC: 15 MG/DL (ref 4–86)
IMM GRANULOCYTES # BLD: 0 10E3/UL
IMM GRANULOCYTES # BLD: 0.1 10E3/UL
IMM GRANULOCYTES NFR BLD: 1 %
IMM GRANULOCYTES NFR BLD: 1 %
INTERPRETATION ECG - MUSE: NORMAL
INTERPRETATION ECG - MUSE: NORMAL
LYMPHOCYTES # BLD AUTO: 0.6 10E3/UL (ref 0.8–5.3)
LYMPHOCYTES # BLD AUTO: 1.2 10E3/UL (ref 0.8–5.3)
LYMPHOCYTES NFR BLD AUTO: 22 %
LYMPHOCYTES NFR BLD AUTO: 7 %
LYSOZYME SERPL-MCNC: 6.25 UG/ML
M TB IFN-G BLD-IMP: NEGATIVE
M TB IFN-G CD4+ BCKGRND COR BLD-ACNC: 0.9 IU/ML
MAGNESIUM SERPL-MCNC: 1.8 MG/DL (ref 1.7–2.3)
MAGNESIUM SERPL-MCNC: 1.8 MG/DL (ref 1.7–2.3)
MAGNESIUM SERPL-MCNC: 1.9 MG/DL (ref 1.7–2.3)
MAGNESIUM SERPL-MCNC: 2.4 MG/DL (ref 1.7–2.3)
MCH RBC QN AUTO: 29 PG (ref 26.5–33)
MCH RBC QN AUTO: 29.2 PG (ref 26.5–33)
MCH RBC QN AUTO: 29.2 PG (ref 26.5–33)
MCH RBC QN AUTO: 29.3 PG (ref 26.5–33)
MCH RBC QN AUTO: 29.3 PG (ref 26.5–33)
MCH RBC QN AUTO: 29.4 PG (ref 26.5–33)
MCH RBC QN AUTO: 29.4 PG (ref 26.5–33)
MCH RBC QN AUTO: 29.5 PG (ref 26.5–33)
MCHC RBC AUTO-ENTMCNC: 31.3 G/DL (ref 31.5–36.5)
MCHC RBC AUTO-ENTMCNC: 31.6 G/DL (ref 31.5–36.5)
MCHC RBC AUTO-ENTMCNC: 31.6 G/DL (ref 31.5–36.5)
MCHC RBC AUTO-ENTMCNC: 31.7 G/DL (ref 31.5–36.5)
MCHC RBC AUTO-ENTMCNC: 31.8 G/DL (ref 31.5–36.5)
MCHC RBC AUTO-ENTMCNC: 32.1 G/DL (ref 31.5–36.5)
MCHC RBC AUTO-ENTMCNC: 32.8 G/DL (ref 31.5–36.5)
MCHC RBC AUTO-ENTMCNC: 32.9 G/DL (ref 31.5–36.5)
MCV RBC AUTO: 89 FL (ref 78–100)
MCV RBC AUTO: 89 FL (ref 78–100)
MCV RBC AUTO: 92 FL (ref 78–100)
MCV RBC AUTO: 93 FL (ref 78–100)
MCV RBC AUTO: 93 FL (ref 78–100)
MICROALBUMIN UR-MCNC: <12 MG/L
MICROALBUMIN/CREAT UR: NORMAL MG/G{CREAT}
MITOGEN IGNF BCKGRD COR BLD-ACNC: 0 IU/ML
MITOGEN IGNF BCKGRD COR BLD-ACNC: 0 IU/ML
MONOCYTES # BLD AUTO: 0.4 10E3/UL (ref 0–1.3)
MONOCYTES # BLD AUTO: 0.6 10E3/UL (ref 0–1.3)
MONOCYTES NFR BLD AUTO: 10 %
MONOCYTES NFR BLD AUTO: 4 %
NEUTROPHILS # BLD AUTO: 3.6 10E3/UL (ref 1.6–8.3)
NEUTROPHILS # BLD AUTO: 7.5 10E3/UL (ref 1.6–8.3)
NEUTROPHILS NFR BLD AUTO: 65 %
NEUTROPHILS NFR BLD AUTO: 88 %
NRBC # BLD AUTO: 0 10E3/UL
NRBC # BLD AUTO: 0 10E3/UL
NRBC BLD AUTO-RTO: 0 /100
NRBC BLD AUTO-RTO: 0 /100
P AXIS - MUSE: 10 DEGREES
P AXIS - MUSE: 14 DEGREES
PATH REPORT.COMMENTS IMP SPEC: NORMAL
PATH REPORT.FINAL DX SPEC: NORMAL
PATH REPORT.GROSS SPEC: NORMAL
PATH REPORT.GROSS SPEC: NORMAL
PATH REPORT.MICROSCOPIC SPEC OTHER STN: NORMAL
PATH REPORT.RELEVANT HX SPEC: NORMAL
PHOTO IMAGE: NORMAL
PHOTO IMAGE: NORMAL
PLATELET # BLD AUTO: 181 10E3/UL (ref 150–450)
PLATELET # BLD AUTO: 209 10E3/UL (ref 150–450)
PLATELET # BLD AUTO: 226 10E3/UL (ref 150–450)
PLATELET # BLD AUTO: 234 10E3/UL (ref 150–450)
PLATELET # BLD AUTO: 241 10E3/UL (ref 150–450)
PLATELET # BLD AUTO: 254 10E3/UL (ref 150–450)
PLATELET # BLD AUTO: 269 10E3/UL (ref 150–450)
PLATELET # BLD AUTO: 275 10E3/UL (ref 150–450)
POTASSIUM SERPL-SCNC: 3.3 MMOL/L (ref 3.4–5.3)
POTASSIUM SERPL-SCNC: 3.7 MMOL/L (ref 3.4–5.3)
POTASSIUM SERPL-SCNC: 3.7 MMOL/L (ref 3.4–5.3)
POTASSIUM SERPL-SCNC: 4 MMOL/L (ref 3.4–5.3)
POTASSIUM SERPL-SCNC: 4.1 MMOL/L (ref 3.4–5.3)
POTASSIUM SERPL-SCNC: 4.2 MMOL/L (ref 3.4–5.3)
POTASSIUM SERPL-SCNC: 4.3 MMOL/L (ref 3.4–5.3)
POTASSIUM SERPL-SCNC: 4.4 MMOL/L (ref 3.4–5.3)
POTASSIUM SERPL-SCNC: 4.5 MMOL/L (ref 3.4–5.3)
POTASSIUM SERPL-SCNC: 4.6 MMOL/L (ref 3.4–5.3)
POTASSIUM SERPL-SCNC: 5.1 MMOL/L (ref 3.4–5.3)
PR INTERVAL - MUSE: 86 MS
PR INTERVAL - MUSE: 88 MS
PROT SERPL-MCNC: 5.9 G/DL (ref 6.4–8.3)
PROT SERPL-MCNC: 6.4 G/DL (ref 6.4–8.3)
PROT/CREAT 24H UR: 0.1 MG/MG CR (ref 0–0.2)
PROT/CREAT 24H UR: 0.15 MG/MG CR (ref 0–0.2)
QRS DURATION - MUSE: 66 MS
QRS DURATION - MUSE: 72 MS
QT - MUSE: 350 MS
QT - MUSE: 410 MS
QTC - MUSE: 361 MS
QTC - MUSE: 402 MS
QUANTIFERON MITOGEN: 0.9 IU/ML
QUANTIFERON NIL TUBE: 0 IU/ML
QUANTIFERON TB1 TUBE: 0 IU/ML
QUANTIFERON TB2 TUBE: 0
R AXIS - MUSE: 40 DEGREES
R AXIS - MUSE: 59 DEGREES
RADIOLOGIST FLAGS: ABNORMAL
RBC # BLD AUTO: 3.86 10E6/UL (ref 3.8–5.2)
RBC # BLD AUTO: 3.88 10E6/UL (ref 3.8–5.2)
RBC # BLD AUTO: 3.94 10E6/UL (ref 3.8–5.2)
RBC # BLD AUTO: 4.07 10E6/UL (ref 3.8–5.2)
RBC # BLD AUTO: 4.15 10E6/UL (ref 3.8–5.2)
RBC # BLD AUTO: 4.2 10E6/UL (ref 3.8–5.2)
RBC # BLD AUTO: 4.2 10E6/UL (ref 3.8–5.2)
RBC # BLD AUTO: 4.32 10E6/UL (ref 3.8–5.2)
RETINOPATHY: POSITIVE
SODIUM SERPL-SCNC: 134 MMOL/L (ref 135–145)
SODIUM SERPL-SCNC: 138 MMOL/L (ref 135–145)
SODIUM SERPL-SCNC: 138 MMOL/L (ref 136–145)
SODIUM SERPL-SCNC: 138 MMOL/L (ref 136–145)
SODIUM SERPL-SCNC: 140 MMOL/L (ref 135–145)
SODIUM SERPL-SCNC: 140 MMOL/L (ref 135–145)
SODIUM SERPL-SCNC: 142 MMOL/L (ref 136–145)
SUBCLASSES, PERCENT: 104 %
SYSTOLIC BLOOD PRESSURE - MUSE: NORMAL MMHG
SYSTOLIC BLOOD PRESSURE - MUSE: NORMAL MMHG
T AXIS - MUSE: 48 DEGREES
T AXIS - MUSE: 64 DEGREES
T PALLIDUM AB SER QL: NONREACTIVE
T3 SERPL-MCNC: 65 NG/DL (ref 85–202)
T3FREE SERPL-MCNC: 2.1 PG/ML (ref 2–4.4)
T4 FREE SERPL-MCNC: 1.68 NG/DL (ref 0.9–1.7)
TME LAST DOSE: NORMAL H
TSH SERPL DL<=0.005 MIU/L-ACNC: 0.22 UIU/ML (ref 0.3–4.2)
TSH SERPL DL<=0.005 MIU/L-ACNC: 0.37 UIU/ML (ref 0.3–4.2)
TSI SER-ACNC: <1 TSI INDEX
VANCOMYCIN SERPL-MCNC: 16.1 UG/ML
VANCOMYCIN SERPL-MCNC: 44 UG/ML
VENTRICULAR RATE- MUSE: 58 BPM
VENTRICULAR RATE- MUSE: 64 BPM
VIT B12 SERPL-MCNC: 373 PG/ML (ref 232–1245)
WBC # BLD AUTO: 4.2 10E3/UL (ref 4–11)
WBC # BLD AUTO: 4.9 10E3/UL (ref 4–11)
WBC # BLD AUTO: 5.5 10E3/UL (ref 4–11)
WBC # BLD AUTO: 5.5 10E3/UL (ref 4–11)
WBC # BLD AUTO: 6.1 10E3/UL (ref 4–11)
WBC # BLD AUTO: 6.3 10E3/UL (ref 4–11)
WBC # BLD AUTO: 6.3 10E3/UL (ref 4–11)
WBC # BLD AUTO: 8.6 10E3/UL (ref 4–11)

## 2023-01-01 PROCEDURE — 80202 ASSAY OF VANCOMYCIN: CPT | Performed by: STUDENT IN AN ORGANIZED HEALTH CARE EDUCATION/TRAINING PROGRAM

## 2023-01-01 PROCEDURE — 85014 HEMATOCRIT: CPT | Performed by: INTERNAL MEDICINE

## 2023-01-01 PROCEDURE — 250N000012 HC RX MED GY IP 250 OP 636 PS 637: Performed by: STUDENT IN AN ORGANIZED HEALTH CARE EDUCATION/TRAINING PROGRAM

## 2023-01-01 PROCEDURE — 31231 NASAL ENDOSCOPY DX: CPT | Performed by: STUDENT IN AN ORGANIZED HEALTH CARE EDUCATION/TRAINING PROGRAM

## 2023-01-01 PROCEDURE — 80158 DRUG ASSAY CYCLOSPORINE: CPT

## 2023-01-01 PROCEDURE — 84132 ASSAY OF SERUM POTASSIUM: CPT | Performed by: STUDENT IN AN ORGANIZED HEALTH CARE EDUCATION/TRAINING PROGRAM

## 2023-01-01 PROCEDURE — 250N000011 HC RX IP 250 OP 636: Mod: JZ | Performed by: STUDENT IN AN ORGANIZED HEALTH CARE EDUCATION/TRAINING PROGRAM

## 2023-01-01 PROCEDURE — 82306 VITAMIN D 25 HYDROXY: CPT

## 2023-01-01 PROCEDURE — 250N000011 HC RX IP 250 OP 636: Performed by: STUDENT IN AN ORGANIZED HEALTH CARE EDUCATION/TRAINING PROGRAM

## 2023-01-01 PROCEDURE — 36415 COLL VENOUS BLD VENIPUNCTURE: CPT | Performed by: INTERNAL MEDICINE

## 2023-01-01 PROCEDURE — 999N000141 HC STATISTIC PRE-PROCEDURE NURSING ASSESSMENT: Performed by: STUDENT IN AN ORGANIZED HEALTH CARE EDUCATION/TRAINING PROGRAM

## 2023-01-01 PROCEDURE — 88305 TISSUE EXAM BY PATHOLOGIST: CPT | Mod: 26 | Performed by: STUDENT IN AN ORGANIZED HEALTH CARE EDUCATION/TRAINING PROGRAM

## 2023-01-01 PROCEDURE — 83735 ASSAY OF MAGNESIUM: CPT | Performed by: STUDENT IN AN ORGANIZED HEALTH CARE EDUCATION/TRAINING PROGRAM

## 2023-01-01 PROCEDURE — 250N000013 HC RX MED GY IP 250 OP 250 PS 637: Performed by: STUDENT IN AN ORGANIZED HEALTH CARE EDUCATION/TRAINING PROGRAM

## 2023-01-01 PROCEDURE — 36415 COLL VENOUS BLD VENIPUNCTURE: CPT | Performed by: STUDENT IN AN ORGANIZED HEALTH CARE EDUCATION/TRAINING PROGRAM

## 2023-01-01 PROCEDURE — 82962 GLUCOSE BLOOD TEST: CPT

## 2023-01-01 PROCEDURE — 250N000009 HC RX 250: Performed by: NURSE ANESTHETIST, CERTIFIED REGISTERED

## 2023-01-01 PROCEDURE — 83036 HEMOGLOBIN GLYCOSYLATED A1C: CPT

## 2023-01-01 PROCEDURE — 80048 BASIC METABOLIC PNL TOTAL CA: CPT | Performed by: EMERGENCY MEDICINE

## 2023-01-01 PROCEDURE — 258N000003 HC RX IP 258 OP 636: Performed by: STUDENT IN AN ORGANIZED HEALTH CARE EDUCATION/TRAINING PROGRAM

## 2023-01-01 PROCEDURE — 250N000011 HC RX IP 250 OP 636: Performed by: EMERGENCY MEDICINE

## 2023-01-01 PROCEDURE — 99214 OFFICE O/P EST MOD 30 MIN: CPT | Mod: 24 | Performed by: INTERNAL MEDICINE

## 2023-01-01 PROCEDURE — 82565 ASSAY OF CREATININE: CPT | Performed by: EMERGENCY MEDICINE

## 2023-01-01 PROCEDURE — 360N000076 HC SURGERY LEVEL 3, PER MIN: Performed by: OPHTHALMOLOGY

## 2023-01-01 PROCEDURE — 258N000003 HC RX IP 258 OP 636

## 2023-01-01 PROCEDURE — 82784 ASSAY IGA/IGD/IGG/IGM EACH: CPT

## 2023-01-01 PROCEDURE — 0N9P0ZZ DRAINAGE OF RIGHT ORBIT, OPEN APPROACH: ICD-10-PCS | Performed by: OPHTHALMOLOGY

## 2023-01-01 PROCEDURE — 87101 SKIN FUNGI CULTURE: CPT | Performed by: OPHTHALMOLOGY

## 2023-01-01 PROCEDURE — 99203 OFFICE O/P NEW LOW 30 MIN: CPT | Mod: 25 | Performed by: STUDENT IN AN ORGANIZED HEALTH CARE EDUCATION/TRAINING PROGRAM

## 2023-01-01 PROCEDURE — 710N000010 HC RECOVERY PHASE 1, LEVEL 2, PER MIN: Performed by: STUDENT IN AN ORGANIZED HEALTH CARE EDUCATION/TRAINING PROGRAM

## 2023-01-01 PROCEDURE — G0439 PPPS, SUBSEQ VISIT: HCPCS | Performed by: INTERNAL MEDICINE

## 2023-01-01 PROCEDURE — 95251 CONT GLUC MNTR ANALYSIS I&R: CPT | Performed by: INTERNAL MEDICINE

## 2023-01-01 PROCEDURE — 61782 SCAN PROC CRANIAL EXTRA: CPT | Mod: GC | Performed by: STUDENT IN AN ORGANIZED HEALTH CARE EDUCATION/TRAINING PROGRAM

## 2023-01-01 PROCEDURE — 250N000025 HC SEVOFLURANE, PER MIN: Performed by: STUDENT IN AN ORGANIZED HEALTH CARE EDUCATION/TRAINING PROGRAM

## 2023-01-01 PROCEDURE — 85027 COMPLETE CBC AUTOMATED: CPT

## 2023-01-01 PROCEDURE — 710N000012 HC RECOVERY PHASE 2, PER MINUTE: Performed by: STUDENT IN AN ORGANIZED HEALTH CARE EDUCATION/TRAINING PROGRAM

## 2023-01-01 PROCEDURE — 70553 MRI BRAIN STEM W/O & W/DYE: CPT

## 2023-01-01 PROCEDURE — 80158 DRUG ASSAY CYCLOSPORINE: CPT | Performed by: STUDENT IN AN ORGANIZED HEALTH CARE EDUCATION/TRAINING PROGRAM

## 2023-01-01 PROCEDURE — 120N000002 HC R&B MED SURG/OB UMMC

## 2023-01-01 PROCEDURE — 36415 COLL VENOUS BLD VENIPUNCTURE: CPT | Performed by: ANESTHESIOLOGY

## 2023-01-01 PROCEDURE — 258N000003 HC RX IP 258 OP 636: Performed by: NURSE ANESTHETIST, CERTIFIED REGISTERED

## 2023-01-01 PROCEDURE — 87075 CULTR BACTERIA EXCEPT BLOOD: CPT | Performed by: OPHTHALMOLOGY

## 2023-01-01 PROCEDURE — 370N000017 HC ANESTHESIA TECHNICAL FEE, PER MIN: Performed by: OPHTHALMOLOGY

## 2023-01-01 PROCEDURE — A9585 GADOBUTROL INJECTION: HCPCS | Performed by: EMERGENCY MEDICINE

## 2023-01-01 PROCEDURE — 85027 COMPLETE CBC AUTOMATED: CPT | Performed by: STUDENT IN AN ORGANIZED HEALTH CARE EDUCATION/TRAINING PROGRAM

## 2023-01-01 PROCEDURE — 999N000054 HC STATISTIC EKG NON-CHARGEABLE

## 2023-01-01 PROCEDURE — 97535 SELF CARE MNGMENT TRAINING: CPT | Mod: GO

## 2023-01-01 PROCEDURE — 88311 DECALCIFY TISSUE: CPT | Mod: TC | Performed by: STUDENT IN AN ORGANIZED HEALTH CARE EDUCATION/TRAINING PROGRAM

## 2023-01-01 PROCEDURE — 99239 HOSP IP/OBS DSCHRG MGMT >30: CPT | Performed by: STUDENT IN AN ORGANIZED HEALTH CARE EDUCATION/TRAINING PROGRAM

## 2023-01-01 PROCEDURE — 250N000011 HC RX IP 250 OP 636: Mod: JZ | Performed by: EMERGENCY MEDICINE

## 2023-01-01 PROCEDURE — 80048 BASIC METABOLIC PNL TOTAL CA: CPT

## 2023-01-01 PROCEDURE — 99285 EMERGENCY DEPT VISIT HI MDM: CPT | Performed by: EMERGENCY MEDICINE

## 2023-01-01 PROCEDURE — 99232 SBSQ HOSP IP/OBS MODERATE 35: CPT | Performed by: PHYSICIAN ASSISTANT

## 2023-01-01 PROCEDURE — 99285 EMERGENCY DEPT VISIT HI MDM: CPT | Mod: 25 | Performed by: EMERGENCY MEDICINE

## 2023-01-01 PROCEDURE — 87077 CULTURE AEROBIC IDENTIFY: CPT | Performed by: OPHTHALMOLOGY

## 2023-01-01 PROCEDURE — 36415 COLL VENOUS BLD VENIPUNCTURE: CPT

## 2023-01-01 PROCEDURE — 85549 MURAMIDASE: CPT

## 2023-01-01 PROCEDURE — 80048 BASIC METABOLIC PNL TOTAL CA: CPT | Performed by: STUDENT IN AN ORGANIZED HEALTH CARE EDUCATION/TRAINING PROGRAM

## 2023-01-01 PROCEDURE — 88342 IMHCHEM/IMCYTCHM 1ST ANTB: CPT | Mod: 26 | Performed by: STUDENT IN AN ORGANIZED HEALTH CARE EDUCATION/TRAINING PROGRAM

## 2023-01-01 PROCEDURE — 84439 ASSAY OF FREE THYROXINE: CPT

## 2023-01-01 PROCEDURE — 99232 SBSQ HOSP IP/OBS MODERATE 35: CPT | Performed by: STUDENT IN AN ORGANIZED HEALTH CARE EDUCATION/TRAINING PROGRAM

## 2023-01-01 PROCEDURE — 250N000009 HC RX 250: Performed by: STUDENT IN AN ORGANIZED HEALTH CARE EDUCATION/TRAINING PROGRAM

## 2023-01-01 PROCEDURE — 250N000009 HC RX 250: Performed by: OPHTHALMOLOGY

## 2023-01-01 PROCEDURE — 99215 OFFICE O/P EST HI 40 MIN: CPT | Mod: 24 | Performed by: INTERNAL MEDICINE

## 2023-01-01 PROCEDURE — 96367 TX/PROPH/DG ADDL SEQ IV INF: CPT | Performed by: EMERGENCY MEDICINE

## 2023-01-01 PROCEDURE — 999N000141 HC STATISTIC PRE-PROCEDURE NURSING ASSESSMENT: Performed by: OPHTHALMOLOGY

## 2023-01-01 PROCEDURE — 84156 ASSAY OF PROTEIN URINE: CPT | Performed by: INTERNAL MEDICINE

## 2023-01-01 PROCEDURE — 84480 ASSAY TRIIODOTHYRONINE (T3): CPT | Performed by: STUDENT IN AN ORGANIZED HEALTH CARE EDUCATION/TRAINING PROGRAM

## 2023-01-01 PROCEDURE — 88312 SPECIAL STAINS GROUP 1: CPT | Mod: TC | Performed by: OPHTHALMOLOGY

## 2023-01-01 PROCEDURE — 82248 BILIRUBIN DIRECT: CPT | Performed by: STUDENT IN AN ORGANIZED HEALTH CARE EDUCATION/TRAINING PROGRAM

## 2023-01-01 PROCEDURE — 99233 SBSQ HOSP IP/OBS HIGH 50: CPT | Performed by: STUDENT IN AN ORGANIZED HEALTH CARE EDUCATION/TRAINING PROGRAM

## 2023-01-01 PROCEDURE — 258N000003 HC RX IP 258 OP 636: Performed by: OPHTHALMOLOGY

## 2023-01-01 PROCEDURE — 250N000011 HC RX IP 250 OP 636: Performed by: NURSE ANESTHETIST, CERTIFIED REGISTERED

## 2023-01-01 PROCEDURE — 88185 FLOWCYTOMETRY/TC ADD-ON: CPT | Performed by: STUDENT IN AN ORGANIZED HEALTH CARE EDUCATION/TRAINING PROGRAM

## 2023-01-01 PROCEDURE — 97535 SELF CARE MNGMENT TRAINING: CPT | Mod: GO | Performed by: OCCUPATIONAL THERAPIST

## 2023-01-01 PROCEDURE — 88312 SPECIAL STAINS GROUP 1: CPT | Mod: 26 | Performed by: STUDENT IN AN ORGANIZED HEALTH CARE EDUCATION/TRAINING PROGRAM

## 2023-01-01 PROCEDURE — 272N000001 HC OR GENERAL SUPPLY STERILE: Performed by: OPHTHALMOLOGY

## 2023-01-01 PROCEDURE — 99214 OFFICE O/P EST MOD 30 MIN: CPT | Mod: 25 | Performed by: INTERNAL MEDICINE

## 2023-01-01 PROCEDURE — 82310 ASSAY OF CALCIUM: CPT

## 2023-01-01 PROCEDURE — 36415 COLL VENOUS BLD VENIPUNCTURE: CPT | Performed by: EMERGENCY MEDICINE

## 2023-01-01 PROCEDURE — 93010 ELECTROCARDIOGRAM REPORT: CPT | Performed by: INTERNAL MEDICINE

## 2023-01-01 PROCEDURE — 84443 ASSAY THYROID STIM HORMONE: CPT | Performed by: INTERNAL MEDICINE

## 2023-01-01 PROCEDURE — G1010 CDSM STANSON: HCPCS

## 2023-01-01 PROCEDURE — 31255 NSL/SINS NDSC W/TOT ETHMDCT: CPT | Mod: RT | Performed by: STUDENT IN AN ORGANIZED HEALTH CARE EDUCATION/TRAINING PROGRAM

## 2023-01-01 PROCEDURE — 99207 PR NO BILLABLE SERVICE THIS VISIT: CPT | Performed by: STUDENT IN AN ORGANIZED HEALTH CARE EDUCATION/TRAINING PROGRAM

## 2023-01-01 PROCEDURE — 84460 ALANINE AMINO (ALT) (SGPT): CPT

## 2023-01-01 PROCEDURE — 250N000011 HC RX IP 250 OP 636: Performed by: OPHTHALMOLOGY

## 2023-01-01 PROCEDURE — 272N000001 HC OR GENERAL SUPPLY STERILE: Performed by: STUDENT IN AN ORGANIZED HEALTH CARE EDUCATION/TRAINING PROGRAM

## 2023-01-01 PROCEDURE — 99205 OFFICE O/P NEW HI 60 MIN: CPT | Mod: VID | Performed by: PHYSICIAN ASSISTANT

## 2023-01-01 PROCEDURE — 250N000024 HC ISOFLURANE, PER MIN: Performed by: OPHTHALMOLOGY

## 2023-01-01 PROCEDURE — 88189 FLOWCYTOMETRY/READ 16 & >: CPT | Mod: GC | Performed by: STUDENT IN AN ORGANIZED HEALTH CARE EDUCATION/TRAINING PROGRAM

## 2023-01-01 PROCEDURE — 86780 TREPONEMA PALLIDUM: CPT

## 2023-01-01 PROCEDURE — 83036 HEMOGLOBIN GLYCOSYLATED A1C: CPT | Performed by: ANESTHESIOLOGY

## 2023-01-01 PROCEDURE — 99223 1ST HOSP IP/OBS HIGH 75: CPT | Mod: AI | Performed by: STUDENT IN AN ORGANIZED HEALTH CARE EDUCATION/TRAINING PROGRAM

## 2023-01-01 PROCEDURE — 99214 OFFICE O/P EST MOD 30 MIN: CPT | Performed by: INTERNAL MEDICINE

## 2023-01-01 PROCEDURE — 84445 ASSAY OF TSI GLOBULIN: CPT

## 2023-01-01 PROCEDURE — 86481 TB AG RESPONSE T-CELL SUSP: CPT

## 2023-01-01 PROCEDURE — 80053 COMPREHEN METABOLIC PANEL: CPT | Performed by: STUDENT IN AN ORGANIZED HEALTH CARE EDUCATION/TRAINING PROGRAM

## 2023-01-01 PROCEDURE — 82310 ASSAY OF CALCIUM: CPT | Performed by: INTERNAL MEDICINE

## 2023-01-01 PROCEDURE — 360N000079 HC SURGERY LEVEL 6, PER MIN: Performed by: STUDENT IN AN ORGANIZED HEALTH CARE EDUCATION/TRAINING PROGRAM

## 2023-01-01 PROCEDURE — G1010 CDSM STANSON: HCPCS | Mod: GC | Performed by: RADIOLOGY

## 2023-01-01 PROCEDURE — 70486 CT MAXILLOFACIAL W/O DYE: CPT | Mod: 26 | Performed by: RADIOLOGY

## 2023-01-01 PROCEDURE — G0180 MD CERTIFICATION HHA PATIENT: HCPCS | Performed by: INTERNAL MEDICINE

## 2023-01-01 PROCEDURE — 67400 EXPLORE/BIOPSY EYE SOCKET: CPT | Mod: RT | Performed by: OPHTHALMOLOGY

## 2023-01-01 PROCEDURE — 85025 COMPLETE CBC W/AUTO DIFF WBC: CPT | Performed by: STUDENT IN AN ORGANIZED HEALTH CARE EDUCATION/TRAINING PROGRAM

## 2023-01-01 PROCEDURE — 82607 VITAMIN B-12: CPT | Performed by: INTERNAL MEDICINE

## 2023-01-01 PROCEDURE — 85025 COMPLETE CBC W/AUTO DIFF WBC: CPT

## 2023-01-01 PROCEDURE — 87205 SMEAR GRAM STAIN: CPT | Performed by: OPHTHALMOLOGY

## 2023-01-01 PROCEDURE — 97165 OT EVAL LOW COMPLEX 30 MIN: CPT | Mod: GO

## 2023-01-01 PROCEDURE — 88311 DECALCIFY TISSUE: CPT | Mod: 26 | Performed by: STUDENT IN AN ORGANIZED HEALTH CARE EDUCATION/TRAINING PROGRAM

## 2023-01-01 PROCEDURE — 99215 OFFICE O/P EST HI 40 MIN: CPT | Performed by: INTERNAL MEDICINE

## 2023-01-01 PROCEDURE — 255N000002 HC RX 255 OP 636: Performed by: EMERGENCY MEDICINE

## 2023-01-01 PROCEDURE — 87040 BLOOD CULTURE FOR BACTERIA: CPT | Performed by: STUDENT IN AN ORGANIZED HEALTH CARE EDUCATION/TRAINING PROGRAM

## 2023-01-01 PROCEDURE — 97167 OT EVAL HIGH COMPLEX 60 MIN: CPT | Mod: GO | Performed by: OCCUPATIONAL THERAPIST

## 2023-01-01 PROCEDURE — 258N000003 HC RX IP 258 OP 636: Performed by: EMERGENCY MEDICINE

## 2023-01-01 PROCEDURE — 82565 ASSAY OF CREATININE: CPT | Performed by: STUDENT IN AN ORGANIZED HEALTH CARE EDUCATION/TRAINING PROGRAM

## 2023-01-01 PROCEDURE — 0NBP0ZX EXCISION OF RIGHT ORBIT, OPEN APPROACH, DIAGNOSTIC: ICD-10-PCS | Performed by: OPHTHALMOLOGY

## 2023-01-01 PROCEDURE — 86037 ANCA TITER EACH ANTIBODY: CPT

## 2023-01-01 PROCEDURE — 84443 ASSAY THYROID STIM HORMONE: CPT

## 2023-01-01 PROCEDURE — 82570 ASSAY OF URINE CREATININE: CPT

## 2023-01-01 PROCEDURE — 82164 ANGIOTENSIN I ENZYME TEST: CPT

## 2023-01-01 PROCEDURE — 250N000009 HC RX 250: Performed by: ANESTHESIOLOGY

## 2023-01-01 PROCEDURE — 710N000010 HC RECOVERY PHASE 1, LEVEL 2, PER MIN: Performed by: OPHTHALMOLOGY

## 2023-01-01 PROCEDURE — 84481 FREE ASSAY (FT-3): CPT | Performed by: STUDENT IN AN ORGANIZED HEALTH CARE EDUCATION/TRAINING PROGRAM

## 2023-01-01 PROCEDURE — 84156 ASSAY OF PROTEIN URINE: CPT

## 2023-01-01 PROCEDURE — 99024 POSTOP FOLLOW-UP VISIT: CPT | Mod: GC | Performed by: OPHTHALMOLOGY

## 2023-01-01 PROCEDURE — 370N000017 HC ANESTHESIA TECHNICAL FEE, PER MIN: Performed by: STUDENT IN AN ORGANIZED HEALTH CARE EDUCATION/TRAINING PROGRAM

## 2023-01-01 PROCEDURE — 96365 THER/PROPH/DIAG IV INF INIT: CPT | Mod: 59 | Performed by: EMERGENCY MEDICINE

## 2023-01-01 PROCEDURE — G0463 HOSPITAL OUTPT CLINIC VISIT: HCPCS | Performed by: INTERNAL MEDICINE

## 2023-01-01 PROCEDURE — 99214 OFFICE O/P EST MOD 30 MIN: CPT

## 2023-01-01 PROCEDURE — 93005 ELECTROCARDIOGRAM TRACING: CPT

## 2023-01-01 RX ORDER — CYCLOSPORINE 25 MG/1
50 CAPSULE ORAL 2 TIMES DAILY
Qty: 120 CAPSULE | Refills: 11 | Status: SHIPPED | OUTPATIENT
Start: 2023-01-01 | End: 2023-01-01

## 2023-01-01 RX ORDER — ACYCLOVIR 400 MG/1
TABLET ORAL
Qty: 1 EACH | Refills: 0 | Status: SHIPPED | OUTPATIENT
Start: 2023-01-01

## 2023-01-01 RX ORDER — CEFAZOLIN SODIUM 2 G/50ML
2 SOLUTION INTRAVENOUS SEE ADMIN INSTRUCTIONS
Status: CANCELLED | OUTPATIENT
Start: 2023-01-01

## 2023-01-01 RX ORDER — ISOSORBIDE MONONITRATE 30 MG/1
15 TABLET, EXTENDED RELEASE ORAL DAILY
Qty: 45 TABLET | Refills: 0 | Status: SHIPPED | OUTPATIENT
Start: 2023-01-01 | End: 2023-01-01

## 2023-01-01 RX ORDER — NICOTINE POLACRILEX 4 MG
15-30 LOZENGE BUCCAL
Status: DISCONTINUED | OUTPATIENT
Start: 2023-01-01 | End: 2023-01-01

## 2023-01-01 RX ORDER — LABETALOL HYDROCHLORIDE 5 MG/ML
10 INJECTION, SOLUTION INTRAVENOUS
Status: DISCONTINUED | OUTPATIENT
Start: 2023-01-01 | End: 2023-01-01 | Stop reason: HOSPADM

## 2023-01-01 RX ORDER — ZINC GLUCONATE 50 MG
50 TABLET ORAL DAILY
Status: DISCONTINUED | OUTPATIENT
Start: 2023-01-01 | End: 2023-01-01

## 2023-01-01 RX ORDER — CYCLOSPORINE 25 MG/1
50 CAPSULE, LIQUID FILLED ORAL 2 TIMES DAILY
Status: DISCONTINUED | OUTPATIENT
Start: 2023-01-01 | End: 2023-01-01 | Stop reason: HOSPADM

## 2023-01-01 RX ORDER — ACYCLOVIR 400 MG/1
1 TABLET ORAL CONTINUOUS PRN
Qty: 3 EACH | Refills: 5 | Status: SHIPPED | OUTPATIENT
Start: 2023-01-01 | End: 2023-01-01

## 2023-01-01 RX ORDER — PANTOPRAZOLE SODIUM 40 MG/1
40 TABLET, DELAYED RELEASE ORAL DAILY
Status: DISCONTINUED | OUTPATIENT
Start: 2023-01-01 | End: 2023-01-01 | Stop reason: HOSPADM

## 2023-01-01 RX ORDER — LOSARTAN POTASSIUM 100 MG/1
TABLET ORAL
Qty: 45 TABLET | Refills: 1 | Status: SHIPPED | OUTPATIENT
Start: 2023-01-01 | End: 2024-01-01

## 2023-01-01 RX ORDER — INSULIN GLARGINE 100 [IU]/ML
15-17 INJECTION, SOLUTION SUBCUTANEOUS DAILY
Qty: 15 ML | Refills: 5 | Status: SHIPPED | OUTPATIENT
Start: 2023-01-01

## 2023-01-01 RX ORDER — PIPERACILLIN SODIUM, TAZOBACTAM SODIUM 3; .375 G/15ML; G/15ML
3.38 INJECTION, POWDER, LYOPHILIZED, FOR SOLUTION INTRAVENOUS ONCE
Status: COMPLETED | OUTPATIENT
Start: 2023-01-01 | End: 2023-01-01

## 2023-01-01 RX ORDER — ACYCLOVIR 400 MG/1
TABLET ORAL
Qty: 1 EACH | Refills: 0 | OUTPATIENT
Start: 2023-01-01

## 2023-01-01 RX ORDER — ACYCLOVIR 400 MG/1
TABLET ORAL
Qty: 3 EACH | Refills: 5 | OUTPATIENT
Start: 2023-01-01

## 2023-01-01 RX ORDER — LIDOCAINE 40 MG/G
CREAM TOPICAL
Status: DISCONTINUED | OUTPATIENT
Start: 2023-01-01 | End: 2023-01-01 | Stop reason: HOSPADM

## 2023-01-01 RX ORDER — LOSARTAN POTASSIUM 50 MG/1
50 TABLET ORAL DAILY
Status: DISCONTINUED | OUTPATIENT
Start: 2023-01-01 | End: 2023-01-01 | Stop reason: HOSPADM

## 2023-01-01 RX ORDER — ESMOLOL HYDROCHLORIDE 10 MG/ML
INJECTION INTRAVENOUS PRN
Status: DISCONTINUED | OUTPATIENT
Start: 2023-01-01 | End: 2023-01-01

## 2023-01-01 RX ORDER — FENTANYL CITRATE 50 UG/ML
50 INJECTION, SOLUTION INTRAMUSCULAR; INTRAVENOUS EVERY 5 MIN PRN
Status: DISCONTINUED | OUTPATIENT
Start: 2023-01-01 | End: 2023-01-01 | Stop reason: HOSPADM

## 2023-01-01 RX ORDER — DEXTROSE MONOHYDRATE 25 G/50ML
25-50 INJECTION, SOLUTION INTRAVENOUS
Status: DISCONTINUED | OUTPATIENT
Start: 2023-01-01 | End: 2023-01-01 | Stop reason: HOSPADM

## 2023-01-01 RX ORDER — DEXTROSE MONOHYDRATE 100 MG/ML
INJECTION, SOLUTION INTRAVENOUS CONTINUOUS PRN
Status: DISCONTINUED | OUTPATIENT
Start: 2023-01-01 | End: 2023-01-01 | Stop reason: HOSPADM

## 2023-01-01 RX ORDER — VITAMIN B COMPLEX
50 TABLET ORAL DAILY
Status: DISCONTINUED | OUTPATIENT
Start: 2023-01-01 | End: 2023-01-01 | Stop reason: HOSPADM

## 2023-01-01 RX ORDER — HYDROMORPHONE HYDROCHLORIDE 1 MG/ML
0.2 INJECTION, SOLUTION INTRAMUSCULAR; INTRAVENOUS; SUBCUTANEOUS EVERY 5 MIN PRN
Status: DISCONTINUED | OUTPATIENT
Start: 2023-01-01 | End: 2023-01-01 | Stop reason: HOSPADM

## 2023-01-01 RX ORDER — ECHINACEA PURPUREA EXTRACT 125 MG
TABLET ORAL
Qty: 50 ML | Refills: 1 | Status: SHIPPED | OUTPATIENT
Start: 2023-01-01 | End: 2024-01-01

## 2023-01-01 RX ORDER — SODIUM CHLORIDE, SODIUM LACTATE, POTASSIUM CHLORIDE, CALCIUM CHLORIDE 600; 310; 30; 20 MG/100ML; MG/100ML; MG/100ML; MG/100ML
INJECTION, SOLUTION INTRAVENOUS CONTINUOUS PRN
Status: DISCONTINUED | OUTPATIENT
Start: 2023-01-01 | End: 2023-01-01

## 2023-01-01 RX ORDER — FENTANYL CITRATE 50 UG/ML
INJECTION, SOLUTION INTRAMUSCULAR; INTRAVENOUS PRN
Status: DISCONTINUED | OUTPATIENT
Start: 2023-01-01 | End: 2023-01-01

## 2023-01-01 RX ORDER — INSULIN GLARGINE 100 [IU]/ML
15-17 INJECTION, SOLUTION SUBCUTANEOUS DAILY
Qty: 15 ML | Refills: 5 | OUTPATIENT
Start: 2023-01-01 | End: 2023-01-01

## 2023-01-01 RX ORDER — ATORVASTATIN CALCIUM 40 MG/1
20 TABLET, FILM COATED ORAL DAILY
Qty: 45 TABLET | Refills: 1 | Status: SHIPPED | OUTPATIENT
Start: 2023-01-01 | End: 2024-01-01

## 2023-01-01 RX ORDER — ACYCLOVIR 400 MG/1
1 TABLET ORAL CONTINUOUS PRN
Qty: 1 EACH | Refills: 3 | Status: SHIPPED | OUTPATIENT
Start: 2023-01-01 | End: 2023-01-01

## 2023-01-01 RX ORDER — LIDOCAINE HYDROCHLORIDE AND EPINEPHRINE 10; 10 MG/ML; UG/ML
INJECTION, SOLUTION INFILTRATION; PERINEURAL PRN
Status: DISCONTINUED | OUTPATIENT
Start: 2023-01-01 | End: 2023-01-01 | Stop reason: HOSPADM

## 2023-01-01 RX ORDER — ONDANSETRON 2 MG/ML
INJECTION INTRAMUSCULAR; INTRAVENOUS PRN
Status: DISCONTINUED | OUTPATIENT
Start: 2023-01-01 | End: 2023-01-01

## 2023-01-01 RX ORDER — DEXAMETHASONE SODIUM PHOSPHATE 4 MG/ML
10 INJECTION, SOLUTION INTRA-ARTICULAR; INTRALESIONAL; INTRAMUSCULAR; INTRAVENOUS; SOFT TISSUE ONCE
Status: CANCELLED | OUTPATIENT
Start: 2023-01-01 | End: 2023-01-01

## 2023-01-01 RX ORDER — CEFAZOLIN SODIUM/WATER 2 G/20 ML
2 SYRINGE (ML) INTRAVENOUS SEE ADMIN INSTRUCTIONS
Status: DISCONTINUED | OUTPATIENT
Start: 2023-01-01 | End: 2023-01-01 | Stop reason: HOSPADM

## 2023-01-01 RX ORDER — DEXTROSE MONOHYDRATE 25 G/50ML
25-50 INJECTION, SOLUTION INTRAVENOUS
Status: DISCONTINUED | OUTPATIENT
Start: 2023-01-01 | End: 2023-01-01

## 2023-01-01 RX ORDER — PROPOFOL 10 MG/ML
INJECTION, EMULSION INTRAVENOUS CONTINUOUS PRN
Status: DISCONTINUED | OUTPATIENT
Start: 2023-01-01 | End: 2023-01-01

## 2023-01-01 RX ORDER — PROCHLORPERAZINE 25 MG/1
SUPPOSITORY RECTAL
Qty: 1 EACH | Refills: 1 | Status: SHIPPED | OUTPATIENT
Start: 2023-01-01 | End: 2023-01-01

## 2023-01-01 RX ORDER — FENTANYL CITRATE 50 UG/ML
25 INJECTION, SOLUTION INTRAMUSCULAR; INTRAVENOUS EVERY 5 MIN PRN
Status: DISCONTINUED | OUTPATIENT
Start: 2023-01-01 | End: 2023-01-01 | Stop reason: HOSPADM

## 2023-01-01 RX ORDER — OXYCODONE HYDROCHLORIDE 5 MG/1
5 TABLET ORAL
Status: DISCONTINUED | OUTPATIENT
Start: 2023-01-01 | End: 2023-01-01 | Stop reason: HOSPADM

## 2023-01-01 RX ORDER — ERYTHROMYCIN 5 MG/G
OINTMENT OPHTHALMIC PRN
Status: DISCONTINUED | OUTPATIENT
Start: 2023-01-01 | End: 2023-01-01 | Stop reason: HOSPADM

## 2023-01-01 RX ORDER — POTASSIUM CHLORIDE 750 MG/1
40 TABLET, EXTENDED RELEASE ORAL ONCE
Status: DISCONTINUED | OUTPATIENT
Start: 2023-01-01 | End: 2023-01-01 | Stop reason: HOSPADM

## 2023-01-01 RX ORDER — ACETAMINOPHEN 325 MG/1
975 TABLET ORAL EVERY 8 HOURS PRN
COMMUNITY
Start: 2023-01-01

## 2023-01-01 RX ORDER — MYCOPHENOLIC ACID 180 MG/1
360 TABLET, DELAYED RELEASE ORAL 2 TIMES DAILY
Qty: 180 TABLET | Refills: 11 | Status: SHIPPED | OUTPATIENT
Start: 2023-01-01 | End: 2024-01-01

## 2023-01-01 RX ORDER — ACYCLOVIR 400 MG/1
TABLET ORAL
Qty: 3 EACH | Refills: 5 | Status: SHIPPED | OUTPATIENT
Start: 2023-01-01 | End: 2023-01-01

## 2023-01-01 RX ORDER — ZINC GLUCONATE 50 MG
50 TABLET ORAL DAILY
Status: ON HOLD | COMMUNITY
End: 2023-01-01

## 2023-01-01 RX ORDER — CARBIDOPA AND LEVODOPA 25; 100 MG/1; MG/1
1.5 TABLET ORAL 3 TIMES DAILY
COMMUNITY
Start: 2023-01-01

## 2023-01-01 RX ORDER — SODIUM CHLORIDE, SODIUM LACTATE, POTASSIUM CHLORIDE, CALCIUM CHLORIDE 600; 310; 30; 20 MG/100ML; MG/100ML; MG/100ML; MG/100ML
INJECTION, SOLUTION INTRAVENOUS CONTINUOUS
Status: DISPENSED | OUTPATIENT
Start: 2023-01-01 | End: 2023-01-01

## 2023-01-01 RX ORDER — ISOSORBIDE MONONITRATE 30 MG/1
TABLET, EXTENDED RELEASE ORAL
Qty: 45 TABLET | Refills: 1 | Status: SHIPPED | OUTPATIENT
Start: 2023-01-01 | End: 2024-01-01

## 2023-01-01 RX ORDER — LIDOCAINE HYDROCHLORIDE 20 MG/ML
INJECTION, SOLUTION INFILTRATION; PERINEURAL PRN
Status: DISCONTINUED | OUTPATIENT
Start: 2023-01-01 | End: 2023-01-01

## 2023-01-01 RX ORDER — AMLODIPINE BESYLATE 10 MG/1
10 TABLET ORAL DAILY
Qty: 90 TABLET | Refills: 1 | Status: SHIPPED | OUTPATIENT
Start: 2023-01-01 | End: 2023-01-01

## 2023-01-01 RX ORDER — DONEPEZIL HYDROCHLORIDE 5 MG/1
5 TABLET, FILM COATED ORAL AT BEDTIME
Status: DISCONTINUED | OUTPATIENT
Start: 2023-01-01 | End: 2023-01-01 | Stop reason: HOSPADM

## 2023-01-01 RX ORDER — VANCOMYCIN HYDROCHLORIDE 1 G/200ML
1000 INJECTION, SOLUTION INTRAVENOUS EVERY 24 HOURS
Status: DISCONTINUED | OUTPATIENT
Start: 2023-01-01 | End: 2023-01-01

## 2023-01-01 RX ORDER — ZINC SULFATE 50(220)MG
220 CAPSULE ORAL DAILY
Status: DISCONTINUED | OUTPATIENT
Start: 2023-01-01 | End: 2023-01-01 | Stop reason: HOSPADM

## 2023-01-01 RX ORDER — LEVOTHYROXINE SODIUM 100 UG/1
100 TABLET ORAL DAILY
Status: DISCONTINUED | OUTPATIENT
Start: 2023-01-01 | End: 2023-01-01 | Stop reason: HOSPADM

## 2023-01-01 RX ORDER — LANOLIN ALCOHOL/MO/W.PET/CERES
1000 CREAM (GRAM) TOPICAL
COMMUNITY

## 2023-01-01 RX ORDER — LOSARTAN POTASSIUM 100 MG/1
TABLET ORAL
Qty: 45 TABLET | Refills: 0 | Status: SHIPPED | OUTPATIENT
Start: 2023-01-01 | End: 2023-01-01

## 2023-01-01 RX ORDER — ACYCLOVIR 400 MG/1
TABLET ORAL
Qty: 2 EACH | Refills: 5 | OUTPATIENT
Start: 2023-01-01

## 2023-01-01 RX ORDER — AMLODIPINE BESYLATE 10 MG/1
10 TABLET ORAL DAILY
Status: DISCONTINUED | OUTPATIENT
Start: 2023-01-01 | End: 2023-01-01 | Stop reason: HOSPADM

## 2023-01-01 RX ORDER — ASPIRIN 81 MG/1
81 TABLET, CHEWABLE ORAL DAILY
Status: DISCONTINUED | OUTPATIENT
Start: 2023-01-01 | End: 2023-01-01 | Stop reason: HOSPADM

## 2023-01-01 RX ORDER — ENOXAPARIN SODIUM 100 MG/ML
40 INJECTION SUBCUTANEOUS EVERY 24 HOURS
Status: DISCONTINUED | OUTPATIENT
Start: 2023-01-01 | End: 2023-01-01 | Stop reason: HOSPADM

## 2023-01-01 RX ORDER — ACYCLOVIR 400 MG/1
TABLET ORAL
Qty: 3 EACH | Refills: 5 | Status: SHIPPED | OUTPATIENT
Start: 2023-01-01 | End: 2024-01-01

## 2023-01-01 RX ORDER — DEXTROSE MONOHYDRATE 100 MG/ML
INJECTION, SOLUTION INTRAVENOUS CONTINUOUS PRN
Status: DISCONTINUED | OUTPATIENT
Start: 2023-01-01 | End: 2023-01-01

## 2023-01-01 RX ORDER — METOPROLOL SUCCINATE 25 MG/1
25 TABLET, EXTENDED RELEASE ORAL AT BEDTIME
Status: DISCONTINUED | OUTPATIENT
Start: 2023-01-01 | End: 2023-01-01 | Stop reason: HOSPADM

## 2023-01-01 RX ORDER — ACETAMINOPHEN 325 MG/1
975 TABLET ORAL EVERY 8 HOURS PRN
Status: DISCONTINUED | OUTPATIENT
Start: 2023-01-01 | End: 2023-01-01 | Stop reason: HOSPADM

## 2023-01-01 RX ORDER — METOPROLOL SUCCINATE 25 MG/1
25 TABLET, EXTENDED RELEASE ORAL AT BEDTIME
Qty: 90 TABLET | Refills: 1 | Status: SHIPPED | OUTPATIENT
Start: 2023-01-01 | End: 2024-01-01

## 2023-01-01 RX ORDER — DEXAMETHASONE SODIUM PHOSPHATE 10 MG/ML
10 INJECTION, SOLUTION INTRAMUSCULAR; INTRAVENOUS ONCE
Status: DISCONTINUED | OUTPATIENT
Start: 2023-01-01 | End: 2023-01-01 | Stop reason: HOSPADM

## 2023-01-01 RX ORDER — LANOLIN ALCOHOL/MO/W.PET/CERES
1000 CREAM (GRAM) TOPICAL
Status: DISCONTINUED | OUTPATIENT
Start: 2023-01-01 | End: 2023-01-01 | Stop reason: HOSPADM

## 2023-01-01 RX ORDER — LOSARTAN POTASSIUM 50 MG/1
50 TABLET ORAL DAILY
Status: DISCONTINUED | OUTPATIENT
Start: 2023-01-01 | End: 2023-01-01

## 2023-01-01 RX ORDER — ACETAMINOPHEN 325 MG/1
975 TABLET ORAL
Status: DISCONTINUED | OUTPATIENT
Start: 2023-01-01 | End: 2023-01-01 | Stop reason: HOSPADM

## 2023-01-01 RX ORDER — EPINEPHRINE 1 MG/ML
INJECTION INTRAMUSCULAR; INTRAVENOUS; SUBCUTANEOUS PRN
Status: DISCONTINUED | OUTPATIENT
Start: 2023-01-01 | End: 2023-01-01 | Stop reason: HOSPADM

## 2023-01-01 RX ORDER — NICOTINE POLACRILEX 4 MG
15-30 LOZENGE BUCCAL
Status: DISCONTINUED | OUTPATIENT
Start: 2023-01-01 | End: 2023-01-01 | Stop reason: HOSPADM

## 2023-01-01 RX ORDER — CEFAZOLIN SODIUM 2 G/50ML
2 SOLUTION INTRAVENOUS
Status: CANCELLED | OUTPATIENT
Start: 2023-01-01

## 2023-01-01 RX ORDER — ONDANSETRON 2 MG/ML
4 INJECTION INTRAMUSCULAR; INTRAVENOUS EVERY 30 MIN PRN
Status: DISCONTINUED | OUTPATIENT
Start: 2023-01-01 | End: 2023-01-01 | Stop reason: HOSPADM

## 2023-01-01 RX ORDER — DOXYCYCLINE 100 MG/1
100 CAPSULE ORAL 2 TIMES DAILY
Qty: 28 CAPSULE | Refills: 0 | Status: SHIPPED | OUTPATIENT
Start: 2023-01-01 | End: 2023-01-01

## 2023-01-01 RX ORDER — ONDANSETRON 4 MG/1
4 TABLET, ORALLY DISINTEGRATING ORAL EVERY 30 MIN PRN
Status: DISCONTINUED | OUTPATIENT
Start: 2023-01-01 | End: 2023-01-01 | Stop reason: HOSPADM

## 2023-01-01 RX ORDER — ONDANSETRON 4 MG/1
4 TABLET, ORALLY DISINTEGRATING ORAL EVERY 6 HOURS PRN
Status: DISCONTINUED | OUTPATIENT
Start: 2023-01-01 | End: 2023-01-01 | Stop reason: HOSPADM

## 2023-01-01 RX ORDER — CYCLOSPORINE 25 MG/1
CAPSULE ORAL
Qty: 150 CAPSULE | Refills: 11 | Status: SHIPPED | OUTPATIENT
Start: 2023-01-01 | End: 2023-01-01

## 2023-01-01 RX ORDER — METOPROLOL SUCCINATE 25 MG/1
TABLET, EXTENDED RELEASE ORAL
Qty: 90 TABLET | Refills: 0 | OUTPATIENT
Start: 2023-01-01

## 2023-01-01 RX ORDER — ATORVASTATIN CALCIUM 20 MG/1
20 TABLET, FILM COATED ORAL DAILY
Status: DISCONTINUED | OUTPATIENT
Start: 2023-01-01 | End: 2023-01-01 | Stop reason: HOSPADM

## 2023-01-01 RX ORDER — GADOBUTROL 604.72 MG/ML
0.1 INJECTION INTRAVENOUS ONCE
Status: COMPLETED | OUTPATIENT
Start: 2023-01-01 | End: 2023-01-01

## 2023-01-01 RX ORDER — SODIUM CHLORIDE, SODIUM LACTATE, POTASSIUM CHLORIDE, CALCIUM CHLORIDE 600; 310; 30; 20 MG/100ML; MG/100ML; MG/100ML; MG/100ML
INJECTION, SOLUTION INTRAVENOUS CONTINUOUS
Status: DISCONTINUED | OUTPATIENT
Start: 2023-01-01 | End: 2023-01-01 | Stop reason: HOSPADM

## 2023-01-01 RX ORDER — CEFAZOLIN SODIUM/WATER 2 G/20 ML
2 SYRINGE (ML) INTRAVENOUS
Status: COMPLETED | OUTPATIENT
Start: 2023-01-01 | End: 2023-01-01

## 2023-01-01 RX ORDER — PROPOFOL 10 MG/ML
INJECTION, EMULSION INTRAVENOUS PRN
Status: DISCONTINUED | OUTPATIENT
Start: 2023-01-01 | End: 2023-01-01

## 2023-01-01 RX ORDER — LEVOTHYROXINE SODIUM 100 UG/1
100 TABLET ORAL DAILY
Qty: 90 TABLET | Refills: 1 | Status: SHIPPED | OUTPATIENT
Start: 2023-01-01

## 2023-01-01 RX ORDER — POLYETHYLENE GLYCOL 3350 17 G/17G
17 POWDER, FOR SOLUTION ORAL DAILY PRN
Status: DISCONTINUED | OUTPATIENT
Start: 2023-01-01 | End: 2023-01-01 | Stop reason: HOSPADM

## 2023-01-01 RX ORDER — PIPERACILLIN SODIUM, TAZOBACTAM SODIUM 3; .375 G/15ML; G/15ML
3.38 INJECTION, POWDER, LYOPHILIZED, FOR SOLUTION INTRAVENOUS EVERY 6 HOURS
Status: DISCONTINUED | OUTPATIENT
Start: 2023-01-01 | End: 2023-01-01

## 2023-01-01 RX ORDER — PANTOPRAZOLE SODIUM 40 MG/1
TABLET, DELAYED RELEASE ORAL
Qty: 90 TABLET | Refills: 2 | Status: SHIPPED | OUTPATIENT
Start: 2023-01-01

## 2023-01-01 RX ORDER — SODIUM CHLORIDE, SODIUM LACTATE, POTASSIUM CHLORIDE, CALCIUM CHLORIDE 600; 310; 30; 20 MG/100ML; MG/100ML; MG/100ML; MG/100ML
INJECTION, SOLUTION INTRAVENOUS
Status: COMPLETED
Start: 2023-01-01 | End: 2023-01-01

## 2023-01-01 RX ORDER — DONEPEZIL HYDROCHLORIDE 10 MG/1
10 TABLET, FILM COATED ORAL AT BEDTIME
COMMUNITY
Start: 2023-01-01

## 2023-01-01 RX ORDER — EPHEDRINE SULFATE 50 MG/ML
INJECTION, SOLUTION INTRAMUSCULAR; INTRAVENOUS; SUBCUTANEOUS PRN
Status: DISCONTINUED | OUTPATIENT
Start: 2023-01-01 | End: 2023-01-01

## 2023-01-01 RX ORDER — ONDANSETRON 2 MG/ML
4 INJECTION INTRAMUSCULAR; INTRAVENOUS EVERY 6 HOURS PRN
Status: DISCONTINUED | OUTPATIENT
Start: 2023-01-01 | End: 2023-01-01 | Stop reason: HOSPADM

## 2023-01-01 RX ORDER — CYCLOSPORINE 25 MG/1
50 CAPSULE, LIQUID FILLED ORAL 2 TIMES DAILY
Qty: 120 CAPSULE | Refills: 11 | Status: SHIPPED | OUTPATIENT
Start: 2023-01-01 | End: 2024-01-01

## 2023-01-01 RX ORDER — OXYCODONE HYDROCHLORIDE 10 MG/1
10 TABLET ORAL
Status: DISCONTINUED | OUTPATIENT
Start: 2023-01-01 | End: 2023-01-01 | Stop reason: HOSPADM

## 2023-01-01 RX ORDER — AMOXICILLIN 500 MG/1
500 CAPSULE ORAL 3 TIMES DAILY
Qty: 42 CAPSULE | Refills: 0 | Status: SHIPPED | OUTPATIENT
Start: 2023-01-01 | End: 2023-01-01

## 2023-01-01 RX ORDER — AMLODIPINE BESYLATE 10 MG/1
10 TABLET ORAL DAILY
Qty: 90 TABLET | Refills: 0 | Status: SHIPPED | OUTPATIENT
Start: 2023-01-01 | End: 2023-01-01

## 2023-01-01 RX ORDER — METOPROLOL SUCCINATE 25 MG/1
TABLET, EXTENDED RELEASE ORAL
Qty: 90 TABLET | Refills: 0 | Status: SHIPPED | OUTPATIENT
Start: 2023-01-01 | End: 2023-01-01

## 2023-01-01 RX ORDER — ACETAMINOPHEN 325 MG/1
975 TABLET ORAL ONCE
Status: COMPLETED | OUTPATIENT
Start: 2023-01-01 | End: 2023-01-01

## 2023-01-01 RX ORDER — HYDROMORPHONE HYDROCHLORIDE 1 MG/ML
0.4 INJECTION, SOLUTION INTRAMUSCULAR; INTRAVENOUS; SUBCUTANEOUS EVERY 5 MIN PRN
Status: DISCONTINUED | OUTPATIENT
Start: 2023-01-01 | End: 2023-01-01 | Stop reason: HOSPADM

## 2023-01-01 RX ORDER — AMLODIPINE BESYLATE 10 MG/1
10 TABLET ORAL DAILY
Qty: 90 TABLET | Refills: 1 | Status: SHIPPED | OUTPATIENT
Start: 2023-01-01

## 2023-01-01 RX ORDER — LEVOTHYROXINE SODIUM 100 UG/1
TABLET ORAL
Qty: 90 TABLET | Refills: 0 | Status: SHIPPED | OUTPATIENT
Start: 2023-01-01 | End: 2023-01-01

## 2023-01-01 RX ADMIN — PIPERACILLIN AND TAZOBACTAM 3.38 G: 3; .375 INJECTION, POWDER, LYOPHILIZED, FOR SOLUTION INTRAVENOUS at 02:13

## 2023-01-01 RX ADMIN — INSULIN ASPART 2 UNITS: 100 INJECTION, SOLUTION INTRAVENOUS; SUBCUTANEOUS at 12:24

## 2023-01-01 RX ADMIN — EPHEDRINE SULFATE 5 MG: 5 INJECTION INTRAVENOUS at 08:38

## 2023-01-01 RX ADMIN — INSULIN HUMAN 2.5 UNITS/HR: 1 INJECTION, SOLUTION INTRAVENOUS at 07:47

## 2023-01-01 RX ADMIN — LEVOTHYROXINE SODIUM 100 MCG: 100 TABLET ORAL at 08:01

## 2023-01-01 RX ADMIN — PIPERACILLIN AND TAZOBACTAM 3.38 G: 3; .375 INJECTION, POWDER, LYOPHILIZED, FOR SOLUTION INTRAVENOUS at 14:01

## 2023-01-01 RX ADMIN — Medication 3 HALF-TAB: at 14:01

## 2023-01-01 RX ADMIN — Medication 15 MG: at 07:58

## 2023-01-01 RX ADMIN — PIPERACILLIN AND TAZOBACTAM 3.38 G: 3; .375 INJECTION, POWDER, LYOPHILIZED, FOR SOLUTION INTRAVENOUS at 08:03

## 2023-01-01 RX ADMIN — PIPERACILLIN AND TAZOBACTAM 3.38 G: 3; .375 INJECTION, POWDER, LYOPHILIZED, FOR SOLUTION INTRAVENOUS at 20:26

## 2023-01-01 RX ADMIN — DEXMEDETOMIDINE HYDROCHLORIDE 12 MCG: 100 INJECTION, SOLUTION INTRAVENOUS at 08:19

## 2023-01-01 RX ADMIN — PANTOPRAZOLE SODIUM 40 MG: 40 TABLET, DELAYED RELEASE ORAL at 08:16

## 2023-01-01 RX ADMIN — CYCLOSPORINE 50 MG: 25 CAPSULE, LIQUID FILLED ORAL at 08:05

## 2023-01-01 RX ADMIN — PROPOFOL 30 MCG/KG/MIN: 10 INJECTION, EMULSION INTRAVENOUS at 08:45

## 2023-01-01 RX ADMIN — INSULIN ASPART 3 UNITS: 100 INJECTION, SOLUTION INTRAVENOUS; SUBCUTANEOUS at 13:43

## 2023-01-01 RX ADMIN — ASPIRIN 81 MG CHEWABLE TABLET 81 MG: 81 TABLET CHEWABLE at 08:02

## 2023-01-01 RX ADMIN — ZINC SULFATE 220 MG (50 MG) CAPSULE 220 MG: CAPSULE at 08:00

## 2023-01-01 RX ADMIN — Medication 3 HALF-TAB: at 07:58

## 2023-01-01 RX ADMIN — INSULIN GLARGINE 17 UNITS: 100 INJECTION, SOLUTION SUBCUTANEOUS at 20:27

## 2023-01-01 RX ADMIN — Medication 3 HALF-TAB: at 20:19

## 2023-01-01 RX ADMIN — Medication 3 HALF-TAB: at 13:54

## 2023-01-01 RX ADMIN — LOSARTAN POTASSIUM 50 MG: 50 TABLET, FILM COATED ORAL at 08:17

## 2023-01-01 RX ADMIN — METOPROLOL SUCCINATE 25 MG: 25 TABLET, FILM COATED, EXTENDED RELEASE ORAL at 22:11

## 2023-01-01 RX ADMIN — METOPROLOL SUCCINATE 25 MG: 25 TABLET, FILM COATED, EXTENDED RELEASE ORAL at 21:30

## 2023-01-01 RX ADMIN — MYCOPHENOLIC ACID 540 MG: 360 TABLET, DELAYED RELEASE ORAL at 20:13

## 2023-01-01 RX ADMIN — PIPERACILLIN AND TAZOBACTAM 3.38 G: 3; .375 INJECTION, POWDER, LYOPHILIZED, FOR SOLUTION INTRAVENOUS at 13:54

## 2023-01-01 RX ADMIN — FOSAPREPITANT 150 MG: 150 INJECTION, POWDER, LYOPHILIZED, FOR SOLUTION INTRAVENOUS at 11:16

## 2023-01-01 RX ADMIN — Medication 3 HALF-TAB: at 08:00

## 2023-01-01 RX ADMIN — Medication 10 MG: at 08:56

## 2023-01-01 RX ADMIN — LIDOCAINE HYDROCHLORIDE 80 MG: 20 INJECTION, SOLUTION INFILTRATION; PERINEURAL at 08:15

## 2023-01-01 RX ADMIN — SODIUM CHLORIDE, POTASSIUM CHLORIDE, SODIUM LACTATE AND CALCIUM CHLORIDE: 600; 310; 30; 20 INJECTION, SOLUTION INTRAVENOUS at 04:32

## 2023-01-01 RX ADMIN — INSULIN HUMAN 3 UNITS/HR: 1 INJECTION, SOLUTION INTRAVENOUS at 07:53

## 2023-01-01 RX ADMIN — Medication 3 HALF-TAB: at 20:27

## 2023-01-01 RX ADMIN — CYANOCOBALAMIN TAB 1000 MCG 1000 MCG: 1000 TAB at 08:01

## 2023-01-01 RX ADMIN — Medication 15 MG: at 08:05

## 2023-01-01 RX ADMIN — MYCOPHENOLIC ACID 540 MG: 360 TABLET, DELAYED RELEASE ORAL at 08:04

## 2023-01-01 RX ADMIN — ENOXAPARIN SODIUM 40 MG: 40 INJECTION SUBCUTANEOUS at 12:27

## 2023-01-01 RX ADMIN — VANCOMYCIN HYDROCHLORIDE 1000 MG: 1 INJECTION, SOLUTION INTRAVENOUS at 22:27

## 2023-01-01 RX ADMIN — Medication 3 HALF-TAB: at 20:25

## 2023-01-01 RX ADMIN — CYCLOSPORINE 50 MG: 25 CAPSULE, LIQUID FILLED ORAL at 22:22

## 2023-01-01 RX ADMIN — VANCOMYCIN HYDROCHLORIDE 1250 MG: 10 INJECTION, POWDER, LYOPHILIZED, FOR SOLUTION INTRAVENOUS at 21:32

## 2023-01-01 RX ADMIN — AMLODIPINE BESYLATE 10 MG: 10 TABLET ORAL at 07:58

## 2023-01-01 RX ADMIN — FENTANYL CITRATE 100 MCG: 50 INJECTION INTRAMUSCULAR; INTRAVENOUS at 08:15

## 2023-01-01 RX ADMIN — Medication 3 HALF-TAB: at 14:35

## 2023-01-01 RX ADMIN — Medication 50 MCG: at 07:58

## 2023-01-01 RX ADMIN — INSULIN GLARGINE 15 UNITS: 100 INJECTION, SOLUTION SUBCUTANEOUS at 19:22

## 2023-01-01 RX ADMIN — METOPROLOL SUCCINATE 25 MG: 25 TABLET, FILM COATED, EXTENDED RELEASE ORAL at 22:24

## 2023-01-01 RX ADMIN — ACETAMINOPHEN 975 MG: 325 TABLET, FILM COATED ORAL at 07:04

## 2023-01-01 RX ADMIN — MYCOPHENOLIC ACID 540 MG: 360 TABLET, DELAYED RELEASE ORAL at 13:18

## 2023-01-01 RX ADMIN — PANTOPRAZOLE SODIUM 40 MG: 40 TABLET, DELAYED RELEASE ORAL at 11:37

## 2023-01-01 RX ADMIN — PIPERACILLIN AND TAZOBACTAM 3.38 G: 3; .375 INJECTION, POWDER, LYOPHILIZED, FOR SOLUTION INTRAVENOUS at 14:03

## 2023-01-01 RX ADMIN — ASPIRIN 81 MG CHEWABLE TABLET 81 MG: 81 TABLET CHEWABLE at 11:37

## 2023-01-01 RX ADMIN — SUGAMMADEX 200 MG: 100 INJECTION, SOLUTION INTRAVENOUS at 10:00

## 2023-01-01 RX ADMIN — MYCOPHENOLIC ACID 540 MG: 360 TABLET, DELAYED RELEASE ORAL at 20:24

## 2023-01-01 RX ADMIN — ONDANSETRON 4 MG: 2 INJECTION INTRAMUSCULAR; INTRAVENOUS at 09:38

## 2023-01-01 RX ADMIN — VANCOMYCIN HYDROCHLORIDE 1500 MG: 10 INJECTION, POWDER, LYOPHILIZED, FOR SOLUTION INTRAVENOUS at 21:11

## 2023-01-01 RX ADMIN — MYCOPHENOLIC ACID 540 MG: 360 TABLET, DELAYED RELEASE ORAL at 20:27

## 2023-01-01 RX ADMIN — Medication 3 HALF-TAB: at 20:13

## 2023-01-01 RX ADMIN — CYCLOSPORINE 50 MG: 25 CAPSULE, LIQUID FILLED ORAL at 08:00

## 2023-01-01 RX ADMIN — CYCLOSPORINE 50 MG: 25 CAPSULE, LIQUID FILLED ORAL at 07:58

## 2023-01-01 RX ADMIN — PIPERACILLIN AND TAZOBACTAM 3.38 G: 3; .375 INJECTION, POWDER, LYOPHILIZED, FOR SOLUTION INTRAVENOUS at 02:49

## 2023-01-01 RX ADMIN — INSULIN HUMAN 2 UNITS: 100 INJECTION, SOLUTION PARENTERAL at 08:45

## 2023-01-01 RX ADMIN — INSULIN ASPART 5 UNITS: 100 INJECTION, SOLUTION INTRAVENOUS; SUBCUTANEOUS at 11:57

## 2023-01-01 RX ADMIN — CYCLOSPORINE 50 MG: 25 CAPSULE, LIQUID FILLED ORAL at 12:11

## 2023-01-01 RX ADMIN — VANCOMYCIN HYDROCHLORIDE 1000 MG: 1 INJECTION, SOLUTION INTRAVENOUS at 21:18

## 2023-01-01 RX ADMIN — PIPERACILLIN AND TAZOBACTAM 3.38 G: 3; .375 INJECTION, POWDER, LYOPHILIZED, FOR SOLUTION INTRAVENOUS at 20:25

## 2023-01-01 RX ADMIN — ATORVASTATIN CALCIUM 20 MG: 20 TABLET, FILM COATED ORAL at 07:58

## 2023-01-01 RX ADMIN — PROPOFOL 50 MCG/KG/MIN: 10 INJECTION, EMULSION INTRAVENOUS at 08:36

## 2023-01-01 RX ADMIN — DONEPEZIL HYDROCHLORIDE 5 MG: 5 TABLET, FILM COATED ORAL at 21:30

## 2023-01-01 RX ADMIN — CYCLOSPORINE 50 MG: 25 CAPSULE, LIQUID FILLED ORAL at 08:15

## 2023-01-01 RX ADMIN — AMLODIPINE BESYLATE 10 MG: 10 TABLET ORAL at 08:01

## 2023-01-01 RX ADMIN — SUGAMMADEX 200 MG: 100 INJECTION, SOLUTION INTRAVENOUS at 10:10

## 2023-01-01 RX ADMIN — ASPIRIN 81 MG CHEWABLE TABLET 81 MG: 81 TABLET CHEWABLE at 08:16

## 2023-01-01 RX ADMIN — LOSARTAN POTASSIUM 50 MG: 50 TABLET, FILM COATED ORAL at 07:58

## 2023-01-01 RX ADMIN — INSULIN ASPART 1 UNITS: 100 INJECTION, SOLUTION INTRAVENOUS; SUBCUTANEOUS at 13:54

## 2023-01-01 RX ADMIN — ATORVASTATIN CALCIUM 20 MG: 20 TABLET, FILM COATED ORAL at 08:01

## 2023-01-01 RX ADMIN — SODIUM CHLORIDE, POTASSIUM CHLORIDE, SODIUM LACTATE AND CALCIUM CHLORIDE: 600; 310; 30; 20 INJECTION, SOLUTION INTRAVENOUS at 02:31

## 2023-01-01 RX ADMIN — LOSARTAN POTASSIUM 50 MG: 50 TABLET, FILM COATED ORAL at 11:37

## 2023-01-01 RX ADMIN — SUGAMMADEX 200 MG: 100 INJECTION, SOLUTION INTRAVENOUS at 10:05

## 2023-01-01 RX ADMIN — PIPERACILLIN AND TAZOBACTAM 3.38 G: 3; .375 INJECTION, POWDER, LYOPHILIZED, FOR SOLUTION INTRAVENOUS at 08:00

## 2023-01-01 RX ADMIN — PIPERACILLIN AND TAZOBACTAM 3.38 G: 3; .375 INJECTION, POWDER, LYOPHILIZED, FOR SOLUTION INTRAVENOUS at 01:03

## 2023-01-01 RX ADMIN — ZINC SULFATE 220 MG (50 MG) CAPSULE 220 MG: CAPSULE at 08:16

## 2023-01-01 RX ADMIN — Medication 5 MG: at 22:11

## 2023-01-01 RX ADMIN — SODIUM CHLORIDE, POTASSIUM CHLORIDE, SODIUM LACTATE AND CALCIUM CHLORIDE: 600; 310; 30; 20 INJECTION, SOLUTION INTRAVENOUS at 07:55

## 2023-01-01 RX ADMIN — Medication 40 MG: at 08:15

## 2023-01-01 RX ADMIN — VANCOMYCIN HYDROCHLORIDE 1250 MG: 10 INJECTION, POWDER, LYOPHILIZED, FOR SOLUTION INTRAVENOUS at 22:11

## 2023-01-01 RX ADMIN — MYCOPHENOLIC ACID 540 MG: 360 TABLET, DELAYED RELEASE ORAL at 20:18

## 2023-01-01 RX ADMIN — Medication 10 MG: at 08:51

## 2023-01-01 RX ADMIN — DONEPEZIL HYDROCHLORIDE 5 MG: 5 TABLET, FILM COATED ORAL at 22:11

## 2023-01-01 RX ADMIN — ESMOLOL HYDROCHLORIDE 20 MG: 10 INJECTION, SOLUTION INTRAVENOUS at 08:25

## 2023-01-01 RX ADMIN — Medication 50 MCG: at 08:00

## 2023-01-01 RX ADMIN — Medication 15 MG: at 08:17

## 2023-01-01 RX ADMIN — LOSARTAN POTASSIUM 50 MG: 50 TABLET, FILM COATED ORAL at 08:01

## 2023-01-01 RX ADMIN — PROPOFOL 170 MG: 10 INJECTION, EMULSION INTRAVENOUS at 08:15

## 2023-01-01 RX ADMIN — MYCOPHENOLIC ACID 540 MG: 360 TABLET, DELAYED RELEASE ORAL at 07:58

## 2023-01-01 RX ADMIN — INSULIN GLARGINE 17 UNITS: 100 INJECTION, SOLUTION SUBCUTANEOUS at 20:22

## 2023-01-01 RX ADMIN — Medication 15 MG: at 11:37

## 2023-01-01 RX ADMIN — Medication 50 MCG: at 08:01

## 2023-01-01 RX ADMIN — LEVOTHYROXINE SODIUM 100 MCG: 100 TABLET ORAL at 07:58

## 2023-01-01 RX ADMIN — ASPIRIN 81 MG CHEWABLE TABLET 81 MG: 81 TABLET CHEWABLE at 08:03

## 2023-01-01 RX ADMIN — MYCOPHENOLIC ACID 540 MG: 360 TABLET, DELAYED RELEASE ORAL at 08:16

## 2023-01-01 RX ADMIN — ASPIRIN 81 MG CHEWABLE TABLET 81 MG: 81 TABLET CHEWABLE at 07:58

## 2023-01-01 RX ADMIN — CYCLOSPORINE 50 MG: 25 CAPSULE, LIQUID FILLED ORAL at 20:17

## 2023-01-01 RX ADMIN — LIDOCAINE HYDROCHLORIDE 80 MG: 20 INJECTION, SOLUTION INFILTRATION; PERINEURAL at 08:21

## 2023-01-01 RX ADMIN — Medication 50 MG: at 08:25

## 2023-01-01 RX ADMIN — ATORVASTATIN CALCIUM 20 MG: 20 TABLET, FILM COATED ORAL at 08:02

## 2023-01-01 RX ADMIN — PANTOPRAZOLE SODIUM 40 MG: 40 TABLET, DELAYED RELEASE ORAL at 08:02

## 2023-01-01 RX ADMIN — PANTOPRAZOLE SODIUM 40 MG: 40 TABLET, DELAYED RELEASE ORAL at 08:01

## 2023-01-01 RX ADMIN — CYCLOSPORINE 50 MG: 25 CAPSULE, LIQUID FILLED ORAL at 17:54

## 2023-01-01 RX ADMIN — EPHEDRINE SULFATE 5 MG: 5 INJECTION INTRAVENOUS at 08:45

## 2023-01-01 RX ADMIN — ZINC SULFATE 220 MG (50 MG) CAPSULE 220 MG: CAPSULE at 08:05

## 2023-01-01 RX ADMIN — PIPERACILLIN AND TAZOBACTAM 3.38 G: 3; .375 INJECTION, POWDER, LYOPHILIZED, FOR SOLUTION INTRAVENOUS at 20:13

## 2023-01-01 RX ADMIN — SODIUM CHLORIDE, POTASSIUM CHLORIDE, SODIUM LACTATE AND CALCIUM CHLORIDE: 600; 310; 30; 20 INJECTION, SOLUTION INTRAVENOUS at 07:47

## 2023-01-01 RX ADMIN — SODIUM CHLORIDE, POTASSIUM CHLORIDE, SODIUM LACTATE AND CALCIUM CHLORIDE 1000 ML: 600; 310; 30; 20 INJECTION, SOLUTION INTRAVENOUS at 13:56

## 2023-01-01 RX ADMIN — PANTOPRAZOLE SODIUM 40 MG: 40 TABLET, DELAYED RELEASE ORAL at 07:58

## 2023-01-01 RX ADMIN — Medication 3 HALF-TAB: at 13:19

## 2023-01-01 RX ADMIN — PIPERACILLIN AND TAZOBACTAM 3.38 G: 3; .375 INJECTION, POWDER, LYOPHILIZED, FOR SOLUTION INTRAVENOUS at 02:30

## 2023-01-01 RX ADMIN — PIPERACILLIN AND TAZOBACTAM 3.38 G: 3; .375 INJECTION, POWDER, LYOPHILIZED, FOR SOLUTION INTRAVENOUS at 20:29

## 2023-01-01 RX ADMIN — CYCLOSPORINE 50 MG: 25 CAPSULE, LIQUID FILLED ORAL at 17:47

## 2023-01-01 RX ADMIN — Medication 3 HALF-TAB: at 08:15

## 2023-01-01 RX ADMIN — ENOXAPARIN SODIUM 40 MG: 40 INJECTION SUBCUTANEOUS at 12:10

## 2023-01-01 RX ADMIN — ZINC SULFATE 220 MG (50 MG) CAPSULE 220 MG: CAPSULE at 13:19

## 2023-01-01 RX ADMIN — SODIUM CHLORIDE 5 UNITS: 9 INJECTION, SOLUTION INTRAVENOUS at 12:40

## 2023-01-01 RX ADMIN — PROPOFOL 70 MG: 10 INJECTION, EMULSION INTRAVENOUS at 08:22

## 2023-01-01 RX ADMIN — MYCOPHENOLIC ACID 540 MG: 360 TABLET, DELAYED RELEASE ORAL at 08:00

## 2023-01-01 RX ADMIN — AMLODIPINE BESYLATE 10 MG: 10 TABLET ORAL at 11:37

## 2023-01-01 RX ADMIN — SODIUM CHLORIDE 5 UNITS: 9 INJECTION, SOLUTION INTRAVENOUS at 13:31

## 2023-01-01 RX ADMIN — DONEPEZIL HYDROCHLORIDE 5 MG: 5 TABLET, FILM COATED ORAL at 22:24

## 2023-01-01 RX ADMIN — ONDANSETRON 4 MG: 2 INJECTION INTRAMUSCULAR; INTRAVENOUS at 09:03

## 2023-01-01 RX ADMIN — PIPERACILLIN AND TAZOBACTAM 3.38 G: 3; .375 INJECTION, POWDER, LYOPHILIZED, FOR SOLUTION INTRAVENOUS at 08:04

## 2023-01-01 RX ADMIN — INSULIN GLARGINE 15 UNITS: 100 INJECTION, SOLUTION SUBCUTANEOUS at 18:59

## 2023-01-01 RX ADMIN — Medication 3 HALF-TAB: at 08:06

## 2023-01-01 RX ADMIN — GADOBUTROL 7.16 ML: 604.72 INJECTION INTRAVENOUS at 19:54

## 2023-01-01 RX ADMIN — ZINC SULFATE 220 MG (50 MG) CAPSULE 220 MG: CAPSULE at 07:58

## 2023-01-01 RX ADMIN — SODIUM CHLORIDE, POTASSIUM CHLORIDE, SODIUM LACTATE AND CALCIUM CHLORIDE: 600; 310; 30; 20 INJECTION, SOLUTION INTRAVENOUS at 08:07

## 2023-01-01 RX ADMIN — PROPOFOL 20 MG: 10 INJECTION, EMULSION INTRAVENOUS at 08:25

## 2023-01-01 RX ADMIN — SUGAMMADEX 200 MG: 100 INJECTION, SOLUTION INTRAVENOUS at 09:17

## 2023-01-01 RX ADMIN — LEVOTHYROXINE SODIUM 100 MCG: 100 TABLET ORAL at 08:17

## 2023-01-01 RX ADMIN — SODIUM CHLORIDE, POTASSIUM CHLORIDE, SODIUM LACTATE AND CALCIUM CHLORIDE: 600; 310; 30; 20 INJECTION, SOLUTION INTRAVENOUS at 16:55

## 2023-01-01 RX ADMIN — PIPERACILLIN AND TAZOBACTAM 3.38 G: 3; .375 INJECTION, POWDER, LYOPHILIZED, FOR SOLUTION INTRAVENOUS at 08:15

## 2023-01-01 RX ADMIN — AMLODIPINE BESYLATE 10 MG: 10 TABLET ORAL at 08:16

## 2023-01-01 RX ADMIN — INSULIN ASPART 3 UNITS: 100 INJECTION, SOLUTION INTRAVENOUS; SUBCUTANEOUS at 03:29

## 2023-01-01 RX ADMIN — Medication 15 MG: at 08:00

## 2023-01-01 RX ADMIN — Medication 50 MCG: at 08:16

## 2023-01-01 RX ADMIN — EPHEDRINE SULFATE 5 MG: 5 INJECTION INTRAVENOUS at 08:33

## 2023-01-01 RX ADMIN — ATORVASTATIN CALCIUM 20 MG: 20 TABLET, FILM COATED ORAL at 08:17

## 2023-01-01 RX ADMIN — Medication 2 G: at 08:15

## 2023-01-01 RX ADMIN — FENTANYL CITRATE 25 MCG: 50 INJECTION INTRAMUSCULAR; INTRAVENOUS at 08:19

## 2023-01-01 RX ADMIN — ENOXAPARIN SODIUM 40 MG: 40 INJECTION SUBCUTANEOUS at 12:56

## 2023-01-01 ASSESSMENT — ACTIVITIES OF DAILY LIVING (ADL)
ADLS_ACUITY_SCORE: 29
CURRENT_FUNCTION: TELEPHONE REQUIRES ASSISTANCE
CURRENT_FUNCTION: MEDICATION ADMINISTRATION REQUIRES ASSISTANCE
ADLS_ACUITY_SCORE: 29
ADLS_ACUITY_SCORE: 31
ADLS_ACUITY_SCORE: 28
ADLS_ACUITY_SCORE: 27
ADLS_ACUITY_SCORE: 29
WALKING_OR_CLIMBING_STAIRS: AMBULATION DIFFICULTY, ASSISTANCE 1 PERSON
ADLS_ACUITY_SCORE: 29
DOING_ERRANDS_INDEPENDENTLY_DIFFICULTY: YES
ADLS_ACUITY_SCORE: 28
ADLS_ACUITY_SCORE: 28
ADLS_ACUITY_SCORE: 27
ADLS_ACUITY_SCORE: 27
ADLS_ACUITY_SCORE: 25
CONCENTRATING,_REMEMBERING_OR_MAKING_DECISIONS_DIFFICULTY: YES
ADLS_ACUITY_SCORE: 31
ADLS_ACUITY_SCORE: 27
ADLS_ACUITY_SCORE: 27
CURRENT_FUNCTION: HOUSEWORK REQUIRES ASSISTANCE
CHANGE_IN_FUNCTIONAL_STATUS_SINCE_ONSET_OF_CURRENT_ILLNESS/INJURY: YES
DEPENDENT_IADLS:: TRANSPORTATION
ADLS_ACUITY_SCORE: 31
ADLS_ACUITY_SCORE: 35
ADLS_ACUITY_SCORE: 33
ADLS_ACUITY_SCORE: 31
CURRENT_FUNCTION: TRANSPORTATION REQUIRES ASSISTANCE
ADLS_ACUITY_SCORE: 35
ADLS_ACUITY_SCORE: 31
ADLS_ACUITY_SCORE: 35
ADLS_ACUITY_SCORE: 29
ADLS_ACUITY_SCORE: 35
ADLS_ACUITY_SCORE: 25
ADLS_ACUITY_SCORE: 33
ADLS_ACUITY_SCORE: 29
ADLS_ACUITY_SCORE: 27
ADLS_ACUITY_SCORE: 28
ADLS_ACUITY_SCORE: 27
CURRENT_FUNCTION: BATHING REQUIRES ASSISTANCE
ADLS_ACUITY_SCORE: 25
ADLS_ACUITY_SCORE: 29
ADLS_ACUITY_SCORE: 25
ADLS_ACUITY_SCORE: 31
CURRENT_FUNCTION: LAUNDRY REQUIRES ASSISTANCE
TOILETING_ISSUES: NO
ADLS_ACUITY_SCORE: 35
ADLS_ACUITY_SCORE: 35
ADLS_ACUITY_SCORE: 27
ADLS_ACUITY_SCORE: 29
ADLS_ACUITY_SCORE: 35
ADLS_ACUITY_SCORE: 29
DEPENDENT_IADLS:: TRANSPORTATION
ADLS_ACUITY_SCORE: 35
DRESSING/BATHING_DIFFICULTY: NO
CURRENT_FUNCTION: MONEY MANAGEMENT REQUIRES ASSISTANCE
ADLS_ACUITY_SCORE: 29
ADLS_ACUITY_SCORE: 31
ADLS_ACUITY_SCORE: 29
ADLS_ACUITY_SCORE: 29
DIFFICULTY_EATING/SWALLOWING: NO
EQUIPMENT_CURRENTLY_USED_AT_HOME: WALKER, STANDARD
ADLS_ACUITY_SCORE: 29
CURRENT_FUNCTION: SHOPPING REQUIRES ASSISTANCE
ADLS_ACUITY_SCORE: 29
ADLS_ACUITY_SCORE: 29
ADLS_ACUITY_SCORE: 27
ADLS_ACUITY_SCORE: 35
ADLS_ACUITY_SCORE: 27
ADLS_ACUITY_SCORE: 29
FALL_HISTORY_WITHIN_LAST_SIX_MONTHS: YES
ADLS_ACUITY_SCORE: 29
ADLS_ACUITY_SCORE: 27
CURRENT_FUNCTION: PREPARING MEALS REQUIRES ASSISTANCE
ADLS_ACUITY_SCORE: 35
TRANSFERRING: 1-->ASSISTANCE (EQUIPMENT/PERSON) NEEDED (NOT DEVELOPMENTALLY APPROPRIATE)
ADLS_ACUITY_SCORE: 27
DIFFICULTY_COMMUNICATING: NO
WEAR_GLASSES_OR_BLIND: YES
WALKING_OR_CLIMBING_STAIRS_DIFFICULTY: YES
TRANSFERRING: 1-->ASSISTANCE (EQUIPMENT/PERSON) NEEDED
ADLS_ACUITY_SCORE: 35
ADLS_ACUITY_SCORE: 31
ADLS_ACUITY_SCORE: 29
ADLS_ACUITY_SCORE: 29
ADLS_ACUITY_SCORE: 35
ADLS_ACUITY_SCORE: 27
DEPENDENT_IADLS:: TRANSPORTATION
ADLS_ACUITY_SCORE: 29

## 2023-01-01 ASSESSMENT — ANXIETY QUESTIONNAIRES
6. BECOMING EASILY ANNOYED OR IRRITABLE: NOT AT ALL
GAD7 TOTAL SCORE: 1
1. FEELING NERVOUS, ANXIOUS, OR ON EDGE: SEVERAL DAYS
2. NOT BEING ABLE TO STOP OR CONTROL WORRYING: NOT AT ALL
7. FEELING AFRAID AS IF SOMETHING AWFUL MIGHT HAPPEN: NOT AT ALL
4. TROUBLE RELAXING: NOT AT ALL
5. BEING SO RESTLESS THAT IT IS HARD TO SIT STILL: NOT AT ALL
3. WORRYING TOO MUCH ABOUT DIFFERENT THINGS: NOT AT ALL
GAD7 TOTAL SCORE: 1

## 2023-01-01 ASSESSMENT — EXTERNAL EXAM - RIGHT EYE: OD_EXAM: NORMAL

## 2023-01-01 ASSESSMENT — PAIN SCALES - GENERAL
PAINLEVEL: NO PAIN (0)

## 2023-01-01 ASSESSMENT — CONF VISUAL FIELD
OS_INFERIOR_NASAL_RESTRICTION: 0
OS_SUPERIOR_TEMPORAL_RESTRICTION: 0
OD_NORMAL: 1
OD_SUPERIOR_NASAL_RESTRICTION: 0
OS_INFERIOR_TEMPORAL_RESTRICTION: 0
METHOD: COUNTING FINGERS
OS_SUPERIOR_NASAL_RESTRICTION: 0
OD_INFERIOR_TEMPORAL_RESTRICTION: 0
OD_INFERIOR_NASAL_RESTRICTION: 0
OS_NORMAL: 1
OD_SUPERIOR_TEMPORAL_RESTRICTION: 0

## 2023-01-01 ASSESSMENT — ENCOUNTER SYMPTOMS
DISTURBANCES IN COORDINATION: 0
TINGLING: 0
TREMORS: 0
MEMORY LOSS: 0
NUMBNESS: 0
SPEECH CHANGE: 0
SEIZURES: 0
HEADACHES: 0
LOSS OF CONSCIOUSNESS: 0
WEAKNESS: 1
WEAKNESS: 0
PARALYSIS: 0
DIZZINESS: 0
DIZZINESS: 0
HEADACHES: 0
NUMBNESS: 0
TREMORS: 0
SEIZURES: 0
FATIGUE: 1

## 2023-01-01 ASSESSMENT — VISUAL ACUITY
OS_SC: 20/40
METHOD: SNELLEN - LINEAR
OD_SC: NLP

## 2023-01-01 ASSESSMENT — TONOMETRY
IOP_METHOD: ICARE
OD_IOP_MMHG: 8
OS_IOP_MMHG: 12

## 2023-01-01 ASSESSMENT — PATIENT HEALTH QUESTIONNAIRE - PHQ9
SUM OF ALL RESPONSES TO PHQ QUESTIONS 1-9: 0
SUM OF ALL RESPONSES TO PHQ QUESTIONS 1-9: 0

## 2023-01-01 ASSESSMENT — EXTERNAL EXAM - LEFT EYE: OS_EXAM: NORMAL

## 2023-01-01 ASSESSMENT — SLIT LAMP EXAM - LIDS
COMMENTS: INCISION C/D/I
COMMENTS: NORMAL

## 2023-01-06 ENCOUNTER — TELEPHONE (OUTPATIENT)
Dept: TRANSPLANT | Facility: CLINIC | Age: 73
End: 2023-01-06

## 2023-01-06 DIAGNOSIS — Z48.298 AFTERCARE FOLLOWING ORGAN TRANSPLANT: ICD-10-CM

## 2023-01-06 DIAGNOSIS — Z79.899 ENCOUNTER FOR LONG-TERM CURRENT USE OF MEDICATION: ICD-10-CM

## 2023-01-06 DIAGNOSIS — Z20.828 CONTACT WITH AND (SUSPECTED) EXPOSURE TO OTHER VIRAL COMMUNICABLE DISEASES: ICD-10-CM

## 2023-01-06 DIAGNOSIS — Z94.0 KIDNEY REPLACED BY TRANSPLANT: ICD-10-CM

## 2023-01-06 NOTE — TELEPHONE ENCOUNTER
Patient Call: General  Route to LPN    Reason for call: called in regards of needing lab orders. Patient will provide details of where the orders need to be sent to. Call back number is [phone].       Call back needed? Yes    Return Call Needed  Same as documented in contacts section  When to return call?: Same day: Route High Priority

## 2023-01-19 ENCOUNTER — OFFICE VISIT (OUTPATIENT)
Dept: INTERNAL MEDICINE | Facility: CLINIC | Age: 73
End: 2023-01-19
Payer: MEDICARE

## 2023-01-19 VITALS
DIASTOLIC BLOOD PRESSURE: 60 MMHG | WEIGHT: 160.9 LBS | RESPIRATION RATE: 13 BRPM | OXYGEN SATURATION: 97 % | TEMPERATURE: 96.6 F | BODY MASS INDEX: 28.51 KG/M2 | HEIGHT: 63 IN | SYSTOLIC BLOOD PRESSURE: 130 MMHG | HEART RATE: 69 BPM

## 2023-01-19 DIAGNOSIS — I15.1 HYPERTENSION SECONDARY TO OTHER RENAL DISORDERS: ICD-10-CM

## 2023-01-19 DIAGNOSIS — I25.10 CORONARY ARTERY DISEASE INVOLVING NATIVE HEART WITHOUT ANGINA PECTORIS, UNSPECIFIED VESSEL OR LESION TYPE: ICD-10-CM

## 2023-01-19 DIAGNOSIS — E10.21 TYPE 1 DIABETES MELLITUS WITH DIABETIC NEPHROPATHY (H): ICD-10-CM

## 2023-01-19 DIAGNOSIS — N18.32 STAGE 3B CHRONIC KIDNEY DISEASE (H): ICD-10-CM

## 2023-01-19 DIAGNOSIS — D84.9 IMMUNOSUPPRESSION (H): ICD-10-CM

## 2023-01-19 DIAGNOSIS — E78.5 HYPERLIPIDEMIA LDL GOAL <100: Primary | ICD-10-CM

## 2023-01-19 DIAGNOSIS — H92.22 EAR DISCHARGE BLOOD, LEFT: ICD-10-CM

## 2023-01-19 PROCEDURE — 99214 OFFICE O/P EST MOD 30 MIN: CPT | Performed by: INTERNAL MEDICINE

## 2023-01-19 RX ORDER — METOPROLOL SUCCINATE 25 MG/1
TABLET, EXTENDED RELEASE ORAL
Qty: 90 TABLET | Refills: 1 | Status: SHIPPED | OUTPATIENT
Start: 2023-01-19 | End: 2023-01-01

## 2023-01-19 RX ORDER — LOSARTAN POTASSIUM 100 MG/1
TABLET ORAL
Qty: 45 TABLET | Refills: 1 | Status: SHIPPED | OUTPATIENT
Start: 2023-01-19 | End: 2023-01-01

## 2023-01-19 NOTE — NURSING NOTE
"BP (!) 142/60   Pulse 69   Temp (!) 96.6  F (35.9  C) (Tympanic)   Resp 13   Ht 1.6 m (5' 3\")   Wt 73 kg (160 lb 14.4 oz)   SpO2 97%   BMI 28.50 kg/m    Patient in for medication follow up.  "

## 2023-01-19 NOTE — PROGRESS NOTES
"  Assessment & Plan     (E78.5) Hyperlipidemia LDL goal <100   Plan: Last LDL not at goal, currently on Lipitor 40 mg half a tablet daily, check lipid panel reflex to direct LDL Fasting and adjust Lipitor dose after results         (I15.1,  N28.89) Hypertension secondary to other renal disorders  Comment: Blood pressure stable  Plan: losartan (COZAAR) 100 MG tablet, metoprolol succinate ER (TOPROL XL) 25 MG 24 hr tablet refilled as directed.explained clearly about the medication,insructions and side effects.           (I25.10) Coronary artery disease involving native heart without angina pectoris, unspecified vessel or lesion type  Plan: metoprolol succinate ER (TOPROL XL) 25 MG 24 hr tablet refilled.explained clearly about the medication,insructions and side effects.  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  Patient will be making appointment soon      Prescription drug management        BMI:   Estimated body mass index is 28.5 kg/m  as calculated from the following:    Height as of this encounter: 1.6 m (5' 3\").    Weight as of this encounter: 73 kg (160 lb 14.4 oz).         Return in about 6 months (around 7/19/2023).    Summer Vega MD  [facility] is a 72 year old accompanied by her spouse, presenting for the following health issues:  Lipids, Hypertension, and Diabetes      History of Present Illness       Reason for visit:  Meds    She eats 0-1 servings of fruits and vegetables daily.She consumes 0 sweetened beverage(s) daily.She " exercises with enough effort to increase her heart rate 9 or less minutes per day.  She exercises with enough effort to increase her heart rate 3 or less days per week.   She is taking medications regularly.     Hyperlipidemia Follow-Up      Are you regularly taking any medication or supplement to lower your cholesterol?   Yes- statin    Are you having muscle aches or other side effects that you think could be caused by your cholesterol lowering medication?  No    Hypertension Follow-up      Do you check your blood pressure regularly outside of the clinic? Yes 130/70    Are you following a low salt diet? Yes    Are your blood pressures ever more than 140 on the top number (systolic) OR more   than 90 on the bottom number (diastolic), for example 140/90? Yes    BP Readings from Last 2 Encounters:   12/05/22 (!) 149/76   08/15/22 138/74     Hemoglobin A1C (%)   Date Value   11/30/2022 6.5 (H)   07/29/2022 7.8 (H)   07/06/2021 6.7 (H)   03/18/2021 6.8 (H)     LDL Cholesterol Calculated (mg/dL)   Date Value   07/29/2022 104 (H)   11/29/2021 84   10/29/2020 95   11/20/2019 97       Hypothyroidism Follow-up      Since last visit, patient describes the following symptoms: Weight stable, no hair loss, no skin changes, no constipation, no loose stools      Diabetes Follows-up with endocrinologist       S/p kidney transplant/immunosuppressed status: Follows up with transplant clinic and is on immunosuppressants    Patient and spouse also complains of patient having a reddish drainage from the left ear, patient has left ear tube and has seen ENT in the past, no ear pain.    Past Medical History:   Diagnosis Date     Abdominal wall hernia     RLQ     AK (actinic keratosis) 08/06/2020     Allergic rhinitis      CAD (coronary artery disease)     coronary artery disease s/p PCI to LAD in 2005coronary artery disease s/p PCI to LAD in 2005     Diabetes mellitus, type 2 (H)     follows up with endocrinologist.     Dyslipidemia       "GERD (gastroesophageal reflux disease)      H/O diabetic nephropathy     s/p kidney trnsplant     Hypertension      Hypothyroidism      Immunosuppressed status (H)      Kidney replaced by transplant     Rt     PONV (postoperative nausea and vomiting)      Shingles      Vitamin B12 deficiency anemia        Current Outpatient Medications   Medication Sig Dispense Refill     amLODIPine (NORVASC) 10 MG tablet TAKE 1 TABLET (10 MG) BY MOUTH DAILY. 90 tablet 0     ASPIRIN PO Take 81 mg by mouth daily       atorvastatin (LIPITOR) 40 MG tablet TAKE 1 TABLET BY MOUTH EVERY DAY (Patient taking differently: 1/2 tab daily) 90 tablet 0     cholecalciferol (VITAMIN D3) 25 mcg (1000 units) capsule Take 2 capsules by mouth daily       Continuous Blood Gluc Transmit (DEXCOM G6 TRANSMITTER) MISC See Admin Instructions 1 each 1     GENGRAF (BRAND) 25 MG CAPSULE Take 75 mg in the am and 50 mg in the pm 540 capsule 0     insulin aspart (NOVOLOG PEN) 100 UNIT/ML pen Inject 5-10 units subcutaneous with meals and correction doses as directed, total daily dose approx 25 units 15 mL 3     insulin glargine (BASAGLAR KWIKPEN) 100 UNIT/ML pen Inject 13-15 Units Subcutaneous daily 15 mL 5     isosorbide mononitrate (IMDUR) 30 MG 24 hr tablet Take 0.5 tablets (15 mg) by mouth daily 45 tablet 3     levothyroxine (SYNTHROID/LEVOTHROID) 100 MCG tablet Take 1 tablet (100 mcg) by mouth daily 90 tablet 1     losartan (COZAAR) 100 MG tablet TAKE 1/2 OF A TABLET (50 MG) BY MOUTH DAILY 45 tablet 1     metoprolol succinate ER (TOPROL XL) 25 MG 24 hr tablet TAKE 1 TABLET BY MOUTH EVERYDAY AT BEDTIME 90 tablet 1     mycophenolic acid (GENERIC EQUIVALENT) 180 MG EC tablet Take 3 tablets (540 mg) by mouth 2 times daily 180 tablet 11     pantoprazole (PROTONIX) 40 MG EC tablet TAKE 1 TABLET BY MOUTH EVERY DAY 90 tablet 1     FLORIN CONTOUR test strip  (Patient not taking: Reported on 1/19/2023)  0     BD INSULIN SYRINGE ULTRAFINE 31G X 5/16\" 0.3 ML USING 4-5 PER " "DAY ([facility] 31G/0.3ML) (Patient not taking: Reported on 1/19/2023)  6         Review of Systems   CONSTITUTIONAL: NEGATIVE for fever, chills, change in weight  RESP: NEGATIVE for significant cough or SOB  CV: NEGATIVE for chest pain, palpitations or peripheral edema  MUSCULOSKELETAL: NEGATIVE for significant arthralgias or myalgia  NEURO: NEGATIVE for weakness, dizziness or paresthesias  PSYCHIATRIC: NEGATIVE for changes in mood or affect      Objective    /60   Pulse 69   Temp (!) 96.6  F (35.9  C) (Tympanic)   Resp 13   Ht 1.6 m (5' 3\")   Wt 73 kg (160 lb 14.4 oz)   SpO2 97%   BMI 28.50 kg/m    Body mass index is 28.5 kg/m .  Physical Exam   GENERAL:   alert and no distress  ENT; Rt ear mild cerumen noted, lt ear pinkish-red discharge noted, ear tube noted  RESP: lungs clear to auscultation - no rales, rhonchi or wheezes  CV: regular rate and rhythm, normal S1 S2,    NEURO: Normal strength and tone, mentation intact and speech normal  PSYCH: mentation appears normal, affect normal/bright           "

## 2023-01-24 DIAGNOSIS — Z94.0 KIDNEY REPLACED BY TRANSPLANT: Primary | ICD-10-CM

## 2023-01-24 DIAGNOSIS — Z94.0 KIDNEY TRANSPLANTED: ICD-10-CM

## 2023-01-24 RX ORDER — MYCOPHENOLIC ACID 180 MG/1
540 TABLET, DELAYED RELEASE ORAL 2 TIMES DAILY
Qty: 180 TABLET | Refills: 11 | Status: SHIPPED | OUTPATIENT
Start: 2023-01-24 | End: 2023-01-01

## 2023-01-24 RX ORDER — CYCLOSPORINE 25 MG/1
CAPSULE ORAL
Qty: 150 CAPSULE | Refills: 11 | Status: SHIPPED | OUTPATIENT
Start: 2023-01-24 | End: 2023-01-01

## 2023-01-25 ENCOUNTER — TELEPHONE (OUTPATIENT)
Dept: INFUSION THERAPY | Facility: CLINIC | Age: 73
End: 2023-01-25
Payer: MEDICARE

## 2023-01-25 NOTE — TELEPHONE ENCOUNTER
Prior Authorization Not Needed per Insurance    Medication: Mycophenolic acid- pa not needed  Insurance Company:    Expected CoPay:      Pharmacy Filling the Rx: [id number] - [place], [place] - [address] AT [address] OF [address]  Pharmacy Notified: Yes  Patient Notified: No  Patient had medicare at time of transplant so would be billed to medicare part B not D  Medicare part A: 12/1/2005-12/31/2008 TERMED then effective again 8/1/2023    Alexis is going to be resending a cms form

## 2023-01-26 ENCOUNTER — TRANSFERRED RECORDS (OUTPATIENT)
Dept: HEALTH INFORMATION MANAGEMENT | Facility: CLINIC | Age: 73
End: 2023-01-26

## 2023-02-02 NOTE — TELEPHONE ENCOUNTER
pharmacy called me to see if I received the CMN form informed tech that I have still not received anything  I was provided with phone number to call [phone] I will call and informed to have cmn form sent to me asap

## 2023-02-02 NOTE — TELEPHONE ENCOUNTER
Per rep from [facility] medicare stated that patient will be approved and I wont need to resend in a new form and to give him 15-30 mins

## 2023-02-02 NOTE — TELEPHONE ENCOUNTER
Called pharmacy to see if everything was able to go through insurance ok. The pharmacy is working on getting this billed however it was stuck in their resolution department and they could not touch anything at this time. I did call patient to inform and she will call me if any issues

## 2023-02-05 DIAGNOSIS — E78.5 HYPERLIPIDEMIA LDL GOAL <100: ICD-10-CM

## 2023-02-08 RX ORDER — ATORVASTATIN CALCIUM 40 MG/1
TABLET, FILM COATED ORAL
Qty: 45 TABLET | Refills: 2 | Status: SHIPPED | OUTPATIENT
Start: 2023-02-08 | End: 2023-01-01

## 2023-02-08 NOTE — TELEPHONE ENCOUNTER
Atorvastatin (Lipitor) 40 mg tab  Prescription approved per [facility] Refill Protocol.

## 2023-02-09 ENCOUNTER — TRANSFERRED RECORDS (OUTPATIENT)
Dept: HEALTH INFORMATION MANAGEMENT | Facility: CLINIC | Age: 73
End: 2023-02-09

## 2023-02-15 ENCOUNTER — TELEPHONE (OUTPATIENT)
Dept: ENDOCRINOLOGY | Facility: CLINIC | Age: 73
End: 2023-02-15
Payer: MEDICARE

## 2023-02-15 DIAGNOSIS — E13.9 DIABETES MELLITUS TYPE 1.5, MANAGED AS TYPE 1 (H): ICD-10-CM

## 2023-02-15 DIAGNOSIS — E10.21 TYPE 1 DIABETES MELLITUS WITH DIABETIC NEPHROPATHY (H): Primary | ICD-10-CM

## 2023-02-15 RX ORDER — ACYCLOVIR 400 MG/1
1 TABLET ORAL CONTINUOUS PRN
Qty: 3 EACH | Refills: 5 | Status: SHIPPED | OUTPATIENT
Start: 2023-02-17 | End: 2023-03-22

## 2023-02-15 RX ORDER — ACYCLOVIR 400 MG/1
1 TABLET ORAL CONTINUOUS PRN
Qty: 1 EACH | Refills: 3 | Status: SHIPPED | OUTPATIENT
Start: 2023-02-17 | End: 2023-01-01

## 2023-02-15 NOTE — TELEPHONE ENCOUNTER
[facility] Call Center    Phone Message    May a detailed message be left on voicemail: yes     Reason for Call: Other: patient's  called in and stated he would like a call back regarding switching his wife to the Dexacom 7 instead of the 6. Please reach out if this is a possibility.      Action Taken: Other: Endo    Travel Screening: Not Applicable

## 2023-02-16 NOTE — TELEPHONE ENCOUNTER
Message reviewed.  The new DexcomG7 sensor and transmitter are not available yet, but should be available in pharmacies 2/17/23.  It can be used with the G7  or with a smartphone that has the [employer] warren.    I have sent the new DexcomG7 Rx's to her local [facility] pharmacy, but available on/after 2/17/23.  Please relay message.    DIANN Fitzpatrick MD, MS  [facility]

## 2023-02-17 DIAGNOSIS — E03.8 OTHER SPECIFIED HYPOTHYROIDISM: ICD-10-CM

## 2023-02-17 RX ORDER — LEVOTHYROXINE SODIUM 100 UG/1
TABLET ORAL
Qty: 90 TABLET | Refills: 1 | Status: SHIPPED | OUTPATIENT
Start: 2023-02-17 | End: 2023-01-01

## 2023-02-20 PROBLEM — N39.0 URINARY TRACT INFECTION: Status: ACTIVE | Noted: 2022-08-12

## 2023-03-21 ENCOUNTER — TELEPHONE (OUTPATIENT)
Dept: ENDOCRINOLOGY | Facility: CLINIC | Age: 73
End: 2023-03-21
Payer: MEDICARE

## 2023-03-21 DIAGNOSIS — E13.9 DIABETES MELLITUS TYPE 1.5, MANAGED AS TYPE 1 (H): ICD-10-CM

## 2023-03-21 NOTE — TELEPHONE ENCOUNTER
[facility] Call Center    Phone Message    May a detailed message be left on voicemail: yes     Reason for Call: Medication Refill Request    Has the patient contacted the pharmacy for the refill? Yes   Name of medication being requested: Continuous Blood Gluc  (DEXCOM G7 ) LUCAS     and     Continuous Blood Gluc Sensor (DEXCOM G7 SENSOR) [facility]    Provider who prescribed the medication:   Pharmacy: [id number] [place] [id number] [facility] AT [address]  Date medication is needed: ASAP         Action Taken: Message routed to:  [facility] ENDO    Travel Screening: Not Applicable

## 2023-03-22 RX ORDER — ACYCLOVIR 400 MG/1
1 TABLET ORAL CONTINUOUS PRN
Qty: 3 EACH | Refills: 5 | Status: SHIPPED | OUTPATIENT
Start: 2023-03-22 | End: 2023-01-01

## 2023-03-22 NOTE — TELEPHONE ENCOUNTER
Last Written Prescription Date:  23  Last Fill Quantity: 3,  # refills: 5   Last office visit: 12/15/2022 with prescribing provider: Dr. Fitzpatrick  Future Office Visit:  23        Requested Prescriptions   Pending Prescriptions Disp Refills     Continuous Blood Gluc Sensor (DEXCOM G7 SENSOR) MISC 3 each 5     Si Device continuous prn (Use for continuous glucose testing, change every 10 days)       There is no refill protocol information for this order        Refills sent  Danitza Acevedo RN

## 2023-03-25 ENCOUNTER — TELEPHONE (OUTPATIENT)
Dept: INTERNAL MEDICINE | Facility: CLINIC | Age: 73
End: 2023-03-25
Payer: MEDICARE

## 2023-03-25 NOTE — TELEPHONE ENCOUNTER
Patient Returning Call    Reason for call:  meds for pink eye    Information relayed to patient:  te    Patient has additional questions:  No    What are your questions/concerns:  n\a    Could we send this information to you in [place] or would you prefer to receive a phone call?:   Patient would prefer a phone call   Okay to leave a detailed message?: Yes at Cell number on file:    Telephone Information:   Mobile [phone]

## 2023-04-04 ENCOUNTER — LAB (OUTPATIENT)
Dept: LAB | Facility: CLINIC | Age: 73
End: 2023-04-04
Payer: MEDICARE

## 2023-04-04 DIAGNOSIS — E10.319 TYPE 1 DIABETES MELLITUS WITH RETINOPATHY, MACULAR EDEMA PRESENCE UNSPECIFIED, UNSPECIFIED LATERALITY, UNSPECIFIED RETINOPATHY SEVERITY (H): ICD-10-CM

## 2023-04-04 DIAGNOSIS — E13.9 DIABETES MELLITUS TYPE 1.5, MANAGED AS TYPE 1 (H): ICD-10-CM

## 2023-04-04 DIAGNOSIS — Z79.899 ENCOUNTER FOR LONG-TERM CURRENT USE OF MEDICATION: ICD-10-CM

## 2023-04-04 DIAGNOSIS — E78.5 HYPERLIPIDEMIA LDL GOAL <100: ICD-10-CM

## 2023-04-04 DIAGNOSIS — Z48.298 AFTERCARE FOLLOWING ORGAN TRANSPLANT: ICD-10-CM

## 2023-04-04 DIAGNOSIS — E10.59 TYPE 1 DIABETES MELLITUS WITH OTHER CIRCULATORY COMPLICATION (H): ICD-10-CM

## 2023-04-04 DIAGNOSIS — Z94.0 KIDNEY REPLACED BY TRANSPLANT: ICD-10-CM

## 2023-04-04 DIAGNOSIS — E10.21 TYPE 1 DIABETES MELLITUS WITH DIABETIC NEPHROPATHY (H): ICD-10-CM

## 2023-04-04 LAB
ANION GAP SERPL CALCULATED.3IONS-SCNC: 9 MMOL/L (ref 7–15)
BUN SERPL-MCNC: 24.8 MG/DL (ref 8–23)
CALCIUM SERPL-MCNC: 9.6 MG/DL (ref 8.8–10.2)
CHLORIDE SERPL-SCNC: 103 MMOL/L (ref 98–107)
CHOLEST SERPL-MCNC: 179 MG/DL
CREAT SERPL-MCNC: 1.39 MG/DL (ref 0.51–0.95)
CYCLOSPORINE BLD LC/MS/MS-MCNC: 76 UG/L (ref 50–400)
DEPRECATED HCO3 PLAS-SCNC: 29 MMOL/L (ref 22–29)
ERYTHROCYTE [DISTWIDTH] IN BLOOD BY AUTOMATED COUNT: 13.2 % (ref 10–15)
GFR SERPL CREATININE-BSD FRML MDRD: 40 ML/MIN/1.73M2
GLUCOSE SERPL-MCNC: 150 MG/DL (ref 70–99)
HBA1C MFR BLD: 6.2 %
HCT VFR BLD AUTO: 39.8 % (ref 35–47)
HDLC SERPL-MCNC: 64 MG/DL
HGB BLD-MCNC: 12.3 G/DL (ref 11.7–15.7)
LDLC SERPL CALC-MCNC: 89 MG/DL
MCH RBC QN AUTO: 29.1 PG (ref 26.5–33)
MCHC RBC AUTO-ENTMCNC: 30.9 G/DL (ref 31.5–36.5)
MCV RBC AUTO: 94 FL (ref 78–100)
NONHDLC SERPL-MCNC: 115 MG/DL
PLATELET # BLD AUTO: 236 10E3/UL (ref 150–450)
POTASSIUM SERPL-SCNC: 4.3 MMOL/L (ref 3.4–5.3)
RBC # BLD AUTO: 4.23 10E6/UL (ref 3.8–5.2)
SODIUM SERPL-SCNC: 141 MMOL/L (ref 136–145)
TME LAST DOSE: NORMAL H
TME LAST DOSE: NORMAL H
TRIGL SERPL-MCNC: 131 MG/DL
WBC # BLD AUTO: 5 10E3/UL (ref 4–11)

## 2023-04-04 PROCEDURE — 80061 LIPID PANEL: CPT

## 2023-04-04 PROCEDURE — 80048 BASIC METABOLIC PNL TOTAL CA: CPT

## 2023-04-04 PROCEDURE — 85014 HEMATOCRIT: CPT

## 2023-04-04 PROCEDURE — 83036 HEMOGLOBIN GLYCOSYLATED A1C: CPT

## 2023-04-04 PROCEDURE — 36415 COLL VENOUS BLD VENIPUNCTURE: CPT

## 2023-04-04 PROCEDURE — 80158 DRUG ASSAY CYCLOSPORINE: CPT

## 2023-04-11 ENCOUNTER — OFFICE VISIT (OUTPATIENT)
Dept: ENDOCRINOLOGY | Facility: CLINIC | Age: 73
End: 2023-04-11
Payer: MEDICARE

## 2023-04-11 VITALS
BODY MASS INDEX: 28.3 KG/M2 | HEIGHT: 63 IN | DIASTOLIC BLOOD PRESSURE: 69 MMHG | WEIGHT: 159.7 LBS | SYSTOLIC BLOOD PRESSURE: 137 MMHG | HEART RATE: 56 BPM

## 2023-04-11 DIAGNOSIS — E13.9 DIABETES MELLITUS TYPE 1.5, MANAGED AS TYPE 1 (H): Primary | ICD-10-CM

## 2023-04-11 DIAGNOSIS — Z94.0 KIDNEY REPLACED BY TRANSPLANT: ICD-10-CM

## 2023-04-11 DIAGNOSIS — E10.21 TYPE 1 DIABETES MELLITUS WITH DIABETIC NEPHROPATHY (H): ICD-10-CM

## 2023-04-11 DIAGNOSIS — E10.59 TYPE 1 DIABETES MELLITUS WITH OTHER CIRCULATORY COMPLICATION (H): ICD-10-CM

## 2023-04-11 DIAGNOSIS — E10.319 TYPE 1 DIABETES MELLITUS WITH RETINOPATHY, MACULAR EDEMA PRESENCE UNSPECIFIED, UNSPECIFIED LATERALITY, UNSPECIFIED RETINOPATHY SEVERITY (H): ICD-10-CM

## 2023-04-11 PROCEDURE — 99214 OFFICE O/P EST MOD 30 MIN: CPT | Performed by: INTERNAL MEDICINE

## 2023-04-11 PROCEDURE — 95251 CONT GLUC MNTR ANALYSIS I&R: CPT | Performed by: INTERNAL MEDICINE

## 2023-04-11 NOTE — PROGRESS NOTES
Recent issues:   Diabetes follow-up evaluation, with her  Johnny  She has been feeling well, no new health issues reported  Reviewed medical history from patient and Epic chart record        Diagnosis of diabetes mellitus age 23, living in [facility]  Recalls having vision problem, saw eye physician and then family physician, also had frequent urination  Initial treatment with oral medication for few weeks, then change to insulin  Had seen Dr. Castillo in [facility]  Subsequent evaluations with Dr. GABRIELE Vickers, then saw Dr. Elmer Garvin/[facility] for endocrinology evaluations  Has used insulin injections 4x per day  Previous FV hgbA1c trends include:     Lab Test 07/06/21  1422 03/18/21  0636 10/29/20  0710 03/27/20  0648 11/20/19  0803   A1C 6.7* 6.8* 7.0* 6.8* 6.9*         8/10/21. Initial diabetes evaluation with me at [facility]  Reviewed health history and diabetes issues  Gets her insulin pens from [facility] mail order  Current DM medications:  Novolog Flexpen 5U premeal (4-5U)  Novolog sscale guestimates  Basaglar Kwikpen  15U subcutaneous in evening    Blood glucose (BG) meter: Raad Contour?   Has tested 4x per day previously   Less frequent testing when using CGM sensor    Has used the DexcomG5, then the G6 CGM sensor  Recent DexcomG6 data:            FamHx DM:  Niece  Recent [facility] labs include:  Lab Results   Component Value Date    A1C 6.2 (H) 04/04/2023     04/04/2023    POTASSIUM 4.3 04/04/2023    CHLORIDE 103 04/04/2023    CO2 29 04/04/2023    ANIONGAP 9 04/04/2023     (H) 04/04/2023    BUN 24.8 (H) 04/04/2023    CR 1.39 (H) 04/04/2023    GFRESTIMATED 40 (L) 04/04/2023    GFRESTBLACK 45 (L) 06/23/2021    NAVI 9.6 04/04/2023    CPEPT 0.2 (L) 11/29/2021    CHOL 179 04/04/2023    TRIG 131 04/04/2023    HDL 64 04/04/2023    LDL 89 04/04/2023    NHDL 115 04/04/2023    UCRR 103.9 11/30/2022    MICROL 30.8 08/12/2022    UMALCR 32.39 (H) 08/12/2022    TSH 2.54 07/29/2022    T4 1.41 04/02/2018     Last  eye exam with Dr. Arevalo/[facility] in 8/2022     DM Complications:   Nephropathy    Previous ESRD, then renal transplant 2005    Sees Dr. Morro Veloz/nephrology   Retinopathy    Previous PDR and laser surgeries [facility]    Had seen Dr. Grayson Miles/[facility] clinic, now sees Dr. Arevalo/[facility] at that clinic building    Macrovascular disease    CAD, previous cardiac stent placement      Lives in [place], [place]  Sees Dr. Summer Vega/[facility] for general medicine evaluations.    PMH/PSH:  Past Medical History:   Diagnosis Date     Abdominal wall hernia     RLQ     AK (actinic keratosis) 08/06/2020     Allergic rhinitis      CAD (coronary artery disease)     coronary artery disease s/p PCI to LAD in 2005coronary artery disease s/p PCI to LAD in 2005     Diabetes mellitus, type 2 (H)     follows up with endocrinologist.     Dyslipidemia      GERD (gastroesophageal reflux disease)      H/O diabetic nephropathy     s/p kidney trnsplant     Hypertension      Hypothyroidism      Immunosuppressed status (H)      Kidney replaced by transplant     Rt     PONV (postoperative nausea and vomiting)      Shingles      Urinary tract infection 8/12/2022     Vitamin B12 deficiency anemia      Past Surgical History:   Procedure Laterality Date     APPENDECTOMY NOS       ARTHROSCOPY SHOULDER ROTATOR CUFF REPAIR Left      COLONOSCOPY N/A 6/7/2016    Procedure: COLONOSCOPY;  Surgeon: Rashard Snider MD;  Location: [facility]     Coronary angioplasty with stent  2005     HYSTERECTOMY       Kidney Transplant NOS Right      LAPAROSCOPIC CHOLECYSTECTOMY       NOSE SURGERY  2013     OPEN SEPARATION COMPONENT HERNIORRHAPHY N/A 10/16/2017    Procedure: OPEN SEPARATION COMPONENT HERNIORRHAPHY;;  Surgeon: CARL Lott MD;  [facility]     RECONSTRUCT ABDOMINAL WALL N/A 10/16/2017    Procedure: RECONSTRUCT ABDOMINAL WALL;  Exploratory Laparotomy, Lysis of Adhesions, Abdominal Wall  reconstruction, Inlay Xen AB mesh, Mitek Suture [place] and Umbilical Hernia Repair.;  Surgeon: CARL Lott MD;  Location: [facility] OR     REMOVE CATARACT INTRACAP,INSERT LENS Right      TONSILLECTOMY         Family Hx:  Family History   Problem Relation Age of Onset     Respiratory Mother          age 71     Cardiovascular Father          age 75 hyertension     Arthritis Sister         living, healthy     Family History Negative Brother          age 44     Colon Cancer No family hx of          Social Hx:  Social History     Socioeconomic History     Marital status:      Spouse name: Not on file     Number of children: Not on file     Years of education: Not on file     Highest education level: Not on file   Occupational History     Not on file   Tobacco Use     Smoking status: Never     Smokeless tobacco: Never   Vaping Use     Vaping status: Not on file   Substance and Sexual Activity     Alcohol use: No     Drug use: No     Sexual activity: Yes     Partners: Male   Other Topics Concern     Parent/sibling w/ CABG, MI or angioplasty before 65F 55M? Not Asked   Social History Narrative     Not on file     Social Determinants of Health     Financial Resource Strain: Not on file   Food Insecurity: Not on file   Transportation Needs: Not on file   Physical Activity: Not on file   Stress: Not on file   Social Connections: Not on file   Intimate Partner Violence: Not on file   Housing Stability: Not on file          MEDICATIONS:  has a current medication list which includes the following prescription(s): amlodipine, aspirin, atorvastatin, cholecalciferol, dexcom g6 transmitter, cyclosporine modified, insulin aspart, insulin glargine, isosorbide mononitrate, levothyroxine, losartan, metoprolol succinate er, mycophenolic acid, pantoprazole, teddy contour, bd insulin syringe ultrafine, dexcom g7 , and dexcom g7 sensor.    ROS:     ROS: 10 point ROS neg other than the symptoms noted  "above in the HPI.    GENERAL: some fatigue, wt stable; denies fevers, chills, night sweats.   HEENT: no dysphagia, odonophagia, diplopia, neck pain  THYROID:  no apparent hyper or hypothyroid symptoms  CV: no chest pain, pressure, palpitations  LUNGS: no SOB, JIMENEZ, cough, wheezing   ABDOMEN: no diarrhea, constipation, abdominal pain  EXTREMITIES: no rashes, ulcers, edema  NEUROLOGY: decreased sensation at feet, balance problems; no headaches, denies changes in vision, tingling  MSK: some leg weakness, denies specific arthralgias  SKIN: no rashes or lesions  :  no menses  PSYCH:  stable mood, no significant anxiety or depression  ENDOCRINE: no heat or cold intolerance      Physical Exam   VS: /69   Pulse 56   Ht 1.6 m (5' 3\")   Wt 72.4 kg (159 lb 11.2 oz)   BMI 28.29 kg/m    GENERAL: AXOX3, NAD, well dressed, answering questions appropriately, appears stated age.  ENT: no nose swelling or nasal discharge, mouth redness or gum changes.  EYES: eyes grossly normal to inspection, conjunctivae and sclerae normal, no exophthalmos or proptosis  THYROID:  no apparent nodules or goiter  LUNGS: no audible wheeze, cough or visible cyanosis, or increased work of breathing  ABDOMEN: abdomen size  EXTREMITIES: normal appearance of feet, pulses R/L DP 3/3, no skin lesions or edema   NEUROLOGY: CN grossly intact, no tremors  MSK: grossly intact  SKIN:  no apparent skin lesions, rash, or edema with visualized skin appearance  PSYCH: mentation appears normal, affect normal/bright, judgement and insight intact,   normal speech and appearance well groomed      LABS:    All pertinent notes, labs, and images personally reviewed by me.     A/P:  Encounter Diagnoses   Name Primary?     Diabetes mellitus type 1.5, managed as type 1 (H) Yes     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)      Kidney replaced by transplant      Type 1 diabetes mellitus with retinopathy, macular edema presence unspecified, unspecified " laterality, unspecified retinopathy severity (H)      Type 1 diabetes mellitus with other circulatory complication (H)        Comments:  Reviewed complicated health history and diabetes issues  Previous low C-Peptide, negative islet cell antibody, and diabetes history indicate Type 1.5 DM.  Recent glucose trends good, DexcomG6 CGM sensor helpful  Reviewed and interpreted tests that I previously ordered.   Ordered appropriate tests for the endocrinology disease management.    Management options discussed and implemented after shared medical decision making with the patient.  T1.5DM problem is chronic-stable    Plan:  Reviewed general issues with the diabetes diagnosis and management  We discussed the hgbA1c test which reflects previous overall glucose levels or control  Discussed the importance of blood glucose (BG) testing to assess glucose trends  Provided general overview of the diabetes medication options and medication treatment plan.  Reviewed recent DexcomG6 CGM glucose trend data, in detail.    Recommend:  Continue current Novolog and Basaglar insulin medication use  Lower Basaglar to 13U QPM  Avoid taking Novolog after start of meal... take dose premeal  Discussed the ideal mealtime and correction scale dosing routine   Use ISF 1U per 50 mg/dl >150 mg/dl   Avoid using nighttime Nv sscale unless SG >250 mg/dl  Goal target premeal SG  mg/dl  Continue use of the DexcomG6 CGM sensor   Patient's insulin regimen requires frequent dose adjustments by patient on basis of therapeutic BGM/CGM test results   I spoke with her [facility] pharmacist today and was told the Medicare plan would not cover the DexcomG7 CGM  We have discussed hypoglycemia prevention  Previous insulin medication from [facility] pharmacy in [place], mail order   They will contact me if needing new Rx locally in [place]  Plan repeat labs in early 8/2023   Lab orders placed  Keep focus on diet, exercise, weight management.  Advise having fasting  lipid panel testing and dilated eye examination, at least annually    Keep regular follow-up evaluations with nephrologist, cardiologist, ophthalmologist, and PCP   Addressed patient questions today    There are no Patient Instructions on file for this visit.    Future labs ordered today:   Orders Placed This Encounter   Procedures     GLUCOSE MONITOR, 72 HOUR, PHYS INTERP     Hemoglobin A1c     ALT     Radiology/Consults ordered today: None    Total time spent on day of encounter:  35 min    Follow-up:  8/8/23 at 8:30 am, Return    DIANN Fitzpatrick MD, MS  Endocrinology  [facility]    CC:  SHARON Vicente

## 2023-04-11 NOTE — LETTER
4/11/2023         RE: Viv Shaw  [address]  [place] [address]        Dear Colleague,    Thank you for referring your patient, Viv Shaw, to the [facility] [place]. Please see a copy of my visit note below.        Recent issues:   Diabetes follow-up evaluation, with her  Bill  She has been feeling well, no new health issues reported  Reviewed medical history from patient and Epic chart record        Diagnosis of diabetes mellitus age 23, living in [facility]  Recalls having vision problem, saw eye physician and then family physician, also had frequent urination  Initial treatment with oral medication for few weeks, then change to insulin  Had seen Dr. Castillo in [facility]  Subsequent evaluations with Dr. GABRIELE Vickers, then saw Dr. Elmer Garvin/[facility] for endocrinology evaluations  Has used insulin injections 4x per day  Previous [facility] hgbA1c trends include:     Lab Test 07/06/21  1422 03/18/21  0636 10/29/20  0710 03/27/20  0648 11/20/19  0803   A1C 6.7* 6.8* 7.0* 6.8* 6.9*         8/10/21. Initial diabetes evaluation with me at [facility]  Reviewed health history and diabetes issues  Gets her insulin pens from [facility] mail order  Current DM medications:  Novolog Flexpen 5U premeal (4-5U)  Novolog sscale guestimates  Basaglar Kwikpen  15U subcutaneous in evening    Blood glucose (BG) meter: Raad Contour?   Has tested 4x per day previously   Less frequent testing when using CGM sensor    Has used the DexcomG5, then the G6 CGM sensor  Recent DexcomG6 data:            FamHx DM:  Niece  Recent [facility]V labs include:  Lab Results   Component Value Date    A1C 6.2 (H) 04/04/2023     04/04/2023    POTASSIUM 4.3 04/04/2023    CHLORIDE 103 04/04/2023    CO2 29 04/04/2023    ANIONGAP 9 04/04/2023     (H) 04/04/2023    BUN 24.8 (H) 04/04/2023    CR 1.39 (H) 04/04/2023    GFRESTIMATED 40 (L) 04/04/2023    GFRESTBLACK 45 (L) 06/23/2021    NAVI 9.6 04/04/2023     CPEPT 0.2 (L) 11/29/2021    CHOL 179 04/04/2023    TRIG 131 04/04/2023    HDL 64 04/04/2023    LDL 89 04/04/2023    NHDL 115 04/04/2023    UCRR 103.9 11/30/2022    MICROL 30.8 08/12/2022    UMALCR 32.39 (H) 08/12/2022    TSH 2.54 07/29/2022    T4 1.41 04/02/2018     Last eye exam with Dr. Arevalo/[facility] in 8/2022     DM Complications:   Nephropathy    Previous ESRD, then renal transplant 2005    Sees Dr. Morro Veloz/nephrology   Retinopathy    Previous PDR and laser surgeries [facility]    Had seen Dr. Grayson Miles/[facility], now sees Dr. Arevalo[facility] at that clinic building    Macrovascular disease    CAD, previous cardiac stent placement      Lives in [place], [place]  Sees Dr. Summer Vega/[facility] for general medicine evaluations.    PMH/PSH:  Past Medical History:   Diagnosis Date     Abdominal wall hernia     RLQ     AK (actinic keratosis) 08/06/2020     Allergic rhinitis      CAD (coronary artery disease)     coronary artery disease s/p PCI to LAD in 2005coronary artery disease s/p PCI to LAD in 2005     Diabetes mellitus, type 2 (H)     follows up with endocrinologist.     Dyslipidemia      GERD (gastroesophageal reflux disease)      H/O diabetic nephropathy     s/p kidney trnsplant     Hypertension      Hypothyroidism      Immunosuppressed status (H)      Kidney replaced by transplant     Rt     PONV (postoperative nausea and vomiting)      Shingles      Urinary tract infection 8/12/2022     Vitamin B12 deficiency anemia      Past Surgical History:   Procedure Laterality Date     APPENDECTOMY NOS       ARTHROSCOPY SHOULDER ROTATOR CUFF REPAIR Left      COLONOSCOPY N/A 6/7/2016    Procedure: COLONOSCOPY;  Surgeon: Rashard Snider MD;  Location: [facility]     Coronary angioplasty with stent  2005     HYSTERECTOMY       Kidney Transplant NOS Right      LAPAROSCOPIC CHOLECYSTECTOMY       NOSE SURGERY  2013     OPEN SEPARATION COMPONENT HERNIORRHAPHY N/A  10/16/2017    Procedure: OPEN SEPARATION COMPONENT HERNIORRHAPHY;;  Surgeon: CARL Lott MD;  [facility]     RECONSTRUCT ABDOMINAL WALL N/A 10/16/2017    Procedure: RECONSTRUCT ABDOMINAL WALL;  Exploratory Laparotomy, Lysis of Adhesions, Abdominal Wall reconstruction, Inlay Xen AB mesh, Mitek Suture [place] and Umbilical Hernia Repair.;  Surgeon: CARL Lott MD;  Location: [facility] OR     REMOVE CATARACT INTRACAP,INSERT LENS Right      TONSILLECTOMY         Family Hx:  Family History   Problem Relation Age of Onset     Respiratory Mother          age 71     Cardiovascular Father          age 75 hyertension     Arthritis Sister         living, healthy     Family History Negative Brother          age 44     Colon Cancer No family hx of          Social Hx:  Social History     Socioeconomic History     Marital status:      Spouse name: Not on file     Number of children: Not on file     Years of education: Not on file     Highest education level: Not on file   Occupational History     Not on file   Tobacco Use     Smoking status: Never     Smokeless tobacco: Never   Vaping Use     Vaping status: Not on file   Substance and Sexual Activity     Alcohol use: No     Drug use: No     Sexual activity: Yes     Partners: Male   Other Topics Concern     Parent/sibling w/ CABG, MI or angioplasty before 65F 55M? Not Asked   Social History Narrative     Not on file     Social Determinants of Health     Financial Resource Strain: Not on file   Food Insecurity: Not on file   Transportation Needs: Not on file   Physical Activity: Not on file   Stress: Not on file   Social Connections: Not on file   Intimate Partner Violence: Not on file   Housing Stability: Not on file          MEDICATIONS:  has a current medication list which includes the following prescription(s): amlodipine, aspirin, atorvastatin, cholecalciferol, dexcom g6 transmitter, cyclosporine modified, insulin aspart,  "insulin glargine, isosorbide mononitrate, levothyroxine, losartan, metoprolol succinate er, mycophenolic acid, pantoprazole, teddy contour, bd insulin syringe ultrafine, dexcom g7 , and dexcom g7 sensor.    ROS:     ROS: 10 point ROS neg other than the symptoms noted above in the HPI.    GENERAL: some fatigue, wt stable; denies fevers, chills, night sweats.   HEENT: no dysphagia, odonophagia, diplopia, neck pain  THYROID:  no apparent hyper or hypothyroid symptoms  CV: no chest pain, pressure, palpitations  LUNGS: no SOB, JIMENEZ, cough, wheezing   ABDOMEN: no diarrhea, constipation, abdominal pain  EXTREMITIES: no rashes, ulcers, edema  NEUROLOGY: decreased sensation at feet, balance problems; no headaches, denies changes in vision, tingling  MSK: some leg weakness, denies specific arthralgias  SKIN: no rashes or lesions  :  no menses  PSYCH:  stable mood, no significant anxiety or depression  ENDOCRINE: no heat or cold intolerance      Physical Exam   VS: /69   Pulse 56   Ht 1.6 m (5' 3\")   Wt 72.4 kg (159 lb 11.2 oz)   BMI 28.29 kg/m    GENERAL: AXOX3, NAD, well dressed, answering questions appropriately, appears stated age.  ENT: no nose swelling or nasal discharge, mouth redness or gum changes.  EYES: eyes grossly normal to inspection, conjunctivae and sclerae normal, no exophthalmos or proptosis  THYROID:  no apparent nodules or goiter  LUNGS: no audible wheeze, cough or visible cyanosis, or increased work of breathing  ABDOMEN: abdomen size  EXTREMITIES: normal appearance of feet, pulses R/L DP 3/3, no skin lesions or edema   NEUROLOGY: CN grossly intact, no tremors  MSK: grossly intact  SKIN:  no apparent skin lesions, rash, or edema with visualized skin appearance  PSYCH: mentation appears normal, affect normal/bright, judgement and insight intact,   normal speech and appearance well groomed      LABS:    All pertinent notes, labs, and images personally reviewed by me.     A/P:  Encounter " Diagnoses   Name Primary?     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  Recent glucose trends good, DexcomG6 CGM sensor helpful  Reviewed and interpreted tests that I previously ordered.   Ordered appropriate tests for the endocrinology disease management.    Management options discussed and implemented after shared medical decision making with the patient.  T1.5DM problem is chronic-stable    Plan:  Reviewed general issues with the diabetes diagnosis and management  We discussed the hgbA1c test which reflects previous overall glucose levels or control  Discussed the importance of blood glucose (BG) testing to assess glucose trends  Provided general overview of the diabetes medication options and medication treatment plan.  Reviewed recent DexcomG6 CGM glucose trend data, in detail.    Recommend:  Continue current Novolog and Basaglar insulin medication use  Lower Basaglar to 13U QPM  Avoid taking Novolog after start of meal... take dose premeal  Discussed the ideal mealtime and correction scale dosing routine   Use ISF 1U per 50 mg/dl >150 mg/dl   Avoid using nighttime Nv sscale unless SG >250 mg/dl  Goal target premeal SG  mg/dl  Continue use of the DexcomG6 CGM sensor   Patient's insulin regimen requires frequent dose adjustments by patient on basis of therapeutic BGM/CGM test results   I spoke with her [facility] pharmacist today and was told the Medicare plan would not cover the DexcomG7 CGM  We have discussed hypoglycemia  prevention  Previous insulin medication from [facility] pharmacy in [place], mail order   They will contact me if needing new Rx locally in [place]  Plan repeat labs in early 8/2023   Lab orders placed  Keep focus on diet, exercise, weight management.  Advise having fasting lipid panel testing and dilated eye examination, at least annually    Keep regular follow-up evaluations with nephrologist, cardiologist, ophthalmologist, and PCP   Addressed patient questions today    There are no Patient Instructions on file for this visit.    Future labs ordered today:   Orders Placed This Encounter   Procedures     GLUCOSE MONITOR, 72 HOUR, PHYS INTERP     Hemoglobin A1c     ALT     Radiology/Consults ordered today: None    Total time spent on day of encounter:  35 min    Follow-up:  8/8/23 at 8:30 am, Return    DIANN Fitzpatrick MD, MS  Endocrinology  [facility]    CC:  SHARON Vicente              Again, thank you for allowing me to participate in the care of your patient.        Sincerely,        Juan M Fitzpatrick MD

## 2023-04-17 NOTE — TELEPHONE ENCOUNTER
Last Written Prescription Date:  4/21/22  Last Fill Quantity: 1,  # refills: 1   Last office visit: 4/11/2023 ; last virtual visit: 12/15/2022 with prescribing provider:Dr Fitzpatrick  Future Office Visit:        Requested Prescriptions   Pending Prescriptions Disp Refills     Continuous Blood Gluc Transmit (DEXCOM G6 TRANSMITTER) MISC 1 each 1     Sig: See Admin Instructions       There is no refill protocol information for this order

## 2023-05-08 NOTE — TELEPHONE ENCOUNTER
Patient Call: Medication Refill  Route to LPN  Instruct the patient to first contact their pharmacy. If they have called their pharmacy and require further assistance, route to LPN.    Pharmacy Name: [facility]  Pharmacy Location: [facility]  Name of Medication: Genfraf Dose: 25 mg 30 day supply   When will the patient be out of this medication?: Less than 3 days (Route high priority)

## 2023-05-08 NOTE — TELEPHONE ENCOUNTER
[facility] Call Center    Phone Message    May a detailed message be left on voicemail: yes     Reason for Call: Other: Patient's spouse would like a call back to discuss a possible upgrade to the G7 for patient.  Has been using G6 for approx 5 years.     Action Taken: Other: endo    Travel Screening: Not Applicable

## 2023-05-08 NOTE — TELEPHONE ENCOUNTER
"Called and spoke with .  Current Dexcom 6 is 5 years old.  Per last visit with Dr Fitzpatrick, \" I spoke with her [facility] pharmacist today and was told the Medicare plan would not cover the DexcomG7 CGM.\"  states he spoke with Medicare and they will pay for the G7 because yesterday (5/7) marked 5 years of use.  Please send new RX to [facility] in [place]. Rx's are pended.  Please review and sign.    Renee Merida RN    "

## 2023-05-09 NOTE — TELEPHONE ENCOUNTER
Message reviewed, but confusing.  I had already sent the DexcomG7 Rx's to patient's [facility] pharmacy... the G7 sensor Rx on 3/22/23 and the G7  on 2/17/23.  I will resend these 2 Rx's again to the [facility] pharmacy, to see if now covered.    DIANN Fitzpatrick MD, MS  [facility]

## 2023-05-09 NOTE — PROGRESS NOTES
"  Assessment & Plan     (R53.83) Other fatigue     Plan: recent CBC and BMP reviewed, normal Hgb ,check Vitamin B12, TSH with free T4 reflex.pt was told I will contact her after results and proceed accordingly.          (M62.81) Generalized muscle weakness  Plan: weakness increased after covid infection in 02/22, referred to Adult Post Covid Clinic  Referral   Also check Vitamin B12, TSH with free T4 reflex            (R41.3) Memory changes  (R44.3) Hallucinations  Plan: Visual hallucinations are clinical feature of dementia with Lewy bodies (DLB) and are also common in Parkinson disease , referred to Neurologist for further evaluation  - Adult Neurology  Referral            (Z74.09) Impaired functional mobility, balance, and endurance  Plan: Adult Post Covid Clinic  Referral            (I15.1,  N28.89) Hypertension secondary to other renal disorders  Plan: SBP slightly high, but BP normal at home, will not change medications at this time , continue to monitor BP and call if SBP > 140/90    (E03.8) Other specified hypothyroidism  Plan: check TSH, on levoxyl 100 mcg daily , adjust levoxyl after results.       Review of the result(s) of each unique test - B12, TSH    60 minutes spent by me on the date of the encounter doing chart review, history and exam, documentation and further activities per the note       Summer Vega MD  [facility]    Anup Nam is a 72 year old, presenting for the following health issues:  Consult (Fatigue \"not feeling well\"), Lipids, Diabetes, and Hypertension         View : No data to display.              History of Present Illness       Reason for visit:  Weakness  Symptom onset:  More than a month  Symptom intensity:  Mild  Symptom progression:  Staying the same  Had these symptoms before:  Yes  Has tried/received treatment for these symptoms:  No    She eats 0-1 servings of fruits and vegetables daily.She consumes 0 " sweetened beverage(s) daily.She exercises with enough effort to increase her heart rate 9 or less minutes per day.  She exercises with enough effort to increase her heart rate 3 or less days per week.   She is taking medications regularly.      Pt is a 72 year old female who is seen here to day along with her  with c/o generalized weakness, fatigue since having had COVID infection in 02/22.  Patient states that she feels weak, gets tired easily, feels like sleeping all the time.  No shortness of breath, no chest pain,  does states that patient has some brain fog.    Patients  also states that patient is having hallucinations which has been going on and off for more than a year and increased after having COVID.  Patient states she sees her son and granddaughter are in the house and talking to them when they are not in the house.  No history of depression or anxiety.    Patients  states that patient does have some memory changes/brain fog.    Patient and  also wants handicap parking form filled due to patient's weakness, balance and endurance issues, uses walker, patient does not drive.      Hypertension Follow-up      Do you check your blood pressure regularly outside of the clinic? Yes 130-138/70    Are you following a low salt diet? Yes    Are your blood pressures ever more than 140 on the top number (systolic) OR more   than 90 on the bottom number (diastolic), for example 140/90? Yes    BP Readings from Last 2 Encounters:   04/11/23 137/69   01/19/23 130/60     Hemoglobin A1C (%)   Date Value   04/04/2023 6.2 (H)   11/30/2022 6.5 (H)   07/06/2021 6.7 (H)   03/18/2021 6.8 (H)     LDL Cholesterol Calculated (mg/dL)   Date Value   04/04/2023 89   07/29/2022 104 (H)   10/29/2020 95   11/20/2019 97     Has history of kidney transplant: Follows up with transplant clinic    Diabetes  - follows up with endocrinologist     Past Medical History:   Diagnosis Date     Abdominal wall hernia      RLQ     AK (actinic keratosis) 08/06/2020     Allergic rhinitis      CAD (coronary artery disease)     coronary artery disease s/p PCI to LAD in 2005coronary artery disease s/p PCI to LAD in 2005     Diabetes mellitus, type 2 (H)     follows up with endocrinologist.     Dyslipidemia      GERD (gastroesophageal reflux disease)      H/O diabetic nephropathy     s/p kidney trnsplant     Hypertension      Hypothyroidism      Immunosuppressed status (H)      Kidney replaced by transplant     Rt     PONV (postoperative nausea and vomiting)      Shingles      Urinary tract infection 8/12/2022     Vitamin B12 deficiency anemia         Current Outpatient Medications   Medication Sig Dispense Refill     amLODIPine (NORVASC) 10 MG tablet TAKE 1 TABLET (10 MG) BY MOUTH DAILY. 90 tablet 0     ASPIRIN PO Take 81 mg by mouth daily       atorvastatin (LIPITOR) 40 MG tablet TAKE 1/2 TABLET BY MOUTH EVERY DAY 45 tablet 2     cholecalciferol (VITAMIN D3) 25 mcg (1000 units) capsule Take 2 capsules by mouth daily       Continuous Blood Gluc Transmit (DEXCOM G6 TRANSMITTER) MISC See Admin Instructions 1 each 1     GENGRAF (BRAND) 25 MG CAPSULE Take 75 mg in the am and 50 mg in the pm 150 capsule 11     insulin aspart (NOVOLOG PEN) 100 UNIT/ML pen Inject 5-10 units subcutaneous with meals and correction doses as directed, total daily dose approx 25 units 15 mL 3     insulin glargine (BASAGLAR KWIKPEN) 100 UNIT/ML pen Inject 13-15 Units Subcutaneous daily 15 mL 5     isosorbide mononitrate (IMDUR) 30 MG 24 hr tablet Take 0.5 tablets (15 mg) by mouth daily 45 tablet 3     levothyroxine (SYNTHROID/LEVOTHROID) 100 MCG tablet TAKE 1 TABLET BY MOUTH EVERY DAY 90 tablet 1     losartan (COZAAR) 100 MG tablet TAKE 1/2 OF A TABLET (50 MG) BY MOUTH DAILY 45 tablet 1     metoprolol succinate ER (TOPROL XL) 25 MG 24 hr tablet TAKE 1 TABLET BY MOUTH EVERYDAY AT BEDTIME 90 tablet 1     mycophenolic acid (GENERIC EQUIVALENT) 180 MG EC tablet Take 3  "tablets (540 mg) by mouth 2 times daily 180 tablet 11     pantoprazole (PROTONIX) 40 MG EC tablet TAKE 1 TABLET BY MOUTH EVERY DAY 90 tablet 1     FLORIN CONTOUR test strip  (Patient not taking: Reported on 1/19/2023)  0     BD INSULIN SYRINGE ULTRAFINE 31G X 5/16\" 0.3 ML USING 4-5 PER DAY ([facility] 31G/0.3ML) (Patient not taking: Reported on 1/19/2023)  6     Continuous Blood Gluc  (DEXCOM G7 ) LUCAS 1 Device continuous prn (for continuous glucose testing, change every 90 days) (Patient not taking: Reported on 5/9/2023) 1 each 3     Continuous Blood Gluc Sensor (DEXCOM G7 SENSOR) MISC 1 Device continuous prn (Use for continuous glucose testing, change every 10 days) (Patient not taking: Reported on 5/9/2023) 3 each 5         Review of Systems   CONSTITUTIONAL: NEGATIVE for fever, chills, change in weight  EYES: NEGATIVE for vision changes or irritation  ENT/MOUTH: NEGATIVE for ear, mouth and throat problems  RESP: NEGATIVE for significant cough or SOB  CV: NEGATIVE for chest pain, palpitations or peripheral edema  MUSCULOSKELETAL: Weakness  NEURO: hallucinations, Has brain fog/weakness, no dizziness   PSYCHIATRIC: Hallucinations      Objective    BP (!) 146/60   Pulse 65   Temp 97.6  F (36.4  C) (Tympanic)   Resp 12   Ht 1.6 m (5' 3\")   Wt 72.8 kg (160 lb 6.4 oz)   SpO2 97%   BMI 28.41 kg/m    Body mass index is 28.41 kg/m .  Physical Exam   GENERAL:  no distress  EYES: Eyes grossly normal to inspection, PERRL and conjunctivae and sclerae normal  RESP: lungs clear to auscultation - no rales, rhonchi or wheezes  CV: regular rate and rhythm, normal S1 S2,    MS: no gross musculoskeletal defects noted, no edema  NEURO: slow gait, mentation intact and speech normal                  "

## 2023-05-09 NOTE — NURSING NOTE
"BP (!) 146/60   Pulse 65   Temp 97.6  F (36.4  C) (Tympanic)   Resp 12   Ht 1.6 m (5' 3\")   Wt 72.8 kg (160 lb 6.4 oz)   SpO2 97%   BMI 28.41 kg/m      "

## 2023-05-12 NOTE — TELEPHONE ENCOUNTER
Per patients , he states Medicare will pay for the Dexcom 7 but the order is not written right to the pharmacy in order for this to be covered. Please call Patients  to have this written properly so Medicare will cover this.     Please call Lois

## 2023-05-12 NOTE — TELEPHONE ENCOUNTER
Called pharmacy to see if the Dexcom G7 is ordered correctly, and it is.  Per pharmacy there is a message saying it can only be filled every 5 yrs, and the last one was in 2019.

## 2023-05-25 NOTE — TELEPHONE ENCOUNTER
Patients  called and states [facility] does have their  now and it is all fixed with Medicare and [facility], they would like to [facility].   [facility] does not need it called in as they have it.   If you have any questions, Please call otherwise per  it is ok

## 2023-05-25 NOTE — TELEPHONE ENCOUNTER
Call from patient's , states they are having trouble getting Gengraf filled at [facility]. Pharmacy is telling them there is an insurance issue that needs to be resolved.     LPN TASK:  Call [facility] and find out what is needed to get Gengraf filled

## 2023-05-25 NOTE — TELEPHONE ENCOUNTER
[facility] Call Center    Phone Message    May a detailed message be left on voicemail: yes     Reason for Call: Other: Per patients spouse Tirso, they need the G7 Dexcom monitor refilled, [facility], [facility] states they are too soon for the 5 year bro, patient called Medicare, Medicare told them they are due so patient was told to try a diffeerent pharmacy, [facility] would [facility]. Please call patient with questions/concerns and when this has been called in please.      Action Taken: Other: Endo     Travel Screening: Not Applicable                                                                         Progress Note - Behavioral Health   Luz Elena Whitaker 60 y o  female MRN: [id number]  Unit/Bed#: AH3 558-01 Encounter: [id number]    Assessment/Plan   Principal Problem:    Schizoaffective disorder, bipolar type [facility])  Active Problems:    Encounter for examination and observation for other specified reasons    Fungal dermatitis    Patient reported her mood is good  She has no current hallucinations  She states her energy level, sleep, and appetite are all good  She is looking forward to discharge and seeing her friends  She denies SI, she states she wants to live for her 2 grandchildren  Staff reported that the patient has been more seclusive but the patient states that she has been out of her room for meals and attending groups  Mental Status Evaluation:  Appearance:  casually dressed and overweight   Behavior:  Cooperative and calm   Speech:  normal volume and Normal rate   Mood:  Dysphoric   Affect:  blunted and constricted   Thought Process:  goal directed   Thought Content:  normal   Perceptual Disturbances: None   Risk Potential: No current suicidal or homicidal ideation   Sensorium:  person, place, time/date and situation   Cognition:  grossly intact   Consciousness:  alert and awake    Attention: Appeared normal   Insight:  limited   Judgment: fair   Gait/Station: Not observed was laying in bed   Motor Activity: no abnormal movements     Progress Toward Goals:  Improving and approaching baseline    Recommended Treatment: Continue with group therapy, milieu therapy and occupational therapy  Risks, benefits and possible side effects of Medications:   Risks, benefits, and possible side effects of medications explained to patient and patient verbalizes understanding        Medications:   all current active meds have been reviewed and current meds:   Current Facility-Administered Medications   Medication Dose Route Frequency    acetaminophen (TYLENOL) tablet 650 mg  650 mg Oral Q6H PRN    acetaminophen (TYLENOL) tablet 650 mg  650 mg Oral Q4H PRN    acetaminophen (TYLENOL) tablet 975 mg  975 mg Oral Q6H PRN    albuterol (PROVENTIL HFA,VENTOLIN HFA) inhaler 2 puff  2 puff Inhalation Q6H    aluminum-magnesium hydroxide-simethicone (MYLANTA) 200-200-20 mg/5 mL oral suspension 30 mL  30 mL Oral Q4H PRN    amLODIPine (NORVASC) tablet 2 5 mg  2 5 mg Oral Daily    benztropine (COGENTIN) injection 1 mg  1 mg Intramuscular Q1H PRN    benztropine (COGENTIN) tablet 1 mg  1 mg Oral Q1H PRN    bisacodyl (DULCOLAX) rectal suppository 10 mg  10 mg Rectal Daily PRN    cholecalciferol (VITAMIN D3) tablet 5,000 Units  5,000 Units Oral HS    clonazePAM (KlonoPIN) tablet 0 5 mg  0 5 mg Oral Early Morning    clonazePAM (KlonoPIN) tablet 0 75 mg  0 75 mg Oral HS    clonazePAM (KlonoPIN) tablet 0 75 mg  0 75 mg Oral Daily With Lunch    DULoxetine (CYMBALTA) delayed release capsule 120 mg  120 mg Oral Daily    fluticasone (FLONASE) 50 mcg/act nasal spray 2 spray  2 spray Nasal Daily    fluticasone-salmeterol (ADVAIR) 250-50 mcg/dose inhaler 1 puff  1 puff Inhalation BID    folic acid (FOLVITE) tablet 1 mg  1 mg Oral Daily    gabapentin (NEURONTIN) capsule 600 mg  600 mg Oral TID    haloperidol (HALDOL) tablet 5 mg  5 mg Oral Q6H PRN    haloperidol lactate (HALDOL) injection 5 mg  5 mg Intramuscular Q6H PRN    hydrOXYzine HCL (ATARAX) tablet 25 mg  25 mg Oral TID    lamoTRIgine (LaMICtal) tablet 200 mg  200 mg Oral Daily    levothyroxine tablet 137 mcg  137 mcg Oral Early Morning    loperamide (IMODIUM) capsule 2 mg  2 mg Oral Q4H PRN    loratadine (CLARITIN) tablet 10 mg  10 mg Oral Daily    LORazepam (ATIVAN) 2 mg/mL injection 2 mg  2 mg Intramuscular Q4H PRN    LORazepam (ATIVAN) tablet 1 mg  1 mg Oral Q6H PRN    loxapine (LOXITANE) capsule 75 mg  75 mg Oral HS    lurasidone (LATUDA) tablet 120 mg  120 mg Oral Daily With Dinner    magnesium hydroxide (MILK OF MAGNESIA) 400 mg/5 mL oral suspension 30 mL  30 mL Oral Daily PRN    melatonin tablet 4 5 mg  4 5 mg Oral HS    nystatin (MYCOSTATIN) powder   Topical TID    OLANZapine (ZyPREXA) tablet 5 mg  5 mg Oral Q3H PRN    oxybutynin (DITROPAN-XL) 24 hr tablet 20 mg  20 mg Oral Daily    oxyCODONE-acetaminophen (PERCOCET) 5-325 mg per tablet 1 tablet  1 tablet Oral Q8H PRN    pravastatin (PRAVACHOL) tablet 40 mg  40 mg Oral Daily With Dinner    risperiDONE (RisperDAL M-TABS) dispersible tablet 1 mg  1 mg Oral Q3H PRN    traZODone (DESYREL) tablet 50 mg  50 mg Oral HS PRN    traZODone (DESYREL) tablet 50 mg  50 mg Oral HS    ziprasidone (GEODON) IM injection 20 mg  20 mg Intramuscular Q4H PRN

## 2023-05-30 NOTE — PROGRESS NOTES
Viv is a 71 year old who is being evaluated via a billable video visit.      How would you like to obtain your AVS? MyChart  If the video visit is dropped, the invitation should be resent by: [phone]  Will anyone else be joining your video visit? No      Video Start Time: 11:07 AM    Assessment & Plan     (I15.1,  N28.89) Hypertension secondary to other renal disorders  Comment: BP stable at home   Plan: amLODIPine (NORVASC) 10 MG tablet, losartan         (COZAAR) 100 MG tablet, metoprolol succinate ER        (TOPROL XL) 25 MG 24 hr tablet refilled as directed.explained clearly about the medication,insructions and side effects.   Advised to follow low salt diet and exercise and f/u in 6 mths.        (E03.8) Other specified hypothyroidism  Plan: levothyroxine (SYNTHROID/LEVOTHROID) 100 MCG  Tablet refilled.explained clearly about the medication,insructions and side effects.             (K21.9) Gastroesophageal reflux disease without esophagitis  Comment: stable  Plan: pantoprazole (PROTONIX) 40 MG EC tablet refilled.explained clearly about the medication,insructions and side effects.          Z94.0) Status post kidney transplant    (D84.9) Immunosuppression (H)  (N18.32) Stage 3b chronic kidney disease (H)  Plan: Follows up with the nephrologist/transplant clinic    (E78.5) Hyperlipidemia LDL goal <100  Plan: atorvastatin (LIPITOR) 40 MG tablet refilled as directed.explained clearly about the medication,insructions and side effects.            Prescription drug management     30 minutes spent on the date of the encounter doing chart review, history and exam, documentation and further activities per the note      Return in about 23 weeks (around 10/18/2022).    Summer Vega MD  [facility]   Viv is a 71 year old who presents for the following health issues  accompanied by her spouse.    History of Present Illness       Reason for visit:  Med check    She  eats 2-3 servings of fruits and vegetables daily.She consumes 0 sweetened beverage(s) daily.She exercises with enough effort to increase her heart rate 9 or less minutes per day.  She exercises with enough effort to increase her heart rate 3 or less days per week.   She is taking medications regularly.        Hyperlipidemia Follow-Up      Are you regularly taking any medication or supplement to lower your cholesterol?   Yes- statin    Are you having muscle aches or other side effects that you think could be caused by your cholesterol lowering medication?  No      Hypothyroidism Follow-up      Since last visit, patient describes the following symptoms: Weight stable, no hair loss, no skin changes, no constipation, no loose stools      Diabetes; Follows-up with endocrinologist       S/p kidney transplant/immunosuppressed status: Follows up with transplant clinic      Hypertension Follow-up      Do you check your blood pressure regularly outside of the clinic? Yes /69     Are you following a low salt diet? Yes    Are your blood pressures ever more than 140 on the top number (systolic) OR more   than 90 on the bottom number (diastolic), for example 140/90? Yes    BP Readings from Last 2 Encounters:   04/21/22 135/60   02/05/22 135/70     Hemoglobin A1C POCT (%)   Date Value   07/06/2021 6.7 (H)   03/18/2021 6.8 (H)     Hemoglobin A1C (%)   Date Value   04/11/2022 7.1 (H)   11/29/2021 7.3 (H)     LDL Cholesterol Calculated (mg/dL)   Date Value   11/29/2021 84   10/29/2020 95   11/20/2019 97       Past Medical History:   Diagnosis Date     Abdominal wall hernia     RLQ     AK (actinic keratosis) 08/06/2020     Allergic rhinitis      CAD (coronary artery disease)     coronary artery disease s/p PCI to LAD in 2005coronary artery disease s/p PCI to LAD in 2005     Diabetes mellitus, type 2 (H)     follows up with endocrinologist.     Dyslipidemia      GERD (gastroesophageal reflux disease)      H/O diabetic nephropathy  "    s/p kidney trnsplant     Hypertension      Hypothyroidism      Immunosuppressed status (H)      Kidney replaced by transplant     Rt     PONV (postoperative nausea and vomiting)      Shingles      Vitamin B12 deficiency anemia        Current Outpatient Medications   Medication Sig Dispense Refill     amLODIPine (NORVASC) 10 MG tablet Take 1 tablet (10 mg) by mouth daily 90 tablet 1     ASPIRIN PO Take 81 mg by mouth daily       atorvastatin (LIPITOR) 40 MG tablet Take 1 tablet (40 mg) by mouth daily 90 tablet 1     FLORIN CONTOUR test strip   0     BD INSULIN SYRINGE ULTRAFINE 31G X 5/16\" 0.3 ML USING 4-5 PER DAY ([facility] 31G/0.3ML)  6     calcium carbonate (TUMS) 500 MG chewable tablet Take 1-2 chew tab by mouth 2 times daily as needed for heartburn       cholecalciferol (VITAMIN D3) 25 mcg (1000 units) capsule Take 2 capsules by mouth daily       Continuous Blood Gluc Transmit (DEXCOM G6 TRANSMITTER) MISC See Admin Instructions 1 each 1     GENGRAF (BRAND) 25 MG CAPSULE Take 2 capsules (50 mg) by mouth 2 times daily 360 capsule 3     insulin aspart (NOVOLOG PEN) 100 UNIT/ML pen Taking as directed with adjustment scale and carb coverage. (Patient taking differently: Through Endocrinologist     Taking as directed with adjustment scale and carb coverage.)       insulin glargine (BASAGLAR KWIKPEN) 100 UNIT/ML pen Inject 14 Units Subcutaneous At Bedtime Through Endocrinologist       isosorbide mononitrate (IMDUR) 30 MG 24 hr tablet TAKE 0.5 TABLETS (15 MG) BY MOUTH 2 TIMES DAILY (Patient taking differently: Take 15 mg by mouth daily TAKE 0.5 TABLETS (15 MG) BY MOUTH Daily PM) 90 tablet 2     levothyroxine (SYNTHROID/LEVOTHROID) 100 MCG tablet Take 1 tablet (100 mcg) by mouth daily 90 tablet 1     losartan (COZAAR) 100 MG tablet TAKE 1/2 OF A TABLET (50 MG) BY MOUTH DAILY 45 tablet 1     metoprolol succinate ER (TOPROL XL) 25 MG 24 hr tablet TAKE 1 TABLET BY MOUTH EVERYDAY AT BEDTIME 90 tablet 1     mycophenolic " acid (GENERIC EQUIVALENT) 180 MG EC tablet Take 3 tablets (540 mg) by mouth 2 times daily 180 tablet 11     pantoprazole (PROTONIX) 40 MG EC tablet Take 1 tablet (40 mg) by mouth daily 90 tablet 1       Review of Systems   CONSTITUTIONAL: NEGATIVE for fever, chills, change in weight  ENT/MOUTH: NEGATIVE for ear, mouth and throat problems  RESP: NEGATIVE for significant cough or SOB  CV: NEGATIVE for chest pain, palpitations or peripheral edema  MUSCULOSKELETAL: NEGATIVE for significant arthralgias or myalgia  NEURO: NEGATIVE for weakness, dizziness or paresthesias  PSYCHIATRIC: NEGATIVE for changes in mood or affect      Objective           Vitals:  No vitals were obtained today due to virtual visit.    Physical Exam   GENERAL: Healthy, alert and no distress  EYES: Eyes grossly normal to inspection.  No discharge or erythema, or obvious scleral/conjunctival abnormalities.  RESP: No audible wheeze, cough, or visible cyanosis.  No visible retractions or increased work of breathing.    PSYCH: Mentation appears normal, affect normal/bright, judgement and insight intact, normal speech and appearance well-groomed.      Video-Visit Details    Type of service:  Video Visit    Video End Time:11:18 AM    Originating Location (pt. Location): Home    Distant Location (provider location):  [facility]     Platform used for Video Visit: [email or web address]    used

## 2023-05-31 NOTE — TELEPHONE ENCOUNTER
Prior Authorization Specialty Medication Request    Medication/Dose: Gengraf  ICD code (if different than what is on RX):  Z94.0 Kidney transplant   Previously Tried and Failed:      Important Lab Values:   Rationale:     Insurance Name:   Insurance ID:   Insurance Phone Number:     Pharmacy Information (if different than what is on RX)  Name:  [facility]   Phone:  [phone]    Rep note that additional PA documentation is needed.

## 2023-05-31 NOTE — TELEPHONE ENCOUNTER
Prior Authorization Not Needed per Insurance    Medication: GENGRAF PO  Insurance Company:    Expected CoPay:      Pharmacy Filling the Rx: [id number] - [place], [place] - [address] AT [address]  Pharmacy Notified: yes    Patient Notified:  No    PA Not needed however a CMN form is still needed. Informed [facility] that this information was sent back 3/2023.  pharmacist will look into this and give me a call back

## 2023-05-31 NOTE — TELEPHONE ENCOUNTER
Patient Call: General  Route to LPN    Reason for call:   called in regards of update on patient's CMN form and needing signuture and approval from provider to Refill Gengraf medication. Call back number is [phone].    Call back needed? Yes    Return Call Needed  Same as documented in contacts section  When to return call?: Same day: Route High Priority

## 2023-05-31 NOTE — TELEPHONE ENCOUNTER
Call returned to [facility]. Rep note that supporting prior authorization documentation is needed. PA sent.

## 2023-06-01 NOTE — TELEPHONE ENCOUNTER
Called and spoke with .  Advised  was sent to [facility] on 5/25/23 for 1 each with 3 refills.  [facility]  Advised to call back with further questions/concerns.     Renee Merida RN

## 2023-06-01 NOTE — TELEPHONE ENCOUNTER
sent to [facility] per request.   [facility].     Renee Merida RN

## 2023-06-01 NOTE — TELEPHONE ENCOUNTER
[facility] Call Center    Phone Message    May a detailed message be left on voicemail: yes     Reason for Call: Other: Patient's spouse Bill calling back for nurse Duran as the RX now needs to be sent to [facility] in [facility], please advise     Action Taken: Other: endo    Travel Screening: Not Applicable

## 2023-06-01 NOTE — TELEPHONE ENCOUNTER
[facility] called indicating [facility] is not able to fill .  He now requests it be sent to [facility] pharmacy.    Rx is sent.     Renee Merida RN

## 2023-06-02 NOTE — TELEPHONE ENCOUNTER
Pharmacy was able to get Gengraf through medicare and will get medication filled sometime today  Called patient and left message to call me back

## 2023-06-04 NOTE — TELEPHONE ENCOUNTER
Pending Prescriptions:                       Disp   Refills    pantoprazole (PROTONIX) 40 MG EC tablet [*90 tab*2            Sig: TAKE 1 TABLET BY MOUTH EVERY DAY    Prescription approved per [facility] Refill Protocol.

## 2023-06-05 NOTE — TELEPHONE ENCOUNTER
PRESCRIPTIONS MUST BE WRITTEN THIS WAY TO MAKE THEM MEDICARE COMPLIANT    DEXCOM G7   SIG: Use to read blood sugars as  s instructions.  QTY: 1   REFILLS: 0    DEXCOM G7 SENSORS  SIG: Change every 10 days.  QTY: 3  REFILLS: 5    -Prescriptions must be written after the clinical note date and will only be able to be used for 6 months from the date of the clinical notes. All prescriptions must be from same provider who patient had visit with. (We will be requesting new clinical notes and prescriptions every 6 months to meet Medicare Guidelines.)    Please contact us at [phone] (this number is for clinics only) with any questions. Please only give [phone] to patients.    [facility]   [address] [place] [address]  Phone # [phone]  Fax # [phone]

## 2023-06-06 NOTE — TELEPHONE ENCOUNTER
Next 5 appointments (look out 90 days)    Aug 09, 2023  8:30 AM  (Arrive by 8:10 AM)  Provider Visit with Summer Vega MD  [facility] ([facility] ) [address]  [address]  [facility] [address]  [phone]               Requested Prescriptions   Pending Prescriptions Disp Refills     Continuous Blood Gluc  (DEXCOM G7 ) LUCAS 1 each 3     Si Device continuous prn (for continuous glucose testing, change every 90 days)       There is no refill protocol information for this order        Continuous Blood Gluc Sensor (DEXCOM G7 SENSOR) MISC 3 each 5     Si Device continuous prn (Use for continuous glucose testing, change every 10 days)       There is no refill protocol information for this order        Refill sent per medicare guidelines  Danitza Acevedo RN

## 2023-07-03 NOTE — PROGRESS NOTES
Viv Shaw is a 72 year old female who presents to be evaluated for a billable video visit.    Video-Visit Details    Video visit Start time:10:52 am    Type of service:  Video Visit    Video End Time: 11:23 am    Originating Location (pt. Location): Home    Distant Location (provider location):  Off- Site    Platform used for Video Visit: JLGOV    Assessment/ Impression:   1. Impaired functional mobility, balance, and endurance/Generalized muscle weakness/Post-COVID chronic fatigue  Patient with lingering fatigue, weakness and balance issues since COVID in February 2022. Discussed with recent diagnosis of parkinson's and Lewy body dementia as well as B12 deficiency there are a number of underlying factors causing fatigue.  Discussed  The low B12 can contribute to fatigue as well as balance issues. She will continue her work up with neurology with an MRI brain as well as a sleep study. Explained poor sleep will exacerbate memory issues as well as fatigue.   Discussed working with PT for strengthing as well as checking vitamin D. If in one month she still has fatigue issues will order Occupational therapy for the Post Covid program.   - Adult Post Covid Clinic  Referral  - Physical Therapy Referral; Future  - Vitamin D Deficiency; Future    2. Long COVID  Discussed COVID and Post COVID with patient.  Educational materials provided and all questions answered. Discussed that her lingering symptoms are likely multifactorial and not completely Long COVID.  Multiple questions answered.      Plan:  1. I reviewed present knowledge on long-Covid.  Education was provided and question were answered.  2. Orders/Referrals as above  3. Will advised patient on test results  4. I will follow up with Viv Shaw in 2 months. I will review progress and consider need for any other therapeutic interventions. If there are any questions and/or concerns she will call the clinic.      On day of encounter time spent in  chart review and with patient in consultation, exam, education, coordination of care, review of outside charts/data and documentation: 60 minutes     I have attempted to proof read for major spelling errors and apologize for any minor errors I may have missed.             _________________________________  MARGARITA Levi [facility] PHYSICAL MEDICINE AND REHABILITATION CLINIC [facility]   This 72 year old female presents to the [facility] Rehabilitation Medicine Post-COVID clinic as a new consult to evaluate continuing symptoms after COVID infection initially diagnosed 1/30/22.  Viv Shaw presented to their primary care physician on 1/31/22 complaining of cough, fatigue and weakness.  Patient developed acute respiratory distress on 2/2/22 and was directed to the ER, while she was waiting to see if she qualified for monoclonal antibodies. She was admitted due to respiratory distress and pneumonia. She was treated with oxygen, steroids and remdesivir.  Viv Shaw experienced complications of Acute Respiratory distress and Pneumonia.  Continuing symptoms include fatigue and weakness.   Per  who is present to help with the visit patient was diagnosed with Parkinson's and Lewy body dementia last week by Neurology. She has started Sinemet 25/100mg 3x a day and is in Physical therapy for balance. Her fatigue worsened as well as her memory after COVID. She feels she cannot walk any more and has very poor balance  She also feels she cannot remember anything and is easily confused. Past medical history is significant for CAD, HTH, HLD, Kidney transplant, Type 2 diabetes mellitus, renal insufficiency, depression and anxiety. The patient was vaccinated against COVID x5.  Previous activity was Retired.    History of COVID-19 infection: 1/31/22  Date of first symptoms: 1/25/22  Diagnosis: PCR  Hospitalization: Yes- [facility]  Treatment: Hospitalization:  oxygen, steroids and remdesivir  Current Symptoms: See subjective  Goals of Care: increase energy and improve quality of life          6/30/2023     3:00 PM   PHQ Assesment Total Score(s)   PHQ-9 Score 0         6/30/2023     3:14 PM   WYATT-7 Results   WYATT 7 TOTAL SCORE 1 (minimal anxiety)   WYATT-7 Total Score 1         6/30/2023     3:14 PM   PTSD Screen Score   Have you ever experienced this kind of event? No         6/30/2023     3:22 PM   PROMIS-29   PROMIS Physical Function T-Score 42 (mild dysfunction)   PROMIS Anxiety T-Score 40 (within normal limits)   PROMIS Depression T-Score 41 (within normal limits)   PROMIS Fatigue T-Score 46 (within normal limits)   PROMIS Sleep Disturbance T-Score 41 (within normal limits)   PROMIS Ability to Participate in Social Roles & Activities T-Score 52 (within normal limits)   PROMIS Pain Interference T-Score 50 (within normal limits)   PROMIS Pain Intensity 2       Past Medical History:   Diagnosis Date     Abdominal wall hernia     RLQ     AK (actinic keratosis) 08/06/2020     Allergic rhinitis      CAD (coronary artery disease)     coronary artery disease s/p PCI to LAD in 2005coronary artery disease s/p PCI to LAD in 2005     Diabetes mellitus, type 2 (H)     follows up with endocrinologist.     Dyslipidemia      GERD (gastroesophageal reflux disease)      H/O diabetic nephropathy     s/p kidney trnsplant     Hypertension      Hypothyroidism      Immunosuppressed status (H)      Kidney replaced by transplant     Rt     PONV (postoperative nausea and vomiting)      Shingles      Urinary tract infection 8/12/2022     Vitamin B12 deficiency anemia        Past Surgical History:   Procedure Laterality Date     APPENDECTOMY NOS       ARTHROSCOPY SHOULDER ROTATOR CUFF REPAIR Left      COLONOSCOPY N/A 6/7/2016    Procedure: COLONOSCOPY;  Surgeon: Rashard Snider MD;  Location:  GI     Coronary angioplasty with stent  2005     HYSTERECTOMY    "    Kidney Transplant NOS Right      LAPAROSCOPIC CHOLECYSTECTOMY       NOSE SURGERY       OPEN SEPARATION COMPONENT HERNIORRHAPHY N/A 10/16/2017    Procedure: OPEN SEPARATION COMPONENT HERNIORRHAPHY;;  Surgeon: CARL Lott MD;  Location: [facility] OR     RECONSTRUCT ABDOMINAL WALL N/A 10/16/2017    Procedure: RECONSTRUCT ABDOMINAL WALL;  Exploratory Laparotomy, Lysis of Adhesions, Abdominal Wall reconstruction, Inlay Xen AB mesh, Mitek Suture [place] and Umbilical Hernia Repair.;  Surgeon: CARL Lott MD;  Location: [facility] OR     REMOVE CATARACT INTRACAP,INSERT LENS Right      TONSILLECTOMY         Family History   Problem Relation Age of Onset     Respiratory Mother          age 71     Cardiovascular Father          age 75 hyertension     Arthritis Sister         living, healthy     Family History Negative Brother          age 44     Colon Cancer No family hx of        Social History     Tobacco Use     Smoking status: Never     Smokeless tobacco: Never   Substance Use Topics     Alcohol use: No     Drug use: No         Current Outpatient Medications:      amLODIPine (NORVASC) 10 MG tablet, TAKE 1 TABLET (10 MG) BY MOUTH DAILY., Disp: 90 tablet, Rfl: 0     ASPIRIN PO, Take 81 mg by mouth daily, Disp: , Rfl:      atorvastatin (LIPITOR) 40 MG tablet, TAKE 1/2 TABLET BY MOUTH EVERY DAY, Disp: 45 tablet, Rfl: 2     FLORIN CONTOUR test strip, , Disp: , Rfl: 0     BD INSULIN SYRINGE ULTRAFINE 31G X 5/16\" 0.3 ML, USING 4-5 PER DAY ([facility] 31G/0.3ML) (Patient not taking: Reported on 2023), Disp: , Rfl: 6     cholecalciferol (VITAMIN D3) 25 mcg (1000 units) capsule, Take 2 capsules by mouth daily, Disp: , Rfl:      Continuous Blood Gluc  (DEXCOM G7 ) LUCAS, Use to read blood sugars as 's instructions., Disp: 1 each, Rfl: 0     Continuous Blood Gluc Sensor (DEXCOM G7 SENSOR) MISC, Change every 10 days., Disp: 3 each, Rfl: 5     Continuous Blood Gluc " Transmit (DEXCOM G6 TRANSMITTER) MISC, See Admin Instructions, Disp: 1 each, Rfl: 1     GENGRAF (BRAND) 25 MG CAPSULE, Take 75 mg in the am and 50 mg in the pm, Disp: 150 capsule, Rfl: 11     insulin aspart (NOVOLOG PEN) 100 UNIT/ML pen, Inject 5-10 units subcutaneous with meals and correction doses as directed, total daily dose approx 25 units, Disp: 15 mL, Rfl: 3     insulin glargine (BASAGLAR KWIKPEN) 100 UNIT/ML pen, Inject 13-15 Units Subcutaneous daily, Disp: 15 mL, Rfl: 5     isosorbide mononitrate (IMDUR) 30 MG 24 hr tablet, Take 0.5 tablets (15 mg) by mouth daily, Disp: 45 tablet, Rfl: 3     levothyroxine (SYNTHROID/LEVOTHROID) 100 MCG tablet, TAKE 1 TABLET BY MOUTH EVERY DAY, Disp: 90 tablet, Rfl: 1     losartan (COZAAR) 100 MG tablet, TAKE 1/2 OF A TABLET (50 MG) BY MOUTH DAILY, Disp: 45 tablet, Rfl: 1     metoprolol succinate ER (TOPROL XL) 25 MG 24 hr tablet, TAKE 1 TABLET BY MOUTH EVERYDAY AT BEDTIME, Disp: 90 tablet, Rfl: 1     mycophenolic acid (GENERIC EQUIVALENT) 180 MG EC tablet, Take 3 tablets (540 mg) by mouth 2 times daily, Disp: 180 tablet, Rfl: 11     pantoprazole (PROTONIX) 40 MG EC tablet, TAKE 1 TABLET BY MOUTH EVERY DAY, Disp: 90 tablet, Rfl: 2    Answers to ROS/HPI submitted by patient on 6/30/23  Review of Systems   Constitutional: Positive for fatigue.   Neurological: Positive for weakness. Negative for dizziness, tremors, seizures, numbness and headaches.        Objective    Vitals:  No vitals were obtained today due to virtual visit.    Physical Exam   GENERAL: Healthy, alert and no distress  EYES: Eyes grossly normal to inspection.  No discharge or erythema, or obvious scleral/conjunctival abnormalities.  RESP: No audible wheeze, cough, or visible cyanosis.  No visible retractions or increased work of breathing.    SKIN: Visible skin clear. No significant rash, abnormal pigmentation or lesions.  NEURO: Cranial nerves grossly intact.  Mentation and speech appropriate for age.  PSYCH:  Mentation appears normal, affect normal/bright, judgement and insight intact, normal speech and appearance well-groomed.       SARS-CoV-2 Vance Ab, Interp, S (no units)   Date Value   11/29/2021 Positive       CRP-Ultrasensitive   Date Value Ref Range Status   07/05/2005 0.02 mg/dL Final     Comment:     Female Reference Range     25th Percentile = 0.03  mg/dL     50th Percentile = 0.09  mg/dL     75th Percentile = 0.31  mg/dL     90th Percentile = 0.76  mg/dL     95th Percentile = 0.95  mg/dL     CRP Inflammation   Date Value Ref Range Status   02/02/2022 81.0 (H) 0.0 - 8.0 mg/L Final      Erythrocyte Sedimentation Rate   Date Value Ref Range Status   02/02/2022 62 (H) 0 - 30 mm/hr Final      Last Renal Panel:  Sodium   Date Value Ref Range Status   04/04/2023 141 136 - 145 mmol/L Final   06/23/2021 138 133 - 144 mmol/L Final     Potassium   Date Value Ref Range Status   04/04/2023 4.3 3.4 - 5.3 mmol/L Final   04/11/2022 3.8 3.4 - 5.3 mmol/L Final   06/23/2021 3.8 3.4 - 5.3 mmol/L Final     Chloride   Date Value Ref Range Status   04/04/2023 103 98 - 107 mmol/L Final   04/11/2022 107 94 - 109 mmol/L Final   06/23/2021 106 94 - 109 mmol/L Final     Carbon Dioxide   Date Value Ref Range Status   06/23/2021 30 20 - 32 mmol/L Final     Carbon Dioxide (CO2)   Date Value Ref Range Status   04/04/2023 29 22 - 29 mmol/L Final   04/11/2022 26 20 - 32 mmol/L Final     Anion Gap   Date Value Ref Range Status   04/04/2023 9 7 - 15 mmol/L Final   04/11/2022 9 3 - 14 mmol/L Final   06/23/2021 3 3 - 14 mmol/L Final     Glucose   Date Value Ref Range Status   04/04/2023 150 (H) 70 - 99 mg/dL Final   04/11/2022 150 (H) 70 - 99 mg/dL Final   06/23/2021 192 (H) 70 - 99 mg/dL Final     Urea Nitrogen   Date Value Ref Range Status   04/04/2023 24.8 (H) 8.0 - 23.0 mg/dL Final   04/11/2022 20 7 - 30 mg/dL Final   06/23/2021 28 7 - 30 mg/dL Final     Creatinine   Date Value Ref Range Status   04/04/2023 1.39 (H) 0.51 - 0.95 mg/dL Final    06/23/2021 1.34 (H) 0.52 - 1.04 mg/dL Final     GFR Estimate   Date Value Ref Range Status   04/04/2023 40 (L) >60 mL/min/1.73m2 Final     Comment:     eGFR calculated using 2021 CKD-EPI equation.   06/23/2021 39 (L) >60 mL/min/[1.73_m2] Final     Comment:     Non  GFR Calc  Starting 12/18/2018, serum creatinine based estimated GFR (eGFR) will be   calculated using the Chronic Kidney Disease Epidemiology Collaboration   (CKD-EPI) equation.       Calcium   Date Value Ref Range Status   04/04/2023 9.6 8.8 - 10.2 mg/dL Final   06/23/2021 9.3 8.5 - 10.1 mg/dL Final     Phosphorus   Date Value Ref Range Status   10/19/2017 2.5 2.5 - 4.5 mg/dL Final     Albumin   Date Value Ref Range Status   02/05/2022 2.5 (L) 3.4 - 5.0 g/dL Final   04/22/2019 4.0 3.4 - 5.0 g/dL Final      Lab Results   Component Value Date    WBC 5.0 04/04/2023    WBC 5.0 06/23/2021     Lab Results   Component Value Date    RBC 4.23 04/04/2023    RBC 3.86 06/23/2021     Lab Results   Component Value Date    HGB 12.3 04/04/2023    HGB 11.3 06/23/2021     Lab Results   Component Value Date    HCT 39.8 04/04/2023    HCT 35.4 06/23/2021     No components found for: MCT  Lab Results   Component Value Date    MCV 94 04/04/2023    MCV 92 06/23/2021     Lab Results   Component Value Date    MCH 29.1 04/04/2023    MCH 29.3 06/23/2021     Lab Results   Component Value Date    MCHC 30.9 04/04/2023    MCHC 31.9 06/23/2021     Lab Results   Component Value Date    RDW 13.2 04/04/2023    RDW 12.7 06/23/2021     Lab Results   Component Value Date     04/04/2023     06/23/2021      Lab Results   Component Value Date    A1C 6.2 04/04/2023    A1C 6.5 11/30/2022    A1C 7.8 07/29/2022    A1C 7.1 04/11/2022    A1C 7.3 11/29/2021    A1C 6.7 07/06/2021    A1C 6.8 03/18/2021    A1C 7.0 10/29/2020    A1C 6.8 03/27/2020    A1C 6.9 11/20/2019      TSH   Date Value Ref Range Status   05/09/2023 0.37 0.30 - 4.20 uIU/mL Final   11/29/2021 1.10 0.40 -  4.00 mU/L Final   11/25/2020 0.55 0.40 - 4.00 mU/L Final      No results found for: VITDT   Recent Labs   Lab Test 02/02/22  0940 10/20/17  0937 10/19/17  0747   MAG 1.7 2.0 1.7     Last Comprehensive Metabolic Panel:  Sodium   Date Value Ref Range Status   04/04/2023 141 136 - 145 mmol/L Final   06/23/2021 138 133 - 144 mmol/L Final     Potassium   Date Value Ref Range Status   04/04/2023 4.3 3.4 - 5.3 mmol/L Final   04/11/2022 3.8 3.4 - 5.3 mmol/L Final   06/23/2021 3.8 3.4 - 5.3 mmol/L Final     Chloride   Date Value Ref Range Status   04/04/2023 103 98 - 107 mmol/L Final   04/11/2022 107 94 - 109 mmol/L Final   06/23/2021 106 94 - 109 mmol/L Final     Carbon Dioxide   Date Value Ref Range Status   06/23/2021 30 20 - 32 mmol/L Final     Carbon Dioxide (CO2)   Date Value Ref Range Status   04/04/2023 29 22 - 29 mmol/L Final   04/11/2022 26 20 - 32 mmol/L Final     Anion Gap   Date Value Ref Range Status   04/04/2023 9 7 - 15 mmol/L Final   04/11/2022 9 3 - 14 mmol/L Final   06/23/2021 3 3 - 14 mmol/L Final     Glucose   Date Value Ref Range Status   04/04/2023 150 (H) 70 - 99 mg/dL Final   04/11/2022 150 (H) 70 - 99 mg/dL Final   06/23/2021 192 (H) 70 - 99 mg/dL Final     Urea Nitrogen   Date Value Ref Range Status   04/04/2023 24.8 (H) 8.0 - 23.0 mg/dL Final   04/11/2022 20 7 - 30 mg/dL Final   06/23/2021 28 7 - 30 mg/dL Final     Creatinine   Date Value Ref Range Status   04/04/2023 1.39 (H) 0.51 - 0.95 mg/dL Final   06/23/2021 1.34 (H) 0.52 - 1.04 mg/dL Final     GFR Estimate   Date Value Ref Range Status   04/04/2023 40 (L) >60 mL/min/1.73m2 Final     Comment:     eGFR calculated using 2021 CKD-EPI equation.   06/23/2021 39 (L) >60 mL/min/[1.73_m2] Final     Comment:     Non  GFR Calc  Starting 12/18/2018, serum creatinine based estimated GFR (eGFR) will be   calculated using the Chronic Kidney Disease Epidemiology Collaboration   (CKD-EPI) equation.       Calcium   Date Value Ref Range Status    04/04/2023 9.6 8.8 - 10.2 mg/dL Final   06/23/2021 9.3 8.5 - 10.1 mg/dL Final     Bilirubin Total   Date Value Ref Range Status   02/05/2022 0.3 0.2 - 1.3 mg/dL Final   04/22/2019 0.8 0.2 - 1.3 mg/dL Final     Alkaline Phosphatase   Date Value Ref Range Status   02/05/2022 102 40 - 150 U/L Final   04/22/2019 65 40 - 150 U/L Final     ALT   Date Value Ref Range Status   04/11/2022 19 0 - 50 U/L Final   10/29/2020 17 0 - 50 U/L Final     AST   Date Value Ref Range Status   02/05/2022 19 0 - 45 U/L Final   10/29/2020 10 0 - 45 U/L Final     Most Recent D-dimer:  Recent Labs   Lab Test 02/02/22  0940   DD 0.31           Imaging:    I personally reviewed the following imaging results today and those on care everywhere, if indicated     Nothing new to review    Reviewed imaging from [facility] sites     Medical Records Reviewed:    Reviewed consults/documents from [facility] including Internal Medicine and Neurology

## 2023-07-03 NOTE — NURSING NOTE
Is the patient currently in the state of [place]? YES    Visit mode:VIDEO    If the visit is dropped, the patient can be reconnected by: VIDEO VISIT: Send to e-mail at: [email or web address]    Will anyone else be joining the visit? NO      How would you like to obtain your AVS? MyChart    Are changes needed to the allergy or medication list? YES: Pt's  Tirso reports pt is now taking Zinc 50 mg, Vitamin B12 1000mg, and Carbidopa/Levodopa 25-100mg.     Reason for visit: Post-Covid.     Pt.  Tirso reports pt was recently diagnosed with Parkinson's Disease.     Christel Aponte on 7/3/2023 at 10:44 AM

## 2023-07-03 NOTE — LETTER
7/3/2023       RE: Viv Shaw  [address] [place] [address]  [place] [facility] [id number]     Dear Colleague,    Thank you for referring your patient, Viv Shaw, to the [facility] PHYSICAL MEDICINE AND REHABILITATION CLINIC [place] at [facility]. Please see a copy of my visit note below.    Viv Shaw is a 72 year old female who presents to be evaluated for a billable video visit.      Assessment/ Impression:   1. Impaired functional mobility, balance, and endurance/Generalized muscle weakness/Post-COVID chronic fatigue  Patient with lingering fatigue, weakness and balance issues since COVID in February 2022. Discussed with recent diagnosis of parkinson's and Lewy body dementia as well as B12 deficiency there are a number of underlying factors causing fatigue.  Discussed  The low B12 can contribute to fatigue as well as balance issues. She will continue her work up with neurology with an MRI brain as well as a sleep study. Explained poor sleep will exacerbate memory issues as well as fatigue.   Discussed working with PT for strengthing as well as checking vitamin D. If in one month she still has fatigue issues will order Occupational therapy for the Post Covid program.   - Adult Post Covid Clinic  Referral  - Physical Therapy Referral; Future  - Vitamin D Deficiency; Future    2. Long COVID  Discussed COVID and Post COVID with patient.  Educational materials provided and all questions answered. Discussed that her lingering symptoms are likely multifactorial and not completely Long COVID.  Multiple questions answered.      Plan:  I reviewed present knowledge on long-Covid.  Education was provided and question were answered.  Orders/Referrals as above  Will advised patient on test results  I will follow up with Viv Shaw in 2 months. I will review progress and consider need for any other therapeutic interventions. If there are any  questions and/or concerns she will call the clinic.      On day of encounter time spent in chart review and with patient in consultation, exam, education, coordination of care, review of outside charts/data and documentation: 60 minutes     I have attempted to proof read for major spelling errors and apologize for any minor errors I may have missed.             _________________________________  MARGARITA [facility] PHYSICAL MEDICINE AND REHABILITATION CLINIC [facility]   This 72 year old female presents to the [facility] Rehabilitation Medicine Post-COVID clinic as a new consult to evaluate continuing symptoms after COVID infection initially diagnosed 1/30/22.  Viv Shaw presented to their primary care physician on 1/31/22 complaining of cough, fatigue and weakness.  Patient developed acute respiratory distress on 2/2/22 and was directed to the ER, while she was waiting to see if she qualified for monoclonal antibodies. She was admitted due to respiratory distress and pneumonia. She was treated with oxygen, steroids and remdesivir.  Viv Shaw experienced complications of Acute Respiratory distress and Pneumonia.  Continuing symptoms include fatigue and weakness.   Per  who is present to help with the visit patient was diagnosed with Parkinson's and Lewy body dementia last week by Neurology. She has started Sinemet 25/100mg 3x a day and is in Physical therapy for balance. Her fatigue worsened as well as her memory after COVID. She feels she cannot walk any more and has very poor balance  She also feels she cannot remember anything and is easily confused. Past medical history is significant for CAD, HTH, HLD, Kidney transplant, Type 2 diabetes mellitus, renal insufficiency, depression and anxiety. The patient was vaccinated against COVID x5.  Previous activity was Retired.    History of COVID-19 infection: 1/31/22  Date of first symptoms:  1/25/22  Diagnosis: PCR  Hospitalization: [facility]  Treatment: Hospitalization:  oxygen, steroids and remdesivir  Current Symptoms: See subjective  Goals of Care: increase energy and improve quality of life          6/30/2023     3:00 PM   PHQ Assesment Total Score(s)   PHQ-9 Score 0         6/30/2023     3:14 PM   WYATT-7 Results   WYATT 7 TOTAL SCORE 1 (minimal anxiety)   WYATT-7 Total Score 1         6/30/2023     3:14 PM   PTSD Screen Score   Have you ever experienced this kind of event? No         6/30/2023     3:22 PM   PROMIS-29   PROMIS Physical Function T-Score 42 (mild dysfunction)   PROMIS Anxiety T-Score 40 (within normal limits)   PROMIS Depression T-Score 41 (within normal limits)   PROMIS Fatigue T-Score 46 (within normal limits)   PROMIS Sleep Disturbance T-Score 41 (within normal limits)   PROMIS Ability to Participate in Social Roles & Activities T-Score 52 (within normal limits)   PROMIS Pain Interference T-Score 50 (within normal limits)   PROMIS Pain Intensity 2       Past Medical History:   Diagnosis Date    Abdominal wall hernia     RLQ    AK (actinic keratosis) 08/06/2020    Allergic rhinitis     CAD (coronary artery disease)     coronary artery disease s/p PCI to LAD in 2005coronary artery disease s/p PCI to LAD in 2005    Diabetes mellitus, type 2 (H)     follows up with endocrinologist.    Dyslipidemia     GERD (gastroesophageal reflux disease)     H/O diabetic nephropathy     s/p kidney trnsplant    Hypertension     Hypothyroidism     Immunosuppressed status (H)     Kidney replaced by transplant     Rt    PONV (postoperative nausea and vomiting)     Shingles     Urinary tract infection 8/12/2022    Vitamin B12 deficiency anemia        Past Surgical History:   Procedure Laterality Date    APPENDECTOMY NOS      ARTHROSCOPY SHOULDER ROTATOR CUFF REPAIR Left     COLONOSCOPY N/A 6/7/2016    Procedure: COLONOSCOPY;  Surgeon: Rashard Snider MD;   "Location: [facility]    Coronary angioplasty with stent      HYSTERECTOMY      Kidney Transplant NOS Right     LAPAROSCOPIC CHOLECYSTECTOMY      NOSE SURGERY      OPEN SEPARATION COMPONENT HERNIORRHAPHY N/A 10/16/2017    Procedure: OPEN SEPARATION COMPONENT HERNIORRHAPHY;;  Surgeon: CARL Lott MD;  Location: [facility] OR    RECONSTRUCT ABDOMINAL WALL N/A 10/16/2017    Procedure: RECONSTRUCT ABDOMINAL WALL;  Exploratory Laparotomy, Lysis of Adhesions, Abdominal Wall reconstruction, Inlay Xen AB mesh, Mitek Suture [place] and Umbilical Hernia Repair.;  Surgeon: CARL Lott MD;  Location: [facility] OR    REMOVE CATARACT INTRACAP,INSERT LENS Right     TONSILLECTOMY         Family History   Problem Relation Age of Onset    Respiratory Mother          age 71    Cardiovascular Father          age 75 hyertension    Arthritis Sister         living, healthy    Family History Negative Brother          age 44    Colon Cancer No family hx of        Social History     Tobacco Use    Smoking status: Never    Smokeless tobacco: Never   Substance Use Topics    Alcohol use: No    Drug use: No         Current Outpatient Medications:     amLODIPine (NORVASC) 10 MG tablet, TAKE 1 TABLET (10 MG) BY MOUTH DAILY., Disp: 90 tablet, Rfl: 0    ASPIRIN PO, Take 81 mg by mouth daily, Disp: , Rfl:     atorvastatin (LIPITOR) 40 MG tablet, TAKE 1/2 TABLET BY MOUTH EVERY DAY, Disp: 45 tablet, Rfl: 2    FLORIN CONTOUR test strip, , Disp: , Rfl: 0    BD INSULIN SYRINGE ULTRAFINE 31G X 5/16\" 0.3 ML, USING 4-5 PER DAY ([facility] 31G/0.3ML) (Patient not taking: Reported on 2023), Disp: , Rfl: 6    cholecalciferol (VITAMIN D3) 25 mcg (1000 units) capsule, Take 2 capsules by mouth daily, Disp: , Rfl:     Continuous Blood Gluc  (DEXCOM G7 ) LUCAS, Use to read blood sugars as 's instructions., Disp: 1 each, Rfl: 0    Continuous Blood Gluc Sensor (DEXCOM G7 SENSOR) MISC, Change every 10 days., " Disp: 3 each, Rfl: 5    Continuous Blood Gluc Transmit (DEXCOM G6 TRANSMITTER) MISC, See Admin Instructions, Disp: 1 each, Rfl: 1    GENGRAF (BRAND) 25 MG CAPSULE, Take 75 mg in the am and 50 mg in the pm, Disp: 150 capsule, Rfl: 11    insulin aspart (NOVOLOG PEN) 100 UNIT/ML pen, Inject 5-10 units subcutaneous with meals and correction doses as directed, total daily dose approx 25 units, Disp: 15 mL, Rfl: 3    insulin glargine (BASAGLAR KWIKPEN) 100 UNIT/ML pen, Inject 13-15 Units Subcutaneous daily, Disp: 15 mL, Rfl: 5    isosorbide mononitrate (IMDUR) 30 MG 24 hr tablet, Take 0.5 tablets (15 mg) by mouth daily, Disp: 45 tablet, Rfl: 3    levothyroxine (SYNTHROID/LEVOTHROID) 100 MCG tablet, TAKE 1 TABLET BY MOUTH EVERY DAY, Disp: 90 tablet, Rfl: 1    losartan (COZAAR) 100 MG tablet, TAKE 1/2 OF A TABLET (50 MG) BY MOUTH DAILY, Disp: 45 tablet, Rfl: 1    metoprolol succinate ER (TOPROL XL) 25 MG 24 hr tablet, TAKE 1 TABLET BY MOUTH EVERYDAY AT BEDTIME, Disp: 90 tablet, Rfl: 1    mycophenolic acid (GENERIC EQUIVALENT) 180 MG EC tablet, Take 3 tablets (540 mg) by mouth 2 times daily, Disp: 180 tablet, Rfl: 11    pantoprazole (PROTONIX) 40 MG EC tablet, TAKE 1 TABLET BY MOUTH EVERY DAY, Disp: 90 tablet, Rfl: 2    Answers to ROS/HPI submitted by patient on 6/30/23  Review of Systems   Constitutional: Positive for fatigue.   Neurological: Positive for weakness. Negative for dizziness, tremors, seizures, numbness and headaches.        Objective    Vitals:  No vitals were obtained today due to virtual visit.    Physical Exam   GENERAL: Healthy, alert and no distress  EYES: Eyes grossly normal to inspection.  No discharge or erythema, or obvious scleral/conjunctival abnormalities.  RESP: No audible wheeze, cough, or visible cyanosis.  No visible retractions or increased work of breathing.    SKIN: Visible skin clear. No significant rash, abnormal pigmentation or lesions.  NEURO: Cranial nerves grossly intact.  Mentation  and speech appropriate for age.  PSYCH: Mentation appears normal, affect normal/bright, judgement and insight intact, normal speech and appearance well-groomed.       SARS-CoV-2 Vance Ab, Interp, S (no units)   Date Value   11/29/2021 Positive       CRP-Ultrasensitive   Date Value Ref Range Status   07/05/2005 0.02 mg/dL Final     Comment:     Female Reference Range     25th Percentile = 0.03  mg/dL     50th Percentile = 0.09  mg/dL     75th Percentile = 0.31  mg/dL     90th Percentile = 0.76  mg/dL     95th Percentile = 0.95  mg/dL     CRP Inflammation   Date Value Ref Range Status   02/02/2022 81.0 (H) 0.0 - 8.0 mg/L Final      Erythrocyte Sedimentation Rate   Date Value Ref Range Status   02/02/2022 62 (H) 0 - 30 mm/hr Final      Last Renal Panel:  Sodium   Date Value Ref Range Status   04/04/2023 141 136 - 145 mmol/L Final   06/23/2021 138 133 - 144 mmol/L Final     Potassium   Date Value Ref Range Status   04/04/2023 4.3 3.4 - 5.3 mmol/L Final   04/11/2022 3.8 3.4 - 5.3 mmol/L Final   06/23/2021 3.8 3.4 - 5.3 mmol/L Final     Chloride   Date Value Ref Range Status   04/04/2023 103 98 - 107 mmol/L Final   04/11/2022 107 94 - 109 mmol/L Final   06/23/2021 106 94 - 109 mmol/L Final     Carbon Dioxide   Date Value Ref Range Status   06/23/2021 30 20 - 32 mmol/L Final     Carbon Dioxide (CO2)   Date Value Ref Range Status   04/04/2023 29 22 - 29 mmol/L Final   04/11/2022 26 20 - 32 mmol/L Final     Anion Gap   Date Value Ref Range Status   04/04/2023 9 7 - 15 mmol/L Final   04/11/2022 9 3 - 14 mmol/L Final   06/23/2021 3 3 - 14 mmol/L Final     Glucose   Date Value Ref Range Status   04/04/2023 150 (H) 70 - 99 mg/dL Final   04/11/2022 150 (H) 70 - 99 mg/dL Final   06/23/2021 192 (H) 70 - 99 mg/dL Final     Urea Nitrogen   Date Value Ref Range Status   04/04/2023 24.8 (H) 8.0 - 23.0 mg/dL Final   04/11/2022 20 7 - 30 mg/dL Final   06/23/2021 28 7 - 30 mg/dL Final     Creatinine   Date Value Ref Range Status    04/04/2023 1.39 (H) 0.51 - 0.95 mg/dL Final   06/23/2021 1.34 (H) 0.52 - 1.04 mg/dL Final     GFR Estimate   Date Value Ref Range Status   04/04/2023 40 (L) >60 mL/min/1.73m2 Final     Comment:     eGFR calculated using 2021 CKD-EPI equation.   06/23/2021 39 (L) >60 mL/min/[1.73_m2] Final     Comment:     Non  GFR Calc  Starting 12/18/2018, serum creatinine based estimated GFR (eGFR) will be   calculated using the Chronic Kidney Disease Epidemiology Collaboration   (CKD-EPI) equation.       Calcium   Date Value Ref Range Status   04/04/2023 9.6 8.8 - 10.2 mg/dL Final   06/23/2021 9.3 8.5 - 10.1 mg/dL Final     Phosphorus   Date Value Ref Range Status   10/19/2017 2.5 2.5 - 4.5 mg/dL Final     Albumin   Date Value Ref Range Status   02/05/2022 2.5 (L) 3.4 - 5.0 g/dL Final   04/22/2019 4.0 3.4 - 5.0 g/dL Final      Lab Results   Component Value Date    WBC 5.0 04/04/2023    WBC 5.0 06/23/2021     Lab Results   Component Value Date    RBC 4.23 04/04/2023    RBC 3.86 06/23/2021     Lab Results   Component Value Date    HGB 12.3 04/04/2023    HGB 11.3 06/23/2021     Lab Results   Component Value Date    HCT 39.8 04/04/2023    HCT 35.4 06/23/2021     No components found for: MCT  Lab Results   Component Value Date    MCV 94 04/04/2023    MCV 92 06/23/2021     Lab Results   Component Value Date    MCH 29.1 04/04/2023    MCH 29.3 06/23/2021     Lab Results   Component Value Date    MCHC 30.9 04/04/2023    MCHC 31.9 06/23/2021     Lab Results   Component Value Date    RDW 13.2 04/04/2023    RDW 12.7 06/23/2021     Lab Results   Component Value Date     04/04/2023     06/23/2021      Lab Results   Component Value Date    A1C 6.2 04/04/2023    A1C 6.5 11/30/2022    A1C 7.8 07/29/2022    A1C 7.1 04/11/2022    A1C 7.3 11/29/2021    A1C 6.7 07/06/2021    A1C 6.8 03/18/2021    A1C 7.0 10/29/2020    A1C 6.8 03/27/2020    A1C 6.9 11/20/2019      TSH   Date Value Ref Range Status   05/09/2023 0.37 0.30  - 4.20 uIU/mL Final   11/29/2021 1.10 0.40 - 4.00 mU/L Final   11/25/2020 0.55 0.40 - 4.00 mU/L Final      No results found for: VITDT   Recent Labs   Lab Test 02/02/22  0940 10/20/17  0937 10/19/17  0747   MAG 1.7 2.0 1.7     Last Comprehensive Metabolic Panel:  Sodium   Date Value Ref Range Status   04/04/2023 141 136 - 145 mmol/L Final   06/23/2021 138 133 - 144 mmol/L Final     Potassium   Date Value Ref Range Status   04/04/2023 4.3 3.4 - 5.3 mmol/L Final   04/11/2022 3.8 3.4 - 5.3 mmol/L Final   06/23/2021 3.8 3.4 - 5.3 mmol/L Final     Chloride   Date Value Ref Range Status   04/04/2023 103 98 - 107 mmol/L Final   04/11/2022 107 94 - 109 mmol/L Final   06/23/2021 106 94 - 109 mmol/L Final     Carbon Dioxide   Date Value Ref Range Status   06/23/2021 30 20 - 32 mmol/L Final     Carbon Dioxide (CO2)   Date Value Ref Range Status   04/04/2023 29 22 - 29 mmol/L Final   04/11/2022 26 20 - 32 mmol/L Final     Anion Gap   Date Value Ref Range Status   04/04/2023 9 7 - 15 mmol/L Final   04/11/2022 9 3 - 14 mmol/L Final   06/23/2021 3 3 - 14 mmol/L Final     Glucose   Date Value Ref Range Status   04/04/2023 150 (H) 70 - 99 mg/dL Final   04/11/2022 150 (H) 70 - 99 mg/dL Final   06/23/2021 192 (H) 70 - 99 mg/dL Final     Urea Nitrogen   Date Value Ref Range Status   04/04/2023 24.8 (H) 8.0 - 23.0 mg/dL Final   04/11/2022 20 7 - 30 mg/dL Final   06/23/2021 28 7 - 30 mg/dL Final     Creatinine   Date Value Ref Range Status   04/04/2023 1.39 (H) 0.51 - 0.95 mg/dL Final   06/23/2021 1.34 (H) 0.52 - 1.04 mg/dL Final     GFR Estimate   Date Value Ref Range Status   04/04/2023 40 (L) >60 mL/min/1.73m2 Final     Comment:     eGFR calculated using 2021 CKD-EPI equation.   06/23/2021 39 (L) >60 mL/min/[1.73_m2] Final     Comment:     Non  GFR Calc  Starting 12/18/2018, serum creatinine based estimated GFR (eGFR) will be   calculated using the Chronic Kidney Disease Epidemiology Collaboration   (CKD-EPI)  equation.       Calcium   Date Value Ref Range Status   04/04/2023 9.6 8.8 - 10.2 mg/dL Final   06/23/2021 9.3 8.5 - 10.1 mg/dL Final     Bilirubin Total   Date Value Ref Range Status   02/05/2022 0.3 0.2 - 1.3 mg/dL Final   04/22/2019 0.8 0.2 - 1.3 mg/dL Final     Alkaline Phosphatase   Date Value Ref Range Status   02/05/2022 102 40 - 150 U/L Final   04/22/2019 65 40 - 150 U/L Final     ALT   Date Value Ref Range Status   04/11/2022 19 0 - 50 U/L Final   10/29/2020 17 0 - 50 U/L Final     AST   Date Value Ref Range Status   02/05/2022 19 0 - 45 U/L Final   10/29/2020 10 0 - 45 U/L Final     Most Recent D-dimer:  Recent Labs   Lab Test 02/02/22  0940   DD 0.31           Imaging:    I personally reviewed the following imaging results today and those on care everywhere, if indicated     Nothing new to review    Reviewed imaging from [facility] and [facility] sites     Medical Records Reviewed:    Reviewed consults/documents from [facility] and [facility] including Internal Medicine and Neurology             Again, thank you for allowing me to participate in the care of your patient.      Sincerely,    Linda Brewster PA-C

## 2023-07-17 NOTE — TELEPHONE ENCOUNTER
Routing refill request to provider for review/approval because:  Blood pressure under 140/90 in past 12 months        BP Readings from Last 3 Encounters:   05/09/23 (!) 146/60

## 2023-07-20 NOTE — TELEPHONE ENCOUNTER
Prescription approved per [facility] Refill Protocol.  -levothyroxine    Routing refill request to provider for review/approval because:  Elevated BP and Elevated creatinine  BP Readings from Last 3 Encounters:   05/09/23 (!) 146/60   04/11/23 137/69   01/19/23 130/60     Creatinine   Date Value Ref Range Status   04/04/2023 1.39 (H) 0.51 - 0.95 mg/dL Final   06/23/2021 1.34 (H) 0.52 - 1.04 mg/dL Final     Thank you,  Kali Louis, Triage RN [facility]  11:56 AM 7/20/2023

## 2023-07-20 NOTE — TELEPHONE ENCOUNTER
Prescription approved per [facility] Refill Protocol.  -levothyroxine    Routing refill request to provider for review/approval because:  Elevated BP and Elevated creatinine  BP Readings from Last 3 Encounters:   05/09/23 (!) 146/60   04/11/23 137/69   01/19/23 130/60     Creatinine   Date Value Ref Range Status   04/04/2023 1.39 (H) 0.51 - 0.95 mg/dL Final   06/23/2021 1.34 (H) 0.52 - 1.04 mg/dL Final     Thank you,  Kali Louis, Triage RN [facility]  11:56 AM 7/20/2023

## 2023-07-20 NOTE — TELEPHONE ENCOUNTER
Medication refill request denied due to duplicate refill request.    Medication(s) last filled on 07/20/2023.    Kali Louis, Triage RN [facility]  3:25 PM 7/20/2023

## 2023-07-26 NOTE — TELEPHONE ENCOUNTER
Spoke to patient and she scheduled 1 year kidney transplant follow up on 12/18/2023. She will do labs the week prior in [place] // 07/26/2023 MCE

## 2023-08-04 NOTE — TELEPHONE ENCOUNTER
cyclosporine level 94 at 12 hours, on 8/4/2023.  Goal 50-75.   Current dose 75 mg in AM, 50 mg in PM    LPN TASK:    Please call patient with instruction to remain on current CSA dose and repeat CSA 12 hour trough in 1-2 week.  Level remains elevated,  dose will be adjusted then    Please ensure repeat lab orders are placed.    Shayna Kent RN   Transplant Coordinator  [phone]

## 2023-08-04 NOTE — TELEPHONE ENCOUNTER
ISSUE:  Creatinine 1.43, above baseline 1.1 - 1.3    PLAN:  Assess for hydration and illness (UTI?)    OUTCOME:  Viv recently was diagnosed with Parkinson's with dementia.  She did start carbidopl/levodopa - 1.5tabs TID, 2,500mg.    Johnny and Viv deny any recent illness (N/V/D).  Has not been more active in the heat.   Denies any change in weight.  BPs have been WNL.  Will see Dr. Emerson (endocrine) and PCP in the next week.   Denies any pain over graft.     Repeat labs in a couple of weeks.     She does like to drink soda and does admit this has increased with recent stressor with new diagnoses.   She will hydrate and repeat.

## 2023-08-07 NOTE — TELEPHONE ENCOUNTER
SiTime message sent to patient regarding:  cyclosporine level 94 at 12 hours, on 8/4/2023.  Goal 50-75.   Current dose 75 mg in AM, 50 mg in PM     LPN TASK:     Please call patient with instruction to remain on current CSA dose and repeat CSA 12 hour trough in 1-2 week.  Level remains elevated,  dose will be adjusted then     Please ensure repeat lab orders are placed.

## 2023-08-08 NOTE — LETTER
8/8/2023         RE: Viv Shaw  [address]  [place] [address]        Dear Colleague,    Thank you for referring your patient, Viv Shaw, to the [facility] [place]. Please see a copy of my visit note below.        Recent issues:   Diabetes follow-up evaluation, with her  Johnny  She has been feeling well, but new diagnoses of possible Parkinson's disease and dementia  Reviewed medical history from patient and Epic chart record        Diagnosis of diabetes mellitus age 23, living in [facility]  Recalls having vision problem, saw eye physician and then family physician, also had frequent urination  Initial treatment with oral medication for few weeks, then change to insulin  Had seen Dr. Castillo in [facility]  Subsequent evaluations with Dr. GABRIELE Vickers, then saw Dr. Elmer Garvin/[facility] for endocrinology evaluations  Has used insulin injections 4x per day  Previous FV hgbA1c trends include:     Lab Test 07/06/21  1422 03/18/21  0636 10/29/20  0710 03/27/20  0648 11/20/19  0803   A1C 6.7* 6.8* 7.0* 6.8* 6.9*         8/10/21. Initial diabetes evaluation with me at [place]  Reviewed health history and diabetes issues  Gets her insulin pens from [place] mail order  Meals TID    Current DM medications:  Novolog Flexpen 5U premeal (4-5U)  Novolog sscale guestimates  Basaglar Kwikpen  15U subcutaneous in evening    Blood glucose (BG) meter: Raad Contour?   Has tested 4x per day previously   Less frequent testing when using CGM sensor    Has used the DexcomG5, then the G6 CGM sensor, then G7 CGM:  Recent DexcomG7 data:            FamHx DM:  Niece  Recent [facility] labs include:  Lab Results   Component Value Date    A1C 7.2 (H) 08/04/2023     08/04/2023    POTASSIUM 5.1 08/04/2023    CHLORIDE 101 08/04/2023    CO2 29 08/04/2023    ANIONGAP 8 08/04/2023     (H) 08/04/2023    BUN 22.5 08/04/2023    CR 1.43 (H) 08/04/2023    GFRESTIMATED 38 (L) 08/04/2023     GFRESTBLACK 45 (L) 06/23/2021    NAVI 10.0 08/04/2023    CPEPT 0.2 (L) 11/29/2021    CHOL 179 04/04/2023    TRIG 131 04/04/2023    HDL 64 04/04/2023    LDL 89 04/04/2023    NHDL 115 04/04/2023    UCRR 103.9 11/30/2022    MICROL 30.8 08/12/2022    UMALCR 32.39 (H) 08/12/2022    TSH 0.37 05/09/2023    T4 1.41 04/02/2018     Last eye exam with Dr. Arevalo/[facility] in 8/2022     DM Complications:   Nephropathy    Previous ESRD, then renal transplant 2005    Sees Dr. Morro Veloz/nephrology   Retinopathy    Previous PDR and laser surgeries [facility]    Had seen Dr. Grayson Miles/[facility] clinic, now sees Dr. Arevalo/[facility] at that clinic building    Macrovascular disease    CAD, previous cardiac stent placement      Lives in [place], [place]  Sees Dr. Summer Vega/[facility] for general medicine evaluations.    PMH/PSH:  Past Medical History:   Diagnosis Date     Abdominal wall hernia     RLQ     AK (actinic keratosis) 08/06/2020     Allergic rhinitis      CAD (coronary artery disease)     coronary artery disease s/p PCI to LAD in 2005coronary artery disease s/p PCI to LAD in 2005     Diabetes mellitus, type 2 (H)     follows up with endocrinologist.     Dyslipidemia      GERD (gastroesophageal reflux disease)      H/O diabetic nephropathy     s/p kidney trnsplant     Hypertension      Hypothyroidism      Immunosuppressed status (H)      Kidney replaced by transplant     Rt     PONV (postoperative nausea and vomiting)      Shingles      Urinary tract infection 8/12/2022     Vitamin B12 deficiency anemia      Past Surgical History:   Procedure Laterality Date     APPENDECTOMY NOS       ARTHROSCOPY SHOULDER ROTATOR CUFF REPAIR Left      COLONOSCOPY N/A 6/7/2016    Procedure: COLONOSCOPY;  Surgeon: Rashard Snider MD;  Location:  GI     Coronary angioplasty with stent  2005     HYSTERECTOMY       Kidney Transplant NOS Right      LAPAROSCOPIC CHOLECYSTECTOMY       NOSE SURGERY  2013      OPEN SEPARATION COMPONENT HERNIORRHAPHY N/A 10/16/2017    Procedure: OPEN SEPARATION COMPONENT HERNIORRHAPHY;;  Surgeon: CARL Lott MD;  Location: [facility] OR     RECONSTRUCT ABDOMINAL WALL N/A 10/16/2017    Procedure: RECONSTRUCT ABDOMINAL WALL;  Exploratory Laparotomy, Lysis of Adhesions, Abdominal Wall reconstruction, Inlay Xen AB mesh, Mitek Suture [place] and Umbilical Hernia Repair.;  Surgeon: CARL Lott MD;  Location: [facility] OR     REMOVE CATARACT INTRACAP,INSERT LENS Right      TONSILLECTOMY         Family Hx:  Family History   Problem Relation Age of Onset     Respiratory Mother          age 71     Cardiovascular Father          age 75 hyertension     Arthritis Sister         living, healthy     Family History Negative Brother          age 44     Colon Cancer No family hx of          Social Hx:  Social History     Socioeconomic History     Marital status:      Spouse name: Not on file     Number of children: Not on file     Years of education: Not on file     Highest education level: Not on file   Occupational History     Not on file   Tobacco Use     Smoking status: Never     Smokeless tobacco: Never   Substance and Sexual Activity     Alcohol use: No     Drug use: No     Sexual activity: Yes     Partners: Male   Other Topics Concern     Parent/sibling w/ CABG, MI or angioplasty before 65F 55M? Not Asked   Social History Narrative     Not on file     Social Determinants of Health     Financial Resource Strain: Not on file   Food Insecurity: Not on file   Transportation Needs: Not on file   Physical Activity: Not on file   Stress: Not on file   Social Connections: Not on file   Intimate Partner Violence: Not on file   Housing Stability: Not on file          MEDICATIONS:  has a current medication list which includes the following prescription(s): amlodipine, aspirin, atorvastatin, carbidopa-levodopa, cholecalciferol, dexcom g7 sensor, cyclosporine modified,  "insulin aspart, insulin glargine, isosorbide mononitrate, levothyroxine, losartan, metoprolol succinate er, mycophenolic acid, pantoprazole, teddy contour, bd insulin syringe ultrafine, dexcom g7 , and dexcom g6 transmitter.    ROS:     ROS: 10 point ROS neg other than the symptoms noted above in the HPI.    GENERAL: some fatigue, wt stable; denies fevers, chills, night sweats.   HEENT: no dysphagia, odonophagia, diplopia, neck pain  THYROID:  no apparent hyper or hypothyroid symptoms  CV: no chest pain, pressure, palpitations  LUNGS: no SOB, JIMENEZ, cough, wheezing   ABDOMEN: no diarrhea, constipation, abdominal pain  EXTREMITIES: no rashes, ulcers, edema  NEUROLOGY: decreased sensation at feet, balance problems; no headaches, denies changes in vision, tingling  MSK: some leg weakness, denies specific arthralgias  SKIN: no rashes or lesions  :  no menses  PSYCH:  stable mood, no significant anxiety or depression  ENDOCRINE: no heat or cold intolerance      Physical Exam   VS: /63   Pulse 65   Ht 1.6 m (5' 3\")   Wt 71.3 kg (157 lb 3.2 oz)   BMI 27.85 kg/m    GENERAL: AXOX3, NAD, well dressed, answering questions appropriately, appears stated age.  ENT: no nose swelling or nasal discharge, mouth redness or gum changes.  EYES: eyes grossly normal to inspection, conjunctivae and sclerae normal, no exophthalmos or proptosis  THYROID:  no apparent nodules or goiter  LUNGS: no audible wheeze, cough or visible cyanosis, or increased work of breathing  ABDOMEN: abdomen mildly obese size  EXTREMITIES: mild lower leg edema; no skin lesions noted  NEUROLOGY: CN grossly intact, no tremors  MSK: grossly intact  SKIN:  no apparent skin lesions, rash, or edema with visualized skin appearance  PSYCH: mentation appears normal, affect normal/bright, judgement and insight intact,   normal speech and appearance well groomed      LABS:    All pertinent notes, labs, and images personally reviewed by me. "     A/P:  Encounter Diagnosis   Name Primary?     Diabetes mellitus type 1.5, managed as type 1 (H)        Comments:  Reviewed complicated health history and diabetes issues  Previous low C-Peptide, negative islet cell antibody, and diabetes history indicate Type 1.5 DM.  Overall CGM trends good, DexcomG7 CGM sensor helpful, recent post breakfast meal hyperglycemia trends  Reviewed and interpreted tests that I previously ordered.   Ordered appropriate tests for the endocrinology disease management.    Management options discussed and implemented after shared medical decision making with the patient.  T1.5DM problem is chronic-stable    Plan:  Reviewed general issues with the diabetes diagnosis and management  We discussed the hgbA1c test which reflects previous overall glucose levels or control  Discussed the importance of blood glucose (BG) testing to assess glucose trends  Provided general overview of the diabetes medication options and medication treatment plan.  Reviewed recent DexcomG6 CGM glucose trend data, in detail.    Recommend:  Continue current Novolog and Basaglar insulin medication use  Novolog Flexpen    Breakfast 6U   Lunch  5U   Supper  5U  Novolog sscale guestimates  Basaglar Kwikpen  15U subcutaneous in evening    Avoid taking Novolog after start of meal... take dose premeal  Sent updated insulin pen Rx's to their local  pharmacy today  Discussed the ideal mealtime and correction scale dosing routine   Use ISF 1U per 50 mg/dl >150 mg/dl   Avoid using nighttime Nv sscale unless SG >250 mg/dl  Continue use of the DexcomG7 CGM sensor   Goal target premeal SG  mg/dl   Patient's insulin regimen requires frequent dose adjustments by patient on basis of therapeutic BGM/CGM test results  We have discussed hypoglycemia prevention  Plan repeat labs in late 11/2023   Lab orders placed  Keep focus on diet, exercise, weight management.  Advise having fasting lipid panel testing and dilated eye  examination, at least annually    Keep regular follow-up evaluations with neurologist, nephrologist, cardiologist, ophthalmologist, and PCP   Addressed patient questions today    There are no Patient Instructions on file for this visit.    Future labs ordered today:   Orders Placed This Encounter   Procedures     GLUCOSE MONITOR, 72 HOUR, PHYS INTERP     Hemoglobin A1c     Basic metabolic panel     ALT     Albumin Random Urine Quantitative with Creat Ratio     Radiology/Consults ordered today: None    Total time spent on day of encounter:  27 min    Follow-up:  12/6/23 at 11:30 am, Return    DIANN Fitzpatrick MD, MS  Endocrinology  [facility]    CC:  Care Team            Again, thank you for allowing me to participate in the care of your patient.        Sincerely,        Juan M Fitzpatrick MD

## 2023-08-08 NOTE — PROGRESS NOTES
Recent issues:   Diabetes follow-up evaluation, with her  Johnny  She has been feeling well, but new diagnoses of possible Parkinson's disease and dementia  Reviewed medical history from patient and Epic chart record        Diagnosis of diabetes mellitus age 23, living in [facility]  Recalls having vision problem, saw eye physician and then family physician, also had frequent urination  Initial treatment with oral medication for few weeks, then change to insulin  Had seen Dr. Castillo in [facility]  Subsequent evaluations with Dr. GABRIELE Vickers, then saw Dr. Elmer Garvin/[facility] for endocrinology evaluations  Has used insulin injections 4x per day  Previous [facility] hgbA1c trends include:     Lab Test 07/06/21  1422 03/18/21  0636 10/29/20  0710 03/27/20  0648 11/20/19  0803   A1C 6.7* 6.8* 7.0* 6.8* 6.9*         8/10/21. Initial diabetes evaluation with me at [place]  Reviewed health history and diabetes issues  Gets her insulin pens from [place] mail order  Meals TID    Current DM medications:  Novolog Flexpen 5U premeal (4-5U)  Novolog sscale guestimates  Basaglar Kwikpen  15U subcutaneous in evening    Blood glucose (BG) meter: Raad Contour?   Has tested 4x per day previously   Less frequent testing when using CGM sensor    Has used the DexcomG5, then the G6 CGM sensor, then G7 CGM:  Recent DexcomG7 data:            FamHx DM:  Niece  Recent [facility] labs include:  Lab Results   Component Value Date    A1C 7.2 (H) 08/04/2023     08/04/2023    POTASSIUM 5.1 08/04/2023    CHLORIDE 101 08/04/2023    CO2 29 08/04/2023    ANIONGAP 8 08/04/2023     (H) 08/04/2023    BUN 22.5 08/04/2023    CR 1.43 (H) 08/04/2023    GFRESTIMATED 38 (L) 08/04/2023    GFRESTBLACK 45 (L) 06/23/2021    NAVI 10.0 08/04/2023    CPEPT 0.2 (L) 11/29/2021    CHOL 179 04/04/2023    TRIG 131 04/04/2023    HDL 64 04/04/2023    LDL 89 04/04/2023    NHDL 115 04/04/2023    UCRR 103.9 11/30/2022    MICROL 30.8 08/12/2022    UMALCR 32.39 (H) 08/12/2022     TSH 0.37 05/09/2023    T4 1.41 04/02/2018     Last eye exam with Dr. Arevalo/[facility] in 8/2022     DM Complications:   Nephropathy    Previous ESRD, then renal transplant 2005    Sees Dr. Morro Veloz/nephrology   Retinopathy    Previous PDR and laser surgeries [facility]    Had seen Dr. Grayson Miles/[facility], now sees Dr. Arevalo/[facility] at that clinic building    Macrovascular disease    CAD, previous cardiac stent placement      Lives in [place], [place]  Sees Dr. Summer Vega/[facility] for general medicine evaluations.    PMH/PSH:  Past Medical History:   Diagnosis Date    Abdominal wall hernia     RLQ    AK (actinic keratosis) 08/06/2020    Allergic rhinitis     CAD (coronary artery disease)     coronary artery disease s/p PCI to LAD in 2005coronary artery disease s/p PCI to LAD in 2005    Diabetes mellitus, type 2 (H)     follows up with endocrinologist.    Dyslipidemia     GERD (gastroesophageal reflux disease)     H/O diabetic nephropathy     s/p kidney trnsplant    Hypertension     Hypothyroidism     Immunosuppressed status (H)     Kidney replaced by transplant     Rt    PONV (postoperative nausea and vomiting)     Shingles     Urinary tract infection 8/12/2022    Vitamin B12 deficiency anemia      Past Surgical History:   Procedure Laterality Date    APPENDECTOMY NOS      ARTHROSCOPY SHOULDER ROTATOR CUFF REPAIR Left     COLONOSCOPY N/A 6/7/2016    Procedure: COLONOSCOPY;  Surgeon: Rashard Snider MD;  Location: [facility] GI    Coronary angioplasty with stent  2005    HYSTERECTOMY      Kidney Transplant NOS Right     LAPAROSCOPIC CHOLECYSTECTOMY      NOSE SURGERY  2013    OPEN SEPARATION COMPONENT HERNIORRHAPHY N/A 10/16/2017    Procedure: OPEN SEPARATION COMPONENT HERNIORRHAPHY;;  Surgeon: CARL Lott MD;  Location[facility]    RECONSTRUCT ABDOMINAL WALL N/A 10/16/2017    Procedure: RECONSTRUCT ABDOMINAL WALL;  Exploratory Laparotomy, Lysis of Adhesions,  Abdominal Wall reconstruction, Inlay Xen AB mesh, Mitek Suture [place] and Umbilical Hernia Repair.;  Surgeon: CARL Lott MD;  Location: [facility] OR    REMOVE CATARACT INTRACAP,INSERT LENS Right     TONSILLECTOMY         Family Hx:  Family History   Problem Relation Age of Onset    Respiratory Mother          age 71    Cardiovascular Father          age 75 hyertension    Arthritis Sister         living, healthy    Family History Negative Brother          age 44    Colon Cancer No family hx of          Social Hx:  Social History     Socioeconomic History    Marital status:      Spouse name: Not on file    Number of children: Not on file    Years of education: Not on file    Highest education level: Not on file   Occupational History    Not on file   Tobacco Use    Smoking status: Never    Smokeless tobacco: Never   Substance and Sexual Activity    Alcohol use: No    Drug use: No    Sexual activity: Yes     Partners: Male   Other Topics Concern    Parent/sibling w/ CABG, MI or angioplasty before 65F 55M? Not Asked   Social History Narrative    Not on file     Social Determinants of Health     Financial Resource Strain: Not on file   Food Insecurity: Not on file   Transportation Needs: Not on file   Physical Activity: Not on file   Stress: Not on file   Social Connections: Not on file   Intimate Partner Violence: Not on file   Housing Stability: Not on file          MEDICATIONS:  has a current medication list which includes the following prescription(s): amlodipine, aspirin, atorvastatin, carbidopa-levodopa, cholecalciferol, dexcom g7 sensor, cyclosporine modified, insulin aspart, insulin glargine, isosorbide mononitrate, levothyroxine, losartan, metoprolol succinate er, mycophenolic acid, pantoprazole, teddy contour, bd insulin syringe ultrafine, dexcom g7 , and dexcom g6 transmitter.    ROS:     ROS: 10 point ROS neg other than the symptoms noted above in the  "HPI.    GENERAL: some fatigue, wt stable; denies fevers, chills, night sweats.   HEENT: no dysphagia, odonophagia, diplopia, neck pain  THYROID:  no apparent hyper or hypothyroid symptoms  CV: no chest pain, pressure, palpitations  LUNGS: no SOB, JIMENEZ, cough, wheezing   ABDOMEN: no diarrhea, constipation, abdominal pain  EXTREMITIES: no rashes, ulcers, edema  NEUROLOGY: decreased sensation at feet, balance problems; no headaches, denies changes in vision, tingling  MSK: some leg weakness, denies specific arthralgias  SKIN: no rashes or lesions  :  no menses  PSYCH:  stable mood, no significant anxiety or depression  ENDOCRINE: no heat or cold intolerance      Physical Exam   VS: /63   Pulse 65   Ht 1.6 m (5' 3\")   Wt 71.3 kg (157 lb 3.2 oz)   BMI 27.85 kg/m    GENERAL: AXOX3, NAD, well dressed, answering questions appropriately, appears stated age.  ENT: no nose swelling or nasal discharge, mouth redness or gum changes.  EYES: eyes grossly normal to inspection, conjunctivae and sclerae normal, no exophthalmos or proptosis  THYROID:  no apparent nodules or goiter  LUNGS: no audible wheeze, cough or visible cyanosis, or increased work of breathing  ABDOMEN: abdomen mildly obese size  EXTREMITIES: mild lower leg edema; no skin lesions noted  NEUROLOGY: CN grossly intact, no tremors  MSK: grossly intact  SKIN:  no apparent skin lesions, rash, or edema with visualized skin appearance  PSYCH: mentation appears normal, affect normal/bright, judgement and insight intact,   normal speech and appearance well groomed      LABS:    All pertinent notes, labs, and images personally reviewed by me.     A/P:  Encounter Diagnosis   Name Primary?    Diabetes mellitus type 1.5, managed as type 1 (H)        Comments:  Reviewed complicated health history and diabetes issues  Previous low C-Peptide, negative islet cell antibody, and diabetes history indicate Type 1.5 DM.  Overall CGM trends good, DexcomG7 CGM sensor helpful, " recent post breakfast meal hyperglycemia trends  Reviewed and interpreted tests that I previously ordered.   Ordered appropriate tests for the endocrinology disease management.    Management options discussed and implemented after shared medical decision making with the patient.  T1.5DM problem is chronic-stable    Plan:  Reviewed general issues with the diabetes diagnosis and management  We discussed the hgbA1c test which reflects previous overall glucose levels or control  Discussed the importance of blood glucose (BG) testing to assess glucose trends  Provided general overview of the diabetes medication options and medication treatment plan.  Reviewed recent DexcomG6 CGM glucose trend data, in detail.    Recommend:  Continue current Novolog and Basaglar insulin medication use  Novolog Flexpen    Breakfast 6U   Lunch  5U   Supper  5U  Novolog sscale guestimates  Basaglar Kwikpen  15U subcutaneous in evening    Avoid taking Novolog after start of meal... take dose premeal  Sent updated insulin pen Rx's to their local  pharmacy today  Discussed the ideal mealtime and correction scale dosing routine   Use ISF 1U per 50 mg/dl >150 mg/dl   Avoid using nighttime Nv sscale unless SG >250 mg/dl  Continue use of the DexcomG7 CGM sensor   Goal target premeal SG  mg/dl   Patient's insulin regimen requires frequent dose adjustments by patient on basis of therapeutic BGM/CGM test results  We have discussed hypoglycemia prevention  Plan repeat labs in late 11/2023   Lab orders placed  Keep focus on diet, exercise, weight management.  Advise having fasting lipid panel testing and dilated eye examination, at least annually    Keep regular follow-up evaluations with neurologist, nephrologist, cardiologist, ophthalmologist, and PCP   Addressed patient questions today    There are no Patient Instructions on file for this visit.    Future labs ordered today:   Orders Placed This Encounter   Procedures    GLUCOSE MONITOR, 72  HOUR, PHYS INTERP    Hemoglobin A1c    Basic metabolic panel    ALT    Albumin Random Urine Quantitative with Creat Ratio     Radiology/Consults ordered today: None    Total time spent on day of encounter:  27 min    Follow-up:  12/6/23 at 11:30 am, Return    DIANN Fitzpatrick MD, MS  Endocrinology  [facility]    CC:  Care Team

## 2023-08-09 PROBLEM — G31.83 LEWY BODY DEMENTIA, UNSPECIFIED DEMENTIA SEVERITY, UNSPECIFIED WHETHER BEHAVIORAL, PSYCHOTIC, OR MOOD DISTURBANCE OR ANXIETY (H): Status: ACTIVE | Noted: 2023-01-01

## 2023-08-09 PROBLEM — G20.A1 PARKINSON DISEASE (H): Status: ACTIVE | Noted: 2023-01-01

## 2023-08-09 PROBLEM — F02.80 LEWY BODY DEMENTIA, UNSPECIFIED DEMENTIA SEVERITY, UNSPECIFIED WHETHER BEHAVIORAL, PSYCHOTIC, OR MOOD DISTURBANCE OR ANXIETY (H): Status: ACTIVE | Noted: 2023-01-01

## 2023-08-09 NOTE — PROGRESS NOTES
"SUBJECTIVE:   Viv is a 72 year old who presents for Preventive Visit along with her spouse       8/9/2023     8:16 AM   Additional Questions   Roomed by Martha   Accompanied by with          8/9/2023     8:16 AM   Patient Reported Additional Medications   Patient reports taking the following new medications none       Are you in the first 12 months of your Medicare coverage?  No    History of Present Illness       Reason for visit:  Consuelo Johnson consumes 0 sweetened beverage(s) daily.She exercises with enough effort to increase her heart rate 10 to 19 minutes per day.  She exercises with enough effort to increase her heart rate 5 days per week.   She is not taking prescribed medications regularly due to None.  Healthy Habits:     In general, how would you rate your overall health?  Fair    Frequency of exercise:  6-7 days/week    Duration of exercise:  15-30 minutes    Do you usually eat at least 4 servings of fruit and vegetables a day, include whole grains    & fiber and avoid regularly eating high fat or \"junk\" foods?  Yes    Taking medications regularly:  Yes    Barriers to taking medications:  None    Medication side effects:  None    Ability to successfully perform activities of daily living:  Money management requires assistance, preparing meals requires assistance, shopping requires assistance, medication administration requires assistance, laundry requires assistance, transportation requires assistance, telephone requires assistance, bathing requires assistance and housework requires assistance    Home Safety:  No safety concerns identified    Hearing Impairment:  No hearing concerns    In the past 6 months, have you been bothered by leaking of urine?  No    In general, how would you rate your overall mental or emotional health?  Good    Additional concerns today:  No        Have you ever done Advance Care Planning? (For example, a Health Directive, POLST, or a discussion with a medical provider or " your loved ones about your wishes): Yes, advance care planning is on file.       Fall risk  Fallen 2 or more times in the past year?: No  Any fall with injury in the past year?: No    Cognitive Screening   1) Repeat 3 items (Leader, Season, Table)    2) Clock draw: ABNORMAL dementia, parkinson's ( new diagnosis)   3) 3 item recall: Recalls 3 objects  Results: ABNORMAL clock, 1-2 items recalled: PROBABLE COGNITIVE IMPAIRMENT, **INFORM PROVIDER**    Mini-CogTM Copyright S Yadira. Licensed by the author for use in [facility]; reprinted with permission (abbe@[email or web address]). All rights reserved.      Do you have sleep apnea, excessive snoring or daytime drowsiness? : no    Reviewed and updated as needed this visit by clinical staff                  Reviewed and updated as needed this visit by Provider                   Past Medical History:   Diagnosis Date    Abdominal wall hernia     RLQ    AK (actinic keratosis) 08/06/2020    Allergic rhinitis     CAD (coronary artery disease)     coronary artery disease s/p PCI to LAD in 2005coronary artery disease s/p PCI to LAD in 2005    Diabetes mellitus, type 2 (H)     follows up with endocrinologist.    Dyslipidemia     GERD (gastroesophageal reflux disease)     H/O diabetic nephropathy     s/p kidney trnsplant    Hypertension     Hypothyroidism     Immunosuppressed status (H)     Kidney replaced by transplant     Rt    PONV (postoperative nausea and vomiting)     Shingles     Urinary tract infection 8/12/2022    Vitamin B12 deficiency anemia        Past Surgical History:   Procedure Laterality Date    APPENDECTOMY NOS      ARTHROSCOPY SHOULDER ROTATOR CUFF REPAIR Left     COLONOSCOPY N/A 6/7/2016    Procedure: COLONOSCOPY;  Surgeon: Rashard Snider MD;  Location:  GI    Coronary angioplasty with stent  2005    HYSTERECTOMY      Kidney Transplant NOS Right     LAPAROSCOPIC CHOLECYSTECTOMY      NOSE SURGERY  2013    OPEN SEPARATION COMPONENT HERNIORRHAPHY N/A  10/16/2017    Procedure: OPEN SEPARATION COMPONENT HERNIORRHAPHY;;  Surgeon: CARL Lott MD;  Location: [facility] OR    RECONSTRUCT ABDOMINAL WALL N/A 10/16/2017    Procedure: RECONSTRUCT ABDOMINAL WALL;  Exploratory Laparotomy, Lysis of Adhesions, Abdominal Wall reconstruction, Inlay Xen AB mesh, Mitek Suture [place] and Umbilical Hernia Repair.;  Surgeon: CARL Lott MD;  Location: [facility] OR    REMOVE CATARACT INTRACAP,INSERT LENS Right     TONSILLECTOMY         Current Outpatient Medications   Medication Sig Dispense Refill    amLODIPine (NORVASC) 10 MG tablet TAKE 1 TABLET (10 MG) BY MOUTH DAILY. 90 tablet 0    ASPIRIN PO Take 81 mg by mouth daily      atorvastatin (LIPITOR) 40 MG tablet TAKE 1/2 TABLET BY MOUTH EVERY DAY 45 tablet 2    carbidopa-levodopa (SINEMET)  MG tablet Take 1.5 tablets by mouth      cholecalciferol (VITAMIN D3) 25 mcg (1000 units) capsule Take 2 capsules by mouth daily      Continuous Blood Gluc  (DEXCOM G7 ) LUCAS Use to read blood sugars as 's instructions. 1 each 0    Continuous Blood Gluc Sensor (DEXCOM G7 SENSOR) MISC Change every 10 days. 3 each 5    GENGRAF (BRAND) 25 MG CAPSULE Take 75 mg in the am and 50 mg in the pm 150 capsule 11    insulin aspart (NOVOLOG PEN) 100 UNIT/ML pen Inject 5-10 units subcutaneous with meals and correction doses as directed, total daily dose approx 25 units 15 mL 5    insulin glargine (BASAGLAR KWIKPEN) 100 UNIT/ML pen Inject 15-17 Units Subcutaneous daily 15 mL 5    isosorbide mononitrate (IMDUR) 30 MG 24 hr tablet TAKE 0.5 TABLETS (15 MG) BY MOUTH DAILY 45 tablet 0    levothyroxine (SYNTHROID/LEVOTHROID) 100 MCG tablet TAKE 1 TABLET BY MOUTH EVERY DAY 90 tablet 0    losartan (COZAAR) 100 MG tablet TAKE 1/2 OF A TABLET (50 MG) BY MOUTH DAILY 45 tablet 0    metoprolol succinate ER (TOPROL XL) 25 MG 24 hr tablet TAKE 1 TABLET BY MOUTH EVERYDAY AT BEDTIME 90 tablet 0    mycophenolic acid (GENERIC EQUIVALENT)  "180 MG EC tablet Take 3 tablets (540 mg) by mouth 2 times daily 180 tablet 11    pantoprazole (PROTONIX) 40 MG EC tablet TAKE 1 TABLET BY MOUTH EVERY DAY 90 tablet 2    FLORIN CONTOUR test strip  (Patient not taking: Reported on 2023)  0    BD INSULIN SYRINGE ULTRAFINE 31G X 5/16\" 0.3 ML USING 4-5 PER DAY ([facility] 31G/0.3ML) (Patient not taking: Reported on 2023)  6    Continuous Blood Gluc Transmit (DEXCOM G6 TRANSMITTER) MISC See Admin Instructions 1 each 1   .  Family History   Problem Relation Age of Onset    Respiratory Mother          age 71    Cardiovascular Father          age 75 hyertension    Arthritis Sister         living, healthy    Family History Negative Brother          age 44    Colon Cancer No family hx of          Social History     Tobacco Use    Smoking status: Never    Smokeless tobacco: Never   Substance Use Topics    Alcohol use: No           2022    12:16 PM   Alcohol Use   Prescreen: >3 drinks/day or >7 drinks/week? Not Applicable       Do you have a current opioid prescription? No  Do you use any other controlled substances or medications that are not prescribed by a provider? None      Patient has been recently diagnosed with Parkinson's/dementia, has been started on Sinemet and follows up with a neurologist      Hyperlipidemia Follow-Up    Are you regularly taking any medication or supplement to lower your cholesterol?   Yes- lipitor  Are you having muscle aches or other side effects that you think could be caused by your cholesterol lowering medication?  No    Hypertension Follow-up    Do you check your blood pressure regularly outside of the clinic? Yes   Are you following a low salt diet? Yes  Are your blood pressures ever more than 140 on the top number (systolic) OR more   than 90 on the bottom number (diastolic), for example 140/90? No    Chronic Kidney Disease/status post kidney transplant: On immunosuppressants and follows up with the " transplant clinic    Hypothyroidism Follow-up    Since last visit, patient describes the following symptoms: Weight stable, no hair loss, no skin changes, no constipation, no loose stools      Diabetes: On insulin and follows up with the endocrinologist, eye exam has been scheduled for 8/14/2023      Current providers sharing in care for this patient include:   Patient Care Team:  Summer Vega MD as PCP - General  Junaid Trevino MD as MD (Cardiology)  Enrico Blankenship MD (Nephrology)  Summer Vega MD as Assigned PCP  Morro Veloz MD as MD (Nephrology)  Juan M Fitzpatrick MD as MD (Endocrinology, Diabetes, and Metabolism)  Juan M Fitzpatrick MD as Assigned Endocrinology Provider  Pauline Bourne MD as Assigned Nephrology Provider  Linda Brewster PA-C as Physician Assistant (Physical Medicine and Rehabilitation)  Linda Brewster PA-C as Assigned Neuroscience Provider    The following health maintenance items are reviewed in Epic and correct as of today:  Health Maintenance   Topic Date Due    ANNUAL REVIEW OF HM ORDERS  Never done    ZOSTER IMMUNIZATION (1 of 2) Never done    DIABETIC FOOT EXAM  11/30/2022    MEDICARE ANNUAL WELLNESS VISIT  07/12/2023    EYE EXAM  08/10/2023    MICROALBUMIN  08/12/2023    INFLUENZA VACCINE (1) 09/01/2023    A1C  02/04/2024    LIPID  04/04/2024    TSH W/FREE T4 REFLEX  05/09/2024    FALL RISK ASSESSMENT  05/09/2024    MAMMO SCREENING  06/21/2024    BMP  08/04/2024    HEMOGLOBIN  08/04/2024    COLORECTAL CANCER SCREENING  06/07/2026    ADVANCE CARE PLANNING  07/12/2027    DTAP/TDAP/TD IMMUNIZATION (3 - Td or Tdap) 07/12/2032    DEXA  07/29/2037    HEPATITIS C SCREENING  Completed    PHQ-2 (once per calendar year)  Completed    Pneumococcal Vaccine: 65+ Years  Completed    URINALYSIS  Completed    COVID-19 Vaccine  Completed    IPV IMMUNIZATION  Aged Out    MENINGITIS IMMUNIZATION  Aged Out     Labs reviewed in EPIC      Review of  "Systems  CONSTITUTIONAL: NEGATIVE for fever, chills, change in weight  EYES: NEGATIVE for vision changes or irritation  ENT/MOUTH: NEGATIVE for ear, mouth and throat problems  RESP: NEGATIVE for significant cough or SOB  BREAST: NEGATIVE for masses, tenderness or discharge  CV: NEGATIVE for chest pain, palpitations or peripheral edema  GI: NEGATIVE for nausea, abdominal pain, heartburn, or change in bowel habits  : NEGATIVE for frequency, dysuria, or hematuria  MUSCULOSKELETAL: NEGATIVE for significant arthralgias or myalgia  NEURO: NEGATIVE for weakness, dizziness or paresthesias  ENDOCRINE: NEGATIVE for temperature intolerance, skin/hair changes  HEME: NEGATIVE for bleeding problems  PSYCHIATRIC: NEGATIVE for changes in mood or affect    OBJECTIVE:   /60   Pulse 72   Temp 97.2  F (36.2  C) (Tympanic)   Resp 13   Ht 1.6 m (5' 3\")   Wt 71.6 kg (157 lb 14.4 oz)   SpO2 98%   BMI 27.97 kg/m   Estimated body mass index is 27.97 kg/m  as calculated from the following:    Height as of this encounter: 1.6 m (5' 3\").    Weight as of this encounter: 71.6 kg (157 lb 14.4 oz).  Physical Exam  GENERAL APPEARANCE:   alert and no distress  EYES: Eyes grossly normal to inspection, PERRL and conjunctivae and sclerae normal  HENT:  nose and mouth without ulcers or lesions, oropharynx clear and oral mucous membranes moist  NECK: no adenopathy, no asymmetry, masses, or scars and thyroid normal to palpation  RESP: lungs clear to auscultation - no rales, rhonchi or wheezes  BREAST: normal without masses, tenderness or nipple discharge and no palpable axillary masses or adenopathy  CV: regular rate and rhythm, normal S1 S2,    ABDOMEN: soft, nontender,   and bowel sounds normal  MS: Trace lower extremity edema, no calf tenderness   NEURO: Normal strength and tone, sensory exam grossly normal,  speech normal  PSYCH: mentation appears normal and affect normal/bright  Diabetic foot exam normal DP and PT pulses, no trophic " changes or ulcerative lesions, normal sensory exam, and normal monofilament exam    ASSESSMENT / PLAN:     (Z00.00) Encounter for Medicare annual wellness exam  (primary encounter diagnosis)  Plan: Recent lab results reviewed, patient gets mammogram every other year, due 2024, up-to-date on colonoscopy, immunizations reviewed    (Z94.0) Kidney replaced by transplant  Plan: Follows up with transplant clinic/nephrologist    (D84.9) Immunosuppressed status (HCC)  Plan: Follows up with transplant clinic and is on immunosuppressants    (I15.1,  N28.89) Hypertension secondary to other renal disorders  Comment: Blood pressure stable  Plan: amLODIPine (NORVASC) 10 MG tablet, losartan         (COZAAR) 100 MG tablet, metoprolol succinate ER        (TOPROL XL) 25 MG 24 hr tablet refilled as directed.explained clearly about the medication,insructions and side effects. Advised to follow low salt diet and exercise and f/u in 6 mths.       (E78.5) Hyperlipidemia LDL goal <100  Comment: Recent lipids reviewed  Plan: atorvastatin (LIPITOR) 40 MG tablet refilled as directed.explained clearly about the medication,insructions and side effects.      (I25.10) Coronary artery disease involving native heart without angina pectoris, unspecified vessel or lesion type  Plan: Follows up with cardiologist, refilled metoprolol succinate ER (TOPROL XL) 25 MG 24 hr        Tablet.explained clearly about the medication,insructions and side effects.         (E03.8) Other specified hypothyroidism  Plan: TSH reviewed, refilled levothyroxine (SYNTHROID/LEVOTHROID) 100 MCG  tablet as directed.explained clearly about the medication,insructions and side effects.       (G20) Parkinson disease (H)  (G31.83,  F02.80) Lewy body dementia, unspecified dementia severity, unspecified whether behavioral, psychotic, or mood disturbance or anxiety (H)  Plan: new diagnosis , On Sinemet and follows up with a neurologist      Patient has been advised of split billing  "requirements and indicates understanding: Yes      COUNSELING:  Reviewed preventive health counseling, as reflected in patient instructions       Regular exercise       Healthy diet/nutrition       Immunizations  Discussed Shingrix vaccine , pt will check with her insurance     BMI:   Estimated body mass index is 27.97 kg/m  as calculated from the following:    Height as of this encounter: 1.6 m (5' 3\").    Weight as of this encounter: 71.6 kg (157 lb 14.4 oz).         She reports that she has never smoked. She has never used smokeless tobacco.      Appropriate preventive services were discussed with this patient, including applicable screening as appropriate for cardiovascular disease, diabetes, osteopenia/osteoporosis, and glaucoma.  As appropriate for age/gender, discussed screening for colorectal cancer, prostate cancer, breast cancer, and cervical cancer. Checklist reviewing preventive services available has been given to the patient.    Reviewed patients plan of care and provided an AVS. The Basic Care Plan (routine screening as documented in Health Maintenance) for Viv meets the Care Plan requirement. This Care Plan has been established and reviewed with the Patient and spouse.          Summer Vega MD  [facility]    Identified Health Risks:    "

## 2023-08-18 NOTE — TELEPHONE ENCOUNTER
Cyclosporine =  169  (8/18/23)  Goal 50-75  Current CSA dose 75 mg / 50 mg    Previous level also elevated (94) while on the same dose.    PLAN:   Call and confirm this was a good 12-hour trough.   Verify current dose.   Confirm no new medications or illness (nacho. Diarrhea).  If good trough and correct dose above, recommend:  decrease dose to 50 mg BID.   Recheck level in 1-2 weeks and make sure it is a good trough to avoid additional lab draws.      OUTCOME:  Reports good 12 hour trough.  No diarrhea.  No new medications, supplements.  Discussed decreasing dose and rechecking level in 1-2 weeks.  Verbalized understanding and agreement to plan.  Lab orders placed.  Updated CSA prescription.  Requesting Rx sent to: [facility] #[id number] [place], [place] - [facility] [address] AT [facility] OF [address] [facility]

## 2023-08-22 NOTE — TELEPHONE ENCOUNTER
"Writer called pharmacy and spoke with pharmacist \"Ashwin\" Pharmacist states that insurance required brand Novolog. Pharmacist is able to take current order and will fill medication.    LVM for patient and sent Hurray! Message with update.    Girish Coffey RN    "

## 2023-08-22 NOTE — TELEPHONE ENCOUNTER
[facility] Call Center    Phone Message    May a detailed message be left on voicemail: yes     Reason for Call: Medication Refill Request    Has the patient contacted the pharmacy for the refill? Yes   Name of medication being requested: insulin aspart (NOVOLOG PEN) 100 UNIT/ML pen   Provider who prescribed the medication: Dr. Fitzpatrick  Pharmacy: [facility] #[id number] [place] [address] AT [address] OF [email or web address]   Date medication is needed: as soon as possible       Action Taken: Message routed to:  [facility] endo    Travel Screening: Not Applicable

## 2023-08-23 NOTE — TELEPHONE ENCOUNTER
[facility] Call Center    Phone Message    May a detailed message be left on voicemail: yes     Reason for Call: Medication Refill Request    Has the patient contacted the pharmacy for the refill? Yes   Name of medication being requested: Continuous Blood Gluc Sensor (DEXCOM G7 SENSOR) MISC [[id number]]   Provider who prescribed the medication: Dr Fitzpatrick  Pharmacy: [facility] #[id number] [place] [address] AT SEC OF [address] [facility]  Date medication is needed: asap       Action Taken: Other: endo    Travel Screening: Not Applicable

## 2023-08-30 NOTE — TELEPHONE ENCOUNTER
Patient Call: General  Route to LPN    Reason for call: Tirso patient's  wanting to update Coordinator that [facility] is out of Gengraf 25 mg until November, and wondering if the Generic version can be sent to their [facility] that's listed on file, he stated patient has enough to last her 7 days     Call back needed? Yes    Return Call Needed  Same as documented in contacts section  When to return call?: Same day: Route High Priority

## 2023-08-30 NOTE — TELEPHONE ENCOUNTER
Spoke to patients  who states that [facility] is out of brand name Gengraf until November.  Patient has enough Gengraf at home at this time but requests generic Cyclosporine Rx be sent to [facility].  Patient will notify txp office when she starts to use generic to monitor CSA levels.

## 2023-09-28 NOTE — TELEPHONE ENCOUNTER
[facility] Call Center    Phone Message    May a detailed message be left on voicemail: yes     Reason for Call: Medication Refill Request    Has the patient contacted the pharmacy for the refill? Yes   Name of medication being requested: Continuous Blood Gluc Sensor (DEXCOM G7 SENSOR) [facility]   Provider who prescribed the medication: Dr. Fitzpatrick  Pharmacy: [facility] #[id number] [place] [address] AT [address]   Date medication is needed: 9/28/2023, Per patient she is out, patient attempted to call the pharmacy to get a refill but was told they need a new script sent in by provider       Action Taken: Message routed to:  [facility] endo    Travel Screening: Not Applicable

## 2023-09-28 NOTE — TELEPHONE ENCOUNTER
Continuous Blood Gluc Sensor (DEXCOM G7 SENSOR) [facility]     Alexis has order. PA needed. Will send to PA team.   Decrease to 10 mg qd and see if helps if doesn't work to discuss with  PCP alternative treatment plan.    Message sent to  Patient.

## 2023-09-28 NOTE — TELEPHONE ENCOUNTER
PRIOR AUTHORIZATION DENIED    Medication: DEXCOM G7 SENSOR MISC  Insurance Company: [employer] - Phone [phone] Fax [phone]  Denial Date: 9/28/2023    Denial Rational: Dexcom G7 sensor is excluded from medicare part D. This is cover under medicare B. The Central PA Team does not handle medicare B. Usually, provider/ clinic staff needs to complete a CMN form for medicare to cover DME. Spoke with Gurwinder at [facility], he is unsure of what documentation needs to be sent in to medicare B. Please contact [facility] medicare billing at [phone] for assistance.     Appeal Information: N/A

## 2023-09-28 NOTE — TELEPHONE ENCOUNTER
Central Prior Authorization Team  Phone: [phone]    PA Initiation    Medication: DEXCOM G7 SENSOR MISC  Insurance Company: [employer] - Phone [phone] Fax [phone]  Pharmacy Filling the Rx: [facility] #[id number] - [place], [place] - [address] [facility] AT [address]  Filling Pharmacy Phone: [phone]  Filling Pharmacy Fax:    Start Date: 9/28/2023

## 2023-09-28 NOTE — TELEPHONE ENCOUNTER
Patient's  states Medicare should be the preferred insurance for this rx, not [employer]. Please update and advise asap. Thank you.

## 2023-10-02 NOTE — TELEPHONE ENCOUNTER
The Central PA Team does not handle Medicare B. It is handled by the clinic staffs and providers. Please contact pharmacy for assistance. Thank you

## 2023-10-04 NOTE — TELEPHONE ENCOUNTER
Will need order for Gengraf re-check will be using [facility] lab 10/18/2023    Per  Viv was diagnosed with Parkinson is taking Carbidota-levodopa and Donetezil for dementia.

## 2023-10-04 NOTE — TELEPHONE ENCOUNTER
Tirso called to update transplant team regarding patient's medications.     She was diagnosed with dementia and Parkinsons. She was started on Carbidopa-Levidopa 25-100mg 3 months ago, takes 1.5 tablets TID. She started donepezil 5mg yesterday, and will increase to 10 mg after 30 days.     Reviewed with pharmacy. No issue with medications as patient does not have a history of seizures or seizure disorder.

## 2023-10-07 PROBLEM — H44.001 INFECTION OF RIGHT EYE: Status: ACTIVE | Noted: 2023-01-01

## 2023-10-07 PROBLEM — H05.89 ORBITAL MASS: Status: ACTIVE | Noted: 2023-01-01

## 2023-10-07 PROBLEM — H05.019: Status: ACTIVE | Noted: 2023-01-01

## 2023-10-07 NOTE — ED PROVIDER NOTES
ED PROVIDER NOTE  October 7, 2023  History     Chief Complaint   Patient presents with    Eye Problem     Infection about right eye     HPI  Viv Shaw is a 73 year old female who arrives today to the emergency department as a transfer from outside facility secondary to concern of right eye infection.  Per outside provider patient with 1 day onset of swelling superior to right eye with fixed 3 mm right pupil, proptosis and diminished extraocular movements.  Patient noted to have blood glucose elevation at outside ED in the 200s.  Patient did have CT imaging concerning for rim-enhancing mass with mass effect onto eye.  Patient given Zosyn.  Case discussed with ophthalmology who has recommended transfer to our department.  Upon arrival patient reports no ocular pain at this time.  No similar symptoms in the past.  She denies recent fever or ocular trauma.  Patient states symptoms did begin yesterday and initially felt this is pinkeye and attempted drops.  Over the past 24 hours she has had rapid expanse of swelling and decrease in visual acuity.  At this time the patient states she is unable to open her right eye.  With forced elevation of her lid patient reports essentially absence of visual acuity.  Otherwise patient reports no contralateral involvement.      Past Medical History  Past Medical History:   Diagnosis Date    Abdominal wall hernia     RLQ    AK (actinic keratosis) 08/06/2020    Allergic rhinitis     CAD (coronary artery disease)     coronary artery disease s/p PCI to LAD in 2005coronary artery disease s/p PCI to LAD in 2005    Diabetes mellitus, type 2 (H)     follows up with endocrinologist.    Dyslipidemia     GERD (gastroesophageal reflux disease)     H/O diabetic nephropathy     s/p kidney trnsplant    Hypertension     Hypothyroidism     Immunosuppressed status (H)     Kidney replaced by transplant     Rt    PONV (postoperative nausea and vomiting)     Shingles     Urinary tract infection  "8/12/2022    Vitamin B12 deficiency anemia      Past Surgical History:   Procedure Laterality Date    APPENDECTOMY NOS      ARTHROSCOPY SHOULDER ROTATOR CUFF REPAIR Left     COLONOSCOPY N/A 6/7/2016    Procedure: COLONOSCOPY;  Surgeon: Rashard Snider MD;  Location: [facility]    Coronary angioplasty with stent  2005    HYSTERECTOMY      Kidney Transplant NOS Right     LAPAROSCOPIC CHOLECYSTECTOMY      NOSE SURGERY  2013    OPEN SEPARATION COMPONENT HERNIORRHAPHY N/A 10/16/2017    Procedure: OPEN SEPARATION COMPONENT HERNIORRHAPHY;;  Surgeon: CARL Lott MD;  Location: [facility]    RECONSTRUCT ABDOMINAL WALL N/A 10/16/2017    Procedure: RECONSTRUCT ABDOMINAL WALL;  Exploratory Laparotomy, Lysis of Adhesions, Abdominal Wall reconstruction, Inlay Xen AB mesh, Mitek Suture [place] and Umbilical Hernia Repair.;  Surgeon: CARL Lott MD;  Location: [facility] OR    REMOVE CATARACT INTRACAP,INSERT LENS Right     TONSILLECTOMY       amLODIPine (NORVASC) 10 MG tablet  ASPIRIN PO  atorvastatin (LIPITOR) 40 MG tablet  FLORIN CONTOUR test strip  BD INSULIN SYRINGE ULTRAFINE 31G X 5/16\" 0.3 ML  carbidopa-levodopa (SINEMET)  MG tablet  cholecalciferol (VITAMIN D3) 25 mcg (1000 units) capsule  Continuous Blood Gluc  (DEXCOM G7 ) LUCAS  Continuous Blood Gluc Sensor (DEXCOM G7 SENSOR) MISC  Continuous Blood Gluc Transmit (DEXCOM G6 TRANSMITTER) MISC  cycloSPORINE modified (GENERIC EQUIVALENT) 25 MG capsule  GENGRAF (BRAND) 25 MG CAPSULE  insulin aspart (NOVOLOG PEN) 100 UNIT/ML pen  insulin glargine (BASAGLAR KWIKPEN) 100 UNIT/ML pen  isosorbide mononitrate (IMDUR) 30 MG 24 hr tablet  levothyroxine (SYNTHROID/LEVOTHROID) 100 MCG tablet  losartan (COZAAR) 100 MG tablet  metoprolol succinate ER (TOPROL XL) 25 MG 24 hr tablet  mycophenolic acid (GENERIC EQUIVALENT) 180 MG EC tablet  pantoprazole (PROTONIX) 40 MG EC tablet      No Known Allergies  Family History  Family History   Problem Relation Age of Onset "    Respiratory Mother          age 71    Cardiovascular Father          age 75 hyertension    Arthritis Sister         living, healthy    Family History Negative Brother          age 44    Colon Cancer No family hx of      Social History   Social History     Tobacco Use    Smoking status: Never    Smokeless tobacco: Never   Substance Use Topics    Alcohol use: No    Drug use: No         A medically appropriate review of systems was performed with pertinent positives and negatives noted in the HPI, and all other systems negative.      Physical Exam   BP: (!) 164/74  Temp: 98  F (36.7  C)  Resp: 16  SpO2: 97 %      Physical Exam  Vitals and nursing note reviewed.   Constitutional:       General: She is not in acute distress.     Appearance: Normal appearance. She is not ill-appearing, toxic-appearing or diaphoretic.   HENT:      Head: Normocephalic and atraumatic.      Right Ear: External ear normal.      Left Ear: External ear normal.      Mouth/Throat:      Mouth: Mucous membranes are moist.      Pharynx: Oropharynx is clear. No oropharyngeal exudate.   Eyes:      General:         Right eye: No foreign body or discharge.      Extraocular Movements:      Right eye: Abnormal extraocular motion present.      Left eye: Normal extraocular motion.      Conjunctiva/sclera:      Right eye: Right conjunctiva is injected. Chemosis present.      Left eye: Left conjunctiva is not injected. No chemosis.     Comments: Edema and erythema noted of the superior lid extending into the inferior aspect of the forehead on the right.  Generalized chemosis and injection of the sclera appreciated with mild proptosis.  Decrease in visual acuity.  Reduction in extraocular movement appreciated.  Minimal response to light stimulus of the pupil.   Cardiovascular:      Rate and Rhythm: Normal rate.      Pulses: Normal pulses.      Heart sounds: Normal heart sounds. No murmur heard.     No friction rub.   Pulmonary:       Effort: Pulmonary effort is normal. No respiratory distress.      Breath sounds: No stridor. No wheezing, rhonchi or rales.   Skin:     General: Skin is warm.      Capillary Refill: Capillary refill takes less than 2 seconds.      Coloration: Skin is not pale.      Findings: No bruising or erythema.   Neurological:      General: No focal deficit present.      Mental Status: She is alert and oriented to person, place, and time.      Motor: No weakness.   Psychiatric:         Mood and Affect: Mood normal.         Behavior: Behavior normal.         ED Course        Procedures         No results found. However, due to the size of the patient record, not all encounters were searched. Please check Results Review for a complete set of results.  Medications - No data to display          Critical care was not performed.     Medical Decision Making  The patient's presentation was of moderate complexity (an acute complicated injury).    The patient's evaluation involved:  review of external note(s) from 2 sources (see separate area of note for details)  review of 2 test result(s) ordered prior to this encounter (see separate area of note for details)  ordering and/or review of 1 test(s) in this encounter (see separate area of note for details)      Assessments & Plan (with Medical Decision Making)     Viv Shaw is a 73 year old female who arrives today to the emergency department as a transfer from outside facility secondary to concern of right eye infection.  Upon arrival patient is alert, afebrile.  She is seated upright in a chair and appears nontoxic.  External evaluation demonstrating fairly significant swelling of the superior lid extending into the lower aspect of the forehead on the side.  She is unable to self raise her lid.  With forced opening of her lids she does have a constricted right pupil.  Extraocular movements diminished and visual acuity is essentially absent.  There is generalized injection and some  mild proptosis with associated chemosis of the globe.  The patient does not arrive with images from outside facility not immediately available in PACS.  I would plan to pull images for formal review.  Case discussed with ophthalmology who is aware of the patient.  She has received Zosyn prior to arrival.  We will keep her n.p.o. in case of need for sedation/operative drainage.  This patient was signed over to my colleague at 3:30 PM pending ophthalmologic consultation.    I have reviewed the nursing notes.    I have reviewed the findings, diagnosis, plan and need for follow up with the patient.    New Prescriptions    No medications on file       Final diagnoses:   Infection of right eye       JASON HINTON MD    10/7/2023   [facility] EMERGENCY DEPARTMENT     Jason Hinton MD  10/07/23 4693

## 2023-10-07 NOTE — TELEPHONE ENCOUNTER
Telephone Note    I was contacted by Dr. Preston from [facility] regarding this patient. The following information was obtained via phone call with this provider.      Patient is a 73-year-old F who presents with rim enhancing mass and visual acuity changes.     Presents with rim enhancing mass above the globe  Noticed that conj of the right eye was injected without purulent discharge  Antibiotic eye drops used  Right eyelid edematous     3 mm fixed pupil right  Conj injection  Proptosis with decreased Extraocular movements on right side  Blood sugars elevated since 10/06 PM    CT scan showing   Superior aspect of right orbit with 1 cm x 2.6 cm by 1.7 cm rim enhancing collection with downward displacement of the extraocular musculature associated with R globe proptosis    Outside provider's exam revealed:   - Visual acuity of 20/HM in the right eye, 20/70 cc in the left eye.   - Pupillary exam: right fixed 3 mm   - Intraocular pressures were not performed   - Confrontational visual fields were not performed.   - Extraocular movements were not performed.   - Anterior slit lamp exam showed not performed.   - Fundus exam was not performed.     Given the patient's significant vision loss an acute presentation as well as focal mass identified on appropriate imaging, I recommended prompt evaluation in the emergency department, [facility].         Andres Hernandez MD  Ophthalmology, PGY-2

## 2023-10-07 NOTE — ED TRIAGE NOTES
Triage Assessment       Row Name 10/07/23 0384       Triage Assessment (Adult)    Airway WDL WDL       Respiratory WDL    Respiratory WDL WDL       Skin Circulation/Temperature WDL    Skin Circulation/Temperature WDL WDL       Cardiac WDL    Cardiac WDL WDL       Peripheral/Neurovascular WDL    Peripheral Neurovascular WDL WDL       Cognitive/Neuro/Behavioral WDL    Cognitive/Neuro/Behavioral WDL WDL

## 2023-10-07 NOTE — CONSULTS
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 No recent illness or trauma. Denies ocular or orbital pain. Initially felt that this was pinkeye and attempted ?antibiotic drops. Does not know what previous vision was but states that she was able to read lines on the Snellen chart at an eye exam which was performed in August. Frozen eye with LP vision. +Relative afferent pupillary defect in the right eye. Pressure is 25 / 20. Upper lid with fullness and appearance of mass underlying orbital rim.     - Start broad spectrum abx: Vancomycin + Zosyn  - Orbital inflammatory syndrome labs: TB, syphilis, ANCA, IgG subclasses, TSH, TSI, ACE, Lysozyme  - MRI w/w/o contrast orbits and brain  - Continue NPO status until imaging has been obtained and surgical plan can be made; will need diabetes management in light of this    It is our pleasure to participate in this patient's care and treatment. Please contact us with any further questions or concerns.    Discussed with Dr. Nathaniel Figueroa and Dr. Green who agreed with this assessment and plan.     Ophthalmology will continue to follow closely. Please contact ophthalmologist on call with any questions or concerns. We appreciate your care of this patient.    Andres Hernandez MD, PGY2  Ophthalmology Resident  [facility]    HISTORY OF PRESENTING ILLNESS:     Viv Shaw is a 73 year old female who presented on 10/07/23 with   Presents with rim enhancing mass above the globe  Noticed that conj of the right eye was injected without purulent discharge  Antibiotic  "eye drops used  Right eyelid edematous      3 mm fixed pupil right  Conj injection  Proptosis with decreased Extraocular movements on right side  Blood sugars elevated since 10/06 PM     CT scan showing   Superior aspect of right orbit with 1 cm x 2.6 cm by 1.7 cm rim enhancing collection with downward displacement of the extraocular musculature associated with R globe proptosis     Outside provider's exam revealed:   - Visual acuity of 20/HM in the right eye, 20/70 cc in the left eye.     10+ review of systems were otherwise negative except for that which has been stated above.      OCULAR/MEDICAL/SURGICAL HISTORIES:     Past Ocular History:   Last eye exam: August, 2023   Previously diagnosed ocular conditions: severe proliferative diabetic retinopathy, OU  Prior eye surgery/laser: Panretinal laser photocoagulation (PRP) Laser OU,   Contact lens wear: none  Glasses: wearing  Eyedrops: none    Pertinent Systemic Medications:   No current facility-administered medications for this encounter.     Current Outpatient Medications   Medication    amLODIPine (NORVASC) 10 MG tablet    ASPIRIN PO    atorvastatin (LIPITOR) 40 MG tablet    FLORIN CONTOUR test strip    BD INSULIN SYRINGE ULTRAFINE 31G X 5/16\" 0.3 ML    carbidopa-levodopa (SINEMET)  MG tablet    cholecalciferol (VITAMIN D3) 25 mcg (1000 units) capsule    Continuous Blood Gluc  (DEXCOM G7 ) LUCAS    Continuous Blood Gluc Sensor (DEXCOM G7 SENSOR) MISC    Continuous Blood Gluc Transmit (DEXCOM G6 TRANSMITTER) MISC    cycloSPORINE modified (GENERIC EQUIVALENT) 25 MG capsule    GENGRAF (BRAND) 25 MG CAPSULE    insulin aspart (NOVOLOG PEN) 100 UNIT/ML pen    insulin glargine (BASAGLAR KWIKPEN) 100 UNIT/ML pen    isosorbide mononitrate (IMDUR) 30 MG 24 hr tablet    levothyroxine (SYNTHROID/LEVOTHROID) 100 MCG tablet    losartan (COZAAR) 100 MG tablet    metoprolol succinate ER (TOPROL XL) 25 MG 24 hr tablet    mycophenolic acid (GENERIC " EQUIVALENT) 180 MG EC tablet    pantoprazole (PROTONIX) 40 MG EC tablet     Past Medical History:  Past Medical History:   Diagnosis Date    Abdominal wall hernia     RLQ    AK (actinic keratosis) 08/06/2020    Allergic rhinitis     CAD (coronary artery disease)     coronary artery disease s/p PCI to LAD in 2005coronary artery disease s/p PCI to LAD in 2005    Diabetes mellitus, type 2 (H)     follows up with endocrinologist.    Dyslipidemia     GERD (gastroesophageal reflux disease)     H/O diabetic nephropathy     s/p kidney trnsplant    Hypertension     Hypothyroidism     Immunosuppressed status (H)     Kidney replaced by transplant     Rt    PONV (postoperative nausea and vomiting)     Shingles     Urinary tract infection 8/12/2022    Vitamin B12 deficiency anemia        Past Surgical History:   Past Surgical History:   Procedure Laterality Date    APPENDECTOMY NOS      ARTHROSCOPY SHOULDER ROTATOR CUFF REPAIR Left     COLONOSCOPY N/A 6/7/2016    Procedure: COLONOSCOPY;  Surgeon: Rashard Snider MD;  Location: [facility]    Coronary angioplasty with stent  2005    HYSTERECTOMY      Kidney Transplant NOS Right     LAPAROSCOPIC CHOLECYSTECTOMY      NOSE SURGERY  2013    OPEN SEPARATION COMPONENT HERNIORRHAPHY N/A 10/16/2017    Procedure: OPEN SEPARATION COMPONENT HERNIORRHAPHY;;  Surgeon: CARL Lott MD;  Location: [facility]    RECONSTRUCT ABDOMINAL WALL N/A 10/16/2017    Procedure: RECONSTRUCT ABDOMINAL WALL;  Exploratory Laparotomy, Lysis of Adhesions, Abdominal Wall reconstruction, Inlay Xen AB mesh, Mitek Suture [place] and Umbilical Hernia Repair.;  Surgeon: CARL Lott MD;  Location: [facility]    REMOVE CATARACT INTRACAP,INSERT LENS Right     TONSILLECTOMY         Family History:   No history of macular degeneration or glaucoma    Social History:   No tobacco use    EXAMINATION:     Base Eye Exam       Visual Acuity (Snellen - Linear)         Right Left    Dist sc LP 20/25              Tonometry  (Tonopen, 6:36 PM)         Right Left    Pressure 25 20              Pupils         Dark Light Shape React APD    Right 3 3 Round Minimal Present    Left 4 3 Round Sluggish None              Visual Fields         Left Right    Restrictions Partial outer inferior temporal, inferior nasal deficiencies    Right unable to assess             Extraocular Movement         Right Left     -3 -3 -3   -3  -3   -3 -3 -3    -- -- --   --  --   -- -- --                 Dilation       Both eyes: 1.0% Mydriacyl, 2.5% Dexter Synephrine @ 6:36 PM                  Slit Lamp and Fundus Exam       External Exam         Right Left    External Proptotic Normal              Slit Lamp Exam         Right Left    Lids/Lashes Upper lid fullness with edema, fluctuance, redness, ptosis Normal    Conjunctiva/Sclera Significant chemosis T > N White and quiet    Cornea Punctate epithelial erosions Clear    Anterior Chamber Deep and quiet Deep and quiet    Iris Round and minimally reactive Round and reactive    Lens CE IOL CE IOL    Anterior Vitreous Normal Normal              Fundus Exam         Right Left    Disc Optic disc pallor Normal    C/D Ratio .3 .3    Macula Scars centrally Scars centrally    Vessels Normal Normal    Periphery PRP Laser PRP Laser                  Labs/Studies/Imaging Performed    MRI with and without contrast pending     Andres Hernandez MD  Resident Physician, PGY2  Department of Ophthalmology  10/07/23 4:16 PM

## 2023-10-08 NOTE — PHARMACY-ADMISSION MEDICATION HISTORY
Pharmacist Admission Medication History    Admission medication history is complete. The information provided in this note is only as accurate as the sources available at the time of the update.    Information Source(s): Caregiver via phone    Pertinent Information: Tirso (spouse) was a good historian. He had a list of her medications that he was able to refer to. He was able to provide recent doses & OTC medications. Patient typically takes Gengraf but due to shortage her pharmacy filled generic cyclosporine modified 25 mg capsules.   Patient typically uses a dexcom sensor. This was removed for imaging on 10/7. Patient is asking if this is needed while inpatient or if she can resume when she goes home.    Changes made to PTA medication list:  Added: vitamin B12  Deleted: None  Changed: vitamin D dose adjusted from 2000 units to 5000 units daily    Medication Affordability:  Not including over the counter (OTC) medications, was there a time in the past 3 months when you did not take your medications as prescribed because of cost?: No    Allergies reviewed with patient and updates made in EHR: yes    Medication History Completed By: EFREN MEDRANO [facility] 10/8/2023 11:06 AM    PTA Med List   Medication Sig Last Dose    amLODIPine (NORVASC) 10 MG tablet Take 1 tablet (10 mg) by mouth daily 10/7/2023    ASPIRIN PO Take 81 mg by mouth daily 10/7/2023    atorvastatin (LIPITOR) 40 MG tablet Take 0.5 tablets (20 mg) by mouth daily 10/7/2023    carbidopa-levodopa (SINEMET)  MG tablet Take 1.5 tablets by mouth 3 times daily 10/7/2023    cholecalciferol 125 MCG (5000 UT) CAPS Take 2 capsules by mouth daily 10/7/2023    Continuous Blood Gluc  (DEXCOM G7 ) LUCAS Use to read blood sugars as 's instructions. 10/8/2023    Continuous Blood Gluc Sensor (DEXCOM G7 SENSOR) MISC Change every 10 days. 10/8/2023    cyanocobalamin (VITAMIN B-12) 1000 MCG tablet Take 1,000 mcg by mouth Every Mon, Wed, Fri  Morning 10/7/2023    cycloSPORINE modified (GENERIC EQUIVALENT) 25 MG capsule Take 2 capsules (50 mg) by mouth 2 times daily 10/7/2023 at 1800    GENGRAF (BRAND) 25 MG CAPSULE Take 2 capsules (50 mg) by mouth 2 times daily Unknown    insulin aspart (NOVOLOG PEN) 100 UNIT/ML pen Inject 5-10 units subcutaneous with meals and correction doses as directed, total daily dose approx 25 units 10/7/2023    insulin glargine (BASAGLAR KWIKPEN) 100 UNIT/ML pen Inject 15-17 Units Subcutaneous daily 10/7/2023    isosorbide mononitrate (IMDUR) 30 MG 24 hr tablet TAKE 0.5 TABLETS (15 MG) BY MOUTH DAILY 10/7/2023    levothyroxine (SYNTHROID/LEVOTHROID) 100 MCG tablet Take 1 tablet (100 mcg) by mouth daily 10/7/2023    losartan (COZAAR) 100 MG tablet TAKE 1/2 OF A TABLET (50 MG) BY MOUTH DAILY 10/7/2023    metoprolol succinate ER (TOPROL XL) 25 MG 24 hr tablet Take 1 tablet (25 mg) by mouth At Bedtime 10/7/2023    mycophenolic acid (GENERIC EQUIVALENT) 180 MG EC tablet Take 3 tablets (540 mg) by mouth 2 times daily 10/7/2023 at 1800    pantoprazole (PROTONIX) 40 MG EC tablet TAKE 1 TABLET BY MOUTH EVERY DAY 10/7/2023    zinc gluconate 50 MG tablet Take 50 mg by mouth daily      Whitley Gupta, PharmD   Clinical Pharmacist  Phone #[phone]

## 2023-10-08 NOTE — ANESTHESIA POSTPROCEDURE EVALUATION
Patient: Viv Shaw    Procedure: Procedure(s):  Orbitotomy WITH BIPOSY OF MASS       Anesthesia Type:  No value filed.    Note:  Disposition: Inpatient   Postop Pain Control: Uneventful            Sign Out: Well controlled pain   PONV: No   Neuro/Psych: Uneventful            Sign Out: Acceptable/Baseline neuro status   Airway/Respiratory: Uneventful            Sign Out: Acceptable/Baseline resp. status; O2 supplementation               Oxygen: Nasal Cannula   CV/Hemodynamics: Uneventful            Sign Out: Acceptable CV status; No obvious hypovolemia; No obvious fluid overload   Other NRE: NONE   DID A NON-ROUTINE EVENT OCCUR? No    Event details/Postop Comments:  Sats preop 91 when got to OR. Improved to 96 with deep breaths.   Viv is tolerating PO and conversant. She is grateful to not be nauseated. No hallucinations (she had with prior surgery)  She and  deny questions re anesthesia  Glucose downtrending, mid 200s           Last vitals:  Vitals Value Taken Time   /71 10/08/23 1015   Temp 37.1  C (98.8  F) 10/08/23 1015   Pulse 68 10/08/23 1026   Resp 16 10/08/23 1026   SpO2 99 % 10/08/23 1025   Vitals shown include unvalidated device data.    Electronically Signed By: Stephanie Castillo MD  October 8, 2023  10:33 AM

## 2023-10-08 NOTE — ANESTHESIA PREPROCEDURE EVALUATION
Anesthesia Pre-Procedure Evaluation    Patient: Viv Shaw   MRN: [id number] : 1950        Procedure : Procedure(s):  Orbitotomy          Past Medical History:   Diagnosis Date    Abdominal wall hernia     RLQ    AK (actinic keratosis) 2020    Allergic rhinitis     CAD (coronary artery disease)     coronary artery disease s/p PCI to LAD in coronary artery disease s/p PCI to LAD in     Diabetes mellitus, type 2 (H)     follows up with endocrinologist.    Dyslipidemia     GERD (gastroesophageal reflux disease)     H/O diabetic nephropathy     s/p kidney trnsplant    Hypertension     Hypothyroidism     Immunosuppressed status (H24)     Kidney replaced by transplant     Rt    PONV (postoperative nausea and vomiting)     Shingles     Urinary tract infection 2022    Vitamin B12 deficiency anemia       Past Surgical History:   Procedure Laterality Date    APPENDECTOMY NOS      ARTHROSCOPY SHOULDER ROTATOR CUFF REPAIR Left     COLONOSCOPY N/A 2016    Procedure: COLONOSCOPY;  Surgeon: Rashard Snider MD;  Location: [facility]    Coronary angioplasty with stent      HYSTERECTOMY      Kidney Transplant NOS Right     LAPAROSCOPIC CHOLECYSTECTOMY      NOSE SURGERY      OPEN SEPARATION COMPONENT HERNIORRHAPHY N/A 10/16/2017    Procedure: OPEN SEPARATION COMPONENT HERNIORRHAPHY;;  Surgeon: CARL Lott MD;  Location: [facility]    RECONSTRUCT ABDOMINAL WALL N/A 10/16/2017    Procedure: RECONSTRUCT ABDOMINAL WALL;  Exploratory Laparotomy, Lysis of Adhesions, Abdominal Wall reconstruction, Inlay Xen AB mesh, Mitek Suture [place] and Umbilical Hernia Repair.;  Surgeon: CARL Lott MD;  Location: [facility]    REMOVE CATARACT INTRACAP,INSERT LENS Right     TONSILLECTOMY        No Known Allergies   Social History     Tobacco Use    Smoking status: Never    Smokeless tobacco: Never   Substance Use Topics    Alcohol use: No      Wt Readings from Last 1 Encounters:   10/07/23 70.3 kg  (155 lb)        Anesthesia Evaluation   Pt has had prior anesthetic. Type: General.    History of anesthetic complications  -  and PONV.  delirium.    ROS/MED HX  ENT/Pulmonary:     (+) sleep apnea, mild, doesn't use CPAP,                                  (-) asthma   Neurologic:     (+)   dementia,              Parkinson's disease,               Cardiovascular:     (+)  hypertension- -  CAD -  - stent-2015.                                 Previous cardiac testing   Echo: Date: Results:    Stress Test:  Date: 2017 Results:   Normal myocardial SPECT study with a summed stress score of 0. A  summed stress score of 0 is associated with an annual event rate of  0.9 and 0.8 for myocardial infarction and cardiac death, respectively  ([place]. Circulation 1998;98:535-43).  2. Normal LV systolic function.  3. No significant perfusion abnormalities.    ECG Reviewed:  Date: 2023 Results:  Difficult interpretation due to tremor. Regular rate, poor R wave progression, normal axis, t wave abn  Cath:  Date: Results:      METS/Exercise Tolerance:     Hematologic:     (+)      anemia (hgb 11),          Musculoskeletal:       GI/Hepatic:     (+) GERD,                   Renal/Genitourinary:     (+) renal disease,    Pt has history of transplant,         Endo:     (+) type I DM,  Last HgA1c: 6-7s,  Using insulin,  Normal glucose range: high recently with infection,   thyroid problem, hypothyroidism,           Psychiatric/Substance Use:       Infectious Disease: Comment: Eye infection      Malignancy:       Other:            Physical Exam    Airway        Mallampati: II   TM distance: > 3 FB   Neck ROM: full   Mouth opening: > 3 cm    Respiratory Devices and Support         Dental       (+) Modest Abnormalities - crowns, retainers, 1 or 2 missing teeth      Cardiovascular   cardiovascular exam normal       Rhythm and rate: regular and normal     Pulmonary   pulmonary exam normal        breath sounds clear to auscultation        Other findings: Tremor, some memory difficulties    OUTSIDE LABS:  CBC:   Lab Results   Component Value Date    WBC 8.6 10/08/2023    WBC 4.9 09/01/2023    HGB 11.4 (L) 10/08/2023    HGB 11.5 (L) 09/01/2023    HCT 35.5 10/08/2023    HCT 36.3 09/01/2023     10/08/2023     09/01/2023     BMP:   Lab Results   Component Value Date     10/08/2023     10/07/2023    POTASSIUM 4.0 10/08/2023    POTASSIUM 4.2 10/07/2023    CHLORIDE 101 10/08/2023    CHLORIDE 99 10/07/2023    CO2 24 10/08/2023    CO2 24 10/07/2023    BUN 18.2 10/08/2023    BUN 20.9 10/07/2023    CR 1.11 (H) 10/08/2023    CR 1.09 (H) 10/07/2023     (H) 10/08/2023     (H) 10/08/2023     COAGS:   Lab Results   Component Value Date    PTT 26 05/24/2007    INR 1.00 02/02/2022    FIBR 413 07/05/2005     POC:   Lab Results   Component Value Date     (H) 10/22/2017     HEPATIC:   Lab Results   Component Value Date    ALBUMIN 3.8 10/08/2023    PROTTOTAL 6.4 10/08/2023    ALT 10 10/08/2023    AST 25 10/08/2023    ALKPHOS 71 10/08/2023    BILITOTAL 0.9 10/08/2023     OTHER:   Lab Results   Component Value Date    PH 7.35 02/02/2022    LACT 2.3 (H) 02/02/2022    A1C 7.2 (H) 08/04/2023    NAVI 9.0 10/08/2023    PHOS 2.5 10/19/2017    MAG 1.7 02/02/2022    LIPASE 224 04/08/2017    AMYLASE 124 (H) 07/05/2005    TSH 0.22 (L) 10/07/2023    T4 1.68 10/07/2023    CRP 81.0 (H) 02/02/2022    SED 62 (H) 02/02/2022       Anesthesia Plan    ASA Status:  3, emergent    NPO Status:  NPO Appropriate    Anesthesia Type: General.     - Airway: ETT   Induction: Intravenous.   Maintenance: Balanced.   Techniques and Equipment:       Drips/Meds: insulin.     Consents    Anesthesia Plan(s) and associated risks, benefits, and realistic alternatives discussed. Questions answered and patient/representative(s) expressed understanding.     - Discussed:     - Discussed with:  Patient, Spouse      - Extended Intubation/Ventilatory Support  Discussed: No.      - Patient is DNR/DNI Status: No     Use of blood products discussed: No .     Postoperative Care    Pain management: Multi-modal analgesia, Oral pain medications, IV analgesics.   PONV prophylaxis: Background Propofol Infusion, Ondansetron (or other 5HT-3)     Comments:    Other Comments: Discussed risks of anesthesia including nausea, vomiting, sore throat, dental damage, cardiac complications, pulmonary complications including worsening of NERY, neurologic complications including delirium, and serious complications.     Insulin infusion given hyperglycemia today. Took last long acting as usual, but runs high with infection. Took metoprolol last night (takes once per day)                Stephanie Castillo MD

## 2023-10-08 NOTE — OP NOTE
PREOPERATIVE DIAGNOSIS:  Orbital mass and abscess, right eye.      POSTOPERATIVE DIAGNOSIS:  Orbital mass and abscess, right eye.      PROCEDURE PERFORMED:  Orbitotomy with biopsy and drainage of abscess, right eye      SURGEON:  Ana Machado MD      ASSISTANTS: Maria Del Rosario Green MD, KALIE and Andres Hernandez MD     ANESTHESIA:  General with local infiltration of a 1% lidocaine with epinephrine.      COMPLICATIONS:  None.      ESTIMATED BLOOD LOSS:  Less than 5 mL.     SPECIMEN: Right orbital mass for path. Abscess contents for cultures.      HISTORY:  Viv Shaw  presented with an orbital mass with abscess  with proptosis.  After the risks, benefits and alternatives to the proposed procedure were explained to the patient, informed consent was obtained. Vision was checked in the preop and appeared to be unreliable - possible no light perception.       PROCEDURE:  The patient was brought to the operating room and placed supine on the operating table.  General anesthesia was induced.  The right upper lid crease was marked with a marking pen and infiltrated with local anesthetic.  The area was prepped and draped in the typical sterile ophthalmic fashion.  Attention was directed to the right side.  Lid crease incision was made with a 15 blade and dissection carried down to the orbicularis with high temperature cautery.  Dissection was carried superiorly to the orbital rim using Moffett scissors. The periosteum was released at the arcus marginalis. The abscess cavity was entered and mosttly consisted of old hemorrhage. The fluid was sent for cultures.  Hemostasis was obtained with cautery.  The thickened abnormal tissue in the anterior orbit was biopsied with the Klever scissors. There was minimal bleeding and the skin was closed with interrupted and running 6-0 plain gut suture.  Erythromycin ophthalmic ointment was applied.  Viv Shaw  was taken to recovery room in stable condition.         ANA  MD RODRIGO

## 2023-10-08 NOTE — ANESTHESIA PROCEDURE NOTES
Airway       Patient location during procedure: OR       Procedure Start/Stop Times: 10/8/2023 8:26 AM  Staff -        CRNA: Juanita Bajwa APRN CRNA       Performed By: CRNA  Consent for Airway        Urgency: elective  Indications and Patient Condition       Indications for airway management: courtney-procedural       Induction type:intravenous       Mask difficulty assessment: 1 - vent by mask    Final Airway Details       Final airway type: endotracheal airway       Successful airway: ETT - single  Endotracheal Airway Details        ETT size (mm): 7.0       Cuffed: yes       Successful intubation technique: direct laryngoscopy       DL Blade Type: Brown 2       Grade View of Cords: 1       Adjucts: stylet       Secured at (cm): 22    Post intubation assessment        Placement verified by: capnometry, equal breath sounds and chest rise        Number of attempts at approach: 1       Ease of procedure: easy       Dentition: Intact    Medication(s) Administered   Medication Administration Time: 10/8/2023 8:26 AM

## 2023-10-08 NOTE — OR NURSING
PACU to Inpatient Nursing Handoff    Patient Viv Shaw is a 73 year old female who speaks English.   Procedure Procedure(s):  Orbitotomy WITH BIPOSY OF MASS   Surgeon(s) Primary: Xu Machado MD  Resident - Assisting: Lanre Dominique MD  Fellow - Assisting: Maria Del Rosario Green MD     No Known Allergies    Isolation  none    Past Medical History   has a past medical history of Abdominal wall hernia, AK (actinic keratosis) (08/06/2020), Allergic rhinitis, CAD (coronary artery disease), Diabetes mellitus, type 2 (H), Dyslipidemia, GERD (gastroesophageal reflux disease), H/O diabetic nephropathy, Hypertension, Hypothyroidism, Immunosuppressed status (H24), Kidney replaced by transplant, PONV (postoperative nausea and vomiting), Shingles, Urinary tract infection (8/12/2022), and Vitamin B12 deficiency anemia.    Anesthesia General   Dermatome Level     Preop Meds Not applicable   Nerve block Not applicable   Intraop Meds fentanyl (Sublimaze): 25 mcg (total dose) last given at 0819   ondansetron (Zofran): 4 mg (total dose) last given at 0903   Insuling gtt; precedex 12mcg last given at 0819   Local Meds Yes   Antibiotics piperacillin-tazobactam (Zosyn) - last given at 0815     Pain Patient Currently in Pain: denies   PACU meds  Not applicable   PCA / epidural No   Capnography     Telemetry ECG Rhythm: Sinus rhythm   Inpatient Telemetry Monitor Ordered? No        Labs Glucose Lab Results   Component Value Date     10/08/2023     10/08/2023     04/11/2022     06/23/2021       Hgb Lab Results   Component Value Date    HGB 11.4 10/08/2023    HGB 11.3 06/23/2021       INR Lab Results   Component Value Date    INR 1.00 02/02/2022    INR 0.96 05/24/2007      PACU Imaging Not applicable     Wound/Incision Incision/Surgical Site 10/16/17 Right Abdomen (Active)   Number of days: 2183       Incision/Surgical Site 10/08/23 Right Eye (Active)   Incision Assessment WDL 10/08/23 0930   Genet-Incision  Assessment Erythema;Edema 10/08/23 0930   Closure Approximated 10/08/23 0930   Incision Drainage Amount None 10/08/23 0930   Dressing Intervention Open to air / No Dressing;Other (Comment) 10/08/23 0930   Number of days: 0      CMS        Equipment Not applicable   Other LDA       IV Access Peripheral IV 10/07/23 Right Antecubital fossa (Active)   Site Assessment WDL 10/08/23 0930   Line Status Infusing 10/08/23 0930   Dressing Transparent 10/08/23 0930   Dressing Status old drainage 10/08/23 0930   Phlebitis Scale 0-->no symptoms 10/08/23 0930   Infiltration? no 10/08/23 0930   Number of days: 1       Peripheral IV 10/08/23 Left Hand (Active)   Site Assessment WDL 10/08/23 0930   Line Status Saline locked 10/08/23 0930   Dressing Transparent 10/08/23 0930   Dressing Status clean;dry;intact 10/08/23 0930   Phlebitis Scale 0-->no symptoms 10/08/23 0930   Infiltration? no 10/08/23 0930   Number of days: 0      Blood Products Not applicable EBL 1 mL   Intake/Output Date 10/08/23 0700 - 10/09/23 0659   Shift 0212-6191 6792-9538 4101-1312 24 Hour Total   INTAKE   P.O. 30   30   I.V. 200   200   Shift Total(mL/kg) 230(3.27)   230(3.27)   OUTPUT   Urine 0   0   Blood 1   1   Shift Total(mL/kg) 1(0.01)   1(0.01)   Weight (kg) 70.31 70.31 70.31 70.31      Drains / Arevalo     Time of void PreOp Time of Void Prior to Procedure: 0715 (10/08/23 0729)    PostOp      Diapered? No   Bladder Scan     PO 30 mL (10/08/23 1000)  tolerating sips and water     Vitals    B/P: (!) 151/62  T: 98.2  F (36.8  C)    Temp src: Oral  P:  Pulse: 63 (10/08/23 1000)          R: 14  O2:  SpO2: 100 %    O2 Device: Nasal cannula (10/08/23 0928)    Oxygen Delivery: 2 LPM (10/08/23 0928)         Family/support present significant other   Patient belongings  Returned to patient in PACU   Patient transported on cart   DC meds/scripts (obs/outpt) Not applicable   Inpatient Pain Meds Released? Yes       Special needs/considerations On insulin gtt - BG at  1000 was 262; insuling drip decreased to 3 units/hr; recheck at 1100, currently using algorithm 1   Tasks needing completion Recheck BG at 1100        Reagan Araya RN  [facility] [id number]

## 2023-10-08 NOTE — ANESTHESIA CARE TRANSFER NOTE
Patient: Viv Shaw    Procedure: Procedure(s):  Orbitotomy WITH BIPOSY OF MASS       Diagnosis: Orbital mass [H05.89]  Diagnosis Additional Information: No value filed.    Anesthesia Type:   No value filed.     Note:    Oropharynx: spontaneously breathing  Level of Consciousness: awake  Oxygen Supplementation: nasal cannula    Independent Airway: airway patency satisfactory and stable  Dentition: dentition unchanged  Vital Signs Stable: post-procedure vital signs reviewed and stable  Report to RN Given: handoff report given  Patient transferred to: PACU    Handoff Report: Identifed the Patient, Identified the Reponsible Provider, Reviewed the pertinent medical history, Discussed the surgical course, Reviewed Intra-OP anesthesia mangement and issues during anesthesia, Set expectations for post-procedure period and Allowed opportunity for questions and acknowledgement of understanding      Vitals:  Vitals Value Taken Time   /62 10/08/23 0930   Temp     Pulse 61 10/08/23 0933   Resp 16 10/08/23 0933   SpO2 97 % 10/08/23 0933   Vitals shown include unvalidated device data.    Electronically Signed By: BRENDA Foreman CRNA  October 8, 2023  9:34 AM

## 2023-10-08 NOTE — PROGRESS NOTES
Brief Ophthalmology Update    #Orbital hematoma, right eye  #Rapid painless vision loss, right eye   #Near complete ophthalmoplegia, right eye  -Vision checked in the pre-operative area was unreliable, possible NLP.  -Patient is s/p right orbitotomy with biopsy of orbital tissue and hematoma drainage (10/8/23); aerobic, anaerobic, gram stain, fungal cultures pending    Plan:  -Continue broad spectrum IV Ab  -Start erythromycin praneeth to the right upper eyelid and into the right eye QID  -Please consult ENT given extensive sinus disease, appreciate their recommendations    Please page the on-call resident with any questions/concerns.    Maria Del Rosario Green MD  Oculoplastic Surgery Fellow, [facility]

## 2023-10-08 NOTE — PLAN OF CARE
"  VS: BP (!) 164/58   Pulse 68   Temp 98.1  F (36.7  C) (Oral)   Resp 17   Ht 1.626 m (5' 4\")   Wt 70.3 kg (155 lb)   SpO2 94%   BMI 26.61 kg/m       O2: Stable @ RA,denies SOB,chest pain   Output: Continent of B/B   Activity: Assist of 1 GB/W   Skin: R.Orbital sugery   Pain: Denies pain   CMS: AXOX3 disoriented to time,forgetfullness,Bed alarm on   Dressing: Open to air   Diet: On regular diet,Carb count   LDA: R.PIV SL,LPIV Infusing LR @100mls/hr   Equipment: Personal belongings   Plan: Continue with POC   Additional Info: Admission of the shift ,Admission protocols completed ,2 nurses skin assessment completed.  BGM- With insulin drip currently on Algorithm 1.To discontinue @ 2100.  Insulin Glargine given @ 1900                           "

## 2023-10-08 NOTE — PROGRESS NOTES
[facility]    Medicine Progress Note - Hospitalist Service, GOLD TEAM 22    Date of Admission:  10/7/2023    Assessment & Plan      Viv Shaw is a 73 year old female admitted on 10/7/2023. She is admitted for Rim enhancing mass above globe,and acute vision.    R eye significant ophthalmoplegia  Acute R eye vision loss with loss in light reflex  Globe proptosis  CT showing rim enhancing lesion above the Orbit. MRI w/ contrast showing mass like lesion at the superior aspect othe R orbit. Differential for above findings remains broad concerning for orbital cellulitis, orbital abscess, malignancy, hemangioma. Given the acute worsening of sx over a short period of time, infectious etiologies most likely, however, there was no purulent drainage with ophthalmologic procedure.   - Continue Vancomycin and zosyn  - follow blood and tissue cultures  - trend fever curve, daily WBC.  - ENT consulted given extensive sinus disease concern for local invasion  - ACE level, TSH, TSI, Lysozyme pending    History of T2DM:  - takes basal-- holding until able to tolerate po  - Insulin drip started yesterday, continue today. Resumed diet  - Carb coverage 1U:15 g CHO  - Resume basal in AM    History of renal transplant  - continue home dose of cyclosporin and mycophenolate  - cyclosporin level    History of Hypertension  - continue home antihypertensives: Norvasc, Losartan, Toprol.    History Hypothyroidism  - will obtain TSH, TSI, free T4 given proptosis  - continue home synthroid    History CAD:  - hold aspirin 81 given surgery-- will need to restart  - continue Imdur       Diet: NPO per Anesthesia Guidelines for Procedure/Surgery Except for: Meds    DVT Prophylaxis: Mechanical  Arevalo Catheter: Not present  Lines: None     Cardiac Monitoring: None  Code Status: No CPR- Do NOT Intubate      Clinically Significant Risk Factors Present on Admission                # Drug Induced Platelet  "Defect: home medication list includes an antiplatelet medication   # Hypertension: Noted on problem list   # Dementia: noted on problem list   # DMII: A1C = 7.2 % (Ref range: <5.7 %) within past 6 months   # Overweight: Estimated body mass index is 26.61 kg/m  as calculated from the following:    Height as of this encounter: 1.626 m (5' 4\").    Weight as of this encounter: 70.3 kg (155 lb).              Disposition Plan      Expected Discharge Date: 10/08/2023                  Gurwinder Moreno MD  Hospitalist Service, GOLD TEAM 22  [facility]  Securely message with [employer] (more info)  Text page via [facility] Paging/Directory   See signed in provider for up to date coverage information  ______________________________________________________________________    Interval History   Surgery with ophto this morning. Denies pain, still unable to see out of right eye    Physical Exam   Vital Signs: Temp: 98.8  F (37.1  C) Temp src: Axillary BP: (!) 157/71 Pulse: 66   Resp: 14 SpO2: 99 % O2 Device: Nasal cannula Oxygen Delivery: 2 LPM  Weight: 155 lbs 0 oz    Sitting in bed NAD  Right eye erythematous, swollen shut  RRR  CTAB    Data     I have personally reviewed the following data over the past 24 hrs:    8.6  \   11.4 (L)   / 234     138 101 18.2 /  197 (H)   4.0 24 1.11 (H) \     ALT: 10 AST: 25 AP: 71 TBILI: 0.9   ALB: 3.8 TOT PROTEIN: 6.4 LIPASE: N/A     TSH: 0.22 (L) T4: 1.68 A1C: N/A       Imaging results reviewed over the past 24 hrs:   Recent Results (from the past 24 hour(s))   MR Brain and Orbits w/o & w Contrast    Narrative    EXAM: MR BRAIN AND ORBITS W/O and W CONTRAST  LOCATION: [facility]  DATE: 10/7/2023    INDICATION: Right orbital mass, proptosis, and vision changes  COMPARISON: CT orbit 10/07/2023  CONTRAST: 7.16 mL Gadavist  TECHNIQUE:  1) Routine multiplanar multisequence head MRI without and with intravenous " contrast.  2) Dedicated high-resolution multiplanar multisequence MRI of the orbits without and with intravenous contrast.    FINDINGS:  INTRACRANIAL CONTENTS: No acute or subacute infarct. No mass, acute hemorrhage, or extra-axial fluid collections. There is chronic lacunar infarct of the central michael and left cerebellar hemisphere. Scattered nonspecific T2/FLAIR hyperintensities within   the cerebral white matter most consistent with mild chronic microvascular ischemic change. Mild generalized cerebral atrophy. No hydrocephalus. Normal position of the cerebellar tonsils. No pathologic contrast enhancement.    SELLA: No abnormality accounting for technique.    OSSEOUS STRUCTURES/SOFT TISSUES: Normal marrow signal. The major intracranial vascular flow voids are maintained.     ORBITS: Dedicated MRI of the orbits was performed. There is nonspecific lentiform predominantly cystic mass at the superior aspect of the right ORBIT. This mass measures approximately 4 cm AP by 2.9 cm transverse by 1.5 cm craniocaudal. Signal   characteristics are predominantly T2 hyperintense with a layering T2 hypointense fluid fluid level. The mass is somewhat heterogeneous on T1 images. There is some apparent peripheral restricted diffusion particularly at the inferior and posterior   aspects. This corresponds with some relatively mild peripheral contrast enhancement at the inferior aspect. There is moderate local mass effect with inferior displacement and bowing of the superior rectus and superior oblique musculature. There is some   apparent stranding of the right intraorbital fat. There is mild proptosis. The optic nerve is unremarkable. Intact globes. Status post bilateral cataract repair.  There is heterogeneously enhancing preseptal periorbital soft tissues. Question   peripherally enhancing fluid collection measuring 6 cm AP by 2.8 cm transverse by 1.3 cm craniocaudal.    SINUSES/MASTOIDS: There is moderately advanced mucosal  thickening of the ethmoid air cells and mild mucosal thickening of the inferior frontal sinuses. There is 5 foot mucosal thickening of the bilateral maxillary sinuses. Posterior right ethmoid air   cells demonstrate 2.8 cm AP by 1.7 cm transverse by 1.6 cm craniocaudal T2 hyperintense peripherally enhancing expansile structure which may represent mucocele. 4.2 x 2.1 cm enhancing mass at the posterior aspect right nasal cavity is incompletely   visualized but favored represent nasal cavity polyp. No middle ear or mastoid effusion.       Impression    IMPRESSION:  HEAD MRI:  1.  No acute intracranial process. Specifically, no evidence of intracranial extension of the right orbital process.  2.  Generalized brain atrophy and presumed microvascular ischemic changes as detailed above.    ORBIT MRI:  1.  Nonspecific lentiform masslike structure at the superior aspect of the right orbit as described above measuring approximately 4 cm. Signal characteristics including relatively mild contrast enhancement, layering fluid fluid level, and pattern of   restricted diffusion are somewhat atypical for abscess. While infection is not excluded and additional differential considerations including malignancy, the lymphatic malformation, and hematoma should be considered.  2.  There is some induration of the intraorbital fat with mild proptosis.  3.  Preseptal soft tissue thickening and question fluid collection, abscess not excluded.  4.  Ethmoid sinusitis, suspect mucocele.  5.  Suspected nasal cavity polyp.

## 2023-10-08 NOTE — H&P
[facility]    History and Physical - Hospitalist Service, GOLD TEAM        Date of Admission:  10/7/2023    Assessment & Plan      Viv Shaw is a 73 year old female admitted on 10/7/2023. She is admitted for Rim enhancing mass above globe,and acute vision.    R eye significant ophthalmoplegia  Acute R eye vision loss with loss in light reflex  Globe proptosis  CT showing rim enhancing lesion above the Orbit. MRI w/ contrast showing mass like lesion at the superior aspect othe R orbit  - Differential for above findings remains broad concerning for orbital cellulitis, orbital abscess, malignancy, hemangioma. Given the acute worsening of sx over a short period of time, infectious etiologies most likely.  - Continue Vancomycin and zosyn  - obtain blood cuture  - trend fever curve, daily WBC.  - ophthalmology consulted and evaluated patient overnight-- plan for surgery in AM  - Keep NPO overnight in preparation for surgey  - ACE level, TSH, TSI, Lysozyme pending    History of T2DM:  - sliding scale insulin low dose  - takes basal-- holding until able to tolerate po  - BGL every 4 hours    History of renal transplant  - continue home dose of cyclosporin and mycophenolate  - cyclosporin level    History of Hypertension  - continue home antihypertensives: Norvasc, Losartan, Toprol.    History Hypothyroidism  - will obtain TSH, TSI, free T4 given proptosis  - continue home synthroid    History CAD:  - hold aspirin 81 given surgery-- will need to restart  - continue Imdur     Diet: NPO per Anesthesia Guidelines for Procedure/Surgery Except for: Meds    DVT Prophylaxis: holding prior to surgery  Arevalo Catheter: Not present  Lines: None     Cardiac Monitoring: None  Code Status: No CPR- Do NOT Intubate    Clinically Significant Risk Factors Present on Admission                  # Drug Induced Platelet Defect: home medication list includes an antiplatelet medication     #  "Hypertension: Noted on problem list   # Dementia: noted on problem list   # DMII: A1C = 7.2 % (Ref range: <5.7 %) within past 6 months   # Overweight: Estimated body mass index is 26.61 kg/m  as calculated from the following:    Height as of this encounter: 1.626 m (5' 4\").    Weight as of this encounter: 70.3 kg (155 lb).              Disposition Plan      Expected Discharge Date: 10/08/2023                  LUCINA CHILDRESS MD  Hospitalist Service, [facility]  Securely message with [employer] (more info)  Text page via [facility] Paging/Directory   See signed in provider for up to date coverage information    ______________________________________________________________________    Chief Complaint   R eye swelling    History is obtained from the patient    History of Present Illness   Viv [place] is a 73 year old female with a history of T2DM, renal transplant (on mycophenolate and cyclosporin, hypertension, CAD s/p stenting, parkonson dementia. Patient presents to the ER with swelling around the R eye. Initially started 3 day age with redness involving the conjunctiva and scelra. On the following day she developed swelling of the R eyelid. Swelling was severe enough that she was unable to open her R eye lid. Also notes redness over the R eyelid. No discharge. No pain in the eye. No pain with movement of the eye. Was seen at the urgent and started on abx which have not helped much. No Has, lightheadedness, dizziness. Admits to being unsteady on her feet which has been progressive over the past year or so. No tremors. No eye discharge. No pain with palpation of the eyelid, pressing on the eyelid, pressing around the orbit. Patient also endorses runny nose 1 episode 2 days ago but self resolved. No associated facial pain, nasal congestion, coughing, an, abd pain,nausea, emesis, diarrhea, headaches, jaw pain, jaw stiffness. No rashes. Patient states that she " "was recently diagnosed with parkinson dementia a couple of months ago. Normally wears glasses. States that she gets seen at ophtho clinic annually for her diabetic eye exams. Per patient no concerns were brought up by ophtho provider.    Patient underwent CT head and orbit which showed : \"Superior aspect of right orbit with 1 cm x 2.6 cm by 1.7 cm rim enhancing collection with downward displacement of the extraocular musculature associated with R globe proptosis\" Given that patient was noted to have a 3mm fixed R pupil, proptosis vision loss (seemingly acute) at urgent care, patient was transferred to the the ER for further ophtho eval.    Past Medical History    Past Medical History:   Diagnosis Date    Abdominal wall hernia     RLQ    AK (actinic keratosis) 08/06/2020    Allergic rhinitis     CAD (coronary artery disease)     coronary artery disease s/p PCI to LAD in 2005coronary artery disease s/p PCI to LAD in 2005    Diabetes mellitus, type 2 (H)     follows up with endocrinologist.    Dyslipidemia     GERD (gastroesophageal reflux disease)     H/O diabetic nephropathy     s/p kidney trnsplant    Hypertension     Hypothyroidism     Immunosuppressed status (H24)     Kidney replaced by transplant     Rt    PONV (postoperative nausea and vomiting)     Shingles     Urinary tract infection 8/12/2022    Vitamin B12 deficiency anemia        Past Surgical History   Past Surgical History:   Procedure Laterality Date    APPENDECTOMY NOS      ARTHROSCOPY SHOULDER ROTATOR CUFF REPAIR Left     COLONOSCOPY N/A 6/7/2016    Procedure: COLONOSCOPY;  Surgeon: Rashard Snider MD;  Location: [facility]    Coronary angioplasty with stent  2005    HYSTERECTOMY      Kidney Transplant NOS Right     LAPAROSCOPIC CHOLECYSTECTOMY      NOSE SURGERY  2013    OPEN SEPARATION COMPONENT HERNIORRHAPHY N/A 10/16/2017    Procedure: OPEN SEPARATION COMPONENT HERNIORRHAPHY;;  Surgeon: CARL Lott MD;  Location: [facility]    RECONSTRUCT " "ABDOMINAL WALL N/A 10/16/2017    Procedure: RECONSTRUCT ABDOMINAL WALL;  Exploratory Laparotomy, Lysis of Adhesions, Abdominal Wall reconstruction, Inlay Xen AB mesh, Mitek Suture [place] and Umbilical Hernia Repair.;  Surgeon: CARL Lott MD;  Location: [facility] OR    REMOVE CATARACT INTRACAP,INSERT LENS Right     TONSILLECTOMY         Prior to Admission Medications   Prior to Admission Medications   Prescriptions Last Dose Informant Patient Reported? Taking?   ASPIRIN PO 10/8/2023  Yes Yes   Sig: Take 81 mg by mouth daily   FLORIN CONTOUR test strip   Yes No   Patient not taking: Reported on 1/19/2023   BD INSULIN SYRINGE ULTRAFINE 31G X 5/16\" 0.3 ML   Yes No   Sig: USING 4-5 PER DAY ([facility] 31G/0.3ML)   Patient not taking: Reported on 1/19/2023   Continuous Blood Gluc  (DEXCOM G7 ) [facility] 10/8/2023  No Yes   Sig: Use to read blood sugars as 's instructions.   Continuous Blood Gluc Sensor (DEXCOM G7 SENSOR) MISC 10/8/2023  No Yes   Sig: Change every 10 days.   Continuous Blood Gluc Transmit (DEXCOM G6 TRANSMITTER) MISC   No No   Sig: See Admin Instructions   GENGRAF (BRAND) 25 MG CAPSULE Unknown  No Yes   Sig: Take 2 capsules (50 mg) by mouth 2 times daily   amLODIPine (NORVASC) 10 MG tablet 10/8/2023  No Yes   Sig: Take 1 tablet (10 mg) by mouth daily   atorvastatin (LIPITOR) 40 MG tablet 10/8/2023  No Yes   Sig: Take 0.5 tablets (20 mg) by mouth daily   carbidopa-levodopa (SINEMET)  MG tablet   Yes No   Sig: Take 1.5 tablets by mouth   cholecalciferol (VITAMIN D3) 25 mcg (1000 units) capsule 10/8/2023  Yes Yes   Sig: Take 2 capsules by mouth daily   cycloSPORINE modified (GENERIC EQUIVALENT) 25 MG capsule 10/8/2023  No Yes   Sig: Take 2 capsules (50 mg) by mouth 2 times daily   insulin aspart (NOVOLOG PEN) 100 UNIT/ML pen 10/8/2023  No Yes   Sig: Inject 5-10 units subcutaneous with meals and correction doses as directed, total daily dose approx 25 units   insulin glargine " (FROILANAGLAR KWIKPEN) 100 UNIT/ML pen 10/8/2023  No Yes   Sig: Inject 15-17 Units Subcutaneous daily   isosorbide mononitrate (IMDUR) 30 MG 24 hr tablet 10/8/2023  No Yes   Sig: TAKE 0.5 TABLETS (15 MG) BY MOUTH DAILY   levothyroxine (SYNTHROID/LEVOTHROID) 100 MCG tablet 10/8/2023  No Yes   Sig: Take 1 tablet (100 mcg) by mouth daily   losartan (COZAAR) 100 MG tablet 10/8/2023  No Yes   Sig: TAKE 1/2 OF A TABLET (50 MG) BY MOUTH DAILY   metoprolol succinate ER (TOPROL XL) 25 MG 24 hr tablet 10/8/2023  No Yes   Sig: Take 1 tablet (25 mg) by mouth At Bedtime   mycophenolic acid (GENERIC EQUIVALENT) 180 MG EC tablet 10/8/2023  No Yes   Sig: Take 3 tablets (540 mg) by mouth 2 times daily   pantoprazole (PROTONIX) 40 MG EC tablet 10/8/2023  No Yes   Sig: TAKE 1 TABLET BY MOUTH EVERY DAY      Facility-Administered Medications: None        Review of Systems    The 10 point Review of Systems is negative other than noted in the HPI or here.     Physical Exam   Vital Signs: Temp: 97.8  F (36.6  C) Temp src: Oral BP: (!) 162/69 Pulse: 59   Resp: 16 SpO2: 94 % O2 Device: None (Room air)    Weight: 155 lbs 0 oz    General Appearance: Alert, awake, interactive  Eyes: redness and swelling over the upper R eyelid. Proptosis,on R side. Significantly decreased movement of the R eye (unable to abduct or adduct). 3 mm fixed pupil. Not responsive to light. No vision on visual field testing.Sclera erythematous. L eye EOMI, No eyelid swelling. Vision intact. No diplopia.  HEENT: No facial tenderness, nasal congestion, rhinorrhea. No LAD  Respiratory: CTAB. No wheezing, rhonchi, rales  Cardiovascular: Normal S1, S2. No murmurs  GI: Abd soft, nontender, non distended, NBS  Lymph/Hematologic: No cervical supra clavicular LAD  Skin: No skin rashes  Musculoskeletal: No joint swelling. Strength in tact in all 4 extremities  Neurologic: Sensation and cebellar signs intact    Medical Decision Making       75 MINUTES SPENT BY ME on the date of  service doing chart review, history, exam, documentation & further activities per the note.      Data     I have personally reviewed the following data over the past 24 hrs:    N/A  \   N/A   / N/A     138 99 20.9 /  295 (H)   4.2 24 1.09 (H) \     TSH: 0.22 (L) T4: 1.68 A1C: N/A       Imaging results reviewed over the past 24 hrs:   Recent Results (from the past 24 hour(s))   MR Brain and Orbits w/o & w Contrast    Narrative    EXAM: MR BRAIN AND ORBITS W/O and W CONTRAST  LOCATION: [facility]  DATE: 10/7/2023    INDICATION: Right orbital mass, proptosis, and vision changes  COMPARISON: CT orbit 10/07/2023  CONTRAST: 7.16 mL Gadavist  TECHNIQUE:  1) Routine multiplanar multisequence head MRI without and with intravenous contrast.  2) Dedicated high-resolution multiplanar multisequence MRI of the orbits without and with intravenous contrast.    FINDINGS:  INTRACRANIAL CONTENTS: No acute or subacute infarct. No mass, acute hemorrhage, or extra-axial fluid collections. There is chronic lacunar infarct of the central michael and left cerebellar hemisphere. Scattered nonspecific T2/FLAIR hyperintensities within   the cerebral white matter most consistent with mild chronic microvascular ischemic change. Mild generalized cerebral atrophy. No hydrocephalus. Normal position of the cerebellar tonsils. No pathologic contrast enhancement.    SELLA: No abnormality accounting for technique.    OSSEOUS STRUCTURES/SOFT TISSUES: Normal marrow signal. The major intracranial vascular flow voids are maintained.     ORBITS: Dedicated MRI of the orbits was performed. There is nonspecific lentiform predominantly cystic mass at the superior aspect of the right ORBIT. This mass measures approximately 4 cm AP by 2.9 cm transverse by 1.5 cm craniocaudal. Signal   characteristics are predominantly T2 hyperintense with a layering T2 hypointense fluid fluid level. The mass is somewhat heterogeneous on  T1 images. There is some apparent peripheral restricted diffusion particularly at the inferior and posterior   aspects. This corresponds with some relatively mild peripheral contrast enhancement at the inferior aspect. There is moderate local mass effect with inferior displacement and bowing of the superior rectus and superior oblique musculature. There is some   apparent stranding of the right intraorbital fat. There is mild proptosis. The optic nerve is unremarkable. Intact globes. Status post bilateral cataract repair.  There is heterogeneously enhancing preseptal periorbital soft tissues. Question   peripherally enhancing fluid collection measuring 6 cm AP by 2.8 cm transverse by 1.3 cm craniocaudal.    SINUSES/MASTOIDS: There is moderately advanced mucosal thickening of the ethmoid air cells and mild mucosal thickening of the inferior frontal sinuses. There is 5 foot mucosal thickening of the bilateral maxillary sinuses. Posterior right ethmoid air   cells demonstrate 2.8 cm AP by 1.7 cm transverse by 1.6 cm craniocaudal T2 hyperintense peripherally enhancing expansile structure which may represent mucocele. 4.2 x 2.1 cm enhancing mass at the posterior aspect right nasal cavity is incompletely   visualized but favored represent nasal cavity polyp. No middle ear or mastoid effusion.       Impression    IMPRESSION:  HEAD MRI:  1.  No acute intracranial process. Specifically, no evidence of intracranial extension of the right orbital process.  2.  Generalized brain atrophy and presumed microvascular ischemic changes as detailed above.    ORBIT MRI:  1.  Nonspecific lentiform masslike structure at the superior aspect of the right orbit as described above measuring approximately 4 cm. Signal characteristics including relatively mild contrast enhancement, layering fluid fluid level, and pattern of   restricted diffusion are somewhat atypical for abscess. While infection is not excluded and additional differential  considerations including malignancy, the lymphatic malformation, and hematoma should be considered.  2.  There is some induration of the intraorbital fat with mild proptosis.  3.  Preseptal soft tissue thickening and question fluid collection, abscess not excluded.  4.  Ethmoid sinusitis, suspect mucocele.  5.  Suspected nasal cavity polyp.

## 2023-10-08 NOTE — PHARMACY-VANCOMYCIN DOSING SERVICE
"Pharmacy Vancomycin Initial Note  Date of Service 2023  Patient's  1950  73 year old, female    Indication: Skin and Soft Tissue Infection    Current estimated CrCl = Estimated Creatinine Clearance: 37.4 mL/min (A) (based on SCr of 1.25 mg/dL (H)).    Creatinine for last 3 days  No results found for requested labs within last 3 days.    Recent Vancomycin Level(s) for last 3 days  No results found for requested labs within last 3 days.      Vancomycin IV Administrations (past 72 hours)        No vancomycin orders with administrations in past 72 hours.                    Nephrotoxins and other renal medications (From now, onward)      Start     Dose/Rate Route Frequency Ordered Stop    10/07/23 1945  piperacillin-tazobactam (ZOSYN) 3.375 g vial to attach to  mL bag        Note to Pharmacy: For [facility], [facility] and [facility]: For Zosyn-naive patients, use the \"Zosyn initial dose + extended infusion\" order panel.    3.375 g  over 30 Minutes Intravenous ONCE 10/07/23 1941              Contrast Orders - past 72 hours (72h ago, onward)      Start     Dose/Rate Route Frequency Stop    10/07/23 1955  gadobutrol (GADAVIST) injection 7.16 mL         0.1 mL/kg × 71.6 kg Intravenous ONCE 10/07/23 1954            Plan:  Start vancomycin 1500 mg IV once. Once more information is known about patient's kidney function, pharmacy will order subsequent doses.    Vancomycin monitoring method: Trough (Method 2 = manual dose calculation)  Vancomycin therapeutic monitoring goal: 15-20 mg/L  Pharmacy will check vancomycin levels as appropriate in 1-3 Days.    Serum creatinine levels will be ordered daily for the first week of therapy and at least twice weekly for subsequent weeks.      Margareth Calle, PharmD   *[phone]     "

## 2023-10-08 NOTE — PHARMACY-VANCOMYCIN DOSING SERVICE
Pharmacy Vancomycin Initial Note  Date of Service 2023  Patient's  1950  73 year old, female    Indication: Skin and Soft Tissue Infection    Current estimated CrCl = Estimated Creatinine Clearance: 42.9 mL/min (A) (based on SCr of 1.09 mg/dL (H)).    Creatinine for last 3 days  10/7/2023:  6:31 PM Creatinine 1.09 mg/dL    Recent Vancomycin Level(s) for last 3 days  No results found for requested labs within last 3 days.      Vancomycin IV Administrations (past 72 hours)                     vancomycin (VANCOCIN) 1,500 mg in 0.9% NaCl 250 mL intermittent infusion (mg) 1,500 mg New Bag 10/07/23 211                    Nephrotoxins and other renal medications (From now, onward)      Start     Dose/Rate Route Frequency Ordered Stop    10/08/23 2100  vancomycin (VANCOCIN) 1,000 mg in 200 mL dextrose intermittent infusion         1,000 mg  200 mL/hr over 1 Hours Intravenous EVERY 24 HOURS 10/07/23 231              Contrast Orders - past 72 hours (72h ago, onward)      Start     Dose/Rate Route Frequency Stop    10/07/23 1955  gadobutrol (GADAVIST) injection 7.16 mL         0.1 mL/kg × 71.6 kg Intravenous ONCE 10/07/23 1954            [employer] Prediction of Planned Initial Vancomycin Regimen  Loading dose: N/A  Regimen: 1000 mg IV every 24 hours.  Start time: 09:11 on 10/08/2023  Exposure target: AUC24 (range)400-600 mg/L.hr   AUC24,ss: 445 mg/L.hr  Probability of AUC24 > 400: 62 %  Ctrough,ss: 13.5 mg/L  Probability of Ctrough,ss > 20: 18 %  Probability of nephrotoxicity (Lodise QASIM ): 9 %          Plan:  Start vancomycin  1000 mg IV q24h.   Vancomycin monitoring method: AUC  Vancomycin therapeutic monitoring goal: 400-600 mg*h/L  Pharmacy will check vancomycin levels as appropriate in 1-3 Days.    Serum creatinine levels will be ordered every 48 hours.      Grayson Chirinos [facility]

## 2023-10-09 NOTE — PLAN OF CARE
Goal Outcome Evaluation:      Plan of Care Reviewed With: patient    Overall Patient Progress: improvingOverall Patient Progress: improving    VS: Temp: 98.5  F (36.9  C) Temp src: Oral BP: (!) 175/81 Pulse: 65   Resp: 16 SpO2: 96 % O2 Device: None (Room air)    O2: VSS on RA. Denies CP and SOB.   Output: Continent of B/B.   Activity: SBA with walker.    Skin: R eye red and swollen.   Pain: No complaints of pain overnight.   Neuro/CMS: A&Ox1. Disoriented to time, situation, place.   Dressing: R eye open to air.   Diet: Regular diet, thin liquids, pills whole.   LDA: L PIV - continuous LR, R PIV - SL.   Equipment: N/A.   Plan: Possible discharge home 10/9. May need second procedure see ENT note.   Additional Info:

## 2023-10-09 NOTE — PROGRESS NOTES
Medicine cross cover admission  -Follow-up ENT recommendations without acute interventions needed overnight

## 2023-10-09 NOTE — PROGRESS NOTES
"Otolaryngology Progress Note  10/09/2023      Subjective/Intvl events: No acute events overnight. Patient reports feeling better. She is concerned about a tumor causing the bleeding behind her eye. She believes she is being discharged today.    O: BP (!) 175/81 (BP Location: Right arm)   Pulse 65   Temp 98.5  F (36.9  C) (Oral)   Resp 16   Ht 1.626 m (5' 4\")   Wt 70.3 kg (155 lb)   SpO2 96%   BMI 26.61 kg/m    General: sitting on edge of bed, no acute distress  HEAD: normocephalic, atraumatic outside of orbital findings,   Face: symmetrical, CN VII intact bilaterally (HB 1). Sensation V1-V3 intact and equal bilaterally.   Eyes: The right eye appears to have periorbital edema and erythema, there is an incision across the superior lid which is intact with some minor crusting. Eye is slightly proptotic. Chemosis of right sclera with thick discharge.  The left eye demonstrates a round pupil, extraocular movements intact.   Ears: External ears appear normal    Nose: no anterior drainage or external deformity  Mouth/oropharynx: Mucosa moist, adequate dentition  Neck: no LAD, trachea midline  Neuro: cranial nerves 2-12 grossly intact  Respiratory: breathing non-labored on RA, no stridor  Skin: no rashes or skin lesions of the face/neck  Psych: pleasant affect  Cardio: extremities warm and well perfused       LABS: Reviewed    Cultures:  No results for input(s): CULT in the last 168 hours.      Imaging:  CT Sinus W/o contrast 10/8/2023  IMPRESSION:   1. Suspected right orbital and periorbital cellulitis including  suspected subperiosteal abscess along the superior and medial  extraconal right orbit.  2. Persistent right ethmoid mucocele since CT 2017.  3. Chronic bilateral maxillary sinusitis, right greater than left.    A/P: Viv Shaw is a 73 year old female with a past medical history of T2DM, renal transplant, Parkinsons who presented to our hospital with sudden blindness and ophthalmoplegia in the right eye " s/p decompression with ophthalmology whom we are consulted on to rule out sinus disease as a source of eye disease. Scope evaluation overnight was reassuring. CT sinus resulted and reads as suspected subperiosteal abscess along superior and medial extraconal right orbit. This is likely the same abscess pocket that was explored and decompressed during ophthalmology's surgery.     - No acute ENT intervention at this time.   - We will follow-up with our team regarding possible future endoscopy and sinus surgery, likely with biopsies and cultures.  - Discharge with nasal saline q2-4 hours  - Please reach out to the ENT team with any acute changes to the patient's status  - Follow up cultures, surgical pathology  -Please reach out to ENT with any questions or concerns    --Patient and plan to be discussed with Dr. Jelly Randolph, PA-C [phone]  Otolaryngology-Head & Neck Surgery  Please contact ENT with questions by dialing [phone] and entering job code [phone] when prompted.

## 2023-10-09 NOTE — CONSULTS
Otolaryngology Consult Note  October 8, 2023      CC: Vision loss    HPI: Viv Shaw is a 73 year old female with a past medical history of type 2 diabetes, coronary artery disease (on daily aspirin), renal transplant (with immunosuppression), Parkinsons whom we are consulted on for concern for acute sinusitis in the setting of sudden vision loss.  She states she was in her normal state of health until this past Thursday when she noted her right eye became swollen and her conjunctiva became injected.  She called the urgent care center thinking that she had pinkeye, and they recommended she be seen urgently on Friday.  When they saw her, they recommended she go to emergency department.  She was then transferred to the [facility] for more urgent care.  Per chart review they noted vision loss on the right side and concern for afferent pupillary defect, that she was seen by the ophthalmology team at the [facility] yesterday.  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  An MRI was ordered which demonstrated a mass versus fluid collection in the right superior orbit that she was taken urgently to the operating room where she underwent an orbitotomy and globe decompression.  Per conversation with the ophthalmology team they noted what they felt was old blood products that were debrided from the wound.  They did not note active bleeding, purulence, or an obvious mass.  They did obtain cultures as well as surgical pathology.  They did note some sinus opacification on the right side.       Interestingly, she denied any pain associated with the event.  She denied any trauma or falls associated with the swelling.  She denied any pus draining from her eye or from her nose.  She denied any facial pain or pressure at that time or since having her sinus surgery (in [place] in 2013), denies  purulent discharge or excessive nasal discharge, denies nasal obstruction, denies epistaxis.  Denies facial numbness or tingling.     Past Medical History:   Diagnosis Date    Abdominal wall hernia     RLQ    AK (actinic keratosis) 2020    Allergic rhinitis     CAD (coronary artery disease)     coronary artery disease s/p PCI to LAD in coronary artery disease s/p PCI to LAD in     Diabetes mellitus, type 2 (H)     follows up with endocrinologist.    Dyslipidemia     GERD (gastroesophageal reflux disease)     H/O diabetic nephropathy     s/p kidney trnsplant    Hypertension     Hypothyroidism     Immunosuppressed status (H24)     Kidney replaced by transplant     Rt    PONV (postoperative nausea and vomiting)     Shingles     Urinary tract infection 2022    Vitamin B12 deficiency anemia        Past Surgical History:   Procedure Laterality Date    APPENDECTOMY NOS      ARTHROSCOPY SHOULDER ROTATOR CUFF REPAIR Left     COLONOSCOPY N/A 2016    Procedure: COLONOSCOPY;  Surgeon: Rashard Snider MD;  Location: [facility]    Coronary angioplasty with stent      HYSTERECTOMY      Kidney Transplant NOS Right     LAPAROSCOPIC CHOLECYSTECTOMY      NOSE SURGERY      OPEN SEPARATION COMPONENT HERNIORRHAPHY N/A 10/16/2017    Procedure: OPEN SEPARATION COMPONENT HERNIORRHAPHY;;  Surgeon: CARL Lott MD;  Location: [facility]    RECONSTRUCT ABDOMINAL WALL N/A 10/16/2017    Procedure: RECONSTRUCT ABDOMINAL WALL;  Exploratory Laparotomy, Lysis of Adhesions, Abdominal Wall reconstruction, Inlay Xen AB mesh, Mitek Suture [place] and Umbilical Hernia Repair.;  Surgeon: CARL Lott MD;  Location: [facility]    REMOVE CATARACT INTRACAP,INSERT LENS Right     TONSILLECTOMY         No current outpatient medications on file.        No Known Allergies    Never smoker, retired, lives in [place]    Family History   Problem Relation Age of Onset    Respiratory Mother          age 71     "Cardiovascular Father          age 75 hyertension    Arthritis Sister         living, healthy    Family History Negative Brother          age 44    Colon Cancer No family hx of        ROS: 12 point review of systems is negative unless noted in HPI.    PHYSICAL EXAM:  BP (!) 158/46 (BP Location: Right arm)   Pulse 67   Temp 97.7  F (36.5  C) (Oral)   Resp 17   Ht 1.626 m (5' 4\")   Wt 70.3 kg (155 lb)   SpO2 95%   BMI 26.61 kg/m     Vitals appear stable, patient has been afebrile, nontachycardic  General: laying in bed, no acute distress  HEAD: normocephalic, atraumatic outside of orbital findings,   Face: symmetrical, CN VII intact bilaterally (HB 1). Sensation V1-V3 intact and equal bilaterally.   Eyes: The right eye appears to have periorbital edema and erythema, there is an incision across the superior lid which is intact with some minor crusting.  Did not attempt an orbital exam.  The left eye demonstrates a round pupil which is reactive to light, extraocular movements intact.   Ears: External ears appear normal    Nose: no anterior drainage, intact and midline septum without perforation or hematoma   Mouth/oropharynx: Mucosa moist, tonsils absent, symmetric palatal elevation  Neck: no LAD, trachea midline  Neuro: cranial nerves 2-12 grossly intact  Respiratory: breathing non-labored on RA, no stridor  Skin: no rashes or skin lesions of the face/neck  Psych: pleasant affect  Cardio: extremities warm and well perfused     FIBEROPTIC ENDOSCOPY:  Due to concern for sinusitis, fiberoptic nasopharyngoscopy was indicated. After obtaining verbal consent, the nose explored.  The fiberoptic laryngoscope was passed under endoscopic vision through the right nasal passage.  The inferior turbinate appeared to have some mild polypoid changes with intact mucosa.  There appeared to be a yellow, round appearing mass in the right ethmoid/middle meatus area which appears consistent with mucocele.  The right " maxillary os was postsurgical and appeared widely patent without mucus collection.  There is no mucopurulence or mucous noted anywhere in the nasal cavity.  There is no necrosis, crusting, or insensate tissue anywhere throughout the nasal passage. No obvious nasal masses noted. The left nasal passage appeared to have healthy mucosa, intact inferior and middle turbinates, no middle meatus polyps or drainage.     LABS  I personally reviewed the labs for this patient. WBC 8.6. Some hyperglycemia.     Blood cultures have demonstrated no growth to date, Gram stain was negative for organisms although did demonstrate some white blood cells.    Imaging:  I personally reviewed the CT sinuses, MR brain, CT head from 2017.     The MRI does demonstrate an enhancing mass in the superior orbit.  This is heterogeneous.It does abut some opacified adjacent anterior ethmoid cells.  The ethmoid cells are suspicious for mucocele in appearance.  The mucosa throughout the nasal passage lights up with contrast suggesting viable mucosa.  Obvious opacification of the frontal sinus.  Inferior turbinate appears enlarged to the level of the nasopharynx posteriorly.    On review of the CT sinuses, there appears to be right ethmoid and mucoceles present, with a characteristic round, expansile growth.  On my read there does appear to be a dehiscence of the lamina papyracea.  This area can be compared to a 2017 CT head in which the mucoceles appear relatively stable, however there is no evidence of the lamina dehiscence communicating with a mucocele as there is on the more recent CT.  There is scattered opacification throughout the rest of the sinuses, including the frontal sinuses, mucous retention cyst in bilateral maxillary sinuses, some scattered left ethmoid air cells, right sphenoid.  The ostiomeatal complexes appear patent and postsurgical bilaterally.  No obvious skull base defects to suggest encephalocele.     IMPRESSION:  HEAD MRI  20/7:  1.  No acute intracranial process. Specifically, no evidence of intracranial extension of the right orbital process.  2.  Generalized brain atrophy and presumed microvascular ischemic changes as detailed above.     ORBIT MRI:  1.  Nonspecific lentiform masslike structure at the superior aspect of the right orbit as described above measuring approximately 4 cm. Signal characteristics including relatively mild contrast enhancement, layering fluid fluid level, and pattern of   restricted diffusion are somewhat atypical for abscess. While infection is not excluded and additional differential considerations including malignancy, the lymphatic malformation, and hematoma should be considered.  2.  There is some induration of the intraorbital fat with mild proptosis.  3.  Preseptal soft tissue thickening and question fluid collection, abscess not excluded.  4.  Ethmoid sinusitis, suspect mucocele.  5.  Suspected nasal cavity polyp.    Official read pending regarding CT sinuses    Assessment and Plan  Viv NORA Shaw is a 73 year old female with a past medical history of T2DM, renal transplant, Parkinsons who presented to our hospital with sudden blindness and ophthalmoplegia in the right eye s/p decompression with ophthalmology whom we are consulted on to rule out sinus disease as a source of eye disease.  Currently our suspicion is low for a complicated acute bacterial rhinosinusitis causing adjacent orbital abscess given lack of sinusitis symptoms (facial pain pressure, nasal obstruction, fever, leukocytosis, purulence/mucosal edema on exam, history of discharge, organisms on Gram stain from surgical drainage).  Given her lack of pain throughout the episode, we are concerned for a neoplasm or mass in the area which may have led to secondary bleeding (hence blood, sudden changes in vision loss, proptosis).  Also possible, although unusual, would be the ethmoid mucocele eroding into the orbit and causing bleeding.   Very little concern for invasive fungal sinusitis given lack of necrosis on examination, enhancement of mucosa with MRI, lack of neutropenia/DKA.     The patient should likely undergo examination under anesthesia with thorough endoscopic evaluation, decompression of the right ethmoidal mucoceles.  We will plan to discuss the case with our sinus/skull base surgery team to develop a plan on timing, whether this can be done as an outpatient versus in the next few days.  There is likely little benefit to performing any sinus procedures in the immediate timeframe.    - No acute ENT intervention at this time.   - We will follow-up with our team regarding possible future endoscopy and sinus surgery, likely with biopsies and cultures.  Currently unclear if this is beneficial while the patient is inpatient or can be safely performed as an outpatient.  - Please reach out to the ENT team with any acute changes to the patient's status, and we can consider more urgent intervention  - Follow up cultures, surgical pathology, CT sinuses read  -Please reach out to ENT with any questions or concerns    Patient was discussed with Dr. Cori Osborn, who agrees.     Cornelio Luong MD  Otolaryngology-Head & Neck Surgery, PGY-3  Please page ENT with questions by dialing [phone] and entering job code [phone] when prompted    I, Cori Osborn MD, discussed this patient with the resident/fellow at the time of consultation. I have reviewed the note, labs, imaging and am in agreement with the assessment and plan. I did not see the patient.  Acute decompression performed by oculo plastics. Has chronic sinusitis with scan in the past. Will need CT scan to evaluate current state.

## 2023-10-09 NOTE — PLAN OF CARE
"Goal Outcome Evaluation:    VS: /41 (BP Location: Right arm)   Pulse 66   Temp 98.6  F (37  C) (Oral)   Resp 16   Ht 1.626 m (5' 4\")   Wt 70.3 kg (155 lb)   SpO2 96%   BMI 26.61 kg/m      O2: 96% RA   Output: Adequate    Last BM: Per pt 10/8/2023   Activity: As tolerated; assist x1 w/walker   Skin: R eye incision with periorbital edema   Pain: Managed with scheduled meds   CMS: Intact; denies SOB, chest pain, headache   Dressing: na   Diet: Regular w/carb coverage   LDA: L wrist PIV infusing LR @100 ml/hr   Equipment: Walker, IV pole   Plan: IV antibiotics; continue POC   Additional Info:                           "

## 2023-10-09 NOTE — CONSULTS
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 No recent illness or trauma. Denies ocular or orbital pain. Initially felt that this was pinkeye and attempted ?antibiotic drops. Does not know what previous vision was but states that she was able to read lines on the Snellen chart at an eye exam which was performed in August. Frozen eye with LP vision. +Relative afferent pupillary defect in the right eye.    Interval history (10/09/23)  Patient doing well over although very confused. Vision continues to be down to NLP. EOM still restricted vertically though improved horizontally. Concerns of decreased visual acuity in the good eye (20/50 at noon rounds today). Color plates 0/11 (but not seeing color plates either). Unclear at this time whether this represents fluctuating mental status or a new visual decline.     - Continue broad spectrum abx: Vancomycin + Zosyn  - Orbital inflammatory syndrome labs: TB, syphilis, ANCA, IgG subclasses, TSH, TSI, ACE, Lysozyme  - MRI w/w/o contrast orbits and brain  - Patinet      It is our pleasure to participate in this patient's care and treatment. Please contact us with any further questions or concerns.    Discussed with Dr. Nathaniel Figueroa and Dr. Green who agreed with this assessment and plan.     Ophthalmology will continue to follow closely. Please contact ophthalmologist on call with any questions or concerns. We appreciate your care of this patient.    Deepali Larsen MD, PGY2  Ophthalmology Resident  [facility]    HISTORY OF  PRESENTING ILLNESS:     Viv Shaw is a 73 year old female who presented on 10/07/23 with   Presents with rim enhancing mass above the globe  Noticed that conj of the right eye was injected without purulent discharge  Antibiotic eye drops used  Right eyelid edematous      3 mm fixed pupil right  Conj injection  Proptosis with decreased Extraocular movements on right side  Blood sugars elevated since 10/06 PM     CT scan showing   Superior aspect of right orbit with 1 cm x 2.6 cm by 1.7 cm rim enhancing collection with downward displacement of the extraocular musculature associated with R globe proptosis     Outside provider's exam revealed:   - Visual acuity of 20/HM in the right eye, 20/70 cc in the left eye.     10+ review of systems were otherwise negative except for that which has been stated above.      OCULAR/MEDICAL/SURGICAL HISTORIES:     Past Ocular History:   Last eye exam: August, 2023   Previously diagnosed ocular conditions: severe proliferative diabetic retinopathy, OU  Prior eye surgery/laser: Panretinal laser photocoagulation (PRP) Laser OU,   Contact lens wear: none  Glasses: wearing  Eyedrops: none    Pertinent Systemic Medications:   Current Facility-Administered Medications   Medication     acetaminophen (TYLENOL) tablet 975 mg     amLODIPine (NORVASC) tablet 10 mg     aspirin (ASA) chewable tablet 81 mg     atorvastatin (LIPITOR) tablet 20 mg     carbidopa-levodopa half-tab 12.5-50 mg     cyanocobalamin (VITAMIN B-12) tablet 1,000 mcg     cycloSPORINE modified (GENERIC EQUIVALENT) capsule 50 mg     dextrose 10% infusion     glucose gel 15-30 g    Or     dextrose 50 % injection 25-50 mL    Or     glucagon injection 1 mg     donepezil (ARICEPT) tablet 5 mg     enoxaparin ANTICOAGULANT (LOVENOX) injection 40 mg     insulin aspart (NovoLOG) injection (RAPID ACTING)     insulin aspart (NovoLOG) injection (RAPID ACTING)     insulin aspart (NovoLOG) injection (RAPID ACTING)     insulin aspart  (NovoLOG) injection (RAPID ACTING)     insulin aspart (NovoLOG) injection (RAPID ACTING)     insulin aspart (NovoLOG) injection (RAPID ACTING)     insulin glargine (LANTUS PEN) injection 15 Units     isosorbide mononitrate (IMDUR) 24 hr half-tab 15 mg     lactated ringers infusion     levothyroxine (SYNTHROID/LEVOTHROID) tablet 100 mcg     losartan (COZAAR) tablet 50 mg     Med Instruction - Transition from IV Insulin Infusion to Sub-Q Insulin     melatonin tablet 1 mg     metoprolol succinate ER (TOPROL XL) 24 hr tablet 25 mg     mycophenolic acid (GENERIC EQUIVALENT) EC tablet 540 mg     ondansetron (ZOFRAN ODT) ODT tab 4 mg    Or     ondansetron (ZOFRAN) injection 4 mg     pantoprazole (PROTONIX) EC tablet 40 mg     piperacillin-tazobactam (ZOSYN) 3.375 g vial to attach to  mL bag     polyethylene glycol (MIRALAX) Packet 17 g     vancomycin (VANCOCIN) 1,000 mg in 200 mL dextrose intermittent infusion     Vitamin D3 (CHOLECALCIFEROL) tablet 50 mcg     zinc sulfate (ZINCATE) capsule 220 mg     Past Medical History:  Past Medical History:   Diagnosis Date     Abdominal wall hernia     RLQ     AK (actinic keratosis) 08/06/2020     Allergic rhinitis      CAD (coronary artery disease)     coronary artery disease s/p PCI to LAD in 2005coronary artery disease s/p PCI to LAD in 2005     Diabetes mellitus, type 2 (H)     follows up with endocrinologist.     Dyslipidemia      GERD (gastroesophageal reflux disease)      H/O diabetic nephropathy     s/p kidney trnsplant     Hypertension      Hypothyroidism      Immunosuppressed status (H24)      Kidney replaced by transplant     Rt     PONV (postoperative nausea and vomiting)      Shingles      Urinary tract infection 8/12/2022     Vitamin B12 deficiency anemia        Past Surgical History:   Past Surgical History:   Procedure Laterality Date     APPENDECTOMY NOS       ARTHROSCOPY SHOULDER ROTATOR CUFF REPAIR Left      COLONOSCOPY N/A 6/7/2016    Procedure: COLONOSCOPY;   Surgeon: Rashard Snider MD;  Location: [facility]     Coronary angioplasty with stent  2005     HYSTERECTOMY       Kidney Transplant NOS Right      LAPAROSCOPIC CHOLECYSTECTOMY       NOSE SURGERY  2013     OPEN SEPARATION COMPONENT HERNIORRHAPHY N/A 10/16/2017    Procedure: OPEN SEPARATION COMPONENT HERNIORRHAPHY;;  Surgeon: CARL Lott MD;  [facility]     RECONSTRUCT ABDOMINAL WALL N/A 10/16/2017    Procedure: RECONSTRUCT ABDOMINAL WALL;  Exploratory Laparotomy, Lysis of Adhesions, Abdominal Wall reconstruction, Inlay Xen AB mesh, Mitek Suture [place] and Umbilical Hernia Repair.;  Surgeon: CARL Lott MD;  Location: [facility]     REMOVE CATARACT INTRACAP,INSERT LENS Right      TONSILLECTOMY         Family History:   No history of macular degeneration or glaucoma    Social History:   No tobacco use    EXAMINATION:     Base Eye Exam     Visual Acuity (Snellen - Linear)       Right Left    Near cc  20/30-1          Visual Fields       Left Right     Full             Additional Tests     Color       Right Left    [place]  0/11              Exam unable to be updated - please refer to the exams tab     Labs/Studies/Imaging Performed    Quant gold - negative  Syphilis - negative  ANCA IgG - WNL  TSH low   T4 WNL    IgG subclasses - pending   TSI - pending  ACE - pending  Lysozyme - pending     MRI orbit    Personally reviewed images and agree with radiology read as below    ORBIT MRI:  1.  Nonspecific lentiform masslike structure at the superior aspect of the right orbit as described above measuring approximately 4 cm. Signal characteristics including relatively mild contrast enhancement, layering fluid fluid level, and pattern of   restricted diffusion are somewhat atypical for abscess. While infection is not excluded and additional differential considerations including malignancy, the lymphatic malformation, and hematoma should be considered.  2.  There is some induration of the intraorbital fat  with mild proptosis.  3.  Preseptal soft tissue thickening and question fluid collection, abscess not excluded.  4.  Ethmoid sinusitis, suspect mucocele.  5.  Suspected nasal cavity polyp.       Deepali Larsen MD  Resident Physician, PGY2  Department of Ophthalmology

## 2023-10-09 NOTE — PROGRESS NOTES
[facility]    Medicine Progress Note - Hospitalist Service, GOLD TEAM 22    Date of Admission:  10/7/2023    Assessment & Plan      Viv Shaw is a 73 year old female admitted on 10/7/2023. She is admitted for Rim enhancing mass above globe,and acute vision.    R eye significant ophthalmoplegia  Acute R eye vision loss with loss in light reflex  Globe proptosis  CT showing rim enhancing lesion above the Orbit. MRI w/ contrast showing mass like lesion at the superior aspect othe R orbit. Differential for above findings remains broad concerning for orbital cellulitis, orbital abscess, malignancy, hemangioma. Given the acute worsening of sx over a short period of time, infectious etiologies most likely, however, there was no purulent drainage with ophthalmologic procedure. ENT consulted given extensive sinus disease concern for local invasion, no acute surgical intervention at this time.   - Continue Vancomycin and zosyn  - follow blood and tissue cultures, biopsy results  - trend fever curve, daily WBC.  - Labs per ophtho  - ACE level, TSI, Lysozyme, ANCA, IgG pending  - TSH low, T4 normal, T3 pending  - IGRA negative  -Trep ab nonreactive    History of T2DM:  - Home insulin regimen   - Lantus 15U after dinner   - Novolog 4-6U preprandial  - Novolog correction (they estimate, do not use a specific scale)  - Current regimen   - Lantus 15U at bedtime   - Novolog 1U:15g CHO   - Novolog medium sliding scale  - Insulin drip started while npo for surg, stopped 10/8 evening    History of renal transplant  - continue home dose of cyclosporin and mycophenolate  - cyclosporin level    History of Hypertension  - continue home antihypertensives: Norvasc, Losartan, Toprol.    History Hypothyroidism  - TSH low, T4 normal, T3 and TSI pending. Obtained due to proptosis  - continue home synthroid    History CAD:  - hold aspirin 81 given surgery-- will need to restart  - continue  "Imdur       Diet: Regular Diet Adult    DVT Prophylaxis: Mechanical  Arevalo Catheter: Not present  Lines: None     Cardiac Monitoring: None  Code Status: No CPR- Do NOT Intubate      Clinically Significant Risk Factors                      # Hypertension: Noted on problem list     # Dementia: noted on problem list   # DMII: A1C = 7.2 % (Ref range: <5.7 %) within past 6 months   # Overweight: Estimated body mass index is 26.61 kg/m  as calculated from the following:    Height as of this encounter: 1.626 m (5' 4\").    Weight as of this encounter: 70.3 kg (155 lb)., PRESENT ON ADMISSION            Disposition Plan      Expected Discharge Date: 10/10/2023                  Gurwinder Moreno MD  Hospitalist Service, GOLD TEAM [address] [place] [address]  Securely message with [employer] (more info)  Text page via [email or web address] Paging/Directory   See signed in provider for up to date coverage information  ______________________________________________________________________    Interval History   Feels better today. Reassuring laryngoscopic exam with ENT yesterday. Feeling anxious about not knowing what next steps are and diagnostic uncertainty.     Physical Exam   Vital Signs: Temp: 98.5  F (36.9  C) Temp src: Oral BP: (!) 175/81 Pulse: 65   Resp: 16 SpO2: 96 % O2 Device: None (Room air) Oxygen Delivery: 2 LPM  Weight: 155 lbs 0 oz    Sitting in bed NAD  Right eye erythematous, swollen shut  RRR  CTAB    Data     I have personally reviewed the following data over the past 24 hrs:    N/A  \   N/A   / N/A     N/A N/A N/A /  102 (H)   3.7 N/A 1.08 (H) \     Imaging results reviewed over the past 24 hrs:   No results found for this or any previous visit (from the past 24 hour(s)).    "

## 2023-10-10 NOTE — PLAN OF CARE
"Goal Outcome Evaluation:      Plan of Care Reviewed With: patient    Overall Patient Progress: improvingOverall Patient Progress: improving     Patient admitted 10/07/23 for RIM enhancing mass above globe, and acute vision of R eye.      VS: BP (!) 178/73 (BP Location: Right arm, Patient Position: Supine, Cuff Size: Adult Regular)   Pulse 74   Temp 98.5  F (36.9  C) (Oral)   Resp 16   Ht 1.626 m (5' 4\")   Wt 70.3 kg (155 lb)   SpO2 95%   BMI 26.61 kg/m         O2: >90% on RA no complaints of SOB or chest pain.   Output: Voiding adequate amounts w/o pain or difficulty to bathroom.   Last BM: 10/08/12   Activity: SBA with walker. Bed alarm at all times.    Skin: R eye incision with perioribital edema.   Pain: No pain.   CMS: Alert and oriented X3. Confusion with sundowning.   Dressing: None.   Diet: Regular, tolerating well with carb coverage. No complaints of nausea or vomiting.   LDA: R and L PIV - SL   Equipment: Personal belongings.   Plan: IV abx - pos discharge tomorrow.    Additional Info:        "

## 2023-10-10 NOTE — TELEPHONE ENCOUNTER
Pt spouse called returning writers call. Pt had surgery on 11/8 at [facility], by Dr Machado. Writer was touching base with family to see if the needed anything further as pt is still currently in the hospital. Bill stated they were doing well and hoping to be able to go home today. No further action needed.  Leonela Heller on 10/10/2023 at 10:02 AM

## 2023-10-10 NOTE — INTERIM SUMMARY
Brief Ophthalmology Note    Repeated visual acuity, CVF and color plates due to concern for decreasing left eye vision with history of reported rapid vision loss in the right eye leading to current hospitalization.    Repeat VA improved to 20/30-1, full CVF and 0/11 color plates. Changes likely represent fluctuating mental status and delirium as patient was actively speaking to her TV when entering the room for repeat examination.    Discussed patient with neuro-ophthalmology fellow Dr. Rubio. No changes in current management plan.    Lisbeth Brody MD  Resident Physician, PGY-3  Department of Ophthalmology

## 2023-10-10 NOTE — PROGRESS NOTES
Brief ENT Note       Patient reviewed with staff and msg sent for outpatient follow-up with our Rhinology team. They will reach out to patient. There is no current indication for inpatient sinus surgery. Recommend nasal saline in the interim time.     Ann-Marie Broussard MD   ENT Resident

## 2023-10-10 NOTE — PROGRESS NOTES
I was called about elevated vancomycin level. This was apparently drawn just as the vancomycin infusion was finishing so is likely falsely elevated. Patient is doing well with no new symptoms per RN. Pharmacy consultation placed for vancomycin monitoring and adjustment.     Eugenio Starks MD  10/10/23  12:47 AM

## 2023-10-10 NOTE — PLAN OF CARE
Shift [id number]  VS: Temp: 98.6  F (37  C) Temp src: Oral BP: (!) 149/59 Pulse: 70   Resp: 16 SpO2: 96 % O2 Device: None (Room air)      O2: Stable On RA, O2 > 90%. Denies SOB, cough, & chest pain.    Output: Cont B&B.    Last BM: 10/8/2023.    Activity: SBA w/walker   Skin: R eye incision   Pain: Denied pain   Neuro: Intermittent confusion. Pt is disorientated to situation, time, and place. Pt is easy to re-orientate.    Diet: Regular diet. Denied N/V. Carb coverage, BG checks   LDA: L PIV Infusing LR at 100 ml/hr.   Equipment: Personal items, IV pole, walker   Plan: Continue POC. Pt able to make needs known, call light within reach,    Additional Info: Writer was notified from lab about critical lab result for Vancomycin of 44.0, writer notified provider and provider believes vanco level needs to be rechecked during the proper trough time.

## 2023-10-10 NOTE — PROGRESS NOTES
"Otolaryngology Progress Note  10/10/2023      Subjective/Intvl events: No acute events overnight. Upon entering the room patient is calling for her sister. She states her sister has come into town from [place] and is supposed to be visiting her. She also asks for her . She denies any pain, headache, nasal drainage, congestion. She wants to know if she is going to have surgery.    O: BP (!) 178/73 (BP Location: Right arm, Patient Position: Supine, Cuff Size: Adult Regular)   Pulse (!) 7   Temp 98.5  F (36.9  C) (Oral)   Resp 16   Ht 1.626 m (5' 4\")   Wt 70.3 kg (155 lb)   SpO2 95%   BMI 26.61 kg/m    General: laying in bed, no acute distress  HEAD: normocephalic, atraumatic outside of orbital findings,   Face: symmetrical, CN VII intact bilaterally (HB 1). Sensation V1-V3 intact and equal bilaterally.   Eyes: The right eye appears to have periorbital edema and erythema improved from prior exam, there is an incision across the superior lid which is intact.  Thick discharge in right eye.  The left eye demonstrates extraocular movements intact.   Ears: External ears appear normal    Nose: no anterior drainage or external deformity  Mouth/oropharynx: Mucosa moist, adequate dentition  Neck: no LAD, trachea midline  Neuro: cranial nerves 2-12 grossly intact  Respiratory: breathing non-labored on RA, no stridor  Skin: no rashes or skin lesions of the face/neck  Psych: pleasant affect, slightly delirious  Cardio: extremities warm and well perfused       LABS: Reviewed    Cultures:  No results for input(s): CULT in the last 168 hours.      Imaging:  CT Sinus W/o contrast 10/8/2023  IMPRESSION:   1. Suspected right orbital and periorbital cellulitis including  suspected subperiosteal abscess along the superior and medial  extraconal right orbit.  2. Persistent right ethmoid mucocele since CT 2017.  3. Chronic bilateral maxillary sinusitis, right greater than left.     A/P: Viv Shaw is a 73 year old female " with a past medical history of T2DM, renal transplant, Parkinsons who presented to our hospital with sudden blindness and ophthalmoplegia in the right eye s/p decompression with ophthalmology whom we are consulted on to rule out sinus disease as a source of eye disease. Patient appears to be improving clinically. Patient staffed with Rhinologist and no indication for inpatient sinus surgery. Follow-up with surgeon in clinic will be arranged.     - No acute ENT intervention at this time.   - Ok to discharge from ENT perspective  - Rhinology follow up will be arranged by ENT  - Discharge with nasal saline q2-4 hours  - Please reach out to the ENT team with any acute changes to the patient's status  - Follow up cultures, surgical pathology  -Please reach out to ENT with any questions or concerns     --Patient and plan discussed with Dr. Jelly Randolph, PA-C pg [phone]  Otolaryngology-Head & Neck Surgery  Please contact ENT with questions by dialing [phone] and entering job code [phone] when prompted.

## 2023-10-10 NOTE — PROGRESS NOTES
[facility]    Medicine Progress Note - Hospitalist Service, GOLD TEAM 22    Date of Admission:  10/7/2023    Assessment & Plan      Viv Shaw is a 73 year old female admitted on 10/7/2023. She is admitted for Rim enhancing mass above globe,and acute vision. Ophthalmology consulted and s/p orbiotomy on 10/8/2023.     Interval Changes:  -Operative cultures with gram positive cocci (10/8)  -Continue vancomycin and zosyn  -Repeat optho evaluation  -Increase lantus to 17 units  -Increase carb ratio to 1:12  -Increase to high dose correction factor    R eye ophthalmoplegia  Probable orbital cellulitis and abscess  Acute R eye vision loss with loss in light reflex  Globe proptosis  CT showing rim enhancing lesion above the Orbit. MRI w/ contrast showing mass like lesion at the superior aspect othe R orbit. Differential for above findings remains broad concerning for orbital cellulitis, orbital abscess, malignancy, hemangioma. Given the acute worsening of sx over a short period of time, infectious etiologies most likely, however, there was no purulent drainage with ophthalmologic procedure.   -ENT consulted given extensive sinus disease concern for local invasion, no acute surgical intervention at this time. Planned for outpatient treatment  -Tissue cultures (10/8) - gram positive cocci  Plan:  - Continue Vancomycin and zosyn  - follow blood and tissue cultures, biopsy results  - Labs per ophtho  - ACE level, TSI, Lysozyme,   - TSH low, T4 normal, T3 total low, T3 free normal  - IGRA negative  -Trep ab nonreactive    History of T2DM:  - Home insulin regimen   - Lantus 15U after dinner   - Novolog 4-6U preprandial  - Novolog correction (they estimate, do not use a specific scale)  - Current regimen   - Lantus 17U at bedtime   - Novolog 1U:12g CHO   - Novolog high intensity sliding scale    History of renal transplant  - continue home dose of cyclosporin and mycophenolate  -  "cyclosporin level    History of Hypertension  - continue home antihypertensives: Norvasc, Losartan, Toprol.    History Hypothyroidism  - TSH low, T4 normal, T3 and TSI pending. Obtained due to proptosis  - continue home synthroid    History CAD:  Aspirin  - continue Imdur       Diet: Regular Diet Adult    DVT Prophylaxis: Mechanical, lovenox  Arevalo Catheter: Not present  Lines: None     Cardiac Monitoring: None  Code Status: No CPR- Do NOT Intubate      Clinically Significant Risk Factors                      # Hypertension: Noted on problem list     # Dementia: noted on problem list   # DMII: A1C = 7.2 % (Ref range: <5.7 %) within past 6 months   # Overweight: Estimated body mass index is 26.61 kg/m  as calculated from the following:    Height as of this encounter: 1.626 m (5' 4\").    Weight as of this encounter: 70.3 kg (155 lb)., PRESENT ON ADMISSION            Disposition Plan     Expected Discharge Date: 10/10/2023      Destination: home with family            Reagan Humza Velasquez MD  Hospitalist Service, GOLD TEAM 22  [facility]  Securely message with [employer] (more info)  Text page via [facility] Paging/Directory   See signed in provider for up to date coverage information  ______________________________________________________________________    Interval History   She notes improving symptoms in the right eye. She feels her vision is better as well compared to yesterday. She notes she is often anxious about these things. No fever or chills. No chest pain. No shortness of breath. No nausea or vomiting.     Physical Exam   Vital Signs: Temp: 98.6  F (37  C) Temp src: Oral BP: (!) 149/59 Pulse: 70   Resp: 16 SpO2: 96 % O2 Device: None (Room air)    Weight: 155 lbs 0 oz    Gen: Alert, sitting in bed, no acute distress  Eyes: right eye lid swollen/erythematous, nearly completely closed, left eye external normal with no erythema or purulence noted  CV: RRR, systolic murmur 1/6 " at left lower sternal border   Pulm: CTAB, no wheezing  Abd: soft, non-tender, non-distended  Ext: trace pitting edema in lower extremities    Data     I have personally reviewed the following data over the past 24 hrs:    N/A  \   N/A   / N/A     N/A N/A N/A /  335 (H)   3.7 N/A 1.08 (H) \     Imaging results reviewed over the past 24 hrs:   No results found for this or any previous visit (from the past 24 hour(s)).

## 2023-10-11 NOTE — PROGRESS NOTES
[facility]    Medicine Progress Note - Hospitalist Service, GOLD TEAM 22    Date of Admission:  10/7/2023    Assessment & Plan      Viv Shaw is a 73 year old female admitted on 10/7/2023. She is admitted for Rim enhancing mass above globe,and acute vision. Ophthalmology consulted and s/p orbiotomy on 10/8/2023.     Interval Changes:  -Operative cultures with  staph epidermis in broth and cutibacterium in anaerobic culture  -Narrow to vancomycin (should cover both organisms for now)  -Final susceptibilities in next 24 hours then should be able to switch to oral antibiotics  -Stop zosyn  -Increase to 1:10 carb ratio on meal time insulin  -Schedule melatonin at night  -OT referral on discharge  -Family updated via phone    R eye ophthalmoplegia  Probable orbital cellulitis and abscess  Acute R eye vision loss with loss in light reflex  Globe proptosis  CT showing rim enhancing lesion above the Orbit. MRI w/ contrast showing mass like lesion at the superior aspect othe R orbit. Differential for above findings remains broad concerning for orbital cellulitis, orbital abscess, malignancy, hemangioma. Given the acute worsening of sx over a short period of time, infectious etiologies most likely, however, there was no purulent drainage with ophthalmologic procedure.   -ENT consulted given extensive sinus disease concern for local invasion, no acute surgical intervention at this time. Planned for outpatient treatment  -Tissue cultures (10/8) - staph epidermis in broth, cutibacterium in anaerobic culture  Plan:  - Continue Vancomycin   - follow blood and tissue cultures, biopsy results  - Labs per ophtho  - ACE level, TSI, Lysozyme,   - TSH low, T4 normal, T3 total low, T3 free normal  - IGRA negative  -Trep ab nonreactive    History of T2DM:  - Home insulin regimen   - Lantus 15U after dinner   - Novolog 4-6U preprandial  - Novolog correction (they estimate, do not use a  "specific scale)  - Current regimen   - Lantus 17U at bedtime   - Novolog 1U:10g CHO   - Novolog high intensity sliding scale    History of renal transplant  - continue home dose of cyclosporin and mycophenolate  - cyclosporin level    History of Hypertension  - continue home antihypertensives: Norvasc, Losartan, Toprol.    History Hypothyroidism  - TSH low, T4 normal, T3 and TSI pending. Obtained due to proptosis  - continue home synthroid    History CAD:  Aspirin  - continue Imdur    Delirium  -Overnight on 10/11, per family occurs at home as well likely tied to dementia  Plan:  >Schedule melatnoin       Diet: Regular Diet Adult    DVT Prophylaxis: Mechanical, lovenox  Arevalo Catheter: Not present  Lines: None     Cardiac Monitoring: None  Code Status: No CPR- Do NOT Intubate      Clinically Significant Risk Factors                      # Hypertension: Noted on problem list     # Dementia: noted on problem list   # DMII: A1C = 7.2 % (Ref range: <5.7 %) within past 6 months   # Overweight: Estimated body mass index is 26.61 kg/m  as calculated from the following:    Height as of this encounter: 1.626 m (5' 4\").    Weight as of this encounter: 70.3 kg (155 lb)., PRESENT ON ADMISSION            Disposition Plan      Expected Discharge Date: 10/12/2023      Destination: home with family            Reagan Humza Velasquez MD  Hospitalist Service, [address]  Securely message with [employer] (more info)  Text page via [facility] Paging/Directory   See signed in provider for up to date coverage information  ______________________________________________________________________    Interval History   Confusion noted overnight. Per patient and family this occurs at home as well in the evening but is worse in the hospital. No fever or chills. Reports vision is still poor in the eye. No chest pain or shortness of breath. No nausea or vomiting. No abdominal pain.     Physical Exam "   Vital Signs: Temp: 98.3  F (36.8  C) Temp src: Oral BP: (!) 155/60 Pulse: 63   Resp: 16 SpO2: 96 % O2 Device: None (Room air)    Weight: 155 lbs 0 oz    Gen: Alert, sitting in bed, no acute distress  Eyes: right eye lid swollen/erythematous, nearly completely closed, left eye external normal with no erythema or purulence noted  CV: RRR, systolic murmur 1/6 at left lower sternal border   Pulm: CTAB, no wheezing  Abd: soft, non-tender, non-distended  Ext: trace pitting edema in lower extremities  Neuro: oriented to location, year, month    Data     I have personally reviewed the following data over the past 24 hrs:    N/A  \   N/A   / N/A     N/A N/A N/A /  357 (H)   N/A N/A N/A \     Imaging results reviewed over the past 24 hrs:   No results found for this or any previous visit (from the past 24 hour(s)).

## 2023-10-11 NOTE — CONSULTS
Care Management Initial Consult    General Information  Assessment completed with: Patient,    Type of CM/SW Visit: Initial Assessment    Primary Care Provider verified and updated as needed: Yes   Readmission within the last 30 days:        Reason for Consult: discharge planning  Advance Care Planning: Advance Care Planning Reviewed: no concerns identified        Communication Assessment  Patient's communication style: spoken language (English or Bilingual)    Hearing Difficulty or Deaf: no   Wear Glasses or Blind: yes    Cognitive  Cognitive/Neuro/Behavioral: .WDL except, orientation  Level of Consciousness: intermittent confusion  Arousal Level: opens eyes spontaneously  Orientation: disoriented to, place, time, situation  Mood/Behavior: flat affect  Best Language: 0 - No aphasia  Speech: illogical, clear, spontaneous    Living Environment:   People in home: spouse     Current living Arrangements: condominium      Able to return to prior arrangements: yes     Family/Social Support:  Care provided by: self  Provides care for: no one  Marital Status:   , Children          Description of Support System: Supportive, Involved    Support Assessment: Adequate family and caregiver support    Current Resources:   Patient receiving home care services: No     Community Resources: None  Equipment currently used at home: walker, standard  Supplies currently used at home: None    Employment/Financial:  Employment Status: homemaker        Financial Concerns: none   Referral to Financial Worker: No     Does the patient's insurance plan have a 3 day qualifying hospital stay waiver?  No    Lifestyle & Psychosocial Needs:  Social Determinants of Health     Food Insecurity: Not on file   Depression: Not at risk (8/9/2023)    PHQ-2     PHQ-2 Score: 0   Housing Stability: Not on file   Tobacco Use: Low Risk  (10/10/2023)    Patient History     Smoking Tobacco Use: Never     Smokeless Tobacco Use: Never     Passive  Exposure: Not on file   Financial Resource Strain: Not on file   Alcohol Use: Not on file   Transportation Needs: Not on file   Physical Activity: Not on file   Interpersonal Safety: Not on file   Stress: Not on file   Social Connections: Not on file     Functional Status:  Prior to admission patient needed assistance:   Dependent ADLs:: Ambulation-walker  Dependent IADLs:: Transportation  Assesssment of Functional Status: At functional baseline    Mental Health Status:  Mental Health Status: No Current Concerns       Chemical Dependency Status:  Chemical Dependency Status: No Current Concerns           Values/Beliefs:  Spiritual, Cultural Beliefs, Restorationism Practices, Values that affect care: no             Additional Information:  Per H&P, patient is a 73 year old female admitted on 10/7/2023. She is admitted for Rim enhancing mass above globe,and acute vision.     Writer completed initial assessment due to elevated risk score. Writer introduced self, role and duties to patient. Patient resides in independent condominium with spouse. She is independent at baseline with ADLs and IADLs. Patient receives assistance with yard care from condominium and transportation from spouse. Her primary care physician on file was confirmed. Patient reports having a healthcare directive, which is not on file, writer asked her to provide a copy to Hospital.     She uses a Dexcom device at home. She denies receiving any financial support. Patient denies any financial, mental health or chemical dependency concerns. Patient's discharge plan is for return home. No discharge needs anticipated. RNCC to continue to follow for any support or discharge needs that arise.    LINO Gramajo, EJ  6 Med Surg and 10 ICU Rooms 1037 and 1039  Phone [phone]  Pager [phone]

## 2023-10-11 NOTE — PLAN OF CARE
"Goal Outcome Evaluation:      Plan of Care Reviewed With: patient    Overall Patient Progress: improvingOverall Patient Progress: improving     Patient admitted 10/07/23 for RIM enhancing mass above globe, and acute vision of R eye.       VS: BP (!) 186/71 (BP Location: Left arm)   Pulse 65   Temp 98.3  F (36.8  C) (Oral)   Resp 16   Ht 1.626 m (5' 4\")   Wt 70.3 kg (155 lb)   SpO2 96%   BMI 26.61 kg/m       O2: >90% on RA no complaints of SOB or chest pain.   Output: Voiding adequate amounts w/o pain or difficulty to bathroom.   Last BM:    Activity: SBA with walker. Bed alarm at all times.    Skin: R eye incision with periorbital edema.   Pain: No Pain.   CMS: Alert and oriented X3. Confusion with sundowning.   Dressing: None.   Diet: Regular, tolerating well with carb coverage. No complaints of nausea or vomiting.   LDA: L PIV - SL   Equipment: Personal belongings.   Plan: Pos discharge tomorrow.    Additional Info:        "

## 2023-10-11 NOTE — PLAN OF CARE
Occupational Therapy Discharge Summary    Reason for therapy discharge:    All goals and outcomes met, no further needs identified.    Progress towards therapy goal(s). See goals on Care Plan in [facility] electronic health record for goal details.  Goals met    Therapy recommendation(s):    Home with continued assist from , would benefit from low vision specialist consult, CC informed

## 2023-10-11 NOTE — PROGRESS NOTES
Brief Social Work Note      RADHIKA and KAYLYNN Ruiz spoke with OT. OT recommends outpatient OT specializing in low vision OT. KAYLYNN Ruiz placed order for low vision outpatient OT for provider signature.      NGUYỄN Lynn, LSW  8 Med Surg   10[facility] beds: -  [facility]  Phone: [phone]  Pager: [phone]
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
## 2023-10-11 NOTE — CONSULTS
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 No corresponding orbital pain. Does not know what previous vision was but states that she was able to read lines on the Snellen chart at an eye exam which was performed in August. Frozen eye with initial LP vision which rapidly progressed to NLP vision and right RAPD. During her hospital course she underwent right orbitotomy with orbital tissue biopsy and hematoma drainage 10/8. She subsequently had variable vision of her unaffected eye (10/9) but this was likely due to encephalopathy as she was disoriented and later that day her vision improved.  10/11: left eye BCVA 20/25+2, equal mild upgaze deficit each eye within expected range for angle, slight downgaze deficit right eye    - Appropriate to transition to PO antibiotics per primary team  - Follow up in 1 week (we will schedule)      It is our pleasure to participate in this patient's care and treatment. Please contact us with any further questions or concerns.    Discussed with Dr. Green and Dr. Crabtree who agreed with this assessment and plan.     Ophthalmology will sign off. Please contact ophthalmologist on call with any questions or concerns. We appreciate your care of this patient.    Thank you for entrusting us with your care  Jayla Ventura MD, PGY3  Ophthalmology Resident  [facility]      SUBJECTIVE     Viv Shaw denies ocular pain today. Her  reports that she appears to be significantly improving.      EXAMINATION:     Base Eye Exam        Visual Acuity (Snellen - Linear)         Right Left    Dist cc NLP 20/25 +2              Tonometry (Tonopen, 6:06 PM)         Right Left    Pressure 23 15              Pupils         Shape React APD    Right Round Sluggish Yes    Left Round Brisk None              Visual Fields         Left Right     Full     Restrictions  Total superior temporal, inferior temporal, superior nasal, inferior nasal deficiencies              Extraocular Movement         Right Left     -1 -1 -1   0  0   -1 -1 -1    -1 -1 -1   0  0   -1 -1 -1                 Neuro/Psych       Mood/Affect: pleasantly confused                  Additional Tests       Color         Right Left    Ish[place]ara 0/11 0/11                  Slit Lamp and Fundus Exam       External Exam         Right Left    External Proptotic Normal              Slit Lamp Exam         Right Left    Lids/Lashes Upper lid fullness with edema, fluctuance, redness, ptosis, improved today Normal    Conjunctiva/Sclera Temporal chemosis White and quiet    Cornea Punctate epithelial erosions Clear    Anterior Chamber Deep and quiet Deep and quiet    Iris Round and minimally reactive Round and reactive                  Exam unable to be updated - please refer to the exams tab     Labs/Studies/Imaging Performed    Quant gold - negative  Syphilis - negative  ANCA IgG - WNL  TSH low   T4 WNL    IgG subclasses - IgG1 369 otherwise normal   TSI - pending  ACE - normal  Lysozyme - pending     Surgical pathology pending    MRI orbit    Personally reviewed images and agree with radiology read as below    ORBIT MRI:  1.  Nonspecific lentiform masslike structure at the superior aspect of the right orbit as described above measuring approximately 4 cm. Signal characteristics including relatively mild contrast enhancement, layering fluid fluid level, and pattern of   restricted diffusion are somewhat atypical for abscess. While infection is not excluded and additional differential considerations including  malignancy, the lymphatic malformation, and hematoma should be considered.  2.  There is some induration of the intraorbital fat with mild proptosis.  3.  Preseptal soft tissue thickening and question fluid collection, abscess not excluded.  4.  Ethmoid sinusitis, suspect mucocele.  5.  Suspected nasal cavity polyp.     Thank you for entrusting us with your care  Jayla Ventura MD, PGY3  Ophthalmology Resident  [facility]

## 2023-10-11 NOTE — PLAN OF CARE
"  Problem: Plan of Care - These are the overarching goals to be used throughout the patient stay.    Goal: Plan of Care Review  Description: The Plan of Care Review/Shift note should be completed every shift.  The Outcome Evaluation is a brief statement about your assessment that the patient is improving, declining, or no change.  This information will be displayed automatically on your shift note.  Outcome: Adequate for Care Transition  Flowsheets (Taken 10/11/2023 5657)  Outcome Evaluation: Patient to discharge to TCU/CD TX.  Plan of Care Reviewed With: patient  Overall Patient Progress: improving  Goal: Patient-Specific Goal (Individualized)  Description: You can add care plan individualizations to a care plan. Examples of Individualization might be:  \"Parent requests to be called daily at 9am for status\", \"I have a hard time hearing out of my right ear\", or \"Do not touch me to wake me up as it startles me\".  Outcome: Adequate for Care Transition  Goal: Absence of Hospital-Acquired Illness or Injury  Outcome: Adequate for Care Transition  Goal: Optimal Comfort and Wellbeing  Outcome: Adequate for Care Transition  Goal: Readiness for Transition of Care  Outcome: Adequate for Care Transition     Problem: Fall Injury Risk  Goal: Absence of Fall and Fall-Related Injury  Outcome: Adequate for Care Transition     Problem: Violence Risk or Actual  Goal: Anger and Impulse Control  Outcome: Adequate for Care Transition     "

## 2023-10-11 NOTE — TELEPHONE ENCOUNTER
Lvm to inform patient of appt scheduled with Dr. Machado for post op. Gave Sary's direct number if reschedule is needed. Sent letter in mail as well.

## 2023-10-11 NOTE — PROGRESS NOTES
10/11/23 0945   Appointment Info   Signing Clinician's Name / Credentials (OT) Elena Holt MA OTR/L   Rehab Comments (OT) R eye significant visual deficits   Living Environment   People in Home spouse   Current Living Arrangements condominium   Home Accessibility other (see comments)  (elevator access, does not have to do stairs)   Transportation Anticipated family or friend will provide   Living Environment Comments Pt does not drive anymore,  provides transportation. Higher toilet. Walk in shower with built in bench and grab bars.   Self-Care   Usual Activity Tolerance good   Current Activity Tolerance moderate   Equipment Currently Used at Home walker, standard   Fall history within last six months yes   Number of times patient has fallen within last six months   (2)   Instrumental Activities of Daily Living (IADL)   IADL Comments  assists with all IADLs   General Information   Onset of Illness/Injury or Date of Surgery 10/10/23   Referring Physician Reagan Velasquez MD   Patient/Family Therapy Goal Statement (OT) Did not endorse   Additional Occupational Profile Info/Pertinent History of Current Problem Viv Shaw is a 73 year old female pmhx recent diagnosis of Parkinson's and Diabetes mellitus with severe proliferative diabetic retinopathy OU who presented with the following: Near Complete Ophthalmoplegia  #Vision Loss, Right Eye  #Abnormal CT Findings  #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 Mild confusion noted during eval, overall lucid and able to interact and follow instructions. per chart, sundowns at night.   Visual Perception   Impact of Vision Impairment on Function (Vision) R eye significant visual deficits, eye lid closed. L  eye WFL. Pt was having visual impairment prior to this hospital stay, however not quite as impaired.   Pain Assessment   Patient Currently in Pain No   Range of Motion Comprehensive   Comment, General Range of Motion B UE's WFL   Strength Comprehensive (MMT)   Comment, General Manual Muscle Testing (MMT) Assessment B UE's WFL   Bed Mobility   Comment (Bed Mobility) SBA   Sit-Stand Transfer   Sit-Stand [place] (Transfers) supervision;set up;verbal cues   Assistive Device (Sit-Stand Transfers) walker, front-wheeled   Balance   Balance Comments slightly wobbly without AD   Lower Body Dressing Assessment/Training   [place] Level (Lower Body Dressing) minimum assist (75% patient effort)   Toileting   [place] Level (Toileting) supervision   Clinical Impression   Criteria for Skilled Therapeutic Interventions Met (OT) Yes, treatment indicated   OT Diagnosis Decreased safety with ADLs and functional mobility   OT Problem List-Impairments impacting ADL vision   Assessment of Occupational Performance 1-3 Performance Deficits   Identified Performance Deficits dressing, bathing, home mgmt   Planned Therapy Interventions (OT) ADL retraining   Clinical Decision Making Complexity (OT) problem focused assessment/low complexity   Therapy Frequency (OT) One time eval and treatment   Risk & Benefits of therapy have been explained evaluation/treatment results reviewed;care plan/treatment goals reviewed;patient;spouse/significant other   OT Total Evaluation Time   OT Eval, Low Complexity Minutes (68371) 8   OT Goals   OT Predicted Duration/Target Date for Goal Attainment 10/11/23   OT Goals Toilet Transfer/Toileting;Transfers   OT: Transfer Supervision/stand-by assist;with assistive device;Goal Met   OT: Toilet Transfer/Toileting Supervision/stand-by assist;toilet transfer;cleaning and garment management;using adaptive equipment;Goal Met   Self-Care/Home Management   Self-Care/Home Mgmt/ADL, Compensatory, Meal Prep  Minutes (60771) 25   Symptoms Noted During/After Treatment (Meal Preparation/Planning Training) fatigue   Treatment Detail/Skilled Intervention  present for session. Session focused on maximizing safety with functional mobility and ADLs due to visual deficits in prep for dc home. pt has dealt with visual impairment prior, just not to this extent, and  is very supportive and provides necessary assist. Supine>sit with SBA. Sit>stand using FWW with SBA. To increase endurance and safety with navigating household distances, pt ambulated ~75' in hallway using FWW with SBA and verbal and physical cues to avoid obstacles on R, pt was then able to IND avoid cart following education. Attempted ambulating back to room without AD as pt does not use walker at baseline, pt was able to ambulate ~40' without AD with CGA-close SBA but with increased sway, provided pt with walker again with noticeable increased steadiness. Educated pt and  on recs to use walker for mobility at this point for increased steadiness but with  assisting with avoiding obstacles, both in agreement. Pt completed toilet transfer and task with SBA-supervision. Pt and  with no concerns regarding dressing,  assists at baseline with LE dresssing as needed. Faciliated discussion on increased safety with shower task with recs to use shower bench (also has grab bars) and have  supervise, both in agreement and verbalized understanding. Discussed placing a towel on shower chair to decrease slipperiness of shower chair. Educated on continuing to use elevator at home to avoid stairs to increase safety, pt and  in agreement. Educated pt and  on referral for low vision specialist to assess future needs, relayed to CC, pt and  appreciative.   OT Discharge Planning   OT Plan dc from OT   OT Discharge Recommendation (DC Rec) home with assist;home with outpatient occupational therapy;home with home care  occupational therapy  (low vision specialist)   OT Rationale for DC Rec Pt is mobilizing close to baseline, does well with FWW which pt has at home. pt was experiencing visual impairment prior to this hospital stay in which  assists/supervises ADLs and mobility at home. Pt has good set up and support at home. pt would likely benefit from a referral to low vision specialist as now has significant visual impairment in R eye,  in agreement. CC informed and will place order.   OT Brief overview of current status SBA using FWW with cues to avoid obstacles due to impaired vision, SBA-supervision toilet task   OT Equipment Needed at Discharge shower chair;walker, rolling  (already has shower chair and walker at home)   Total Session Time   Timed Code Treatment Minutes 25   Total Session Time (sum of timed and untimed services) 33

## 2023-10-12 NOTE — PROGRESS NOTES
Care Management Discharge Note    Discharge Date: 10/12/2023       Discharge Disposition:  Home    Discharge Services:  Referral for outpatient low vision OT    Discharge DME:  None    Discharge Transportation: family or friend will provide    Private pay costs discussed: Not applicable    Does the patient's insurance plan have a 3 day qualifying hospital stay waiver?  No    PAS Confirmation Code:  N/A  Patient/family educated on Medicare website which has current facility and service quality ratings:  N/A    Education Provided on the Discharge Plan:  yes  Persons Notified of Discharge Plans: bedside nurse, charge nurse, patient, family, provider  Patient/Family in Agreement with the Plan:  yes    Handoff Referral Completed: Yes    Additional Information:    Patient discharging home this morning with her . No discharge needs identified.        NGUYỄN Lynn, LSW  8 Med Surg   10[facility] beds: -  [facility]  Phone: [phone]  Pager: [phone]

## 2023-10-12 NOTE — DISCHARGE SUMMARY
"[facility]  Hospitalist Discharge Summary      Date of Admission:  10/7/2023  Date of Discharge:  10/12/2023 10:59 AM  Discharging Provider: Reagan Velasquez MD  Discharge Service: Hospitalist Service, GOLD TEAM 22    Discharge Diagnoses   Orbital cellulitis  Orbital abscess  Right eye ophthalmoplegia  Type 2 diabetes  Hx of kidney transplant  Immunosuppresion  Dementia  Hypertension  Hyothyroidism  CAD    Clinically Significant Risk Factors     # DMII: A1C = 7.2 % (Ref range: <5.7 %) within past 6 months  # Overweight: Estimated body mass index is 26.61 kg/m  as calculated from the following:    Height as of this encounter: 1.626 m (5' 4\").    Weight as of this encounter: 70.3 kg (155 lb).       Follow-ups Needed After Discharge   Follow-up Appointments     Adult [email or web address] Follow-up and recommended labs and tests      Follow up with primary care provider, Summer Vega, within 7   days to evaluate medication change, to evaluate after surgery, and for   hospital follow- up.  The following labs/tests are recommended: BMP.      Appointments on [place] and/or [facility] (with [facility] [[email or web address]] or [facility]   provider or service). Call [phone] if you haven't heard regarding   these appointments within 7 days of discharge.            Unresulted Labs Ordered in the Past 30 Days of this Admission       Date and Time Order Name Status Description    10/8/2023  9:05 AM Surgical Pathology Exam In process     10/8/2023  8:57 AM Fungal or Yeast Culture Routine Preliminary     10/8/2023  8:57 AM Anaerobic Bacterial Culture Routine Preliminary     10/8/2023 12:53 AM Blood Culture Peripheral Blood Preliminary     10/8/2023 12:53 AM Blood Culture Peripheral Blood Preliminary     10/7/2023  5:43 PM Lysozyme, serum In process     10/7/2023  5:43 PM Thyroid stimulating immunoglobulin In process         These results will be followed up by hospitalist    Discharge Disposition "   Discharged to home  Condition at discharge: Stable    Hospital Course   Viv Shaw is a 73 year old female admitted on 10/7/2023. She is admitted for Rim enhancing mass above globe,and acute vision. Ophthalmology consulted and s/p orbiotomy on 10/8/2023. Intra operaive no ronald purulence noted, but cultures grew  cutibacterum and staph epidermis. She received IV antibiotics and was transitioned to oral antibiotics to complete 2 week course at discharge. She was planned for 1 week follow up in opthalmology clinic.         Consultations This Hospital Stay   PHARMACY TO DOSE VANCO  MEDICATION HISTORY IP PHARMACY CONSULT  PHARMACY IP CONSULT  PHARMACY IP CONSULT  ENT IP CONSULT  PHARMACY TO DOSE VANCO  OCCUPATIONAL THERAPY ADULT IP CONSULT  CARE MANAGEMENT / SOCIAL WORK IP CONSULT    Code Status   No CPR- Do NOT Intubate    Time Spent on this Encounter   I, Reagan Velasquez MD, personally saw the patient today and spent greater than 30 minutes discharging this patient.       Reagan Velasquez MD  [facility] [address]  [facility] [address]  Phone: [phone]  Fax: [phone]  ______________________________________________________________________    Physical Exam   Vital Signs: Temp: 98.4  F (36.9  C) Temp src: Oral BP: (!) 152/71 Pulse: 66   Resp: 16 SpO2: 97 % O2 Device: None (Room air)    Weight: 155 lbs 0 oz  Constitutional: alert, sitting in chair, no acute distress  Eyes: right eye erythema with significnat periorbital swelling, able to open but not fully open  ENT: supple, no elevated JVD  Respiratory: CTAB, no wheezing  Cardiovascular: RRR, systolic murmur 1/6  GI: soft, non-tender, non-distended  Musculoskeletal: trace lower extremity edema        Primary Care Physician   Summer Vega    Discharge Orders      Occupational Therapy Referral      Adult Eye  Referral      Primary Care - Care Coordination Referral      Reason for your hospital stay    You were  hospitalized for an eye infection. You were seen by the eye doctors who performed a surgery and drainage of the eye. You were treated with IV antibiotics and switched to oral antibiotics when you left the hospital. You will take doxycycline twice daily and amoxicillin three times daily for 14 days each to treat the infection. You will see the eye doctors in 1 week after discharge.     Activity    Your activity upon discharge: activity as tolerated     Adult [facility] Follow-up and recommended labs and tests    Follow up with primary care provider, Summer Vega, within 7 days to evaluate medication change, to evaluate after surgery, and for hospital follow- up.  The following labs/tests are recommended: BMP.      Appointments on [place] and/or [facility] (with [facility] or [facility] provider or service). Call [phone] if you haven't heard regarding these appointments within 7 days of discharge.     Diet    Follow this diet upon discharge: Orders Placed This Encounter      Regular Diet Adult       Significant Results and Procedures   Most Recent 3 CBC's:  Recent Labs   Lab Test 10/12/23  0846 10/11/23  0610 10/08/23  0620   WBC 6.3 5.5 8.6   HGB 12.3 11.4* 11.4*   MCV 89 89 92    226 234     Most Recent 3 BMP's:  Recent Labs   Lab Test 10/12/23  0846 10/12/23  0834 10/12/23  0313 10/11/23  0752 10/11/23  0610 10/10/23  0810 10/10/23  0601     --   --   --  138  --  134*   POTASSIUM 3.3*  --   --   --  3.7  --  4.3   CHLORIDE 101  --   --   --  101  --  98   CO2 22  --   --   --  26  --  24   BUN 13.6  --   --   --  15.7  --  14.3   CR 1.05*  1.05*  --   --   --  1.19*  --  1.02*   ANIONGAP 17*  --   --   --  11  --  12   NAVI 8.9  --   --   --  8.7*  --  8.8   GLC 93 100* 108*   < > 240*   < > 427*    < > = values in this interval not displayed.     Most Recent 2 LFT's:  Recent Labs   Lab Test 10/11/23  0610 10/08/23  0620   AST 21 25   ALT 9 10   ALKPHOS 57 71   BILITOTAL 0.7 0.9  "      Discharge Medications   Discharge Medication List as of 10/12/2023  9:37 AM        START taking these medications    Details   acetaminophen (TYLENOL) 325 MG tablet Take 3 tablets (975 mg) by mouth every 8 hours as needed for mild pain, OTC      amoxicillin (AMOXIL) 500 MG capsule Take 1 capsule (500 mg) by mouth 3 times daily for 14 days, Disp-42 capsule, R-0, E-Prescribe      doxycycline hyclate (VIBRAMYCIN) 100 MG capsule Take 1 capsule (100 mg) by mouth 2 times daily for 14 days, Disp-28 capsule, R-0, E-Prescribe           CONTINUE these medications which have NOT CHANGED    Details   amLODIPine (NORVASC) 10 MG tablet Take 1 tablet (10 mg) by mouth daily, Disp-90 tablet, R-1, E-PrescribeProfile Rx: patient will contact pharmacy when needed      ASPIRIN PO Take 81 mg by mouth daily, Historical      atorvastatin (LIPITOR) 40 MG tablet Take 0.5 tablets (20 mg) by mouth daily, Disp-45 tablet, R-1, E-PrescribeProfile Rx: patient will contact pharmacy when needed      FLORIN CONTOUR test strip R-0, KAIT, Historical      BD INSULIN SYRINGE ULTRAFINE 31G X 5/16\" 0.3 ML USING 4-5 PER DAY ([employer] 31G/0.3ML), R-6, KAIT, Historical      carbidopa-levodopa (SINEMET)  MG tablet Take 1.5 tablets by mouth 3 times daily, Historical      cholecalciferol 125 MCG (5000 UT) CAPS Take 2 capsules by mouth daily, Historical      Continuous Blood Gluc  (DEXCOM G7 ) LUCAS Use to read blood sugars as 's instructions., Disp-1 each, R-0, E-Prescribe      Continuous Blood Gluc Sensor (DEXCOM G7 SENSOR) MISC Change every 10 days., Disp-3 each, R-5, E-Prescribe      Continuous Blood Gluc Transmit (DEXCOM G6 TRANSMITTER) MISC See Admin Instructions, Disp-1 each, R-1, E-Prescribe      cyanocobalamin (VITAMIN B-12) 1000 MCG tablet Take 1,000 mcg by mouth Every Mon, Wed, Fri Morning, Historical      cycloSPORINE modified (GENERIC EQUIVALENT) 25 MG capsule Take 2 capsules (50 mg) by mouth 2 times daily, " Disp-120 capsule, R-11, E-Prescribe      GENGRAF (BRAND) 25 MG CAPSULE Take 2 capsules (50 mg) by mouth 2 times daily, Disp-120 capsule, R-11, KAIT, E-Prescribe      insulin aspart (NOVOLOG PEN) 100 UNIT/ML pen Inject 5-10 units subcutaneous with meals and correction doses as directed, total daily dose approx 25 units, Disp-15 mL, R-5, E-Prescribe      insulin glargine (BASAGLAR KWIKPEN) 100 UNIT/ML pen Inject 15-17 Units Subcutaneous daily, Disp-15 mL, R-5, E-PrescribeIf Basaglar is not covered by insurance, may substitute Lantus or Semglee or other insulin glargine product per insurance preference at same dose and frequency.        isosorbide mononitrate (IMDUR) 30 MG 24 hr tablet TAKE 0.5 TABLETS (15 MG) BY MOUTH DAILY, Disp-45 tablet, R-0, E-Prescribe      levothyroxine (SYNTHROID/LEVOTHROID) 100 MCG tablet Take 1 tablet (100 mcg) by mouth daily, Disp-90 tablet, R-1, E-PrescribeProfile Rx: patient will contact pharmacy when needed      losartan (COZAAR) 100 MG tablet TAKE 1/2 OF A TABLET (50 MG) BY MOUTH DAILY, Disp-45 tablet, R-1, E-PrescribeProfile Rx: patient will contact pharmacy when needed      metoprolol succinate ER (TOPROL XL) 25 MG 24 hr tablet Take 1 tablet (25 mg) by mouth At Bedtime, Disp-90 tablet, R-1, E-PrescribeProfile Rx: patient will contact pharmacy when needed      mycophenolic acid (GENERIC EQUIVALENT) 180 MG EC tablet Take 3 tablets (540 mg) by mouth 2 times daily, Disp-180 tablet, R-11, E-Prescribe      pantoprazole (PROTONIX) 40 MG EC tablet TAKE 1 TABLET BY MOUTH EVERY DAY, Disp-90 tablet, R-2, E-Prescribe           STOP taking these medications       zinc gluconate 50 MG tablet Comments:   Reason for Stopping:             Allergies   No Known Allergies

## 2023-10-12 NOTE — PLAN OF CARE
"  VS: BP (!) 160/69   Pulse 66   Temp 98.4  F (36.9  C) (Oral)   Resp 16   Ht 1.626 m (5' 4\")   Wt 70.3 kg (155 lb)   SpO2 97%   BMI 26.61 kg/m       O2: >90% on RA no complaints of SOB or chest pain.   Output: Voiding adequate amounts w/o pain or difficulty to bathroom.   Activity: SBA with walker. Bed alarm at all times.    Skin: R eye incision with periorbital edema.   Pain: No Pain.   CMS: Alert and oriented X3. Confusion with sundowning.   Dressing: None.   Diet: Regular. No complaints of nausea or vomiting. BG 66 at 0200 apple juice given.    LDA: L PIV - SL   Equipment: Personal belongings.   Plan: Pos discharge today.   Additional Info:        "

## 2023-10-12 NOTE — DISCHARGE SUMMARY
Patient discharged home with . Left before nurse was able to remove IV. Discharge paperwork reviewed and antibiotics sent from discharge pharmacy. Returning home.

## 2023-10-13 NOTE — TELEPHONE ENCOUNTER
calling in with patient present. They were concerned that patient had diarrhea last night some after being discharged from hospital and starting antibiotics. They stated they did give Imodium last night and it stopped. Patient has not taken any more Imodium and has not had diarrhea since last night.     RN advised them on home care options such as yogurt and a bland diet. Also advised to increase fluids and try to avoid Imodium if possible. Advised them to call back if symptoms worsen or do not improve. RN reviewed red flag symptoms with patient and when to see emergency care. Patient agreed and understood.     JESSICA Nunez, RN         Reason for Disposition   [1] MILD diarrhea AND [2] taking antibiotics    Additional Information   Negative: Shock suspected (e.g., cold/pale/clammy skin, too weak to stand, low BP, rapid pulse)   Negative: Difficult to awaken or acting confused (e.g., disoriented, slurred speech)   Negative: Sounds like a life-threatening emergency to the triager   Negative: Vomiting also present and worse than the diarrhea   Negative: [1] Blood in stool AND [2] without diarrhea   Negative: Diarrhea in a cancer patient who is currently (or recently) receiving chemotherapy or radiation therapy, or cancer patient who has metastatic or end-stage cancer and is receiving palliative care   Negative: SEVERE abdominal pain (e.g., excruciating)   Negative: [1] Blood in the stool AND [2] moderate or large amount of blood (e.g., any blood clots, passing blood without stool, toilet water turns red)   Negative: Black or tarry bowel movements  (Exception: Chronic-unchanged black-grey BMs AND is taking iron pills or Pepto-Bismol.)   Negative: SEVERE diarrhea (e.g., 7 or more times / day more than normal)   Negative: [1] Constant abdominal pain AND [2] present > 2 hours   Negative: Abdomen looks much more swollen than usual   Negative: [1] Drinking very little AND [2] dehydration suspected (e.g.,  no urine > 12 hours, very dry mouth, very lightheaded)   Negative: Patient sounds very sick or weak to the triager   Negative: Patient wants to stop the antibiotic   Negative: Diarrhea continues for > 3 days after stopping the antibiotic   Negative: MODERATE diarrhea (e.g., 4-6 times / day more than normal)   Negative: [1] Taking antibiotic > 48 hours (2 days) AND [2] fever still present or recurs   Negative: [1] Taking antibiotic AND [2] new-onset of fever   Negative: Tube feedings (e.g., nasogastric, g-tube, j-tube)   Negative: Abdominal pain  (Exception: Mild cramping that clears with each passage of diarrhea stool.)   Negative: [1] Recent hospitalization or nursing home stay AND [2] diarrhea present > 3 days   Negative: [1] Blood in the stool AND [2] small amount of blood (e.g., few drops or streaks on normal brown stool)  (Exception: Only on toilet paper. Reason: diarrhea can cause rectal irritation with blood on wiping.)   Negative: Pus or mucus in stool   Negative: History of C. diff (e.g., C. diff diarrhea, C. diff colitis)   Negative: Weak immune system (e.g., HIV positive, cancer chemo, splenectomy, organ transplant, chronic steroids)    Protocols used: Diarrhea on Antibiotics-A-

## 2023-10-13 NOTE — PROGRESS NOTES
"Clinic Care Coordination Contact  [facility]: Post-Discharge Note  SITUATION                                                      Admission:    Admission Date: 10/07/23   Reason for Admission: Abscess of orbit, unspecified laterality  Discharge:   Discharge Date: 10/12/23  Discharge Diagnosis: Orbital cellulitis, Orbital abscess, Right eye ophthalmoplegia, Type 2 diabetes, Hx of kidney transplant, Immunosuppresion, Dementia, Hypertension, Hyothyroidism, CAD    BACKGROUND                                                      Per hospital discharge summary and inpatient provider notes:  \"Viv Shaw is a 73 year old female admitted on 10/7/2023. She is admitted for Rim enhancing mass above globe,and acute vision. Ophthalmology consulted and s/p orbiotomy on 10/8/2023. Intra operaive no ronald purulence noted, but cultures grew  cutibacterum and staph epidermis. She received IV antibiotics and was transitioned to oral antibiotics to complete 2 week course at discharge. She was planned for 1 week follow up in opthalmology clinic.\"    ASSESSMENT      Discharge Assessment  How are you doing now that you are home?: [facility] called and spoke with patient. Patient states she is doing okay since discharging home. Patient added spouse, Tirso, to conversation whom expressed patient is doing well, however, notes concerns with patients recent stools. Offered to transfer patient/spouse to RN Triage for further assessment, patient/spouse in agreement with plan. Prior to transfer [facility] reviewed AVS, medications, and upcoming appointments. Patient/spouse expressed understanding, however, voiced questions regarding outpatient OT follow-up. Per chart review patient/spouse should hear from scheduling within 1-2 business days otherwise they should contact [facility] services. Patient/spouse voiced understanding. [facility] provided contact information for above department ([phone]) shall patient/spouse need to reach " out. Patient/spouse thankful for information. Reviewed recommendation to follow-up with PCP post-hospitalization. Patient/spouse expressed understanding. Offered to transfer patient/spouse to central scheduling, however, patient/spouse stated they would schedule follow-up visit on their own. Patient/spouse declined any concerns related to housing, transportation, financial, and/or food insecurities. Patients spouse reports patient has all the supports and resources needed at this time and denied any ongoing CC needs. [facility] transferred patient/spouse to Callie BROWN, for further assessment regarding loose stools.  How are your symptoms? (Red Flag symptoms escalate to triage hotline per guidelines): New (Patient/spouse expressed concerns regarding loose stool. Offered to transfer to RN Triage for further assessment. Patient/spouse in agreement with plan. Patient/spouse transfered to Callie BROWN.)  Do you feel your condition is stable enough to be safe at home until your provider visit?: Yes  Does the patient have their discharge instructions? : Yes  Does the patient have questions regarding their discharge instructions? : No  Were you started on any new medications or were there changes to any of your previous medications? : Yes  Does the patient have all of their medications?: Yes  Do you have questions regarding any of your medications? : No  Do you have all of your needed medical supplies or equipment (DME)?  (i.e. oxygen tank, CPAP, cane, etc.): Yes  Discharge follow-up appointment scheduled within 14 calendar days? : No  Is patient agreeable to assistance with scheduling? : No (Patient/spouse will schedule follow-up on their own.)    Care Mgmt General Assessment  Referral  Referral Source: IP Handoff  Health Care Home/Utilization  Preferred Hospital: [facility]  [phone]  Living Situation  Current living arrangement:: I live in a private home with spouse  Type of  residence:: [facility] home  Resources  Patient receiving home care services:: No  Community Resources:  (Outpatient OT)  Supplies Currently Used at Home: None  Equipment Currently Used at Home: walker, standard  Referrals Placed: None  Employment Status: homemaker  Psychosocial  Scientologist or spiritual beliefs that impact treatment:: No  Mental health DX:: No  Mental health management concern (GOAL):: No  Chemical Dependency Status: No Current Concerns  Informal Support system:: Family  Functional Status  Dependent ADLs:: Ambulation-walker  Dependent IADLs:: Transportation  Bed or wheelchair confined:: No  Mobility Status: Independent w/Device  Advance Care Plan/Directive  Advanced Care Plans/Directives on file:: Yes  Type Advanced Care Plans/Directives: Advanced Directive - On File  Advanced Care Plan/Directive Status: Not Applicable    Care Mgmt Encounter Assessment  Preventative Care  Routine Health maintenance Reviewed: Due/Overdue   Health Maintenance Due   Topic Date Due    RSV VACCINE 60+ (1 - 1-dose 60+ series) Never done    MICROALBUMIN  08/12/2023    ZOSTER IMMUNIZATION (2 of 2) 10/05/2023   Clinic Utilization  Difficulty keeping appointments:: No  Compliance Concerns: No  No-Show Concerns: No  No PCP office visit in Past Year: No  Transportation  Transportation means:: Family;Regular car  Barriers in Communication  Other concerns:: None  How confident are you filling out medical forms by yourself:: Not Assessed  Pain  Pain (GOAL):: No  Medication Review  Medication adherence problem (GOAL):: No  Knowledgeable about how to use meds:: Yes  Medication side effects suspected:: No  Diet/Exercise/Sleep  Diet:: Regular  Inadequate nutrition (GOAL):: No  Tube Feeding: No  Inadequate activity/exercise (GOAL):: No  Significant changes in sleep pattern (GOAL): No    PLAN                                                      Outpatient Plan: Patient/spouse were encouraged to call with questions, concerns, and/or support  needs. SW CC will remain available as needed. No further care coordination outreaches will be made at this time.    Future Appointments   Date Time Provider Department Center   10/17/2023  1:30 PM Christel Castro APRN CNP [facility]   10/20/2023  8:30 AM Xu Machado MD [facility]   11/8/2023  1:00 PM Josef Hernandez MD [facility]   12/6/2023 11:30 AM Juan M Fitzpatrick MD [facility]   12/18/2023  1:35 PM Pauline Bourne MD [facility]   2/6/2024  8:00 AM Summer Vega MD [facility]     For any urgent concerns, please contact our 24 hour nurse triage line: [phone])       Lea Melgar, ZOSW

## 2023-10-17 NOTE — TELEPHONE ENCOUNTER
----- Message from Pauline Armstrong MD sent at 10/17/2023  8:32 AM CDT -----  No just keep her on  mg po bid given age, no prior rejection, DSA and time from transplant  ----- Message -----  From: Graciela Castaneda RN  Sent: 10/17/2023   8:11 AM CDT  To: Pauline Armstrong MD    Would you like her to resume her regular dosing once she is off the antibiotics?  ----- Message -----  From: Pauline Bourne MD  Sent: 10/16/2023   4:56 PM CDT  To: Graciela Castaneda RN    Yes please reduce MPA to 360 mg po bid  ----- Message -----  From: Graciela Castaneda RN  Sent: 10/16/2023  11:54 AM CDT  To: Pauline Armstrong MD    FYI- patient recently admitted for an orbital abscess and discharged on amoxicillin 500 mg TID x 14 days, vibramycin 100 mg x 14 days. She's currently on CSA 50 mg BID and  mg BID-- would we decrease immunosuppression for this infection?

## 2023-10-17 NOTE — NURSING NOTE
"/72   Pulse 62   Temp 97.1  F (36.2  C) (Tympanic)   Resp 13   Ht 1.6 m (5' 3\")   Wt 67.9 kg (149 lb 11.2 oz)   SpO2 98%   BMI 26.52 kg/m      "

## 2023-10-17 NOTE — TELEPHONE ENCOUNTER
Informed Tirso that Viv's medication dosage has been changed from 540 mg to 360 mg BID. He verbalized understanding of this. She has several follow up appointments with week and unfortunately lost sight in her right eye due to infection. He will call with any other questions or updates. Orders updated.

## 2023-10-17 NOTE — PROGRESS NOTES
Assessment & Plan     (H05.89) Orbital mass  (primary encounter diagnosis)  Comment: Patient presents to the clinic for hospital follow-up for orbital mass.  Patient has significant amount of follow-up with ENT and ophthalmology.  Patient states she is doing much better since being discharged home however she is still unable to see out of the right eye.  She is currently taking her antibiotic medication as prescribed and does not have any new concerns today.  Plan: Continue on the course and to follow-up with ENT and ophthalmology as scheduled.      30 minutes spent by me on the date of the encounter doing chart review, patient visit, documentation, and discussion with family      MED REC REQUIRED  Post Medication Reconciliation Status:         BRENDA Rodríguez CNP  [employer] TAMMIE Nam is a 73 year old, presenting for the following health issues: since being home on Sunday she has been doing better but is unable to see out of her right eye. Had some diarrhea with the first day but has since resolved.   Hospital F/U        10/17/2023     1:06 PM   Additional Questions   Roomed by meghann   Accompanied by          10/17/2023     1:06 PM   Patient Reported Additional Medications   Patient reports taking the following new medications Amoxicillin, doxycycline, tylenol       [facility] [place]         10/13/2023    10:30 AM   Post Discharge Outreach   Admission Date 10/7/2023   Reason for Admission Abscess of orbit, unspecified laterality   Discharge Date 10/12/2023   Discharge Diagnosis Orbital cellulitis, Orbital abscess, Right eye ophthalmoplegia, Type 2 diabetes, Hx of kidney transplant, Immunosuppresion, Dementia, Hypertension, Hyothyroidism, CAD   How are you doing now that you are home? RADHIKA GUZMAN called and spoke with patient. Patient states she is doing okay since discharging home. Patient added spouse, Tirso, to conversation whom expressed patient is doing well, however, notes  concerns with patients recent stools. Offered to transfer patient/spouse to RN Triage for further assessment, patient/spouse in agreement with plan. Prior to transfer [facility] reviewed AVS, medications, and upcoming appointments. Patient/spouse expressed understanding, however, voiced questions regarding outpatient OT follow-up. Per chart review patient/spouse should hear from scheduling within 1-2 business days otherwise they should contact [facility] services. Patient/spouse voiced understanding. [facility] provided contact information for above department ([phone]) shall patient/spouse need to reach out. Patient/spouse thankful for information. Reviewed recommendation to follow-up with PCP post-hospitalization. Patient/spouse expressed understanding. Offered to transfer patient/spouse to central scheduling, however, patient/spouse stated they would schedule follow-up visit on their own. Patient/spouse declined any concerns related to housing, transportation, financial, and/or food insecurities. Patients spouse reports patient has all the supports and resources needed at this time and denied any ongoing CC needs. [facility] transferred patient/spouse to RN, aCllie, for further assessment regarding loose stools.   How are your symptoms? (Red Flag symptoms escalate to triage hotline per guidelines) New   Do you feel your condition is stable enough to be safe at home until your provider visit? Yes   Does the patient have their discharge instructions?  Yes   Does the patient have questions regarding their discharge instructions?  No   Were you started on any new medications or were there changes to any of your previous medications?  Yes   Does the patient have all of their medications? Yes   Do you have questions regarding any of your medications?  No   Do you have all of your needed medical supplies or equipment (DME)?  (i.e. oxygen tank, CPAP, cane, etc.) Yes   Discharge follow-up appointment scheduled within 14  "calendar days?  No     Hospital Follow-up Visit:    Hospital/Nursing Home/IP Rehab Facility: [facility]  Date of Admission: 10/7/23  Date of Discharge: 10/12/23  Reason(s) for Admission: Abscess of orbit    Was your hospitalization related to COVID-19? No   Problems taking medications regularly:  None  Medication changes since discharge: doxycycline hyclate , amoxicillin, tylenol  Problems adhering to non-medication therapy:  None    Summary of hospitalization:  [facility] discharge summary reviewed  Diagnostic Tests/Treatments reviewed.  Follow up needed: none  Other Healthcare Providers Involved in Patient s Care:         None  Update since discharge: improved.         Plan of care communicated with patient                 Review of Systems   Constitutional, HEENT, cardiovascular, pulmonary, gi and gu systems are negative, except as otherwise noted.      Objective    /72   Pulse 62   Temp 97.1  F (36.2  C) (Tympanic)   Resp 13   Ht 1.6 m (5' 3\")   Wt 67.9 kg (149 lb 11.2 oz)   SpO2 98%   BMI 26.52 kg/m    Body mass index is 26.52 kg/m .  Physical Exam   GENERAL: healthy, alert and no distress  EYES: Eyes grossly normal to inspection, visual field defect Unable to see out of Right eye, eyelids- incision over right eye, and pupils- Right pupil fixed, Left pupil PERRLA  NECK: no adenopathy, no asymmetry, masses, or scars and thyroid normal to palpation  RESP: lungs clear to auscultation - no rales, rhonchi or wheezes  CV: regular rate and rhythm, normal S1 S2, no S3 or S4, no murmur, click or rub, no peripheral edema and peripheral pulses strong  ABDOMEN: soft, nontender, no hepatosplenomegaly, no masses and bowel sounds normal  MS: no gross musculoskeletal defects noted, no edema                      "

## 2023-10-20 NOTE — NURSING NOTE
Chief Complaints and History of Present Illnesses   Patient presents with    Post Op (Ophthalmology) Right Eye     Pt here for POW#3 s/p RIGHT ORBITOTOMY WITH BIOPSY AND DRAINAGE OF ABSCESS-- DOS 10/08/2023.     Follow Up For Surgery Of Eye     Chief Complaint(s) and History of Present Illness(es)       Post Op (Ophthalmology) Right Eye              Laterality: right eye    Associated symptoms: Negative for redness, photophobia and foreign body sensation    Comments: Pt here for POW#3 s/p RIGHT ORBITOTOMY WITH BIOPSY AND DRAINAGE OF ABSCESS-- DOS 10/08/2023.               Follow Up For Surgery Of Eye              Laterality: right eye    Associated symptoms: Negative for redness, photophobia and foreign body sensation    Pain scale: 0/10              Comments    Right orbitotomy of orbital mass and drainage of abscess. Patient states the combination of parkinson's and dementia and surgery has made her feel fatigue. Patient experiencing light headedness and unsure ness when walking.   Florence Hernandez, BARB October 20, 2023 7:59 AM

## 2023-10-20 NOTE — PROGRESS NOTES
Chief Complaints and History of Present Illnesses   Patient presents with    Post Op (Ophthalmology) Right Eye     Pt here for POW#3 s/p RIGHT ORBITOTOMY WITH BIOPSY AND DRAINAGE OF ABSCESS-- DOS 10/08/2023.     Follow Up For Surgery Of Eye     Chief Complaint(s) and History of Present Illness(es)     Post Op (Ophthalmology) Right Eye    In right eye.  Associated symptoms include Negative for redness,   photophobia and foreign body sensation. Additional comments: Pt here for   POW#3 s/p RIGHT ORBITOTOMY WITH BIOPSY AND DRAINAGE OF ABSCESS-- DOS   10/08/2023.     Follow Up For Surgery Of Eye    In right eye.  Associated symptoms include Negative for redness,   photophobia and foreign body sensation.  Pain was noted as 0/10.    Comments    Right orbitotomy of orbital mass and drainage of abscess. Patient states   the combination of parkinson's and dementia and surgery has made her feel   fatigue. Patient experiencing light headedness and unsure ness when   walking.   BARB Arciniega October 20, 2023 7:59 AM        MRI (10/7/23)  HEAD MRI:  1.  No acute intracranial process. Specifically, no evidence of intracranial extension of the right orbital process.  2.  Generalized brain atrophy and presumed microvascular ischemic changes as detailed above.     ORBIT MRI:  1.  Nonspecific lentiform masslike structure at the superior aspect of the right orbit as described above measuring approximately 4 cm. Signal characteristics including relatively mild contrast enhancement, layering fluid fluid level, and pattern of   restricted diffusion are somewhat atypical for abscess. While infection is not excluded and additional differential considerations including malignancy, the lymphatic malformation, and hematoma should be considered.  2.  There is some induration of the intraorbital fat with mild proptosis.  3.  Preseptal soft tissue thickening and question fluid collection, abscess not excluded.  4.  Ethmoid sinusitis, suspect  mucocele.  5.  Suspected nasal cavity polyp.    Surgical pathology (10/8/23):  A. EYE, RIGHT ORBITAL MASS:  - Fragments of fibroadipose tissue with edema and extensive acute inflammation  - No evidence of malignancy    Labs from recent admission:   Quant gold - negative  Syphilis - negative  ANCA IgG - WNL  TSH low   T4 WNL  IgG subclasses - WNL   TSI - WNL  ACE - WNL  Lysozyme - elevated     Assessment & Plan     Viv Shaw is a 73 year old female with the following diagnoses:   1. Hematoma of right orbit    2. Orbital inflammation       History of sudden vision loss (over <5 days) in the right eye to [facility] in early October 2023. On initial presentation, noted to have right APD and near-complete ophthalmoplegia.   Underwent right orbitotomy with orbital tissue biopsy and evacuation of subperiosteal hematoma (10/8/23). Surgical pathology demonstrated evidence of extensive acute inflammation; no evidence of malignancy.  Suspect patient's rapid right eye vision loss was likely secondary to orbital compartment syndrome in the context of large subperiosteal hematoma and orbital inflammation.   -Complete course of antibiotic therapy   -Follow up with ENT as recommended, appreciate input regarding sinus disease and possibility of contribution to right orbital hematoma.     Vision and exam in the contralateral eye have remained reassuring and stable (mild fluctuation in left eye visual acuity in the hospital attributed to fluctuating cognition)  -Monocular precautions discussed  -Follow up with low vision, as scheduled  -Follow up with local ophthalmologist for left eye in December, as scheduled    Reported instability and fatigue at home since discharge from the hospital.  -Follow up with primary care physician and ENT    Return to clinic 2 months          Maria Del Rosario Green MD  Oculoplastic Surgery Fellow    Attending Physician Attestation:  I have seen and examined this patient with the fellow .  I have confirmed and  edited as necessary the chief complaint(s), history of present illness, review of systems, relevant history, and examination findings as documented by others.  I have personally reviewed the relevant tests, images, and reports as documented above.  I have confirmed and edited as necessary the assessment and plan and agree with this note.    - Xu Machado MD 8:46 AM 10/20/2023

## 2023-10-25 NOTE — LETTER
[facility] CARE COORDINATION  [address]  [facility] [address]    October 25, 2023    Viv Shaw  [address]  [facility] [address]      Dear Viv/Johnny,    I am a clinic care coordinator who works with Summer Vega MD with the [facility]. I wanted to thank you for spending the time to talk with me.  Below is a description of clinic care coordination and how I can further assist you shall any additional needs arise.       The clinic care coordination team is made up of a registered nurse, , financial resource worker and community health worker who understand the health care system. The goal of clinic care coordination is to help you manage your health and improve access to the health care system. Our team works alongside your provider to assist you in determining your health and social needs. We can help you obtain health care and community resources, providing you with necessary information and education. We can work with you through any barriers and develop a care plan that helps coordinate and strengthen the communication between you and your care team.  Our services are voluntary and are offered without charge to you personally.    Please feel free to contact me with any questions or concerns regarding care coordination and what we can offer.      We are focused on providing you with the highest-quality healthcare experience possible.    Sincerely,     EJ Owens/[facility] Center[facility]KA  Social Work Care Coordinator  [facility] - [place], [place], and [place]  Phone: [phone]      Enclosed: I have enclosed the resources we discussed over the phone below. Please review and call me with any questions. I have also enclosed a copy of the Patient Centered Plan of Care. This has helpful information and goals that we have talked about. Please keep this in an easy to access place to use as needed.         Resources    Vision Loss Resources:  We have tips, tools, books, and more to help you or a loved one live with vision loss.   Browse our resources at [email or web address]  Contact - If you have not used our services before, please call ahead to schedule an appointment.  Email: [email or web address]  Call: [phone]  Office: [address] [place] [address], [address] Services for the Blind:   We offer tools and training for employment and for helping seniors remain independent and active. As [place]'s accessible reading source we also transcribe books and other materials into alternative formats, including audio and braille. We assist [place] who are blind, DeafBlind, losing vision, or who have another disability that makes it difficult to read print.  Services for Seniors - https://[place].gov/deed/ssb/seniors/  Our low vision counselors who work with seniors know that vision loss due to conditions like macular degeneration, diabetic retinopathy, glaucoma and other age-related eye conditions can be a frustrating and difficult part of aging. The good news is that while it may require learning some new skills and making some adaptations, vision loss need not destroy your quality of life, or your ability to live independently. Our Senior Services team has the tools, techniques, and training you'll need to stay mentally and physically active and independent. If you have questions or would like more information, give us a call at [phone].    Parkinson's Foundation:   Living well with Parkinson's disease (PD) means getting the support you need -- from tools that help you work alongside your care team to resources that empower you. The Parkinson's Foundation is your trusted ally for information that can help you navigate every stage of this disease. We offer resources that provide life-changing support to people with PD, care partners, family members and healthcare  professionals.  Resources and Support - https://www.parkinson.org/resources-support

## 2023-10-25 NOTE — PROGRESS NOTES
Clinic Care Coordination Contact  Clinic Care Coordination Contact  OUTREACH    Referral Information:  Referral Source: Self-patient/Caregiver  Primary Diagnosis: Psychosocial    Chief Complaint   Patient presents with    Clinic Care Coordination - Initial     Universal Utilization: Reviewed 10/25/2023  Clinic Utilization  Difficulty keeping appointments:: No  Compliance Concerns: No  No-Show Concerns: No  No PCP office visit in Past Year: No  Utilization      No Show Count (past year)  0             ED Visits  1             Hospital Admissions  1                    Current as of: 10/24/2023  7:54 PM              Clinical Concerns:  Current Medical Concerns:   Patient Active Problem List   Diagnosis    Other vitamin B12 deficiency anemia    Irritable bowel syndrome    GERD (gastroesophageal reflux disease)    Advanced directives, counseling/discussion    Kidney replaced by transplant    Immunosuppressed status (HCC)    Hyperlipidemia LDL goal <100    CAD S/P percutaneous coronary angioplasty    Anemia in chronic kidney disease    Other specified hypothyroidism    Coronary artery disease involving native heart without angina pectoris, unspecified vessel or lesion type    Hypertension secondary to other renal disorders    Hernia, incisional    CKD (chronic kidney disease) stage 3, GFR 30-59 ml/min (H)    Diabetes mellitus, type 2 (H)    Status post kidney transplant    Pneumonia due to 2019 novel coronavirus    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)    Urinary tract infection    Parkinson disease    Lewy body dementia, unspecified dementia severity, unspecified whether behavioral, psychotic, or mood disturbance or anxiety (H)    Orbital mass    Abscess of orbit, unspecified laterality    Infection of right eye   Current Behavioral Concerns: none noted     Education Provided to patients spouse: SWCC role and reason for call   Pain  Pain (GOAL):: No  Health Maintenance Reviewed: Due/Overdue   Health  "Maintenance Due   Topic Date Due    MICROALBUMIN  08/12/2023    ZOSTER IMMUNIZATION (2 of 2) 10/05/2023   Clinical Pathway: None    Medication Management:  Medication review status: Medications not reviewed during this encounter due to nature of call.   Current Outpatient Medications   Medication    acetaminophen (TYLENOL) 325 MG tablet    amLODIPine (NORVASC) 10 MG tablet    amoxicillin (AMOXIL) 500 MG capsule    ASPIRIN PO    atorvastatin (LIPITOR) 40 MG tablet    FLORIN CONTOUR test strip    BD INSULIN SYRINGE ULTRAFINE 31G X 5/16\" 0.3 ML    carbidopa-levodopa (SINEMET)  MG tablet    cholecalciferol 125 MCG (5000 UT) CAPS    Continuous Blood Gluc  (DEXCOM G7 ) LUCAS    Continuous Blood Gluc Sensor (DEXCOM G7 SENSOR) MISC    Continuous Blood Gluc Transmit (DEXCOM G6 TRANSMITTER) MISC    cyanocobalamin (VITAMIN B-12) 1000 MCG tablet    cycloSPORINE modified (GENERIC EQUIVALENT) 25 MG capsule    donepezil (ARICEPT) 10 MG tablet    doxycycline hyclate (VIBRAMYCIN) 100 MG capsule    GENGRAF (BRAND) 25 MG CAPSULE    insulin aspart (NOVOLOG PEN) 100 UNIT/ML pen    insulin glargine (BASAGLAR KWIKPEN) 100 UNIT/ML pen    isosorbide mononitrate (IMDUR) 30 MG 24 hr tablet    levothyroxine (SYNTHROID/LEVOTHROID) 100 MCG tablet    losartan (COZAAR) 100 MG tablet    metoprolol succinate ER (TOPROL XL) 25 MG 24 hr tablet    mycophenolic acid (GENERIC EQUIVALENT) 180 MG EC tablet    pantoprazole (PROTONIX) 40 MG EC tablet     No current facility-administered medications for this visit.     Functional Status:  Dependent ADLs:: Ambulation-walker  Dependent IADLs:: Transportation  Bed or wheelchair confined:: No  Mobility Status: Independent w/Device  Fallen 2 or more times in the past year?: No    Living Situation:  Current living arrangement:: I live in a private home with spouse  Type of residence:: Town home    Lifestyle & Psychosocial Needs:  Social Determinants of Health     Food Insecurity: Low Risk  " (10/13/2023)    Food Insecurity     Within the past 12 months, did you worry that your food would run out before you got money to buy more?: No     Within the past 12 months, did the food you bought just not last and you didn t have money to get more?: No   Depression: Not at risk (10/20/2023)    PHQ-2     PHQ-2 Score: 0   Housing Stability: Low Risk  (10/13/2023)    Housing Stability     Do you have housing? : Yes     Are you worried about losing your housing?: No   Tobacco Use: Low Risk  (10/20/2023)    Patient History     Smoking Tobacco Use: Never     Smokeless Tobacco Use: Never     Passive Exposure: Not on file   Financial Resource Strain: Low Risk  (10/13/2023)    Financial Resource Strain     Within the past 12 months, have you or your family members you live with been unable to get utilities (heat, electricity) when it was really needed?: No   Alcohol Use: Not on file   Transportation Needs: Low Risk  (10/13/2023)    Transportation Needs     Within the past 12 months, has lack of transportation kept you from medical appointments, getting your medicines, non-medical meetings or appointments, work, or from getting things that you need?: No   Physical Activity: Not on file   Interpersonal Safety: Low Risk  (10/17/2023)    Interpersonal Safety     Do you feel physically and emotionally safe where you currently live?: Yes     Within the past 12 months, have you been hit, slapped, kicked or otherwise physically hurt by someone?: No     Within the past 12 months, have you been humiliated or emotionally abused in other ways by your partner or ex-partner?: No   Stress: Not on file   Social Connections: Not on file     Diet:: Regular  Inadequate nutrition (GOAL):: No  Tube Feeding: No  Inadequate activity/exercise (GOAL):: No  Significant changes in sleep pattern (GOAL): No  Transportation means:: Family, Regular car     Sabianism or spiritual beliefs that impact treatment:: No  Mental health DX:: No  Mental health  management concern (GOAL):: No  Chemical Dependency Status: No Current Concerns  Informal Support system:: Family, Spouse      [facility] spoke with patients spouse, Johnny (consent to communicate on file), to introduce self and offer Care Coordination services. Johnny shared patient was recently diagnosed with Parkinson's Disease and Dementia. 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 Bill inquiring about what resources are available to assist patient with recent changes. [facility] reviewed Vision Loss Resource center and [facility] for the Blind as two organizations with support/resources for patients with new vision loss. Johnny expressed understanding. Offered to send Johnny additional information on programs along with links to websites. Bill open to receiving resources and requested resources be sent via e-mail. [facility] reviewed that no PHI will be included and verified email address ([email or web address]) with the patients spouse. Johnny reports that he is currently assisting patient with daily tasks in the form of reminders and que's. Johnny shared patient has hallucinations at times, however, they do not appear to cause her stress and are generally pleasant. Johnny expressed he feels patients needs are currently being met but would like to know what services are available if/when needed. [facility] provided general overview of in-home supports vs BRANDON vs BRANDON with memory care vs SNF. Johnny expressed understanding. [facility] strongly encouraged Johnny to review the Parkinson's foundation website for additional resources and support regarding patients diagnosis. Bill voiced understanding and agreement with plan. Allowed time and space for patients spouse to voice any additional needs. No further CC needs identified at this time. Please refer to letter sent 10/25/2023 for further information regarding resources provided in this encounter and e-mailed to patients spouse 10/26/2023. Patients spouse in agreement with care coordination  services and monthly outreaches. Created patient centered goals and sent to patient via [employer].      Resources and Interventions:  Current Resources:   Community Resources: None (Outpatient OT)  Supplies Currently Used at Home: None  Equipment Currently Used at Home: walker, standard  Employment Status: retired  Advance Care Plan/Directive  Advanced Care Plans/Directives on file:: Yes  Type Advanced Care Plans/Directives: Advanced Directive - On File  Referrals Placed: None     Care Plan:  Care Plan: Help At Home       Problem: Potential need for additional supports       Goal: Establish adequate home support if/when needed       Start Date: 10/25/2023 Expected End Date: 2/26/2024    This Visit's Progress: 10%    Priority: High    Note:     Barriers: new vision loss, PD/dementia  Strengths: spousal support  Patients spouse expressed understanding of goal: Yes  Action steps to achieve this goal:  I will continue to lean on informal supports of my: family  My family will review resources and supports sent to me via E-mail  My family will outreach to supports and consider establishing with ones I might find beneficial.  My family will follow up with scheduled appointments as recommended  My family will contact my care team with questions, concerns, support needs.   My family will use the clinic as a resource and I understand I can contact my clinic with 24/7 after hours services available.   My family will continue to outreach to care coordination as needed for additional resources or supports.                           Patient/Caregiver understanding: Patients spouse reports understanding and denies any additional questions or concerns at this time. RADHIKA CC engaged in AIDET communication during encounter.    Outreach Frequency: monthly, more frequently as needed  Future Appointments                In 1 week Cherie Wilson OT [facility] Services [facility] RID    In 2 weeks  Josef Hernandez MD [facility]    In 2 weeks Cherie Wilson OT [facility] Rehabilitation [facility] RID    In 3 weeks Cherie Wilson OT [facility] Rehabilitation [facility] RID    In 1 month Juan M Fitzpatrick MD [facility] Specialty Clinic [facility]    In 1 month Pauline Bourne MD [facility] Transplant [facility]    In 2 months Xu Machado MD [facility] Eye [facility]    In 3 months Summer Vega MD [place], [place]          Plan: Patients spouse will review introduction to CC letter, resources, and Complex Care Plan sent via [employer]/e-mail. CHW will outreach to patients spouse in one month to review goal progression. CHW will involve Lead CC as needed or if patient is ready to move to maintenance. Lead CC will continue to monitor CHW's monthly outreaches and progress to goal(s) every 6 weeks.    EJ Owens/[facility] Psych[facility] CenterSW  Social Work Care Coordinator  [facility], [place], and [place]  Phone: [phone]

## 2023-10-25 NOTE — LETTER
[facility]  Patient Centered Plan of Care  About Me:        Patient Name:  Viv King    YOB: 1950  Age:         73 year old   [place] MRN:    [id number] Telephone Information:  Home Phone [phone]   Mobile [phone]       Address:  [address]  [facility] [address] Email address:  [email or web address]      Emergency Contact(s)    Name Relationship Lgl Grd Work Phone Home Phone Mobile Phone   1. JR KING Spouse   [phone]   2. MS CHRISTINE STA* Daughter    [phone]           Primary language:  English     needed? No   [place] [facility]:  [phone] op. 1  Other communication barriers:None  Preferred Method of Communication:  Maxwell  Current living arrangement: I live in a private home with spouse  Mobility Status/ Medical Equipment: Independent w/Device    Health Maintenance  Health Maintenance Reviewed: Due/Overdue   Health Maintenance Due   Topic Date Due    MICROALBUMIN  08/12/2023    ZOSTER IMMUNIZATION (2 of 2) 10/05/2023     My Access Plan  Medical Emergency 911   Primary Clinic Line [facility] - [phone]   24 Hour Appointment Line [phone] or  [phone]) (toll-free)   24 Hour Nurse Line [phone] (toll-free)   Preferred Urgent Care No data recorded   Preferred Hospital [facility]  [phone]     Preferred Pharmacy [id number] [place], [place] - [address]     Behavioral Health Crisis Line The National Suicide Prevention Lifeline at 1-879.607.4277 or Text/Call 268     My Care Team Members  Patient Care Team         Relationship Specialty Notifications Start End    Summer Vega MD PCP - General   4/14/04     referred to endocrinology for type 2 diabetes    Phone: [phone] Pager: [phone] Fax: [phone]        [address] [facility] [address]     Junaid Trevino MD MD Cardiology  12/8/11     Phone: [phone] Fax: [phone]         [address] [facility] [address]    Enrico Blankenship MD Nephrology  12/8/11     Phone: [phone] Fax: [phone]         [facility] [address] [place] [address]    Summer Vega MD Assigned PCP   10/4/12     Phone: [phone] Pager: [phone] Fax: [phone]        [address] [facility] [address]    Spong, Morro Valdez MD MD Nephrology  6/11/15     REFER TO LABS    Phone: [phone] Fax: [phone]         [address] [facility] [address] [facility] [address] [facility] [address]    Juan M Fitzpatrick MD MD Endocrinology, Diabetes, and Metabolism  5/20/21     Phone: [phone] Fax: [phone] [address] [facility] [address]    Juan M Fitzpatrick MD Assigned Endocrinology Provider   8/15/21     Phone: [phone] Fax: [phone] [address] [facility] [address]    Pauline Bourne MD Assigned Nephrology Provider   12/10/22     Phone: [phone] Fax: [phone]         [address] [facility] [address]    Linda Brewster PAVivianaC Physician Assistant Physical Medicine and Rehabilitation  5/15/23     Phone: [phone] Fax: [phone]         [address] [facility] [address]    Linda Brewster PAARIANNE Assigned Neuroscience Provider   7/8/23     Phone: [phone] Fax: [phone]          [facility] [address]    Lea Melgar, [facility] Lead Care Coordinator  - Clinical Admissions 10/25/23     Phone: [phone] Fax: [phone]        Haven Yi MA Community Health Worker  Admissions 10/25/23               My Care Plans  Self Management and Treatment Plan    Care Plan  Care Plan: Help At Home       Problem: Potential need for additional supports       Goal: Establish adequate home support if/when needed       Start Date: 10/25/2023 Expected End Date: 2/26/2024    This Visit's Progress: 10%     Priority: High    Note:     Barriers: new vision loss, PD/dementia  Strengths: spousal support  Patients spouse expressed understanding of goal: Yes  Action steps to achieve this goal:  I will continue to lean on informal supports of my: family  My family will review resources and supports sent to me via E-mail  My family will outreach to supports and consider establishing with ones I might find beneficial.  My family will follow up with scheduled appointments as recommended  My family will contact my care team with questions, concerns, support needs.   My family will use the clinic as a resource and I understand I can contact my clinic with 24/7 after hours services available.   My family will continue to outreach to care coordination as needed for additional resources or supports.                             Advance Care Plans/Directives:   Advanced Care Plan/Directives on file:   Yes    Status of Document(s): No data recorded  Advanced Care Plan/Directives Type:   Advanced Directive - On File       My Medical and Care Information  Problem List   Patient Active Problem List   Diagnosis    Other vitamin B12 deficiency anemia    Irritable bowel syndrome    GERD (gastroesophageal reflux disease)    Advanced directives, counseling/discussion    Kidney replaced by transplant    Immunosuppressed status (HCC)    Hyperlipidemia LDL goal <100    CAD S/P percutaneous coronary angioplasty    Anemia in chronic kidney disease    Other specified hypothyroidism    Coronary artery disease involving native heart without angina pectoris, unspecified vessel or lesion type    Hypertension secondary to other renal disorders    Hernia, incisional    CKD (chronic kidney disease) stage 3, GFR 30-59 ml/min (H)    Diabetes mellitus, type 2 (H)    Status post kidney transplant    Pneumonia due to 2019 novel coronavirus    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)    Urinary tract infection    Parkinson disease    Lewy body  "dementia, unspecified dementia severity, unspecified whether behavioral, psychotic, or mood disturbance or anxiety (H)    Orbital mass    Abscess of orbit, unspecified laterality    Infection of right eye        Current Medications and Allergies:   Current Outpatient Medications   Medication    acetaminophen (TYLENOL) 325 MG tablet    amLODIPine (NORVASC) 10 MG tablet    amoxicillin (AMOXIL) 500 MG capsule    ASPIRIN PO    atorvastatin (LIPITOR) 40 MG tablet    FLORIN CONTOUR test strip    BD INSULIN SYRINGE ULTRAFINE 31G X 5/16\" 0.3 ML    carbidopa-levodopa (SINEMET)  MG tablet    cholecalciferol 125 MCG (5000 UT) CAPS    Continuous Blood Gluc  (DEXCOM G7 ) LUCAS    Continuous Blood Gluc Sensor (DEXCOM G7 SENSOR) MISC    Continuous Blood Gluc Transmit (DEXCOM G6 TRANSMITTER) MISC    cyanocobalamin (VITAMIN B-12) 1000 MCG tablet    cycloSPORINE modified (GENERIC EQUIVALENT) 25 MG capsule    donepezil (ARICEPT) 10 MG tablet    doxycycline hyclate (VIBRAMYCIN) 100 MG capsule    GENGRAF (BRAND) 25 MG CAPSULE    insulin aspart (NOVOLOG PEN) 100 UNIT/ML pen    insulin glargine (BASAGLAR KWIKPEN) 100 UNIT/ML pen    isosorbide mononitrate (IMDUR) 30 MG 24 hr tablet    levothyroxine (SYNTHROID/LEVOTHROID) 100 MCG tablet    losartan (COZAAR) 100 MG tablet    metoprolol succinate ER (TOPROL XL) 25 MG 24 hr tablet    mycophenolic acid (GENERIC EQUIVALENT) 180 MG EC tablet    pantoprazole (PROTONIX) 40 MG EC tablet     No current facility-administered medications for this visit.     No Known Allergies    Care Coordination Start Date: 10/25/2023   Frequency of Care Coordination: monthly, more frequently as needed     Form Last Updated: 10/25/2023     "

## 2023-10-27 NOTE — TELEPHONE ENCOUNTER
[facility]    Phone Message    May a detailed message be left on voicemail: yes     Reason for Call: Medication Question or concern regarding medication   Prescription Clarification  Name of Medication: Dexcom G7 sensor  Prescribing Provider: Dr Fitzpatrick   [facility]armacy[facility]   What on the order needs clarification? Patient  calling to say they are having issues refilling the sensors at current pharmacy. Should they move it to a different pharmacy or do you have any ideas where they can get this filled without any hassle each time they go to refill it? Please call to discuss further.      Action Taken: Message routed to:  [facility] Endo    Travel Screening: Not Applicable

## 2023-10-27 NOTE — TELEPHONE ENCOUNTER
Called pharmacy to inquire about G7 sensor refill.  They state they are needing the CMN (cert of medical necessity) form completed.  Called pt's  to notify, and he verbalized understanding.  Will route to provider, and then spouse would like call back once form is completed/faxed.  Danitza Acevedo RN

## 2023-10-30 NOTE — TELEPHONE ENCOUNTER
Patient's spouse calling requesting an order for DME. Per spouse Tirso, patient is having increased weakness in her legs and trouble getting around due to Parkinson's diagnosis and recently losing sight in her R eye. Does she need an appointment first or can she be prescribed something to help her navigate around her home?

## 2023-10-31 NOTE — TELEPHONE ENCOUNTER
Patient notified and expressed understanding. Patient and spouse prefer in-clinic visit. Appointment scheduled.

## 2023-11-03 NOTE — TELEPHONE ENCOUNTER
Patients , Tirso states they have not heard anything back about patients medication. Tirso states they went to the pharmacy, [id number] - [place], [place] - [address] AT [facility] [employer] [facility]. Tirso states the pharmacy still has not received anything. Tirso states patient is needing the medication ASAP. Tirso would like to get a call back with an update on patients medication.  Please advise.

## 2023-11-03 NOTE — TELEPHONE ENCOUNTER
RN reviewed chart.  There is a note from a month ago with an update from the PA team that they do not handle Medicare B, and to contact the pharmacy.  The clinic just rec'd this update today.    RN called pharmacy to see what is needed, and a CMN form needs to be completed and faxed to [facility] Medicare dept at [phone].  Will route to provider to complete form.  Contacted pt's  to update, and he verbalized understanding.  Danitza Acevedo RN

## 2023-11-07 NOTE — PROGRESS NOTES
OCCUPATIONAL THERAPY EVALUATION  Type of Visit: Evaluation    See electronic medical record for Abuse and Falls Screening details.    Subjective      Presenting condition or subjective complaint: loss of sight in right eye  Date of onset: 10/11/23    Relevant medical history:     Dates & types of surgery:      Prior diagnostic imaging/testing results: MRI; CT scan     Prior therapy history for the same diagnosis, illness or injury: No      Prior Level of Function  Transfers: Independent  Ambulation: Independent  ADL: Independent  IADL: Driving, Housekeeping, Laundry, Meal preparation, Medication management    Living Environment  Social support: With a significant other or spouse - daughter in [place], son disabled (in group home); patient can stay alone for a couple hours per spouse while he runs errands  Type of home: Apartment/condo; 1 level - regular apartment  Stairs to enter the home: No       Ramp: No   Stairs inside the home: No       Help at home: None  Equipment owned: Walker; Grab bars; Bath bench     Employment: No    Hobbies/Interests: none    Patient goals for therapy: not sure - be able to see the microwave okay (light from solar tube overhead can affect her visability) - had trouble prior    Pain assessment: Pain denied     Objective   LOW VISION EVALUATION  ADDITIONAL HISTORY:  Current Responsibilities - IADLS:  spouse doing all ; patient stopped driving ~ 1 year ago    Others present at visit: Spouse / significant other    COGNITIVE/BEHAVIORAL:  Communication:  slightly [place]  Cognitive Status:  further assessment required; spouse reports decreased memory - diagnosis of Lewy Body Dementia; patient able to follow simple commands generally 100% with increased time, difficulty with multi-step directions  Behavior: Appropriate  Patient/family aware of diagnosis: Yes  Cognitive Screen/Assessment:  to be assessed at later date      Physical Status/Equipment   Physical Status: decreased  balance  Mobility Equipment Used: None, has a fww but not using much; spouse thinking of getting a 4ww with seat  Bathing ADL Equipment Used: Grab bar, built-in shower ; non-slippery floor  Toileting ADL Equipment Used:  higher toilet    VISUAL REPORT:  Functional complaints: Avocational tasks, Leisure, Reading, Safety in mobility, Writing  Visual Complaints: Constricted visual fields right eye, Difficulty maintaining focus, Light sensitivity  Ritesh Bonnet Symptoms? Yes and/or visual hallucinations not related to CBS  Magnifiers and Low Vision AE owned: Handheld magnifier no light  Reading Glasses: Single lens (readers), prescription reading glasses  Power per MD report:  N/A  Technology: Smart phone, Electronic tablet, not as interested in ipad    LIGHTING AND GLARE:  Is your lighting adequate? No at home  Is glare a problem? Yes indoors, Yes outdoors  Are you satisfied with your sunglasses? Yes thinks so  Sunglass Details: Transition glasses, Department store sunglasses    VISUAL ACUITY:  Distance Acuity Right Eye: Per recent MD report, NLP  Distance Acuity Left Eye: Without correction, Per recent MD report, 20/40  Distance Acuity Both Eyes Together:  not stated  Near Visual Acuity: Via Anny Symbols chart: 20/40 ambient light with Rx readers, 20/25 -2 with task light     CONTRAST SENSITIVITY:  Contrast Sensitivity (score/25): 14/25    PREFERRED RETINAL LOCUS:  N/A    [place] Read  Smallest Print Size Read: Patient with moderate difficulty reading words, reports that the letters get scrambled, not sure if due to cognition and/or vision.  Read to 2M size and then due to increased challenge and difficulty, switched to reassemble chart (see above).  Patient did appear to be missing left side of some words and left side of some lines, will assess further.  Critical Print Size: NT  Words per minute at critical print size: varied greatly at each size.  Best times: 12.3 sec. At 8M and 18 sec. At 3.2M.    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  The following significant findings have been identified: Impaired activity tolerance, Impaired balance, Impaired cognition, Impaired communication, Impaired hearing, Impaired mobility, Impaired safety/judgement, and Impaired visual perception.  These identified deficits interfere with their ability to perform recreational activities, household chores, driving , household mobility, community mobility, medication management, and meal planning and preparation as compared to previous level of function.     Clinical Decision Making (Complexity):  Assessment of Occupational Performance: 5 or more Performance Deficits  Occupational Performance Limitations: bathing/showering, toileting, dressing, functional mobility, driving and community mobility, health management and maintenance, home establishment and management, meal preparation and cleanup, shopping, and leisure activities  Clinical Decision Making (Complexity): High complexity    PLAN OF CARE  Treatment Interventions:  Interventions: Self-Care/Home Management, Therapeutic Activity    Long Term Goals   OT Goal 1  Goal Identifier: Near Vision  Goal Description: Patient/family will demonstrate 3 pieces of adaptive equipment and/or adaptive techniques in regards to magnification, lighting, contrast and glare for increased  ADL/IADL independence with near vision tasks (reading, very simple meal prep, writing).  Rationale: In order to maximize safety and independence with performance of self-care activities;In order to safely and appropriately apply compensatory strategies with ADL/IADL performance  Target Date: 05/03/24  OT Goal 2  Goal Identifier: Environmental modifications  Goal Description: Patient/family will demonstrate and/or verbalize 2 environmentally-based ADL modifications to improve visual-based ADL/IADL activities.  Rationale: In order to maximize safety and independence with performance of self-care activities;In order to safely and appropriately apply compensatory strategies with ADL/IADL performance  Target Date: 05/03/24  OT Goal 3  Goal Identifier: Resource Education  Goal Description: Patient/family will verbalize awareness of 2 community resources for increased ADL/IADL completion.  Rationale: In order to maximize safety and independence with performance of self-care activities  Target Date: 05/03/24  OT Goal 4  Goal Identifier: Distance Viewing/peripheral vision  Goal Description: Patient will demonstrate increased independence in distance viewing and peripheral vision for safety and leisure during ADL/IADLs through independent use of at least one adaptive technique or AE.  Rationale: In order to maximize safety and independence with performance of self-care activities;In order to safely and appropriately apply compensatory strategies with ADL/IADL performance  Target Date: 05/03/24  OT Goal 5  Goal Identifier: memory skills  Goal Description: Patient/family to utilize and demonstrate at least 2 memory tools and strategies (planner, calendar, timers, association) with min cues for increased ability to manage daily schedule, simple ADLs and simple meal prep.  Rationale: In order to maximize safety and independence with performance of self-care activities;In order to safely and appropriately apply compensatory  strategies with ADL/IADL performance  Target Date: 05/03/24  OT Goal 6  Goal Identifier: ADLs and AE  Goal Description: Patient to verbalize 3 strategies to prevent falls through adapted equipment and adaptive techniques in the home and community setting (modifications to the home, use of AE, etc.) for decreased risk of falls during ADL/IADLs.  Rationale: In order to maximize safety and independence with performance of self-care activities;In order to safely and appropriately apply compensatory strategies with ADL/IADL performance  Target Date: 05/03/24      Frequency of Treatment: 1x/week, decreasing frequency as indicated  Duration of Treatment: 6 months     Recommended Referrals to Other Professionals:  already seeing PT  Education Assessment: Learner/Method: Patient;Listening;Reading;Demonstration;Family  Education Comments: Extensive education in vision loss and functional implications (decreased depth perception from loss of vision in right eye, light sensitivity, previous eye conditions, decreased contrast, etc.) and how cognition can affect as well; education and focus on things she is able to do in her goals; education in possible inattention to the left, will monitor; education in large print and good task lighting (Verilux floor lamp); education in continuing to do as much activity as possible as long as safe and meaningful for patient: Started with large print word finds for visual scanning -min to mod cues today (crosswords might be too challenging right now); safety at home: Strongly recommend toilet safety frame, shower chair, grab bar to step in/out of the shower, talk to physical therapist about possibly getting a walker with a seat and 1 specifically for Parkinson's if appropriate; cell phone adaptations for greater visibility: Larger font, dark mode/reverse contrast successful, [employer]/Dasha; Educated in [facility] and services they offer     Risks and benefits of  evaluation/treatment have been explained.   Patient/Family/caregiver agrees with Plan of Care.     Evaluation Time:    OT Eval, High Complexity Minutes (29354): 38    Signing Clinician: CHRISTINE Dean [facility]                                                                                   OUTPATIENT OCCUPATIONAL THERAPY      PLAN OF TREATMENT FOR OUTPATIENT REHABILITATION   Patient's Last Name, First Name, Viv Young YOB: 1950   Provider's Name   [facility]   Medical Record No.  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                Referring Provider:  Reagan Velasquez      Initial Assessment  See Epic Evaluation- 11/07/23

## 2023-11-08 PROBLEM — J34.1 MUCOCELE OF ETHMOID SINUS: Status: ACTIVE | Noted: 2023-01-01

## 2023-11-08 NOTE — LETTER
11/8/2023       RE: Viv Shaw  [address]  [place] [address]     Dear Colleague,    Thank you for referring your patient, Viv Shaw, to the [facility] [place] [facility]. Please see a copy of my visit note below.      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  Patient reported to the ED on October 7 with rapidly increasing right eye swelling and decreased vision.  Underwent orbitotomy and globe decompression with the ophthalmology team.  On CT and MRI demonstration of chronic sinus disease and possible mucocele on the right side.  Here today to discuss definitive treatment.    Patient states that she's lost all vision in her right eye since her episode. Other than that swelling has completely resolved. No issues since that time. States that she had sinus surgery many years ago in [place] and denies any sinus issues since then other than this recent orbital exacerbation.     Hx of parkinson's and mild/moderate dementia. Kidney transplant      [place] [facility]:  Right orbitotomy on 10/8/23    Review of systems: A 14-point review of systems has been conducted and was negative for any notable symptoms, except as dictated in the history of present illness.     Medical History:  Past Medical History:   Diagnosis Date    Abdominal wall hernia     RLQ    AK (actinic keratosis) 08/06/2020    Allergic rhinitis     CAD (coronary artery disease)     coronary artery disease s/p PCI to LAD in 2005coronary artery disease s/p PCI to LAD in 2005     Diabetes mellitus, type 2 (H)     follows up with endocrinologist.    Dyslipidemia     GERD (gastroesophageal reflux disease)     H/O diabetic nephropathy     s/p kidney trnsplant    Hypertension     Hypothyroidism     Immunosuppressed status (H24)     Kidney replaced by transplant     Rt    PONV (postoperative nausea and vomiting)     Shingles     Urinary tract infection 2022    Vitamin B12 deficiency anemia         Surgical History:   Past Surgical History:   Procedure Laterality Date    APPENDECTOMY NOS      ARTHROSCOPY SHOULDER ROTATOR CUFF REPAIR Left     COLONOSCOPY N/A 2016    Procedure: COLONOSCOPY;  Surgeon: Rashard Snider MD;  Location: [facility]    Coronary angioplasty with stent      HYSTERECTOMY      Kidney Transplant NOS Right     LAPAROSCOPIC CHOLECYSTECTOMY      NOSE SURGERY      OPEN SEPARATION COMPONENT HERNIORRHAPHY N/A 10/16/2017    Procedure: OPEN SEPARATION COMPONENT HERNIORRHAPHY;;  Surgeon: CARL Lott MD;  Location: [facility]    ORBITOTOMY Right 10/8/2023    Procedure: RIGHT ORBITOTOMY WITH BIOPSY AND DRAINAGE OF ABSCESS;  Surgeon: Xu Machado MD;  Location: [facility]    RECONSTRUCT ABDOMINAL WALL N/A 10/16/2017    Procedure: RECONSTRUCT ABDOMINAL WALL;  Exploratory Laparotomy, Lysis of Adhesions, Abdominal Wall reconstruction, Inlay Xen AB mesh, Mitek Suture [place] and Umbilical Hernia Repair.;  Surgeon: CARL Lott MD;  Location: [facility] OR    REMOVE CATARACT INTRACAP,INSERT LENS Right     TONSILLECTOMY          Family History:  Family History   Problem Relation Age of Onset    Respiratory Mother          age 71    Cardiovascular Father          age 75 hyertension    Arthritis Sister         living, healthy    Family History Negative Brother          age 44    Colon Cancer No family hx of         Social History:   Social History     Socioeconomic History    Marital status:    Tobacco Use    Smoking status: Never    Smokeless  tobacco: Never   Substance and Sexual Activity    Alcohol use: No    Drug use: No    Sexual activity: Yes     Partners: Male     Social Determinants of Health     Financial Resource Strain: Low Risk  (10/13/2023)    Financial Resource Strain     Within the past 12 months, have you or your family members you live with been unable to get utilities (heat, electricity) when it was really needed?: No   Food Insecurity: Low Risk  (10/13/2023)    Food Insecurity     Within the past 12 months, did you worry that your food would run out before you got money to buy more?: No     Within the past 12 months, did the food you bought just not last and you didn t have money to get more?: No   Transportation Needs: Low Risk  (10/13/2023)    Transportation Needs     Within the past 12 months, has lack of transportation kept you from medical appointments, getting your medicines, non-medical meetings or appointments, work, or from getting things that you need?: No   Interpersonal Safety: Low Risk  (10/17/2023)    Interpersonal Safety     Do you feel physically and emotionally safe where you currently live?: Yes     Within the past 12 months, have you been hit, slapped, kicked or otherwise physically hurt by someone?: No     Within the past 12 months, have you been humiliated or emotionally abused in other ways by your partner or ex-partner?: No   Housing Stability: Low Risk  (10/13/2023)    Housing Stability     Do you have housing? : Yes     Are you worried about losing your housing?: No        Physical Exam:  Vital signs: There were no vitals taken for this visit.   General Appearance: No acute distress, appropriate demeanor, conversant  Eyes: moist conjunctivae; EOMI; pupils symmetric; visual acuity grossly intact; no proptosis  Head: normocephalic; overall symmetric appearance without deformity  Face: overall symmetric without deformity  Ears: Normal appearance of external ear; external meatus normal in appearance, TMs with tubes  B/L  Nose: No external deformity;  Oral Cavity/oropharynx: Normal appearance of mucosa; tongue midline; no mass or lesions  Neck: no palpable lymphadenopathy; thyroid without palpable nodules  Lungs: symmetric chest rise; no wheezing  CV: Good distal perfusion; normal hear rate  Extremities: No deformity  Neurologic Exam: Cranial nerves II-XII are grossly intact; no focal deficit    Procedure Note  Procedure performed: Rigid nasal endoscopy  Indication: To evaluate for sinonasal pathology not visualized on routine anterior rhinoscopy  Anesthesia: 4% topical lidocaine with 0.05% oxymetazoline  Description of procedure: A 30 degree, 3 mm rigid endoscope was inserted into bilateral nasal cavities and the nasal valves, nasal cavity, middle meatus, sphenoethmoid recess, and nasopharynx were thoroughly evaluated for evidence of obstruction, edema, purulence, polyps and/or mass/lesion.     Herriman-Minor Endoscopic Scoring System  Endoscopic observation Right Left   Polyps in middle meatus (0 = absent, 1 = restricted to middle meatus, 2 = Beyond middle meatus) 0 0   Discharge (0 = absent, 1 = thin and clear, 2 = thick, purulent) 0 0   Edema (0 = absent, 1 = mild-moderate, 2 = moderate-severe) 1 0   Crusting (0 = absent, 1 = mild-moderate, 2 = moderate-severe) 0 0   Scarring (0= absent, 1 = mild-moderate, 2 = moderate-severe) 0 0   Total 0 0     Findings  RT: grossly normal exam, no purulence or evidence of chronic sinusitis  LT: Large turbinate, mucocele visualized    The patient tolerated the procedure well without complication.       Imaging Review:  Reviewed CT from 10/08/23, evidence of right ethmoid mucocele        Assessment/Medical Decision Makin-year-old female who presents today for visit due to right orbitotomy for possible infectious mucocele/hematoma.    Plan:    Likely patient is doing quite well at this time other than complete loss of vision on the right.  Discussed with patient that based on her CT scan  would recommend right-sided total ethmoidectomy and inferior turbinate reduction in order to hopefully prevent this from happening again.  Would also want to removed to prevent any further complications from this area.    Patient is in agreement.     I discussed the risks, benefits, and alternatives to endoscopic sinonasal surgery, including but not limited to: pain, bleeding, infection, CSF leak, orbital injury resulting in vision changes and/or vision loss, septal perforation and/or hematoma, dental hypesthesia, facial numbness, need for continued medical management after surgery, and need for additional procedures, among others. she was allowed to ask questions, which I answered to the best of my ability. After this discussion, surgery was elected.       Josef Hernandez MD    [facility]  Rhinology  Endoscopic Skull Base Surgery  [facility]  Department of Otolaryngology - Head & Neck Surgery

## 2023-11-08 NOTE — PROGRESS NOTES
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  Patient reported to the ED on October 7 with rapidly increasing right eye swelling and decreased vision.  Underwent orbitotomy and globe decompression with the ophthalmology team.  On CT and MRI demonstration of chronic sinus disease and possible mucocele on the right side.  Here today to discuss definitive treatment.    Patient states that she's lost all vision in her right eye since her episode. Other than that swelling has completely resolved. No issues since that time. States that she had sinus surgery many years ago in [place] and denies any sinus issues since then other than this recent orbital exacerbation.     Hx of parkinson's and mild/moderate dementia. Kidney transplant      [facility]:  Right orbitotomy on 10/8/23    Review of systems: A 14-point review of systems has been conducted and was negative for any notable symptoms, except as dictated in the history of present illness.     Medical History:  Past Medical History:   Diagnosis Date    Abdominal wall hernia     RLQ    AK (actinic keratosis) 08/06/2020    Allergic rhinitis     CAD (coronary artery disease)     coronary artery disease s/p PCI to LAD in 2005coronary artery disease s/p PCI to LAD in 2005    Diabetes mellitus, type 2 (H)     follows up with endocrinologist.    Dyslipidemia     GERD (gastroesophageal reflux disease)     H/O diabetic nephropathy     s/p kidney trnsplant    Hypertension     Hypothyroidism     Immunosuppressed status (H24)     Kidney replaced by transplant     Rt    PONV (postoperative nausea and vomiting)      Shingles     Urinary tract infection 2022    Vitamin B12 deficiency anemia         Surgical History:   Past Surgical History:   Procedure Laterality Date    APPENDECTOMY NOS      ARTHROSCOPY SHOULDER ROTATOR CUFF REPAIR Left     COLONOSCOPY N/A 2016    Procedure: COLONOSCOPY;  Surgeon: Rashard Snider MD;  Location: [facility]    Coronary angioplasty with stent      HYSTERECTOMY      Kidney Transplant NOS Right     LAPAROSCOPIC CHOLECYSTECTOMY      NOSE SURGERY      OPEN SEPARATION COMPONENT HERNIORRHAPHY N/A 10/16/2017    Procedure: OPEN SEPARATION COMPONENT HERNIORRHAPHY;;  Surgeon: CARL Lott MD;  Location: [facility]    ORBITOTOMY Right 10/8/2023    Procedure: RIGHT ORBITOTOMY WITH BIOPSY AND DRAINAGE OF ABSCESS;  Surgeon: Xu Machado MD;  Location: [facility]    RECONSTRUCT ABDOMINAL WALL N/A 10/16/2017    Procedure: RECONSTRUCT ABDOMINAL WALL;  Exploratory Laparotomy, Lysis of Adhesions, Abdominal Wall reconstruction, Inlay Xen AB mesh, Mitek Suture [place] and Umbilical Hernia Repair.;  Surgeon: CARL Lott MD;  Location: [facility]    REMOVE CATARACT INTRACAP,INSERT LENS Right     TONSILLECTOMY          Family History:  Family History   Problem Relation Age of Onset    Respiratory Mother          age 71    Cardiovascular Father          age 75 hyertension    Arthritis Sister         living, healthy    Family History Negative Brother          age 44    Colon Cancer No family hx of         Social History:   Social History     Socioeconomic History    Marital status:    Tobacco Use    Smoking status: Never    Smokeless tobacco: Never   Substance and Sexual Activity    Alcohol use: No    Drug use: No    Sexual activity: Yes     Partners: Male     Social Determinants of Health     Financial Resource Strain: Low Risk  (10/13/2023)    Financial Resource Strain     Within the past 12 months, have you or your family members you live with been unable to get  utilities (heat, electricity) when it was really needed?: No   Food Insecurity: Low Risk  (10/13/2023)    Food Insecurity     Within the past 12 months, did you worry that your food would run out before you got money to buy more?: No     Within the past 12 months, did the food you bought just not last and you didn t have money to get more?: No   Transportation Needs: Low Risk  (10/13/2023)    Transportation Needs     Within the past 12 months, has lack of transportation kept you from medical appointments, getting your medicines, non-medical meetings or appointments, work, or from getting things that you need?: No   Interpersonal Safety: Low Risk  (10/17/2023)    Interpersonal Safety     Do you feel physically and emotionally safe where you currently live?: Yes     Within the past 12 months, have you been hit, slapped, kicked or otherwise physically hurt by someone?: No     Within the past 12 months, have you been humiliated or emotionally abused in other ways by your partner or ex-partner?: No   Housing Stability: Low Risk  (10/13/2023)    Housing Stability     Do you have housing? : Yes     Are you worried about losing your housing?: No        Physical Exam:  Vital signs: There were no vitals taken for this visit.   General Appearance: No acute distress, appropriate demeanor, conversant  Eyes: moist conjunctivae; EOMI; pupils symmetric; visual acuity grossly intact; no proptosis  Head: normocephalic; overall symmetric appearance without deformity  Face: overall symmetric without deformity  Ears: Normal appearance of external ear; external meatus normal in appearance, TMs with tubes B/L  Nose: No external deformity;  Oral Cavity/oropharynx: Normal appearance of mucosa; tongue midline; no mass or lesions  Neck: no palpable lymphadenopathy; thyroid without palpable nodules  Lungs: symmetric chest rise; no wheezing  CV: Good distal perfusion; normal hear rate  Extremities: No deformity  Neurologic Exam: Cranial nerves  II-XII are grossly intact; no focal deficit    Procedure Note  Procedure performed: Rigid nasal endoscopy  Indication: To evaluate for sinonasal pathology not visualized on routine anterior rhinoscopy  Anesthesia: 4% topical lidocaine with 0.05% oxymetazoline  Description of procedure: A 30 degree, 3 mm rigid endoscope was inserted into bilateral nasal cavities and the nasal valves, nasal cavity, middle meatus, sphenoethmoid recess, and nasopharynx were thoroughly evaluated for evidence of obstruction, edema, purulence, polyps and/or mass/lesion.     Kush-Minor Endoscopic Scoring System  Endoscopic observation Right Left   Polyps in middle meatus (0 = absent, 1 = restricted to middle meatus, 2 = Beyond middle meatus) 0 0   Discharge (0 = absent, 1 = thin and clear, 2 = thick, purulent) 0 0   Edema (0 = absent, 1 = mild-moderate, 2 = moderate-severe) 1 0   Crusting (0 = absent, 1 = mild-moderate, 2 = moderate-severe) 0 0   Scarring (0= absent, 1 = mild-moderate, 2 = moderate-severe) 0 0   Total 0 0     Findings  RT: grossly normal exam, no purulence or evidence of chronic sinusitis  LT: Large turbinate, mucocele visualized    The patient tolerated the procedure well without complication.       Imaging Review:  Reviewed CT from 10/08/23, evidence of right ethmoid mucocele        Assessment/Medical Decision Makin-year-old female who presents today for visit due to right orbitotomy for possible infectious mucocele/hematoma.    Plan:    Likely patient is doing quite well at this time other than complete loss of vision on the right.  Discussed with patient that based on her CT scan would recommend right-sided total ethmoidectomy and inferior turbinate reduction in order to hopefully prevent this from happening again.  Would also want to removed to prevent any further complications from this area.    Patient is in agreement.     I discussed the risks, benefits, and alternatives to endoscopic sinonasal surgery,  including but not limited to: pain, bleeding, infection, CSF leak, orbital injury resulting in vision changes and/or vision loss, septal perforation and/or hematoma, dental hypesthesia, facial numbness, need for continued medical management after surgery, and need for additional procedures, among others. she was allowed to ask questions, which I answered to the best of my ability. After this discussion, surgery was elected.       Josef Hernandez MD    [facility]  Rhinology  Endoscopic Skull Base Surgery  [facility]  Department of Otolaryngology - Head & Neck Surgery

## 2023-11-09 NOTE — TELEPHONE ENCOUNTER
Please send new rx. Can not transfer due to pt being medicare.    PRESCRIPTIONS MUST BE WRITTEN THIS WAY TO MAKE THEM MEDICARE COMPLIANT    DEXCOM G7 SENSORS  SIG: Change every 10 days.  QTY: 3  REFILLS: 5    -Prescriptions must be written after the clinical note date and will only be able to be used for 6 months from the date of the clinical notes. All prescriptions must be from same provider who patient had visit with. (We will be requesting new clinical notes and prescriptions every 6 months to meet Medicare Guidelines.)    Please contact us at [phone] (this number is for clinics only) with any questions. Please only give [phone] to patients.    [facility]   [address] [place] [address]  Phone # [phone]

## 2023-11-09 NOTE — PROGRESS NOTES
Assessment & Plan     (H05.89) Orbital mass     (H54.61) Vision loss of right eye  Plan: S/p right orbitotomy, follows up with ophthalmologist - Home Care Referral             (Z94.0) Kidney replaced by transplant  Plan: Follows up with transplant clinic, Home Care Referral            (G20.A1) Parkinson's disease, unspecified whether dyskinesia present, unspecified whether manifestations fluctuate  Plan: Using walker at this time, requesting walker with seat, follows up with the neurologist and is on carbidopa levodopa and also on Aricept, Home care referral placed for home safety evaluation-  - Home Care Referral         (I15.1) Hypertension secondary to other renal disorders  Plan: Blood pressure stable    (E78.5) Hyperlipidemia LDL goal <100  Plan: Continue on atorvastatin as directed       (I25.10) Coronary artery disease involving native heart without angina pectoris, unspecified vessel or lesion type  Plan: isosorbide mononitrate (IMDUR) 30 MG 24 hr         Tablet refilled as directed.explained clearly about the medication,insructions and side effects.           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      Patient's spouse and daughter were informed that we will order home care for home safety evaluation and also evaluate for the need for bedside commode/physical therapy/walker with seat.    Review of external notes as documented elsewhere in note-reviewed ENT and ophthalmology notes  40  minutes spent by me on the date of the encounter doing chart review, history and exam, documentation and further activities per the      Summer Vega MD  [facility]    Anup Nam is a 73 year old, presenting for the following health issues along with her  and daughter to discuss about diagnosis of Parkinson's and dementia and need for home care and DME supplies:  Patient is being seen for an medication check.       10/17/2023     1:06 PM   Additional Questions   Roomed by meghann   Accompanied by        History of Present Illness       Reason for visit:  Recheck    She eats 0-1 servings of fruits and vegetables daily.She consumes 0 sweetened beverage(s) daily.She exercises with enough effort to increase her heart rate 9 or less minutes per day.  She exercises with enough effort to increase her heart rate 3 or less days per week.   She is taking medications regularly.     Pt is a 73 year old female who is seen here to day along with the  and her daughter to discuss patient's medical issues and need for home care.  Patient recently had  sudden vision loss in the right eye in early October 2023. Had enhancing mass above globe,  Ophthalmology consulted  and on 10/8/2023- Underwent right orbitotomy with orbital tissue biopsy and evacuation of subperiosteal hematoma (10/8/23). Surgical pathology demonstrated evidence of extensive acute inflammation; no evidence of malignancy.  Patient has been following up with the ophthalmologist has right vision loss.    Patient was also recently discovered right ethmoid mucocele during hospitalization .  And was following up with ENT and scheduling for surgery soon.   Patient and  states it is difficult for patient to navigate and ambulate  at home, requesting walker with seat, also additional accommodations.  Patient needs assistance in showering, dressing, transportation.  Patient's spouse is also requesting bedside commode.    Patient also has been diagnosed with Parkinson's and has been following up with neurology and is on carbidopa/levodopa and Aricept.      Hyperlipidemia Follow-Up    Are you regularly taking any medication or supplement to lower your cholesterol?   Yes- atorvastatin  Are you having muscle aches or other side effects that you think could be caused by your cholesterol lowering medication?  No    History of diabetes: Follows up with  endocrinologist  History of kidney transplant: Follows up with nephrologist and transplant clinic  History of orbital mass and acute vision loss of right eye s/p right orbitotomy: Follows up with ophthalmologist    Hypertension Follow-up    Do you check your blood pressure regularly outside of the clinic? No   Are you following a low salt diet? No  Are your blood pressures ever more than 140 on the top number (systolic) OR more   than 90 on the bottom number (diastolic), for example 140/90? No    BP Readings from Last 2 Encounters:   11/08/23 117/53   10/17/23 106/72     Hemoglobin A1C (%)   Date Value   08/04/2023 7.2 (H)   04/04/2023 6.2 (H)   07/06/2021 6.7 (H)   03/18/2021 6.8 (H)     LDL Cholesterol Calculated (mg/dL)   Date Value   04/04/2023 89   07/29/2022 104 (H)   10/29/2020 95   11/20/2019 97       Past Medical History:   Diagnosis Date    Abdominal wall hernia     RLQ    AK (actinic keratosis) 08/06/2020    Allergic rhinitis     CAD (coronary artery disease)     coronary artery disease s/p PCI to LAD in 2005coronary artery disease s/p PCI to LAD in 2005    Diabetes mellitus, type 2 (H)     follows up with endocrinologist.    Dyslipidemia     GERD (gastroesophageal reflux disease)     H/O diabetic nephropathy     s/p kidney trnsplant    Hypertension     Hypothyroidism     Immunosuppressed status (H24)     Kidney replaced by transplant     Rt    PONV (postoperative nausea and vomiting)     Shingles     Urinary tract infection 8/12/2022    Vitamin B12 deficiency anemia        Current Outpatient Medications   Medication Sig Dispense Refill    acetaminophen (TYLENOL) 325 MG tablet Take 3 tablets (975 mg) by mouth every 8 hours as needed for mild pain      amLODIPine (NORVASC) 10 MG tablet Take 1 tablet (10 mg) by mouth daily 90 tablet 1    ASPIRIN PO Take 81 mg by mouth daily      atorvastatin (LIPITOR) 40 MG tablet Take 0.5 tablets (20 mg) by mouth daily 45 tablet 1    carbidopa-levodopa (SINEMET)  MG  tablet Take 1.5 tablets by mouth 3 times daily Through Neurologist      cholecalciferol 125 MCG (5000 UT) CAPS Take 2 capsules by mouth daily      Continuous Blood Gluc  (DEXCOM G7 ) LUCAS Use to read blood sugars as 's instructions. 1 each 0    Continuous Blood Gluc Sensor (DEXCOM G7 SENSOR) MISC Change every 10 days. 3 each 5    Continuous Blood Gluc Transmit (DEXCOM G6 TRANSMITTER) MISC See Admin Instructions 1 each 1    cyanocobalamin (VITAMIN B-12) 1000 MCG tablet Take 1,000 mcg by mouth Every Mon, Wed, Fri Morning      cycloSPORINE modified (GENERIC EQUIVALENT) 25 MG capsule Take 2 capsules (50 mg) by mouth 2 times daily 120 capsule 11    donepezil (ARICEPT) 10 MG tablet Take 10 mg by mouth Through Neurologist      insulin aspart (NOVOLOG PEN) 100 UNIT/ML pen Inject 5-10 units subcutaneous with meals and correction doses as directed, total daily dose approx 25 units 15 mL 5    insulin glargine (BASAGLAR KWIKPEN) 100 UNIT/ML pen Inject 15-17 Units Subcutaneous daily 15 mL 5    isosorbide mononitrate (IMDUR) 30 MG 24 hr tablet TAKE 1/2 TABLET DAILY BY MOUTH (15 MG) 45 tablet 1    levothyroxine (SYNTHROID/LEVOTHROID) 100 MCG tablet Take 1 tablet (100 mcg) by mouth daily 90 tablet 1    losartan (COZAAR) 100 MG tablet TAKE 1/2 OF A TABLET (50 MG) BY MOUTH DAILY 45 tablet 1    metoprolol succinate ER (TOPROL XL) 25 MG 24 hr tablet Take 1 tablet (25 mg) by mouth At Bedtime 90 tablet 1    mycophenolic acid (GENERIC EQUIVALENT) 180 MG EC tablet Take 2 tablets (360 mg) by mouth 2 times daily 180 tablet 11    pantoprazole (PROTONIX) 40 MG EC tablet TAKE 1 TABLET BY MOUTH EVERY DAY 90 tablet 2         Review of Systems   CONSTITUTIONAL: NEGATIVE for fever, chills, change in weight  EYES: Right eye vision loss  RESP: NEGATIVE for significant cough or SOB  CV: NEGATIVE for chest pain, palpitations or peripheral edema  MUSCULOSKELETAL: Gait abnormality/Parkinson's  NEURO: Parkinson's disease     "  Objective    /60   Pulse 58   Temp (!) 96.6  F (35.9  C) (Tympanic)   Resp 18   Ht 1.6 m (5' 3\")   Wt 67.3 kg (148 lb 6.4 oz)   LMP  (LMP Unknown)   SpO2 97%   Breastfeeding No   BMI 26.29 kg/m    Body mass index is 26.29 kg/m .  Physical Exam   GENERAL: alert and no distress  RESP: lungs clear to auscultation - no rales, rhonchi or wheezes  CV: regular rate and rhythm, normal S1 S2,    MS:  no edema, no calf tenderness  CNS; short shuffling gait           "

## 2023-11-10 NOTE — TELEPHONE ENCOUNTER
Messages reviewed.  I have not received any forms from her [facility] pharmacy regarding the Dexcom CGM sensor, but have been waiting for the form to be sent to our clinic.      I spoke with her  Johnny today, reviewed the [facility] option... which he now prefers, and have now sent the DexcomG7 sensor Rx to [facility].  He thanked me for my assistance and said Viv has a follow-up endocrinology appointment in the next few weeks.    DIANN Fitzpatrick MD, MS  Endocrinology  [facility]

## 2023-11-10 NOTE — TELEPHONE ENCOUNTER
Per staff message:       ----- Message -----  From: Helen Rojas  Sent: 11/10/2023  10:58 AM CST  To: Summer Vega MD; *  Subject: START OF CARE DELAY NEEDED FOR 11/12            Hello!   We received SN/PT order for this patient, however we have a delay for start of care until 11/12. I just need a okay for the delayed start of care.  I can receive v/o  here in [id number]. Thank you    Helen Rojas Lvn  Clinical   [facility].  [phone] ext [phone]

## 2023-11-10 NOTE — TELEPHONE ENCOUNTER
Messages reviewed.  I have not received any forms from her [facility] pharmacy regarding the Dexcom CGM sensor, but have been waiting for the form to be sent to our clinic.  Today, I called patient and spoke with her  Johnny.  He said they prefer using the [facility] ([facility]) since they had success getting the G7  from that pharmacy.  I agreed, have now sent the DexcomG7 sensor Rx to [facility].  He thanked me for my assistance and said Viv has a follow-up endocrinology appointment in the next few weeks.    DIANN Fitzpatrick MD, MS  Endocrinology  [facility]

## 2023-11-10 NOTE — TELEPHONE ENCOUNTER
Spoke with Ann with [facility] intake informed of provider's message below.

## 2023-11-13 NOTE — TELEPHONE ENCOUNTER
Patient is scheduled for surgery with Dr. Hernandez.     Spoke with: Patient     Date of Surgery: 12/19/23.     Location: [facility] OR     Pre op with Provider: ANNIKA     H&P: Patient will schedule pre op at [facility] in [place]. Patient informed pre op will need to be done within 30 days of scheduled surgery date.     Additional imaging/appointments: Patient is scheduled for a post op with Dr. Hernandez on 1/03/24 at 4:20.     Surgery packet: Will mail packet, confirmed with patient address in chart works best. Patient made aware arrival time will not be listed within packet. Soap will be sent as well.      Additional comments: Writer informed patient a pre op nurse will call a few days prior to surgery to go over further details/give arrival time.         Angela Shanks on 11/13/2023 at 4:18 PM

## 2023-11-13 NOTE — TELEPHONE ENCOUNTER
Patient needs home care for home safety evaluation and med management, has history of Parkinson/dementia please check who does home safety eval

## 2023-11-13 NOTE — TELEPHONE ENCOUNTER
[facility] calling as the received a referral from us. However the patient is already doing outpatient PHYSICAL THERAPY and so she does not qualify for homecare too.The patient feels comfortable doing outpatient and wants to continue with that.    Kylah [phone]

## 2023-11-16 NOTE — TELEPHONE ENCOUNTER
Farrukh from [facility] calling for verbal orders  PT & OT evaluate and treat  Skilled nursing  1 visit a week for 2 weeks  1 visit every other week for 4 weeks    Drug interactions between Cyclosporine and Atorvastatin    Ok to call and lm [phone]

## 2023-11-17 NOTE — TELEPHONE ENCOUNTER
Call to Sary. Advised.    Sary states Cyclosporine is new because patient was previously taking Gengraf but that was out of stock. Please clarify question about drug interaction with Atorvastatin.

## 2023-11-21 NOTE — TELEPHONE ENCOUNTER
Please advise patient to reduce metoprolol 25 mg tablet to take 1/2 ( 12.5 mg)  tablet daily and monitor heart rate, call if heart rate is less than 50

## 2023-11-21 NOTE — TELEPHONE ENCOUNTER
Left message on Sary's voicemail asking her to return call to clinic for a message on patient.  The outgoing voicemail message did not identify Sary or her company. ERNA Albright R.N.

## 2023-11-21 NOTE — TELEPHONE ENCOUNTER
Sary with home care calling to ask primary care provider to change heart rate parameters to call for heart rate less than 50 instead of less than 60.  Patient has consistently been running heart rate in the 50s.  Today was 50, last week 54.    Call Sary at [phone].  ERNA Albright R.N.

## 2023-11-22 NOTE — TELEPHONE ENCOUNTER
Reason for Call:  Other appointment    Detailed comments: pt needs to get in for pre-op 10 days prior to sug date on 12/19. Please contact   Tir[facility]    Phone Number Patient can be reached at: Cell number on file:    Telephone Information:   Mobile [phone]        Best Time: any    Can we leave a detailed message on this number? Not Applicable    Call taken on 11/22/2023 at 12:35 PM by Dina Avendano

## 2023-11-29 NOTE — TELEPHONE ENCOUNTER
Patient is on atorvastatin and cyclosporine for several years and transplant clinic is aware of this

## 2023-12-01 NOTE — TELEPHONE ENCOUNTER
Johnny called to discuss Viv's labs. We reviewed her cyclosporine at goal, and all results are at baseline.     Viv has lost eye sight in her right eye and was recently diagnosed with Parkinson's and mild dementia but is otherwise doing well.     Johnny will call with any other questions.

## 2023-12-01 NOTE — TELEPHONE ENCOUNTER
[facility] calling for orders. New PHYSICAL THERAPY   1/week for 6 weeks.    Mariah [phone]

## 2023-12-01 NOTE — TELEPHONE ENCOUNTER
Home Care is calling regarding an established patient with [facility].       Requesting orders from: Summer Vega  Provider is following patient: Yes  Is this a 60-day recertification request?  No    Orders Requested    Physical Therapy  Request for initial certification (first set of orders)   Frequency:  1x/wk for 6 wks      Confirmed ok to leave a detailed message with call back.  Contact information confirmed and updated as needed.    Nataliya Deng RN

## 2023-12-04 NOTE — TELEPHONE ENCOUNTER
"Voicemail outgoing message does not identify anyone responsible for this voicemail.  Left generic message for Sary to call back about patient named \"Valeria\". ERNA Albright R.N.    "

## 2023-12-04 NOTE — TELEPHONE ENCOUNTER
OCCUPATIONAL THERAPY / PHYSICAL THERAPY order received via fax. Form in your mailbox to be signed.

## 2023-12-06 NOTE — PROGRESS NOTES
Clinic Care Coordination Contact  Community Health Worker Follow Up    Care Gaps:     Health Maintenance Due   Topic Date Due    COVID-19 Vaccine (7 - 2023-24 season) 11/24/2023       Scheduled to be addressed at ongoing PCP appointments      Care Plan:   Care Plan: Help At Home       Problem: Potential need for additional supports       Goal: Establish adequate home support if/when needed       Start Date: 10/25/2023 Expected End Date: 2/26/2024    This Visit's Progress: 20% Recent Progress: 10%    Priority: High    Note:     Barriers: new vision loss, PD/dementia  Strengths: spousal support  Patients spouse expressed understanding of goal: Yes  Action steps to achieve this goal:  I will continue to lean on informal supports of my: family  My family will review resources and supports sent to me via E-mail  My family will outreach to supports and consider establishing with ones I might find beneficial.  My family will follow up with scheduled appointments as recommended  My family will contact my care team with questions, concerns, support needs.   My family will use the clinic as a resource and I understand I can contact my clinic with 24/7 after hours services available.   My family will continue to outreach to care coordination as needed for additional resources or supports.                               Intervention and Education during outreach:     CHW was able to connect with the Patient's  (CTC), Patient on speaker phone for the call. They share that right now, they are receiving Home Health Care Services: Skilled Nursing and Physical Therapy.    Reviewed that resources sent by CC SW were sent to their email. They voiced understanding and can utilize as they see fit as a supplement to the specialty's Patient is already engaged with.     Inquired if they had any additional questions or concerns during the call, however they did not. Encouraged them to reach out to CC with any questions or concerns;  They voiced understanding and agreement with plan.    CHW Plan: Writer will reach out to the Patient/ in 1 month to monitor the progression of their goals.        Haven BERGER. Public Health  Community Health Worker  [facility]:  [place] [facility] [place]   Clinic Care Coordination  [phone]

## 2023-12-06 NOTE — NURSING NOTE
Chief Complaint   Patient presents with    Follow Up     Diabetes mellitus type 1.5, managed as type 1 (H)       Vitals:    12/06/23 1112   BP: (!) 166/74   BP Location: Left arm   Patient Position: Sitting   Cuff Size: Adult Regular   Pulse: 61   Resp: 16   SpO2: 98%   Weight: 66.7 kg (147 lb)       Body mass index is 26.04 kg/m .

## 2023-12-06 NOTE — TELEPHONE ENCOUNTER
Called and [facility] a voice mail message on December 6, 2023 10:39 AM to call back the clinic. Not detailed because voicemail didn't seem secure.     Thank you,  Kali Louis, Triage RN [facility]  10:39 AM 12/6/2023

## 2023-12-06 NOTE — PROGRESS NOTES
Clinic Care Coordination Contact  Care Coordination Clinician Chart Review    Situation: Patient chart reviewed by Care Coordinator.       Background: Care Coordination Program started: 10/25/2023. Initial assessment completed and patient-centered care plan(s) were developed with participation from patient. Lead CC handed patient off to CHW for continued outreaches.       Assessment: Per chart review, patient outreach completed by CC CHW on 12/6/2023. Patient is actively working to accomplish goal(s). Patient's goal(s) appropriate and relevant at this time. Patient is not due for updated Plan of Care. Assessments will be completed annually or as needed/with change of patient status.      Care Plan: Help At Home       Problem: Potential need for additional supports       Goal: Establish adequate home support if/when needed       Start Date: 10/25/2023 Expected End Date: 2/26/2024    This Visit's Progress: 20% Recent Progress: 10%    Priority: High    Note:     Barriers: new vision loss, PD/dementia  Strengths: spousal support  Patients spouse expressed understanding of goal: Yes  Action steps to achieve this goal:  I will continue to lean on informal supports of my: family  My family will review resources and supports sent to me via E-mail  My family will outreach to supports and consider establishing with ones I might find beneficial.  My family will follow up with scheduled appointments as recommended  My family will contact my care team with questions, concerns, support needs.   My family will use the clinic as a resource and I understand I can contact my clinic with 24/7 after hours services available.   My family will continue to outreach to care coordination as needed for additional resources or supports.                           Plan/Recommendations: The patient will continue working with Care Coordination to achieve goal(s) as above. CHW will continue outreaches at minimum every 30 days and will involve Lead  CC as needed or if patient is ready to move to Maintenance. Lead CC will continue to monitor CHW outreaches and patient's progress to goal(s) every 6 weeks.     Plan of Care updated and sent to patient: EJ Delgado/[facility]  Social Work Care Coordinator  [facility], and [place]  Phone: [phone]

## 2023-12-06 NOTE — LETTER
12/6/2023         RE: Viv Shaw  [address]  [place] [address]        Dear Colleague,    Thank you for referring your patient, Viv Shaw, to the [facility] [place]. Please see a copy of my visit note below.        Recent issues:   Diabetes follow-up evaluation, with her  Bill  She has been feeling well, but new diagnoses of possible Parkinson's disease and dementia  Planning to have sinus surgery at [facility] with Dr. Josef Hernandez/ENT        Diagnosis of diabetes mellitus age 23, living in [place] [facility]  Recalls having vision problem, saw eye physician and then family physician, also had frequent urination  Initial treatment with oral medication for few weeks, then change to insulin  Had seen Dr. Castillo in [place] [facility] [place]  Subsequent evaluations with Dr. GABRIELE Vickers, then saw Dr. Elmer Garvin/[facility] for endocrinology evaluations  Has used insulin injections 4x per day  Previous [facility] hgbA1c trends include:     Lab Test 07/06/21  1422 03/18/21  0636 10/29/20  0710 03/27/20  0648 11/20/19  0803   A1C 6.7* 6.8* 7.0* 6.8* 6.9*         8/10/21. Initial diabetes evaluation with me at [facility]  Reviewed health history and diabetes issues  Gets her insulin pens from [place] mail order  Meals TID    Current DM medications:  Novolog Flexpen    Breakfast 6U   Lunch  5U   Supper  5U  Novolog sscale guestimates  Basaglar Kwikpen  15U subcutaneous in evening    Blood glucose (BG) meter: Raad Contour?   Has tested 4x per day previously   Less frequent testing when using CGM sensor    Has used the DexcomG5, then the G6 CGM sensor, then G7 CGM:  Recent DexcomG7 data:          FamHx DM:  Niece  Recent [facility] labs include:  Lab Results   Component Value Date    A1C 6.7 (H) 12/01/2023     12/01/2023    POTASSIUM 4.4 12/01/2023    CHLORIDE 101 12/01/2023    CO2 29 12/01/2023    ANIONGAP 8 12/01/2023     (H) 12/01/2023    BUN 23.8 (H) 12/01/2023    CR 1.21 (H) 12/01/2023     GFRESTIMATED 47 (L) 12/01/2023    GFRESTBLACK 45 (L) 06/23/2021    NAVI 9.0 12/01/2023    CPEPT 0.2 (L) 11/29/2021    CHOL 179 04/04/2023    TRIG 131 04/04/2023    HDL 64 04/04/2023    LDL 89 04/04/2023    NHDL 115 04/04/2023    UCRR 52.3 12/01/2023    UCRR 52.4 12/01/2023    MICROL <12.0 12/01/2023    UMALCR  12/01/2023      Comment:      Unable to calculate, urine albumin and/or urine creatinine is outside detectable limits.  Microalbuminuria is defined as an albumin:creatinine ratio of 17 to 299 for males and 25 to 299 for females. A ratio of albumin:creatinine of 300 or higher is indicative of overt proteinuria.  Due to biologic variability, positive results should be confirmed by a second, first-morning random or 24-hour timed urine specimen. If there is discrepancy, a third specimen is recommended. When 2 out of 3 results are in the microalbuminuria range, this is evidence for incipient nephropathy and warrants increased efforts at glucose control, blood pressure control, and institution of therapy with an angiotensin-converting-enzyme (ACE) inhibitor (if the patient can tolerate it).      TSH 0.22 (L) 10/07/2023    T4 1.68 10/07/2023     Last eye exam with Dr. Arevalo/[facility] in 8/2022     DM Complications:   Nephropathy    Previous ESRD, then renal transplant 2005    Sees Dr. Morro Veloz/nephrology   Retinopathy    Previous PDR and laser surgeries [facility]    Had seen Dr. Grayson Miles/[facility] clinic, now sees Dr. Arevalo/[facility] at that clinic building    Macrovascular disease    CAD, previous cardiac stent placement      Lives in [place], [place]  Sees Dr. Summer Vega/[facility] for general medicine evaluations.    PMH/PSH:  Past Medical History:   Diagnosis Date     Abdominal wall hernia     RLQ     AK (actinic keratosis) 08/06/2020     Allergic rhinitis      CAD (coronary artery disease)     coronary artery disease s/p PCI to LAD in 2005coronary artery disease s/p  PCI to LAD in      Dyslipidemia      GERD (gastroesophageal reflux disease)      H/O diabetic nephropathy     s/p kidney trnsplant     Hypertension      Hypothyroidism      Immunosuppressed status (H24)      Kidney replaced by transplant     Rt     PONV (postoperative nausea and vomiting)      Shingles      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, managed as type 1 (H)     nephropathy, retinopathy, macrovascular disease     Urinary tract infection 2022     Vitamin B12 deficiency anemia      Past Surgical History:   Procedure Laterality Date     APPENDECTOMY NOS       ARTHROSCOPY SHOULDER ROTATOR CUFF REPAIR Left      COLONOSCOPY N/A 2016    Procedure: COLONOSCOPY;  Surgeon: Rashard Snider MD;  Location: [facility]     Coronary angioplasty with stent       HYSTERECTOMY       Kidney Transplant NOS Right      LAPAROSCOPIC CHOLECYSTECTOMY       NOSE SURGERY       OPEN SEPARATION COMPONENT HERNIORRHAPHY N/A 10/16/2017    Procedure: OPEN SEPARATION COMPONENT HERNIORRHAPHY;;  Surgeon: CARL Lott MD;  Location: [facility]     ORBITOTOMY Right 10/8/2023    Procedure: RIGHT ORBITOTOMY WITH BIOPSY AND DRAINAGE OF ABSCESS;  Surgeon: Xu Mahcado MD;  Location: [facility] OR     RECONSTRUCT ABDOMINAL WALL N/A 10/16/2017    Procedure: RECONSTRUCT ABDOMINAL WALL;  Exploratory Laparotomy, Lysis of Adhesions, Abdominal Wall reconstruction, Inlay Xen AB mesh, Mitek Suture [place] and Umbilical Hernia Repair.;  Surgeon: CARL Lott MD;  Location: [facility] OR     REMOVE CATARACT INTRACAP,INSERT LENS Right      TONSILLECTOMY         Family Hx:  Family History   Problem Relation Age of Onset     Respiratory Mother          age 71     Cardiovascular Father          age 75 hyertension     Arthritis Sister         living, healthy     Family History Negative Brother          age 44     Colon Cancer No family hx of          Social Hx:  Social History     Socioeconomic History     Marital status:       Spouse name: Not on file     Number of children: Not on file     Years of education: Not on file     Highest education level: Not on file   Occupational History     Not on file   Tobacco Use     Smoking status: Never     Passive exposure: Never     Smokeless tobacco: Never   Vaping Use     Vaping Use: Never used   Substance and Sexual Activity     Alcohol use: No     Drug use: No     Sexual activity: Yes     Partners: Male   Other Topics Concern     Parent/sibling w/ CABG, MI or angioplasty before 65F 55M? Not Asked   Social History Narrative     Not on file     Social Determinants of Health     Financial Resource Strain: Low Risk  (10/13/2023)    Financial Resource Strain      Within the past 12 months, have you or your family members you live with been unable to get utilities (heat, electricity) when it was really needed?: No   Food Insecurity: Low Risk  (10/13/2023)    Food Insecurity      Within the past 12 months, did you worry that your food would run out before you got money to buy more?: No      Within the past 12 months, did the food you bought just not last and you didn t have money to get more?: No   Transportation Needs: Low Risk  (10/13/2023)    Transportation Needs      Within the past 12 months, has lack of transportation kept you from medical appointments, getting your medicines, non-medical meetings or appointments, work, or from getting things that you need?: No   Physical Activity: Not on file   Stress: Not on file   Social Connections: Not on file   Interpersonal Safety: Low Risk  (11/9/2023)    Interpersonal Safety      Do you feel physically and emotionally safe where you currently live?: Yes      Within the past 12 months, have you been hit, slapped, kicked or otherwise physically hurt by someone?: No      Within the past 12 months, have you been humiliated or emotionally abused in other ways by your partner or ex-partner?: No   Housing Stability: Low Risk  (10/13/2023)    Housing Stability       Do you have housing? : Yes      Are you worried about losing your housing?: No          MEDICATIONS:  has a current medication list which includes the following prescription(s): acetaminophen, amlodipine, aspirin, atorvastatin, carbidopa-levodopa, cholecalciferol, dexcom g7 , dexcom g7 sensor, cyanocobalamin, cyclosporine modified, donepezil, insulin aspart, insulin glargine, isosorbide mononitrate, levothyroxine, losartan, metoprolol succinate er, mycophenolic acid, and pantoprazole.    ROS:     ROS: 10 point ROS neg other than the symptoms noted above in the HPI.    GENERAL: some fatigue, wt stable; denies fevers, chills, night sweats.   HEENT: right eye vision loss; no dysphagia, odonophagia, diplopia, neck pain  THYROID:  no apparent hyper or hypothyroid symptoms  CV: no chest pain, pressure, palpitations  LUNGS: no SOB, JIMENEZ, cough, wheezing   ABDOMEN: no diarrhea, constipation, abdominal pain  EXTREMITIES: no rashes, ulcers, edema  NEUROLOGY: decreased sensation at feet, balance problems; no headaches, denies changes in vision, tingling  MSK: some leg weakness, denies specific arthralgias  SKIN: no rashes or lesions  :  no menses  PSYCH:  stable mood, no significant anxiety or depression  ENDOCRINE: no heat or cold intolerance      Physical Exam   VS: BP (!) 158/70   Pulse 61   Resp 16   Wt 66.7 kg (147 lb)   LMP  (LMP Unknown)   SpO2 98%   BMI 26.04 kg/m    GENERAL: AXOX3, NAD, well dressed, answering questions appropriately, appears stated age.  ENT: no nose swelling or nasal discharge, mouth redness or gum changes.  EYES: eyes grossly normal to inspection, conjunctivae and sclerae normal, no exophthalmos or proptosis  THYROID:  no apparent nodules or goiter  LUNGS: no audible wheeze, cough or visible cyanosis, or increased work of breathing  ABDOMEN: abdomen mildly obese size  EXTREMITIES: mild lower leg edema; no skin lesions noted  NEUROLOGY: CN grossly intact, no tremors  MSK: grossly  intact  SKIN:  no apparent skin lesions, rash, or edema with visualized skin appearance  PSYCH: mentation appears normal, affect normal/bright, judgement and insight intact,   normal speech and appearance well groomed      LABS:    All pertinent notes, labs, and images personally reviewed by me.     A/P:  Encounter Diagnoses   Name Primary?     Diabetes mellitus type 1.5, managed as type 1 (H) Yes     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)      Kidney replaced by transplant      Type 1 diabetes mellitus with retinopathy, macular edema presence unspecified, unspecified laterality, unspecified retinopathy severity (H)      Type 1 diabetes mellitus with other circulatory complication (H)          Comments:  Reviewed complicated health history and diabetes issues  Previous low C-Peptide, negative islet cell antibody, and diabetes history indicate Type 1.5 DM.  Overall CGM trends good, DexcomG7 CGM sensor helpful, recent post breakfast meal hyperglycemia trends  Reviewed and interpreted tests that I previously ordered.   Ordered appropriate tests for the endocrinology disease management.    Management options discussed and implemented after shared medical decision making with the patient.  T1.5DM problem is chronic-stable    Plan:  Reviewed general issues with the diabetes diagnosis and management  We discussed the hgbA1c test which reflects previous overall glucose levels or control  Discussed the importance of blood glucose (BG) testing to assess glucose trends  Provided general overview of the diabetes medication options and medication treatment plan.  Reviewed recent DexcomG6 CGM glucose trend data, in detail.    Recommend:  Continue current Novolog and Basaglar insulin medication use  Novolog Flexpen    Breakfast 6U   Lunch  5U   Supper  5U  Novolog sscale 1U per 50 mg/dl >200 mg/dl (premeal)  Basaglar Kwikpen  14U subcutaneous in evening    Avoid taking Novolog after start of meal... take dose  premeal  Discussed the ideal mealtime and correction scale dosing routine   Use ISF 1U per 50 mg/dl >200 mg/dl   Avoid using nighttime Nv sscale unless SG >250 mg/dl  Continue use of the DexcomG7 CGM sensor   Goal target premeal SG  mg/dl   Patient's insulin regimen requires frequent dose adjustments by patient on basis of therapeutic BGM/CGM test results  We have discussed hypoglycemia prevention  Plan repeat labs in early 3/2024   Testing at [facility]   Lab orders placed  Keep focus on diet, exercise, weight management.  Advise having fasting lipid panel testing and dilated eye examination, at least annually    Keep regular follow-up evaluations with neurologist, nephrologist, cardiologist, ophthalmologist, and PCP   Addressed patient questions today    There are no Patient Instructions on file for this visit.    Future labs ordered today:   Orders Placed This Encounter   Procedures     GLUCOSE MONITOR, 72 HOUR, PHYS INTERP     TSH with free T4 reflex     Hemoglobin A1c     Radiology/Consults ordered today: None    Total time spent on day of encounter:  26 min    Follow-up:  3/14/24 at 8:30 am, Return    DIANN Fitzpatrick MD, MS  Endocrinology  [facility]    CC:  Care Team            Again, thank you for allowing me to participate in the care of your patient.        Sincerely,        Juan M Fitzpatrick MD

## 2023-12-06 NOTE — PROGRESS NOTES
Recent issues:   Diabetes follow-up evaluation, with her  Johnny  She has been feeling well, but new diagnoses of possible Parkinson's disease and dementia  Planning to have sinus surgery at [facility] with Dr. Josef Hernandez/ENT        Diagnosis of diabetes mellitus age 23, living in [facility]  Recalls having vision problem, saw eye physician and then family physician, also had frequent urination  Initial treatment with oral medication for few weeks, then change to insulin  Had seen Dr. Castillo in [facility]  Subsequent evaluations with Dr. GABRIELE Vickers, then saw Dr. Elmer Garvin/[facility] for endocrinology evaluations  Has used insulin injections 4x per day  Previous [facility] hgbA1c trends include:     Lab Test 07/06/21  1422 03/18/21  0636 10/29/20  0710 03/27/20  0648 11/20/19  0803   A1C 6.7* 6.8* 7.0* 6.8* 6.9*         8/10/21. Initial diabetes evaluation with me at [place]  Reviewed health history and diabetes issues  Gets her insulin pens from [employer] mail order  Meals TID    Current DM medications:  Novolog Flexpen    Breakfast 6U   Lunch  5U   Supper  5U  Novolog sscale guestimates  Basaglar Kwikpen  15U subcutaneous in evening    Blood glucose (BG) meter: Raad Contour?   Has tested 4x per day previously   Less frequent testing when using CGM sensor    Has used the DexcomG5, then the G6 CGM sensor, then G7 CGM:  Recent DexcomG7 data:          FamHx DM:  Niece  Recent [facility] labs include:  Lab Results   Component Value Date    A1C 6.7 (H) 12/01/2023     12/01/2023    POTASSIUM 4.4 12/01/2023    CHLORIDE 101 12/01/2023    CO2 29 12/01/2023    ANIONGAP 8 12/01/2023     (H) 12/01/2023    BUN 23.8 (H) 12/01/2023    CR 1.21 (H) 12/01/2023    GFRESTIMATED 47 (L) 12/01/2023    GFRESTBLACK 45 (L) 06/23/2021    NAVI 9.0 12/01/2023    CPEPT 0.2 (L) 11/29/2021    CHOL 179 04/04/2023    TRIG 131 04/04/2023    HDL 64 04/04/2023    LDL 89 04/04/2023    NHDL 115 04/04/2023    UCRR 52.3 12/01/2023    UCRR 52.4 12/01/2023     MICROL <12.0 12/01/2023    UMALCR  12/01/2023      Comment:      Unable to calculate, urine albumin and/or urine creatinine is outside detectable limits.  Microalbuminuria is defined as an albumin:creatinine ratio of 17 to 299 for males and 25 to 299 for females. A ratio of albumin:creatinine of 300 or higher is indicative of overt proteinuria.  Due to biologic variability, positive results should be confirmed by a second, first-morning random or 24-hour timed urine specimen. If there is discrepancy, a third specimen is recommended. When 2 out of 3 results are in the microalbuminuria range, this is evidence for incipient nephropathy and warrants increased efforts at glucose control, blood pressure control, and institution of therapy with an angiotensin-converting-enzyme (ACE) inhibitor (if the patient can tolerate it).      TSH 0.22 (L) 10/07/2023    T4 1.68 10/07/2023     Last eye exam with Dr. Arevalo/[facility] in 8/2022     DM Complications:   Nephropathy    Previous ESRD, then renal transplant 2005    Sees Dr. Morro Veloz/nephrology   Retinopathy    Previous PDR and laser surgeries [facility]    Had seen Dr. Grayson Miles/[facility], now sees Dr. Arevalo/[facility] at that clinic building    Macrovascular disease    CAD, previous cardiac stent placement      Lives in [place], [place]  Sees Dr. Summer Vega/[facility] for general medicine evaluations.    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, managed as type 1 (H)     nephropathy, retinopathy, macrovascular disease    Urinary tract infection 2022    Vitamin B12 deficiency anemia      Past Surgical History:   Procedure Laterality Date    APPENDECTOMY NOS      ARTHROSCOPY SHOULDER ROTATOR CUFF REPAIR Left     COLONOSCOPY N/A 2016    Procedure: COLONOSCOPY;  Surgeon: Rashard Snider MD;  Location: [facility]    Coronary angioplasty with stent      HYSTERECTOMY      Kidney Transplant NOS Right     LAPAROSCOPIC CHOLECYSTECTOMY      NOSE SURGERY      OPEN SEPARATION COMPONENT HERNIORRHAPHY N/A 10/16/2017    Procedure: OPEN SEPARATION COMPONENT HERNIORRHAPHY;;  Surgeon: CARL Lott MD;  Location: [facility]    ORBITOTOMY Right 10/8/2023    Procedure: RIGHT ORBITOTOMY WITH BIOPSY AND DRAINAGE OF ABSCESS;  Surgeon: Xu Machado MD;  Location: [facility]    RECONSTRUCT ABDOMINAL WALL N/A 10/16/2017    Procedure: RECONSTRUCT ABDOMINAL WALL;  Exploratory Laparotomy, Lysis of Adhesions, Abdominal Wall reconstruction, Inlay Xen AB mesh, Mitek Suture Holdrege and Umbilical Hernia Repair.;  Surgeon: CARL Lott MD;  Location: [facility] OR    REMOVE CATARACT INTRACAP,INSERT LENS Right     TONSILLECTOMY         Family Hx:  Family History   Problem Relation Age of Onset    Respiratory Mother          age 71    Cardiovascular Father          age 75 hyertension    Arthritis Sister         living, healthy    Family History Negative Brother          age 44    Colon Cancer No family hx of          Social Hx:  Social History     Socioeconomic History    Marital status:      Spouse name: Not on file    Number of children: Not on file    Years of education: Not on file    Highest education level: Not on file   Occupational History    Not on file   Tobacco Use    Smoking status: Never     Passive exposure: Never    Smokeless tobacco: Never   Vaping Use    Vaping Use: Never used    Substance and Sexual Activity    Alcohol use: No    Drug use: No    Sexual activity: Yes     Partners: Male   Other Topics Concern    Parent/sibling w/ CABG, MI or angioplasty before 65F 55M? Not Asked   Social History Narrative    Not on file     Social Determinants of Health     Financial Resource Strain: Low Risk  (10/13/2023)    Financial Resource Strain     Within the past 12 months, have you or your family members you live with been unable to get utilities (heat, electricity) when it was really needed?: No   Food Insecurity: Low Risk  (10/13/2023)    Food Insecurity     Within the past 12 months, did you worry that your food would run out before you got money to buy more?: No     Within the past 12 months, did the food you bought just not last and you didn t have money to get more?: No   Transportation Needs: Low Risk  (10/13/2023)    Transportation Needs     Within the past 12 months, has lack of transportation kept you from medical appointments, getting your medicines, non-medical meetings or appointments, work, or from getting things that you need?: No   Physical Activity: Not on file   Stress: Not on file   Social Connections: Not on file   Interpersonal Safety: Low Risk  (11/9/2023)    Interpersonal Safety     Do you feel physically and emotionally safe where you currently live?: Yes     Within the past 12 months, have you been hit, slapped, kicked or otherwise physically hurt by someone?: No     Within the past 12 months, have you been humiliated or emotionally abused in other ways by your partner or ex-partner?: No   Housing Stability: Low Risk  (10/13/2023)    Housing Stability     Do you have housing? : Yes     Are you worried about losing your housing?: No          MEDICATIONS:  has a current medication list which includes the following prescription(s): acetaminophen, amlodipine, aspirin, atorvastatin, carbidopa-levodopa, cholecalciferol, dexcom g7 , dexcom g7 sensor, cyanocobalamin,  cyclosporine modified, donepezil, insulin aspart, insulin glargine, isosorbide mononitrate, levothyroxine, losartan, metoprolol succinate er, mycophenolic acid, and pantoprazole.    ROS:     ROS: 10 point ROS neg other than the symptoms noted above in the HPI.    GENERAL: some fatigue, wt stable; denies fevers, chills, night sweats.   HEENT: right eye vision loss; no dysphagia, odonophagia, diplopia, neck pain  THYROID:  no apparent hyper or hypothyroid symptoms  CV: no chest pain, pressure, palpitations  LUNGS: no SOB, JIMENEZ, cough, wheezing   ABDOMEN: no diarrhea, constipation, abdominal pain  EXTREMITIES: no rashes, ulcers, edema  NEUROLOGY: decreased sensation at feet, balance problems; no headaches, denies changes in vision, tingling  MSK: some leg weakness, denies specific arthralgias  SKIN: no rashes or lesions  :  no menses  PSYCH:  stable mood, no significant anxiety or depression  ENDOCRINE: no heat or cold intolerance      Physical Exam   VS: BP (!) 158/70   Pulse 61   Resp 16   Wt 66.7 kg (147 lb)   LMP  (LMP Unknown)   SpO2 98%   BMI 26.04 kg/m    GENERAL: AXOX3, NAD, well dressed, answering questions appropriately, appears stated age.  ENT: no nose swelling or nasal discharge, mouth redness or gum changes.  EYES: eyes grossly normal to inspection, conjunctivae and sclerae normal, no exophthalmos or proptosis  THYROID:  no apparent nodules or goiter  LUNGS: no audible wheeze, cough or visible cyanosis, or increased work of breathing  ABDOMEN: abdomen mildly obese size  EXTREMITIES: mild lower leg edema; no skin lesions noted  NEUROLOGY: CN grossly intact, no tremors  MSK: grossly intact  SKIN:  no apparent skin lesions, rash, or edema with visualized skin appearance  PSYCH: mentation appears normal, affect normal/bright, judgement and insight intact,   normal speech and appearance well groomed      LABS:    All pertinent notes, labs, and images personally reviewed by me.     A/P:  Encounter  Diagnoses   Name Primary?    Diabetes mellitus type 1.5, managed as type 1 (H) Yes    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)     Kidney replaced by transplant     Type 1 diabetes mellitus with retinopathy, macular edema presence unspecified, unspecified laterality, unspecified retinopathy severity (H)     Type 1 diabetes mellitus with other circulatory complication (H)          Comments:  Reviewed complicated health history and diabetes issues  Previous low C-Peptide, negative islet cell antibody, and diabetes history indicate Type 1.5 DM.  Overall CGM trends good, DexcomG7 CGM sensor helpful, recent post breakfast meal hyperglycemia trends  Reviewed and interpreted tests that I previously ordered.   Ordered appropriate tests for the endocrinology disease management.    Management options discussed and implemented after shared medical decision making with the patient.  T1.5DM problem is chronic-stable    Plan:  Reviewed general issues with the diabetes diagnosis and management  We discussed the hgbA1c test which reflects previous overall glucose levels or control  Discussed the importance of blood glucose (BG) testing to assess glucose trends  Provided general overview of the diabetes medication options and medication treatment plan.  Reviewed recent DexcomG6 CGM glucose trend data, in detail.    Recommend:  Continue current Novolog and Basaglar insulin medication use  Novolog Flexpen    Breakfast 6U   Lunch  5U   Supper  5U  Novolog sscale 1U per 50 mg/dl >200 mg/dl (premeal)  Basaglar Kwikpen  14U subcutaneous in evening    Avoid taking Novolog after start of meal... take dose premeal  Discussed the ideal mealtime and correction scale dosing routine   Use ISF 1U per 50 mg/dl >200 mg/dl   Avoid using nighttime Nv sscale unless SG >250 mg/dl  Continue use of the DexcomG7 CGM sensor   Goal target premeal SG  mg/dl   Patient's insulin regimen requires frequent dose adjustments by patient on basis  of therapeutic BGM/CGM test results  We have discussed hypoglycemia prevention  Plan repeat labs in early 3/2024   Testing [facility]   Lab orders placed  Keep focus on diet, exercise, weight management.  Advise having fasting lipid panel testing and dilated eye examination, at least annually    Keep regular follow-up evaluations with neurologist, nephrologist, cardiologist, ophthalmologist, and PCP   Addressed patient questions today    There are no Patient Instructions on file for this visit.    Future labs ordered today:   Orders Placed This Encounter   Procedures    GLUCOSE MONITOR, 72 HOUR, PHYS INTERP    TSH with free T4 reflex    Hemoglobin A1c     Radiology/Consults ordered today: None    Total time spent on day of encounter:  26 min    Follow-up:  3/14/24 at 8:30 am, Return    DIANN Fitzpatrick MD, MS  Endocrinology  [facility]    CC:  Care Team

## 2023-12-07 NOTE — TELEPHONE ENCOUNTER
Sary called back. Advised that Cyclosporine and atorvastatin are OK to take together.   She agrees.

## 2023-12-12 NOTE — NURSING NOTE
"/60   Pulse 63   Temp 96.9  F (36.1  C) (Tympanic)   Resp 12   Ht 1.6 m (5' 3\")   Wt 65.8 kg (145 lb)   LMP  (LMP Unknown)   SpO2 97%   BMI 25.69 kg/m      "

## 2023-12-12 NOTE — PROGRESS NOTES
[place] [address]  [address]  [facility] [address]  Phone: [phone]  Primary Provider: Gail Elmore  Pre-op Performing Provider: GAIL ELMORE      PREOPERATIVE EVALUATION:  Today's date: 12/12/2023    Viv is a 73 year old, presenting for the following:  Pre-Op Exam        12/12/2023    10:27 AM   Additional Questions   Roomed by meghann   Accompanied by self         12/12/2023    10:27 AM   Patient Reported Additional Medications   Patient reports taking the following new medications none       Surgical Information:  Surgery/Procedure: SINUS SURGERY, ENDOSCOPIC, USING OPTICAL TRACKING SYSTEM  Right total ethmoidectomy/mucocele drainage   Surgery Location: [facility]  Surgeon: Dr. Hernandez  Surgery Date: 12/19/23  Time of Surgery: 0800  Where patient plans to recover: At home with family  Fax number for surgical facility: Note does not need to be faxed, will be available electronically in Epic.    Assessment & Plan     The proposed surgical procedure is considered LOW risk.    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  Plan: SINUS SURGERY, ENDOSCOPIC, USING OPTICAL TRACKING SYSTEM  Right total ethmoidectomy/mucocele drainage     (E10.21) 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)  Plan: Follows up with endocrinologist on Basaglar    (I15.1) Hypertension secondary to other renal disorders  Plan: Blood pressure stable on losartan and metoprolol    (D84.9) Immunosuppressed status (HCC)  Plan: Follows up with the transplant clinic on cyclosporine and mycophenolate    (E78.5) Hyperlipidemia LDL goal <100  Plan: On atorvastatin          - No identified additional risk factors other than previously addressed    Antiplatelet or Anticoagulation Medication Instructions:   -  aspirin: Discontinue aspirin 7- days prior to procedure to reduce bleeding risk.     Additional Medication Instructions:   - ACE/ARB: HOLD on day of surgery (minimum 11 hours for general anesthesia).   - Beta Blockers: Continue taking on the day of surgery.   - Statins: Continue taking on the day of surgery.    - Long acting insulin (e.g. glargine, detemir): Take 80% of the usual evening or morning dose before surgery   - short acting insulin (e.g. regular, lispro, aspart): HOLD on the morning of surgery.    - Alzheimer's medications: Continue without modification.   - NSAID's : HOLD 3 days before surgery.  .     RECOMMENDATION:  APPROVAL GIVEN to proceed with proposed procedure, without further diagnostic evaluation.          Subjective       HPI related to upcoming procedure: SINUS SURGERY, ENDOSCOPIC, USING OPTICAL TRACKING SYSTEM  Right total ethmoidectomy/mucocele drainage         12/12/2023    10:27 AM   Preop Questions   1. Have you ever had a heart attack or stroke? No   2. Have you ever had surgery on your heart or blood vessels, such as a stent placement, a coronary artery bypass, or surgery on an artery in your head, neck, heart, or legs? YES - Stent   3. Do you have chest pain with activity? No   4. Do you have a history of  heart failure? No   5. Do you currently have a cold, bronchitis or symptoms of other infection? No   6. Do you have a cough, shortness of breath, or wheezing? No   7. Do you or anyone in your family have previous history of blood clots? No   8. Do you or does anyone in your family have a serious bleeding problem such as prolonged bleeding following surgeries or cuts? No   9. Have you ever had problems with anemia or been told to take iron pills? No   10. Have you had any abnormal blood loss such as black, tarry or bloody stools, or abnormal vaginal bleeding? No   11. Have you ever had a blood transfusion? No   12. Are you willing to have a blood transfusion if it is medically needed  before, during, or after your surgery? Yes   13. Have you or any of your relatives ever had problems with anesthesia? YES - trouble waking up/Nausea   14. Do you have sleep apnea, excessive snoring or daytime drowsiness? No   15. Do you have any artifical heart valves or other implanted medical devices like a pacemaker, defibrillator, or continuous glucose monitor? No   16. Do you have artificial joints? No   17. Are you allergic to latex? No       Health Care Directive:  Patient does not have a Health Care Directive or Living Will: Patient states has Advance Directive and will bring in a copy to clinic.         Status of Chronic Conditions:  DIABETES - Patient has a longstanding history of DiabetesType Type I . Patient is being treated with insulin injections and denies significant side effects. Control has been good. Complicating factors include but are not limited to: hypertension and hyperlipidemia.     HYPERLIPIDEMIA - Patient has a long history of significant Hyperlipidemia requiring medication for treatment with recent good control. Patient reports no problems or side effects with the medication.     HYPERTENSION - Patient has longstanding history of HTN , currently denies any symptoms referable to elevated blood pressure. Specifically denies chest pain, palpitations, dyspnea, orthopnea, PND or peripheral edema. Blood pressure readings have been in normal range. Current medication regimen is as listed below. Patient denies any side effects of medication.     HYPOTHYROIDISM - Patient has a longstanding history of chronic Hypothyroidism. Patient has been doing well, noting no tremor, insomnia, hair loss or changes in skin texture. Continues to take medications as directed, without adverse reactions or side effects. Last TSH   Lab Results   Component Value Date    TSH 0.22 (L) 10/07/2023   .      Status postrenal transplant/immunosuppressed status    Review of Systems  CONSTITUTIONAL: NEGATIVE for fever, chills,  change in weight  EYES: vision loss rt eye   ENT/MOUTH: NEGATIVE for ear, mouth and throat problems  RESP: NEGATIVE for significant cough or SOB  CV: NEGATIVE for chest pain, palpitations or peripheral edema  GI: NEGATIVE for nausea, abdominal pain, heartburn, or change in bowel habits  : NEGATIVE for frequency, dysuria, or hematuria  MUSCULOSKELETAL: NEGATIVE for significant arthralgias or myalgia  NEURO: NEGATIVE for weakness, dizziness or paresthesias  ENDOCRINE: NEGATIVE for temperature intolerance, skin/hair changes  HEME: NEGATIVE for bleeding problems  PSYCHIATRIC: NEGATIVE for changes in mood or affect    Patient Active Problem List    Diagnosis Date Noted    Kidney replaced by transplant      Priority: High     Rt      Mucocele of ethmoid sinus 11/08/2023     Priority: Medium    Orbital mass 10/07/2023     Priority: Medium    Abscess of orbit, unspecified laterality 10/07/2023     Priority: Medium    Infection of right eye 10/07/2023     Priority: Medium    Parkinson disease 08/09/2023     Priority: Medium    Lewy body dementia, unspecified dementia severity, unspecified whether behavioral, psychotic, or mood disturbance or anxiety (H) 08/09/2023     Priority: Medium    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) 01/19/2023     Priority: Medium    Status post kidney transplant 02/02/2022     Priority: Medium    Pneumonia due to 2019 novel coronavirus 02/02/2022     Priority: Medium    Diabetes mellitus, type 2 (H) 07/07/2021     Priority: Medium    CKD (chronic kidney disease) stage 3, GFR 30-59 ml/min (H) 10/31/2019     Priority: Medium    Hernia, incisional 10/30/2017     Priority: Medium    Hypertension secondary to other renal disorders 11/14/2016     Priority: Medium    Other specified hypothyroidism 04/12/2016     Priority: Medium    Coronary artery disease involving native heart without angina pectoris, unspecified vessel or lesion type 04/12/2016     Priority: Medium    Anemia in  chronic kidney disease 12/07/2015     Priority: Medium    CAD S/P percutaneous coronary angioplasty 09/20/2015     Priority: Medium    Hyperlipidemia LDL goal <100 07/15/2013     Priority: Medium    Immunosuppressed status (HCC)      Priority: Medium    Advanced directives, counseling/discussion 08/10/2012     Priority: Medium     Patient states has Advance Directive and will bring in a copy to clinic.      GERD (gastroesophageal reflux disease)      Priority: Medium    Irritable bowel syndrome 04/30/2006     Priority: Medium    Other vitamin B12 deficiency anemia 12/02/2002     Priority: Medium      Past Medical History:   Diagnosis Date    Abdominal wall hernia     RLQ    AK (actinic keratosis) 08/06/2020    Allergic rhinitis     CAD (coronary artery disease)     coronary artery disease s/p PCI to LAD in 2005coronary artery disease s/p PCI to LAD in 2005    Dyslipidemia     GERD (gastroesophageal reflux disease)     H/O diabetic nephropathy     s/p kidney trnsplant    Hypertension     Hypothyroidism     Immunosuppressed status (H24)     Kidney replaced by transplant     Rt    PONV (postoperative nausea and vomiting)     Shingles     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, managed as type 1 (H)     nephropathy, retinopathy, macrovascular disease    Urinary tract infection 08/12/2022    Vitamin B12 deficiency anemia      Past Surgical History:   Procedure Laterality Date    ABDOMEN SURGERY      APPENDECTOMY      APPENDECTOMY NOS      ARTHROSCOPY SHOULDER ROTATOR CUFF REPAIR Left     COLONOSCOPY N/A 06/07/2016    Procedure: COLONOSCOPY;  Surgeon: Rashard Snider MD;  Location: [facility]    Coronary angioplasty with stent  2005    HYSTERECTOMY      Kidney Transplant NOS Right     LAPAROSCOPIC CHOLECYSTECTOMY      NOSE SURGERY  2013    OPEN SEPARATION COMPONENT HERNIORRHAPHY N/A 10/16/2017    Procedure: OPEN SEPARATION COMPONENT HERNIORRHAPHY;;  Surgeon: CARL Lott MD;  Location: [facility]    ORBITOTOMY Right 10/08/2023     Procedure: RIGHT ORBITOTOMY WITH BIOPSY AND DRAINAGE OF ABSCESS;  Surgeon: Xu Machado MD;  Location: [facility]    RECONSTRUCT ABDOMINAL WALL N/A 10/16/2017    Procedure: RECONSTRUCT ABDOMINAL WALL;  Exploratory Laparotomy, Lysis of Adhesions, Abdominal Wall reconstruction, Inlay Xen AB mesh, Mitek Suture [place] and Umbilical Hernia Repair.;  Surgeon: CARL Lott MD;  Location: [facility] OR    REMOVE CATARACT INTRACAP,INSERT LENS Right     TONSILLECTOMY      TRANSPLANT       Current Outpatient Medications   Medication Sig Dispense Refill    amLODIPine (NORVASC) 10 MG tablet Take 1 tablet (10 mg) by mouth daily 90 tablet 1    ASPIRIN PO Take 81 mg by mouth daily      atorvastatin (LIPITOR) 40 MG tablet Take 0.5 tablets (20 mg) by mouth daily 45 tablet 1    carbidopa-levodopa (SINEMET)  MG tablet Take 1.5 tablets by mouth 3 times daily Through Neurologist      cholecalciferol 125 MCG (5000 UT) CAPS Take 2 capsules by mouth daily      Continuous Blood Gluc  (DEXCOM G7 ) LUCAS Use to read blood sugars as 's instructions. 1 each 0    Continuous Blood Gluc Sensor (DEXCOM G7 SENSOR) MISC Change every 10 days. 3 each 5    cyanocobalamin (VITAMIN B-12) 1000 MCG tablet Take 1,000 mcg by mouth Every Mon, Wed, Fri Morning      cycloSPORINE modified (GENERIC EQUIVALENT) 25 MG capsule Take 2 capsules (50 mg) by mouth 2 times daily 120 capsule 11    donepezil (ARICEPT) 10 MG tablet Take 10 mg by mouth Through Neurologist      insulin aspart (NOVOLOG PEN) 100 UNIT/ML pen Inject 5-10 units subcutaneous with meals and correction doses as directed, total daily dose approx 25 units 15 mL 5    insulin glargine (BASAGLAR KWIKPEN) 100 UNIT/ML pen Inject 15-17 Units Subcutaneous daily 15 mL 5    isosorbide mononitrate (IMDUR) 30 MG 24 hr tablet TAKE 1/2 TABLET DAILY BY MOUTH (15 MG) 45 tablet 1    levothyroxine (SYNTHROID/LEVOTHROID) 100 MCG tablet Take 1 tablet (100 mcg) by mouth daily 90 tablet 1     "losartan (COZAAR) 100 MG tablet TAKE 1/2 OF A TABLET (50 MG) BY MOUTH DAILY 45 tablet 1    metoprolol succinate ER (TOPROL XL) 25 MG 24 hr tablet Take 1 tablet (25 mg) by mouth At Bedtime (Patient taking differently: Take 25 mg by mouth at bedtime 1/2 tab daily) 90 tablet 1    mycophenolic acid (GENERIC EQUIVALENT) 180 MG EC tablet Take 2 tablets (360 mg) by mouth 2 times daily 180 tablet 11    pantoprazole (PROTONIX) 40 MG EC tablet TAKE 1 TABLET BY MOUTH EVERY DAY 90 tablet 2    acetaminophen (TYLENOL) 325 MG tablet Take 3 tablets (975 mg) by mouth every 8 hours as needed for mild pain (Patient not taking: Reported on 2023)         No Known Allergies     Social History     Tobacco Use    Smoking status: Never     Passive exposure: Never    Smokeless tobacco: Never   Substance Use Topics    Alcohol use: No     Family History   Problem Relation Age of Onset    Respiratory Mother          age 71    Cardiovascular Father          age 75 hyertension    Arthritis Sister         living, healthy    Family History Negative Brother          age 44    Colon Cancer No family hx of      History   Drug Use No         Objective     /60   Pulse 63   Temp 96.9  F (36.1  C) (Tympanic)   Resp 12   Ht 1.6 m (5' 3\")   Wt 65.8 kg (145 lb)   LMP  (LMP Unknown)   SpO2 97%   BMI 25.69 kg/m      Physical Exam    GENERAL APPEARANCE: healthy, alert and no distress       HENT: ear canals and TM's normal and  mouth without ulcers or lesions        RESP: lungs clear to auscultation - no rales, rhonchi or wheezes     CV: regular rates and rhythm, normal S1 S2,      ABDOMEN:  soft, nontender,  bowel sounds normal     MS: extremities normal- no edema, no calf tenderness       NEURO: Normal strength and tone, sensory exam grossly normal, mentation intact and speech normal     PSYCH: mentation appears normal. and affect normal/bright        Recent Labs   Lab Test 23  0723 10/18/23  0722 " 08/18/23  0732 08/04/23  0740 02/02/22  2237 02/02/22  0940   HGB 12.3 12.2   < > 12.6   < > 11.1*    275   < > 254   < > 322   INR  --   --   --   --   --  1.00    140   < > 138   < > 139   POTASSIUM 4.4 4.1   < > 5.1   < > 3.5   CR 1.21* 1.15*   < > 1.43*   < > 1.43*   A1C 6.7*  --   --  7.2*   < >  --     < > = values in this interval not displayed.        Diagnostics:  No labs were ordered during this visit.  Recent labs reviewed  No EKG this visit, completed in the last 90 days.    Revised Cardiac Risk Index (RCRI):  The patient has the following serious cardiovascular risks for perioperative complications:   - Coronary Artery Disease (MI, positive stress test, angina, Qs on EKG) = 1 point   - Diabetes Mellitus (on Insulin) = 1 point             Signed Electronically by: Summer Vega MD  Copy of this evaluation report is provided to requesting physician.

## 2023-12-18 NOTE — NURSING NOTE
Chief Complaint   Patient presents with    RECHECK     Annual visit.      BP (!) 159/71 (BP Location: Left arm, Patient Position: Sitting, Cuff Size: Adult Large)   Pulse 57   Temp 97.5  F (36.4  C) (Oral)   Wt 64 kg (141 lb)   LMP  (LMP Unknown)   SpO2 96%   BMI 24.98 kg/m    Thierry Sullivan CMA on 12/18/2023 at 1:32 PM

## 2023-12-18 NOTE — PROGRESS NOTES
TRANSPLANT NEPHROLOGY CHRONIC POST TRANSPLANT VISIT    Assessment & Plan     # LDKT: Stable              - Baseline Cr ~ 1.1-1.3              - Proteinuria: Normal (<0.2 grams)              - Date DSA Last Checked: May/2007      Latest DSA: No              - BK Viremia: No, last checked 06/2015              - Kidney Tx Biopsy: Yes, 2007 no rejection      # Immunosuppression: Cyclosporine (goal 50-75) and Mycophenolic acid (goal 540 mg bid)   -  Continue with intensive monitoring of immunosuppression for efficacy and toxicity.              - Changes: no     # Prophylaxis:              - PJP: none, CD4>200     # Hypertension: Controlled;   Goal BP: < 130/80              - Changes: No      # Diabetes: Controlled (HbA1c <7%)            Last HbA1c: 6.5%              - on insulin, f/up PCP     # Hx of coronary artery disease s/p PCI:               - asymptomatic              - follow-up with cardiology.      # Skin Cancer Risk:               - recommend regular follow up with Dermatology.     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     # Vaccines: UTD covid and Flu     # Transplant History:  Etiology of Kidney Failure: Diabetes mellitus  Tx: LDKT  Transplant: 12/27/2005 (Kidney)  Donor Type: Living      Donor Class:   Significant changes in immunosuppression: None  Significant transplant-related complications: None    Transplant Office Phone Number: [phone]    Assessment and plan was discussed with the patient and she voiced her understanding and agreement.    Return visit: 1 year    Pauline Ta  MD    Chief Complaint   Ms. Shaw is a 73 year old here for kidney transplant and immunosuppression management.    History of Present Illness     Feels ok overall but had few concerns during visit today:  - new onset R eye vision loss due to right ethmoid mucocele., scheduled for mucocele decompression and excision 12/19 to prevent recurrent issues.   - newly diagnosed with Parkinson's disease Aug 2023with effects on gait/balance, as well as memory, started on carbidopa/levidopa as well as aricept, no falls, follows with neurology   - weight loss of about 20 lbs, no night sweats, new lumps, fevers, chills, abdominal pain diarrhea, cough or shortness of breath  - follows with dermatology and had new lesions/NMSC on R leg (report unavailable)    IS CSA 50/50 /360  Vaccines RSV Flu COVID    Home BP: ~130/70s    Problem List   Patient Active Problem List   Diagnosis    Other vitamin B12 deficiency anemia    Irritable bowel syndrome    GERD (gastroesophageal reflux disease)    Advanced directives, counseling/discussion    Kidney replaced by transplant    Immunosuppressed status (HCC)    Hyperlipidemia LDL goal <100    CAD S/P percutaneous coronary angioplasty    Anemia in chronic kidney disease    Other specified hypothyroidism    Coronary artery disease involving native heart without angina pectoris, unspecified vessel or lesion type    Hypertension secondary to other renal disorders    Hernia, incisional    CKD (chronic kidney disease) stage 3, GFR 30-59 ml/min (H)    Diabetes mellitus, type 2 (H)    Status post kidney transplant    Pneumonia due to 2019 novel coronavirus    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)    Parkinson disease    Lewy body dementia, unspecified dementia severity, unspecified whether behavioral, psychotic, or mood disturbance or anxiety (H)    Orbital mass    Abscess of orbit, unspecified laterality    Infection of right eye    Mucocele of ethmoid sinus       Allergies    No Known Allergies    Medications   Current Outpatient Medications   Medication Sig    amLODIPine (NORVASC) 10 MG tablet Take 1 tablet (10 mg) by mouth daily    ASPIRIN PO Take 81 mg by mouth daily    atorvastatin (LIPITOR) 40 MG tablet Take 0.5 tablets (20 mg) by mouth daily    carbidopa-levodopa (SINEMET)  MG tablet Take 1.5 tablets by mouth 3 times daily Through Neurologist    cholecalciferol 125 MCG (5000 UT) CAPS Take 2 capsules by mouth daily    Continuous Blood Gluc  (DEXCOM G7 ) LUCAS Use to read blood sugars as 's instructions.    Continuous Blood Gluc Sensor (DEXCOM G7 SENSOR) MISC Change every 10 days.    cyanocobalamin (VITAMIN B-12) 1000 MCG tablet Take 1,000 mcg by mouth Every Mon, Wed, Fri Morning    cycloSPORINE modified (GENERIC EQUIVALENT) 25 MG capsule Take 2 capsules (50 mg) by mouth 2 times daily    donepezil (ARICEPT) 10 MG tablet Take 10 mg by mouth Through Neurologist    insulin aspart (NOVOLOG PEN) 100 UNIT/ML pen Inject 5-10 units subcutaneous with meals and correction doses as directed, total daily dose approx 25 units    insulin glargine (BASAGLAR KWIKPEN) 100 UNIT/ML pen Inject 15-17 Units Subcutaneous daily    isosorbide mononitrate (IMDUR) 30 MG 24 hr tablet TAKE 1/2 TABLET DAILY BY MOUTH (15 MG)    levothyroxine (SYNTHROID/LEVOTHROID) 100 MCG tablet Take 1 tablet (100 mcg) by mouth daily    losartan (COZAAR) 100 MG tablet TAKE 1/2 OF A TABLET (50 MG) BY MOUTH DAILY    metoprolol succinate ER (TOPROL XL) 25 MG 24 hr tablet Take 1 tablet (25 mg) by mouth At Bedtime (Patient taking differently: Take 25 mg by mouth at bedtime 1/2 tab daily)    mycophenolic acid (GENERIC EQUIVALENT) 180 MG EC tablet Take 2 tablets (360 mg) by mouth 2 times daily    pantoprazole (PROTONIX) 40 MG EC tablet TAKE 1 TABLET BY MOUTH EVERY DAY    acetaminophen (TYLENOL) 325 MG tablet Take 3 tablets (975 mg) by mouth every 8 hours as needed for mild pain (Patient not taking:  Reported on 12/12/2023)     No current facility-administered medications for this visit.     There are no discontinued medications.    Physical Exam   Vital Signs: BP (!) 159/71 (BP Location: Left arm, Patient Position: Sitting, Cuff Size: Adult Large)   Pulse 57   Temp 97.5  F (36.4  C) (Oral)   Wt 64 kg (141 lb)   LMP  (LMP Unknown)   SpO2 96%   BMI 24.98 kg/m      GENERAL APPEARANCE: alert and no distress  HENT: mouth without ulcers or lesions  RESP: lungs clear to auscultation - no rales, rhonchi or wheezes  CV: regular rhythm, normal rate, no rub, no murmur  EDEMA: no LE edema bilaterally  ABDOMEN: soft, nondistended, nontender, bowel sounds normal  MS: extremities normal - no gross deformities noted, no evidence of inflammation in joints, no muscle tenderness  SKIN: no rash      Data         Latest Ref Rng & Units 12/1/2023     7:23 AM 10/18/2023     7:22 AM 10/12/2023     8:46 AM   Renal   Sodium 135 - 145 mmol/L 138  140  140    K 3.4 - 5.3 mmol/L 4.4  4.1  3.3    Cl 98 - 107 mmol/L 101  104  101    Cl (external) 98 - 107 mmol/L 101  104  101    CO2 22 - 29 mmol/L 29  28  22    Urea Nitrogen 8.0 - 23.0 mg/dL 23.8  23.3  13.6    Creatinine 0.51 - 0.95 mg/dL 1.21  1.15  1.05     1.05    Glucose 70 - 99 mg/dL 146  107  93    Calcium 8.8 - 10.2 mg/dL 9.0  9.8  8.9    Magnesium 1.7 - 2.3 mg/dL   1.8          Latest Ref Rng & Units 8/4/2023     7:40 AM 10/19/2017     7:47 AM 10/1/2008     6:44 AM   Bone Health   Phosphorus 2.5 - 4.5 mg/dL  2.5  3.2    Vit D Def 20 - 75 ug/L 73            Latest Ref Rng & Units 12/1/2023     7:23 AM 10/18/2023     7:22 AM 10/12/2023     8:46 AM   Heme   WBC 4.0 - 11.0 10e3/uL 4.2  5.5  6.3    Hgb 11.7 - 15.7 g/dL 12.3  12.2  12.3    Plt 150 - 450 10e3/uL 209  275  269          Latest Ref Rng & Units 12/1/2023     7:23 AM 10/11/2023     6:10 AM 10/8/2023     6:20 AM   Liver   AP 35 - 104 U/L  57  71    TBili <=1.2 mg/dL  0.7  0.9    Bilirubin Direct 0.00 - 0.30 mg/dL  <0.20      ALT 0 - 50 U/L 8  9  10    AST 0 - 45 U/L  21  25    Tot Protein 6.4 - 8.3 g/dL  5.9  6.4    Albumin 3.5 - 5.2 g/dL  3.4  3.8          Latest Ref Rng & Units 12/1/2023     7:23 AM 8/4/2023     7:40 AM 4/4/2023     7:22 AM   Pancreas   A1C <5.7 % 6.7  7.2  6.2          Latest Ref Rng & Units 10/19/2017     7:47 AM 12/5/2016     2:00 PM 12/7/2015     1:57 PM   Iron studies   Iron 35 - 180 ug/dL 29  37  36    Iron Saturation Index 15 - 46 % 10  13  11    Ferritin 8 - 252 ng/mL 181  130  55          Latest Ref Rng & Units 3/31/2017     5:55 AM 12/5/2016     2:00 PM 6/18/2015     6:59 AM   [facility]   CVM DNA Quant   Plasma, EDTA anticoagulant     CMV QUANT IU/ML CMVND [IU]/mL  CMV DNA Not Detected   The [employer] AmpliPrep/OSMANI TaqMan CMV Test is an FDA-approved in vitro nucleic   acid amplification test for the quantitation of cytomegalovirus DNA in human   plasma (EDTA plasma) using the [employer] AmpliPrep Instrument for automated viral   nucleic acid extraction and the [employer] TaqMan Analyzer or [employer] TaqMan for   automated Real Time amplification and detection of the viral nucleic acid   target.   Titer results are reported in International Units/mL (IU/mL using 1st WHO   International standard for Human Cytomegalovirus for Nucleic Acid Amplification   based assays. The conversion factor between CMV DNA copis/mL (as defined by the   Roche OSMANI TaqMan CMV test) and International Units is the CMV DNA   concentration in IU/mL x 1.1 copies/IU = CMV DNA in copies/mL.   This assay has received FDA approval for the testing of human plasma only. The   In[facility] Laboratory at the [facility], [place], has validated the performance characteristics of the Roche   CMV assay for plasma, bronchial alveolar lavage/wash and urine.       LOG IU/ML OF CMVQNT <2.1 [Log_IU]/mL  Not Calculated     BK Spec    Plasma    BK Res BKNEG copies/mL   BK Virus DNA Not Detected    BK Log <2.7 Log  copies/mL   Not Calculated   The Real-Time quantitative BK Virus assay was developed and its performance   characteristics determined by the Infectious Diseases Diagnostic Laboratory at   the [facility] in [place], [place]. The   primers and probes for each analyte are Analyte Specific Reagents (ASRs)   manufactured by [employer].   ASRs are used in many laboratory tests necessary for standard medical care and   generally do not require U.S. Food and Drug Administration approval. The FDA   has determined that such clearance or approval is not necessary.   This test is used for clinical purposes. It should not be regarded as   investigational or for research. This laboratory is certified under the   Clinical Laboratory Improvement Amendments of 1988 (CLIA-88) as qualified to   perform high complexity clinical laboratory testing.      EBV DNA COPIES/ML EBVNEG [Copies]/mL <500  EBV DNA Detected below the reportable range of 500 Copies/mL    3,913     EBV DNA LOG OF COPIES <2.7 [Log_copies]/mL <2.7  The Real-Time quantitative EBV assay was developed and its performance   characteristics determined by the Infect[facility] Diseases Diagnostic Laboratory at   the [facility] in [place], [place].  The   primers and probes are Analyte Specific Reagents (ASRs) manufactured  by   [employer].   ASRs are used in many laboratory tests necessary for standard medical care and   generally do not require U.S. Food and Drug Administration approval.  The FDA   has determined that such clearance or approval is not necessary.  This test is   used for clinical purposes.  It should not be regarded as investigational or   research.   This laboratory is certified under the Clinical Laboratory Improvement   Amendments of 1988 (CLIA-88) as qualified to perform high complexity clinical   laboratory testing.   The quantitative range of this assay is 500-22,500,00 copies/mL (2.7-7.4 log    copies/mL).  A negative result does not rule out the presence of PCR inhibitors   in the patient specimen or EBV DNA nucleic acid in concentrations below the   level of detection of the assay.  Inhibition may also lead to underestimation   of viral quantitation.    3.6             Recent Labs   Lab Test 12/04/15  0650 04/07/16  0638   DOSMPA 2000 12/3/15 19:00 04/6/16   MPACID 1.56 0.80*   MPAG 72.1 64.2     I spent a total of 42 minutes on the date of the encounter doing chart review, performing a history and physical exam, completing documentation and any further activities as noted above.

## 2023-12-18 NOTE — PATIENT INSTRUCTIONS
Hold losartan the day of surgery    Labs every 3 months    Transplant Patient Information  Your Post Transplant Coordinator is: Graciela Castaneda   You and your care team can contact your transplant coordinator Monday - Friday, 8am - 5pm at [phone] (Option 2 to reach the coordinator or Option 4 to schedule an appointment).  You can also reach your care team online via [employer].  After hours for urgent matters, please call [facility] at [phone].

## 2023-12-18 NOTE — LETTER
12/18/2023         RE: Viv Shaw  [address] [facility]  [address]  [facility] [address]        Dear Colleague,    Thank you for referring your patient, Viv Shaw, to the [facility]. Please see a copy of my visit note below.    TRANSPLANT NEPHROLOGY CHRONIC POST TRANSPLANT VISIT    Assessment & Plan    # LDKT: Stable              - Baseline Cr ~ 1.1-1.3              - Proteinuria: Normal (<0.2 grams)              - Date DSA Last Checked: May/2007      Latest DSA: No              - BK Viremia: No, last checked 06/2015              - Kidney Tx Biopsy: Yes, 2007 no rejection      # Immunosuppression: Cyclosporine (goal 50-75) and Mycophenolic acid (goal 540 mg bid)   -  Continue with intensive monitoring of immunosuppression for efficacy and toxicity.              - Changes: no     # Prophylaxis:              - PJP: none, CD4>200     # Hypertension: Controlled;   Goal BP: < 130/80              - Changes: No      # Diabetes: Controlled (HbA1c <7%)            Last HbA1c: 6.5%              - on insulin, f/up PCP     # Hx of coronary artery disease s/p PCI:               - asymptomatic              - follow-up with cardiology.      # Skin Cancer Risk:               - recommend regular follow up with Dermatology.     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     # Vaccines: UTD covid and Flu     # Transplant History:  Etiology of Kidney Failure: Diabetes mellitus  Tx: LDKT  Transplant: 12/27/2005 (Kidney)  Donor Type: Living      Donor Class:   Significant changes in  immunosuppression: None  Significant transplant-related complications: None    Transplant Office Phone Number: [phone]    Assessment and plan was discussed with the patient and she voiced her understanding and agreement.    Return visit: 1 year    Pauline Ta MD    Chief Complaint  Ms. Shaw is a 73 year old here for kidney transplant and immunosuppression management.    History of Present Illness    Feels ok overall but had few concerns during visit today:  - new onset R eye vision loss due to right ethmoid mucocele., scheduled for mucocele decompression and excision 12/19 to prevent recurrent issues.   - newly diagnosed with Parkinson's disease Aug 2023with effects on gait/balance, as well as memory, started on carbidopa/levidopa as well as aricept, no falls, follows with neurology   - weight loss of about 20 lbs, no night sweats, new lumps, fevers, chills, abdominal pain diarrhea, cough or shortness of breath  - follows with dermatology and had new lesions/NMSC on R leg (report unavailable)    IS CSA 50/50 /360  Vaccines RSV Flu COVID    Home BP: ~130/70s    Problem List  Patient Active Problem List   Diagnosis    Other vitamin B12 deficiency anemia    Irritable bowel syndrome    GERD (gastroesophageal reflux disease)    Advanced directives, counseling/discussion    Kidney replaced by transplant    Immunosuppressed status (HCC)    Hyperlipidemia LDL goal <100    CAD S/P percutaneous coronary angioplasty    Anemia in chronic kidney disease    Other specified hypothyroidism    Coronary artery disease involving native heart without angina pectoris, unspecified vessel or lesion type    Hypertension secondary to other renal disorders    Hernia, incisional    CKD (chronic kidney disease) stage 3, GFR 30-59 ml/min (H)    Diabetes mellitus, type 2 (H)    Status post kidney transplant    Pneumonia due to 2019 novel coronavirus    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)    Parkinson disease     Lewy body dementia, unspecified dementia severity, unspecified whether behavioral, psychotic, or mood disturbance or anxiety (H)    Orbital mass    Abscess of orbit, unspecified laterality    Infection of right eye    Mucocele of ethmoid sinus       Allergies  No Known Allergies    Medications  Current Outpatient Medications   Medication Sig    amLODIPine (NORVASC) 10 MG tablet Take 1 tablet (10 mg) by mouth daily    ASPIRIN PO Take 81 mg by mouth daily    atorvastatin (LIPITOR) 40 MG tablet Take 0.5 tablets (20 mg) by mouth daily    carbidopa-levodopa (SINEMET)  MG tablet Take 1.5 tablets by mouth 3 times daily Through Neurologist    cholecalciferol 125 MCG (5000 UT) CAPS Take 2 capsules by mouth daily    Continuous Blood Gluc  (DEXCOM G7 ) LUCAS Use to read blood sugars as 's instructions.    Continuous Blood Gluc Sensor (DEXCOM G7 SENSOR) MISC Change every 10 days.    cyanocobalamin (VITAMIN B-12) 1000 MCG tablet Take 1,000 mcg by mouth Every Mon, Wed, Fri Morning    cycloSPORINE modified (GENERIC EQUIVALENT) 25 MG capsule Take 2 capsules (50 mg) by mouth 2 times daily    donepezil (ARICEPT) 10 MG tablet Take 10 mg by mouth Through Neurologist    insulin aspart (NOVOLOG PEN) 100 UNIT/ML pen Inject 5-10 units subcutaneous with meals and correction doses as directed, total daily dose approx 25 units    insulin glargine (BASAGLAR KWIKPEN) 100 UNIT/ML pen Inject 15-17 Units Subcutaneous daily    isosorbide mononitrate (IMDUR) 30 MG 24 hr tablet TAKE 1/2 TABLET DAILY BY MOUTH (15 MG)    levothyroxine (SYNTHROID/LEVOTHROID) 100 MCG tablet Take 1 tablet (100 mcg) by mouth daily    losartan (COZAAR) 100 MG tablet TAKE 1/2 OF A TABLET (50 MG) BY MOUTH DAILY    metoprolol succinate ER (TOPROL XL) 25 MG 24 hr tablet Take 1 tablet (25 mg) by mouth At Bedtime (Patient taking differently: Take 25 mg by mouth at bedtime 1/2 tab daily)    mycophenolic acid (GENERIC EQUIVALENT) 180 MG EC tablet  Take 2 tablets (360 mg) by mouth 2 times daily    pantoprazole (PROTONIX) 40 MG EC tablet TAKE 1 TABLET BY MOUTH EVERY DAY    acetaminophen (TYLENOL) 325 MG tablet Take 3 tablets (975 mg) by mouth every 8 hours as needed for mild pain (Patient not taking: Reported on 12/12/2023)     No current facility-administered medications for this visit.     There are no discontinued medications.    Physical Exam  Vital Signs: BP (!) 159/71 (BP Location: Left arm, Patient Position: Sitting, Cuff Size: Adult Large)   Pulse 57   Temp 97.5  F (36.4  C) (Oral)   Wt 64 kg (141 lb)   LMP  (LMP Unknown)   SpO2 96%   BMI 24.98 kg/m      GENERAL APPEARANCE: alert and no distress  HENT: mouth without ulcers or lesions  RESP: lungs clear to auscultation - no rales, rhonchi or wheezes  CV: regular rhythm, normal rate, no rub, no murmur  EDEMA: no LE edema bilaterally  ABDOMEN: soft, nondistended, nontender, bowel sounds normal  MS: extremities normal - no gross deformities noted, no evidence of inflammation in joints, no muscle tenderness  SKIN: no rash      Data        Latest Ref Rng & Units 12/1/2023     7:23 AM 10/18/2023     7:22 AM 10/12/2023     8:46 AM   Renal   Sodium 135 - 145 mmol/L 138  140  140    K 3.4 - 5.3 mmol/L 4.4  4.1  3.3    Cl 98 - 107 mmol/L 101  104  101    Cl (external) 98 - 107 mmol/L 101  104  101    CO2 22 - 29 mmol/L 29  28  22    Urea Nitrogen 8.0 - 23.0 mg/dL 23.8  23.3  13.6    Creatinine 0.51 - 0.95 mg/dL 1.21  1.15  1.05     1.05    Glucose 70 - 99 mg/dL 146  107  93    Calcium 8.8 - 10.2 mg/dL 9.0  9.8  8.9    Magnesium 1.7 - 2.3 mg/dL   1.8          Latest Ref Rng & Units 8/4/2023     7:40 AM 10/19/2017     7:47 AM 10/1/2008     6:44 AM   Bone Health   Phosphorus 2.5 - 4.5 mg/dL  2.5  3.2    Vit D Def 20 - 75 ug/L 73            Latest Ref Rng & Units 12/1/2023     7:23 AM 10/18/2023     7:22 AM 10/12/2023     8:46 AM   Heme   WBC 4.0 - 11.0 10e3/uL 4.2  5.5  6.3    Hgb 11.7 - 15.7 g/dL 12.3  12.2   12.3    Plt 150 - 450 10e3/uL 209  275  269          Latest Ref Rng & Units 12/1/2023     7:23 AM 10/11/2023     6:10 AM 10/8/2023     6:20 AM   Liver   AP 35 - 104 U/L  57  71    TBili <=1.2 mg/dL  0.7  0.9    Bilirubin Direct 0.00 - 0.30 mg/dL  <0.20     ALT 0 - 50 U/L 8  9  10    AST 0 - 45 U/L  21  25    Tot Protein 6.4 - 8.3 g/dL  5.9  6.4    Albumin 3.5 - 5.2 g/dL  3.4  3.8          Latest Ref Rng & Units 12/1/2023     7:23 AM 8/4/2023     7:40 AM 4/4/2023     7:22 AM   Pancreas   A1C <5.7 % 6.7  7.2  6.2          Latest Ref Rng & Units 10/19/2017     7:47 AM 12/5/2016     2:00 PM 12/7/2015     1:57 PM   Iron studies   Iron 35 - 180 ug/dL 29  37  36    Iron Saturation Index 15 - 46 % 10  13  11    Ferritin 8 - 252 ng/mL 181  130  55          Latest Ref Rng & Units 3/31/2017     5:55 AM 12/5/2016     2:00 PM 6/18/2015     6:59 AM   [facility] Txp Virology   CVM DNA Quant   Plasma, EDTA anticoagulant     CMV QUANT IU/ML CMVND [IU]/mL  CMV DNA Not Detected   The OSMANI AmpliPrep/OSMANI TaqMan CMV Test is an FDA-approved in vitro nucleic   acid amplification test for the quantitation of cytomegalovirus DNA in human   plasma (EDTA plasma) using the OSMANI AmpliPrep Instrument for automated viral   nucleic acid extraction and the T1 Visions TaqMan Analyzer or T1 Visions TaqMan for   automated Real Time amplification and detection of the viral nucleic acid   target.   Titer results are reported in International Units/mL (IU/mL using 1st WHO   International standard for Human Cytomegalovirus for Nucleic Acid Amplification   based assays. The conversion factor between CMV DNA copis/mL (as defined by the   Roche OSMANI TaqMan CMV test) and International Units is the CMV DNA   concentration in IU/mL x 1.1 copies/IU = CMV DNA in copies/mL.   This assay has received FDA approval for the testing of human plasma only. The   Infectious Disease Diagnostic Laboratory at the [facility], [place], has validated the  performance characteristics of the Roche   CMV assay for plasma, bronchial alveolar lavage/wash and urine.       LOG IU/ML OF CMVQNT <2.1 [Log_IU]/mL  Not Calculated     BK Spec    Plasma    BK Res BKNEG copies/mL   BK Virus DNA Not Detected    BK Log <2.7 Log copies/mL   Not Calculated   The Real-Time quantitative BK Virus assay was developed and its performance   characteristics determined by the Infectious Diseases Diagnostic Laboratory at   the [facility] in [place], [place]. The   primers and probes for each analyte are Analyte Specific Reagents (ASRs)   manufactured by [employer].   ASRs are used in many laboratory tests necessary for standard medical care and   generally do not require U.S. Food and Drug Administration approval. The FDA   has determined that such clearance or approval is not necessary.   This test is used for clinical purposes. It should not be regarded as   investigational or for research. This laboratory is certified under the   Clinical Laboratory Improvement Amendments of 1988 (CLIA-88) as qualified to   perform high complexity clinical laboratory testing.      EBV DNA COPIES/ML EBVNEG [Copies]/mL <500  EBV DNA Detected below the reportable range of 500 Copies/mL    3,913     EBV DNA LOG OF COPIES <2.7 [Log_copies]/mL <2.7  The Real-Time quantitative EBV assay was developed and its performance   characteristics determined by the Infectious Diseases Diagnostic Laboratory at   the [facility] in [place], [place].  The   primers and probes are Analyte Specific Reagents (ASRs) manufactured  by   Qiagen.   ASRs are used in many laboratory tests necessary for standard medical care and   generally do not require U.S. Food and Drug Administration approval.  The FDA   has determined that such clearance or approval is not necessary.  This test is   used for clinical purposes.  It should not be regarded as investigational or    research.   This laboratory is certified under the Clinical Laboratory Improvement   Amendments of 1988 (CLIA-88) as qualified to perform high complexity clinical   laboratory testing.   The quantitative range of this assay is 500-22,500,00 copies/mL (2.7-7.4 log   copies/mL).  A negative result does not rule out the presence of PCR inhibitors   in the patient specimen or EBV DNA nucleic acid in concentrations below the   level of detection of the assay.  Inhibition may also lead to underestimation   of viral quantitation.    3.6             Recent Labs   Lab Test 12/04/15  0650 04/07/16  0638   DOSMPA 2000 12/3/15 19:00 04/6/16   MPACID 1.56 0.80*   MPAG 72.1 64.2     I spent a total of 42 minutes on the date of the encounter doing chart review, performing a history and physical exam, completing documentation and any further activities as noted above.        Pauline Ta MD

## 2023-12-18 NOTE — ANESTHESIA PREPROCEDURE EVALUATION
Anesthesia Pre-Procedure Evaluation    Patient: Viv Shaw   MRN: [id number] : 1950        Procedure : Procedure(s):  SINUS SURGERY, ENDOSCOPIC, USING OPTICAL TRACKING SYSTEM  Right total ethmoidectomy/mucocele drainage          Past Medical History:   Diagnosis Date    Abdominal wall hernia     RLQ    AK (actinic keratosis) 2020    Allergic rhinitis     CAD (coronary artery disease)     coronary artery disease s/p PCI to LAD in coronary artery disease s/p PCI to LAD in     Dyslipidemia     GERD (gastroesophageal reflux disease)     H/O diabetic nephropathy     s/p kidney trnsplant    Hypertension     Hypothyroidism     Immunosuppressed status (H24)     Kidney replaced by transplant     Rt    PONV (postoperative nausea and vomiting)     Shingles     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, managed as type 1 (H)     nephropathy, retinopathy, macrovascular disease    Urinary tract infection 2022    Vitamin B12 deficiency anemia       Past Surgical History:   Procedure Laterality Date    ABDOMEN SURGERY      APPENDECTOMY      APPENDECTOMY NOS      ARTHROSCOPY SHOULDER ROTATOR CUFF REPAIR Left     COLONOSCOPY N/A 2016    Procedure: COLONOSCOPY;  Surgeon: Rashard Snider MD;  Location: [facility]    Coronary angioplasty with stent      HYSTERECTOMY      Kidney Transplant NOS Right     LAPAROSCOPIC CHOLECYSTECTOMY      NOSE SURGERY      OPEN SEPARATION COMPONENT HERNIORRHAPHY N/A 10/16/2017    Procedure: OPEN SEPARATION COMPONENT HERNIORRHAPHY;;  Surgeon: CARL Lott MD;  Location: [facility]    ORBITOTOMY Right 10/08/2023    Procedure: RIGHT ORBITOTOMY WITH BIOPSY AND DRAINAGE OF ABSCESS;  Surgeon: Xu Machado MD;  Location: [facility]    RECONSTRUCT ABDOMINAL WALL N/A 10/16/2017    Procedure: RECONSTRUCT ABDOMINAL WALL;  Exploratory Laparotomy, Lysis of Adhesions, Abdominal Wall reconstruction, Inlay Xen AB mesh, Mitek Suture [employer] and Umbilical Hernia Repair.;  Surgeon: CARL Lott  MD [place];  Location: [facility] OR    REMOVE CATARACT INTRACAP,INSERT LENS Right     TONSILLECTOMY      TRANSPLANT        No Known Allergies   Social History     Tobacco Use    Smoking status: Never     Passive exposure: Never    Smokeless tobacco: Never   Substance Use Topics    Alcohol use: No      Wt Readings from Last 1 Encounters:   12/12/23 65.8 kg (145 lb)        Anesthesia Evaluation   Pt has had prior anesthetic. Type: General.    History of anesthetic complications  -  and PONV.  delirium; remote history of PONV (5 years ago).    ROS/MED HX  ENT/Pulmonary: Comment: Mucocele of ethmoid sinus    (+) sleep apnea, mild, doesn't use CPAP,                                  (-) asthma   Neurologic:     (+)   dementia,              Parkinson's disease,               Cardiovascular:     (+)  hypertension- -  CAD -  - stent (PCI to LAD)-2005.                                 Previous cardiac testing   Echo: Date: Results:    Stress Test:  Date: 2017 Results:   Normal myocardial SPECT study with a summed stress score of 0. A  summed stress score of 0 is associated with an annual event rate of  0.9 and 0.8 for myocardial infarction and cardiac death, respectively  ([place]. Circulation 1998;98:535-43).  2. Normal LV systolic function.  3. No significant perfusion abnormalities.    ECG Reviewed:  Date: 2023 Results:  Difficult interpretation due to tremor. Regular rate, poor R wave progression, normal axis, t wave abn  Cath:  Date: Results:   (-) wheezes   METS/Exercise Tolerance:     Hematologic:     (+)      anemia (Hgb 12.3),          Musculoskeletal:       GI/Hepatic:     (+) GERD (asymptomatic),                   Renal/Genitourinary:     (+) renal disease,    Pt has history of transplant,         Endo:     (+) type I DM,  Last HgA1c: 6-7s,  Using insulin,  Normal glucose range: high recently with infection,   thyroid problem, hypothyroidism,        Type II DM: 6.7 (on 12/1/23).   Psychiatric/Substance Use:        Infectious Disease:       Malignancy:       Other:            Physical Exam    Airway      Comment: Overbite     Mallampati: II   TM distance: > 3 FB   Neck ROM: limited   Mouth opening: > 3 cm    Respiratory Devices and Support         Dental       (+) Modest Abnormalities - crowns, retainers, 1 or 2 missing teeth    B=Bridge, C=Chipped, L=Loose, M=Missing    Cardiovascular          Rhythm and rate: regular and normal   (+) murmur       Pulmonary           breath sounds clear to auscultation   (-) no rhonchi and no wheezes        OUTSIDE LABS:  CBC:   Lab Results   Component Value Date    WBC 4.2 12/01/2023    WBC 5.5 10/18/2023    HGB 12.3 12/01/2023    HGB 12.2 10/18/2023    HCT 38.7 12/01/2023    HCT 38.6 10/18/2023     12/01/2023     10/18/2023     BMP:   Lab Results   Component Value Date     12/01/2023     10/18/2023    POTASSIUM 4.4 12/01/2023    POTASSIUM 4.1 10/18/2023    CHLORIDE 101 12/01/2023    CHLORIDE 104 10/18/2023    CO2 29 12/01/2023    CO2 28 10/18/2023    BUN 23.8 (H) 12/01/2023    BUN 23.3 (H) 10/18/2023    CR 1.21 (H) 12/01/2023    CR 1.15 (H) 10/18/2023     (H) 12/01/2023     (H) 10/18/2023     COAGS:   Lab Results   Component Value Date    PTT 26 05/24/2007    INR 1.00 02/02/2022    FIBR 413 07/05/2005     POC:   Lab Results   Component Value Date     (H) 10/22/2017     HEPATIC:   Lab Results   Component Value Date    ALBUMIN 3.4 (L) 10/11/2023    PROTTOTAL 5.9 (L) 10/11/2023    ALT 8 12/01/2023    AST 21 10/11/2023    ALKPHOS 57 10/11/2023    BILITOTAL 0.7 10/11/2023     OTHER:   Lab Results   Component Value Date    PH 7.35 02/02/2022    LACT 2.3 (H) 02/02/2022    A1C 6.7 (H) 12/01/2023    NAVI 9.0 12/01/2023    PHOS 2.5 10/19/2017    MAG 1.8 10/12/2023    LIPASE 224 04/08/2017    AMYLASE 124 (H) 07/05/2005    TSH 0.22 (L) 10/07/2023    T4 1.68 10/07/2023    T3 65 (L) 10/09/2023    CRP 81.0 (H) 02/02/2022    SED 62 (H) 02/02/2022  "      Anesthesia Plan    ASA Status:  3    NPO Status:  NPO Appropriate    Anesthesia Type: General.     - Airway: ETT   Induction: Intravenous, Propofol.   Maintenance: Balanced.   Techniques and Equipment:     - Lines/Monitors: 2nd IV     Consents    Anesthesia Plan(s) and associated risks, benefits, and realistic alternatives discussed. Questions answered and patient/representative(s) expressed understanding.     - Discussed: Risks, Benefits and Alternatives for BOTH SEDATION and the PROCEDURE were discussed     - Discussed with:  Patient, Spouse      - Extended Intubation/Ventilatory Support Discussed: Yes.      - Patient is DNR/DNI Status: No     Use of blood products discussed: Yes.     - Discussed with: Patient.     - Consented: consented to blood products     Postoperative Care    Pain management: IV analgesics, Oral pain medications, Multi-modal analgesia.   PONV prophylaxis: Ondansetron (or other 5HT-3), Dexamethasone or Solumedrol, Background Propofol Infusion     Comments:               Gabriela Darden MD    I have reviewed the pertinent notes and labs in the chart from the past 30 days and (re)examined the patient.  Any updates or changes from those notes are reflected in this note.              # DMII: A1C = 6.7 % (Ref range: <5.7 %) within past 6 months  # Overweight: Estimated body mass index is 25.69 kg/m  as calculated from the following:    Height as of 12/12/23: 1.6 m (5' 3\").    Weight as of 12/12/23: 65.8 kg (145 lb).      "

## 2023-12-19 NOTE — DISCHARGE INSTRUCTIONS
"Dr. Hernandez's Surgical Discharge Instructions    1. Start rinsing the nasal cavities with ocean saline spray as needed for congestion and nasal saline rinse 2 or more times daily the day after surgery  2. Take medications as prescribed.    3. Please use over the counter pain medications such as tylenol in addition to your nasal saline spray. An antibiotic will be prescribed if there was evidence of infection during surgery and cultures were obtained at the time of surgery.  If needed we will also send you home on anti-nausea medication.  4. Do not drive or operate machinery while taking narcotic pain medication.   5. For nasal bleeding that is bothersome or excessive, spray afrin (oxymetazoline) nasal spray (four sprays into each nostril) and pinch the tip of the nose closed for 10 minutes. This can be bought over the counter. If you find you have done this four times in one hour and are still having bothersome bleeding, please call your doctor. Sometimes you will be given a bottle of afrin at the time of discharge.  This was used at the end of your surgery in your own nose, so you will find that the bottle is already opened, and may have some small streaks of blood on the tip of the bottle. Please do not be alarmed, as this was used on you, and you alone!  6. There are no activity restrictions after surgery, but please \"listen to your body\". If you feel like you are doing too much, please reduce your activity level. The one restriction I have is that you avoid excessive nose-blowing, as this can lead to nasal bleeding.   7. Please call your doctor for any headache not controlled with pain medication, severe nausea or vomiting, fevers >100.4, changes in mental status, or any other concerning symptoms you may identify.      Josef Hernandez MD    [facility]  Rhinology  Endoscopic Skull Base Surgery  [facility]  Department of Otolaryngology - Head & Neck Surgery    Contacting " your Doctor -   To contact a doctor, call Dr. Hernandez's office at [phone] or:  [phone] and ask for the resident on call for ENT-Otolaryngology (answered 24 hours a day)   Emergency Department:  [facility]: [phone]  [facility]: [phone] 911 if you are in need of immediate or emergent help

## 2023-12-19 NOTE — PROGRESS NOTES
Clinic Care Coordination Contact    Situation: Patient chart reviewed by care coordinator.    Background: Patient was hospitalized at [facility] 12/19/2023 due to planned admission for sinus surgery.     Assessment: Per chart review patients admission was uneventful and patient discharged home same day. No CC needs identified while patient was admitted, therefore, no CC outreach indicated at this time.      Plan/Recommendations: Patient will benefit from routine ongoing CC outreaches per standard workflow.    EJ Owens/[facility] CenterRADHIKA  Social Work Care Coordinator  [facility] [place]  Phone: [phone]

## 2023-12-19 NOTE — OP NOTE
DATE OF PROCEDURE:  December 19, 2023   ATTENDING SURGEON: Josef Hernandez MD  ASSISTANT SURGEON: Maliha Parker MD     PREOPERATIVE DIAGNOSES:  1. Right ethmoid mucocele        POSTOPERATIVE DIAGNOSES:  1. Right Ethmoid mucocele     PROCEDURES PERFORMED:  1. Right total revision ethmoidectomy with tissue removal  8. Stereotactic image-guided surgery, CPT 51326.     FINDINGS: significant osteitis and scar of the ethmoid, two mucoceles with polypoid edema      ANESTHESIA: General.  EBL: 20cc.  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 Decision made to proceed with mucocele decompression and excision to prevent recurrent issues. The risks and expectations of endoscopic sinus surgery were reviewed with the patient who agreed to proceed.     JUSTIFICATION FOR CPT 30154: Anterior & posterior ethmoid surgery,  skull base disease     PROCEDURE:  After informed consent was given, the patient was placed under satisfactory anesthesia by the anesthesiologist. The nose was topically decongested with pledgets moistened with 1:1,000 epinephrine. The bed was rotated, and the patient was prepped and draped in the usual fashion. The patient was identified and a surgical time out was performed. Next, injections with 1% lidocaine with 1:100,000 epinephrine were performed in the region of the agger nasi, septum, and lateral nasal walls bilaterally.      STEREOTACTIC IMAGE-GUIDED SURGERY: The Fusion navigation device was used to perform stereotactic navigation. The tracking instruments were calibrated appropriately with the headgear, and topical landmarks were confirmed to assure accuracy. During the case, the navigation probes were used to confirm our location along the skull base.        ENDOSCOPIC SINUS SURGERY: We first began with the 0  nasal endoscope on the right.   The pledgets were removed and the inferior turbinate was gently infractured and then outfractured with a [place] elevator.  There was significant polypoid tissue attached to the middle turbinate which was debrided with the microdebrider in order to fully open the nasopharynx.    There is extensive scarring from patient's prior surgery and the middle turbinate had been surgically excised.  Able to fully visualize area of mucocele which was confirmed with navigation.  The inferior mucocele was entered with a J curette with immediate return of thick inspissated mucus.  No purulence.  There was polypoid edema surrounding this area.  The edges of the mucocele were carefully taken down with a up Mahamed-Cut and 2 [place].  The superior mucocele was very osteitic and in order to fully remove it while protecting the skull base decision was made to proceed with a posterior ethmoidectomy as well as an anterior 1.  The posterior skull base was visualized and straight and up Mahamed-Cut's were used to slowly and carefully remove all bony attachments from it.  This was done until the posterior wall of the superiormost mucocele was encountered.    At this point to the middle part of the mucocele was entered with a J curette and thick mucus was encountered and removed.  The surrounding walls were then slowly taken down until they were flush with the skull base.    The entire lamina papyracea was then evaluated to ensure there were no injuries to it or protrusion of orbital fat of which there were none.  At the end of the case both mucoceles had been completely marsupialized.    At the termination of the case, hemostasis was assured.  All counts were correct times two. An orogastric tube was used to suction gastric and pharyngeal contents. The patient was then returned to the anesthesiologist for awakening.      I was present for the entire case.    Josef Hernandez MD    [facility]  Rhinology  Endoscopic Skull  Base Surgery  [facility]  Department of Otolaryngology - Head & Neck Surgery

## 2023-12-19 NOTE — ANESTHESIA POSTPROCEDURE EVALUATION
Patient: Viv Shaw    Procedure: Procedure(s):  SINUS SURGERY, ENDOSCOPIC, USING OPTICAL TRACKING SYSTEM  Right total ethmoidectomy/mucocele drainage       Anesthesia Type:  General    Note:  Disposition: Outpatient   Postop Pain Control: Uneventful            Sign Out: Well controlled pain   PONV: No   Neuro/Psych: Uneventful            Sign Out: Acceptable/Baseline neuro status   Airway/Respiratory: Uneventful            Sign Out: Acceptable/Baseline resp. status   CV/Hemodynamics: Uneventful            Sign Out: Acceptable CV status; No obvious hypovolemia; No obvious fluid overload   Other NRE: NONE   DID A NON-ROUTINE EVENT OCCUR? No           Last vitals:  Vitals Value Taken Time   /58 12/19/23 1100   Temp 36.6  C (97.8  F) 12/19/23 1019   Pulse 69 12/19/23 1111   Resp 14 12/19/23 1111   SpO2 96 % 12/19/23 1111   Vitals shown include unfiled device data.    Electronically Signed By: Enrico Velasquez MD  December 19, 2023  11:12 AM

## 2023-12-19 NOTE — OR NURSING
Informed Dr. Enrico Velasquez the , with insulin infusion, he ordered to Discontinue the insulin infusion then instructed the patient to continue her diabetic medicine at home.

## 2023-12-19 NOTE — OR NURSING
Notified Dr. Velasquez patients blood sugar on personal dexcom was 300 (263 on our monitor), verified ok for patient to take half of her regular Novolog dose right now (3 units Novolog insulin), will continue to monitor.

## 2023-12-19 NOTE — ANESTHESIA PROCEDURE NOTES
Airway       Patient location during procedure: OR       Procedure Start/Stop Times: 12/19/2023 8:31 AM  Staff -        Anesthesiologist:  Angela Hale MD       Resident/Fellow: Gabriela Darden MD       Performed By: resident  Consent for Airway        Urgency: elective  Indications and Patient Condition       Indications for airway management: courtney-procedural       Induction type:intravenous       Mask difficulty assessment: 1 - vent by mask    Final Airway Details       Final airway type: endotracheal airway       Successful airway: FRITZ, Oral and ETT - single  Endotracheal Airway Details        ETT size (mm): 7.5       Cuffed: yes       Cuff volume (mL): 8       Successful intubation technique: direct laryngoscopy       DL Blade Type: MAC 3       Grade View of Cords: 2       Adjucts: stylet       Position: Left       Measured from: lips       Secured at (cm): 21       Bite block used: None    Post intubation assessment        Placement verified by: capnometry, equal breath sounds and chest rise        Number of attempts at approach: 1       Number of other approaches attempted: 0       Secured with: pink tape       Ease of procedure: easy       Dentition: Intact and Unchanged    Medication(s) Administered   Medication Administration Time: 12/19/2023 8:31 AM

## 2023-12-19 NOTE — BRIEF OP NOTE
[facility]    Brief Operative Note    Pre-operative diagnosis: Mucocele of ethmoid sinus [J34.1]  Post-operative diagnosis Same as pre-operative diagnosis    Procedure: SINUS SURGERY, ENDOSCOPIC, USING OPTICAL TRACKING SYSTEM  Right total ethmoidectomy/mucocele drainage, Right - Head    Surgeon: Surgeon(s) and Role:     * Josef Hernandez MD - Primary     * Jeana Parker MD  Anesthesia: General   Estimated Blood Loss: 20 ml    Drains: None  Specimens:   ID Type Source Tests Collected by Time Destination   1 : Right Sinus Content Tissue Sinus SURGICAL PATHOLOGY EXAM Josef Hernandez MD 12/19/2023  9:42 AM      Findings:   Right ethmoid mucocele  Complications: None.  Implants: * No implants in log *

## 2023-12-19 NOTE — ANESTHESIA CARE TRANSFER NOTE
Patient: Viv Shaw    Procedure: Procedure(s):  SINUS SURGERY, ENDOSCOPIC, USING OPTICAL TRACKING SYSTEM  Right total ethmoidectomy/mucocele drainage       Diagnosis: Mucocele of ethmoid sinus [J34.1]  Diagnosis Additional Information: No value filed.    Anesthesia Type:   General     Note:    Oropharynx: oropharynx clear of all foreign objects and spontaneously breathing  Level of Consciousness: awake  Oxygen Supplementation: nasal cannula    Independent Airway: airway patency satisfactory and stable  Dentition: dentition unchanged  Vital Signs Stable: post-procedure vital signs reviewed and stable  Report to RN Given: handoff report given  Patient transferred to: PACU  Comments: L PIV noted to have infiltrated; sugammadex given through line x2; additional dose given through alternate IV site for adequate reversal  Handoff Report: Identifed the Patient, Identified the Reponsible Provider, Reviewed the pertinent medical history, Discussed the surgical course, Reviewed Intra-OP anesthesia mangement and issues during anesthesia, Set expectations for post-procedure period and Allowed opportunity for questions and acknowledgement of understanding      Vitals:  Vitals Value Taken Time   /54 12/19/23 1019   Temp     Pulse 71 12/19/23 1025   Resp     SpO2 100 % 12/19/23 1025   Vitals shown include unfiled device data.    Electronically Signed By: Gabriela Darden MD  December 19, 2023  10:26 AM

## 2023-12-27 NOTE — PROVIDER NOTIFICATION
Notified provider that pt's BG is 338, 2 hrs after 3 units of insulin given for BG of 315. Provider will put in orders to start an insulin drip.    No

## 2024-01-01 ENCOUNTER — PATIENT OUTREACH (OUTPATIENT)
Dept: CARE COORDINATION | Facility: CLINIC | Age: 74
End: 2024-01-01
Payer: MEDICARE

## 2024-01-01 ENCOUNTER — TELEPHONE (OUTPATIENT)
Dept: INTERNAL MEDICINE | Facility: CLINIC | Age: 74
End: 2024-01-01
Payer: MEDICARE

## 2024-01-01 ENCOUNTER — OFFICE VISIT (OUTPATIENT)
Dept: OPHTHALMOLOGY | Facility: CLINIC | Age: 74
End: 2024-01-01
Payer: MEDICARE

## 2024-01-01 ENCOUNTER — APPOINTMENT (OUTPATIENT)
Dept: GENERAL RADIOLOGY | Facility: CLINIC | Age: 74
DRG: 522 | End: 2024-01-01
Attending: STUDENT IN AN ORGANIZED HEALTH CARE EDUCATION/TRAINING PROGRAM
Payer: MEDICARE

## 2024-01-01 ENCOUNTER — APPOINTMENT (OUTPATIENT)
Dept: CT IMAGING | Facility: CLINIC | Age: 74
DRG: 522 | End: 2024-01-01
Attending: STUDENT IN AN ORGANIZED HEALTH CARE EDUCATION/TRAINING PROGRAM
Payer: MEDICARE

## 2024-01-01 ENCOUNTER — TELEPHONE (OUTPATIENT)
Dept: ENDOCRINOLOGY | Facility: CLINIC | Age: 74
End: 2024-01-01
Payer: MEDICARE

## 2024-01-01 ENCOUNTER — PATIENT OUTREACH (OUTPATIENT)
Dept: CARE COORDINATION | Facility: CLINIC | Age: 74
End: 2024-01-01

## 2024-01-01 ENCOUNTER — TELEPHONE (OUTPATIENT)
Dept: INTERNAL MEDICINE | Facility: CLINIC | Age: 74
End: 2024-01-01

## 2024-01-01 ENCOUNTER — TELEPHONE (OUTPATIENT)
Dept: TRANSPLANT | Facility: CLINIC | Age: 74
End: 2024-01-01
Payer: MEDICARE

## 2024-01-01 ENCOUNTER — ANESTHESIA EVENT (OUTPATIENT)
Dept: SURGERY | Facility: CLINIC | Age: 74
DRG: 522 | End: 2024-01-01
Payer: MEDICARE

## 2024-01-01 ENCOUNTER — LAB REQUISITION (OUTPATIENT)
Dept: LAB | Facility: CLINIC | Age: 74
End: 2024-01-01
Payer: MEDICARE

## 2024-01-01 ENCOUNTER — APPOINTMENT (OUTPATIENT)
Dept: PHYSICAL THERAPY | Facility: CLINIC | Age: 74
DRG: 522 | End: 2024-01-01
Payer: MEDICARE

## 2024-01-01 ENCOUNTER — APPOINTMENT (OUTPATIENT)
Dept: GENERAL RADIOLOGY | Facility: CLINIC | Age: 74
DRG: 522 | End: 2024-01-01
Payer: MEDICARE

## 2024-01-01 ENCOUNTER — HOSPITAL ENCOUNTER (INPATIENT)
Facility: CLINIC | Age: 74
LOS: 4 days | Discharge: NURSING, REHAB OR LONG-TERM CARE | DRG: 522 | End: 2024-01-27
Attending: STUDENT IN AN ORGANIZED HEALTH CARE EDUCATION/TRAINING PROGRAM | Admitting: STUDENT IN AN ORGANIZED HEALTH CARE EDUCATION/TRAINING PROGRAM
Payer: MEDICARE

## 2024-01-01 ENCOUNTER — ANESTHESIA (OUTPATIENT)
Dept: SURGERY | Facility: CLINIC | Age: 74
DRG: 522 | End: 2024-01-01
Payer: MEDICARE

## 2024-01-01 ENCOUNTER — NURSE TRIAGE (OUTPATIENT)
Dept: NURSING | Facility: CLINIC | Age: 74
End: 2024-01-01

## 2024-01-01 ENCOUNTER — NURSE TRIAGE (OUTPATIENT)
Dept: NURSING | Facility: CLINIC | Age: 74
End: 2024-01-01
Payer: MEDICARE

## 2024-01-01 ENCOUNTER — OFFICE VISIT (OUTPATIENT)
Dept: OTOLARYNGOLOGY | Facility: CLINIC | Age: 74
End: 2024-01-01
Payer: MEDICARE

## 2024-01-01 VITALS
HEART RATE: 61 BPM | TEMPERATURE: 97.5 F | BODY MASS INDEX: 24.01 KG/M2 | OXYGEN SATURATION: 91 % | HEIGHT: 64 IN | SYSTOLIC BLOOD PRESSURE: 107 MMHG | DIASTOLIC BLOOD PRESSURE: 54 MMHG | WEIGHT: 140.65 LBS | RESPIRATION RATE: 18 BRPM

## 2024-01-01 VITALS
DIASTOLIC BLOOD PRESSURE: 61 MMHG | WEIGHT: 144.1 LBS | HEART RATE: 66 BPM | SYSTOLIC BLOOD PRESSURE: 158 MMHG | OXYGEN SATURATION: 96 % | HEIGHT: 64 IN | BODY MASS INDEX: 24.6 KG/M2

## 2024-01-01 DIAGNOSIS — Z94.0 KIDNEY REPLACED BY TRANSPLANT: ICD-10-CM
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
## 2024-01-01 DIAGNOSIS — J34.1 MUCOCELE OF ETHMOID SINUS: Primary | ICD-10-CM

## 2024-01-01 DIAGNOSIS — Z74.09 IMPAIRED FUNCTIONAL MOBILITY, BALANCE, GAIT, AND ENDURANCE: Primary | ICD-10-CM

## 2024-01-01 DIAGNOSIS — Z94.0 KIDNEY TRANSPLANTED: Primary | ICD-10-CM

## 2024-01-01 DIAGNOSIS — E13.9 DIABETES MELLITUS TYPE 1.5, MANAGED AS TYPE 1 (H): ICD-10-CM

## 2024-01-01 DIAGNOSIS — E78.5 HYPERLIPIDEMIA LDL GOAL <100: ICD-10-CM

## 2024-01-01 DIAGNOSIS — I10 ESSENTIAL (PRIMARY) HYPERTENSION: ICD-10-CM

## 2024-01-01 DIAGNOSIS — H54.40 VISUAL LOSS, ONE EYE, NO LIGHT PERCEPTION (NLP): Primary | ICD-10-CM

## 2024-01-01 DIAGNOSIS — K21.9 GASTROESOPHAGEAL REFLUX DISEASE WITHOUT ESOPHAGITIS: ICD-10-CM

## 2024-01-01 DIAGNOSIS — J34.1 MUCOCELE OF ETHMOID SINUS: ICD-10-CM

## 2024-01-01 DIAGNOSIS — H54.415A CATEGORY 5 BLINDNESS OF RIGHT EYE WITH NORMAL VISION OF LEFT EYE: ICD-10-CM

## 2024-01-01 DIAGNOSIS — I15.1 HYPERTENSION SECONDARY TO OTHER RENAL DISORDERS: ICD-10-CM

## 2024-01-01 DIAGNOSIS — S72.001A HIP FRACTURE, RIGHT, CLOSED, INITIAL ENCOUNTER (H): ICD-10-CM

## 2024-01-01 DIAGNOSIS — S72.001A CLOSED FRACTURE OF RIGHT HIP, INITIAL ENCOUNTER (H): Primary | ICD-10-CM

## 2024-01-01 DIAGNOSIS — Z94.0 KIDNEY TRANSPLANTED: ICD-10-CM

## 2024-01-01 LAB
ABO/RH(D): NORMAL
ACANTHOCYTES BLD QL SMEAR: ABNORMAL
ALBUMIN SERPL BCG-MCNC: 4.3 G/DL (ref 3.5–5.2)
ALP SERPL-CCNC: 91 U/L (ref 40–150)
ALT SERPL W P-5'-P-CCNC: 10 U/L (ref 0–50)
ANION GAP SERPL CALCULATED.3IONS-SCNC: 10 MMOL/L (ref 7–15)
ANION GAP SERPL CALCULATED.3IONS-SCNC: 10 MMOL/L (ref 7–15)
ANION GAP SERPL CALCULATED.3IONS-SCNC: 11 MMOL/L (ref 7–15)
ANION GAP SERPL CALCULATED.3IONS-SCNC: 12 MMOL/L (ref 7–15)
ANION GAP SERPL CALCULATED.3IONS-SCNC: 7 MMOL/L (ref 7–15)
ANION GAP SERPL CALCULATED.3IONS-SCNC: 8 MMOL/L (ref 7–15)
ANION GAP SERPL CALCULATED.3IONS-SCNC: 8 MMOL/L (ref 7–15)
ANION GAP SERPL CALCULATED.3IONS-SCNC: 9 MMOL/L (ref 7–15)
ANTIBODY SCREEN: NEGATIVE
AST SERPL W P-5'-P-CCNC: 18 U/L (ref 0–45)
ATRIAL RATE - MUSE: 58 BPM
AUER BODIES BLD QL SMEAR: ABNORMAL
BASO STIPL BLD QL SMEAR: ABNORMAL
BASOPHILS # BLD AUTO: 0 10E3/UL (ref 0–0.2)
BASOPHILS NFR BLD AUTO: 0 %
BILIRUB SERPL-MCNC: 0.7 MG/DL
BITE CELLS BLD QL SMEAR: ABNORMAL
BLISTER CELLS BLD QL SMEAR: ABNORMAL
BUN SERPL-MCNC: 18 MG/DL (ref 8–23)
BUN SERPL-MCNC: 20.3 MG/DL (ref 8–23)
BUN SERPL-MCNC: 22.9 MG/DL (ref 8–23)
BUN SERPL-MCNC: 23.1 MG/DL (ref 8–23)
BUN SERPL-MCNC: 25.2 MG/DL (ref 8–23)
BUN SERPL-MCNC: 28.2 MG/DL (ref 8–23)
BUN SERPL-MCNC: 28.7 MG/DL (ref 8–23)
BUN SERPL-MCNC: 33.6 MG/DL (ref 8–23)
BURR CELLS BLD QL SMEAR: ABNORMAL
CALCIUM SERPL-MCNC: 8.4 MG/DL (ref 8.8–10.2)
CALCIUM SERPL-MCNC: 8.4 MG/DL (ref 8.8–10.2)
CALCIUM SERPL-MCNC: 8.5 MG/DL (ref 8.8–10.2)
CALCIUM SERPL-MCNC: 8.5 MG/DL (ref 8.8–10.2)
CALCIUM SERPL-MCNC: 8.8 MG/DL (ref 8.8–10.2)
CALCIUM SERPL-MCNC: 9 MG/DL (ref 8.8–10.2)
CALCIUM SERPL-MCNC: 9.3 MG/DL (ref 8.8–10.2)
CALCIUM SERPL-MCNC: 9.5 MG/DL (ref 8.8–10.2)
CHLORIDE SERPL-SCNC: 100 MMOL/L (ref 98–107)
CHLORIDE SERPL-SCNC: 100 MMOL/L (ref 98–107)
CHLORIDE SERPL-SCNC: 101 MMOL/L (ref 98–107)
CHLORIDE SERPL-SCNC: 103 MMOL/L (ref 98–107)
CHLORIDE SERPL-SCNC: 103 MMOL/L (ref 98–107)
CREAT SERPL-MCNC: 1.01 MG/DL (ref 0.51–0.95)
CREAT SERPL-MCNC: 1.05 MG/DL (ref 0.51–0.95)
CREAT SERPL-MCNC: 1.11 MG/DL (ref 0.51–0.95)
CREAT SERPL-MCNC: 1.17 MG/DL (ref 0.51–0.95)
CREAT SERPL-MCNC: 1.18 MG/DL (ref 0.51–0.95)
CREAT SERPL-MCNC: 1.23 MG/DL (ref 0.51–0.95)
CREAT SERPL-MCNC: 1.31 MG/DL (ref 0.51–0.95)
CREAT SERPL-MCNC: 1.38 MG/DL (ref 0.51–0.95)
DACRYOCYTES BLD QL SMEAR: ABNORMAL
DEPRECATED HCO3 PLAS-SCNC: 26 MMOL/L (ref 22–29)
DEPRECATED HCO3 PLAS-SCNC: 27 MMOL/L (ref 22–29)
DEPRECATED HCO3 PLAS-SCNC: 28 MMOL/L (ref 22–29)
DEPRECATED HCO3 PLAS-SCNC: 28 MMOL/L (ref 22–29)
DEPRECATED HCO3 PLAS-SCNC: 30 MMOL/L (ref 22–29)
DIASTOLIC BLOOD PRESSURE - MUSE: NORMAL MMHG
EGFRCR SERPLBLD CKD-EPI 2021: 40 ML/MIN/1.73M2
EGFRCR SERPLBLD CKD-EPI 2021: 42 ML/MIN/1.73M2
EGFRCR SERPLBLD CKD-EPI 2021: 46 ML/MIN/1.73M2
EGFRCR SERPLBLD CKD-EPI 2021: 48 ML/MIN/1.73M2
EGFRCR SERPLBLD CKD-EPI 2021: 48 ML/MIN/1.73M2
EGFRCR SERPLBLD CKD-EPI 2021: 52 ML/MIN/1.73M2
EGFRCR SERPLBLD CKD-EPI 2021: 55 ML/MIN/1.73M2
EGFRCR SERPLBLD CKD-EPI 2021: 58 ML/MIN/1.73M2
ELLIPTOCYTES BLD QL SMEAR: ABNORMAL
EOSINOPHIL # BLD AUTO: 0 10E3/UL (ref 0–0.7)
EOSINOPHIL # BLD AUTO: 0.1 10E3/UL (ref 0–0.7)
EOSINOPHIL # BLD AUTO: 0.1 10E3/UL (ref 0–0.7)
EOSINOPHIL # BLD AUTO: 0.2 10E3/UL (ref 0–0.7)
EOSINOPHIL # BLD AUTO: 0.2 10E3/UL (ref 0–0.7)
EOSINOPHIL NFR BLD AUTO: 0 %
EOSINOPHIL NFR BLD AUTO: 1 %
EOSINOPHIL NFR BLD AUTO: 2 %
EOSINOPHIL NFR BLD AUTO: 3 %
EOSINOPHIL NFR BLD AUTO: 3 %
ERYTHROCYTE [DISTWIDTH] IN BLOOD BY AUTOMATED COUNT: 13.2 % (ref 10–15)
ERYTHROCYTE [DISTWIDTH] IN BLOOD BY AUTOMATED COUNT: 13.2 % (ref 10–15)
ERYTHROCYTE [DISTWIDTH] IN BLOOD BY AUTOMATED COUNT: 13.3 % (ref 10–15)
ERYTHROCYTE [DISTWIDTH] IN BLOOD BY AUTOMATED COUNT: 13.7 % (ref 10–15)
ERYTHROCYTE [DISTWIDTH] IN BLOOD BY AUTOMATED COUNT: 16.2 % (ref 10–15)
ERYTHROCYTE [DISTWIDTH] IN BLOOD BY AUTOMATED COUNT: 17.2 % (ref 10–15)
FRAGMENTS BLD QL SMEAR: ABNORMAL
GLUCOSE BLDC GLUCOMTR-MCNC: 103 MG/DL (ref 70–99)
GLUCOSE BLDC GLUCOMTR-MCNC: 119 MG/DL (ref 70–99)
GLUCOSE BLDC GLUCOMTR-MCNC: 145 MG/DL (ref 70–99)
GLUCOSE BLDC GLUCOMTR-MCNC: 147 MG/DL (ref 70–99)
GLUCOSE BLDC GLUCOMTR-MCNC: 159 MG/DL (ref 70–99)
GLUCOSE BLDC GLUCOMTR-MCNC: 171 MG/DL (ref 70–99)
GLUCOSE BLDC GLUCOMTR-MCNC: 175 MG/DL (ref 70–99)
GLUCOSE BLDC GLUCOMTR-MCNC: 179 MG/DL (ref 70–99)
GLUCOSE BLDC GLUCOMTR-MCNC: 180 MG/DL (ref 70–99)
GLUCOSE BLDC GLUCOMTR-MCNC: 184 MG/DL (ref 70–99)
GLUCOSE BLDC GLUCOMTR-MCNC: 209 MG/DL (ref 70–99)
GLUCOSE BLDC GLUCOMTR-MCNC: 211 MG/DL (ref 70–99)
GLUCOSE BLDC GLUCOMTR-MCNC: 219 MG/DL (ref 70–99)
GLUCOSE BLDC GLUCOMTR-MCNC: 255 MG/DL (ref 70–99)
GLUCOSE BLDC GLUCOMTR-MCNC: 261 MG/DL (ref 70–99)
GLUCOSE BLDC GLUCOMTR-MCNC: 262 MG/DL (ref 70–99)
GLUCOSE BLDC GLUCOMTR-MCNC: 286 MG/DL (ref 70–99)
GLUCOSE BLDC GLUCOMTR-MCNC: 287 MG/DL (ref 70–99)
GLUCOSE BLDC GLUCOMTR-MCNC: 343 MG/DL (ref 70–99)
GLUCOSE BLDC GLUCOMTR-MCNC: 355 MG/DL (ref 70–99)
GLUCOSE BLDC GLUCOMTR-MCNC: 40 MG/DL (ref 70–99)
GLUCOSE BLDC GLUCOMTR-MCNC: 90 MG/DL (ref 70–99)
GLUCOSE SERPL-MCNC: 151 MG/DL (ref 70–99)
GLUCOSE SERPL-MCNC: 155 MG/DL (ref 70–99)
GLUCOSE SERPL-MCNC: 159 MG/DL (ref 70–99)
GLUCOSE SERPL-MCNC: 174 MG/DL (ref 70–99)
GLUCOSE SERPL-MCNC: 233 MG/DL (ref 70–99)
GLUCOSE SERPL-MCNC: 240 MG/DL (ref 70–99)
GLUCOSE SERPL-MCNC: 254 MG/DL (ref 70–99)
GLUCOSE SERPL-MCNC: 83 MG/DL (ref 70–99)
HCT VFR BLD AUTO: 29.4 % (ref 35–47)
HCT VFR BLD AUTO: 31.2 % (ref 35–47)
HCT VFR BLD AUTO: 31.8 % (ref 35–47)
HCT VFR BLD AUTO: 32.8 % (ref 35–47)
HCT VFR BLD AUTO: 34.1 % (ref 35–47)
HCT VFR BLD AUTO: 34.2 % (ref 35–47)
HCT VFR BLD AUTO: 39.5 % (ref 35–47)
HCT VFR BLD AUTO: 40.4 % (ref 35–47)
HGB BLD-MCNC: 10.4 G/DL (ref 11.7–15.7)
HGB BLD-MCNC: 10.9 G/DL (ref 11.7–15.7)
HGB BLD-MCNC: 11.1 G/DL (ref 11.7–15.7)
HGB BLD-MCNC: 12.4 G/DL (ref 11.7–15.7)
HGB BLD-MCNC: 12.8 G/DL (ref 11.7–15.7)
HGB BLD-MCNC: 9.2 G/DL (ref 11.7–15.7)
HGB BLD-MCNC: 9.8 G/DL (ref 11.7–15.7)
HGB BLD-MCNC: 9.9 G/DL (ref 11.7–15.7)
HGB C CRYSTALS: ABNORMAL
HOWELL-JOLLY BOD BLD QL SMEAR: ABNORMAL
IMM GRANULOCYTES # BLD: 0 10E3/UL
IMM GRANULOCYTES # BLD: 0.1 10E3/UL
IMM GRANULOCYTES NFR BLD: 1 %
INTERPRETATION ECG - MUSE: NORMAL
LYMPHOCYTES # BLD AUTO: 0.6 10E3/UL (ref 0.8–5.3)
LYMPHOCYTES # BLD AUTO: 0.8 10E3/UL (ref 0.8–5.3)
LYMPHOCYTES # BLD AUTO: 1.2 10E3/UL (ref 0.8–5.3)
LYMPHOCYTES NFR BLD AUTO: 12 %
LYMPHOCYTES NFR BLD AUTO: 16 %
LYMPHOCYTES NFR BLD AUTO: 5 %
LYMPHOCYTES NFR BLD AUTO: 7 %
LYMPHOCYTES NFR BLD AUTO: 8 %
MAGNESIUM SERPL-MCNC: 2.1 MG/DL (ref 1.7–2.3)
MCH RBC QN AUTO: 28.6 PG (ref 26.5–33)
MCH RBC QN AUTO: 28.9 PG (ref 26.5–33)
MCH RBC QN AUTO: 29 PG (ref 26.5–33)
MCH RBC QN AUTO: 29.1 PG (ref 26.5–33)
MCH RBC QN AUTO: 29.2 PG (ref 26.5–33)
MCH RBC QN AUTO: 29.3 PG (ref 26.5–33)
MCH RBC QN AUTO: 31 PG (ref 26.5–33)
MCH RBC QN AUTO: 31.2 PG (ref 26.5–33)
MCHC RBC AUTO-ENTMCNC: 31.1 G/DL (ref 31.5–36.5)
MCHC RBC AUTO-ENTMCNC: 31.3 G/DL (ref 31.5–36.5)
MCHC RBC AUTO-ENTMCNC: 31.4 G/DL (ref 31.5–36.5)
MCHC RBC AUTO-ENTMCNC: 31.4 G/DL (ref 31.5–36.5)
MCHC RBC AUTO-ENTMCNC: 31.7 G/DL (ref 31.5–36.5)
MCHC RBC AUTO-ENTMCNC: 31.7 G/DL (ref 31.5–36.5)
MCHC RBC AUTO-ENTMCNC: 31.9 G/DL (ref 31.5–36.5)
MCHC RBC AUTO-ENTMCNC: 32.6 G/DL (ref 31.5–36.5)
MCV RBC AUTO: 90 FL (ref 78–100)
MCV RBC AUTO: 91 FL (ref 78–100)
MCV RBC AUTO: 91 FL (ref 78–100)
MCV RBC AUTO: 92 FL (ref 78–100)
MCV RBC AUTO: 92 FL (ref 78–100)
MCV RBC AUTO: 93 FL (ref 78–100)
MCV RBC AUTO: 99 FL (ref 78–100)
MCV RBC AUTO: 99 FL (ref 78–100)
MONOCYTES # BLD AUTO: 0.5 10E3/UL (ref 0–1.3)
MONOCYTES # BLD AUTO: 0.7 10E3/UL (ref 0–1.3)
MONOCYTES NFR BLD AUTO: 4 %
MONOCYTES NFR BLD AUTO: 6 %
MONOCYTES NFR BLD AUTO: 7 %
MONOCYTES NFR BLD AUTO: 7 %
MONOCYTES NFR BLD AUTO: 8 %
NEUTROPHILS # BLD AUTO: 11.1 10E3/UL (ref 1.6–8.3)
NEUTROPHILS # BLD AUTO: 4.9 10E3/UL (ref 1.6–8.3)
NEUTROPHILS # BLD AUTO: 5.5 10E3/UL (ref 1.6–8.3)
NEUTROPHILS # BLD AUTO: 6.4 10E3/UL (ref 1.6–8.3)
NEUTROPHILS # BLD AUTO: 6.8 10E3/UL (ref 1.6–8.3)
NEUTROPHILS NFR BLD AUTO: 73 %
NEUTROPHILS NFR BLD AUTO: 77 %
NEUTROPHILS NFR BLD AUTO: 83 %
NEUTROPHILS NFR BLD AUTO: 83 %
NEUTROPHILS NFR BLD AUTO: 90 %
NEUTS HYPERSEG BLD QL SMEAR: ABNORMAL
NRBC # BLD AUTO: 0 10E3/UL
NRBC BLD AUTO-RTO: 0 /100
P AXIS - MUSE: 39 DEGREES
PATH REPORT.COMMENTS IMP SPEC: NORMAL
PATH REPORT.COMMENTS IMP SPEC: NORMAL
PATH REPORT.FINAL DX SPEC: NORMAL
PATH REPORT.GROSS SPEC: NORMAL
PATH REPORT.MICROSCOPIC SPEC OTHER STN: NORMAL
PATH REPORT.RELEVANT HX SPEC: NORMAL
PHOSPHATE SERPL-MCNC: 3.1 MG/DL (ref 2.5–4.5)
PHOTO IMAGE: NORMAL
PLAT MORPH BLD: ABNORMAL
PLATELET # BLD AUTO: 163 10E3/UL (ref 150–450)
PLATELET # BLD AUTO: 167 10E3/UL (ref 150–450)
PLATELET # BLD AUTO: 176 10E3/UL (ref 150–450)
PLATELET # BLD AUTO: 203 10E3/UL (ref 150–450)
PLATELET # BLD AUTO: 221 10E3/UL (ref 150–450)
PLATELET # BLD AUTO: 294 10E3/UL (ref 150–450)
PLATELET # BLD AUTO: 303 10E3/UL (ref 150–450)
PLATELET # BLD AUTO: 332 10E3/UL (ref 150–450)
POLYCHROMASIA BLD QL SMEAR: SLIGHT
POTASSIUM SERPL-SCNC: 3.8 MMOL/L (ref 3.4–5.3)
POTASSIUM SERPL-SCNC: 3.9 MMOL/L (ref 3.4–5.3)
POTASSIUM SERPL-SCNC: 3.9 MMOL/L (ref 3.4–5.3)
POTASSIUM SERPL-SCNC: 4.1 MMOL/L (ref 3.4–5.3)
POTASSIUM SERPL-SCNC: 4.2 MMOL/L (ref 3.4–5.3)
POTASSIUM SERPL-SCNC: 4.3 MMOL/L (ref 3.4–5.3)
POTASSIUM SERPL-SCNC: 4.4 MMOL/L (ref 3.4–5.3)
POTASSIUM SERPL-SCNC: 4.6 MMOL/L (ref 3.4–5.3)
PR INTERVAL - MUSE: 82 MS
PROT SERPL-MCNC: 7.2 G/DL (ref 6.4–8.3)
QRS DURATION - MUSE: 92 MS
QT - MUSE: 458 MS
QTC - MUSE: 449 MS
R AXIS - MUSE: 36 DEGREES
RBC # BLD AUTO: 3.16 10E6/UL (ref 3.8–5.2)
RBC # BLD AUTO: 3.22 10E6/UL (ref 3.8–5.2)
RBC # BLD AUTO: 3.42 10E6/UL (ref 3.8–5.2)
RBC # BLD AUTO: 3.55 10E6/UL (ref 3.8–5.2)
RBC # BLD AUTO: 3.76 10E6/UL (ref 3.8–5.2)
RBC # BLD AUTO: 3.8 10E6/UL (ref 3.8–5.2)
RBC # BLD AUTO: 3.98 10E6/UL (ref 3.8–5.2)
RBC # BLD AUTO: 4.4 10E6/UL (ref 3.8–5.2)
RBC AGGLUT BLD QL: ABNORMAL
RBC MORPH BLD: ABNORMAL
ROULEAUX BLD QL SMEAR: ABNORMAL
SICKLE CELLS BLD QL SMEAR: ABNORMAL
SMUDGE CELLS BLD QL SMEAR: ABNORMAL
SODIUM SERPL-SCNC: 137 MMOL/L (ref 135–145)
SODIUM SERPL-SCNC: 138 MMOL/L (ref 135–145)
SODIUM SERPL-SCNC: 139 MMOL/L (ref 135–145)
SODIUM SERPL-SCNC: 139 MMOL/L (ref 135–145)
SODIUM SERPL-SCNC: 141 MMOL/L (ref 135–145)
SPECIMEN EXPIRATION DATE: NORMAL
SPHEROCYTES BLD QL SMEAR: ABNORMAL
STOMATOCYTES BLD QL SMEAR: ABNORMAL
SYSTOLIC BLOOD PRESSURE - MUSE: NORMAL MMHG
T AXIS - MUSE: 68 DEGREES
TARGETS BLD QL SMEAR: ABNORMAL
TOXIC GRANULES BLD QL SMEAR: ABNORMAL
TSH SERPL DL<=0.005 MIU/L-ACNC: 5.62 UIU/ML (ref 0.3–4.2)
VARIANT LYMPHS BLD QL SMEAR: ABNORMAL
VENTRICULAR RATE- MUSE: 58 BPM
VIT D+METAB SERPL-MCNC: 56 NG/ML (ref 20–50)
WBC # BLD AUTO: 12.4 10E3/UL (ref 4–11)
WBC # BLD AUTO: 5.3 10E3/UL (ref 4–11)
WBC # BLD AUTO: 5.8 10E3/UL (ref 4–11)
WBC # BLD AUTO: 6.1 10E3/UL (ref 4–11)
WBC # BLD AUTO: 6.5 10E3/UL (ref 4–11)
WBC # BLD AUTO: 7.4 10E3/UL (ref 4–11)
WBC # BLD AUTO: 7.7 10E3/UL (ref 4–11)
WBC # BLD AUTO: 8.2 10E3/UL (ref 4–11)

## 2024-01-01 PROCEDURE — 250N000013 HC RX MED GY IP 250 OP 250 PS 637: Performed by: STUDENT IN AN ORGANIZED HEALTH CARE EDUCATION/TRAINING PROGRAM

## 2024-01-01 PROCEDURE — 82374 ASSAY BLOOD CARBON DIOXIDE: CPT | Performed by: FAMILY MEDICINE

## 2024-01-01 PROCEDURE — 370N000017 HC ANESTHESIA TECHNICAL FEE, PER MIN: Performed by: STUDENT IN AN ORGANIZED HEALTH CARE EDUCATION/TRAINING PROGRAM

## 2024-01-01 PROCEDURE — 250N000009 HC RX 250: Performed by: NURSE ANESTHETIST, CERTIFIED REGISTERED

## 2024-01-01 PROCEDURE — 80048 BASIC METABOLIC PNL TOTAL CA: CPT | Performed by: FAMILY MEDICINE

## 2024-01-01 PROCEDURE — 250N000011 HC RX IP 250 OP 636

## 2024-01-01 PROCEDURE — 85025 COMPLETE CBC W/AUTO DIFF WBC: CPT | Performed by: STUDENT IN AN ORGANIZED HEALTH CARE EDUCATION/TRAINING PROGRAM

## 2024-01-01 PROCEDURE — 86900 BLOOD TYPING SEROLOGIC ABO: CPT | Performed by: STUDENT IN AN ORGANIZED HEALTH CARE EDUCATION/TRAINING PROGRAM

## 2024-01-01 PROCEDURE — 82306 VITAMIN D 25 HYDROXY: CPT | Performed by: PHYSICIAN ASSISTANT

## 2024-01-01 PROCEDURE — 250N000011 HC RX IP 250 OP 636: Performed by: STUDENT IN AN ORGANIZED HEALTH CARE EDUCATION/TRAINING PROGRAM

## 2024-01-01 PROCEDURE — 250N000012 HC RX MED GY IP 250 OP 636 PS 637: Performed by: INTERNAL MEDICINE

## 2024-01-01 PROCEDURE — 360N000077 HC SURGERY LEVEL 4, PER MIN: Performed by: STUDENT IN AN ORGANIZED HEALTH CARE EDUCATION/TRAINING PROGRAM

## 2024-01-01 PROCEDURE — 120N000001 HC R&B MED SURG/OB

## 2024-01-01 PROCEDURE — 250N000013 HC RX MED GY IP 250 OP 250 PS 637: Performed by: PHYSICIAN ASSISTANT

## 2024-01-01 PROCEDURE — 85025 COMPLETE CBC W/AUTO DIFF WBC: CPT | Performed by: FAMILY MEDICINE

## 2024-01-01 PROCEDURE — 250N000012 HC RX MED GY IP 250 OP 636 PS 637: Performed by: STUDENT IN AN ORGANIZED HEALTH CARE EDUCATION/TRAINING PROGRAM

## 2024-01-01 PROCEDURE — 250N000013 HC RX MED GY IP 250 OP 250 PS 637

## 2024-01-01 PROCEDURE — 99024 POSTOP FOLLOW-UP VISIT: CPT | Performed by: STUDENT IN AN ORGANIZED HEALTH CARE EDUCATION/TRAINING PROGRAM

## 2024-01-01 PROCEDURE — 250N000011 HC RX IP 250 OP 636: Performed by: NURSE ANESTHETIST, CERTIFIED REGISTERED

## 2024-01-01 PROCEDURE — 36415 COLL VENOUS BLD VENIPUNCTURE: CPT | Performed by: STUDENT IN AN ORGANIZED HEALTH CARE EDUCATION/TRAINING PROGRAM

## 2024-01-01 PROCEDURE — 258N000003 HC RX IP 258 OP 636: Performed by: ANESTHESIOLOGY

## 2024-01-01 PROCEDURE — 0SRR019 REPLACEMENT OF RIGHT HIP JOINT, FEMORAL SURFACE WITH METAL SYNTHETIC SUBSTITUTE, CEMENTED, OPEN APPROACH: ICD-10-PCS | Performed by: STUDENT IN AN ORGANIZED HEALTH CARE EDUCATION/TRAINING PROGRAM

## 2024-01-01 PROCEDURE — 80053 COMPREHEN METABOLIC PANEL: CPT | Performed by: STUDENT IN AN ORGANIZED HEALTH CARE EDUCATION/TRAINING PROGRAM

## 2024-01-01 PROCEDURE — 88300 SURGICAL PATH GROSS: CPT | Mod: TC | Performed by: STUDENT IN AN ORGANIZED HEALTH CARE EDUCATION/TRAINING PROGRAM

## 2024-01-01 PROCEDURE — 250N000009 HC RX 250: Performed by: PHYSICIAN ASSISTANT

## 2024-01-01 PROCEDURE — 258N000001 HC RX 258: Performed by: STUDENT IN AN ORGANIZED HEALTH CARE EDUCATION/TRAINING PROGRAM

## 2024-01-01 PROCEDURE — 999N000141 HC STATISTIC PRE-PROCEDURE NURSING ASSESSMENT: Performed by: STUDENT IN AN ORGANIZED HEALTH CARE EDUCATION/TRAINING PROGRAM

## 2024-01-01 PROCEDURE — 83735 ASSAY OF MAGNESIUM: CPT | Performed by: FAMILY MEDICINE

## 2024-01-01 PROCEDURE — 93005 ELECTROCARDIOGRAM TRACING: CPT

## 2024-01-01 PROCEDURE — P9604 ONE-WAY ALLOW PRORATED TRIP: HCPCS | Performed by: FAMILY MEDICINE

## 2024-01-01 PROCEDURE — 88300 SURGICAL PATH GROSS: CPT | Mod: 26 | Performed by: PATHOLOGY

## 2024-01-01 PROCEDURE — 99207 PR NO BILLABLE SERVICE THIS VISIT: CPT | Performed by: STUDENT IN AN ORGANIZED HEALTH CARE EDUCATION/TRAINING PROGRAM

## 2024-01-01 PROCEDURE — 97530 THERAPEUTIC ACTIVITIES: CPT | Mod: GP

## 2024-01-01 PROCEDURE — 258N000003 HC RX IP 258 OP 636: Performed by: STUDENT IN AN ORGANIZED HEALTH CARE EDUCATION/TRAINING PROGRAM

## 2024-01-01 PROCEDURE — 250N000011 HC RX IP 250 OP 636: Performed by: PHYSICIAN ASSISTANT

## 2024-01-01 PROCEDURE — 82565 ASSAY OF CREATININE: CPT | Performed by: FAMILY MEDICINE

## 2024-01-01 PROCEDURE — 96374 THER/PROPH/DIAG INJ IV PUSH: CPT

## 2024-01-01 PROCEDURE — 36415 COLL VENOUS BLD VENIPUNCTURE: CPT | Performed by: FAMILY MEDICINE

## 2024-01-01 PROCEDURE — 73502 X-RAY EXAM HIP UNI 2-3 VIEWS: CPT

## 2024-01-01 PROCEDURE — C1776 JOINT DEVICE (IMPLANTABLE): HCPCS | Performed by: STUDENT IN AN ORGANIZED HEALTH CARE EDUCATION/TRAINING PROGRAM

## 2024-01-01 PROCEDURE — 84443 ASSAY THYROID STIM HORMONE: CPT | Performed by: FAMILY MEDICINE

## 2024-01-01 PROCEDURE — 80048 BASIC METABOLIC PNL TOTAL CA: CPT | Performed by: STUDENT IN AN ORGANIZED HEALTH CARE EDUCATION/TRAINING PROGRAM

## 2024-01-01 PROCEDURE — 999N000111 HC STATISTIC OT IP EVAL DEFER: Performed by: REHABILITATION PRACTITIONER

## 2024-01-01 PROCEDURE — 85027 COMPLETE CBC AUTOMATED: CPT | Performed by: FAMILY MEDICINE

## 2024-01-01 PROCEDURE — 272N000001 HC OR GENERAL SUPPLY STERILE: Performed by: STUDENT IN AN ORGANIZED HEALTH CARE EDUCATION/TRAINING PROGRAM

## 2024-01-01 PROCEDURE — C1713 ANCHOR/SCREW BN/BN,TIS/BN: HCPCS | Performed by: STUDENT IN AN ORGANIZED HEALTH CARE EDUCATION/TRAINING PROGRAM

## 2024-01-01 PROCEDURE — 97163 PT EVAL HIGH COMPLEX 45 MIN: CPT | Mod: GP

## 2024-01-01 PROCEDURE — 70450 CT HEAD/BRAIN W/O DYE: CPT | Mod: MA

## 2024-01-01 PROCEDURE — 99285 EMERGENCY DEPT VISIT HI MDM: CPT | Mod: 25

## 2024-01-01 PROCEDURE — 258N000003 HC RX IP 258 OP 636

## 2024-01-01 PROCEDURE — 999N000065 XR PELVIS PORT 1/2 VIEWS

## 2024-01-01 PROCEDURE — 85027 COMPLETE CBC AUTOMATED: CPT | Mod: ORL | Performed by: FAMILY MEDICINE

## 2024-01-01 PROCEDURE — 99213 OFFICE O/P EST LOW 20 MIN: CPT | Performed by: OPHTHALMOLOGY

## 2024-01-01 PROCEDURE — 710N000009 HC RECOVERY PHASE 1, LEVEL 1, PER MIN: Performed by: STUDENT IN AN ORGANIZED HEALTH CARE EDUCATION/TRAINING PROGRAM

## 2024-01-01 PROCEDURE — 250N000025 HC SEVOFLURANE, PER MIN: Performed by: STUDENT IN AN ORGANIZED HEALTH CARE EDUCATION/TRAINING PROGRAM

## 2024-01-01 PROCEDURE — 84100 ASSAY OF PHOSPHORUS: CPT | Performed by: FAMILY MEDICINE

## 2024-01-01 PROCEDURE — 250N000009 HC RX 250: Performed by: STUDENT IN AN ORGANIZED HEALTH CARE EDUCATION/TRAINING PROGRAM

## 2024-01-01 PROCEDURE — P9603 ONE-WAY ALLOW PRORATED MILES: HCPCS | Performed by: FAMILY MEDICINE

## 2024-01-01 PROCEDURE — 99222 1ST HOSP IP/OBS MODERATE 55: CPT | Mod: AI | Performed by: STUDENT IN AN ORGANIZED HEALTH CARE EDUCATION/TRAINING PROGRAM

## 2024-01-01 PROCEDURE — 72125 CT NECK SPINE W/O DYE: CPT | Mod: MA

## 2024-01-01 PROCEDURE — 99239 HOSP IP/OBS DSCHRG MGMT >30: CPT | Performed by: STUDENT IN AN ORGANIZED HEALTH CARE EDUCATION/TRAINING PROGRAM

## 2024-01-01 DEVICE — CEMENTRALIZER STEM CENTRALIZER 8.5MM CEMENTED
Type: IMPLANTABLE DEVICE | Site: HIP | Status: FUNCTIONAL
Brand: CEMENTRALIZER

## 2024-01-01 DEVICE — FULL DOSE BONE CEMENT, 10 PACK CATALOG NUMBER IS 6191-1-010
Type: IMPLANTABLE DEVICE | Site: HIP | Status: FUNCTIONAL
Brand: SIMPLEX

## 2024-01-01 DEVICE — SELF CENTERING BI-POLAR HEAD 28MM ID 43MM OD
Type: IMPLANTABLE DEVICE | Site: HIP | Status: FUNCTIONAL
Brand: SELF CENTERING

## 2024-01-01 DEVICE — SUMMIT FEMORAL STEM 12/14 TAPER CEMENTED SIZE 3 HI 108MM
Type: IMPLANTABLE DEVICE | Site: HIP | Status: FUNCTIONAL
Brand: SUMMIT

## 2024-01-01 DEVICE — ARTICUL/EZE FEMORAL HEAD DIAMETER 28MM +1.5 12/14 TAPER
Type: IMPLANTABLE DEVICE | Site: HIP | Status: FUNCTIONAL
Brand: ARTICUL/EZE

## 2024-01-01 DEVICE — CEMENT RESTRICTOR SIZE 3 13.25MM: Type: IMPLANTABLE DEVICE | Site: HIP | Status: FUNCTIONAL

## 2024-01-01 RX ORDER — VANCOMYCIN HYDROCHLORIDE 1 G/20ML
INJECTION, POWDER, LYOPHILIZED, FOR SOLUTION INTRAVENOUS PRN
Status: DISCONTINUED | OUTPATIENT
Start: 2024-01-01 | End: 2024-01-01 | Stop reason: HOSPADM

## 2024-01-01 RX ORDER — ASPIRIN 81 MG/1
81 TABLET ORAL 2 TIMES DAILY
Status: DISCONTINUED | OUTPATIENT
Start: 2024-01-01 | End: 2024-01-01 | Stop reason: HOSPADM

## 2024-01-01 RX ORDER — CEFAZOLIN SODIUM 1 G/3ML
1 INJECTION, POWDER, FOR SOLUTION INTRAMUSCULAR; INTRAVENOUS EVERY 8 HOURS
Qty: 10 ML | Refills: 0 | Status: COMPLETED | OUTPATIENT
Start: 2024-01-01 | End: 2024-01-01

## 2024-01-01 RX ORDER — DEXTROSE MONOHYDRATE 25 G/50ML
25-50 INJECTION, SOLUTION INTRAVENOUS
Status: DISCONTINUED | OUTPATIENT
Start: 2024-01-01 | End: 2024-01-01 | Stop reason: HOSPADM

## 2024-01-01 RX ORDER — CYCLOSPORINE 25 MG/1
50 CAPSULE, LIQUID FILLED ORAL 2 TIMES DAILY
Status: DISCONTINUED | OUTPATIENT
Start: 2024-01-01 | End: 2024-01-01 | Stop reason: HOSPADM

## 2024-01-01 RX ORDER — ATORVASTATIN CALCIUM 20 MG/1
20 TABLET, FILM COATED ORAL DAILY
Status: DISCONTINUED | OUTPATIENT
Start: 2024-01-01 | End: 2024-01-01 | Stop reason: HOSPADM

## 2024-01-01 RX ORDER — DONEPEZIL HYDROCHLORIDE 10 MG/1
10 TABLET, FILM COATED ORAL AT BEDTIME
Status: DISCONTINUED | OUTPATIENT
Start: 2024-01-01 | End: 2024-01-01 | Stop reason: HOSPADM

## 2024-01-01 RX ORDER — LIDOCAINE 40 MG/G
CREAM TOPICAL
Status: DISCONTINUED | OUTPATIENT
Start: 2024-01-01 | End: 2024-01-01

## 2024-01-01 RX ORDER — NALOXONE HYDROCHLORIDE 0.4 MG/ML
0.2 INJECTION, SOLUTION INTRAMUSCULAR; INTRAVENOUS; SUBCUTANEOUS
Status: DISCONTINUED | OUTPATIENT
Start: 2024-01-01 | End: 2024-01-01 | Stop reason: HOSPADM

## 2024-01-01 RX ORDER — NICOTINE POLACRILEX 4 MG
15-30 LOZENGE BUCCAL
Status: DISCONTINUED | OUTPATIENT
Start: 2024-01-01 | End: 2024-01-01 | Stop reason: HOSPADM

## 2024-01-01 RX ORDER — ASPIRIN 81 MG/1
81 TABLET ORAL 2 TIMES DAILY
DISCHARGE
Start: 2024-01-01

## 2024-01-01 RX ORDER — LOSARTAN POTASSIUM 50 MG/1
50 TABLET ORAL DAILY
Status: DISCONTINUED | OUTPATIENT
Start: 2024-01-01 | End: 2024-01-01 | Stop reason: HOSPADM

## 2024-01-01 RX ORDER — SODIUM CHLORIDE, SODIUM LACTATE, POTASSIUM CHLORIDE, CALCIUM CHLORIDE 600; 310; 30; 20 MG/100ML; MG/100ML; MG/100ML; MG/100ML
INJECTION, SOLUTION INTRAVENOUS CONTINUOUS
Status: DISCONTINUED | OUTPATIENT
Start: 2024-01-01 | End: 2024-01-01

## 2024-01-01 RX ORDER — LIDOCAINE HYDROCHLORIDE 20 MG/ML
INJECTION, SOLUTION INFILTRATION; PERINEURAL PRN
Status: DISCONTINUED | OUTPATIENT
Start: 2024-01-01 | End: 2024-01-01

## 2024-01-01 RX ORDER — NALOXONE HYDROCHLORIDE 0.4 MG/ML
0.4 INJECTION, SOLUTION INTRAMUSCULAR; INTRAVENOUS; SUBCUTANEOUS
Status: DISCONTINUED | OUTPATIENT
Start: 2024-01-01 | End: 2024-01-01 | Stop reason: HOSPADM

## 2024-01-01 RX ORDER — TOBRAMYCIN 1.2 G/30ML
INJECTION, POWDER, LYOPHILIZED, FOR SOLUTION INTRAVENOUS PRN
Status: DISCONTINUED | OUTPATIENT
Start: 2024-01-01 | End: 2024-01-01 | Stop reason: HOSPADM

## 2024-01-01 RX ORDER — PROPOFOL 10 MG/ML
INJECTION, EMULSION INTRAVENOUS PRN
Status: DISCONTINUED | OUTPATIENT
Start: 2024-01-01 | End: 2024-01-01

## 2024-01-01 RX ORDER — CYCLOSPORINE 25 MG/1
50 CAPSULE, LIQUID FILLED ORAL 2 TIMES DAILY
Qty: 120 CAPSULE | Refills: 11 | Status: SHIPPED | OUTPATIENT
Start: 2024-01-01

## 2024-01-01 RX ORDER — CEFAZOLIN SODIUM/WATER 2 G/20 ML
2 SYRINGE (ML) INTRAVENOUS SEE ADMIN INSTRUCTIONS
Status: DISCONTINUED | OUTPATIENT
Start: 2024-01-01 | End: 2024-01-01 | Stop reason: HOSPADM

## 2024-01-01 RX ORDER — LIDOCAINE 40 MG/G
CREAM TOPICAL
Status: DISCONTINUED | OUTPATIENT
Start: 2024-01-01 | End: 2024-01-01 | Stop reason: HOSPADM

## 2024-01-01 RX ORDER — ONDANSETRON 4 MG/1
4 TABLET, ORALLY DISINTEGRATING ORAL EVERY 30 MIN PRN
Status: DISCONTINUED | OUTPATIENT
Start: 2024-01-01 | End: 2024-01-01 | Stop reason: HOSPADM

## 2024-01-01 RX ORDER — ONDANSETRON 2 MG/ML
4 INJECTION INTRAMUSCULAR; INTRAVENOUS EVERY 30 MIN PRN
Status: DISCONTINUED | OUTPATIENT
Start: 2024-01-01 | End: 2024-01-01 | Stop reason: HOSPADM

## 2024-01-01 RX ORDER — MYCOPHENOLIC ACID 360 MG/1
360 TABLET, DELAYED RELEASE ORAL 2 TIMES DAILY
Status: DISCONTINUED | OUTPATIENT
Start: 2024-01-01 | End: 2024-01-01 | Stop reason: HOSPADM

## 2024-01-01 RX ORDER — CARBIDOPA AND LEVODOPA 25; 100 MG/1; MG/1
1 TABLET ORAL 3 TIMES DAILY
Status: DISCONTINUED | OUTPATIENT
Start: 2024-01-01 | End: 2024-01-01 | Stop reason: HOSPADM

## 2024-01-01 RX ORDER — HYDRALAZINE HYDROCHLORIDE 20 MG/ML
2.5-5 INJECTION INTRAMUSCULAR; INTRAVENOUS EVERY 10 MIN PRN
Status: DISCONTINUED | OUTPATIENT
Start: 2024-01-01 | End: 2024-01-01 | Stop reason: HOSPADM

## 2024-01-01 RX ORDER — DEXAMETHASONE SODIUM PHOSPHATE 4 MG/ML
INJECTION, SOLUTION INTRA-ARTICULAR; INTRALESIONAL; INTRAMUSCULAR; INTRAVENOUS; SOFT TISSUE PRN
Status: DISCONTINUED | OUTPATIENT
Start: 2024-01-01 | End: 2024-01-01

## 2024-01-01 RX ORDER — HYDROMORPHONE HCL IN WATER/PF 6 MG/30 ML
0.2 PATIENT CONTROLLED ANALGESIA SYRINGE INTRAVENOUS EVERY 5 MIN PRN
Status: DISCONTINUED | OUTPATIENT
Start: 2024-01-01 | End: 2024-01-01 | Stop reason: HOSPADM

## 2024-01-01 RX ORDER — PROCHLORPERAZINE MALEATE 5 MG
5 TABLET ORAL EVERY 6 HOURS PRN
Status: DISCONTINUED | OUTPATIENT
Start: 2024-01-01 | End: 2024-01-01 | Stop reason: HOSPADM

## 2024-01-01 RX ORDER — BISACODYL 10 MG
10 SUPPOSITORY, RECTAL RECTAL DAILY PRN
Status: DISCONTINUED | OUTPATIENT
Start: 2024-01-01 | End: 2024-01-01 | Stop reason: HOSPADM

## 2024-01-01 RX ORDER — ONDANSETRON 4 MG/1
4 TABLET, ORALLY DISINTEGRATING ORAL EVERY 6 HOURS PRN
Status: DISCONTINUED | OUTPATIENT
Start: 2024-01-01 | End: 2024-01-01 | Stop reason: HOSPADM

## 2024-01-01 RX ORDER — LOSARTAN POTASSIUM 100 MG/1
TABLET ORAL
Qty: 45 TABLET | Refills: 1 | Status: SHIPPED | OUTPATIENT
Start: 2024-01-01

## 2024-01-01 RX ORDER — TRANEXAMIC ACID 10 MG/ML
1 INJECTION, SOLUTION INTRAVENOUS ONCE
Status: COMPLETED | OUTPATIENT
Start: 2024-01-01 | End: 2024-01-01

## 2024-01-01 RX ORDER — AMOXICILLIN 250 MG
1 CAPSULE ORAL 2 TIMES DAILY PRN
DISCHARGE
Start: 2024-01-01

## 2024-01-01 RX ORDER — HYDROMORPHONE HCL IN WATER/PF 6 MG/30 ML
0.4 PATIENT CONTROLLED ANALGESIA SYRINGE INTRAVENOUS EVERY 5 MIN PRN
Status: DISCONTINUED | OUTPATIENT
Start: 2024-01-01 | End: 2024-01-01 | Stop reason: HOSPADM

## 2024-01-01 RX ORDER — ONDANSETRON 2 MG/ML
4 INJECTION INTRAMUSCULAR; INTRAVENOUS EVERY 6 HOURS PRN
Status: DISCONTINUED | OUTPATIENT
Start: 2024-01-01 | End: 2024-01-01 | Stop reason: HOSPADM

## 2024-01-01 RX ORDER — AMOXICILLIN 250 MG
2 CAPSULE ORAL 2 TIMES DAILY PRN
Status: DISCONTINUED | OUTPATIENT
Start: 2024-01-01 | End: 2024-01-01 | Stop reason: HOSPADM

## 2024-01-01 RX ORDER — AMOXICILLIN 250 MG
1 CAPSULE ORAL 2 TIMES DAILY PRN
Status: DISCONTINUED | OUTPATIENT
Start: 2024-01-01 | End: 2024-01-01 | Stop reason: HOSPADM

## 2024-01-01 RX ORDER — FENTANYL CITRATE 50 UG/ML
25 INJECTION, SOLUTION INTRAMUSCULAR; INTRAVENOUS EVERY 5 MIN PRN
Status: DISCONTINUED | OUTPATIENT
Start: 2024-01-01 | End: 2024-01-01 | Stop reason: HOSPADM

## 2024-01-01 RX ORDER — FENTANYL CITRATE 50 UG/ML
50 INJECTION, SOLUTION INTRAMUSCULAR; INTRAVENOUS EVERY 5 MIN PRN
Status: DISCONTINUED | OUTPATIENT
Start: 2024-01-01 | End: 2024-01-01 | Stop reason: HOSPADM

## 2024-01-01 RX ORDER — DEXAMETHASONE SODIUM PHOSPHATE 4 MG/ML
4 INJECTION, SOLUTION INTRA-ARTICULAR; INTRALESIONAL; INTRAMUSCULAR; INTRAVENOUS; SOFT TISSUE
Status: DISCONTINUED | OUTPATIENT
Start: 2024-01-01 | End: 2024-01-01 | Stop reason: HOSPADM

## 2024-01-01 RX ORDER — VITAMIN B COMPLEX
50 TABLET ORAL DAILY
DISCHARGE
Start: 2024-01-01

## 2024-01-01 RX ORDER — FENTANYL CITRATE 50 UG/ML
INJECTION, SOLUTION INTRAMUSCULAR; INTRAVENOUS PRN
Status: DISCONTINUED | OUTPATIENT
Start: 2024-01-01 | End: 2024-01-01

## 2024-01-01 RX ORDER — MORPHINE SULFATE 2 MG/ML
2 INJECTION, SOLUTION INTRAMUSCULAR; INTRAVENOUS ONCE
Status: COMPLETED | OUTPATIENT
Start: 2024-01-01 | End: 2024-01-01

## 2024-01-01 RX ORDER — ACETAMINOPHEN 325 MG/1
650 TABLET ORAL EVERY 6 HOURS PRN
Status: DISCONTINUED | OUTPATIENT
Start: 2024-01-01 | End: 2024-01-01

## 2024-01-01 RX ORDER — ATORVASTATIN CALCIUM 40 MG/1
TABLET, FILM COATED ORAL
Qty: 45 TABLET | Refills: 1 | Status: SHIPPED | OUTPATIENT
Start: 2024-01-01

## 2024-01-01 RX ORDER — AMOXICILLIN 250 MG
1 CAPSULE ORAL 2 TIMES DAILY
Status: DISCONTINUED | OUTPATIENT
Start: 2024-01-01 | End: 2024-01-01 | Stop reason: HOSPADM

## 2024-01-01 RX ORDER — POLYETHYLENE GLYCOL 3350 17 G/17G
17 POWDER, FOR SOLUTION ORAL DAILY
Status: DISCONTINUED | OUTPATIENT
Start: 2024-01-01 | End: 2024-01-01 | Stop reason: HOSPADM

## 2024-01-01 RX ORDER — CALCIUM CARBONATE 500 MG/1
1000 TABLET, CHEWABLE ORAL 4 TIMES DAILY PRN
Status: DISCONTINUED | OUTPATIENT
Start: 2024-01-01 | End: 2024-01-01 | Stop reason: HOSPADM

## 2024-01-01 RX ORDER — GLYCOPYRROLATE 0.2 MG/ML
INJECTION, SOLUTION INTRAMUSCULAR; INTRAVENOUS PRN
Status: DISCONTINUED | OUTPATIENT
Start: 2024-01-01 | End: 2024-01-01

## 2024-01-01 RX ORDER — ACETAMINOPHEN 325 MG/1
975 TABLET ORAL 3 TIMES DAILY
Status: DISCONTINUED | OUTPATIENT
Start: 2024-01-01 | End: 2024-01-01 | Stop reason: HOSPADM

## 2024-01-01 RX ORDER — ACYCLOVIR 400 MG/1
TABLET ORAL
Qty: 9 EACH | Refills: 1 | Status: SHIPPED | OUTPATIENT
Start: 2024-01-01

## 2024-01-01 RX ORDER — PANTOPRAZOLE SODIUM 40 MG/1
TABLET, DELAYED RELEASE ORAL
Qty: 90 TABLET | Refills: 2 | OUTPATIENT
Start: 2024-01-01

## 2024-01-01 RX ORDER — ACYCLOVIR 400 MG/1
TABLET ORAL
Qty: 3 EACH | Refills: 5 | Status: CANCELLED | OUTPATIENT
Start: 2024-01-01

## 2024-01-01 RX ORDER — VITAMIN B COMPLEX
50 TABLET ORAL DAILY
Status: DISCONTINUED | OUTPATIENT
Start: 2024-01-01 | End: 2024-01-01 | Stop reason: HOSPADM

## 2024-01-01 RX ORDER — HYDROMORPHONE HYDROCHLORIDE 1 MG/ML
.3-.5 INJECTION, SOLUTION INTRAMUSCULAR; INTRAVENOUS; SUBCUTANEOUS
Status: DISCONTINUED | OUTPATIENT
Start: 2024-01-01 | End: 2024-01-01 | Stop reason: HOSPADM

## 2024-01-01 RX ORDER — CEFAZOLIN SODIUM/WATER 2 G/20 ML
2 SYRINGE (ML) INTRAVENOUS
Status: COMPLETED | OUTPATIENT
Start: 2024-01-01 | End: 2024-01-01

## 2024-01-01 RX ORDER — AMLODIPINE BESYLATE 10 MG/1
10 TABLET ORAL DAILY
Status: DISCONTINUED | OUTPATIENT
Start: 2024-01-01 | End: 2024-01-01 | Stop reason: HOSPADM

## 2024-01-01 RX ORDER — METOPROLOL TARTRATE 1 MG/ML
1-2 INJECTION, SOLUTION INTRAVENOUS EVERY 5 MIN PRN
Status: DISCONTINUED | OUTPATIENT
Start: 2024-01-01 | End: 2024-01-01 | Stop reason: HOSPADM

## 2024-01-01 RX ORDER — SODIUM CHLORIDE, SODIUM LACTATE, POTASSIUM CHLORIDE, CALCIUM CHLORIDE 600; 310; 30; 20 MG/100ML; MG/100ML; MG/100ML; MG/100ML
INJECTION, SOLUTION INTRAVENOUS CONTINUOUS
Status: ACTIVE | OUTPATIENT
Start: 2024-01-01 | End: 2024-01-01

## 2024-01-01 RX ORDER — ATORVASTATIN CALCIUM 40 MG/1
20 TABLET, FILM COATED ORAL DAILY
Qty: 45 TABLET | Refills: 0 | Status: SHIPPED | OUTPATIENT
Start: 2024-01-01 | End: 2024-01-01

## 2024-01-01 RX ORDER — PROPOFOL 10 MG/ML
INJECTION, EMULSION INTRAVENOUS CONTINUOUS PRN
Status: DISCONTINUED | OUTPATIENT
Start: 2024-01-01 | End: 2024-01-01

## 2024-01-01 RX ORDER — OXYCODONE HYDROCHLORIDE 5 MG/1
2.5-5 TABLET ORAL EVERY 6 HOURS PRN
Qty: 15 TABLET | Refills: 0 | Status: SHIPPED | OUTPATIENT
Start: 2024-01-01

## 2024-01-01 RX ORDER — METOPROLOL SUCCINATE 25 MG/1
12.5 TABLET, EXTENDED RELEASE ORAL DAILY
COMMUNITY

## 2024-01-01 RX ORDER — ISOSORBIDE MONONITRATE 30 MG/1
15 TABLET, EXTENDED RELEASE ORAL DAILY
COMMUNITY

## 2024-01-01 RX ORDER — ACETAMINOPHEN 325 MG/1
975 TABLET ORAL ONCE
Status: DISCONTINUED | OUTPATIENT
Start: 2024-01-01 | End: 2024-01-01 | Stop reason: HOSPADM

## 2024-01-01 RX ORDER — ACETAMINOPHEN 500 MG
1000 TABLET ORAL ONCE
Status: COMPLETED | OUTPATIENT
Start: 2024-01-01 | End: 2024-01-01

## 2024-01-01 RX ORDER — SODIUM CHLORIDE, SODIUM LACTATE, POTASSIUM CHLORIDE, CALCIUM CHLORIDE 600; 310; 30; 20 MG/100ML; MG/100ML; MG/100ML; MG/100ML
INJECTION, SOLUTION INTRAVENOUS CONTINUOUS
Status: DISCONTINUED | OUTPATIENT
Start: 2024-01-01 | End: 2024-01-01 | Stop reason: HOSPADM

## 2024-01-01 RX ORDER — LEVOTHYROXINE SODIUM 100 UG/1
100 TABLET ORAL DAILY
Status: DISCONTINUED | OUTPATIENT
Start: 2024-01-01 | End: 2024-01-01 | Stop reason: HOSPADM

## 2024-01-01 RX ORDER — LABETALOL 20 MG/4 ML (5 MG/ML) INTRAVENOUS SYRINGE
10-20 EVERY 4 HOURS PRN
Status: DISCONTINUED | OUTPATIENT
Start: 2024-01-01 | End: 2024-01-01 | Stop reason: HOSPADM

## 2024-01-01 RX ORDER — ASPIRIN 81 MG/1
81 TABLET ORAL DAILY
Status: ON HOLD | COMMUNITY
End: 2024-01-01

## 2024-01-01 RX ORDER — HYDROMORPHONE HCL IN WATER/PF 6 MG/30 ML
0.2 PATIENT CONTROLLED ANALGESIA SYRINGE INTRAVENOUS EVERY 30 MIN PRN
Status: DISCONTINUED | OUTPATIENT
Start: 2024-01-01 | End: 2024-01-01

## 2024-01-01 RX ORDER — MYCOPHENOLIC ACID 180 MG/1
360 TABLET, DELAYED RELEASE ORAL 2 TIMES DAILY
Qty: 120 TABLET | Refills: 11 | Status: SHIPPED | OUTPATIENT
Start: 2024-01-01

## 2024-01-01 RX ORDER — CYCLOSPORINE 25 MG/1
50 CAPSULE, LIQUID FILLED ORAL 2 TIMES DAILY
Qty: 120 CAPSULE | Refills: 11 | Status: SHIPPED | OUTPATIENT
Start: 2024-01-01 | End: 2024-01-01

## 2024-01-01 RX ORDER — PANTOPRAZOLE SODIUM 40 MG/1
40 TABLET, DELAYED RELEASE ORAL DAILY
Status: DISCONTINUED | OUTPATIENT
Start: 2024-01-01 | End: 2024-01-01 | Stop reason: HOSPADM

## 2024-01-01 RX ORDER — KETOROLAC TROMETHAMINE 15 MG/ML
15 INJECTION, SOLUTION INTRAMUSCULAR; INTRAVENOUS
Status: DISCONTINUED | OUTPATIENT
Start: 2024-01-01 | End: 2024-01-01 | Stop reason: HOSPADM

## 2024-01-01 RX ORDER — LANOLIN ALCOHOL/MO/W.PET/CERES
3 CREAM (GRAM) TOPICAL
Status: DISCONTINUED | OUTPATIENT
Start: 2024-01-01 | End: 2024-01-01 | Stop reason: HOSPADM

## 2024-01-01 RX ORDER — ONDANSETRON 2 MG/ML
INJECTION INTRAMUSCULAR; INTRAVENOUS PRN
Status: DISCONTINUED | OUTPATIENT
Start: 2024-01-01 | End: 2024-01-01

## 2024-01-01 RX ORDER — ACYCLOVIR 400 MG/1
TABLET ORAL
Qty: 3 EACH | Refills: 5 | Status: SHIPPED | OUTPATIENT
Start: 2024-01-01 | End: 2024-01-01

## 2024-01-01 RX ADMIN — INSULIN ASPART 1 UNITS: 100 INJECTION, SOLUTION INTRAVENOUS; SUBCUTANEOUS at 11:32

## 2024-01-01 RX ADMIN — ISOSORBIDE MONONITRATE 15 MG: 30 TABLET, EXTENDED RELEASE ORAL at 08:29

## 2024-01-01 RX ADMIN — Medication 3 MG: at 21:19

## 2024-01-01 RX ADMIN — AMLODIPINE BESYLATE 10 MG: 10 TABLET ORAL at 08:29

## 2024-01-01 RX ADMIN — CYCLOSPORINE 50 MG: 25 CAPSULE, LIQUID FILLED ORAL at 21:17

## 2024-01-01 RX ADMIN — ACETAMINOPHEN 975 MG: 325 TABLET, FILM COATED ORAL at 08:42

## 2024-01-01 RX ADMIN — SODIUM CHLORIDE, POTASSIUM CHLORIDE, SODIUM LACTATE AND CALCIUM CHLORIDE: 600; 310; 30; 20 INJECTION, SOLUTION INTRAVENOUS at 17:48

## 2024-01-01 RX ADMIN — PROPOFOL 50 MCG/KG/MIN: 10 INJECTION, EMULSION INTRAVENOUS at 10:42

## 2024-01-01 RX ADMIN — MYCOPHENOLIC ACID 360 MG: 360 TABLET, DELAYED RELEASE ORAL at 08:43

## 2024-01-01 RX ADMIN — Medication 50 MCG: at 08:17

## 2024-01-01 RX ADMIN — CEFAZOLIN 1 G: 1 INJECTION, POWDER, FOR SOLUTION INTRAMUSCULAR; INTRAVENOUS at 18:23

## 2024-01-01 RX ADMIN — DONEPEZIL HYDROCHLORIDE 10 MG: 10 TABLET ORAL at 21:02

## 2024-01-01 RX ADMIN — MYCOPHENOLIC ACID 360 MG: 360 TABLET, DELAYED RELEASE ORAL at 21:42

## 2024-01-01 RX ADMIN — ACETAMINOPHEN 975 MG: 325 TABLET, FILM COATED ORAL at 21:17

## 2024-01-01 RX ADMIN — ISOSORBIDE MONONITRATE 15 MG: 30 TABLET, EXTENDED RELEASE ORAL at 08:18

## 2024-01-01 RX ADMIN — ACETAMINOPHEN 975 MG: 325 TABLET, FILM COATED ORAL at 08:29

## 2024-01-01 RX ADMIN — ACETAMINOPHEN 975 MG: 325 TABLET, FILM COATED ORAL at 19:10

## 2024-01-01 RX ADMIN — DONEPEZIL HYDROCHLORIDE 10 MG: 10 TABLET ORAL at 20:51

## 2024-01-01 RX ADMIN — CARBIDOPA AND LEVODOPA 1 TABLET: 25; 100 TABLET ORAL at 21:05

## 2024-01-01 RX ADMIN — METOPROLOL SUCCINATE 12.5 MG: 25 TABLET, EXTENDED RELEASE ORAL at 18:25

## 2024-01-01 RX ADMIN — DONEPEZIL HYDROCHLORIDE 10 MG: 10 TABLET ORAL at 21:41

## 2024-01-01 RX ADMIN — DONEPEZIL HYDROCHLORIDE 10 MG: 10 TABLET ORAL at 21:17

## 2024-01-01 RX ADMIN — CARBIDOPA AND LEVODOPA 1 TABLET: 25; 100 TABLET ORAL at 08:18

## 2024-01-01 RX ADMIN — ACETAMINOPHEN 975 MG: 325 TABLET, FILM COATED ORAL at 22:20

## 2024-01-01 RX ADMIN — ASPIRIN 81 MG: 81 TABLET, COATED ORAL at 21:02

## 2024-01-01 RX ADMIN — CYCLOSPORINE 50 MG: 25 CAPSULE, LIQUID FILLED ORAL at 20:52

## 2024-01-01 RX ADMIN — SENNOSIDES AND DOCUSATE SODIUM 1 TABLET: 8.6; 5 TABLET ORAL at 08:17

## 2024-01-01 RX ADMIN — CARBIDOPA AND LEVODOPA 1 HALF-TAB: 25; 100 TABLET ORAL at 15:37

## 2024-01-01 RX ADMIN — CARBIDOPA AND LEVODOPA 1 HALF-TAB: 25; 100 TABLET ORAL at 08:43

## 2024-01-01 RX ADMIN — MYCOPHENOLIC ACID 360 MG: 360 TABLET, DELAYED RELEASE ORAL at 21:17

## 2024-01-01 RX ADMIN — SODIUM CHLORIDE, POTASSIUM CHLORIDE, SODIUM LACTATE AND CALCIUM CHLORIDE: 600; 310; 30; 20 INJECTION, SOLUTION INTRAVENOUS at 14:21

## 2024-01-01 RX ADMIN — INSULIN ASPART 1 UNITS: 100 INJECTION, SOLUTION INTRAVENOUS; SUBCUTANEOUS at 09:03

## 2024-01-01 RX ADMIN — INSULIN ASPART 1 UNITS: 100 INJECTION, SOLUTION INTRAVENOUS; SUBCUTANEOUS at 17:49

## 2024-01-01 RX ADMIN — ROCURONIUM BROMIDE 50 MG: 50 INJECTION, SOLUTION INTRAVENOUS at 10:33

## 2024-01-01 RX ADMIN — GLYCOPYRROLATE 0.1 MG: 0.2 INJECTION, SOLUTION INTRAMUSCULAR; INTRAVENOUS at 11:32

## 2024-01-01 RX ADMIN — LOSARTAN POTASSIUM 50 MG: 50 TABLET, FILM COATED ORAL at 08:42

## 2024-01-01 RX ADMIN — LEVOTHYROXINE SODIUM 100 MCG: 0.1 TABLET ORAL at 08:44

## 2024-01-01 RX ADMIN — MYCOPHENOLIC ACID 360 MG: 360 TABLET, DELAYED RELEASE ORAL at 21:01

## 2024-01-01 RX ADMIN — Medication 125 MCG: at 08:29

## 2024-01-01 RX ADMIN — LOSARTAN POTASSIUM 50 MG: 50 TABLET, FILM COATED ORAL at 08:30

## 2024-01-01 RX ADMIN — ASPIRIN 81 MG: 81 TABLET, COATED ORAL at 08:18

## 2024-01-01 RX ADMIN — CARBIDOPA AND LEVODOPA 1 HALF-TAB: 25; 100 TABLET ORAL at 21:02

## 2024-01-01 RX ADMIN — CEFAZOLIN 1 G: 1 INJECTION, POWDER, FOR SOLUTION INTRAMUSCULAR; INTRAVENOUS at 01:26

## 2024-01-01 RX ADMIN — TRANEXAMIC ACID 1 G: 10 INJECTION, SOLUTION INTRAVENOUS at 11:46

## 2024-01-01 RX ADMIN — CARBIDOPA AND LEVODOPA 1 TABLET: 25; 100 TABLET ORAL at 15:37

## 2024-01-01 RX ADMIN — AMLODIPINE BESYLATE 10 MG: 10 TABLET ORAL at 08:18

## 2024-01-01 RX ADMIN — Medication 2 G: at 10:15

## 2024-01-01 RX ADMIN — CARBIDOPA AND LEVODOPA 1 TABLET: 25; 100 TABLET ORAL at 08:30

## 2024-01-01 RX ADMIN — GLYCOPYRROLATE 0.2 MG: 0.2 INJECTION, SOLUTION INTRAMUSCULAR; INTRAVENOUS at 10:33

## 2024-01-01 RX ADMIN — CYCLOSPORINE 50 MG: 25 CAPSULE, LIQUID FILLED ORAL at 21:01

## 2024-01-01 RX ADMIN — INSULIN GLARGINE 10 UNITS: 100 INJECTION, SOLUTION SUBCUTANEOUS at 21:43

## 2024-01-01 RX ADMIN — CARBIDOPA AND LEVODOPA 1 HALF-TAB: 25; 100 TABLET ORAL at 08:18

## 2024-01-01 RX ADMIN — Medication 125 MCG: at 08:44

## 2024-01-01 RX ADMIN — POLYETHYLENE GLYCOL 3350 17 G: 17 POWDER, FOR SOLUTION ORAL at 08:38

## 2024-01-01 RX ADMIN — CYCLOSPORINE 25 MG: 25 CAPSULE, LIQUID FILLED ORAL at 08:42

## 2024-01-01 RX ADMIN — ACETAMINOPHEN 1000 MG: 500 TABLET ORAL at 11:39

## 2024-01-01 RX ADMIN — CYCLOSPORINE 25 MG: 25 CAPSULE, LIQUID FILLED ORAL at 08:59

## 2024-01-01 RX ADMIN — CARBIDOPA AND LEVODOPA 1 HALF-TAB: 25; 100 TABLET ORAL at 08:30

## 2024-01-01 RX ADMIN — FENTANYL CITRATE 100 MCG: 50 INJECTION INTRAMUSCULAR; INTRAVENOUS at 10:33

## 2024-01-01 RX ADMIN — SODIUM CHLORIDE, POTASSIUM CHLORIDE, SODIUM LACTATE AND CALCIUM CHLORIDE: 600; 310; 30; 20 INJECTION, SOLUTION INTRAVENOUS at 10:42

## 2024-01-01 RX ADMIN — INSULIN ASPART 5 UNITS: 100 INJECTION, SOLUTION INTRAVENOUS; SUBCUTANEOUS at 17:36

## 2024-01-01 RX ADMIN — LIDOCAINE HYDROCHLORIDE 50 MG: 20 INJECTION, SOLUTION INFILTRATION; PERINEURAL at 10:33

## 2024-01-01 RX ADMIN — PANTOPRAZOLE SODIUM 40 MG: 40 TABLET, DELAYED RELEASE ORAL at 08:30

## 2024-01-01 RX ADMIN — CARBIDOPA AND LEVODOPA 1 TABLET: 25; 100 TABLET ORAL at 08:44

## 2024-01-01 RX ADMIN — INSULIN ASPART 1 UNITS: 100 INJECTION, SOLUTION INTRAVENOUS; SUBCUTANEOUS at 12:54

## 2024-01-01 RX ADMIN — CYCLOSPORINE 50 MG: 25 CAPSULE, LIQUID FILLED ORAL at 21:50

## 2024-01-01 RX ADMIN — POLYETHYLENE GLYCOL 3350 17 G: 17 POWDER, FOR SOLUTION ORAL at 08:30

## 2024-01-01 RX ADMIN — ATORVASTATIN CALCIUM 20 MG: 20 TABLET, FILM COATED ORAL at 08:42

## 2024-01-01 RX ADMIN — ASPIRIN 81 MG: 81 TABLET, COATED ORAL at 21:17

## 2024-01-01 RX ADMIN — LOSARTAN POTASSIUM 50 MG: 50 TABLET, FILM COATED ORAL at 08:18

## 2024-01-01 RX ADMIN — CARBIDOPA AND LEVODOPA 1 TABLET: 25; 100 TABLET ORAL at 21:17

## 2024-01-01 RX ADMIN — HYDROMORPHONE HYDROCHLORIDE 0.5 MG: 1 INJECTION, SOLUTION INTRAMUSCULAR; INTRAVENOUS; SUBCUTANEOUS at 11:02

## 2024-01-01 RX ADMIN — ONDANSETRON 4 MG: 2 INJECTION INTRAMUSCULAR; INTRAVENOUS at 11:41

## 2024-01-01 RX ADMIN — SUGAMMADEX 150 MG: 100 INJECTION, SOLUTION INTRAVENOUS at 12:04

## 2024-01-01 RX ADMIN — DEXTROSE MONOHYDRATE 50 ML: 25 INJECTION, SOLUTION INTRAVENOUS at 06:42

## 2024-01-01 RX ADMIN — HYDROMORPHONE HYDROCHLORIDE 0.5 MG: 1 INJECTION, SOLUTION INTRAMUSCULAR; INTRAVENOUS; SUBCUTANEOUS at 10:49

## 2024-01-01 RX ADMIN — CARBIDOPA AND LEVODOPA 1 TABLET: 25; 100 TABLET ORAL at 21:41

## 2024-01-01 RX ADMIN — CARBIDOPA AND LEVODOPA 1 HALF-TAB: 25; 100 TABLET ORAL at 21:50

## 2024-01-01 RX ADMIN — SENNOSIDES AND DOCUSATE SODIUM 1 TABLET: 8.6; 5 TABLET ORAL at 20:51

## 2024-01-01 RX ADMIN — CARBIDOPA AND LEVODOPA 1 TABLET: 25; 100 TABLET ORAL at 14:47

## 2024-01-01 RX ADMIN — ASPIRIN 81 MG: 81 TABLET, COATED ORAL at 08:30

## 2024-01-01 RX ADMIN — CYCLOSPORINE 50 MG: 25 CAPSULE, LIQUID FILLED ORAL at 08:29

## 2024-01-01 RX ADMIN — PANTOPRAZOLE SODIUM 40 MG: 40 TABLET, DELAYED RELEASE ORAL at 08:42

## 2024-01-01 RX ADMIN — SENNOSIDES AND DOCUSATE SODIUM 1 TABLET: 8.6; 5 TABLET ORAL at 21:17

## 2024-01-01 RX ADMIN — METOPROLOL SUCCINATE 12.5 MG: 25 TABLET, EXTENDED RELEASE ORAL at 20:51

## 2024-01-01 RX ADMIN — Medication 50 MCG: at 08:42

## 2024-01-01 RX ADMIN — ASPIRIN 81 MG: 81 TABLET, COATED ORAL at 20:51

## 2024-01-01 RX ADMIN — SODIUM CHLORIDE, POTASSIUM CHLORIDE, SODIUM LACTATE AND CALCIUM CHLORIDE: 600; 310; 30; 20 INJECTION, SOLUTION INTRAVENOUS at 10:15

## 2024-01-01 RX ADMIN — SENNOSIDES AND DOCUSATE SODIUM 1 TABLET: 8.6; 5 TABLET ORAL at 08:30

## 2024-01-01 RX ADMIN — CARBIDOPA AND LEVODOPA 1 HALF-TAB: 25; 100 TABLET ORAL at 14:47

## 2024-01-01 RX ADMIN — CARBIDOPA AND LEVODOPA 1 HALF-TAB: 25; 100 TABLET ORAL at 21:17

## 2024-01-01 RX ADMIN — OXYCODONE HYDROCHLORIDE 2.5 MG: 5 TABLET ORAL at 18:26

## 2024-01-01 RX ADMIN — Medication 50 MCG: at 08:30

## 2024-01-01 RX ADMIN — INSULIN ASPART 1 UNITS: 100 INJECTION, SOLUTION INTRAVENOUS; SUBCUTANEOUS at 08:21

## 2024-01-01 RX ADMIN — CARBIDOPA AND LEVODOPA 1 TABLET: 25; 100 TABLET ORAL at 20:51

## 2024-01-01 RX ADMIN — AMLODIPINE BESYLATE 10 MG: 10 TABLET ORAL at 08:43

## 2024-01-01 RX ADMIN — METOPROLOL SUCCINATE 12.5 MG: 25 TABLET, EXTENDED RELEASE ORAL at 21:17

## 2024-01-01 RX ADMIN — TRANEXAMIC ACID 1 G: 10 INJECTION, SOLUTION INTRAVENOUS at 10:17

## 2024-01-01 RX ADMIN — PANTOPRAZOLE SODIUM 40 MG: 40 TABLET, DELAYED RELEASE ORAL at 08:18

## 2024-01-01 RX ADMIN — LEVOTHYROXINE SODIUM 100 MCG: 0.1 TABLET ORAL at 06:11

## 2024-01-01 RX ADMIN — ACETAMINOPHEN 975 MG: 325 TABLET, FILM COATED ORAL at 08:18

## 2024-01-01 RX ADMIN — ACETAMINOPHEN 975 MG: 325 TABLET, FILM COATED ORAL at 21:01

## 2024-01-01 RX ADMIN — INSULIN ASPART 1 UNITS: 100 INJECTION, SOLUTION INTRAVENOUS; SUBCUTANEOUS at 17:58

## 2024-01-01 RX ADMIN — LEVOTHYROXINE SODIUM 100 MCG: 0.1 TABLET ORAL at 06:23

## 2024-01-01 RX ADMIN — HYDROMORPHONE HYDROCHLORIDE 0.5 MG: 1 INJECTION, SOLUTION INTRAMUSCULAR; INTRAVENOUS; SUBCUTANEOUS at 05:21

## 2024-01-01 RX ADMIN — ATORVASTATIN CALCIUM 20 MG: 20 TABLET, FILM COATED ORAL at 08:30

## 2024-01-01 RX ADMIN — METOPROLOL SUCCINATE 12.5 MG: 25 TABLET, EXTENDED RELEASE ORAL at 21:05

## 2024-01-01 RX ADMIN — CARBIDOPA AND LEVODOPA 1 HALF-TAB: 25; 100 TABLET ORAL at 20:51

## 2024-01-01 RX ADMIN — ASPIRIN 81 MG: 81 TABLET, COATED ORAL at 08:43

## 2024-01-01 RX ADMIN — ISOSORBIDE MONONITRATE 15 MG: 30 TABLET, EXTENDED RELEASE ORAL at 08:43

## 2024-01-01 RX ADMIN — ACETAMINOPHEN 975 MG: 325 TABLET, FILM COATED ORAL at 15:37

## 2024-01-01 RX ADMIN — SENNOSIDES AND DOCUSATE SODIUM 2 TABLET: 8.6; 5 TABLET ORAL at 08:43

## 2024-01-01 RX ADMIN — MYCOPHENOLIC ACID 360 MG: 360 TABLET, DELAYED RELEASE ORAL at 08:30

## 2024-01-01 RX ADMIN — MORPHINE SULFATE 2 MG: 2 INJECTION, SOLUTION INTRAMUSCULAR; INTRAVENOUS at 11:39

## 2024-01-01 RX ADMIN — PROPOFOL 150 MG: 10 INJECTION, EMULSION INTRAVENOUS at 10:33

## 2024-01-01 RX ADMIN — Medication 125 MCG: at 08:18

## 2024-01-01 RX ADMIN — MYCOPHENOLIC ACID 360 MG: 360 TABLET, DELAYED RELEASE ORAL at 08:18

## 2024-01-01 RX ADMIN — POLYETHYLENE GLYCOL 3350 17 G: 17 POWDER, FOR SOLUTION ORAL at 08:49

## 2024-01-01 RX ADMIN — CYCLOSPORINE 50 MG: 25 CAPSULE, LIQUID FILLED ORAL at 08:18

## 2024-01-01 RX ADMIN — MYCOPHENOLIC ACID 360 MG: 360 TABLET, DELAYED RELEASE ORAL at 20:52

## 2024-01-01 RX ADMIN — SENNOSIDES AND DOCUSATE SODIUM 1 TABLET: 8.6; 5 TABLET ORAL at 21:02

## 2024-01-01 RX ADMIN — ACETAMINOPHEN 975 MG: 325 TABLET, FILM COATED ORAL at 14:47

## 2024-01-01 RX ADMIN — DEXAMETHASONE SODIUM PHOSPHATE 4 MG: 4 INJECTION, SOLUTION INTRA-ARTICULAR; INTRALESIONAL; INTRAMUSCULAR; INTRAVENOUS; SOFT TISSUE at 10:33

## 2024-01-01 RX ADMIN — ISOSORBIDE MONONITRATE 15 MG: 30 TABLET, EXTENDED RELEASE ORAL at 18:26

## 2024-01-01 RX ADMIN — ATORVASTATIN CALCIUM 20 MG: 20 TABLET, FILM COATED ORAL at 08:18

## 2024-01-01 ASSESSMENT — ACTIVITIES OF DAILY LIVING (ADL)
ADLS_ACUITY_SCORE: 34
ADLS_ACUITY_SCORE: 37
ADLS_ACUITY_SCORE: 33
ADLS_ACUITY_SCORE: 37
ADLS_ACUITY_SCORE: 35
ADLS_ACUITY_SCORE: 37
ADLS_ACUITY_SCORE: 34
ADLS_ACUITY_SCORE: 37
ADLS_ACUITY_SCORE: 37
ADLS_ACUITY_SCORE: 31
ADLS_ACUITY_SCORE: 37
ADLS_ACUITY_SCORE: 34
ADLS_ACUITY_SCORE: 37
ADLS_ACUITY_SCORE: 37
ADLS_ACUITY_SCORE: 35
ADLS_ACUITY_SCORE: 37
ADLS_ACUITY_SCORE: 33
ADLS_ACUITY_SCORE: 37
ADLS_ACUITY_SCORE: 34
ADLS_ACUITY_SCORE: 37
ADLS_ACUITY_SCORE: 34
ADLS_ACUITY_SCORE: 31
ADLS_ACUITY_SCORE: 33
ADLS_ACUITY_SCORE: 33
ADLS_ACUITY_SCORE: 37
ADLS_ACUITY_SCORE: 37
ADLS_ACUITY_SCORE: 33
ADLS_ACUITY_SCORE: 34
ADLS_ACUITY_SCORE: 31
ADLS_ACUITY_SCORE: 37
ADLS_ACUITY_SCORE: 37
ADLS_ACUITY_SCORE: 34
ADLS_ACUITY_SCORE: 37

## 2024-01-01 ASSESSMENT — VISUAL ACUITY
METHOD: SNELLEN - LINEAR
OS_SC+: -1
OS_SC: 20/40
OD_SC: NLP

## 2024-01-01 ASSESSMENT — TONOMETRY
OD_IOP_MMHG: 17
IOP_METHOD: ICARE
OS_IOP_MMHG: 19

## 2024-01-01 ASSESSMENT — EXTERNAL EXAM - LEFT EYE: OS_EXAM: NORMAL

## 2024-01-01 ASSESSMENT — EXTERNAL EXAM - RIGHT EYE: OD_EXAM: NORMAL

## 2024-01-01 ASSESSMENT — SLIT LAMP EXAM - LIDS
COMMENTS: NORMAL
COMMENTS: NORMAL

## 2024-01-01 ASSESSMENT — PAIN SCALES - GENERAL: PAINLEVEL: NO PAIN (0)

## 2024-01-03 NOTE — PATIENT INSTRUCTIONS
You were seen in the ENT Clinic today by Dr. Hernandez. If you have any questions or concerns after your appointment, please contact us (see below)       2.   Please return to the ENT clinic the 2nd or 3rd week in February for follow up.           How to Contact Us:  Send a Mosso message to your provider. Our team will respond to you via Mosso. Occasionally, we will need to call you to get further information.  For urgent matters (Monday-Friday), call the ENT Clinic: [phone] and speak with a call center team member - they will route your call appropriately.   If you'd like to speak directly with a nurse, please find our contact information below. We do our best to check voicemail frequently throughout the day, and will work to call you back within 1-2 days. For urgent matters, please use the general clinic phone numbers listed above.        Britt MELENDEZ RN  ENT RN Care Coordinator  Direct: [phone]  Shayna MORGAN LPN  Direct: [phone]         [facility]  Department of Otolaryngology

## 2024-01-03 NOTE — NURSING NOTE
"Chief Complaint   Patient presents with    RECHECK   Blood pressure (!) 158/61, pulse 66, height 1.626 m (5' 4\"), weight 65.4 kg (144 lb 1.6 oz), SpO2 96%, not currently breastfeeding. Edd Henson, EMT    "

## 2024-01-03 NOTE — LETTER
1/3/2024       RE: Viv Shaw  [address]  [place] [address]     Dear Colleague,    Thank you for referring your patient, Viv Shaw, to the [facility] [place] [facility]. Please see a copy of my visit note below.      [facility]  Return Visit  Encounter date:   January 3, 2024    Chief Complaint:   Follow up    ID: s/p  Right total revision ethmoidectomy with tissue removal; mucocele drainage      History of Present Illness:   Viv Shaw is a 73 year old female who presents for consultation regarding recently discovered right ethmoid mucocele during hospitalization for right orbital apex syndrome.  Patient reported to the ED on October 7 with rapidly increasing right eye swelling and decreased vision.  Underwent orbitotomy and globe decompression with the ophthalmology team.  On CT and MRI demonstration of chronic sinus disease and possible mucocele on the right side.  Here today to discuss definitive treatment.     Patient states that she's lost all vision in her right eye since her episode. Other than that swelling has completely resolved. No issues since that time. States that she had sinus surgery many years ago in [place] and denies any sinus issues since then other than this recent orbital exacerbation.      Hx of parkinson's and mild/moderate dementia. Kidney transplant    Interval History (01/03/24):   Viv Shaw is a 73 year old female who presents for follow up. She is here for her first post op visit after undergoing right total ethmoidectomy/mucocele drainage (12/19/23). She reports feeling fine and makes no major complaints at this time. She is compliant with the sinus rinses and nasal spray.    Sino-Nasal Outcome Test (SNOT - 22)  [facility] Operative History  Right orbitotomy on 10/8/23      Review of systems: A 14-point review of systems has been  "conducted and is negative for any notable symptoms, except as dictated in the history of present illness.     Physical Exam:  Vital signs: BP (!) 158/61 (BP Location: Left arm, Patient Position: Sitting, Cuff Size: Adult Regular)   Pulse 66   Ht 1.626 m (5' 4\")   Wt 65.4 kg (144 lb 1.6 oz)   LMP  (LMP Unknown)   SpO2 96%   BMI 24.73 kg/m     General Appearance: No acute distress, appropriate demeanor, conversant  Eyes: moist conjunctivae; EOMI; pupils symmetric; visual acuity grossly intact; no proptosis  Head: normocephalic; overall symmetric appearance without deformity  Face: overall symmetric without deformity  Ears: Normal appearance of external ear  Nose: No external deformity  Oral Cavity/oropharynx: Normal appearance of mucosa  Neck: no palpable lymphadenopathy; thyroid without palpable nodules  Lungs: symmetric chest rise; no wheezing  CV: Good distal perfusion; normal hear rate  Extremities: No deformity  Neurologic Exam: Cranial nerves II-XII are grossly intact      Procedure Note  Procedure performed: Rigid nasal endoscopy  Indication: To evaluate for sinonasal pathology not visualized on routine anterior rhinoscopy  Anesthesia: 4% topical lidocaine with 0.05 % oxymetazoline  Description of procedure: A 30 degree, 3 mm rigid endoscope was inserted into bilateral nasal cavities and the nasal valves, nasal cavity, middle meatus, sphenoethmoid recess, nasopharynx were evaluated for evidence of obstruction, edema, purulence, polyps and/or mass/lesion.         Findings  Right ethmoid cavity healing appropriately after mucocele drainage. No evidence of scarring or reaccumulation ;mild amount of edema.      The patient tolerated the procedure well without complication.     Laboratory Review:  N/a    Imaging Review:  Reviewed CT from 10/08/23, evidence of right ethmoid mucocele     Pathology Review:  N/a    Assessment/Medical Decision Making:  Viv Shaw is a 73 year old female who presents for follow " up after right total revision ethmoidectomy with tissue removal; mucocele drainage. She appears to be doing well. On exam right ethmoid cavity healing apporpriately after mucocele drainage. No evidence of scarring or accumulation; mild amount of edema. She should continue her nasal rinses and follow up in 4-6 weeks.      Plan:  1.Continue nasal rinses as needed  2.Follow up in 1 month    Josef Hernandez MD    [facility]  Rhinology, Endoscopic Skull Base Surgery  [facility]  Department of Otolaryngology - Head & Neck Surgery    ~~~~~~~~~~~~~~~~~~~~~~~~~~~~~~~~~~~~~~~~~~~~~~~~~~~~~~~~~~~~~~~~~~~~~~~~~~~~~~~~~~~~~~~~~~~~~~~~~~~~~~~~~~~~~~~~~~~~~~~~~~~~~~~~~~~~~~~    Past Medical History:   Diagnosis Date     Abdominal wall hernia     RLQ     AK (actinic keratosis) 08/06/2020     Allergic rhinitis      CAD (coronary artery disease)     coronary artery disease s/p PCI to LAD in 2005coronary artery disease s/p PCI to LAD in 2005     Dyslipidemia      GERD (gastroesophageal reflux disease)      H/O diabetic nephropathy     s/p kidney trnsplant     Hypertension      Hypothyroidism      Immunosuppressed status (H24)      Kidney replaced by transplant     Rt     PONV (postoperative nausea and vomiting)      Shingles      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, managed as type 1 (H)     nephropathy, retinopathy, macrovascular disease     Urinary tract infection 08/12/2022     Vitamin B12 deficiency anemia         Past Surgical History:   Procedure Laterality Date     ABDOMEN SURGERY       APPENDECTOMY       APPENDECTOMY NOS       ARTHROSCOPY SHOULDER ROTATOR CUFF REPAIR Left      COLONOSCOPY N/A 06/07/2016    Procedure: COLONOSCOPY;  Surgeon: Rashard Snider MD;  Location:  GI     Coronary angioplasty with stent  2005     HYSTERECTOMY       Kidney Transplant NOS Right      LAPAROSCOPIC CHOLECYSTECTOMY       NOSE SURGERY  2013     OPEN SEPARATION COMPONENT HERNIORRHAPHY N/A 10/16/2017    Procedure:  OPEN SEPARATION COMPONENT HERNIORRHAPHY;;  Surgeon: CARL Lott MD;  Location: [facility] OR     OPTICAL TRACKING SYSTEM ENDOSCOPIC SINUS SURGERY Right 2023    Procedure: SINUS SURGERY, ENDOSCOPIC, USING OPTICAL TRACKING SYSTEM  Right total ethmoidectomy/mucocele drainage;  Surgeon: Josef Hernandez MD;  Location: [facility] OR     ORBITOTOMY Right 10/08/2023    Procedure: RIGHT ORBITOTOMY WITH BIOPSY AND DRAINAGE OF ABSCESS;  Surgeon: Xu Machado MD;  Location: [facility] OR     RECONSTRUCT ABDOMINAL WALL N/A 10/16/2017    Procedure: RECONSTRUCT ABDOMINAL WALL;  Exploratory Laparotomy, Lysis of Adhesions, Abdominal Wall reconstruction, Inlay Xen AB mesh, Mitek Suture [place] and Umbilical Hernia Repair.;  Surgeon: CARL Lott MD;  Location: [facility] OR     REMOVE CATARACT INTRACAP,INSERT LENS Right      TONSILLECTOMY       TRANSPLANT          Family History   Problem Relation Age of Onset     Respiratory Mother          age 71     Cardiovascular Father          age 75 hyertension     Arthritis Sister         living, healthy     Family History Negative Brother          age 44     Colon Cancer No family hx of         Social History     Socioeconomic History     Marital status:    Tobacco Use     Smoking status: Never     Passive exposure: Never     Smokeless tobacco: Never   Vaping Use     Vaping Use: Never used   Substance and Sexual Activity     Alcohol use: No     Drug use: No     Sexual activity: Yes     Partners: Male     Birth control/protection: None   Other Topics Concern     Parent/sibling w/ CABG, MI or angioplasty before 65F 55M? No     Social Determinants of Health     Financial Resource Strain: Low Risk  (2023)    Financial Resource Strain      Within the past 12 months, have you or your family members you live with been unable to get utilities (heat, electricity) when it was really needed?: No   Food Insecurity: Low Risk  (2023)    Food Insecurity      Within  the past 12 months, did you worry that your food would run out before you got money to buy more?: No      Within the past 12 months, did the food you bought just not last and you didn t have money to get more?: No   Transportation Needs: Low Risk  (12/11/2023)    Transportation Needs      Within the past 12 months, has lack of transportation kept you from medical appointments, getting your medicines, non-medical meetings or appointments, work, or from getting things that you need?: No   Interpersonal Safety: Low Risk  (12/12/2023)    Interpersonal Safety      Do you feel physically and emotionally safe where you currently live?: Yes      Within the past 12 months, have you been hit, slapped, kicked or otherwise physically hurt by someone?: No      Within the past 12 months, have you been humiliated or emotionally abused in other ways by your partner or ex-partner?: No   Housing Stability: Low Risk  (12/11/2023)    Housing Stability      Do you have housing? : Yes      Are you worried about losing your housing?: No          Again, thank you for allowing me to participate in the care of your patient.      Sincerely,    Josef Hernandez MD

## 2024-01-03 NOTE — PROGRESS NOTES
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  Patient reported to the ED on October 7 with rapidly increasing right eye swelling and decreased vision.  Underwent orbitotomy and globe decompression with the ophthalmology team.  On CT and MRI demonstration of chronic sinus disease and possible mucocele on the right side.  Here today to discuss definitive treatment.     Patient states that she's lost all vision in her right eye since her episode. Other than that swelling has completely resolved. No issues since that time. States that she had sinus surgery many years ago in [place] and denies any sinus issues since then other than this recent orbital exacerbation.      Hx of parkinson's and mild/moderate dementia. Kidney transplant    Interval History (01/03/24):   Viv Shaw is a 73 year old female who presents for follow up. She is here for her first post op visit after undergoing right total ethmoidectomy/mucocele drainage (12/19/23). She reports feeling fine and makes no major complaints at this time. She is compliant with the sinus rinses and nasal spray.    Sino-Nasal Outcome Test (SNOT - 22)  DNT    [facility]  Right orbitotomy on 10/8/23      Review of systems: A 14-point review of systems has been conducted and is negative for any notable symptoms, except as dictated in the history of present illness.     Physical Exam:  Vital signs: BP (!) 158/61 (BP Location: Left arm, Patient Position: Sitting, Cuff Size: Adult Regular)   Pulse 66   Ht 1.626 m (5' 4\")   Wt 65.4 kg (144 lb 1.6 oz)   LMP  (LMP Unknown)   SpO2 96%   BMI 24.73 " kg/m     General Appearance: No acute distress, appropriate demeanor, conversant  Eyes: moist conjunctivae; EOMI; pupils symmetric; visual acuity grossly intact; no proptosis  Head: normocephalic; overall symmetric appearance without deformity  Face: overall symmetric without deformity  Ears: Normal appearance of external ear  Nose: No external deformity  Oral Cavity/oropharynx: Normal appearance of mucosa  Neck: no palpable lymphadenopathy; thyroid without palpable nodules  Lungs: symmetric chest rise; no wheezing  CV: Good distal perfusion; normal hear rate  Extremities: No deformity  Neurologic Exam: Cranial nerves II-XII are grossly intact      Procedure Note  Procedure performed: Rigid nasal endoscopy  Indication: To evaluate for sinonasal pathology not visualized on routine anterior rhinoscopy  Anesthesia: 4% topical lidocaine with 0.05 % oxymetazoline  Description of procedure: A 30 degree, 3 mm rigid endoscope was inserted into bilateral nasal cavities and the nasal valves, nasal cavity, middle meatus, sphenoethmoid recess, nasopharynx were evaluated for evidence of obstruction, edema, purulence, polyps and/or mass/lesion.         Findings  Right ethmoid cavity healing appropriately after mucocele drainage. No evidence of scarring or reaccumulation ;mild amount of edema.      The patient tolerated the procedure well without complication.     Laboratory Review:  N/a    Imaging Review:  Reviewed CT from 10/08/23, evidence of right ethmoid mucocele     Pathology Review:  N/a    Assessment/Medical Decision Making:  Viv Shaw is a 73 year old female who presents for follow up after right total revision ethmoidectomy with tissue removal; mucocele drainage. She appears to be doing well. On exam right ethmoid cavity healing apporpriately after mucocele drainage. No evidence of scarring or accumulation; mild amount of edema. She should continue her nasal rinses and follow up in 4-6 weeks.      Plan:  1.Continue  nasal rinses as needed  2.Follow up in 1 month    Josef Hernandez MD    [facility]  Rhinology, Endoscopic Skull Base Surgery  [facility]  Department of Otolaryngology - Head & Neck Surgery    ~~~~~~~~~~~~~~~~~~~~~~~~~~~~~~~~~~~~~~~~~~~~~~~~~~~~~~~~~~~~~~~~~~~~~~~~~~~~~~~~~~~~~~~~~~~~~~~~~~~~~~~~~~~~~~~~~~~~~~~~~~~~~~~~~~~~~~~    Past Medical History:   Diagnosis Date    Abdominal wall hernia     RLQ    AK (actinic keratosis) 08/06/2020    Allergic rhinitis     CAD (coronary artery disease)     coronary artery disease s/p PCI to LAD in 2005coronary artery disease s/p PCI to LAD in 2005    Dyslipidemia     GERD (gastroesophageal reflux disease)     H/O diabetic nephropathy     s/p kidney trnsplant    Hypertension     Hypothyroidism     Immunosuppressed status (H24)     Kidney replaced by transplant     Rt    PONV (postoperative nausea and vomiting)     Shingles     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, managed as type 1 (H)     nephropathy, retinopathy, macrovascular disease    Urinary tract infection 08/12/2022    Vitamin B12 deficiency anemia         Past Surgical History:   Procedure Laterality Date    ABDOMEN SURGERY      APPENDECTOMY      APPENDECTOMY NOS      ARTHROSCOPY SHOULDER ROTATOR CUFF REPAIR Left     COLONOSCOPY N/A 06/07/2016    Procedure: COLONOSCOPY;  Surgeon: Rashard Snider MD;  Location:  GI    Coronary angioplasty with stent  2005    HYSTERECTOMY      Kidney Transplant NOS Right     LAPAROSCOPIC CHOLECYSTECTOMY      NOSE SURGERY  2013    OPEN SEPARATION COMPONENT HERNIORRHAPHY N/A 10/16/2017    Procedure: OPEN SEPARATION COMPONENT HERNIORRHAPHY;;  Surgeon: CARL Lott MD;  Location:  OR    OPTICAL TRACKING SYSTEM ENDOSCOPIC SINUS SURGERY Right 12/19/2023    Procedure: SINUS SURGERY, ENDOSCOPIC, USING OPTICAL TRACKING SYSTEM  Right total ethmoidectomy/mucocele drainage;  Surgeon: Josef Hernandez MD;  [facility]    ORBITOTOMY Right 10/08/2023    Procedure:  RIGHT ORBITOTOMY WITH BIOPSY AND DRAINAGE OF ABSCESS;  Surgeon: Xu Machado MD;  Location: [facility] OR    RECONSTRUCT ABDOMINAL WALL N/A 10/16/2017    Procedure: RECONSTRUCT ABDOMINAL WALL;  Exploratory Laparotomy, Lysis of Adhesions, Abdominal Wall reconstruction, Inlay Xen AB mesh, Mitek Suture [place] and Umbilical Hernia Repair.;  Surgeon: CARL Lott MD;  [facility]    REMOVE CATARACT INTRACAP,INSERT LENS Right     TONSILLECTOMY      TRANSPLANT          Family History   Problem Relation Age of Onset    Respiratory Mother          age 71    Cardiovascular Father          age 75 hyertension    Arthritis Sister         living, healthy    Family History Negative Brother          age 44    Colon Cancer No family hx of         Social History     Socioeconomic History    Marital status:    Tobacco Use    Smoking status: Never     Passive exposure: Never    Smokeless tobacco: Never   Vaping Use    Vaping Use: Never used   Substance and Sexual Activity    Alcohol use: No    Drug use: No    Sexual activity: Yes     Partners: Male     Birth control/protection: None   Other Topics Concern    Parent/sibling w/ CABG, MI or angioplasty before 65F 55M? No     Social Determinants of Health     Financial Resource Strain: Low Risk  (2023)    Financial Resource Strain     Within the past 12 months, have you or your family members you live with been unable to get utilities (heat, electricity) when it was really needed?: No   Food Insecurity: Low Risk  (2023)    Food Insecurity     Within the past 12 months, did you worry that your food would run out before you got money to buy more?: No     Within the past 12 months, did the food you bought just not last and you didn t have money to get more?: No   Transportation Needs: Low Risk  (2023)    Transportation Needs     Within the past 12 months, has lack of transportation kept you from medical appointments, getting your  medicines, non-medical meetings or appointments, work, or from getting things that you need?: No   Interpersonal Safety: Low Risk  (12/12/2023)    Interpersonal Safety     Do you feel physically and emotionally safe where you currently live?: Yes     Within the past 12 months, have you been hit, slapped, kicked or otherwise physically hurt by someone?: No     Within the past 12 months, have you been humiliated or emotionally abused in other ways by your partner or ex-partner?: No   Housing Stability: Low Risk  (12/11/2023)    Housing Stability     Do you have housing? : Yes     Are you worried about losing your housing?: No

## 2024-01-04 NOTE — TELEPHONE ENCOUNTER
Patient Call: Medication Refill  Route to LPN  Instruct the patient to first contact their pharmacy. If they have called their pharmacy and require further assistance, route to LPN.    Pharmacy Name: [id number]     Pharmacy Location: [address] AT [facility]       Name of Medication:   mycophenolic acid (GENERIC EQUIVALENT)       Dose: 180 MG EC tablet          When will the patient be out of this medication?: Less than 24 hours ([facility] LPN, then page if no answer)

## 2024-01-08 NOTE — NURSING NOTE
Chief Complaints and History of Present Illnesses   Patient presents with    Post Op (Ophthalmology) Right Eye     POM3 s/p Right Orbitotomy with biopsy and drainage of abscess - DOS 10/08/2023       Chief Complaint(s) and History of Present Illness(es)       Post Op (Ophthalmology) Right Eye              Associated symptoms: Negative for eye pain, redness, flashes and floaters    Treatments tried: no treatments    Pain scale: 0/10    Comments: POM3 s/p Right Orbitotomy with biopsy and drainage of abscess - DOS 10/08/2023                Comments    Pt states no changes in vision.   Denies eye pain or discomfort.   No flashes or floaters.   Had Sinus surgery 12/19/23 and healing well.     Bhaskar Arias 8:11 AM January 8, 2024

## 2024-01-08 NOTE — PROGRESS NOTES
Chief Complaints and History of Present Illnesses   Patient presents with    Post Op (Ophthalmology) Right Eye     POM3 s/p Right Orbitotomy with biopsy and drainage of abscess - DOS 10/08/2023       Chief Complaint(s) and History of Present Illness(es)     Post Op (Ophthalmology) Right Eye    Associated symptoms include Negative for eye pain, redness, flashes and   floaters.  Treatments tried include no treatments.  Pain was noted as   0/10. Additional comments: POM3 s/p Right Orbitotomy with biopsy and   drainage of abscess - DOS 10/08/2023             Comments    Pt states no changes in vision.   Denies eye pain or discomfort.   No flashes or floaters.   Had Sinus surgery 12/19/23 and healing well.     Bhaskar Arias 8:11 AM January 8, 2024             Assessment & Plan     Viv Shaw is a 73 year old female with the following diagnoses:   1. 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 Mucocele of ethmoid sinus       S/p sinus surgery with Dr. Hernandez doing well   Unfortunately, right eye has remained no light perception. Left eye vision is intact.         PLAN:  Follow up with ENT and Dr Arevalo (oph)   Return to clinic as needed                   Attending Physician Attestation:  Complete documentation of historical and exam elements from today's encounter can be found in the full encounter summary report (not reduplicated in this progress note).  I personally obtained the chief complaint(s) and history of present illness.  I confirmed and edited as necessary the review of systems, past medical/surgical history, family history, social history, and examination findings as documented by others; and I examined the patient myself.  I personally reviewed the relevant tests, images, and reports as documented above.  I formulated and edited as necessary the assessment and plan and discussed the findings and management plan with the patient and family. I personally reviewed the ophthalmic test(s) associated with  this encounter, agree with the interpretation(s) as documented and have edited the corresponding report(s) as necessary.   -Xu Machado MD  9:10 AM 1/8/2024

## 2024-01-12 NOTE — TELEPHONE ENCOUNTER
Phyllis from [facility] is calling to send FYI to PCP that patient is discharged from agency 1/12/24, due to goals being met. Patient will continue outpatient physical therapy.

## 2024-01-15 NOTE — TELEPHONE ENCOUNTER
Call to spouse States patient had in home therapy but she was discharged from home physical therapy. The therapist suggested patient continue with out patient physical therapy. Home care was ordered by Dr. Vega. Spouse reached out to [facility] and the [facility] because they have used them for out patient therapy in the past and he was told a new order would be needed.

## 2024-01-15 NOTE — TELEPHONE ENCOUNTER
Reason for Call:  Other Referral    Detailed comments: [facility] stated , new year needs new referral orders for Physical therapy.     Phone Number Patient can be reached at: Cell number on file:    Telephone Information:   Mobile [phone]       Best Time: any    Can we leave a detailed message on this number? YES    Call taken on 1/15/2024 at 10:38 AM by Dione Leon

## 2024-01-15 NOTE — TELEPHONE ENCOUNTER
Please check who had referred patient to physical therapy.  did [facility] refer patient to physical therapy?  As the message states that [facility] wants a new referral for physical therapy

## 2024-01-16 NOTE — PROGRESS NOTES
Clinic Care Coordination Contact  Community Health Worker Follow Up    Care Gaps:     There are no preventive care reminders to display for this patient.    Currently there are no Care Gaps.    Care Plan:   Care Plan: Help At Home       Problem: Potential need for additional supports       Goal: Establish adequate home support if/when needed       Start Date: 10/25/2023 Expected End Date: 2/26/2024    This Visit's Progress: 50% Recent Progress: 20%    Priority: High    Note:     Barriers: new vision loss, PD/dementia  Strengths: spousal support  Patients spouse expressed understanding of goal: Yes  Action steps to achieve this goal:  I will continue to lean on informal supports of my: family  My family will review resources and supports sent to me via E-mail  My family will outreach to supports and consider establishing with ones I might find beneficial.  My family will follow up with scheduled appointments as recommended  My family will contact my care team with questions, concerns, support needs.   My family will use the clinic as a resource and I understand I can contact my clinic with 24/7 after hours services available.   My family will continue to outreach to care coordination as needed for additional resources or supports.                               Intervention and Education during outreach:     CHW was able to connect with the Patient's  and review their above goals. He shared that they discharged her from home PT last week. Patient's  would like a [facility] referral so the Patient can obtain PT through them.   He has no questions about the resources sent by CC SW. They state they're doing okay with food, and getting around.     No additional CC needs identified during the time of call. Encouraged them to reach out to the Writer previous to next outreach with any questions or concerns; He is agreeable with the plan.     CHW Plan: Writer will reach out to the Patient/ in 1 month to monitor  the progression of their goals.        [facility]  Community Health Worker  [facility]:  [place]   Clinic Care Coordination  [phone]

## 2024-01-18 NOTE — TELEPHONE ENCOUNTER
I have already done a referral for physical therapy, please see my orders on 1/15/2024, please inform patient

## 2024-01-18 NOTE — TELEPHONE ENCOUNTER
Order/Referral Request    Who is requesting: PATIENT'S /PATIENT    Orders being requested: [facility] REFERRAL FOR [facility] (IN HOME CARE RELEASED HER SO SHE NEEDS THIS FOR HER MOBILITY)    Reason service is needed/diagnosis: PARKINSON'S WITH DEMENTIA     When are orders needed by: ASAP    Has this been discussed with Provider: Yes    Does patient have a preference on a Group/Provider/Facility? [facility]     Does patient have an appointment scheduled?: No    Where to send orders: Place orders within Epic    Could we send this information to you in [facility] or would you prefer to receive a phone call?:   Patient would prefer a phone call   Okay to leave a detailed message?: Yes at Cell number on file:     or Other phone number:  [phone]

## 2024-01-23 PROBLEM — S72.001A CLOSED FRACTURE OF RIGHT HIP, INITIAL ENCOUNTER (H): Status: ACTIVE | Noted: 2024-01-01

## 2024-01-23 PROBLEM — S72.001A HIP FRACTURE, RIGHT, CLOSED, INITIAL ENCOUNTER (H): Status: ACTIVE | Noted: 2024-01-01
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
## 2024-01-23 NOTE — H&P
[facility]    History and Physical - Hospitalist Service       Date of Admission:  1/23/2024    Assessment & Plan      Viv Shaw is a 71 year old female admitted on 01/23/24. 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    Mechanical fall  Right femoral neck fracture  -Orthopedics consulted, appreciated recommendations  -Surgery tomorrow tentatively  -N.p.o. midnight  -PCD, further anticoagulation per   Orthopedics  -PT/OT per orthopedics  -Nonweightbearing/bedrest      Mild leukocytosis  Likely stress response in the setting of right femoral neck fracture.  A total review of system is fairly negative.  -Monitor CBC    History of kidney transplant  Fluctuating baseline creatinine from 1-1.25 at best.  -Resume home immunosuppressants,) and mycophenolate    Type II DM  -Patient wears Dexcom at home  -POCT 3 times daily for meal and at bedtime  -Home Lantus 15 to 17 units, start 10 units at bedtime  -At home patient is also on sliding scale, hold off sliding scale for now      CAD status post PCI 2015  Hypertension  Hyperlipidemia  -Patient denied any chest pain, dyspnea on exertion, orthopnea.  Last echocardiogram was in 2007 that showed ejection fraction of 55%.  She had a Lexiscan in 2017 which was without any ischemia.   -At home patient is on aspirin 81 mg, losartan 50 mg, Imdur 15 mg, amlodipine 10 mg, metoprolol 12.5 mg  -Hold prior to admission losartan perioperatively  -Hold prior to admission aspirin  -Okay to give amlodipine, Imdur now but hold on the morning of surgery  -Resume home metoprolol  -Resume home statin      Parkinson's dementia  -Resume home Sinemet    Hypothyroidism  -resume home levothyroxine    Right eye blindness  -Since October 2023 due to orbital cellulitis and abscess.                      Diet: NPO per Anesthesia Guidelines for Procedure/Surgery Except for: Meds, Ice Chips  NPO per Anesthesia Guidelines for Procedure/Surgery Except for: Meds, Ice Chips  Moderate Consistent Carb (60 g CHO per Meal) Diet  NPO for Medical/Clinical Reasons Except for: Meds, Ice Chips    DVT Prophylaxis: Pneumatic Compression Devices  Arevalo Catheter: Not present  Lines: None     Cardiac Monitoring: None  Code Status: No CPR- Do NOT Intubate      Clinically Significant Risk Factors Present on Admission                # Drug Induced Platelet Defect: home medication list includes an antiplatelet medication   # Hypertension: Noted on problem list   # Dementia: noted on problem list   # DMII: A1C = 7.3 % (Ref range: <5.7 %) within past 6 months       # Financial/Environmental Concerns:           Disposition Plan      Expected Discharge Date: 01/25/2024                  Greta Quigley MD  Hospitalist Service  [facility]  Securely message with [employer] (more info)  Text page via [facility] Paging/Directory     ______________________________________________________________________    Chief Complaint   Right femoral neck fracture    History is obtained from the patient and chart review    History of Present Illness     Viv NORA Shaw is a 71 year old female admitted on 01/23/24. 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    Patient uses walker to walk around.  She states that she was walking from bedroom to kitchen with walker and went down and thinks that she fell down on the right side on hardwood floor.  She did not hit her head no loss of consciousness.  She was able to get herself up onto the bench.  Her only complaint is right hip pain but now she thinks that pain is taking over both her lower extremities and is soreness.    She  denied any recent fever, chills, URI symptoms, chest pain, difficulty breathing, nausea, vomiting, abdominal pain, change in bowel movements, dysuria.  She is compliant with all of her medication.     She was hypertensive in the ED at 186/71.  Other vitals were stable.  Labs were significant for mild leukocytosis at 12.4, creatinine at 1.05.    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, managed as type 1 (H)     nephropathy, retinopathy, macrovascular disease    Urinary tract infection 08/12/2022    Vitamin B12 deficiency anemia        Past Surgical History   Past Surgical History:   Procedure Laterality Date    ABDOMEN SURGERY      APPENDECTOMY      APPENDECTOMY NOS      ARTHROSCOPY SHOULDER ROTATOR CUFF REPAIR Left     COLONOSCOPY N/A 06/07/2016    Procedure: COLONOSCOPY;  Surgeon: Rashard Snider MD;  Location: [facility]    Coronary angioplasty with stent  2005    HYSTERECTOMY      Kidney Transplant NOS Right     LAPAROSCOPIC CHOLECYSTECTOMY      NOSE SURGERY  2013    OPEN SEPARATION COMPONENT HERNIORRHAPHY N/A 10/16/2017    Procedure: OPEN SEPARATION COMPONENT HERNIORRHAPHY;;  Surgeon: CARL Lott MD;  [facility]    OPTICAL TRACKING SYSTEM ENDOSCOPIC SINUS SURGERY Right 12/19/2023    Procedure: SINUS SURGERY, ENDOSCOPIC, USING OPTICAL TRACKING SYSTEM  Right total ethmoidectomy/mucocele drainage;  Surgeon: Josef Hernandez MD;  [facility]    ORBITOTOMY Right  10/08/2023    Procedure: RIGHT ORBITOTOMY WITH BIOPSY AND DRAINAGE OF ABSCESS;  Surgeon: Xu Machado MD;  Location: [facility]    RECONSTRUCT ABDOMINAL WALL N/A 10/16/2017    Procedure: RECONSTRUCT ABDOMINAL WALL;  Exploratory Laparotomy, Lysis of Adhesions, Abdominal Wall reconstruction, Inlay Xen AB mesh, Mitek Suture [place] and Umbilical Hernia Repair.;  Surgeon: CARL Lott MD;  [facility]    REMOVE CATARACT INTRACAP,INSERT LENS Right     TONSILLECTOMY      TRANSPLANT         Prior to Admission Medications   Prior to Admission Medications   Prescriptions Last Dose Informant Patient Reported? Taking?   Continuous Blood Gluc  (DEXCOM G7 ) LUCAS   No No   Sig: Use to read blood sugars as 's instructions.   Continuous Blood Gluc Sensor (DEXCOM G7 SENSOR) MISC   No No   Sig: Change every 10 days.   acetaminophen (TYLENOL) 325 MG tablet Unknown at PRN  No Yes   Sig: Take 3 tablets (975 mg) by mouth every 8 hours as needed for mild pain   amLODIPine (NORVASC) 10 MG tablet 1/23/2024  No Yes   Sig: Take 1 tablet (10 mg) by mouth daily   aspirin 81 MG EC tablet 1/23/2024  Yes Yes   Sig: Take 81 mg by mouth daily   atorvastatin (LIPITOR) 40 MG tablet 1/23/2024  No Yes   Sig: TAKE 0.5 TABLETS BY MOUTH DAILY   carbidopa-levodopa (SINEMET)  MG tablet 1/23/2024 at X1  Yes Yes   Sig: Take 1.5 tablets by mouth 3 times daily Through Neurologist   cholecalciferol 125 MCG (5000 UT) CAPS 1/23/2024  Yes Yes   Sig: Take 1 capsule by mouth daily   cyanocobalamin (VITAMIN B-12) 1000 MCG tablet 1/22/2024  Yes Yes   Sig: Take 1,000 mcg by mouth Every Mon, Wed, Fri Morning   cycloSPORINE modified (GENERIC EQUIVALENT) 25 MG capsule 1/23/2024 at X1  No Yes   Sig: Take 2 capsules (50 mg) by mouth 2 times daily   donepezil (ARICEPT) 10 MG tablet 1/22/2024  Yes Yes   Sig: Take 10 mg by mouth at bedtime Through Neurologist   insulin aspart (NOVOLOG PEN) 100 UNIT/ML pen 1/23/2024  No Yes   Sig:  Inject 5-10 units subcutaneous with meals and correction doses as directed, total daily dose approx 25 units   Patient taking differently: Inject Subcutaneous 3 times daily (with meals) Inject 6 units subcutaneous with breakfast, 5 units with lunch, and 5 units with dinner.  Use 1 unit per 50 mg/dL over 200 mg/dL for correction.  Total daily dose approx 25 units.   insulin glargine (BASAGLAR KWIKPEN) 100 UNIT/ML pen 1/22/2024 at HS  No Yes   Sig: Inject 15-17 Units Subcutaneous daily   Patient taking differently: Inject 14 Units Subcutaneous daily   isosorbide mononitrate (IMDUR) 30 MG 24 hr tablet 1/23/2024  Yes Yes   Sig: Take 15 mg by mouth daily   levothyroxine (SYNTHROID/LEVOTHROID) 100 MCG tablet 1/23/2024  No Yes   Sig: Take 1 tablet (100 mcg) by mouth daily   losartan (COZAAR) 100 MG tablet 1/23/2024  No Yes   Sig: TAKE 1/2 OF A TABLET (50 MG) BY MOUTH DAILY   metoprolol succinate ER (TOPROL XL) 25 MG 24 hr tablet 1/22/2024 at HS  Yes Yes   Sig: Take 12.5 mg by mouth daily   mycophenolic acid (GENERIC EQUIVALENT) 180 MG EC tablet 1/23/2024 at X1  No Yes   Sig: Take 2 tablets (360 mg) by mouth 2 times daily   pantoprazole (PROTONIX) 40 MG EC tablet 1/23/2024  No Yes   Sig: TAKE 1 TABLET BY MOUTH EVERY DAY      Facility-Administered Medications: None        Review of Systems    The 10 point Review of Systems is negative other than noted in the HPI or here.      Physical Exam   Vital Signs: Temp: 98.4  F (36.9  C) Temp src: Temporal BP: (!) 177/64 Pulse: 64   Resp: 16 SpO2: 91 % O2 Device: None (Room air)    Weight: 140 lbs 10.46 oz    Constitutional: awake, alert, cooperative, no apparent distress, and appears stated age  Eyes: Blindness in right eye, anicteric sclera  ENT: Normocephalic, atraumatic  Respiratory: No increased work of breathing, good air exchange, clear to auscultation bilaterally, no crackles or wheezing  Cardiovascular: Normal rate, regular rhythm, no murmur  GI:, Nontender,  nondistended  Musculoskeletal: Laying flat in bed, no lower extremity edema  Neurologic: Awake, alert, oriented to name, place and time.    Neuropsychiatric: Pleasant    Medical Decision Making       55 MINUTES SPENT BY ME on the date of service doing chart review, history, exam, documentation & further activities per the note.      Data   ------------------------- PAST 24 HR DATA REVIEWED -----------------------------------------------

## 2024-01-23 NOTE — ED PROVIDER NOTES
History     Chief Complaint:  Hip Pain ([facility] [place] as fractured according to family.  Pt states was walking from bedroom to kitchen with walker and went down and thinks that she fell down on right side- hardwood floor and throw rug. No LOC.  is at side, pt has hx of  diabetes, parkinson , dementia and most recent loss of vision on Right eye. )     BEENA Shaw is a 73 year old female who presents to the ED with her  after a fall this morning.  Patient reports recent diagnosis of Parkinson's and dementia and blindness in her right eye.  She is needing a walker in the morning to help get around.  This morning, she was turning a corner when she slipped and fell.  She does remember exactly how she fell but she does not think she hit her head.  She did not lose consciousness.  She was able to get herself up onto the bench.  When her  got home shortly later, a neighbor and he used a wheelchair to get her into the car.  Only complaint is right hip pain. She does not take blood thinners.    Independent Historian:   Spouse/Partner - They report supplemental history    Review of External Notes:   Reviewed her neurology note from October 2023.  Parkinson's disease with Parkinson's related dementia.  MRI in July 2023 showed several small chronic infarcts    Medications:    acetaminophen (TYLENOL) 325 MG tablet  amLODIPine (NORVASC) 10 MG tablet  ASPIRIN PO  atorvastatin (LIPITOR) 40 MG tablet  carbidopa-levodopa (SINEMET)  MG tablet  cholecalciferol 125 MCG (5000 UT) CAPS  Continuous Blood Gluc  (DEXCOM G7 ) LUCAS  Continuous Blood Gluc Sensor (DEXCOM G7 SENSOR) MISC  cyanocobalamin (VITAMIN B-12) 1000 MCG tablet  cycloSPORINE modified (GENERIC EQUIVALENT) 25 MG capsule  donepezil (ARICEPT) 10 MG tablet  insulin aspart (NOVOLOG PEN) 100 UNIT/ML pen  insulin glargine (BASAGLAR KWIKPEN) 100 UNIT/ML pen  isosorbide mononitrate (IMDUR) 30 MG 24 hr  tablet  levothyroxine (SYNTHROID/LEVOTHROID) 100 MCG tablet  losartan (COZAAR) 100 MG tablet  metoprolol succinate ER (TOPROL XL) 25 MG 24 hr tablet  mycophenolic acid (GENERIC EQUIVALENT) 180 MG EC tablet  pantoprazole (PROTONIX) 40 MG EC tablet  sodium chloride (OCEAN) 0.65 % nasal spray        Past Medical History:    Past Medical History:   Diagnosis Date    Abdominal wall hernia     AK (actinic keratosis) 08/06/2020    Allergic rhinitis     CAD (coronary artery disease)     Dyslipidemia     GERD (gastroesophageal reflux disease)     H/O diabetic nephropathy     Hypertension     Hypothyroidism     Immunosuppressed status (H24)     Kidney replaced by transplant     PONV (postoperative nausea and vomiting)     Shingles     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, managed as type 1 (H)     Urinary tract infection 08/12/2022    Vitamin B12 deficiency anemia        Past Surgical History:    Past Surgical History:   Procedure Laterality Date    ABDOMEN SURGERY      APPENDECTOMY      APPENDECTOMY NOS      ARTHROSCOPY SHOULDER ROTATOR CUFF REPAIR Left     COLONOSCOPY N/A 06/07/2016    Procedure: COLONOSCOPY;  Surgeon: Rashard Snider MD;  Location: [facility]    Coronary angioplasty with stent  2005    HYSTERECTOMY      Kidney Transplant NOS Right     LAPAROSCOPIC CHOLECYSTECTOMY      NOSE SURGERY  2013    OPEN SEPARATION COMPONENT HERNIORRHAPHY N/A 10/16/2017    Procedure: OPEN SEPARATION COMPONENT HERNIORRHAPHY;;  Surgeon: CARL Lott MD;  [facility]    OPTICAL TRACKING SYSTEM ENDOSCOPIC SINUS SURGERY Right 12/19/2023    Procedure: SINUS SURGERY, ENDOSCOPIC, USING OPTICAL TRACKING SYSTEM  Right total ethmoidectomy/mucocele drainage;  Surgeon: Josef Hernandez MD;  [facility]    ORBITOTOMY Right 10/08/2023    Procedure: RIGHT ORBITOTOMY WITH BIOPSY AND DRAINAGE OF ABSCESS;  Surgeon: Xu Machado MD;  [facility]    RECONSTRUCT ABDOMINAL WALL N/A 10/16/2017    Procedure: RECONSTRUCT ABDOMINAL WALL;  Exploratory  Laparotomy, Lysis of Adhesions, Abdominal Wall reconstruction, Inlay Xen AB mesh, Mitek Suture [place] and Umbilical Hernia Repair.;  Surgeon: CARL Lott MD;  [facility]: [facility]    REMOVE CATARACT INTRACAP,INSERT LENS Right     TONSILLECTOMY      TRANSPLANT          Physical Exam   Patient Vitals for the past 24 hrs:   BP Pulse Resp SpO2 Weight   01/23/24 1337 -- 55 12 94 % --   01/23/24 1336 -- 59 13 93 % --   01/23/24 1320 -- 57 14 95 % 64.1 kg (141 lb 4.8 oz)   01/23/24 1319 -- 59 11 94 % --   01/23/24 1318 -- 58 15 93 % --   01/23/24 1302 -- 57 10 93 % --   01/23/24 1301 (!) 149/59 57 11 94 % --   01/23/24 1238 -- 57 14 93 % --   01/23/24 1230 (!) 156/61 56 -- 95 % --   01/23/24 1229 -- 56 10 90 % --   01/23/24 1224 (!) 161/63 57 12 93 % --   01/23/24 1205 -- 56 14 93 % --   01/23/24 1126 (!) 157/62 60 14 92 % --   01/23/24 1056 (!) 171/57 58 12 98 % --   01/23/24 1051 (!) 186/71 58 10 98 % --      Physical Exam  Vital signs and nursing notes reviewed.    General:  Patient in no acute distress. Sitting on bed with  at bedside.   Head:  No obvious trauma to head. Negative edward's sign and negative raccoon eyes bilaterally.  Ears: External ears normal.   Nose:  No deformity to nose. No epistaxis.   Eyes:  Conjunctivae and EOM are normal. Pupils are equal, round, and reactive.   Neck: Normal range of motion. Neck supple. No tracheal deviation present. No midline cervical neck tenderness, deformity, step off or pain in the midline with ROM.  CV:  Normal heart sounds.   Pulm/Chest: Breath sounds clear and equal. Effort normal.   Abd: Soft and non distended. There is no tenderness.   M/S: Right hip: pain with log roll and anterior palpation. Slight bruising noted anteriorly. Unable flex hip/lift leg off bed.  Otherwise, no pain to palpation or deformity of all 4 extremities. No pain to palpation or step off to thoracic and lumbar spine.  Neuro: Alert. CN II-XII Grossly intact. GCS 15.  Skin: Skin is  warm and dry to touch. No lesions noted. Not diaphoretic.   Psych: Normal mood and affect. Behavior is normal.     Emergency Department Course     ECG results from 01/23/24   EKG 12 lead     Value    Systolic Blood Pressure     Diastolic Blood Pressure     Ventricular Rate 58    Atrial Rate 58    AL Interval 82    QRS Duration 92        QTc 449    P Axis 39    R AXIS 36    T Axis 68    Interpretation ECG      Sinus bradycardia with short AL  Otherwise normal ECG  When compared with ECG of 19-DEC-2023 06:29,  No significant change was found       *Note: Due to a large number of results and/or encounters for the requested time period, some results have not been displayed. A complete set of results can be found in Results Review.     Imaging:  XR Hip Right 2-3 Views   Final Result   IMPRESSION:   1.  Acute fracture of the right femoral neck. Mild anterolateral   displacement and mild/moderate impaction.   2.  Normal right hip joint spacing and alignment.   3.  Surgical clips projecting over the right pelvis.      BARRETT SARAVIA MD            SYSTEM ID:  [id number]      CT Cervical Spine w/o Contrast   Final Result   Impression:    No acute fracture or traumatic subluxation.      CAMERON EDUARDO MD            SYSTEM ID:  [id number]      Head CT w/o contrast   Final Result   IMPRESSION:    1. No acute intracranial pathology.    2. Chronic small vessel ischemic disease and scattered chronic lacunar   infarcts.      CAMERON EDUARDO MD            SYSTEM ID:  [id number]         Laboratory:  Labs Ordered and Resulted from Time of ED Arrival to Time of ED Departure   COMPREHENSIVE METABOLIC PANEL - Abnormal       Result Value    Sodium 141      Potassium 4.6      Carbon Dioxide (CO2) 27      Anion Gap 11      Urea Nitrogen 22.9      Creatinine 1.05 (*)     GFR Estimate 55 (*)     Calcium 9.5      Chloride 103      Glucose 174 (*)     Alkaline Phosphatase 91      AST 18      ALT 10      Protein Total 7.2      Albumin 4.3       Bilirubin Total 0.7     CBC WITH PLATELETS AND DIFFERENTIAL - Abnormal    WBC Count 12.4 (*)     RBC Count 4.40      Hemoglobin 12.8      Hematocrit 40.4      MCV 92      MCH 29.1      MCHC 31.7      RDW 13.3      Platelet Count 221      % Neutrophils 90      % Lymphocytes 5      % Monocytes 4      % Eosinophils 0      % Basophils 0      % Immature Granulocytes 1      NRBCs per 100 WBC 0      Absolute Neutrophils 11.1 (*)     Absolute Lymphocytes 0.6 (*)     Absolute Monocytes 0.5      Absolute Eosinophils 0.0      Absolute Basophils 0.0      Absolute Immature Granulocytes 0.1      Absolute NRBCs 0.0     VITAMIN D DEFICIENCY SCREENING   TYPE AND SCREEN, ADULT    [facility](D) A NEG      Antibody Screen Negative      SPECIMEN EXPIRATION DATE [id number]     [facility] TYPE AND SCREEN        Procedures   None    Emergency Department Course & Assessments:         Interventions:  Medications   Vitamin D3 (CHOLECALCIFEROL) tablet 50 mcg (has no administration in time range)   morphine (PF) injection 2 mg (2 mg Intravenous $Given 1/23/24 1139)   acetaminophen (TYLENOL) tablet 1,000 mg (1,000 mg Oral $Given 1/23/24 1139)      Independent Interpretation (X-rays, CTs, rhythm strip):  Reviewed AP pelvis x-ray from [facility] this morning in our PACS system, and appreciated right femoral neck fracture.    Consultations/Discussion of Management or Tests/Assessments:   ED Course as of 01/23/24 1428   Tue Jan 23, 2024   1106 I initially assessed the patient and obtained the above history and physical exam.     1200 I staffed the patient with Dr. Moffett   1241 I spoke with MARGARITA Saab with orthopedics regarding patient's presentation, findings, and plan of care.     1341 I spoke with Dr. Quigley, hospitalist, regarding patient's presentation, findings, and plan of care.     1400 I updated the patient's  on plan of care.     Social Determinants of Health affecting care:   None    Disposition:  The patient was admitted to the hospital  under the care of Dr. Quigley.     Impression & Plan    CMS Diagnoses: None    Medical Decision Making:  Viv Shaw is a 73 year old female with a history of kidney transplant, type 1 diabetes, Parkinson's and Parkinson's dementia who presents for evaluation of right hip pain after fall this morning.  She is not anticoagulated.  See HPI.  Vital signs stable.  On exam, the patient has pain with logroll of the right leg, pain to palpation of her right hip and is unable to flex the leg off the bed.  [facility] urgent care x-ray from prior to arrival reveals right femoral neck fracture.  Head and neck CT were obtained here in the ED and are fortunately negative.  No evidence of acute intracranial hemorrhage, or cervical fracture or subluxation.  Her neurologic exam is without deficits.  Head to toe trauma exam is otherwise negative.  I spoke to orthopedics, who will plan on surgery tomorrow.  Patient will be admitted to the hospital for further evaluation and definitive management.  Dr. Quigley graciously accepted the patient and will oversee further cares.    I staffed this patient with Dr. Moffett who agrees with the above assessment and plan.    Diagnosis:    ICD-10-CM    1. Closed fracture of right hip, initial encounter (H)  S72.001A Case Request: HEMIARTHROPLASTY, RIGHT HIP, BIPOLAR     Case Request: HEMIARTHROPLASTY, RIGHT HIP, BIPOLAR      2. Hip fracture, right, closed, initial encounter (H)  S72.001A       3. Fall, initial encounter  W19.XXXA            Discharge Medications:  New Prescriptions    No medications on file      Alise Reynolds PA-C on 1/23/2024 at 2:50 PM         Alise Reynolds PA-C  01/23/24 145

## 2024-01-23 NOTE — PROGRESS NOTES
Clinic Care Coordination Contact  Care Coordination Clinician Chart Review    Situation: Patient chart reviewed by Care Coordinator.       Background: Care Coordination Program started: 10/25/2023. Initial assessment completed and patient-centered care plan(s) were developed with participation from patient. Lead CC handed patient off to CHW for continued outreaches.       Assessment: Per chart review, patient outreach completed by CC CHW on 1/16/2024. Patient is actively working to accomplish goal(s). Patient's goal(s) appropriate and relevant at this time. Patient is due for updated Plan of Care. Assessments will be completed annually or as needed/with change of patient status.      Care Plan: Help At Home       Problem: Potential need for additional supports       Goal: Establish adequate home support if/when needed       Start Date: 10/25/2023 Expected End Date: 3/22/2024    Recent Progress: 50%    Priority: High    Note:     Barriers: new vision loss, PD/dementia  Strengths: spousal support  Patients spouse expressed understanding of goal: Yes  Action steps to achieve this goal:  I will continue to lean on informal supports of my: family  My family will review resources and supports sent to me via E-mail  My family will outreach to supports and consider establishing with ones I might find beneficial.  My family will follow up with scheduled appointments as recommended  My family will contact my care team with questions, concerns, support needs.   My family will use the clinic as a resource and I understand I can contact my clinic with 24/7 after hours services available.   My family will continue to outreach to care coordination as needed for additional resources or supports.                           Plan/Recommendations: The patient will continue working with Care Coordination to achieve goal(s) as above. CHW will continue outreaches at minimum every 30 days and will involve Lead CC as needed or if patient is  ready to move to Maintenance. Lead CC will continue to monitor CHW outreaches and patient's progress to goal(s) every 6 weeks.     Plan of Care updated and sent to patient: Yes, via [facility]  Social Work Care Coordinator  [facility], and [place]  Phone: [phone]

## 2024-01-23 NOTE — PHARMACY-ADMISSION MEDICATION HISTORY
Pharmacist Admission Medication History    Admission medication history is complete. The information provided in this note is only as accurate as the sources available at the time of the update.    Information Source(s): Family member (spouse) and CareEverywhere/SureScripts via in-person    Pertinent Information: Pt's spouse sets up pt's pill box, spouse is unable to recall if once daily medications are taken in AM vs PM but did state that pt did take AM medications today (marked once daily meds as taken today unless pt spouse specified they were bedtime medications).    Changes made to PTA medication list:  Added: None  Deleted:   Sodium chloride nasal spray  Changed:   Updated insulin dosing  Vit D 2 caps daily -> 1 cap    Allergies reviewed with patient and updates made in EHR: yes    Medication History Completed By: Danielle Jacob [facility] 1/23/2024 1:55 PM    Prior to Admission medications    Medication Sig Last Dose Taking? Auth Provider Long Term End Date   acetaminophen (TYLENOL) 325 MG tablet Take 3 tablets (975 mg) by mouth every 8 hours as needed for mild pain Unknown at PRN Yes Reagan Velasquez MD     amLODIPine (NORVASC) 10 MG tablet Take 1 tablet (10 mg) by mouth daily 1/23/2024 Yes Summer Vega MD Yes    aspirin 81 MG EC tablet Take 81 mg by mouth daily 1/23/2024 Yes Unknown, Entered By History     atorvastatin (LIPITOR) 40 MG tablet TAKE 0.5 TABLETS BY MOUTH DAILY 1/23/2024 Yes Summer Vega MD Yes    carbidopa-levodopa (SINEMET)  MG tablet Take 1.5 tablets by mouth 3 times daily  1/23/2024 at X1 Yes Reported, Patient Yes    cholecalciferol 125 MCG (5000 UT) CAPS Take 1 capsule by mouth daily 1/23/2024 Yes Reported, Patient     cyanocobalamin (VITAMIN B-12) 1000 MCG tablet Take 1,000 mcg by mouth Every Mon, Wed, Fri Morning 1/22/2024 Yes Unknown, Entered By History     cycloSPORINE modified (GENERIC EQUIVALENT) 25 MG capsule Take 2 capsules (50 mg) by mouth 2 times  daily 1/23/2024 at X1 Yes Morro Veloz MD Yes    donepezil (ARICEPT) 10 MG tablet Take 10 mg by mouth daily 1/22/2024 Yes Reported, Patient     insulin aspart (NOVOLOG PEN) 100 UNIT/ML pen Inject Subcutaneous 3 times daily (with meals)   Inject 6 units subcutaneous with breakfast, 5 units with lunch, and 5 units with dinner.  Use 1 unit per 50 mg/dL over 200 mg/dL for correction.  Total daily dose approx 25 units. 1/23/2024 Yes Juan M Fitzpatrick MD Yes    insulin glargine (BASAGLAR KWIKPEN) 100 UNIT/ML pen Inject 14 Units Subcutaneous daily 1/22/2024 at HS Yes Juan M Fitzpatrick MD Yes    isosorbide mononitrate (IMDUR) 30 MG 24 hr tablet TAKE 1/2 TABLET DAILY BY MOUTH (15 MG) 1/23/2024 Yes Summer Vega MD Yes    levothyroxine (SYNTHROID/LEVOTHROID) 100 MCG tablet Take 1 tablet (100 mcg) by mouth daily 1/23/2024 Yes Summer Vega MD Yes    losartan (COZAAR) 100 MG tablet TAKE 1/2 OF A TABLET (50 MG) BY MOUTH DAILY 1/23/2024 Yes Summer Vega MD Yes    metoprolol succinate ER (TOPROL XL) 25 MG 24 hr tablet Take 12.5 mg by mouth daily 1/22/2024 at HS Yes Unknown, Entered By History No    mycophenolic acid (GENERIC EQUIVALENT) 180 MG EC tablet Take 2 tablets (360 mg) by mouth 2 times daily 1/23/2024 at X1 Yes Pauline Bourne MD     pantoprazole (PROTONIX) 40 MG EC tablet TAKE 1 TABLET BY MOUTH EVERY DAY 1/23/2024 Yes Summer Vega MD     Continuous Blood Gluc  (DEXCOM G7 ) LUCAS Use to read blood sugars as 's instructions.   Juan M Fitzpatrick MD     Continuous Blood Gluc Sensor (DEXCOM G7 SENSOR) MISC Change every 10 days.   Juan M Fitzpatrick MD

## 2024-01-23 NOTE — TELEPHONE ENCOUNTER
PRESCRIPTIONS MUST BE WRITTEN THIS WAY TO MAKE THEM MEDICARE COMPLIANT    DEXCOM G7 SENSORS  SIG: Change every 10 days.  QTY: 3  REFILLS: 5    -Prescriptions must be written after the clinical note date and will only be able to be used for 6 months from the date of the clinical notes. All prescriptions must be from same provider who patient had visit with. (We will be requesting new clinical notes and prescriptions every 6 months to meet Medicare Guidelines.)    Please contact us at [phone] (this number is for clinics only) with any questions. Please only give [phone] to patients.    [facility]   [address] [place] [address]  Phone # [phone]  Fax # [phone]  
Refill sent    Girish Coffey RN    
Requested Prescriptions   Pending Prescriptions Disp Refills    Continuous Blood Gluc Sensor (DEXCOM G7 SENSOR) MISC 3 each 5     Sig: Change every 10 days.       There is no refill protocol information for this order        Last Written Prescription Date:  11/9/23  Last Fill Quantity: 3 each,  # refills: 5   Last office visit: 12/6/2023 ; last virtual visit: 12/15/2022 with prescribing provider:  Dr Fitzpatrick   Future Office Visit: 3/14/24    Refill held due to hospital status  Girish Coffey RN              
Satisfactory

## 2024-01-23 NOTE — LETTER
[facility]  [address]  [facility] [address]    January 23, 2024      Viv King  [address]  [facility] [address]        Dear Viv     Attached is an updated Patient Centered Plan of Care for your continued enrollment in Care Coordination. Please let us know if you have additional questions, concerns, or goals that we can assist with.    Sincerely,    EJ Owens/LICSW  Social Work Care Coordinator  [facility] - [place], [place], and [place]  Phone: [phone]      [facility]  Patient Centered Plan of Care  About Me:        Patient Name:  Viv NORA King    YOB: 1950  Age:         73 year old   [place] MRN:    [id number] Telephone Information:  Home Phone [phone]   Mobile [phone]       Address:  [address]  [facility] [address] Email address:  [email or web address]      Emergency Contact(s)    Name Relationship Lgl Grd Work Phone Home Phone Mobile Phone   1. JR KING Spouse   [phone]   2. MS CHRISTINE STA* Daughter    [phone]           Primary language:  English     needed? No   [place] [employer]:  [phone] op. 1  Other communication barriers:None  Preferred Method of Communication:  Maxwell  Current living arrangement: I live in a private home with spouse  Mobility Status/ Medical Equipment: Independent w/Device    My Access Plan  Medical Emergency 911   Primary Clinic Line [facility] - [phone]   24 Hour Appointment Line [phone] or  [phone]) (toll-free)   24 Hour Nurse Line [phone] (toll-free)   Preferred Urgent Care No data recorded   Preferred Hospital [facility]  [phone]     Preferred Pharmacy [id number] - [place], [place] - [address]     Behavioral Health Crisis Line The  National Suicide Prevention Lifeline at 1-872.440.1909 or Text/Call 988     My Care Team Members  Patient Care Team         Relationship Specialty Notifications Start End    Summer Vega MD PCP - General   4/14/04     referred to endocrinology for type 2 diabetes    Phone: [phone] Pager: [phone] Fax: [phone]        [address] [place] [address]    Junaid Trevino MD MD Cardiology  12/8/11     Phone: [phone] Fax: [phone]         [address] [facility] [address]    Enrico Blankenship MD Nephrology  12/8/11     Phone: [phone] Fax: [phone]         [facility] [address] [place] [address]    Summer Vega MD Assigned PCP   10/4/12     Phone: [phone] Pager: [phone] Fax: [phone]        [address] [place] [address]    Morro Veloz MD MD Nephrology  6/11/15     REFER TO LABS    Phone: [phone] Fax: [phone]         [address] [facility] [address] [facility] [address] [facility] [address]    Juan M Fitzpatrick MD MD Endocrinology, Diabetes, and Metabolism  5/20/21     Phone: [phone] Fax: [phone] [address] [facility] [address]    Juan M Fitzpatrick MD Assigned Endocrinology Provider   8/15/21     Phone: [phone] Fax: [phone] [address] [place] [address]    Pauline Bourne MD Assigned Nephrology Provider   12/10/22     Phone: [phone] Fax: [phone]         [address] [facility] [address]    Linda Brewster, PA-C Physician Assistant Physical Medicine and Rehabilitation  5/15/23     Phone: [phone] Fax: [phone] [address] [facility] [address]    Linda Brewster PA-C Assigned Neuroscience Provider   7/8/23     Phone: [phone] Fax: [phone] [address] [facility] [address]    Lea Melgar, [facility] Lead Care Coordinator  - Clinical Admissions 10/25/23     Phone: [phone] Fax:  [phone]        Haven Yi CHW Community Health Worker  Admissions 10/25/23     Josef Hernandez MD Assigned Surgical Provider   11/18/23     Phone: [phone] Fax: [phone]         [address] [address]              My Care Plans  Self Management and Treatment Plan - Care Plan  Care Plan: Help At Home       Problem: Potential need for additional supports       Goal: Establish adequate home support if/when needed       Start Date: 10/25/2023 Expected End Date: 3/22/2024    Recent Progress: 50%    Priority: High    Note:     Barriers: new vision loss, PD/dementia  Strengths: spousal support  Patients spouse expressed understanding of goal: Yes  Action steps to achieve this goal:  I will continue to lean on informal supports of my: family  My family will review resources and supports sent to me via E-mail  My family will outreach to supports and consider establishing with ones I might find beneficial.  My family will follow up with scheduled appointments as recommended  My family will contact my care team with questions, concerns, support needs.   My family will use the clinic as a resource and I understand I can contact my clinic with 24/7 after hours services available.   My family will continue to outreach to care coordination as needed for additional resources or supports.                             Advance Care Plans/Directives:   Advanced Care Plan/Directives on file:   Yes    Status of Document(s): No data recorded  Advanced Care Plan/Directives Type:   Advanced Directive - On File       My Medical and Care Information  Problem List   Patient Active Problem List   Diagnosis    Other vitamin B12 deficiency anemia    Irritable bowel syndrome    GERD (gastroesophageal reflux disease)    Advanced directives, counseling/discussion    Kidney replaced by transplant    Immunosuppressed status (HCC)    Hyperlipidemia LDL goal <100    CAD S/P percutaneous coronary angioplasty     Anemia in chronic kidney disease    Other specified hypothyroidism    Coronary artery disease involving native heart without angina pectoris, unspecified vessel or lesion type    Hypertension secondary to other renal disorders    Hernia, incisional    CKD (chronic kidney disease) stage 3, GFR 30-59 ml/min (H)    Diabetes mellitus, type 2 (H)    Status post kidney transplant    Pneumonia due to 2019 novel coronavirus    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)    Parkinson disease    Lewy body dementia, unspecified dementia severity, unspecified whether behavioral, psychotic, or mood disturbance or anxiety (H)    Orbital mass    Abscess of orbit, unspecified laterality    Infection of right eye    Mucocele of ethmoid sinus        Current Medications and Allergies:  Current Outpatient Medications   Medication    acetaminophen (TYLENOL) 325 MG tablet    amLODIPine (NORVASC) 10 MG tablet    ASPIRIN PO    atorvastatin (LIPITOR) 40 MG tablet    carbidopa-levodopa (SINEMET)  MG tablet    cholecalciferol 125 MCG (5000 UT) CAPS    Continuous Blood Gluc  (DEXCOM G7 ) LUCAS    Continuous Blood Gluc Sensor (DEXCOM G7 SENSOR) MISC    cyanocobalamin (VITAMIN B-12) 1000 MCG tablet    cycloSPORINE modified (GENERIC EQUIVALENT) 25 MG capsule    donepezil (ARICEPT) 10 MG tablet    insulin aspart (NOVOLOG PEN) 100 UNIT/ML pen    insulin glargine (BASAGLAR KWIKPEN) 100 UNIT/ML pen    isosorbide mononitrate (IMDUR) 30 MG 24 hr tablet    levothyroxine (SYNTHROID/LEVOTHROID) 100 MCG tablet    losartan (COZAAR) 100 MG tablet    metoprolol succinate ER (TOPROL XL) 25 MG 24 hr tablet    mycophenolic acid (GENERIC EQUIVALENT) 180 MG EC tablet    pantoprazole (PROTONIX) 40 MG EC tablet    sodium chloride (OCEAN) 0.65 % nasal spray     No current facility-administered medications for this visit.     No Known Allergies    Care Coordination Start Date: 10/25/2023   Frequency of Care Coordination: monthly,  more frequently as needed     Form Last Updated: 01/23/2024

## 2024-01-23 NOTE — PLAN OF CARE
Orthopedic Plan of Care     Outside AP pelvis radiograph reviewed and demonstrates a right femoral neck fracture. Imaging sent to on-call orthopedic surgeon, Dr. Pino Rivas. Surgical treatment recommended likely with a right hip hemiarthroplasty. Will plan for surgery on 1/24/24 provided the patient is medically optimized.     -Admit to medicine team.  -Right hip radiographs ordered per ED PA.  -NPO at midnight. Okay for diet today from an orthopedic standpoint.  -NWB/bedrest.  -Hold PTA anticoagulation (none per ED PA).  -Type and screen ordered.  -Vitamin D level and supplementation ordered.  -Continue pain regimen.     Formal consult to follow tomorrow morning.     ALVIN LundC  [facility]

## 2024-01-23 NOTE — ED NOTES
[facility]  ED Nurse Handoff Report    ED Chief complaint: Hip Pain ([facility] [place] [facility] to family.  Pt states was walking from bedroom to kitchen with walker and went down and thinks that she fell down on right side- hardwood floor and throw rug. No LOC.  is at side, pt has hx of  diabetes, parkinson , dementia and most recent loss of vision on Right eye. )  . ED Diagnosis:   Final diagnoses:   Hip fracture, right, closed, initial encounter (H)   Fall, initial encounter       Allergies: No Known Allergies    Code Status: DNR / DNI    Activity level - Baseline/Home:  independent. With walker  Activity Level - Current:   assist of 1.   Lift room needed: No.   Bariatric: No   Needed: No   Isolation: No.   Infection: Not Applicable.     Respiratory status: Room air    Vital Signs (within 30 minutes):   Vitals:    01/23/24 1302 01/23/24 1318 01/23/24 1319 01/23/24 1320   BP:       Pulse: 57 58 59 57   Resp: 10 15 11 14   SpO2: 93% 93% 94% 95%   Weight:    64.1 kg (141 lb 4.8 oz)       Cardiac Rhythm:  ,      Pain level:    Patient confused: No.   Patient Falls Risk: nonskid shoes/slippers when out of bed, arm band in place, patient and family education, assistive device/personal items within reach, and activity supervised.   Elimination Status:  Purewick in place      Patient Report - Initial Complaint: R hip pain after fall.   Focused Assessment: A & O x 4,  reports she does have dementia and parkinson's, breathing even and unlabored, pain 6/10,  pain in RLE, CMS intact, + R pedal pulse    Abnormal Results:   Labs Ordered and Resulted from Time of ED Arrival to Time of ED Departure   COMPREHENSIVE METABOLIC PANEL - Abnormal       Result Value    Sodium 141      Potassium 4.6      Carbon Dioxide (CO2) 27      Anion Gap 11      Urea Nitrogen 22.9      Creatinine 1.05 (*)     GFR Estimate 55 (*)     Calcium 9.5      Chloride 103       Glucose 174 (*)     Alkaline Phosphatase 91      AST 18      ALT 10      Protein Total 7.2      Albumin 4.3      Bilirubin Total 0.7     CBC WITH PLATELETS AND DIFFERENTIAL - Abnormal    WBC Count 12.4 (*)     RBC Count 4.40      Hemoglobin 12.8      Hematocrit 40.4      MCV 92      MCH 29.1      MCHC 31.7      RDW 13.3      Platelet Count 221      % Neutrophils 90      % Lymphocytes 5      % Monocytes 4      % Eosinophils 0      % Basophils 0      % Immature Granulocytes 1      NRBCs per 100 WBC 0      Absolute Neutrophils 11.1 (*)     Absolute Lymphocytes 0.6 (*)     Absolute Monocytes 0.5      Absolute Eosinophils 0.0      Absolute Basophils 0.0      Absolute Immature Granulocytes 0.1      Absolute NRBCs 0.0     VITAMIN D DEFICIENCY SCREENING   TYPE AND SCREEN, ADULT    ABO/RH(D) A NEG      Antibody Screen Negative      SPECIMEN EXPIRATION DATE [id number]     ABO/RH TYPE AND SCREEN        CT Cervical Spine w/o Contrast   Final Result   Impression:    No acute fracture or traumatic subluxation.      CAMERON EDUARDO MD            SYSTEM ID:  [id number]      Head CT w/o contrast   Final Result   IMPRESSION:    1. No acute intracranial pathology.    2. Chronic small vessel ischemic disease and scattered chronic lacunar   infarcts.      CAMERON EDUARDO MD            SYSTEM ID:  [id number]      XR Pelvis and Hip Right 1 View    (Results Pending)       Treatments provided: IV pain meds  Family Comments:  @ bedside  OBS brochure/video discussed/provided to patient:  N/A  ED Medications:   Medications   Vitamin D3 (CHOLECALCIFEROL) tablet 50 mcg (has no administration in time range)   morphine (PF) injection 2 mg (2 mg Intravenous $Given 1/23/24 1139)   acetaminophen (TYLENOL) tablet 1,000 mg (1,000 mg Oral $Given 1/23/24 1139)       Drips infusing:  No  For the majority of the shift this patient was Green.   Interventions performed were NA.    Sepsis treatment initiated:  No    Cares/treatment/interventions/medications to be completed following ED care: Pain control, surgery?    ED Nurse Name: Christel Hickman RN  1:49 PM  RECEIVING UNIT ED HANDOFF REVIEW    Above ED Nurse Handoff Report was reviewed: Yes  Reviewed by: Irina German RN on January 23, 2024 at 4:23 PM

## 2024-01-23 NOTE — ED PROVIDER NOTES
Emergency Department Attending Supervision Note  1/23/2024  1:25 PM      I evaluated this patient in conjunction with Alise CHAKRABORTY      Briefly, the patient presented with  Parkinsons and shuffles with unsteady gait at times per  had purely mechanical fall with right hip pain.  [facility] they found a [facility] fracture on the right where he pain is and sent her here for surgical consultation.        On my exam, Well appearing no pain or distress.  Head to toe trauma normal except as below.  Lungs clear.  Heart normal.   Abd nromal.  Pain in right hip with any movement.  NVI distal right leg and no other bony or joint deformities.      Results:  XR from [facility] reviewed in PACS.  Femoral neck fracture.  Also reviewed labs and CT head and neck with Alise.      ED course:  Plan to admit for possible surgery tomorrow.     My impression is         Diagnosis    ICD-10-CM    1. Closed fracture of right hip, initial encounter (H)  S72.001A Case Request: HEMIARTHROPLASTY, RIGHT HIP, BIPOLAR     Case Request: HEMIARTHROPLASTY, RIGHT HIP, BIPOLAR            Alise Reynolds, Xu Baldwin MD  01/23/24 5262

## 2024-01-23 NOTE — PROGRESS NOTES
Clinic Care Coordination Contact  Ambulatory Care Coordination to Inpatient Care Management   Hand-In Communication    Date:  January 23, 2024  Name: Viv Shaw is enrolled in Ambulatory Care Coordination program and I am the Lead Care Coordinator.  CC Contact Information: Epic InBasket + phone  Payor Source: Payor: MEDICARE / Plan: MEDICARE / Product Type: Medicare /   Current services in place:  Please see the CC Snapshot and Care Management Flowsheets for specific details of this Viv Shaw care plan.   Additional details/specific concerns r/t this admission: No additional concerns at this time.    I will follow this admission in Epic. Please feel free to contact me with questions or for further collaboration in discharge planning.    EJ Owens/LICSW  Social Work Care Coordinator  [facility] - [place], [place], and [place]  Phone: [phone]

## 2024-01-23 NOTE — ED TRIAGE NOTES
[facility] [place] [facility] to family.  Pt states was walking from bedroom to kitchen with walker and went down and thinks that she fell down on right side- hardwood floor and throw rug. No LOC.  is at side, pt has hx of  diabetes, parkinson , dementia and most recent loss of vision on Right eye. ,   Right LE mobility impaired.      Triage Assessment (Adult)       Row Name 01/23/24 1047          Triage Assessment    Airway WDL WDL        Respiratory WDL    Respiratory WDL WDL        Skin Circulation/Temperature WDL    Skin Circulation/Temperature WDL WDL        Cardiac WDL    Cardiac WDL WDL        Peripheral/Neurovascular WDL    Peripheral Neurovascular WDL WDL        Cognitive/Neuro/Behavioral WDL    Cognitive/Neuro/Behavioral WDL WDL        Pupils (CN II)    Pupil Size Left 2 mm     Pupil Size Right 1 mm        [place] Coma Scale    Best Eye Response 4-->(E4) spontaneous     Best Motor Response 6-->(M6) obeys commands     Best Verbal Response 5-->(V5) oriented     [place] Coma Scale Score 15

## 2024-01-24 NOTE — ANESTHESIA PREPROCEDURE EVALUATION
Anesthesia Pre-Procedure Evaluation    Patient: Viv Shaw   MRN: [id number] : 1950        Procedure : Procedure(s):  Right hip bipolar hemiarthroplasty          Past Medical History:   Diagnosis Date    Abdominal wall hernia     RLQ    AK (actinic keratosis) 2020    Allergic rhinitis     CAD (coronary artery disease)     coronary artery disease s/p PCI to LAD in coronary artery disease s/p PCI to LAD in     Dyslipidemia     GERD (gastroesophageal reflux disease)     H/O diabetic nephropathy     s/p kidney trnsplant    Hypertension     Hypothyroidism     Immunosuppressed status (H24)     Kidney replaced by transplant     Rt    PONV (postoperative nausea and vomiting)     Shingles     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, managed as type 1 (H)     nephropathy, retinopathy, macrovascular disease    Urinary tract infection 2022    Vitamin B12 deficiency anemia       Past Surgical History:   Procedure Laterality Date    ABDOMEN SURGERY      APPENDECTOMY      APPENDECTOMY NOS      ARTHROSCOPY SHOULDER ROTATOR CUFF REPAIR Left     COLONOSCOPY N/A 2016    Procedure: COLONOSCOPY;  Surgeon: Rashard Snider MD;  Location: [facility]    Coronary angioplasty with stent      HYSTERECTOMY      Kidney Transplant NOS Right     LAPAROSCOPIC CHOLECYSTECTOMY      NOSE SURGERY  2013    OPEN SEPARATION COMPONENT HERNIORRHAPHY N/A 10/16/2017    Procedure: OPEN SEPARATION COMPONENT HERNIORRHAPHY;;  Surgeon: CARL Lott MD;  Location: [facility]    OPTICAL TRACKING SYSTEM ENDOSCOPIC SINUS SURGERY Right 2023    Procedure: SINUS SURGERY, ENDOSCOPIC, USING OPTICAL TRACKING SYSTEM  Right total ethmoidectomy/mucocele drainage;  Surgeon: Josef Hernandez MD;  Location: [facility]    ORBITOTOMY Right 10/08/2023    Procedure: RIGHT ORBITOTOMY WITH BIOPSY AND DRAINAGE OF ABSCESS;  Surgeon: Xu Machado MD;  Location: [facility]    RECONSTRUCT ABDOMINAL WALL N/A 10/16/2017    Procedure: RECONSTRUCT ABDOMINAL WALL;   Exploratory Laparotomy, Lysis of Adhesions, Abdominal Wall reconstruction, Inlay Xen AB mesh, Mitek Suture Troy and Umbilical Hernia Repair.;  Surgeon: CARL Lott MD;  Location: [facility]    REMOVE CATARACT INTRACAP,INSERT LENS Right     TONSILLECTOMY      TRANSPLANT        No Known Allergies   Social History     Tobacco Use    Smoking status: Never     Passive exposure: Never    Smokeless tobacco: Never   Substance Use Topics    Alcohol use: No      Wt Readings from Last 1 Encounters:   01/23/24 63.8 kg (140 lb 10.5 oz)        Anesthesia Evaluation   Pt has had prior anesthetic. Type: General.    History of anesthetic complications  - PONV.      ROS/MED HX  ENT/Pulmonary:  - neg pulmonary ROS     Neurologic:  - neg neurologic ROS   (+)    peripheral neuropathy,                            Cardiovascular:     (+)  hypertension- -  CAD -  - -                                      METS/Exercise Tolerance:     Hematologic: Comments: Lab Test        01/23/24     12/01/23     10/18/23     02/03/22     02/02/22                       1138          0723          0722          0544          0940          WBC          12.4*        4.2          5.5            < >        14.8*         HGB          12.8         12.3         12.2           < >        11.1*         MCV          92           92           93             < >        92            PLT          221          209          275            < >        322           INR           --           --           --           --          1.00           < > = values in this interval not displayed.                  Lab Test        01/24/24     01/24/24     01/23/24     01/23/24     01/23/24     12/19/23     12/01/23     10/18/23                       0800          0232          2127          1841          1138          0634          0723          0722          NA            --           --           --           --          141           --          138          140            POTASSIUM     --           --           --           --          4.6           --          4.4          4.1           CHLORIDE      --           --           --           --          103           --          101          104           CO2           --           --           --           --          27            --          29           28            BUN           --           --           --           --          22.9          --          23.8*        23.3*         CR            --           --           --           --          1.05*         --          1.21*        1.15*         ANIONGAP      --           --           --           --          11            --          8            8             NAVI           --           --           --           --          9.5           --          9.0          9.8           GLC          219*         179*         261*           < >        174*           < >        146*         107*           < > = values in this interval not displayed.                       Musculoskeletal:  - neg musculoskeletal ROS     GI/Hepatic:     (+) GERD, Asymptomatic on medication,                  Renal/Genitourinary: Comment: s/p kidney trnsplant    (+) renal disease,             Endo:     (+)  type II DM,        thyroid problem, hypothyroidism,           Psychiatric/Substance Use:  - neg psychiatric ROS     Infectious Disease:  - neg infectious disease ROS     Malignancy:  - neg malignancy ROS     Other:  - neg other ROS          Physical Exam    Airway        Mallampati: II   TM distance: > 3 FB   Neck ROM: full   Mouth opening: > 3 cm    Respiratory Devices and Support         Dental       (+) Minor Abnormalities - some fillings, tiny chips      Cardiovascular   cardiovascular exam normal          Pulmonary   pulmonary exam normal                OUTSIDE LABS:  CBC:   Lab Results   Component Value Date    WBC 12.4 (H) 01/23/2024    WBC 4.2 12/01/2023    HGB 12.8 01/23/2024    HGB 12.3 12/01/2023     HCT 40.4 01/23/2024    HCT 38.7 12/01/2023     01/23/2024     12/01/2023     BMP:   Lab Results   Component Value Date     01/23/2024     12/01/2023    POTASSIUM 4.6 01/23/2024    POTASSIUM 4.4 12/01/2023    CHLORIDE 103 01/23/2024    CHLORIDE 101 12/01/2023    CO2 27 01/23/2024    CO2 29 12/01/2023    BUN 22.9 01/23/2024    BUN 23.8 (H) 12/01/2023    CR 1.05 (H) 01/23/2024    CR 1.21 (H) 12/01/2023     (H) 01/24/2024     (H) 01/24/2024     COAGS:   Lab Results   Component Value Date    PTT 26 05/24/2007    INR 1.00 02/02/2022    FIBR 413 07/05/2005     POC:   Lab Results   Component Value Date     (H) 10/22/2017     HEPATIC:   Lab Results   Component Value Date    ALBUMIN 4.3 01/23/2024    PROTTOTAL 7.2 01/23/2024    ALT 10 01/23/2024    AST 18 01/23/2024    ALKPHOS 91 01/23/2024    BILITOTAL 0.7 01/23/2024     OTHER:   Lab Results   Component Value Date    PH 7.35 02/02/2022    LACT 2.3 (H) 02/02/2022    A1C 7.3 (H) 12/19/2023    NAVI 9.5 01/23/2024    PHOS 2.5 10/19/2017    MAG 1.8 10/12/2023    LIPASE 224 04/08/2017    AMYLASE 124 (H) 07/05/2005    TSH 0.22 (L) 10/07/2023    T4 1.68 10/07/2023    T3 65 (L) 10/09/2023    CRP 81.0 (H) 02/02/2022    SED 62 (H) 02/02/2022       Anesthesia Plan    ASA Status:  3       Anesthesia Type: General.     - Airway: ETT   Induction: Intravenous, Propofol.   Maintenance: Balanced.        Consents    Anesthesia Plan(s) and associated risks, benefits, and realistic alternatives discussed. Questions answered and patient/representative(s) expressed understanding.     - Discussed:     - Discussed with:  Patient      - Extended Intubation/Ventilatory Support Discussed: No.      - Patient is DNR/DNI Status: No     Use of blood products discussed: Yes.     - Discussed with: Patient.     - Consented: consented to blood products     Postoperative Care    Pain management: IV analgesics.   PONV prophylaxis: Ondansetron (or other 5HT-3),  Dexamethasone or Solumedrol     Comments:               Alec Plunkett MD    I have reviewed the pertinent notes and labs in the chart from the past 30 days and (re)examined the patient.  Any updates or changes from those notes are reflected in this note.             # Drug Induced Platelet Defect: home medication list includes an antiplatelet medication  # DMII: A1C = 7.3 % (Ref range: <5.7 %) within past 6 months

## 2024-01-24 NOTE — PROGRESS NOTES
Dr. Plunkett notified of blood glucose (219). Blood pressure (184/70), and that the patient has a dex com on right shoulder.  No new orders at this time.  Phyllis Mcclain RN on 1/24/2024 at 8:39 AM

## 2024-01-24 NOTE — ANESTHESIA CARE TRANSFER NOTE
Patient: Viv Shaw    Procedure: Procedure(s):  Right hip bipolar hemiarthroplasty       Diagnosis: Closed fracture of right hip, initial encounter (H) [S72.001A]  Diagnosis Additional Information: No value filed.    Anesthesia Type:   General     Note:    Oropharynx: oropharynx clear of all foreign objects and spontaneously breathing  Level of Consciousness: drowsy  Oxygen Supplementation: face mask    Independent Airway: airway patency satisfactory and stable  Dentition: dentition unchanged  Vital Signs Stable: post-procedure vital signs reviewed and stable  Report to RN Given: handoff report given  Patient transferred to: PACU    Handoff Report: Identifed the Patient, Identified the Reponsible Provider, Reviewed the pertinent medical history, Discussed the surgical course, Reviewed Intra-OP anesthesia mangement and issues during anesthesia, Set expectations for post-procedure period and Allowed opportunity for questions and acknowledgement of understanding      Vitals:  Vitals Value Taken Time   BP     Temp     Pulse     Resp 15 01/24/24 1224   SpO2 99 % 01/24/24 1224   Vitals shown include unfiled device data.    Electronically Signed By: BRENDA Reynolds CRNA  January 24, 2024  12:25 PM

## 2024-01-24 NOTE — PLAN OF CARE
Pt is alert with some forgetfulness. Pt denies pain or discomfort. VSS. Pt is NPO for surgery in the morning. Pt is on blood sugar checks. Pt is nonbearing and bedrest. Pt has pure wick in place. CHG bath done during the shift. Pt is sleeping in bed. Bed alarm in place and call light within reach. Will continue to monitor and assess pt.

## 2024-01-24 NOTE — ANESTHESIA PROCEDURE NOTES
Airway       Patient location during procedure: OR       Procedure Start/Stop Times: 1/24/2024 10:34 AM  Staff -        CRNA: Carmen Bhagat APRN CRNA       Performed By: CRNA  Consent for Airway        Urgency: emergent  Indications and Patient Condition       Indications for airway management: courtney-procedural       Induction type:intravenous       Mask difficulty assessment: 1 - vent by mask    Final Airway Details       Final airway type: endotracheal airway       Successful airway: ETT - single  Endotracheal Airway Details        ETT size (mm): 7.0       Cuffed: yes       Successful intubation technique: direct laryngoscopy       DL Blade Type: MAC 3       Grade View of Cords: 1       Adjucts: stylet       Position: Right       Bite block used: Soft    Post intubation assessment        Placement verified by: capnometry        Number of attempts at approach: 1       Secured with: tape       Ease of procedure: easy       Dentition: Intact and Unchanged    Medication(s) Administered   Medication Administration Time: 1/24/2024 10:34 AM

## 2024-01-24 NOTE — OP NOTE
[facility]  Orthopedic Operative Note    Hip Hemiarthroplasty     Viv Shaw MRN# [id number]   YOB: 1950  Procedure Date:  1/24/2024  Age: 73 year old       PREOPERATIVE DIAGNOSIS:  Displaced femoral neck fracture, right hip.    POSTOPERATIVE DIAGNOSIS:  Displaced femoral neck fracture,right hip.    PROCEDURE PERFORMED:  right  hip hemiarthroplasty.      SURGEON:  Pino Rivas MD    FIRST ASSISTANT:  Braxton Mayers PA-C, whose was critical for positioning, retraction during exposure, placement of implants, and closure.     ANESTHESIA:  general     ESTIMATED BLOOD LOSS:  200 mL.       IMPLANTS:  .  Implant Name Type Inv. Item Serial No.  Lot No. LRB No. Used Action   IMP HEAD FEMORAL DEPUY 28MM +1.5MM 1365- - [id number] Total Joint Component/Insert IMP HEAD FEMORAL DEPUY 28MM +1.5MM 1365-  [employer] [facility] [employer]- [id number] Right 1 Implanted   IMP STEM CENTRALIZER DEPUY 8.5MM 1376- - [id number] Total Joint Component/Insert IMP STEM CENTRALIZER DEPUY 8.5MM 1376-  [employer] [facility] [employer]- [id number] Right 1 Implanted   STEM FEMORAL SUM[place] HI OFFSET CEMENT SZ3 - [id number] Total Joint Component/Insert STEM FEMORAL SUMMIT HI OFFSET CEMENT SZ3  [employer] [facility] [employer]- [id number] Right 1 Implanted   IMP CEMENT RESTRICTOR DIST FEM 13.25MM SZ 3 UHMWPE [id number] - [id number] Total Joint Component/Insert IMP CEMENT RESTRICTOR DIST FEM 13.25MM SZ 3 UHMWPE [id number]  [employer]- [id number] Right 1 Implanted   IMP HEAD FEMORAL DEPUY BIPOLAR [id number] - [id number] Total Joint Component/Insert IMP HEAD FEMORAL DEPUY BIPOLAR [id number]  [employer]- [id number] Right 1 Implanted   BONE CEMENT SIMPLEX FULL DOSE 6191-1-001 - [id number] Cement, Bone BONE CEMENT SIMPLEX FULL DOSE 6191-1-001  [employer] [id number] Right 2 Implanted        INDICATIONS FOR PROCEDURE: NAME@ is a 73 year old female who presents with a right displaced  femoral neck fracture. We discussed nonoperative and various operative treatment options including hemiarthroplasty and total hip arthroplasty. Risks of bleeding, infection, damage to surrounding neurovascular structures, hip dislocation, intraoperative or postoperative periprosthetic fracture, leg length inequality, blood clots including deep vein thrombosis and pulmonary embolism and anesthetic complications were discussed with patient.  Benefits of surgery discussed included improved function, reduction medical risk of hip fractures, and pain relief.  Alternatives include nonoperative management, which was not recommended.  The patient and family understands and wishes to proceed.  Consent was obtained.      DESCRIPTION OF PROCEDURE:  The patient was identified in the preoperative holding area per hospital policy and the correct operative site marked. Patient was brought into the to the operating room and placed supine on the operating room table.  After induction of general anesthesia he was placed into a left lateral decubitus position on a bean bag and all bony prominences well-padded. Chlorhexidine prescrub was performed followed by prepping and draping in normal sterile fashion.  Antibiotic administration and trans-examined acid administration were confirmed and timeout was performed per standard protocol.       A lateral incision was made centered over the greater trochanter proceeding sharply the skin and subtenons tissue down the fascia.  Fascia was incised in line with the incision.  A bursectomy was performed.  The anterior one third of the abductors and the vastus lateralis were lifted off the anterior aspect of the femur using cautery.  Capsulotomy was performed in line with the abductor split.  Medial release was performed extending to the superior aspect the lesser trochanter.  The femoral neck was cut maintaining approximately 10 mm of neck proximal to the lesser trochanter. The head was removed  with a corkscrew and noted to be sized at a 43 mm.  We copiously irrigated the hip socket, removing all bony debris. The socket was inspected and found to be without fracture or significant wear.  The box osteotome utilized to access the proximal femoral canal.  We broached to a size 3 broach trial, taking care to lateralize as needed with the broaches and/or lateralizing reamer.  We trialed with the appropriate length high offset neck trial and +1.5 mm inner diameter head.  Limb lengths were symmetric.  The hip was stable in all positions including position sleep.  Minimal shuck was noted.  The trial components were removed. A cement restrictor was placed, the canal was brushed, irrigated, and dried. When complete we cemented the size 3 stem using third generation cement technique.  Adequate blood pressure/hemodynamics were confirmed with anesthesia prior to pressurizing.  Excess cement was removed and the cup was inspected for loose cement.  The stem advanced the same level as the broach as such the real head and bipolar shell were impacted and placed in standard fashion.  The hip was reduced.    We performed a diluted Betadine lavage.  Thoroughly irrigated the wound with saline. 1 gram of vancomycin powder and 1.2 grams of tobramycin powder were placed in the wound. The capsule closed with #1 Vicryl.  We repaired down the gluteus minimus, gluteus minimus, and vastus lateralis fascia with #5 Ethibond.  A local anesthetic mixture was infiltrated.  We then closed the wound in layers with #1 Vicryl in the fascia, 2-0 Vicryl in the subcutaneous tissue, and staples in the skin.  Sterile dressings were applied with Aquacel Ag.  The patient was awoken from anesthesia and transported to the recovery room in stable condition, sustaining no complications.     Plan:  1.  Weightbearing as tolerated with assistive devices as needed x 6 weeks and and as needed thereafter. No active hip abduction  2.  Ancef x 24 hours.   3.   Aspirin enteric coated 81 mg BID for DVT prophylaxis for 6 weeks (if not contra-indications)  4.  PACU x-rays AP pelvis  5.  Physical therapy/occupational therapy.   6.  Osteoporosis workup and treatment as outpatient  7.  Follow-up in 2-week wound check with x-rays .

## 2024-01-24 NOTE — CONSULTS
[facility]    Orthopedic Consultation    Viv Shaw MRN# [id number]   Age: 73 year old YOB: 1950     Date of Admission:  1/23/2024    Reason for consult: Right hip femoral neck fracture        Requesting provider: Call from ED provider        Level of consult: Consult, follow and place orders           Assessment and Plan:   Assessment:   Right hip femoral neck fracture       Plan:   Patient scheduled for a right hip hemiarthroplasty later this morning   Surgeon: Dr Rivas  NPO Status effective now   NWB/Bedrest until postop   Hold anticoagulants if taken   Pain medication as needed, minimize narcotics as able             Chief Complaint:   Right hip pain          History of Present Illness:   Viv Shaw is a 71 year old female admitted on 01/23/24. 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     Per patient's , patient uses walker in the mornings and gradually weans off of the walker during the day. Yesterday, she was walking from bedroom to kitchen with walker and fell.  She did not hit her head no loss of consciousness.  She was able to get herself up onto the bench but had immediate right hip pain so was brought to the ED.  Xrays confirmed a right femoral neck fracture.           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, managed as type 1 (H)     nephropathy, retinopathy, macrovascular disease    Urinary tract infection 2022    Vitamin B12 deficiency anemia              Past Surgical History:     Past Surgical History:   Procedure Laterality Date    ABDOMEN SURGERY      APPENDECTOMY      APPENDECTOMY NOS      ARTHROSCOPY SHOULDER ROTATOR CUFF REPAIR Left     COLONOSCOPY N/A 2016    Procedure: COLONOSCOPY;  Surgeon: Rashard Snider MD;  Location: [facility]    Coronary angioplasty with stent      HYSTERECTOMY      Kidney Transplant NOS Right     LAPAROSCOPIC CHOLECYSTECTOMY      NOSE SURGERY      OPEN SEPARATION COMPONENT HERNIORRHAPHY N/A 10/16/2017    Procedure: OPEN SEPARATION COMPONENT HERNIORRHAPHY;;  Surgeon: CARL Lott MD;  Location: [facility]    OPTICAL TRACKING SYSTEM ENDOSCOPIC SINUS SURGERY Right 2023    Procedure: SINUS SURGERY, ENDOSCOPIC, USING OPTICAL TRACKING SYSTEM  Right total ethmoidectomy/mucocele drainage;  Surgeon: Josef Hernandez MD;  Location: [facility] OR    ORBITOTOMY Right 10/08/2023    Procedure: RIGHT ORBITOTOMY WITH BIOPSY AND DRAINAGE OF ABSCESS;  Surgeon: Xu Machado MD;  Location: [facility]    RECONSTRUCT ABDOMINAL WALL N/A 10/16/2017    Procedure: RECONSTRUCT ABDOMINAL WALL;  Exploratory Laparotomy, Lysis of Adhesions, Abdominal Wall reconstruction, Inlay Xen AB mesh, Mitek Suture [place] and Umbilical Hernia Repair.;  Surgeon: CARL Lott MD;  Location: [facility]    REMOVE CATARACT INTRACAP,INSERT LENS Right     TONSILLECTOMY      TRANSPLANT               Social History:     Social History     Tobacco Use    Smoking status: Never     Passive exposure: Never    Smokeless tobacco: Never   Substance Use Topics    Alcohol use: No             Family History:     Family History   Problem Relation Age of Onset    Respiratory Mother          age 71    Cardiovascular Father           age 75 hyertension    Arthritis Sister         living, healthy    Family History Negative Brother          age 44    Colon Cancer No family hx of              Immunizations:     VACCINE/DOSE   Diptheria   DPT   DTAP   HBIG   Hepatitis A   Hepatitis B   HIB   Influenza   Measles   Meningococcal   MMR   Mumps   Pneumococcal   Polio   Rubella   Small Pox   TDAP   Varicella   Zoster             Allergies:   No Known Allergies          Medications:     Current Facility-Administered Medications   Medication    [Auto Hold] acetaminophen (TYLENOL) tablet 975 mg    [Auto Hold] amLODIPine (NORVASC) tablet 10 mg    [Auto Hold] atorvastatin (LIPITOR) tablet 20 mg    [Auto Hold] calcium carbonate (TUMS) chewable tablet 1,000 mg    [Auto Hold] carbidopa-levodopa (SINEMET)  MG per tablet 1 tablet    [Auto Hold] carbidopa-levodopa half-tab 12.5-50 mg    ceFAZolin Sodium (ANCEF) injection 2 g    ceFAZolin Sodium (ANCEF) injection 2 g    [Auto Hold] cholecalciferol (VITAMIN D3) capsule 125 mcg    [Auto Hold] cycloSPORINE modified (GENERIC EQUIVALENT) capsule 50 mg    [Auto Hold] glucose gel 15-30 g    Or    [Auto Hold] dextrose 50 % injection 25-50 mL    Or    [Auto Hold] glucagon injection 1 mg    [Auto Hold] donepezil (ARICEPT) tablet 10 mg    [Auto Hold] HYDROmorphone (PF) (DILAUDID) injection 0.3-0.5 mg    [Auto Hold] insulin aspart (NovoLOG) injection (RAPID ACTING)    [Auto Hold] insulin aspart (NovoLOG) injection (RAPID ACTING)    [Auto Hold] insulin glargine (LANTUS PEN) injection 10 Units    [Auto Hold] isosorbide mononitrate (IMDUR) 24 hr half-tab 15 mg    lactated ringers infusion    [Auto Hold] levothyroxine (SYNTHROID/LEVOTHROID) tablet 100 mcg    lidocaine (LMX4) cream    [Auto Hold] lidocaine (LMX4) cream    lidocaine 1 % 0.1-1 mL    [Auto Hold] lidocaine 1 % 0.1-1 mL    [Held by provider] losartan (COZAAR) tablet 50 mg    [Auto Hold] metoprolol succinate ER (TOPROL-XL) 24 hr half-tab 12.5 mg    [Auto  "Hold] mycophenolic acid (GENERIC EQUIVALENT) EC tablet 360 mg    [Auto Hold] naloxone (NARCAN) injection 0.2 mg    Or    [Auto Hold] naloxone (NARCAN) injection 0.4 mg    Or    [Auto Hold] naloxone (NARCAN) injection 0.2 mg    Or    [Auto Hold] naloxone (NARCAN) injection 0.4 mg    [Auto Hold] oxyCODONE IR (ROXICODONE) half-tab 2.5-5 mg    [Auto Hold] pantoprazole (PROTONIX) EC tablet 40 mg    [Auto Hold] senna-docusate (SENOKOT-S/PERICOLACE) 8.6-50 MG per tablet 1 tablet    Or    [Auto Hold] senna-docusate (SENOKOT-S/PERICOLACE) 8.6-50 MG per tablet 2 tablet    sodium chloride (PF) 0.9% PF flush 3 mL    sodium chloride (PF) 0.9% PF flush 3 mL    [Auto Hold] sodium chloride (PF) 0.9% PF flush 3 mL    [Auto Hold] sodium chloride (PF) 0.9% PF flush 3 mL    tranexamic acid 1 g in 100 mL NS IV bag (premix)    tranexamic acid 1 g in 100 mL NS IV bag (premix)    [Auto Hold] Vitamin D3 (CHOLECALCIFEROL) tablet 50 mcg             Review of Systems:   ROS:  10 point ROS neg other than the symptoms noted above in the HPI.            Physical Exam:   All vitals have been reviewed  Patient Vitals for the past 24 hrs:   BP Temp Temp src Pulse Resp SpO2 Height Weight   01/24/24 0807 (!) 184/70 97  F (36.1  C) Temporal 65 16 93 % -- --   01/24/24 0040 (!) 160/66 97.1  F (36.2  C) Temporal 56 16 92 % -- --   01/23/24 1825 -- -- -- -- 16 -- -- --   01/23/24 1800 (!) 175/60 -- -- 70 -- -- -- --   01/23/24 1715 -- -- -- -- 16 -- 1.626 m (5' 4\") --   01/23/24 1644 (!) 177/64 98.4  F (36.9  C) Temporal 64 16 91 % -- 63.8 kg (140 lb 10.5 oz)   01/23/24 1631 (!) 177/68 -- -- 65 -- -- -- --   01/23/24 1630 -- -- -- 63 12 91 % -- --   01/23/24 1627 -- -- -- -- -- 92 % -- --   01/23/24 1621 -- -- -- 65 12 92 % -- --   01/23/24 1503 -- -- -- 57 11 92 % -- --   01/23/24 1501 -- -- -- 60 10 91 % -- --   01/23/24 1500 -- -- -- 60 12 91 % -- --   01/23/24 1459 -- -- -- 64 11 92 % -- --   01/23/24 1458 -- -- -- 67 10 93 % -- --   01/23/24 1449 (!) " 171/70 -- -- 61 (!) 8 98 % -- --   01/23/24 1337 -- -- -- 55 12 94 % -- --   01/23/24 1336 -- -- -- 59 13 93 % -- --   01/23/24 1320 -- -- -- 57 14 95 % -- 64.1 kg (141 lb 4.8 oz)   01/23/24 1319 -- -- -- 59 11 94 % -- --   01/23/24 1318 -- -- -- 58 15 93 % -- --   01/23/24 1302 -- -- -- 57 10 93 % -- --   01/23/24 1301 (!) 149/59 -- -- 57 11 94 % -- --   01/23/24 1238 -- -- -- 57 14 93 % -- --   01/23/24 1230 (!) 156/61 -- -- 56 -- 95 % -- --   01/23/24 1229 -- -- -- 56 10 90 % -- --   01/23/24 1224 (!) 161/63 -- -- 57 12 93 % -- --   01/23/24 1205 -- -- -- 56 14 93 % -- --   01/23/24 1126 (!) 157/62 -- -- 60 14 92 % -- --   01/23/24 1056 (!) 171/57 -- -- 58 12 98 % -- --   01/23/24 1051 (!) 186/71 -- -- 58 10 98 % -- --       Intake/Output Summary (Last 24 hours) at 1/24/2024 0905  Last data filed at 1/24/2024 0600  Gross per 24 hour   Intake 360 ml   Output 550 ml   Net -190 ml         Physical Exam   Temp: 97  F (36.1  C) Temp src: Temporal BP: (!) 184/70 Pulse: 65   Resp: 16 SpO2: 93 % O2 Device: None (Room air)    Vital Signs with Ranges  Temp:  [97  F (36.1  C)-98.4  F (36.9  C)] 97  F (36.1  C)  Pulse:  [55-70] 65  Resp:  [8-16] 16  BP: (149-186)/(57-71) 184/70  SpO2:  [90 %-98 %] 93 %  140 lbs 10.46 oz    Constitutional: Pleasant, alert, appropriate, following commands.  HEENT: Head atraumatic normocephalic. Pupils equal round and reactive to light.  Respiratory: Unlabored breathing no audible wheeze  Cardiovascular: Regular rate and rhythm per pulses  GI: Abdomen non-distended.  Lymph/Hematologic: No lymphadenopathy in areas examined  Genitourinary:  No nelson  Skin: No rashes, no cyanosis, no edema.  Musculoskeletal: On physical exam of the right lower extremity, patient's leg is resting in a shortened and externally rotated position.  No skin abrasions seen at the hip.  Patient is able to dorsi and plantarflex both ankles.  Reports equal sensation to light touch on the left versus right lower extremities.   Distal pulses are intact and equal bilaterally.    Neurologic: normal without focal findings  Neuropsychiatric: stable            Data:   All laboratory data reviewed  Results for orders placed or performed during the hospital encounter of 01/23/24   Head CT w/o contrast     Status: None    Narrative    EXAM: CT HEAD W/O CONTRAST  1/23/2024 12:10 PM     HISTORY:  unwitnessed fall       COMPARISON:  Brain MRI 10/7/2023    TECHNIQUE: Using multidetector thin collimation helical acquisition  technique, axial, coronal and sagittal CT images from the skull base  to the vertex were obtained without intravenous contrast.   (topogram) image(s) also obtained and reviewed.    FINDINGS:  No intracranial hemorrhage, mass effect, or midline shift. No acute  loss of gray-white matter differentiation in the cerebral hemispheres.  Ventricles are proportionate to the cerebral sulci. Clear basal  cisterns. Patchy periventricular hypoattenuation, consistent with  chronic small vessel ischemic disease. Intracranial atherosclerosis.  Scattered chronic lacunar infarcts.    The bony calvaria and the bones of the skull base are normal.  Postsurgical changes from right maxillary antrectomy. 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 Scattered  paranasal sinus mucosal thickening. Bilateral mild mastoid effusions.  Grossly normal orbits.       Impression    IMPRESSION:   1. No acute intracranial pathology.   2. Chronic small vessel ischemic disease and scattered chronic lacunar  infarcts.    CAMERON EDUARDO MD         SYSTEM ID:  [id number]   CT Cervical Spine w/o Contrast     Status: None    Narrative    CT CERVICAL SPINE W/O CONTRAST 1/23/2024 12:10 PM    Provided History: fal    Comparison: No prior similar studies    Technique: Using multidetector thin collimation helical acquisition  technique, axial, coronal and sagittal CT images through the cervical  spine were obtained without intravenous contrast.      Findings:  Mild anterolisthesis of C4-5.    Reversal of normal cervical lordosis.     Anterior bony fusion at C5-6.    No acute fracture or subluxation. No prevertebral edema.     There is mild disc height narrowing at C6-7. Mild to moderate left  neural foraminal stenosis at C6-7. No high-grade spinal canal or  neural foraminal stenosis in the remaining cervical spine.     No abnormality of the paraspinous soft tissues.      Impression    Impression:   No acute fracture or traumatic subluxation.    CAMERON EDUARDO MD         SYSTEM ID:  [id number]   XR Hip Right 2-3 Views     Status: None    Narrative    RIGHT HIP TWO TO THREE VIEWS  1/23/2024 1:58 PM     INDICATION: Right hip pain after trauma.     COMPARISON: None.      Impression    IMPRESSION:  1.  Acute fracture of the right femoral neck. Mild anterolateral  displacement and mild/moderate impaction.  2.  Normal right hip joint spacing and alignment.  3.  Surgical clips projecting over the right pelvis.    BARRETT SARAVIA MD         SYSTEM ID:  [id number]   Comprehensive metabolic panel     Status: Abnormal   Result Value Ref Range    Sodium 141 135 - 145 mmol/L    Potassium 4.6 3.4 - 5.3 mmol/L    Carbon Dioxide (CO2) 27 22 - 29 mmol/L    Anion Gap 11 7 - 15 mmol/L    Urea Nitrogen 22.9 8.0 - 23.0 mg/dL    Creatinine 1.05 (H) 0.51 - 0.95 mg/dL    GFR Estimate 55 (L) >60 mL/min/1.73m2    Calcium 9.5 8.8 - 10.2 mg/dL    Chloride 103 98 - 107 mmol/L    Glucose 174 (H) 70 - 99 mg/dL    Alkaline Phosphatase 91 40 - 150 U/L    AST 18 0 - 45 U/L    ALT 10 0 - 50 U/L    Protein Total 7.2 6.4 - 8.3 g/dL    Albumin 4.3 3.5 - 5.2 g/dL    Bilirubin Total 0.7 <=1.2 mg/dL   CBC with platelets and differential     Status: Abnormal   Result Value Ref Range    WBC Count 12.4 (H) 4.0 - 11.0 10e3/uL    RBC Count 4.40 3.80 - 5.20 10e6/uL    Hemoglobin 12.8 11.7 - 15.7 g/dL    Hematocrit 40.4 35.0 - 47.0 %    MCV 92 78 - 100 fL    MCH 29.1 26.5 - 33.0 pg    MCHC 31.7 31.5 -  36.5 g/dL    RDW 13.3 10.0 - 15.0 %    Platelet Count 221 150 - 450 10e3/uL    % Neutrophils 90 %    % Lymphocytes 5 %    % Monocytes 4 %    % Eosinophils 0 %    % Basophils 0 %    % Immature Granulocytes 1 %    NRBCs per 100 WBC 0 <1 /100    Absolute Neutrophils 11.1 (H) 1.6 - 8.3 10e3/uL    Absolute Lymphocytes 0.6 (L) 0.8 - 5.3 10e3/uL    Absolute Monocytes 0.5 0.0 - 1.3 10e3/uL    Absolute Eosinophils 0.0 0.0 - 0.7 10e3/uL    Absolute Basophils 0.0 0.0 - 0.2 10e3/uL    Absolute Immature Granulocytes 0.1 <=0.4 10e3/uL    Absolute NRBCs 0.0 10e3/uL   Vitamin D Deficiency     Status: Abnormal   Result Value Ref Range    Vitamin D, Total (25-Hydroxy) 56 (H) 20 - 50 ng/mL    Narrative    Season, race, dietary intake, and treatment affect the concentration of 25-hydroxy-Vitamin D. Values may decrease during winter months and increase during summer months.    Vitamin D determination is routinely performed by an immunoassay specific for 25 hydroxyvitamin D3.  If an individual is on vitamin D2(ergocalciferol) supplementation, please specify 25 OH vitamin D2 and D3 level determination by LCMSMS test [id number].     Glucose by meter     Status: Abnormal   Result Value Ref Range    GLUCOSE BY METER POCT 262 (H) 70 - 99 mg/dL   Glucose by meter     Status: Abnormal   Result Value Ref Range    GLUCOSE BY METER POCT 261 (H) 70 - 99 mg/dL   Glucose by meter     Status: Abnormal   Result Value Ref Range    GLUCOSE BY METER POCT 179 (H) 70 - 99 mg/dL   Glucose by meter     Status: Abnormal   Result Value Ref Range    GLUCOSE BY METER POCT 219 (H) 70 - 99 mg/dL   EKG 12 lead     Status: None   Result Value Ref Range    Systolic Blood Pressure  mmHg    Diastolic Blood Pressure  mmHg    Ventricular Rate 58 BPM    Atrial Rate 58 BPM    SC Interval 82 ms    QRS Duration 92 ms     ms    QTc 449 ms    P Axis 39 degrees    R AXIS 36 degrees    T Axis 68 degrees    Interpretation ECG       Sinus bradycardia with short SC  Otherwise  normal ECG  When compared with ECG of 19-DEC-2023 06:29,  No significant change was found  Unconfirmed report - interpretation of this ECG is computer generated - see medical record for final interpretation  Confirmed by - EMERGENCY ROOM, PHYSICIAN ([id number]),  MAGGIE TOUSSAINT ([id number]) on 1/24/2024 7:17:50 AM     Adult Type and Screen     Status: None   Result Value Ref Range    ABO/RH(D) A NEG     Antibody Screen Negative Negative    SPECIMEN EXPIRATION DATE [id number]    ABO/Rh type and screen     Status: None    Narrative    The following orders were created for panel order ABO/Rh type and screen.  Procedure                               Abnormality         Status                     ---------                               -----------         ------                     Adult Type and Screen[[id number]]                            Final result                 Please view results for these tests on the individual orders.   CBC with platelets differential     Status: Abnormal    Narrative    The following orders were created for panel order CBC with platelets differential.  Procedure                               Abnormality         Status                     ---------                               -----------         ------                     CBC with platelets and d...[[id number]]  Abnormal            Final result                 Please view results for these tests on the individual orders.   ABO/Rh type and screen *Canceled*     Status: None ()    Narrative    The following orders were created for panel order ABO/Rh type and screen.  Procedure                               Abnormality         Status                     ---------                               -----------         ------                       Please view results for these tests on the individual orders.          Attestation:  I have reviewed today's vital signs, notes, medications, labs and imaging with Dr. Rivas.  Amount of time performed on  this consult: 50 minutes.    Danielle Dillard PA-C

## 2024-01-24 NOTE — ANESTHESIA POSTPROCEDURE EVALUATION
Patient: Viv Shaw    Procedure: Procedure(s):  Right hip bipolar hemiarthroplasty       Anesthesia Type:  General    Note:  Disposition: Outpatient   Postop Pain Control: Uneventful            Sign Out: Well controlled pain   PONV: No   Neuro/Psych: Uneventful            Sign Out: Acceptable/Baseline neuro status   Airway/Respiratory: Uneventful            Sign Out: Acceptable/Baseline resp. status   CV/Hemodynamics: Uneventful            Sign Out: Acceptable CV status; No obvious hypovolemia; No obvious fluid overload   Other NRE: NONE   DID A NON-ROUTINE EVENT OCCUR? No           Last vitals:  Vitals Value Taken Time   /57 01/24/24 1350   Temp 97.8  F (36.6  C) 01/24/24 1345   Pulse 69 01/24/24 1355   Resp 9 01/24/24 1355   SpO2 96 % 01/24/24 1356   Vitals shown include unfiled device data.    Electronically Signed By: Hilary Ferguson MD  January 24, 2024  3:51 PM

## 2024-01-24 NOTE — PLAN OF CARE
"Goal Outcome Evaluation: arrived to room 645 at 1645. Alert, oriented x4. Some forgetfulness at baseline and not noted on admission. Right hip fracture. Plan is NPO at midnight and surgery 1/24 at 0835 at this time.   Admitted and oriented into room.    took home her purse and 2 gold rings.   Dexcom remains on right upper arm.     Blind right eye at baseline.       Plan of Care Reviewed With: patient, spouse    Overall Patient Progress: no changeOverall Patient Progress: no change     Patient vital signs are at baseline: Yes. Room air. Bp elevated 170s/80s. Home meds provided.   Patient able to ambulate as they were prior to admission or with assist devices provided by therapies during their stay:  No,  Reason:  bedrest. Plan is surgery tomorrow. To note  says \"luz marina falls down daily at home. Usually small falls and legs just get weak.\"  Patient MUST void prior to discharge:  No,  Reason:  due to void. Bladder scan 221. Purewick in place.   Patient able to tolerate oral intake:  Yes  Pain has adequate pain control using Oral analgesics:  Yes. Tylenol and oxycodone 2.5mg. tolerated the movement well actually to get the linins out from her after arrival to the room.   Does patient have an identified :  Yes. .   Has goal D/C date and time been discussed with patient:  No,  Reason:  new admit. Surgery 1/24. To be determined.    Pleasant. Pain controlled. Unable to view coccyx/posterior skin due to fracture and pain with mobility.       "

## 2024-01-25 NOTE — PROGRESS NOTES
"    [facility]  Hospitalist Progress Note  Musa Alvarez MD 01/24/24    Reason for Stay (Diagnosis): Right hip fracture.         Assessment and Plan:      Summary of Stay: Viv Shaw is a 73 year old female admitted on 1/23/2024 with right hip fracture    Problem List/Assessment and Plan:   Mechanical fall with right hip fracture: Hemiarthroplasty done on 1/24.  2.   History of renal transplant.:  Baseline creatinine 1-1.25.  Resumed cyclosporine and mycophenolate.  Creatinine went up to 1.38 today.  Give LR at 75 mill per hour.  3.   Diabetes type 2.:  Hyperglycemia, increase Lantus to 20 units at night.  Continue Premeal and sliding scale.  Blood sugars better controlled now.  4.   Essential hypertension.:  Resumed Imdur, amlodipine and metoprolol.  5.   Hypothyroidism.:  Continue Synthroid.  6.  Parkinson disease.  Continue Sinemet.  7.  Hyperlipidemia.  Continue Lipitor.  8.  Dementia.  Continue on Aricept.  Patient was slightly confused after her surgery.  9.  Urinary retention.  Arevalo was inserted perioperatively.  Defer Arevalo removal to orthopedics.  10.  Encephalopathy.  Patient is slightly encephalopathic but this is normal for her after procedures as per the .    DVT Prophylaxis: Aspirin  Code Status: DNR/DNI  Discharge Dispo/Date: 1 to 2 days, likely TCU.    Clinically Significant Risk Factors                  # Hypertension: Noted on problem list     # Dementia: noted on problem list   # DMII: A1C = 7.3 % (Ref range: <5.7 %) within past 6 months       # Financial/Environmental Concerns:                 Interval History (Subjective):      Right hip hemiarthroplasty.                  Physical Exam:      Last Vital Signs:  /53 (BP Location: Left arm)   Pulse 59   Temp 97.7  F (36.5  C) (Temporal)   Resp 18   Ht 1.626 m (5' 4\")   Wt 63.8 kg (140 lb 10.5 oz)   LMP  (LMP Unknown)   SpO2 92%   Breastfeeding No   BMI 24.14 kg/m      General Appearance: Frail female.  Able " to answer simple questions and follow simple commands but confused..  Eyes: No icterus  HEENT: Moist mucosa  Respiratory: Clear to auscultation  Cardiovascular: S1 is normal  GI: Soft and nontender  Lymph/Hematologic: Not examined  Genitourinary: Not examined  Skin: No rash  Musculoskeletal: No edema  Neurologic: Nonfocal  Psychiatric: Normal mood             Medications:      All current medications were reviewed with changes reflected in problem list.         Data:      All new lab and imaging data was reviewed.     Musa Alvarez MD , MD.    I've spent 45 minutes in chart review, ordering medications and tests, obtaining additional history as needed, evaluating the patient and in documentation for this encounter.

## 2024-01-25 NOTE — PROGRESS NOTES
"   01/25/24 0900   Appointment Info   Signing Clinician's Name / Credentials (PT) Suma Villarreal DPT   Rehab Comments (PT) no active hip abd, WBAT   Living Environment   People in Home spouse   Current Living Arrangements condominium   Home Accessibility no concerns   Transportation Anticipated family or friend will provide   Living Environment Comments Pt lives with supportive spouse, spouse works a part-time job ~1.5 hrs a day   Self-Care   Usual Activity Tolerance moderate   Current Activity Tolerance fair   Equipment Currently Used at Home walker, rolling;shower chair;grab bar, toilet;grab bar, tub/shower   Fall history within last six months yes   Number of times patient has fallen within last six months 30  (unsure, reports has been falling once a day over the past 2-3 weeks)   Activity/Exercise/Self-Care Comment Pt typically amb with FWW for longer distances, otherwise amb IND without AD for household distances. Spouse assists at times with UB dressing, cooking/cleaning, supervises transfers into shower.   General Information   Onset of Illness/Injury or Date of Surgery 01/23/24   Referring Physician Braxton Mayers PA-C   Pertinent History of Current Problem (include personal factors and/or comorbidities that impact the POC) \"73-year-old woman with history of diabetes, coronary artery disease, hypertension, and Parkinson's dementia presents with right hip pain after a ground-level fall.  She has been having a lot of falls recently per her report of herself and her .  She walks with a walker at home.  She walked from the bedroom to the kitchen and then had a ground-level fall sustaining an injury to the right hip.  She was unable to bear weight afterwards.  Pain is limited to the right hip region.  Does not radiate.  She has no ipsilateral knee, ankle, or foot pain.  Pain is worse with any attempted movement or attempted ambulation.  She does not take a blood thinner at baseline     Exam is notable " "for a shortened and externally rotated right leg.  Active dorsiflexion and plantarflexion of her toes and ankle are intact.  Sensation is intact to light touch diffusely within the foot.  Foot is warm and well-perfused with palpable pulses.  \" Now POD 1 R hip hemiarthroplasty   Existing Precautions/Restrictions fall;weight bearing;no active hip ABD   Cognition   Affect/Mental Status (Cognition) confused   Orientation Status (Cognition) oriented to;person   Follows Commands (Cognition) follows one-step commands;50-74% accuracy   Cognitive Status Comments visual impairments R>L   Pain Assessment   Patient Currently in Pain   (moderate pain in R hip wit mobility)   Posture    Posture Forward head position;Protracted shoulders  (R sided gaze preference)   Range of Motion (ROM)   ROM Comment R hip deficits 2/2 to procedure   Strength (Manual Muscle Testing)   Strength (Manual Muscle Testing) Deficits observed during functional mobility   Strength Comments impaired functional LE strength R>L   Bed Mobility   Comment, (Bed Mobility) maxA sup>sit   Transfers   Comment, (Transfers) modA sit>stand to FWW   Gait/Stairs (Locomotion)   Comment, (Gait/Stairs) maxA for small side steps along EOB ~2 ft   Balance   Balance Comments high falls risk, currently requires assist and use of FWW for safe upright mobility   Sensory Examination   Sensory Perception Comments BLE light touch intact   Clinical Impression   Criteria for Skilled Therapeutic Intervention Yes, treatment indicated   PT Diagnosis (PT) impaired IND with functional mobility   Influenced by the following impairments impaired functional strength, balance, ROM, activity tolerance   Functional limitations due to impairments impaired bed mobility, transfers, ambulations   Clinical Presentation (PT Evaluation Complexity) evolving   Clinical Presentation Rationale Based on current presentation, PMH, social support   Clinical Decision Making (Complexity) high complexity "   Planned Therapy Interventions (PT) balance training;bed mobility training;gait training;home exercise program;patient/family education;ROM (range of motion);strengthening;transfer training;progressive activity/exercise;home program guidelines   Risk & Benefits of therapy have been explained evaluation/treatment results reviewed;care plan/treatment goals reviewed;risks/benefits reviewed;current/potential barriers reviewed;participants voiced agreement with care plan;participants included;patient;spouse/significant other   PT Total Evaluation Time   PT Eval, High Complexity Minutes (36700) 10   Physical Therapy Goals   PT Frequency Daily   PT Predicted Duration/Target Date for Goal Attainment 02/01/24   PT Goals Bed Mobility;Transfers;Gait   PT: Bed Mobility Supervision/stand-by assist;Supine to/from sit   PT: Transfers Supervision/stand-by assist;Sit to/from stand;Bed to/from chair;Assistive device   PT: Gait Supervision/stand-by assist;Assistive device;Jayro walker;50 feet   Interventions   Interventions Quick Adds Therapeutic Activity   Therapeutic Activity   Therapeutic Activities: dynamic activities to improve functional performance Minutes (29269) 40   Symptoms Noted During/After Treatment Fatigue;Increased pain   Treatment Detail/Skilled Intervention Pt edu on WBAT status, no active hip abduction precaution. Continued edu and demos provided for this throughout session 2/2 pt confusion and poor carryover of edu. Pt with visual deficits, needs cues to locate objects during session as well as to keep eyes open during mobility. Cued  for sup>sit maxA, needing max cues for technique. Sit<>Stands x5 throughout session, with min-modA to FWW. Pt needing phys assist for proper hand placement, appears to be challenged due to visual deficits as well as confusion. Attempted standing march, pt unable to clear either foot. Cued for side steps along EOB with maxA needed to complete 2 ft. Pt with 2 episodes of increased  tremulousness -- first episode, this resolves after seated rest. second episode, pt closing eyes, needing repeated cues to open eyes. Pt declines any dizziness. Sit>sup with maxA of 2, maxA of 2 to repos in supine. Pt remained supine with alarm armed and needs in reach. RN updated at PT exit.   PT Discharge Planning   PT Plan monitor BP, sit<>Stand reps, pre-gait/gait as able   PT Discharge Recommendation (DC Rec) Transitional Care Facility   PT Rationale for DC Rec Pt currently well below reported Tosin baseline. Currently Ax1-2 for all mobility, unable to ambulate functional distances at this time. Pt will benefit from continued skilled PT at TCU to maximize safety and IND with mobility prior to returning home.   PT Brief overview of current status rec Ax2 with FWW   Total Session Time   Timed Code Treatment Minutes 40   Total Session Time (sum of timed and untimed services) 50

## 2024-01-25 NOTE — PROGRESS NOTES
Ortho Daily Progress Note    Pain controlled.  Doing well today.   reports she is more confused.  She is not having much pain at rest.    Temp:  [97.5  F (36.4  C)-98.4  F (36.9  C)] 98  F (36.7  C)  Pulse:  [56-78] 59  Resp:  [6-15] 14  BP: (118-182)/() 135/37  SpO2:  [87 %-100 %] 93 %  Hemoglobin   Date Value Ref Range Status   01/25/2024 10.4 (L) 11.7 - 15.7 g/dL Final   06/23/2021 11.3 (L) 11.7 - 15.7 g/dL Final       Alert, awake, conversant  Breathing easily without wheeze  Dressing/wound c/d/i, intact df/pf/ehl, SILT, palpable dp pulse        A/P - 73 year old female septae 1 status post right hip hemiarthroplasty for femoral neck fracture    Hemoglobin 10.4, monitor but no indication for transfusion  ABX: Ancef x24 hrs post-op   Activity: Weight-bear as tolerated, no active hip abduction  DVT: SCDs, aspirin  Dispo: discharge planning     Guerrero Rivas  [facility]

## 2024-01-25 NOTE — PLAN OF CARE
"Patient vital signs are at baseline: Yes  Patient able to ambulate as they were prior to admission or with assist devices provided by therapies during their stay:  Yes  Patient MUST void prior to discharge:  Yes  Patient able to tolerate oral intake:  Yes  Pain has adequate pain control using Oral analgesics:  Yes  Does patient have an identified :  Yes  Has goal D/C date and time been discussed with patient:  Yes    BP (!) 162/57 (BP Location: Right arm)   Pulse 62   Temp 98.4  F (36.9  C) (Temporal)   Resp 14   Ht 1.626 m (5' 4\")   Wt 63.8 kg (140 lb 10.5 oz)   LMP  (LMP Unknown)   SpO2 96%   Breastfeeding No   BMI 24.14 kg/m       Pt is alert to self with confusion. pt has 1:1 sitter at bedside. Pt was given Dilaudid for pain. Surgical dressing clean dry and intact. Pt has Arevalo catheter which is patent and intact. Pt was given her scheduled Ancef. Pt is on capno. PT consult in the morning. Pt is assist of two in transfer and cares. Pt is sleeping in bed. Bed alarm in place and call light within reach. Will continue to monitor and assess pt.           "

## 2024-01-25 NOTE — PROGRESS NOTES
"    [facility]  Hospitalist Progress Note  Musa Alvarez MD 01/24/24    Reason for Stay (Diagnosis): Right hip fracture.         Assessment and Plan:      Summary of Stay: Viv Shaw is a 73 year old female admitted on 1/23/2024 with right hip fracture    Problem List/Assessment and Plan:   Mechanical fall with right hip fracture: Hemiarthroplasty done on 1/24.  2.   History of renal transplant.:  Baseline creatinine 1-1.25.  Resumed cyclosporine and mycophenolate.  3.   Diabetes type 2.:  Hyperglycemia, increase Lantus to 20 units at night.  Continue Premeal and sliding scale.  4.   Essential hypertension.:  Resumed Imdur, amlodipine and metoprolol.  5.   Hypothyroidism.:  Continue Synthroid.  6.  Parkinson disease.  Continue Sinemet.  7.  Hyperlipidemia.  Continue Lipitor.  8.  Dementia.  Continue on Aricept.  Patient was slightly confused after her surgery.    DVT Prophylaxis: Aspirin  Code Status: DNR/DNI  Discharge Dispo/Date: 1 to 2 days, likely TCU.    Clinically Significant Risk Factors                  # Hypertension: Noted on problem list     # Dementia: noted on problem list   # DMII: A1C = 7.3 % (Ref range: <5.7 %) within past 6 months       # Financial/Environmental Concerns:                 Interval History (Subjective):      Right hip hemiarthroplasty.                  Physical Exam:      Last Vital Signs:  BP (!) 162/65 (BP Location: Right arm)   Pulse 68   Temp 98.2  F (36.8  C) (Temporal)   Resp 15   Ht 1.626 m (5' 4\")   Wt 63.8 kg (140 lb 10.5 oz)   LMP  (LMP Unknown)   SpO2 98%   Breastfeeding No   BMI 24.14 kg/m      General: Mildly confused but follows all commands and able to answer questions.  HEENT: NC/AT, eyes anicteric, external occular movements intact, face symmetric.  Dentition WNL, MM moist.  Cardiac: RRR, S1, S2.  No murmurs appreciated.  Pulmonary: Normal chest rise, normal work of breathing.  Lungs CTA BL  Abdomen: soft, non-tender, non-distended.  Bowel " Sounds Present.  No guarding.  Extremities: no deformities.  Warm, well perfused.  Right hip is benign.  Skin: no rashes or lesions noted.  Warm and Dry.  Neuro: No focal deficits noted.  Speech clear.  Coordination and strength grossly normal.           Medications:      All current medications were reviewed with changes reflected in problem list.         Data:      All new lab and imaging data was reviewed.     Musa Alvarez MD , MD.    I've spent 45 minutes in chart review, ordering medications and tests, obtaining additional history as needed, evaluating the patient and in documentation for this encounter.

## 2024-01-25 NOTE — PLAN OF CARE
"OT: Orders received. Chart reviewed and discussed with care team.  IP OT not indicated. Per PT \"Pt currently well below reported Tosin baseline. Currently Ax1-2 for all mobility, unable to ambulate functional distances at this time. Pt will benefit from continued skilled PT at TCU to maximize safety and IND with mobility prior to returning home\".  Defer discharge recommendations to care team and OT to next level of care\".  Will complete orders.    "

## 2024-01-25 NOTE — CONSULTS
Care Management Initial Consult    General Information  Assessment completed with: Spouse or significant otherTirso  Type of CM/SW Visit: Initial Assessment    Primary Care Provider verified and updated as needed: Yes   Readmission within the last 30 days: no previous admission in last 30 days      Reason for Consult: care coordination/care conference, discharge planning         Communication Assessment  Patient's communication style: spoken language (English or Bilingual)    Hearing Difficulty or Deaf: no   Wear Glasses or Blind: yes    Cognitive  Cognitive/Neuro/Behavioral: .WDL except  Level of Consciousness: confused  Arousal Level: opens eyes spontaneously  Orientation: disoriented to, place, time, situation  Mood/Behavior: calm  Best Language: 0 - No aphasia  Speech: illogical    Living Environment:   People in home: spouse     Current living Arrangements: condominium           Family/Social Support:  Care provided by: spouse/significant other  Provides care for: no one         Current Resources:   Patient receiving home care services: No     Community Resources: None  Equipment currently used at home: walker, rolling, shower chair, grab bar, toilet, grab bar, tub/shower  Supplies currently used at home: None    Employment/Financial:  Does the patient's insurance plan have a 3 day qualifying hospital stay waiver?  No    Lifestyle & Psychosocial Needs:  Social Determinants of Health     Food Insecurity: Low Risk  (12/11/2023)    Food Insecurity     Within the past 12 months, did you worry that your food would run out before you got money to buy more?: No     Within the past 12 months, did the food you bought just not last and you didn t have money to get more?: No   Depression: Not at risk (1/3/2024)    PHQ-2     PHQ-2 Score: 0   Housing Stability: Low Risk  (12/11/2023)    Housing Stability     Do you have housing? : Yes     Are you worried about losing your housing?: No   Tobacco Use: Low Risk   (1/24/2024)    Patient History     Smoking Tobacco Use: Never     Smokeless Tobacco Use: Never     Passive Exposure: Never   Financial Resource Strain: Low Risk  (12/11/2023)    Financial Resource Strain     Within the past 12 months, have you or your family members you live with been unable to get utilities (heat, electricity) when it was really needed?: No   Alcohol Use: Not on file   Transportation Needs: Low Risk  (12/11/2023)    Transportation Needs     Within the past 12 months, has lack of transportation kept you from medical appointments, getting your medicines, non-medical meetings or appointments, work, or from getting things that you need?: No   Physical Activity: Not on file   Interpersonal Safety: Low Risk  (12/12/2023)    Interpersonal Safety     Do you feel physically and emotionally safe where you currently live?: Yes     Within the past 12 months, have you been hit, slapped, kicked or otherwise physically hurt by someone?: No     Within the past 12 months, have you been humiliated or emotionally abused in other ways by your partner or ex-partner?: No   Stress: Not on file   Social Connections: Not on file       Functional Status:  Prior to admission patient needed assistance:   Dependent ADLs:: Ambulation-walker, Bathing  Dependent IADLs:: Cleaning, Cooking, Transportation, Medication Management                  Additional Information:  CM consulted for care coordination/discharge planning. CM spoke with pt  Tirso via phone d/t pt confusion. Pt  shared they live together in a condo that is elevator accessible. Pt was open to [facility] RN and recently discharged.  states that pt ambulates independently and with a walker PRN at baseline, has a wheelchair available for use if needed. He denied using any other medical equipment in the home at baseline including oxygen.  helps her with bathing and dressing PRN, does her med set up, and completes household tasks and provides  transport. He stated he can transport at discharge.     PT recs TCU at discharge,  agreeable to this and would like to send referrals to 1) [facility] 2) [facility] 3) [facility]. CM sent referrals. Will continue to follow for placement.     Adele Plunkett, RN, BSN  Inpatient Care Coordination  [facility]  [phone]

## 2024-01-25 NOTE — PLAN OF CARE
Goal Outcome Evaluation:    Patient arrived back on unit around 1400. Alert and oriented x3, disoriented to situation. Reporting no pain. Dressing C/D/I. CMS+. Patient pulled out IV, another IV started. Indwelling catheter. Mod carb diet. Blood glucose staying around 360. Awaiting discharge plan.

## 2024-01-25 NOTE — PLAN OF CARE
Patient vital signs are at baseline: Yes  Patient able to ambulate as they were prior to admission or with assist devices provided by therapies during their stay:  No  Patient MUST void prior to discharge:  No  Patient able to tolerate oral intake:  Yes  Pain has adequate pain control using Oral analgesics:  Yes  Does patient have an identified :  Yes,  (Johnny)  Has goal D/C date and time been discussed with patient:  Yes, TCU pending bed placement     Afebrile.  Denies pain.  No nausea.  LS clear bilat., weaned off O2, sats low-mid 90s RA.  Denies N/T.  Drsg CDI.  Irina DC, DTV, bladder scanned only 40ml.  A2 belt + walker/SS, repo with A1.

## 2024-01-26 NOTE — PROGRESS NOTES
"    [facility]  Hospitalist Progress Note  Musa Alvarez MD 01/24/24    Reason for Stay (Diagnosis): Right hip fracture.         Assessment and Plan:      Summary of Stay: Viv Shaw is a 73 year old female admitted on 1/23/2024 with right hip fracture    Problem List/Assessment and Plan:   Mechanical fall with right hip fracture: Hemiarthroplasty done on 1/24.  2.   History of renal transplant.:  Baseline creatinine 1-1.25.  Resumed cyclosporine and mycophenolate.  Creatinine went up to 1.38 today.  Given LR at 75 mill per hour and creatinine is improving.  3.   Diabetes type 2.:  Patient was hypoglycemic with blood sugar in 40s, decrease Lantus to 15 units at night and increase Premeal to 4 units.  Continue sliding scale.  Blood sugars better controlled now.  4.   Essential hypertension.:  Resumed Imdur, amlodipine and metoprolol.  Blood pressure is now better controlled.  5.   Hypothyroidism.:  Continue Synthroid.  6.  Parkinson disease.  Continue Sinemet.  7.  Hyperlipidemia.  Continue Lipitor.  8.  Dementia.  Continue on Aricept.  Patient was slightly confused after her surgery.  9.  Urinary retention.  Arevalo was discontinued on 1/26.    10.  Encephalopathy.  Patient is slightly encephalopathic but this is normal for her after procedures as per the .  Her mental status is better today.    DVT Prophylaxis: Aspirin  Code Status: DNR/DNI  Discharge Dispo/Date: 1 to 2 days, likely TCU.    Clinically Significant Risk Factors                  # Hypertension: Noted on problem list     # Dementia: noted on problem list   # DMII: A1C = 7.3 % (Ref range: <5.7 %) within past 6 months       # Financial/Environmental Concerns:               Interval History (Subjective):      Confusion is improving. Continues to be week.                   Physical Exam:      Last Vital Signs:  BP (!) 142/54 (BP Location: Left arm)   Pulse 63   Temp 97.8  F (36.6  C) (Temporal)   Resp 18   Ht 1.626 m (5' 4\")   " Wt 63.8 kg (140 lb 10.5 oz)   LMP  (LMP Unknown)   SpO2 95%   Breastfeeding No   BMI 24.14 kg/m      General Appearance: Frail female.  Able to answer simple questions and follow simple commands but confused..  Eyes: No icterus  HEENT: Moist mucosa  Respiratory: Clear to auscultation  Cardiovascular: S1 and S2 is normal  GI: Soft and nontender  Lymph/Hematologic: Not examined  Genitourinary: Not examined  Skin: No rash  Musculoskeletal: No edema  Neurologic: Nonfocal  Psychiatric: Normal mood             Medications:      All current medications were reviewed with changes reflected in problem list.         Data:      All new lab and imaging data was reviewed.     Musa Alvarez MD , MD.    I've spent 45 minutes in chart review, ordering medications and tests, obtaining additional history as needed, evaluating the patient and in documentation for this encounter.

## 2024-01-26 NOTE — PLAN OF CARE
Goal Outcome Evaluation:      Plan of Care Reviewed With: patient    Overall Patient Progress: no changeOverall Patient Progress: no change    Patient vital signs are at baseline: No,  Reason:  put on 2L O2 overnight  Patient able to ambulate as they were prior to admission or with assist devices provided by therapies during their stay:  No,  Reason: not OOB   Patient MUST void prior to discharge:  No,  Reason:  d/t void  Patient able to tolerate oral intake:  Yes  Pain has adequate pain control using Oral analgesics:  Yes  Does patient have an identified :  Yes  Has goal D/C date and time been discussed with patient:  No,  Reason:  TBD- TCU referrals sent    A/O to self and sometimes situation. Denies pain. CMS intact, denies N/T. Aquacel CDI. D/t void, bladder scan for 483.     *BG dropped to 40 at 0630, gave 50 ml of dextrose. Went up to 209 in 15 mins. MD notified.  called and said this happens at home and it is not uncommon for her to drop that low.

## 2024-01-26 NOTE — PROGRESS NOTES
Care Management Follow Up    Length of Stay (days): 3    Expected Discharge Date: 01/27/2024     Concerns to be Addressed: care coordination/care conferences, discharge planning     Patient plan of care discussed at interdisciplinary rounds: Yes    Anticipated Discharge Disposition: Transitional Care     Anticipated Discharge Services: None  Anticipated Discharge DME: None    Patient/family educated on Medicare website which has current facility and service quality ratings:    Education Provided on the Discharge Plan: Yes  Patient/Family in Agreement with the Plan:      Referrals Placed by CM/SW: Post Acute Facilities  Private pay costs discussed: transportation costs    Additional Information:  CM following for discharge planning to TCU. [facility] accepted for placement, family is aware and agreeable. Per MD pt may be medically ready tomorrow. Family is okay w [facility] transport. CM set up [facility] w oxygen for 1/27 at [id number] if pt medically ready, facility aware of transport time. Facility will call for nurse to nurse report tomorrow.     Reviewed out of pocket cost for [facility] transport, $81.80 for base rate and $5.26 per mile to the destination. Pt/family expressed understanding and are agreeable to this.     Addendum 1618: CM updated pt at bedside w transport time for tomorrow.     Adele Plunkett, RN, BSN  Inpatient Care Coordination  [facility]  [phone]

## 2024-01-27 NOTE — PLAN OF CARE
Goal Outcome Evaluation:      Plan of Care Reviewed With: patient    Pt AOX4. VSS. On 1 L of O2. Pain controlled with scheduled tylenol. Voiding via external catheter. CMS intact. Dressing to surgical leg CDI.

## 2024-01-27 NOTE — PLAN OF CARE
Goal Outcome Evaluation:                    Pt failed 2 attempts at getting OOB d/t hypotension - orthostatics completed (refer to flowsheet). Pt able to lay, sit, and stand without difficulty however once standing pt faints momentarily. Unable to get standing BP but when pt momentarily loses conscience but once flat on bed BP quickly recovers. - External cath placed    Fluids encouraged d/t dark urine  Intermittent confusion    at bedside  Discharge TCU - SW following

## 2024-01-27 NOTE — PROGRESS NOTES
Orthopedic Surgery  Viv Shaw  01/27/2024     Admit Date:  1/23/2024    POD: 3 Days Post-Op   Procedure(s):  Right hip bipolar hemiarthroplasty    Baseline Parkinson's dementia.  Patient sitting upright in bed eating breakfast.  and RN at bedside.  Denies pain to the right hip.   Denies numbness and tingling to the RLE.  Patient has been unable to ambulate due to two syncopal episodes with standing. Today she was able to stand at bedside with PT without drop in BP per RN.   Tolerating oral intake.    Denies nausea or vomiting.  Denies chest pain or shortness of breath.  No acute events overnight.    Temp:  [97.1  F (36.2  C)-97.5  F (36.4  C)] 97.5  F (36.4  C)  Pulse:  [58-65] 61  Resp:  [16-18] 18  BP: (107-180)/(54-67) 107/54  SpO2:  [86 %-95 %] 91 %    Alert and oriented.   Right hip Aquacel dressing is clean, dry, and intact. Scant saturation as distal end of dressing.   Minimal erythema of the surrounding skin.   Mild swelling to the right lateral and anterior hip and thigh.  Bilateral calves are soft, non-tender.  Right lower extremity is NVI.  Patient able to resist ankle dorsiflexion and plantar flexion bilaterally.  DP pulse palpable.  Sensation intact bilateral lower extremities.    Labs/Imaging:  Recent Labs   Lab Test 01/27/24  0719 01/26/24  0648 01/25/24  0611   WBC 6.5 7.4 8.2   HGB 11.1* 10.9* 10.4*    163 176     Recent Labs   Lab Test 02/02/22  0940   INR 1.00     A/P    1. S/p right hip hemiarthroplasty for a femoral neck fracture (DOS: 1/24/24)  -Continue ASA 81 mg BID x 6 weeks for DVT prophylaxis.   -Periop Ancef completed.  -Mobilize with PT/OT.   -WBAT RLE with walker.  -No active right hip abduction.   -Continue current pain regimen.  -Dressings: Keep intact. Okay for RN to change if >60% saturated or peeling/falling off.   -Follow-up: 2 weeks post-op with Dr. Pino Rivas    2. Disposition  -Anticipate d/c to TCU when medically cleared and progressing in PT. Ortho  stable.    Maria M Summers PA-C  [facility]

## 2024-01-27 NOTE — PROGRESS NOTES
Care Management Discharge Note    Discharge Date: 01/27/2024       Discharge Disposition: Transitional Care    Discharge Services: None    Discharge DME: None    Discharge Transportation:  [employer] transport  Private pay costs discussed: transportation costs    Does the patient's insurance plan have a 3 day qualifying hospital stay waiver?  No    PAS Confirmation Code: [id number]  Patient/family educated on Medicare website which has current facility and service quality ratings:  yes    Education Provided on the Discharge Plan: Yes  Persons Notified of Discharge Plans: patient and , Bill  Patient/Family in Agreement with the Plan:  yes      Additional Information:  Patient discharging to [facility]. Family agreeable with discharge plan. [facility] WC transport with oxygen scheduled today at [id number]. TCU admission given discharge order and script.     Mari Polo RN Case Manager  Inpatient Care Coordination   [facility]   [phone]      Mari Polo, RN

## 2024-01-28 NOTE — TELEPHONE ENCOUNTER
Nurse Triage SBAR    Is this a 2nd Level Triage? YES, LICENSED PRACTITIONER REVIEW IS REQUIRED    Situation: Medication Question    Background: :Patient is POD 3 right hip hemiarthroplasty. She is currently at TCU.    Assessment: Patient's spouse (CTC located in chart) calling to report that patient's BG is 342 and the TCU is unable to treat patient's BG as they do not have a provider to give orders until Monday.    Spoke to TCU RN Supervisor, she reports that patient was treated with sliding scale novolog prior to dinner and they will treat with additional novolog if BG >400.    Protocol Recommended Disposition:   According to the protocol, patient should discuss with nurse and provider at TCU.  Care advice given. Patient's spouse verbalizes understanding and agrees with plan of care.     Dagmar Sebastian RN  01/27/24 7:47 PM  [facility] Nurse Advisor    Reason for Disposition   [1] Caller has URGENT medicine question about med that PCP or specialist prescribed AND [2] triager unable to answer question    Additional Information   Negative: [1] Intentional drug overdose AND [2] suicidal thoughts or ideas   Negative: Drug overdose and triager unable to answer question   Negative: Caller requesting a renewal or refill of a medicine patient is currently taking   Negative: Caller requesting information unrelated to medicine   Negative: Caller requesting information about COVID-19 Vaccine   Negative: Caller requesting information about Emergency Contraception   Negative: Caller requesting information about Combined Birth Control Pills   Negative: Caller requesting information about Progestin Birth Control Pills   Negative: Caller requesting information about Post-Op pain or medicines   Negative: Caller requesting a prescription antibiotic (such as Penicillin) for Strep throat and has a positive culture result   Negative: Caller requesting a prescription anti-viral med (such as Tamiflu) and has influenza (flu)  symptoms   Negative: Immunization reaction suspected   Negative: Rash while taking a medicine or within 3 days of stopping it   Negative: [1] Asthma and [2] having symptoms of asthma (cough, wheezing, etc.)   Negative: [1] Symptom of illness (e.g., headache, abdominal pain, earache, vomiting) AND [2] more than mild   Negative: Breastfeeding questions about mother's medicines and diet   Negative: MORE THAN A DOUBLE DOSE of a prescription or over-the-counter (OTC) drug   Negative: [1] DOUBLE DOSE (an extra dose or lesser amount) of prescription drug AND [2] any symptoms (e.g., dizziness, nausea, pain, sleepiness)   Negative: [1] DOUBLE DOSE (an extra dose or lesser amount) of over-the-counter (OTC) drug AND [2] any symptoms (e.g., dizziness, nausea, pain, sleepiness)   Negative: Took another person's prescription drug   Negative: [1] DOUBLE DOSE (an extra dose or lesser amount) of prescription drug AND [2] NO symptoms  (Exception: A double dose of antibiotics.)   Negative: Diabetes drug error or overdose (e.g., took wrong type of insulin or took extra dose)   Negative: [1] Prescription not at pharmacy AND [2] was prescribed by PCP recently (Exception: Triager has access to EMR and prescription is recorded there. Go to Home Care and confirm for pharmacy.)   Negative: [1] Pharmacy calling with prescription question AND [2] triager unable to answer question    Protocols used: Medication Question Call-A-

## 2024-01-28 NOTE — PLAN OF CARE
Physical Therapy Discharge Summary    Reason for therapy discharge:    Discharged to transitional care facility.    Progress towards therapy goal(s). See goals on Care Plan in [facility] electronic health record for goal details.  Goals partially met.  Barriers to achieving goals:   discharge from facility.    Therapy recommendation(s):    Pt currently well below reported Tosin baseline. Currently Ax1-2 for all mobility, unable to ambulate functional distances at this time. Continues to be limited 2/2 orthostatic BP. Pt will benefit from continued skilled PT at U to maximize safety and IND with mobility prior to returning home.

## 2024-01-31 NOTE — LETTER
[facility]   To:                   Please give to facility    From:Gauri Black/ [facility] Care Coordinator   [facility]   P: [phone]  [email or web address]    Patient Name:  Viv Shaw YOB: 1950   Admit date: 1/27/24      *Information Needed:  Please contact me when the patient will discharge (or if they will move to long term care)- include the discharge date, disposition, and main diagnosis   If the patient is discharged with home care services, please provide the name of the agency    Also- Please inform me if a care conference is being held.   Phone, Fax or Email with information                              Thank you, Gauri Black

## 2024-02-05 NOTE — TELEPHONE ENCOUNTER
Johnny called to refill patient's cyclosporine and to notify team that she was transferred from TCU to [facility] as she was having possible orthostatic episodes with standing. He reports she had two small PE's. Unable to access [facility] records in Care Everywhere.     CSA sent to [facility]. Johnny will update team as needed.

## 2024-02-09 NOTE — TELEPHONE ENCOUNTER
Needs CSA 25mg refilled.  Per  Bill;  [facility] has not received the request.      May need updated script.    [id number] - [place], [place] - [address] AT [facility] [address] Phone: [phone]   Fax: [phone]

## 2024-02-13 NOTE — TELEPHONE ENCOUNTER
Last Written Prescription Date:  1/23/24  Last Fill Quantity: 3,  # refills: 5   Last office visit: 12/6/2023   Future Office Visit:  3/14/24    Pt has refills available.  Pt has medicare, and they don't allow 90 day fills. Will send pt msg to notify. Danitza Acevedo RN

## 2024-02-13 NOTE — TELEPHONE ENCOUNTER
[facility] Call Center    Phone Message    May a detailed message be left on voicemail: yes     Reason for Call: Medication Question or concern regarding medication   Prescription Clarification  Name of Medication:   Continuous Blood Gluc Sensor (DEXCOM G7 SENSOR) MISC       Prescribing Provider: Juan M Fitzpatrick MD       Pharmacy:   [facility] - [facility] [address]      What on the order needs clarification?   The patient  was calling to ask if they will get a 90 day supply sent to the pharmacy  please review and follow up thank you.      Action Taken: Message routed to:  [facility] Endo    Travel Screening: Not Applicable

## 2024-02-14 NOTE — TELEPHONE ENCOUNTER
Hello we understand this pt has medicare but it looks like medicare is going to let us fill for a 3 month supply can we please get a new script sent over for this       PRESCRIPTIONS MUST BE WRITTEN THIS WAY TO MAKE THEM MEDICARE COMPLIANT     DEXCOM G7 SENSORS  SIG: Change every 10 days.  QTY: 9 REFILLS: 1     -Prescriptions must be written after the clinical note date and will only be able to be used for 6 months from the date of the clinical notes. All prescriptions must be from same provider who patient had visit with. (We will be requesting new clinical notes and prescriptions every 6 months to meet Medicare Guidelines.)     Please contact us at [phone] (this number is for clinics only) with any questions. Please only give [phone] to patients.     [facility]   [address] [place] [address]  Phone # [phone]  Fax # [phone]

## 2024-03-07 NOTE — PROGRESS NOTES
Clinic Care Coordination Contact  [facility]/Voicemail    Clinical Data: Care Coordinator Outreach.     [facility] attempted to contact patients TCU as noted in CC note 1/31/2024, however, no answer. Per chart review (encounter dated 2/5/2024) patient was transferred from TCU to [facility]. CC unable to obtain Care Everywhere records, therefore, outreach made to patients spouse on this date to check in.     Outreach Documentation Number of Outreach Attempt   3/7/2024  11:52 PM 1     Left message on patient's voicemail with call back information and requested return call.    Plan: Care Coordinator will try to reach patient/family again in 10 business days.    EJ Owens/[facility] Center[facility]HIKA  Social Work Care Coordinator  [facility], [place], and [place]  Phone: [phone]

## 2024-03-14 NOTE — TELEPHONE ENCOUNTER
is the caller, pt is in Rehab now and  requesting handicap sticker be renewed, it runs out May 31, 2024.    Please advise .   can be reached at [phone]    Thank you  Alyssa Forde RN  [facility] Nurse Advisor

## 2024-03-21 NOTE — PROGRESS NOTES
Clinic Care Coordination Contact    Situation: Patient chart reviewed by care coordinator and outreach to patients spouse, Tirso (consent to communicate on file), initiated on this date to check in.      Background: Patient was discharged from the hospital to [facility] 1/27/2024 and SW CC has been monitoring for TCU discharge to identify any outstanding Clinic Care Coordination needs/concerns. Per chart review (encounter dated 2/5/2024) patient was transferred from [facility] to [facility]. CC unable to obtain Care Everywhere records, therefore, unsure where patient is currently residing.    Assessment: [facility] report[facility], will likely [facility] soon. Patients spouse shared patient has enrolled into hospice and will transition to Long Term Care ([facility]) upon TCU discharge. Due to above, patient is no longer a candidate for Primary Care Clinic Care Coordination. Patients spouse expressed understanding and declined any further CC needs at this time.     Plan/Recommendations:  CC will perform no further monitoring/outreaches at this time and will remain available as needed. If new needs arise, a new Care Coordination Referral may be placed.    EJ Owens/[facility]  Social Work Care Coordinator  [facility], [place], and [place]  Phone: [phone]

## 2024-03-22 NOTE — TELEPHONE ENCOUNTER
Called Bill to advise of provider's recommendation. Patient requested that form be mailed out. Writer sent blank form to patient's address and advised to either bring in form or mail back once patient section is completed.     Sabina BROWN

## 2024-04-01 NOTE — TELEPHONE ENCOUNTER
called back with patient.     The  states the patient's health is declining     She has had a disability parking previously.     She does not use a cane or a walker  Patient only uses a wheelchair- She can not walk    Routing back to

## 2024-04-01 NOTE — TELEPHONE ENCOUNTER
Disability parking form dropped off at our .  Left message for patient to call back. Why does patient need this? Has she had one previously?. Does she use a walker or a cane and how far can she walk?

## 2024-04-08 NOTE — TELEPHONE ENCOUNTER
Completed form received and copies made. Placed at  for patient . Left detailed message per consent.

## 2024-04-17 ENCOUNTER — TELEPHONE (OUTPATIENT)
Dept: ENDOCRINOLOGY | Facility: CLINIC | Age: 74
End: 2024-04-17
Payer: MEDICARE

## 2024-04-17 NOTE — TELEPHONE ENCOUNTER
[facility] Call Center    Phone Message    May a detailed message be left on voicemail: yes     Reason for Call: Other: Tirso spouse called to let Dr Fonseca know his wife patient Viv has passed away and wanted to thank Dr Fonseca for the great care.     Action Taken: Message routed to:  [facility] endo    Travel Screening: Not Applicable

## 2024-04-19 NOTE — TELEPHONE ENCOUNTER
Sad message from patient's  Bill noted.  I will send a condolence letter.    DIANN Fitzpatrick MD, [facility]

## 2024-05-06 ENCOUNTER — DOCUMENTATION ONLY (OUTPATIENT)
Dept: TRANSPLANT | Facility: CLINIC | Age: 74
End: 2024-05-06

## 2024-05-06 ENCOUNTER — POST MORTEM DOCUMENTATION (OUTPATIENT)
Dept: TRANSPLANT | Facility: CLINIC | Age: 74
End: 2024-05-06
Payer: MEDICARE

## 2024-05-06 NOTE — PROGRESS NOTES
Received notification on 24 at 10:16 AM of patient's death from [facility], contact information is in chart.  Place of death was reported as unknown.  Graft status at the time of death was reported as Unknown.  Additional information: Patient was admitted to [facility] in February for PE and other medical issues, and transferred to TCU shortly after. Records are not available in Care Everywhere.  TIS verification is: Pending  The Transplant Office has been notified that patient is . The Post Mortem Encounter has been completed. Notifications have been sent to the Care team.   Instructions have been sent to cancel pending appointments, discontinue pending orders, and send a sympathy card to the family.    SURI LANDON  Received notification of patient's death from EPIC report.  Place of death was [facility] as [facility] on hospice.  Graft status at the time of death was reported as Functioning.  TIS verification is: Complete

## 2024-07-27 NOTE — ED NOTES
A&Ox4. ABC's intact. Pt c/o R abdominal hardness and increase in size for the past week. Hx of right kidney transplant (2005) located in that area.  Denies pain, no bruising at site. Denies changes in urination, N/V/D.  Has not talked to her nephrologist yet.    Triage complete  
Pt reports profound right lower abdominal distension worsening over the past 24 hours; reports chronic constipation and issues with scarring from kidney transplant.  Denies pain but reports bloating, discomfort, pressure; IV initiated; MD notified    
no

## (undated) DEVICE — DRAPE CONVERTORS U-DRAPE 60X72" 8476

## (undated) DEVICE — BLADE KNIFE SURG 15 371115

## (undated) DEVICE — PACK NEURO MINOR UMMC SNE32MNMU4

## (undated) DEVICE — LINEN DRAPE 54X72" 5467

## (undated) DEVICE — SU VICRYL 3-0 SH 27" J316H

## (undated) DEVICE — EYE FLUORESCEIN OPHTHALMIC STRIP FLO-GLO 1272111

## (undated) DEVICE — STPL SKIN 35W 059037

## (undated) DEVICE — SU MONOCRYL 2-0 SH 27" UND Y417H

## (undated) DEVICE — DRAPE SHEET MED 44X70" 9355

## (undated) DEVICE — GOWN IMPERVIOUS BREATHABLE SMART XLG 89045

## (undated) DEVICE — EYE PREP BETADINE 5% SOLUTION 30ML 0065-0411-30

## (undated) DEVICE — SPONGE RAY-TEC 4X4" 7317

## (undated) DEVICE — ESU GROUND PAD UNIVERSAL W/O CORD

## (undated) DEVICE — DRAPE STERI TOWEL LG 1010

## (undated) DEVICE — SU VICRYL 1 CT-1 27" J341H

## (undated) DEVICE — BLADE SHAVER SERRATED 4MM ROTATE 1884002HRE

## (undated) DEVICE — SPONGE LAP 18X18" X8435

## (undated) DEVICE — ESU PENCIL W/HOLSTER E2350H

## (undated) DEVICE — BAG CLEAR TRASH 1.3M 39X33" P4040C

## (undated) DEVICE — DRSG AQUACEL AG HYDROFIBER  3.5X10" 422605

## (undated) DEVICE — DRAPE SHEET REV FOLD 3/4 9349

## (undated) DEVICE — ESU SUCTION CAUTERY 10FR FOOT CONTROL E2505-10FR

## (undated) DEVICE — DRSG MEPILEX BORDER SACRUM 7.2X7.2" 282000

## (undated) DEVICE — DRAIN JACKSON PRATT CHANNEL 15FR ROUND HUBLESS SIL JP-2228

## (undated) DEVICE — LINEN TOWEL PACK X30 5481

## (undated) DEVICE — SU PDS II 1 TP-1 48" Z880G

## (undated) DEVICE — ESU ELEC NDL 6" COATED/INSULATED E1465-6

## (undated) DEVICE — NDL 25GA 2"  8881200441

## (undated) DEVICE — NDL SPINAL 25GA 3.5" QUINCKE 405180

## (undated) DEVICE — SPONGE SURGIFOAM 100 1974

## (undated) DEVICE — GLOVE BIOGEL PI MICRO INDICATOR UNDERGLOVE SZ 7.5 48975

## (undated) DEVICE — BRUSH FEMORAL CANAL 210-4 0210004000

## (undated) DEVICE — SUCTION MANIFOLD NEPTUNE 2 SYS 4 PORT 0702-020-000

## (undated) DEVICE — SYR 03ML LL W/O NDL 309657

## (undated) DEVICE — SU ETHIBOND 5 V-37 4X30" MB66G

## (undated) DEVICE — SYR BULB IRRIG 50ML LATEX FREE 0035280

## (undated) DEVICE — DECANTER VIAL 2006S

## (undated) DEVICE — LINEN HALF SHEET 5512

## (undated) DEVICE — CLIP HORIZON MED BLUE 002200

## (undated) DEVICE — SU VICRYL 1 CTX 36" J371H

## (undated) DEVICE — PREP CHLORAPREP 26ML TINTED ORANGE  260815

## (undated) DEVICE — GLOVE PROTEXIS W/NEU-THERA 7.0  2D73TE70

## (undated) DEVICE — LINEN ORTHO ACL PACK 5447

## (undated) DEVICE — SU VICRYL 2-0 CT-1 27" UND J259H

## (undated) DEVICE — CATH TRAY FOLEY SURESTEP 16FR W/URNE MTR STLK LATEX A303316A

## (undated) DEVICE — ESU CORD BIPOLAR GREEN 10-4000

## (undated) DEVICE — BLADE KNIFE SURG 10 371110

## (undated) DEVICE — DRAIN JACKSON PRATT RESERVOIR 100ML SU130-1305

## (undated) DEVICE — HEMOSTAT ABSORBABLE AGENT ARISTA 3GM POWDER SM0002-USA

## (undated) DEVICE — LINEN FULL SHEET 5511

## (undated) DEVICE — BLADE CLIPPER SGL USE 9680

## (undated) DEVICE — STRAP UNIVERSAL POSITIONING 2-PIECE 4X47X76" 91-287

## (undated) DEVICE — STRAP KNEE/BODY 31143004

## (undated) DEVICE — NDL 30GA 0.5" 305106

## (undated) DEVICE — ESU ELEC BLADE 2.75" COATED/INSULATED E1455

## (undated) DEVICE — ESU GROUND PAD ADULT W/CORD E7507

## (undated) DEVICE — ENDO SHEATH STORZ SHARPSITE ENDOSCRUB 0DEG 4MM 1912000

## (undated) DEVICE — SPONGE BONE WICK FEMORAL W/SUCTION ATTACHMENT 0206-714-000

## (undated) DEVICE — DRAPE IOBAN INCISE 23X17" 6650EZ

## (undated) DEVICE — SUCTION MANIFOLD DORNOCH ULTRA CART UL-CL500

## (undated) DEVICE — COVER CAMERA IN-LIGHT DISP LT-C02

## (undated) DEVICE — DRSG MEDIPORE 3 1/2X13 3/4" 3573

## (undated) DEVICE — SYR 20ML LL W/O NDL 302830

## (undated) DEVICE — SPECIMEN CONTAINER 5OZ STERILE 2600SA

## (undated) DEVICE — BLADE SAW SAGITTAL STRK 25X90X1.27MM HD SYS 6 6125-127-090

## (undated) DEVICE — SPONGE COTTONOID 1/2X3" 80-1407

## (undated) DEVICE — LINEN TOWEL PACK X6 WHITE 5487

## (undated) DEVICE — SU PDS II 1 CTX 36" Z371T

## (undated) DEVICE — PREP SKIN SCRUB TRAY 4461A

## (undated) DEVICE — EYE SHIELD CORNEAL CROUCH  E5699

## (undated) DEVICE — APPLICATORS COTTON TIP 6"X2 STERILE LF C15053-006

## (undated) DEVICE — ENDO SHEATH STORZ SHARPSITE ENDOSCRUB 30DEG 4MM 1912010

## (undated) DEVICE — LABEL MEDICATION SYSTEM 3303-P

## (undated) DEVICE — SYR 30ML LL W/O NDL 302832

## (undated) DEVICE — ESU ELEC NDL 1" COATED/INSULATED E1465

## (undated) DEVICE — DRSG GAUZE 4X4" 8044

## (undated) DEVICE — POSITIONER ARMBOARD FOAM 1PAIR LF FP-ARMB1

## (undated) DEVICE — SU ETHILON 3-0 PS-1 18" 1663H

## (undated) DEVICE — DRAPE FLUID WARMING 52"X66" ORS-301

## (undated) DEVICE — PEN MARKING SKIN VISIMARK 1424SR

## (undated) DEVICE — DRSG XEROFORM 5X9" CUR253590W

## (undated) DEVICE — TUBING SUCTION 10'X3/16" N510

## (undated) DEVICE — LINEN TOWEL PACK X5 5464

## (undated) DEVICE — PACK MINOR EYE

## (undated) DEVICE — Device

## (undated) DEVICE — WIPES FOLEY CARE SURESTEP PROVON DFC100

## (undated) DEVICE — SYR 05ML LL W/O NDL

## (undated) DEVICE — SOL WATER IRRIG 1000ML BOTTLE 2F7114

## (undated) DEVICE — GLOVE BIOGEL PI SZ 7.5 40875

## (undated) DEVICE — SOL NACL 0.9% IRRIG 1000ML BOTTLE 2F7124

## (undated) DEVICE — PACK TOTAL HIP RIDGES LATEX PO15HIFSG

## (undated) DEVICE — SUCTION TIP YANKAUER W/O VENT K86

## (undated) DEVICE — BONE CEMENT MIXING SYSTEM REVOLUTION

## (undated) DEVICE — SU VICRYL 1 CT 36" J959H

## (undated) DEVICE — TRACKER ENT OTS INSTRUMENT FUSION 9733533

## (undated) DEVICE — DRAIN HEMOVAC RESERVOIR KIT 19FR 1/4" LG 00-2550-004-10

## (undated) DEVICE — SPONGE SURGIFOAM 01GM POWDER 1978

## (undated) DEVICE — SOL NACL 0.9% 10ML VIAL 0409-4888-02

## (undated) DEVICE — NDL ANGIOCATH 14GA 1.25" 4048

## (undated) DEVICE — BLADE SINUS TRICUT STR 4MMX13CM FUSION ROTATE W/TRACKING

## (undated) DEVICE — PREP CHLORAPREP 26ML TINTED HI-LITE ORANGE 930815

## (undated) DEVICE — CLIP HORIZON SM RED WIDE SLOT 001201

## (undated) DEVICE — SU MONOCRYL 2-0 UR-6 27" Y605H

## (undated) DEVICE — GLOVE BIOGEL PI MICRO SZ 7.5 48575

## (undated) DEVICE — GLOVE BIOGEL PI SZ 8.5 40885

## (undated) DEVICE — TUBING SUCTION MEDI-VAC 1/4"X20' N620A

## (undated) DEVICE — SET HANDPIECE INTERPULSE W/COAXIAL FAN SPRAY TIP 0210118000

## (undated) DEVICE — DEVICE SUTURE GRASPER TROCAR CLOSURE 14GA PMITCSG

## (undated) DEVICE — GLOVE BIOGEL PI MICRO SZ 8.5 48585

## (undated) RX ORDER — ONDANSETRON 2 MG/ML
INJECTION INTRAMUSCULAR; INTRAVENOUS
Status: DISPENSED
Start: 2023-01-01

## (undated) RX ORDER — FENTANYL CITRATE 50 UG/ML
INJECTION, SOLUTION INTRAMUSCULAR; INTRAVENOUS
Status: DISPENSED
Start: 2024-01-01

## (undated) RX ORDER — TRANEXAMIC ACID 10 MG/ML
INJECTION, SOLUTION INTRAVENOUS
Status: DISPENSED
Start: 2024-01-01

## (undated) RX ORDER — PROPOFOL 10 MG/ML
INJECTION, EMULSION INTRAVENOUS
Status: DISPENSED
Start: 2023-01-01

## (undated) RX ORDER — ONDANSETRON 2 MG/ML
INJECTION INTRAMUSCULAR; INTRAVENOUS
Status: DISPENSED
Start: 2017-10-16

## (undated) RX ORDER — LIDOCAINE HYDROCHLORIDE AND EPINEPHRINE 10; 10 MG/ML; UG/ML
INJECTION, SOLUTION INFILTRATION; PERINEURAL
Status: DISPENSED
Start: 2023-01-01

## (undated) RX ORDER — FENTANYL CITRATE-0.9 % NACL/PF 10 MCG/ML
PLASTIC BAG, INJECTION (ML) INTRAVENOUS
Status: DISPENSED
Start: 2023-01-01

## (undated) RX ORDER — GLYCOPYRROLATE 0.2 MG/ML
INJECTION, SOLUTION INTRAMUSCULAR; INTRAVENOUS
Status: DISPENSED
Start: 2017-10-16

## (undated) RX ORDER — PROPOFOL 10 MG/ML
INJECTION, EMULSION INTRAVENOUS
Status: DISPENSED
Start: 2017-10-16

## (undated) RX ORDER — EPINEPHRINE NASAL SOLUTION 1 MG/ML
SOLUTION NASAL
Status: DISPENSED
Start: 2023-01-01

## (undated) RX ORDER — PROPOFOL 10 MG/ML
INJECTION, EMULSION INTRAVENOUS
Status: DISPENSED
Start: 2024-01-01

## (undated) RX ORDER — ESMOLOL HYDROCHLORIDE 10 MG/ML
INJECTION INTRAVENOUS
Status: DISPENSED
Start: 2023-01-01

## (undated) RX ORDER — FENTANYL CITRATE 50 UG/ML
INJECTION, SOLUTION INTRAMUSCULAR; INTRAVENOUS
Status: DISPENSED
Start: 2017-10-16

## (undated) RX ORDER — HYDROMORPHONE HYDROCHLORIDE 1 MG/ML
INJECTION, SOLUTION INTRAMUSCULAR; INTRAVENOUS; SUBCUTANEOUS
Status: DISPENSED
Start: 2017-10-16

## (undated) RX ORDER — EPHEDRINE SULFATE 50 MG/ML
INJECTION, SOLUTION INTRAMUSCULAR; INTRAVENOUS; SUBCUTANEOUS
Status: DISPENSED
Start: 2017-10-16

## (undated) RX ORDER — CEFAZOLIN SODIUM 2 G/100ML
INJECTION, SOLUTION INTRAVENOUS
Status: DISPENSED
Start: 2017-10-16

## (undated) RX ORDER — GLYCOPYRROLATE 0.2 MG/ML
INJECTION, SOLUTION INTRAMUSCULAR; INTRAVENOUS
Status: DISPENSED
Start: 2024-01-01

## (undated) RX ORDER — TOBRAMYCIN 1.2 G/30ML
INJECTION, POWDER, LYOPHILIZED, FOR SOLUTION INTRAVENOUS
Status: DISPENSED
Start: 2024-01-01

## (undated) RX ORDER — EPHEDRINE SULFATE 50 MG/ML
INJECTION, SOLUTION INTRAMUSCULAR; INTRAVENOUS; SUBCUTANEOUS
Status: DISPENSED
Start: 2023-01-01

## (undated) RX ORDER — CEFAZOLIN SODIUM/WATER 2 G/20 ML
SYRINGE (ML) INTRAVENOUS
Status: DISPENSED
Start: 2023-01-01

## (undated) RX ORDER — LIDOCAINE HYDROCHLORIDE 20 MG/ML
INJECTION, SOLUTION EPIDURAL; INFILTRATION; INTRACAUDAL; PERINEURAL
Status: DISPENSED
Start: 2017-10-16

## (undated) RX ORDER — ONDANSETRON 2 MG/ML
INJECTION INTRAMUSCULAR; INTRAVENOUS
Status: DISPENSED
Start: 2024-01-01

## (undated) RX ORDER — VANCOMYCIN HYDROCHLORIDE 1 G/20ML
INJECTION, POWDER, LYOPHILIZED, FOR SOLUTION INTRAVENOUS
Status: DISPENSED
Start: 2024-01-01

## (undated) RX ORDER — DEXAMETHASONE SODIUM PHOSPHATE 4 MG/ML
INJECTION, SOLUTION INTRA-ARTICULAR; INTRALESIONAL; INTRAMUSCULAR; INTRAVENOUS; SOFT TISSUE
Status: DISPENSED
Start: 2024-01-01

## (undated) RX ORDER — DEXTROSE, SODIUM CHLORIDE, SODIUM LACTATE, POTASSIUM CHLORIDE, AND CALCIUM CHLORIDE 5; .6; .31; .03; .02 G/100ML; G/100ML; G/100ML; G/100ML; G/100ML
INJECTION, SOLUTION INTRAVENOUS
Status: DISPENSED
Start: 2017-10-16

## (undated) RX ORDER — REGADENOSON 0.08 MG/ML
INJECTION, SOLUTION INTRAVENOUS
Status: DISPENSED
Start: 2017-07-11

## (undated) RX ORDER — LIDOCAINE HYDROCHLORIDE 10 MG/ML
INJECTION, SOLUTION EPIDURAL; INFILTRATION; INTRACAUDAL; PERINEURAL
Status: DISPENSED
Start: 2024-01-01

## (undated) RX ORDER — FENTANYL CITRATE 50 UG/ML
INJECTION, SOLUTION INTRAMUSCULAR; INTRAVENOUS
Status: DISPENSED
Start: 2023-01-01

## (undated) RX ORDER — OXYMETAZOLINE HYDROCHLORIDE 0.05 G/100ML
SPRAY NASAL
Status: DISPENSED
Start: 2023-01-01

## (undated) RX ORDER — CEFAZOLIN SODIUM/WATER 2 G/20 ML
SYRINGE (ML) INTRAVENOUS
Status: DISPENSED
Start: 2024-01-01

## (undated) RX ORDER — CEFAZOLIN SODIUM 1 G/3ML
INJECTION, POWDER, FOR SOLUTION INTRAMUSCULAR; INTRAVENOUS
Status: DISPENSED
Start: 2017-10-16

## (undated) RX ORDER — ACETAMINOPHEN 325 MG/1
TABLET ORAL
Status: DISPENSED
Start: 2023-01-01